# Patient Record
Sex: MALE | Race: BLACK OR AFRICAN AMERICAN | NOT HISPANIC OR LATINO
[De-identification: names, ages, dates, MRNs, and addresses within clinical notes are randomized per-mention and may not be internally consistent; named-entity substitution may affect disease eponyms.]

---
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
## 2021-01-12 ENCOUNTER — NON-APPOINTMENT (OUTPATIENT)
Age: 21
End: 2021-01-12

## 2021-01-13 ENCOUNTER — APPOINTMENT (OUTPATIENT)
Dept: PEDIATRIC HEMATOLOGY/ONCOLOGY | Facility: CLINIC | Age: 21
End: 2021-01-13

## 2021-01-13 ENCOUNTER — OUTPATIENT (OUTPATIENT)
Dept: OUTPATIENT SERVICES | Age: 21
LOS: 1 days | End: 2021-01-13

## 2021-01-13 DIAGNOSIS — Z98.89 OTHER SPECIFIED POSTPROCEDURAL STATES: Chronic | ICD-10-CM

## 2021-01-13 DIAGNOSIS — Q24.9 CONGENITAL MALFORMATION OF HEART, UNSPECIFIED: Chronic | ICD-10-CM

## 2021-01-13 DIAGNOSIS — Z98.890 OTHER SPECIFIED POSTPROCEDURAL STATES: Chronic | ICD-10-CM

## 2021-03-12 NOTE — PATIENT PROFILE PEDIATRIC. - EXTENSIONS OF SELF_PEDS
none Protopic Pregnancy And Lactation Text: This medication is Pregnancy Category C. It is unknown if this medication is excreted in breast milk when applied topically.

## 2021-03-23 ENCOUNTER — APPOINTMENT (OUTPATIENT)
Dept: PEDIATRIC CARDIOLOGY | Facility: CLINIC | Age: 21
End: 2021-03-23
Payer: MEDICAID

## 2021-03-23 VITALS
HEIGHT: 66.54 IN | DIASTOLIC BLOOD PRESSURE: 65 MMHG | OXYGEN SATURATION: 90 % | SYSTOLIC BLOOD PRESSURE: 128 MMHG | HEART RATE: 85 BPM | BODY MASS INDEX: 23.11 KG/M2 | WEIGHT: 145.51 LBS

## 2021-03-23 PROCEDURE — 99072 ADDL SUPL MATRL&STAF TM PHE: CPT

## 2021-03-23 PROCEDURE — 93288 INTERROG EVL PM/LDLS PM IP: CPT

## 2021-03-23 PROCEDURE — 99214 OFFICE O/P EST MOD 30 MIN: CPT

## 2021-03-24 NOTE — DISCUSSION/SUMMARY
[Needs SBE Prophylaxis] : [unfilled]  needs bacterial endocarditis prophylaxis. SBE prophylaxis is indicated for dental and invasive ENT procedures. (Circulation. 2007; 116: 9866-9992) [PE + No Varsity Sports or Strenuous Activity] : [unfilled] may participate in the physical education program, WITH RESTRICTION from all varsity sports and from excessively stressful activities such as rope climbing, weight lifting, sustained running (i.e. laps) and fitness testing. Must be allowed to rest when tired. [FreeTextEntry1] : In summary Jimbo is a 20 year old with history of DILV, L-malposition of the great arteries status post lateral tunnel Fontan with sick sinus node syndrome, AV block with tachybradycardia syndrome (and IART), s/p dual chamber pacemaker and CRT system who is seen today for follow up. He has an RV lead fracture and is only LV paced now. Today on pacemaker interrogation, he is noticed to be in AT that started 3 days ago. He has remained asymptomatic through this. I am reassured that he has no signs of low cardiac output. In the past ATP has not worked for him to abort the AT. At this time, knowing that he has had frequent recurrent AT while he is anticoagulated and rate controlled because of his heart block, I don’t see any benefit of cardioversion at this time.  I recommended that he undergoes an ablation for IART. The risks and benefits of the procedure were discussed with mother and the patient. We will work on scheduling the procedure. I advised him to send weekly transmissions and follow up in 2-3 months. He will follow up with Dr Londono in 3 months.

## 2021-03-24 NOTE — CARDIOLOGY SUMMARY
[Today's Date] : [unfilled] [FreeTextEntry1] : Likely IART with atrial rate of 250. V paced at the rate of 80. QTc in the setting of V pacing is 450 ms.  [de-identified] : PACEMAKER EVALUATION (See separate note for details): Presenting rhythm was AT  @ 80 bpm. CL of the AT was ~ 240 ms. The pacemaker mode is DDDR with an AV delay of 100/130 ms. PVARP is 250 ms with 2:1 block at 188. The lower rate limit is 80 with an upper tracking limit of 115 bpm. He has 16 months remaining on the battery. Adequate lead parameters of the LV lead. Low p wave amplitudes. \par Several events noted of AT. Since 3 days ago patient has consistently been in AT with V rate of 80. No changes were made.

## 2021-03-24 NOTE — HISTORY OF PRESENT ILLNESS
[FreeTextEntry1] : It was a pleasure to see Jimbo Myers today in the EP clinic for the follow up of pacemaker. As you know, he is a 20 year old with single ventricle physiology (double-inlet left ventricle with L-malposition of the great arteries) who is status post Jykqd-Ejlj-Uagtfwy anastomosis and aortic arch reconstruction followed by a fenestrated lateral tunnel Fontan completion (now s/p fenestration closure). He underwent placement of dual-chamber epicardial leads for AV sequential pacing and antitachycardia functionality for sick sinus node syndrome with AV mihir disease, tachybradycardia syndrome and IART. \par He developed features of failing Fontan in 2016- cardiac cath revealed LV dysfunction with elevated PVR and mild fontan obstruction; a stent was placed at the site of Fontan stenosis, pulmonary vasodilators and CHF meds were initiated and cardiac resynchronization therapy with placement of additional ventricular lead led to an excellent clinical response and he was noted to be doing well until recently when in June, 2020 he presented to the ER with syncope. CT head was performed and was normal, however CXR revealed a likely RV fracture and upon device interrogation the RV lead impedance did increase. He has previously been on coumadin for anti-coagulation but it was changed to Rivoraxiban by Hematology.  \par \par He was admitted to PICU in Sept, 2020 after being seen in the clinic for A. fib that required cardioversion. He was last seen in the clinic on Dec 2020 when he was doing well. Today he denies any symptoms of chest pain, palpitations, dizziness or syncope. He has recently started lifting weights. He lifts about 50 lbs in each hand, he does it twice a day (for 2 hrs) and has not had any limitations with exercise. He says that he is compliant with his medications. He continues on Sotalol 120 mg twice a day and Xarelto. \par

## 2021-03-24 NOTE — PHYSICAL EXAM
[Facies] : the head and face were normal in appearance [Sclera] : the sclera were normal [Outer Ear] : the ears and nose were normal in appearance [II] : a grade 2/6 [LLSB] : LLSB  [Apical] : apex [Holodiastolic] : holodiastolic [Abdomen Soft] : soft [Nail Clubbing] : no clubbing  or cyanosis of the fingernails [Delayed Developmental Milestones] : normal neurologic development for age [] : no rash [Demonstrated Behavior - Infant Nonreactive To Parents] : interactive [FreeTextEntry1] : Normal S1 and single S2

## 2021-03-24 NOTE — CONSULT LETTER
[Today's Date] : [unfilled] [Name] : Name: [unfilled] [] : : ~~ [Today's Date:] : [unfilled] [Dear  ___:] : Dear Dr. [unfilled]: [Consult] : I had the pleasure of evaluating your patient, [unfilled]. My full evaluation follows. [Consult - Single Provider] : Thank you very much for allowing me to participate in the care of this patient. If you have any questions, please do not hesitate to contact me. [Sincerely,] : Sincerely, [DrAmarilys ___] : Dr. WU [FreeTextEntry4] : Adonis Londono MD [FreeTextEntry5] : Pediatric Cardiology [FreeTextEntry6] : [facility] [place] [de-identified] : Issa Christensen MD, FACC, FHRS, FAAP\par Associate Chief, Pediatric Cardiology\par , Pediatric Cardiac Electrophysiology\par [facility]\par [facility]\par Professor of Pediatrics\par [employer]\par \par \par

## 2021-03-24 NOTE — REASON FOR VISIT
[Follow-Up] : a follow-up visit for [S/P Cardiac Surgery] : status post cardiac surgery [S/P Catheterization] : status post catheterization [Atrial Flutter] : atrial flutter [Patient] : patient [Mother] : mother [FreeTextEntry1] : sinus node dysfunction [FreeTextEntry3] : complex congenital heart disease

## 2021-04-19 ENCOUNTER — APPOINTMENT (OUTPATIENT)
Dept: PEDIATRIC CARDIOLOGY | Facility: CLINIC | Age: 21
End: 2021-04-19

## 2021-05-18 ENCOUNTER — APPOINTMENT (OUTPATIENT)
Dept: PEDIATRIC CARDIOLOGY | Facility: CLINIC | Age: 21
End: 2021-05-18
Payer: MEDICAID

## 2021-05-18 ENCOUNTER — OUTPATIENT (OUTPATIENT)
Dept: OUTPATIENT SERVICES | Age: 21
LOS: 1 days | End: 2021-05-18

## 2021-05-18 ENCOUNTER — APPOINTMENT (OUTPATIENT)
Dept: PEDIATRIC CARDIOLOGY | Facility: CLINIC | Age: 21
End: 2021-05-18

## 2021-05-18 VITALS
DIASTOLIC BLOOD PRESSURE: 80 MMHG | OXYGEN SATURATION: 92 % | HEART RATE: 83 BPM | WEIGHT: 145.73 LBS | HEIGHT: 65.55 IN | TEMPERATURE: 97 F | SYSTOLIC BLOOD PRESSURE: 129 MMHG

## 2021-05-18 DIAGNOSIS — Z98.89 OTHER SPECIFIED POSTPROCEDURAL STATES: Chronic | ICD-10-CM

## 2021-05-18 DIAGNOSIS — Z98.890 OTHER SPECIFIED POSTPROCEDURAL STATES: Chronic | ICD-10-CM

## 2021-05-18 DIAGNOSIS — Z95.0 PRESENCE OF CARDIAC PACEMAKER: Chronic | ICD-10-CM

## 2021-05-18 DIAGNOSIS — Z95.818 PRESENCE OF OTHER CARDIAC IMPLANTS AND GRAFTS: Chronic | ICD-10-CM

## 2021-05-18 DIAGNOSIS — I49.5 SICK SINUS SYNDROME: ICD-10-CM

## 2021-05-18 DIAGNOSIS — Q20.4 DOUBLE INLET VENTRICLE: ICD-10-CM

## 2021-05-18 DIAGNOSIS — Q24.9 CONGENITAL MALFORMATION OF HEART, UNSPECIFIED: Chronic | ICD-10-CM

## 2021-05-18 LAB
ALBUMIN SERPL ELPH-MCNC: 4.9 G/DL — SIGNIFICANT CHANGE UP (ref 3.3–5)
ALP SERPL-CCNC: 125 U/L — SIGNIFICANT CHANGE UP (ref 60–270)
ALT FLD-CCNC: 26 U/L — SIGNIFICANT CHANGE UP (ref 4–41)
ANION GAP SERPL CALC-SCNC: 13 MMOL/L — SIGNIFICANT CHANGE UP (ref 7–14)
AST SERPL-CCNC: 21 U/L — SIGNIFICANT CHANGE UP (ref 4–40)
BILIRUB SERPL-MCNC: 1.1 MG/DL — SIGNIFICANT CHANGE UP (ref 0.2–1.2)
BLD GP AB SCN SERPL QL: NEGATIVE — SIGNIFICANT CHANGE UP
BUN SERPL-MCNC: 16 MG/DL — SIGNIFICANT CHANGE UP (ref 7–23)
CALCIUM SERPL-MCNC: 9.8 MG/DL — SIGNIFICANT CHANGE UP (ref 8.4–10.5)
CHLORIDE SERPL-SCNC: 103 MMOL/L — SIGNIFICANT CHANGE UP (ref 98–107)
CO2 SERPL-SCNC: 24 MMOL/L — SIGNIFICANT CHANGE UP (ref 22–31)
CREAT SERPL-MCNC: 0.85 MG/DL — SIGNIFICANT CHANGE UP (ref 0.5–1.3)
GLUCOSE SERPL-MCNC: 95 MG/DL — SIGNIFICANT CHANGE UP (ref 70–99)
HCT VFR BLD CALC: 49.7 % — SIGNIFICANT CHANGE UP (ref 39–50)
HGB BLD-MCNC: 15.4 G/DL — SIGNIFICANT CHANGE UP (ref 13–17)
MCHC RBC-ENTMCNC: 25.8 PG — LOW (ref 27–34)
MCHC RBC-ENTMCNC: 31 GM/DL — LOW (ref 32–36)
MCV RBC AUTO: 83.4 FL — SIGNIFICANT CHANGE UP (ref 80–100)
NRBC # BLD: 0 /100 WBCS — SIGNIFICANT CHANGE UP
NRBC # FLD: 0 K/UL — SIGNIFICANT CHANGE UP
NT-PROBNP SERPL-SCNC: 211 PG/ML — SIGNIFICANT CHANGE UP
PLATELET # BLD AUTO: 112 K/UL — LOW (ref 150–400)
POTASSIUM SERPL-MCNC: 4.2 MMOL/L — SIGNIFICANT CHANGE UP (ref 3.5–5.3)
POTASSIUM SERPL-SCNC: 4.2 MMOL/L — SIGNIFICANT CHANGE UP (ref 3.5–5.3)
PROT SERPL-MCNC: 8.1 G/DL — SIGNIFICANT CHANGE UP (ref 6–8.3)
RBC # BLD: 5.96 M/UL — HIGH (ref 4.2–5.8)
RBC # FLD: 13.9 % — SIGNIFICANT CHANGE UP (ref 10.3–14.5)
RH IG SCN BLD-IMP: NEGATIVE — SIGNIFICANT CHANGE UP
SODIUM SERPL-SCNC: 140 MMOL/L — SIGNIFICANT CHANGE UP (ref 135–145)
WBC # BLD: 5.37 K/UL — SIGNIFICANT CHANGE UP (ref 3.8–10.5)
WBC # FLD AUTO: 5.37 K/UL — SIGNIFICANT CHANGE UP (ref 3.8–10.5)

## 2021-05-18 PROCEDURE — 93303 ECHO TRANSTHORACIC: CPT

## 2021-05-18 PROCEDURE — 93325 DOPPLER ECHO COLOR FLOW MAPG: CPT

## 2021-05-18 PROCEDURE — 93320 DOPPLER ECHO COMPLETE: CPT

## 2021-05-18 NOTE — H&P PST ADULT - NSICDXPASTMEDICALHX_GEN_ALL_CORE_FT
PAST MEDICAL HISTORY:  Atrial tachycardia     AV block     D-TGA (dextro-transposition of great arteries)     Double inlet left ventricle     Hepatomegaly     Other ascites     Pacemaker     Pulmonary hypertension     S/P Fontan procedure     Sick sinus syndrome      PAST MEDICAL HISTORY:  Atrial tachycardia     AV block     D-TGA (dextro-transposition of great arteries)     Double inlet left ventricle     Hepatomegaly     Other ascites     Pacemaker     Pulmonary hypertension     Sick sinus syndrome      PAST MEDICAL HISTORY:  Atrial tachycardia     AV block     Coagulopathy     D-TGA (dextro-transposition of great arteries)     Double inlet left ventricle     Hepatomegaly     Other ascites     Pacemaker     Pulmonary hypertension     Sick sinus syndrome

## 2021-05-18 NOTE — H&P PST ADULT - REASON FOR ADMISSION
PST evaluation prior to bilateral heart cath EPS ablation HOLA with Dr. Christensen on 5/24/21 at [facility] [place].

## 2021-05-18 NOTE — H&P PST ADULT - CARDIOVASCULAR COMMENTS
well healed MS incision H/o DILV, L-malposition of the great arteries status post lateral tunnel Fontan with sick sinus node syndrome, AV block with tachybradycardia syndrome (and IART), s/p dual chamber pacemaker and CRT system who is seen today for follow up. He has an RV lead fracture and is only LV paced. Now scheduled for bilateral heart cath EPS ablation and HOLA with Dr. Christensen on 5/24/2021. Please see attached cardiology for further details.

## 2021-05-18 NOTE — H&P PST ADULT - NSICDXPASTSURGICALHX_GEN_ALL_CORE_FT
PAST SURGICAL HISTORY:  Cardiac anomaly, congenital     S/P bidirectional Storm shunt     S/P Fontan procedure fenestration closed with device    S/P Fontan procedure Cardiac cath with stent in Fontan conduit placed 10/24/16    [facility]     Status post Fontan operation      PAST SURGICAL HISTORY:  Cardiac pacemaker 5/26/2004    S/P Nirmal-Taussig shunt Neqkz-Qgkd-Rbfgzjz anastomosis with modified right BT shunt and creation of pulmonary atresia    S/P cardiac cath 2001-assessment of anatomy prior to 1st surgery  4/13/2004- coil emolization of collateral vessels  7/25/2011-balloon angioplasty of superior narrowing of Fontan circuit, balloon angioplasty of LPA, ASD device placed for closure of Fontan fenestraion  10/24/2016- Cath of Fontan    S/P celestine-Fontan operation 3/15/2003- Celestine-Fontan with ligation of BT shunt and left pulmonary artery plasty

## 2021-05-18 NOTE — H&P PST ADULT - NS PRO REFERRAL CMGT
Review of education for day of procedure was provided. Pt. expressed strong understanding due to previous surgical/medical experience. Pt. appeared to be coping well.

## 2021-05-18 NOTE — H&P PST ADULT - ASSESSMENT
21yo male with complex cardiac h/o DILV, L-malposition of great arteries, sick sinus syndrome and AV mihir disease with tachybradycardia syndrome with a dual chamber pacemaker (currently with RV lead fracture and is currently only LV paced). PSH of status post Qpfsy-Gpzd-Uwfoydq anastomosis and aortic arch reconstruction followed by a fenestrated lateral tunnel Fontan completion (now s/p fenestration closure). multiple cardiac caths, pacemaker placement, CBC, CMP, Pro-BNP, and T/S sent as indicated. No evidence of acute illness or infection. Child life prep with family. CHG wipes given and detailed instructions given.    *Per cardiology patient instructed to hold Sotalol started 5/16/21 (mother confirmed medication was stopped) and to hold Xarelto on day of procedure.  19yo male with complex cardiac h/o DILV, L-malposition of great arteries, sick sinus syndrome and AV mihir disease with tachybradycardia syndrome with a dual chamber pacemaker (currently with RV lead fracture and is currently only LV paced). PSH of status post Bndkh-Zaiw-Pnfsrtr anastomosis and aortic arch reconstruction followed by a fenestrated lateral tunnel Fontan completion (now s/p fenestration closure). multiple cardiac caths, pacemaker placement, CBC, CMP, Pro-BNP, and T/S sent as indicated. Pt is scheduled for Covid PCR on 5/21/21.  No evidence of acute illness or infection. Child life prep with family. CHG wipes given and detailed instructions given.    *Per cardiology patient instructed to hold Sotalol started 5/16/21 (mother confirmed medication was stopped) and to hold Xarelto on day of procedure.  19yo male with complex cardiac h/o DILV, L-malposition of great arteries, sick sinus syndrome and AV mihir disease with tachybradycardia syndrome with a dual chamber pacemaker (currently with RV lead fracture and is currently only LV paced). PSH of status post Vucwy-Whfa-Rdqfabe anastomosis and aortic arch reconstruction followed by a fenestrated lateral tunnel Fontan completion (now s/p fenestration closure). multiple cardiac caths, pacemaker placement, CBC, CMP, Pro-BNP, and T/S sent as indicated. Pt is scheduled for Covid PCR on 5/21/21.  No evidence of acute illness or infection. Child life prep with family. CHG wipes given and detailed instructions given.    *Per cardiology patient instructed to hold Sotalol starting 5/16/21 (mother confirmed medication was stopped) and to hold Xarelto on day of procedure.

## 2021-05-18 NOTE — H&P PST ADULT - NSICDXPROBLEM_GEN_ALL_CORE_FT
PROBLEM DIAGNOSES  Problem: Sick sinus syndrome  Assessment and Plan: bilateral heart cath [place]A [place]. [place] [facility] [place]       PROBLEM DIAGNOSES  Problem: Sick sinus syndrome  Assessment and Plan: bilateral heart cath EPS ablation HOLA with Dr. Christensen on 5/24/21 at [facility] [place].    Problem: DILV (double inlet left ventricle)  Assessment and Plan: hold Sotalol starting 5/16/21    Problem: Coagulopathy  Assessment and Plan:  hold Xarelto on day of procedure

## 2021-05-18 NOTE — H&P PST ADULT - URINARY CATHETER
From: Neil Davis  To: Neil Gordon MD  Sent: 8/11/2020 3:40 PM CDT  Subject: Lab Test or Test Related Question    Heldaquan Gordon,  After a little over a month of daily taking 11 mg of Xeljanz XR,I would like to schedule updated blood and liver tests...to follow, if any internal effects or changes. I can tell you physical effects I feel that were not there prior to,some muscle aching,shortness of breath..the feeling of tiredness more than usual. I am also the effect on my liver if any..reading the Xeljanz medical guide there is a risk of increased of death in people 50 years and older I'm 73 with rheumatoid arthritis..I would like to confirm or deny if I am within that percentage/classification. Also discuss the additional supplements and or medication I am taking and its interaction with the Xeljanz xr. Please reply  Thank you Mr. Neil Davis  
no

## 2021-05-18 NOTE — H&P PST ADULT - NOTES
[employer] bud[employer] FHx: Mother: PCOS, Father: Healthy, Sister (5yo): Healthy. No family history of anesthesia complications or prolonged bleeding. FHx: Mother: PCOS, Father: Healthy, Sister (7yo): Healthy. No family history of anesthesia complications or prolonged bleeding. All vaccines reportedly UTD. No vaccine in past 2 weeks, educated parent on avoiding any vaccines until 3 days after surgery. No recent travel in the past few months in or outside of the [place]. No known exposure to anyone with Covid-19 virus.

## 2021-05-18 NOTE — H&P PST ADULT - EKG AND INTERPRETATION
EK2021. Likely IART with atrial rate of 250. V paced at the rate of 80. QTc in the setting of V pacing is 450 ms.  Echo: 2020. No significant interval change as compare to previous echo. Mild to moderate AV valve regurgitation. (see attached)

## 2021-05-18 NOTE — H&P PST ADULT - HEMATOLOGY/LYMPHATICS COMMENTS
Follows with hematology, maintained on Xarelto to prevent any thromboembolism/ stroke associated with underlying cardiac disorder.

## 2021-05-19 DIAGNOSIS — Q20.4 DOUBLE INLET VENTRICLE: ICD-10-CM

## 2021-05-19 DIAGNOSIS — D68.9 COAGULATION DEFECT, UNSPECIFIED: ICD-10-CM

## 2021-05-20 DIAGNOSIS — Z01.818 ENCOUNTER FOR OTHER PREPROCEDURAL EXAMINATION: ICD-10-CM

## 2021-05-21 ENCOUNTER — APPOINTMENT (OUTPATIENT)
Dept: DISASTER EMERGENCY | Facility: CLINIC | Age: 21
End: 2021-05-21

## 2021-05-21 PROBLEM — D68.9 COAGULATION DEFECT, UNSPECIFIED: Chronic | Status: ACTIVE | Noted: 2021-05-19

## 2021-05-22 LAB — SARS-COV-2 N GENE NPH QL NAA+PROBE: NOT DETECTED

## 2021-05-24 ENCOUNTER — APPOINTMENT (OUTPATIENT)
Dept: DISASTER EMERGENCY | Facility: CLINIC | Age: 21
End: 2021-05-24

## 2021-05-25 LAB — SARS-COV-2 N GENE NPH QL NAA+PROBE: NOT DETECTED

## 2021-05-26 ENCOUNTER — INPATIENT (INPATIENT)
Age: 21
LOS: 33 days | Discharge: HOME | End: 2021-06-29
Attending: PEDIATRICS | Admitting: PEDIATRICS
Payer: MEDICAID

## 2021-05-26 VITALS
RESPIRATION RATE: 20 BRPM | TEMPERATURE: 98 F | HEART RATE: 81 BPM | SYSTOLIC BLOOD PRESSURE: 127 MMHG | OXYGEN SATURATION: 94 % | HEIGHT: 65.55 IN | WEIGHT: 145.73 LBS | DIASTOLIC BLOOD PRESSURE: 72 MMHG

## 2021-05-26 DIAGNOSIS — Z98.890 OTHER SPECIFIED POSTPROCEDURAL STATES: Chronic | ICD-10-CM

## 2021-05-26 DIAGNOSIS — Z95.0 PRESENCE OF CARDIAC PACEMAKER: Chronic | ICD-10-CM

## 2021-05-26 DIAGNOSIS — Z95.818 PRESENCE OF OTHER CARDIAC IMPLANTS AND GRAFTS: Chronic | ICD-10-CM

## 2021-05-26 DIAGNOSIS — Q20.4 DOUBLE INLET VENTRICLE: ICD-10-CM

## 2021-05-26 LAB
BASE EXCESS BLDC CALC-SCNC: -2.5 MMOL/L — SIGNIFICANT CHANGE UP
BLD GP AB SCN SERPL QL: NEGATIVE — SIGNIFICANT CHANGE UP
BLOOD GAS PROFILE - CAPILLARY W/ LACTATE RESULT: SIGNIFICANT CHANGE UP
CA-I BLDC-SCNC: 1.09 MMOL/L — LOW (ref 1.1–1.35)
COHGB MFR BLDC: 1 % — SIGNIFICANT CHANGE UP
HCO3 BLDC-SCNC: 23 MMOL/L — SIGNIFICANT CHANGE UP
HGB BLD-MCNC: 15.6 G/DL — SIGNIFICANT CHANGE UP (ref 13–17)
LACTATE, CAPILLARY RESULT: 1.8 MMOL/L — HIGH (ref 0.5–1.6)
METHGB MFR BLDC: 0.8 % — SIGNIFICANT CHANGE UP
OXYHGB MFR BLDC: 92.7 % — SIGNIFICANT CHANGE UP
PCO2 BLDC: 30.4 MMHG — SIGNIFICANT CHANGE UP (ref 30–65)
PH BLDC: 7.45 — SIGNIFICANT CHANGE UP (ref 7.2–7.45)
PO2 BLDC: 66 MMHG — SIGNIFICANT CHANGE UP (ref 30–65)
POTASSIUM BLDC-SCNC: 4.3 MMOL/L — SIGNIFICANT CHANGE UP (ref 3.5–5)
RH IG SCN BLD-IMP: NEGATIVE — SIGNIFICANT CHANGE UP
SAO2 % BLDC: 94.4 % — SIGNIFICANT CHANGE UP
SODIUM BLDC-SCNC: 140 MMOL/L — SIGNIFICANT CHANGE UP (ref 135–145)

## 2021-05-26 PROCEDURE — 94681 O2 UPTK CO2 OUTP % O2 XTRC: CPT | Mod: 26

## 2021-05-26 PROCEDURE — 93531: CPT | Mod: 26

## 2021-05-26 PROCEDURE — 93325 DOPPLER ECHO COLOR FLOW MAPG: CPT | Mod: 26

## 2021-05-26 PROCEDURE — 93568 NJX CAR CTH NSLC P-ART ANGRP: CPT | Mod: 59

## 2021-05-26 PROCEDURE — 93320 DOPPLER ECHO COMPLETE: CPT | Mod: 26

## 2021-05-26 PROCEDURE — 93653 COMPRE EP EVAL TX SVT: CPT | Mod: 22

## 2021-05-26 PROCEDURE — 93010 ELECTROCARDIOGRAM REPORT: CPT

## 2021-05-26 PROCEDURE — 93621 COMP EP EVL L PAC&REC C SINS: CPT | Mod: 26

## 2021-05-26 PROCEDURE — 93613 INTRACARDIAC EPHYS 3D MAPG: CPT

## 2021-05-26 PROCEDURE — 76937 US GUIDE VASCULAR ACCESS: CPT | Mod: 26

## 2021-05-26 PROCEDURE — 71045 X-RAY EXAM CHEST 1 VIEW: CPT | Mod: 26

## 2021-05-26 PROCEDURE — 99291 CRITICAL CARE FIRST HOUR: CPT

## 2021-05-26 PROCEDURE — 93010 ELECTROCARDIOGRAM REPORT: CPT | Mod: 77

## 2021-05-26 PROCEDURE — 93315 ECHO TRANSESOPHAGEAL: CPT | Mod: 26

## 2021-05-26 RX ORDER — PROPOFOL 10 MG/ML
1 INJECTION, EMULSION INTRAVENOUS
Qty: 1000 | Refills: 0 | Status: DISCONTINUED | OUTPATIENT
Start: 2021-05-26 | End: 2021-05-27

## 2021-05-26 RX ORDER — PROPOFOL 10 MG/ML
10 INJECTION, EMULSION INTRAVENOUS ONCE
Refills: 0 | Status: DISCONTINUED | OUTPATIENT
Start: 2021-05-26 | End: 2021-05-27

## 2021-05-26 RX ORDER — PROPOFOL 10 MG/ML
30 INJECTION, EMULSION INTRAVENOUS ONCE
Refills: 0 | Status: COMPLETED | OUTPATIENT
Start: 2021-05-26 | End: 2021-05-26

## 2021-05-26 RX ORDER — DEXTROSE MONOHYDRATE, SODIUM CHLORIDE, AND POTASSIUM CHLORIDE 50; .745; 4.5 G/1000ML; G/1000ML; G/1000ML
1000 INJECTION, SOLUTION INTRAVENOUS
Refills: 0 | Status: DISCONTINUED | OUTPATIENT
Start: 2021-05-26 | End: 2021-05-27

## 2021-05-26 RX ORDER — SOTALOL HCL 120 MG
120 TABLET ORAL EVERY 12 HOURS
Refills: 0 | Status: DISCONTINUED | OUTPATIENT
Start: 2021-05-26 | End: 2021-05-26

## 2021-05-26 RX ORDER — FUROSEMIDE 40 MG
20 TABLET ORAL EVERY 12 HOURS
Refills: 0 | Status: DISCONTINUED | OUTPATIENT
Start: 2021-05-27 | End: 2021-05-28

## 2021-05-26 RX ORDER — CALCIUM GLUCONATE 100 MG/ML
2000 VIAL (ML) INTRAVENOUS ONCE
Refills: 0 | Status: COMPLETED | OUTPATIENT
Start: 2021-05-26 | End: 2021-05-26

## 2021-05-26 RX ORDER — PROPOFOL 10 MG/ML
20 INJECTION, EMULSION INTRAVENOUS ONCE
Refills: 0 | Status: COMPLETED | OUTPATIENT
Start: 2021-05-26 | End: 2021-05-26

## 2021-05-26 RX ORDER — ROCURONIUM BROMIDE 10 MG/ML
66 VIAL (ML) INTRAVENOUS ONCE
Refills: 0 | Status: COMPLETED | OUTPATIENT
Start: 2021-05-26 | End: 2021-05-26

## 2021-05-26 RX ORDER — MILRINONE LACTATE 1 MG/ML
0.5 INJECTION, SOLUTION INTRAVENOUS
Qty: 20 | Refills: 0 | Status: DISCONTINUED | OUTPATIENT
Start: 2021-05-26 | End: 2021-05-27

## 2021-05-26 RX ORDER — SPIRONOLACTONE 25 MG/1
100 TABLET, FILM COATED ORAL DAILY
Refills: 0 | Status: DISCONTINUED | OUTPATIENT
Start: 2021-05-26 | End: 2021-05-27

## 2021-05-26 RX ORDER — PROPOFOL 10 MG/ML
66 INJECTION, EMULSION INTRAVENOUS
Refills: 0 | Status: DISCONTINUED | OUTPATIENT
Start: 2021-05-26 | End: 2021-05-26

## 2021-05-26 RX ORDER — LISINOPRIL 2.5 MG/1
5 TABLET ORAL DAILY
Refills: 0 | Status: DISCONTINUED | OUTPATIENT
Start: 2021-05-26 | End: 2021-05-26

## 2021-05-26 RX ADMIN — PROPOFOL 30 MILLIGRAM(S): 10 INJECTION, EMULSION INTRAVENOUS at 19:50

## 2021-05-26 RX ADMIN — SPIRONOLACTONE 100 MILLIGRAM(S): 25 TABLET, FILM COATED ORAL at 23:15

## 2021-05-26 RX ADMIN — PROPOFOL 6.61 MG/KG/HR: 10 INJECTION, EMULSION INTRAVENOUS at 18:41

## 2021-05-26 RX ADMIN — Medication 66 MILLIGRAM(S): at 20:55

## 2021-05-26 RX ADMIN — MILRINONE LACTATE 9.92 MICROGRAM(S)/KG/MIN: 1 INJECTION, SOLUTION INTRAVENOUS at 18:49

## 2021-05-26 RX ADMIN — MILRINONE LACTATE 9.92 MICROGRAM(S)/KG/MIN: 1 INJECTION, SOLUTION INTRAVENOUS at 19:42

## 2021-05-26 RX ADMIN — Medication 40 MILLIGRAM(S): at 22:23

## 2021-05-26 RX ADMIN — PROPOFOL 13.2 MG/KG/HR: 10 INJECTION, EMULSION INTRAVENOUS at 19:44

## 2021-05-26 RX ADMIN — PROPOFOL 30 MILLIGRAM(S): 10 INJECTION, EMULSION INTRAVENOUS at 20:54

## 2021-05-26 RX ADMIN — PROPOFOL 20 MILLIGRAM(S): 10 INJECTION, EMULSION INTRAVENOUS at 19:15

## 2021-05-26 NOTE — TRANSFER ACCEPTANCE NOTE - PMH
Atrial tachycardia    AV block    Coagulopathy    D-TGA (dextro-transposition of great arteries)    Double inlet left ventricle    Hepatomegaly    Other ascites    Pacemaker    Pulmonary hypertension    Sick sinus syndrome

## 2021-05-26 NOTE — TRANSFER ACCEPTANCE NOTE - ASSESSMENT
21yo male with complex cardiac h/o DILV, L-malposition of great arteries, sick sinus syndrome and AV mihir disease with tachybradycardia syndrome with a dual chamber pacemaker (currently with RV lead fracture and is currently only LV paced 2/2 complete heart block), PSH of status post Zkbww-Jqsu-Dyvciou anastomosis and aortic arch reconstruction followed by a fenestrated lateral tunnel Fontan completion (now s/p fenestration closure) here s/p cardiac catheterization and ablation. Procedure was complicated by unsuccessful ablation and post extubation patient was noted to have increased hemoptysis and desaturations, patient was reintubated for airway protection and given propofol for sedation. Hemoptysis most likely due traumatic intubation, patient is now stable on current vent settings. Will need to keep patient on high PEEP settings to help tamponade bleed. Additionally patient seems to be back in atrial arrythmia and will need long term plan to help break  21yo male with complex cardiac h/o DILV, L-malposition of great arteries, sick sinus syndrome and AV mihir disease with tachybradycardia syndrome with a dual chamber pacemaker (currently with RV lead fracture and is currently only LV paced 2/2 complete heart block), PSH of status post Nsnzx-Uumh-Behyguz anastomosis and aortic arch reconstruction followed by a fenestrated lateral tunnel Fontan completion (now s/p fenestration closure) here s/p cardiac catheterization and ablation. Procedure was complicated by unsuccessful ablation and post extubation patient was noted to have increased hemoptysis and desaturations, patient was reintubated for airway protection and given propofol for sedation. Hemoptysis most likely due traumatic intubation, patient is now stable on current vent settings. Will need to keep patient on high PEEP settings to help tamponade bleed. Additionally patient seems to be back in atrial arrythmia and will need long term plan to help break arrythmia. Patient is in critical condition and requires ICU level care.    #Resp  - SIMV PRVC RR 20  10/5  - CXR wnl  - Hemoptysis ongoing however hgb stable  - Lasix 20mg q12    #CV  - EKG done, continues in abnormal atrial rhythm  - Milrinone 0.5  - Sildenafil 20 mg q8  - holding sotalol per cardio, conversation ongoing about cardioversion versus amio drip  - holding lisinopril    #Heme  - xarelto on hold     #Neuro  - SBS 0  - Propofol @ 2    21yo male with complex cardiac h/o DILV, L-malposition of great arteries, sick sinus syndrome and AV mihir disease with tachybradycardia syndrome with a dual chamber pacemaker (currently with RV lead fracture and is currently only LV paced 2/2 complete heart block), PSH of status post Xrlmg-Wznu-Tzckloq anastomosis and aortic arch reconstruction followed by a fenestrated lateral tunnel Fontan completion (now s/p fenestration closure) here s/p cardiac catheterization and ablation. Procedure was complicated by unsuccessful ablation and post extubation patient was noted to have increased hemoptysis, work of breathing and desaturations, patient was reintubated for airway protection and given propofol for sedation.   Patient is in critical condition and requires ICU level care.    #Resp  - SIMV PRVC RR 20  10/5  - CXR wnl  - Hemoptysis ongoing however hgb stable  - Lasix 20mg q12    #CV  - EKG done, continues in abnormal atrial rhythm  - Milrinone 0.5  - Sildenafil 20 mg q8  - holding sotalol per cardio, conversation ongoing about cardioversion versus amio drip  - holding lisinopril    #Heme  - xarelto on hold     #Neuro  - SBS 0  - Propofol @ 2

## 2021-05-26 NOTE — TRANSFER ACCEPTANCE NOTE - ATTENDING COMMENTS
Patient seen and examined. Plan of care discussed with House Staff. Agree with H&P as above and edited as appropriate    Access 4 Fr RFA, 7 Fr RFV x2 with US guidance.  Sat data (%): on 50% FiO2 LPA 73, RUPVein 98, Ao 97; CI 2.6 L/min/m2 with Qp:Qs 1 :1.  Pressures (mmHg): Elevated mFontan = mIVC = 20. mPCW=16, No significant gradient across LV to AAo to Vikki (98/58/73).  Normal PVR while on sildenafil.  Angios showed unobstructed Fontan with existing stent within Fontan conduit.    Comment: IART ablation was attempted after the diagnostic cath but unsuccessful.  Patient was overdrive paced out of IART.  At the end of the case, Ao sat was 94% and LPA 66% on 50% FiO2.    Exam on arrival to PICU  Gen: sedated  Resp: vent assisted  CVS RRR nl S1/single s2, no murmurs  Abd sofyt nt/nd + hepatomegaly   Ext WWP 2+ DP throughout cath sites c/d/i no hematoma  Neuro sedated but moves extremities equally       A/P: 22 yo M DILV, L-malposed great arteries s/p lateral tunnel Fontan (closed fenestration) with sick sinus syndrome and complete heart block requiring pacing also with IART and failing Fontan physiology now admitted for further monitoring, assessment, and treatment s/p diagnostic cath and IART ablation attempt. He has elevated Fontan pressure secondary to LV diastolic dysfunction. He has bleeding from mouth and ETT likely  secondary to anticoagulation and HOLA (ddx includes collaterals)    cardiopulmonary monitoring,   wean vent to ERT as tolerated (goal sats ~92% room air (baseline)  start milrinone gtt (holding home lisinopril)   continue home sildenafil   restart home sotalol for IART in am   EKG now   lasix Q12 for even to slight negative fluid balance  continue home aldactone  hold home Xarelto as is still bleeding   SBS goal 0 on propofol gtt  vascular checks of cath sites     CBC. chem 20, coags now  CBC in am  CXR now and in am

## 2021-05-26 NOTE — PROCEDURE NOTE - ADDITIONAL PROCEDURE DETAILS
Access 4 Fr RFA, 7 Fr RFV x2 with US guidance.  Sat data (%): on 50% FiO2 LPA 73, RUPVein 98, Ao 97; CI 2.6 L/min/m2 with Qp:Qs 1 :1.  Pressures (mmHg): Elevated mFontan = mIVC = 20. mPCW=16, No significant gradient across LV to AAo to Vikki (98/58/73).  Normal PVR while on sildenafil.  Angios showed unobstructed Fontan with existing stent within Fontan conduit.    Comment: IART ablation was attempted after the diagnostic cath but unsuccessful.  Patient was overdrive paced out of IART.  At the end of the case, Ao sat was 94% and LPA 66% on 50% FiO2.  INTERVENTION.  A/P: 22 yo M DILV, L-malposed great arteries s/p Fontan with IART now s/p diagnostic cath with elevated Fontan pressure secondary to LV diastolic dysfunction.  - Transfer to PICU  - Above shared with family, PICU, cardiology. Access 4 Fr RFA, 7 Fr RFV x2 with US guidance.  Sat data (%): on 50% FiO2 LPA 73, RUPVein 98, Ao 97; CI 2.6 L/min/m2 with Qp:Qs 1 :1.  Pressures (mmHg): Elevated mFontan = mIVC = 20. mPCW=16, No significant gradient across LV to AAo to Vikki (98/58/73).  Normal PVR while on sildenafil.  Angios showed unobstructed Fontan with existing stent within Fontan conduit.    Comment: IART ablation was attempted after the diagnostic cath but unsuccessful.  Patient was overdrive paced out of IART.  At the end of the case, Ao sat was 94% and LPA 66% on 50% FiO2.  INTERVENTION.  A/P: 22 yo M DILV, L-malposed great arteries s/p Fontan with IART now s/p diagnostic cath with elevated Fontan pressure secondary to LV diastolic dysfunction.  - Transfer to PICU  - Resume home medications  - Above shared with family, PICU, cardiology.

## 2021-05-26 NOTE — TRANSFER ACCEPTANCE NOTE - HISTORY OF PRESENT ILLNESS
19yo male with complex cardiac h/o DILV, L-malposition of great arteries, sick sinus syndrome and AV mihir disease with tachybradycardia syndrome with a dual chamber pacemaker (currently with RV lead fracture and is currently only LV paced 2/2 complete heart block), PSH of status post Pyrmv-Blfy-Fanlzyi anastomosis and aortic arch reconstruction followed by a fenestrated lateral tunnel Fontan completion (now s/p fenestration closure). here s/p cardiac catheterization and ablation. Procedure was complicated by unsuccessful ablation and post extubation patient was noted to have increased hemoptysis and desaturations, patient was reintubated for airway protection and given propofol for sedation.

## 2021-05-27 DIAGNOSIS — I47.1 SUPRAVENTRICULAR TACHYCARDIA: ICD-10-CM

## 2021-05-27 DIAGNOSIS — Z95.0 PRESENCE OF CARDIAC PACEMAKER: ICD-10-CM

## 2021-05-27 DIAGNOSIS — I44.30 UNSPECIFIED ATRIOVENTRICULAR BLOCK: ICD-10-CM

## 2021-05-27 DIAGNOSIS — Q20.8 OTHER CONGENITAL MALFORMATIONS OF CARDIAC CHAMBERS AND CONNECTIONS: ICD-10-CM

## 2021-05-27 DIAGNOSIS — I51.9 HEART DISEASE, UNSPECIFIED: ICD-10-CM

## 2021-05-27 LAB
T3 SERPL-MCNC: 90 NG/DL — SIGNIFICANT CHANGE UP (ref 80–200)
T4 AB SER-ACNC: 9.77 UG/DL — SIGNIFICANT CHANGE UP (ref 5.1–13)
TSH SERPL-MCNC: 3.02 UIU/ML — SIGNIFICANT CHANGE UP (ref 0.5–4.3)

## 2021-05-27 PROCEDURE — 93303 ECHO TRANSTHORACIC: CPT | Mod: 26

## 2021-05-27 PROCEDURE — 99291 CRITICAL CARE FIRST HOUR: CPT

## 2021-05-27 PROCEDURE — 93325 DOPPLER ECHO COLOR FLOW MAPG: CPT | Mod: 26

## 2021-05-27 PROCEDURE — 93320 DOPPLER ECHO COMPLETE: CPT | Mod: 26

## 2021-05-27 PROCEDURE — 99292 CRITICAL CARE ADDL 30 MIN: CPT

## 2021-05-27 RX ORDER — RIVAROXABAN 15 MG-20MG
20 KIT ORAL DAILY
Refills: 0 | Status: DISCONTINUED | OUTPATIENT
Start: 2021-05-27 | End: 2021-06-01

## 2021-05-27 RX ORDER — SPIRONOLACTONE 25 MG/1
100 TABLET, FILM COATED ORAL DAILY
Refills: 0 | Status: DISCONTINUED | OUTPATIENT
Start: 2021-05-27 | End: 2021-06-02

## 2021-05-27 RX ORDER — LISINOPRIL 2.5 MG/1
5 TABLET ORAL DAILY
Refills: 0 | Status: DISCONTINUED | OUTPATIENT
Start: 2021-05-27 | End: 2021-06-02

## 2021-05-27 RX ORDER — CALCIUM GLUCONATE 100 MG/ML
2000 VIAL (ML) INTRAVENOUS ONCE
Refills: 0 | Status: COMPLETED | OUTPATIENT
Start: 2021-05-27 | End: 2021-05-27

## 2021-05-27 RX ORDER — AMIODARONE HYDROCHLORIDE 400 MG/1
300 TABLET ORAL
Refills: 0 | Status: DISCONTINUED | OUTPATIENT
Start: 2021-05-27 | End: 2021-06-01

## 2021-05-27 RX ORDER — ACETAMINOPHEN 500 MG
650 TABLET ORAL EVERY 6 HOURS
Refills: 0 | Status: DISCONTINUED | OUTPATIENT
Start: 2021-05-27 | End: 2021-05-27

## 2021-05-27 RX ORDER — PROPOFOL 10 MG/ML
66 INJECTION, EMULSION INTRAVENOUS
Refills: 0 | Status: DISCONTINUED | OUTPATIENT
Start: 2021-05-27 | End: 2021-05-27

## 2021-05-27 RX ORDER — DEXMEDETOMIDINE HYDROCHLORIDE IN 0.9% SODIUM CHLORIDE 4 UG/ML
0.3 INJECTION INTRAVENOUS
Qty: 1000 | Refills: 0 | Status: DISCONTINUED | OUTPATIENT
Start: 2021-05-27 | End: 2021-05-27

## 2021-05-27 RX ORDER — ACETAMINOPHEN 500 MG
650 TABLET ORAL EVERY 6 HOURS
Refills: 0 | Status: DISCONTINUED | OUTPATIENT
Start: 2021-05-27 | End: 2021-06-02

## 2021-05-27 RX ADMIN — Medication 20 MILLIGRAM(S): at 03:32

## 2021-05-27 RX ADMIN — Medication 20 MILLIGRAM(S): at 07:58

## 2021-05-27 RX ADMIN — Medication 20 MILLIGRAM(S): at 00:08

## 2021-05-27 RX ADMIN — MILRINONE LACTATE 9.92 MICROGRAM(S)/KG/MIN: 1 INJECTION, SOLUTION INTRAVENOUS at 07:22

## 2021-05-27 RX ADMIN — DEXMEDETOMIDINE HYDROCHLORIDE IN 0.9% SODIUM CHLORIDE 4.96 MICROGRAM(S)/KG/HR: 4 INJECTION INTRAVENOUS at 00:47

## 2021-05-27 RX ADMIN — Medication 20 MILLIGRAM(S): at 15:46

## 2021-05-27 RX ADMIN — Medication 20 MILLIGRAM(S): at 15:45

## 2021-05-27 RX ADMIN — PROPOFOL 9.92 MG/KG/HR: 10 INJECTION, EMULSION INTRAVENOUS at 00:15

## 2021-05-27 RX ADMIN — LISINOPRIL 5 MILLIGRAM(S): 2.5 TABLET ORAL at 15:46

## 2021-05-27 RX ADMIN — SPIRONOLACTONE 100 MILLIGRAM(S): 25 TABLET, FILM COATED ORAL at 23:19

## 2021-05-27 RX ADMIN — AMIODARONE HYDROCHLORIDE 300 MILLIGRAM(S): 400 TABLET ORAL at 15:45

## 2021-05-27 RX ADMIN — AMIODARONE HYDROCHLORIDE 300 MILLIGRAM(S): 400 TABLET ORAL at 23:18

## 2021-05-27 RX ADMIN — MILRINONE LACTATE 9.92 MICROGRAM(S)/KG/MIN: 1 INJECTION, SOLUTION INTRAVENOUS at 05:53

## 2021-05-27 RX ADMIN — RIVAROXABAN 20 MILLIGRAM(S): KIT at 15:46

## 2021-05-27 RX ADMIN — Medication 650 MILLIGRAM(S): at 11:32

## 2021-05-27 RX ADMIN — Medication 40 MILLIGRAM(S): at 05:54

## 2021-05-27 RX ADMIN — PROPOFOL 66 MILLIGRAM(S): 10 INJECTION, EMULSION INTRAVENOUS at 01:12

## 2021-05-27 NOTE — PROGRESS NOTE PEDS - ASSESSMENT
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 Patient asymptomatic and hemodynamically stable upon admission.  Admitted to PICU with plan for HOLA under sedation to evaluate for thrombus followed by synchronized cardioversion.    Resp   - RA  - pulse ox    CV  - Sotalol increased to 120 mg BID   - Enalapril 2.5 mg BID  - Spironolactone 100 mg daily  - Sildenafil 20 mg q8h  - Lasix 20 mg daily  - HOLA and cardioversion (w sedation/intubation) successful 9/23/20  - DDD pacing      Heme  - Restart Xarelto 20 mg daily  - Melody ARRIOLA  - tolerated regular PO diet over night  - NPO since 2 am 22 y/o male DILV, 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 He has elevated Fontan pressure secondary to LV diastolic dysfunction; pt failed extubation after cath, but extubated this morning.    PLAN:    Resp:  Encourage incentive spirometry  Wean NC as tolerated for goal SpO2 > 92%    CV:  Milrinone infusion at 0.5 mcg/kg/min  Continue home sildenafil   f/u with EP regarding cardioversion vs starting a new med  Holding home Sotalol  Continue Lasix IV q 12 hours (usually on Lasix daily)  Continue home aldactone    Heme:  hold home Xarelto   vascular checks of cath sites    22 y/o male DILV, 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 He has elevated Fontan pressure secondary to LV diastolic dysfunction; pt failed extubation after cath, but extubated this morning.    PLAN:    Resp:  Encourage incentive spirometry  Wean NC as tolerated for goal SpO2 > 92%    CV:  Milrinone infusion at 0.5 mcg/kg/min  (holding home Lisinopril)  Continue home sildenafil   f/u with EP regarding cardioversion vs starting a new med (potentially Amiodarone)  Holding home Sotalol  Continue Lasix IV q 12 hours (usually on Lasix daily)  Continue home aldactone    Heme:  hold home Xarelto   vascular checks of cath sites     FEN:  NPO for possible cardioversion

## 2021-05-27 NOTE — PROGRESS NOTE PEDS - SUBJECTIVE AND OBJECTIVE BOX
RESPIRATORY:  RR: 16 (21 @ 06:25) (10 - 25)  SpO2: 90% (21 @ 06:30) (88% - 96%)  Wt(kg): --    Respiratory Support:        CBG - ( 26 May 2021 20:18 )  pH: 7.45  /  pCO2: 30.4  /  pO2: 66.0  / HCO3: 23    / Base Excess: -2.5  /  SO2: 94.4  / Lactate: x            Respiratory Medications:          Comments:      CARDIOVASCULAR  HR: 75 (21 @ 06:30) (75 - 77)  BP: 108/60 (21 @ 06:25) (89/45 - 128/76)  Wt(kg): --  ABP: --  ABP(mean): --  Wt(kg): --  CVP(mm Hg): --  CVP(cm H2O): --  [ ] NIRS:  [ ] ECHO:   Cardiac Rhythm: NSR    Cardiovascular Medications:  furosemide   Oral Tab/Cap - Peds 20 milliGRAM(s) Oral every 12 hours  milrinone Infusion - Peds 0.5 MICROgram(s)/kG/Min IV Continuous <Continuous>  sildenafil   Oral Tab/Cap - Peds 20 milliGRAM(s) Oral every 8 hours  spironolactone Oral Liquid - Peds 100 milliGRAM(s) Oral daily      Comments:    HEMATOLOGIC/ONCOLOGIC:  ( @ 02:21):               14.9   7.87 )-----------(114                46.1   Neurophils% (auto):   81.1    manual%: x      Lymphocytes% (auto):  7.9     manual%: x      Eosinphils% (auto):   0.6     manual%: x      Bands%: x       blasts%: x        ( @ 19:11):               16.7   12.84)-----------(155                52.0   Neurophils% (auto):   78.1    manual%: x      Lymphocytes% (auto):  12.8    manual%: x      Eosinphils% (auto):   1.0     manual%: x      Bands%: x       blasts%: x          (  @ 19:11 )   PT: 15.0 sec;   INR: 1.32 ratio  aPTT: 42.6 sec           Transfusions last 24 hours:	  [ ] PRBC	[ ] Platelets    [ ] FFP	[ ] Cryoprecipitate    Hematologic/Oncologic Medications:    DVT Prophylaxis:    Comments:    INFECTIOUS DISEASE:  T(C): 37.8 (21 @ 05:00), Max: 37.9 (21 @ 02:00)  Wt(kg): --    Cultures:  RECENT CULTURES:        Medications:      Labs:        FLUIDS/ELECTROLYTES/NUTRITION:    Weight:  Daily Weight Gm: 43139 (26 May 2021 07:11)     @ 07:01  -   @ 07:00  --------------------------------------------------------  IN: 1174.2 mL / OUT: 1020 mL / NET: 154.2 mL          Labs:   @ 02:21    138    |  105    |  13     ----------------------------<  155    3.7     |  17     |  0.88     I.Ca:0.99  Mg:x     Ph:x           @ 19:11    139    |  106    |  16     ----------------------------<  119    6.0     |  18     |  1.01     I.Ca:0.97  M.9   Ph:6.0           @ 02:21  TPro  6.6     AST  25     Alb  4.0      ALT  17     TBili  1.1    AlkPhos  101    DBili  x      Trig: x       @ 19:11  TPro  7.1     AST  52     Alb  4.4      ALT  24     TBili  1.1    AlkPhos  110    DBili  x      Trig: x          	  Gastrointestinal Medications:  dextrose 5% + sodium chloride 0.9% with potassium chloride 20 mEq/L. - Pediatric 1000 milliLiter(s) IV Continuous <Continuous>      Comments:      NEUROLOGY:  [ ] SBS:	[ ] GISSELLE-1:         [ ] BIS:        Adequacy of sedation and pain control has been assessed and adjusted    Comments:      OTHER MEDICATIONS:  Endocrine/Metabolic Medications:    Genitourinary Medications:    Topical/Other Medications:      Necessity of urinary, arterial, and venous catheters discussed      PHYSICAL EXAM:      IMAGING STUDIES:        Parent/Guardian is at the bedside:   [ ] Yes   [  ] No  Patient and Parent/Guardian updated as to the progress/plan of care:  [  ] Yes	[  ] No    [ ] The patient remains in critical and unstable condition, and requires ICU care and monitoring  [ ] The patient is improving but requires continued monitoring and adjustment of therapy Bloody secretions improved, so patient extubated early this morning.     RESPIRATORY:  RR: 16 (21 @ 06:25) (10 - 25)  SpO2: 90% (21 @ 06:30) (88% - 96%)      Respiratory Support:  NC 2L        Respiratory Medications:          Comments:      CARDIOVASCULAR  HR: 75 (21 @ 06:30) (75 - 77)  BP: 108/60 (21 @ 06:25) (89/45 - 128/76)  [ ] NIRS:  [ ] ECHO:   Cardiac Rhythm: NSR    Cardiovascular Medications:  furosemide   Oral Tab/Cap - Peds 20 milliGRAM(s) Oral every 12 hours  milrinone Infusion - Peds 0.5 MICROgram(s)/kG/Min IV Continuous <Continuous>  sildenafil   Oral Tab/Cap - Peds 20 milliGRAM(s) Oral every 8 hours  spironolactone Oral Liquid - Peds 100 milliGRAM(s) Oral daily      Comments:    HEMATOLOGIC/ONCOLOGIC:  ( @ 02:21):               14.9   7.87 )-----------(114                46.1   Neurophils% (auto):   81.1    manual%: x      Lymphocytes% (auto):  7.9     manual%: x      Eosinphils% (auto):   0.6     manual%: x      Bands%: x       blasts%: x        ( @ 19:11):               16.7   12.84)-----------(155                52.0   Neurophils% (auto):   78.1    manual%: x      Lymphocytes% (auto):  12.8    manual%: x      Eosinphils% (auto):   1.0     manual%: x      Bands%: x       blasts%: x          (  @ 19:11 )   PT: 15.0 sec;   INR: 1.32 ratio  aPTT: 42.6 sec           Transfusions last 24 hours:	  [ ] PRBC	[ ] Platelets    [ ] FFP	[ ] Cryoprecipitate    Hematologic/Oncologic Medications:    DVT Prophylaxis:    Comments:    INFECTIOUS DISEASE:  T(C): 37.8 (21 @ 05:00), Max: 37.9 (21 @ 02:00)      Cultures:  RECENT CULTURES:        Medications:      Labs:        FLUIDS/ELECTROLYTES/NUTRITION:    Weight:  Daily Weight Gm: 58343 (26 May 2021 07:11)     @ 07:01  -   @ 07:00  --------------------------------------------------------  IN: 1174.2 mL / OUT: 1020 mL / NET: 154.2 mL          Labs:   @ 02:21    138    |  105    |  13     ----------------------------<  155    3.7     |  17     |  0.88     I.Ca:0.99  Mg:x     Ph:x           @ 19:11    139    |  106    |  16     ----------------------------<  119    6.0     |  18     |  1.01     I.Ca:0.97  M.9   Ph:6.0           @ 02:21  TPro  6.6     AST  25     Alb  4.0      ALT  17     TBili  1.1    AlkPhos  101    DBili  x      Trig: x       @ 19:11  TPro  7.1     AST  52     Alb  4.4      ALT  24     TBili  1.1    AlkPhos  110    DBili  x      Trig: x          	  Gastrointestinal Medications:  dextrose 5% + sodium chloride 0.9% with potassium chloride 20 mEq/L. - Pediatric 1000 milliLiter(s) IV Continuous <Continuous>      Comments:      NEUROLOGY:  [ ] SBS:	[ ] GISSELLE-1:         [ ] BIS:        Adequacy of sedation and pain control has been assessed and adjusted    Comments:      OTHER MEDICATIONS:  Endocrine/Metabolic Medications:    Genitourinary Medications:    Topical/Other Medications:      Necessity of urinary, arterial, and venous catheters discussed      PHYSICAL EXAM:      IMAGING STUDIES:  Cath data:  Sat data (%): on 50% FiO2 LPA 73, RUPVein 98, Ao 97; CI 2.6 L/min/m2 with Qp:Qs 1 :1.  Pressures (mmHg): Elevated mFontan = mIVC = 20. mPCW=16, No significant gradient across LV to AAo to Vikki (98/58/73).  Normal PVR while on sildenafil.  Angios showed unobstructed Fontan with existing stent within Fontan conduit.    Comment: IART ablation was attempted after the diagnostic cath but unsuccessful.  Patient was overdrive paced out of IART.  At the end of the case, Ao sat was 94% and LPA 66% on 50% FiO2.          Parent/Guardian is at the bedside:   [x] Yes   [  ] No  Patient and Parent/Guardian updated as to the progress/plan of care:  [x] Yes	[  ] No    [x] The patient remains in critical and unstable condition, and requires ICU care and monitoring  [ ] The patient is improving but requires continued monitoring and adjustment of therapy    Critical Care time by attending physician, excluding procedure time =  45 minutes Bloody secretions improved, so patient extubated early this morning to nasal cannula.     RESPIRATORY:  RR: 16 (21 @ 06:25) (10 - 25)  SpO2: 90% (21 @ 06:30) (88% - 96%)      Respiratory Support:  NC 2L    Respiratory Medications:      Comments:      CARDIOVASCULAR  HR: 75 (21 @ 06:30) (75 - 77)  BP: 108/60 (21 @ 06:25) (89/45 - 128/76)  [ ] NIRS:  [ ] ECHO:   Cardiac Rhythm: NSR    Cardiovascular Medications:  furosemide   Oral Tab/Cap - Peds 20 milliGRAM(s) Oral every 12 hours  milrinone Infusion - Peds 0.5 MICROgram(s)/kG/Min IV Continuous <Continuous>  sildenafil   Oral Tab/Cap - Peds 20 milliGRAM(s) Oral every 8 hours  spironolactone Oral Liquid - Peds 100 milliGRAM(s) Oral daily      Comments:    HEMATOLOGIC/ONCOLOGIC:  ( @ 02:21):               14.9   7.87 )-----------(114                46.1   Neurophils% (auto):   81.1    manual%: x      Lymphocytes% (auto):  7.9     manual%: x      Eosinphils% (auto):   0.6     manual%: x      Bands%: x       blasts%: x        ( @ 19:11):               16.7   12.84)-----------(155                52.0   Neurophils% (auto):   78.1    manual%: x      Lymphocytes% (auto):  12.8    manual%: x      Eosinphils% (auto):   1.0     manual%: x      Bands%: x       blasts%: x          (  @ 19:11 )   PT: 15.0 sec;   INR: 1.32 ratio  aPTT: 42.6 sec           Transfusions last 24 hours:	  [ ] PRBC	[ ] Platelets    [ ] FFP	[ ] Cryoprecipitate    Hematologic/Oncologic Medications:    DVT Prophylaxis:    Comments:    INFECTIOUS DISEASE:  T(C): 37.8 (21 @ 05:00), Max: 37.9 (21 @ 02:00)      Cultures:  RECENT CULTURES:        Medications:      Labs:        FLUIDS/ELECTROLYTES/NUTRITION:    Weight:  Daily Weight Gm: 02006 (26 May 2021 07:11)     @ 07:01  -   @ 07:00  --------------------------------------------------------  IN: 1174.2 mL / OUT: 1020 mL / NET: 154.2 mL          Labs:   @ 02:21    138    |  105    |  13     ----------------------------<  155    3.7     |  17     |  0.88     I.Ca:0.99  Mg:x     Ph:x           @ 19:11    139    |  106    |  16     ----------------------------<  119    6.0     |  18     |  1.01     I.Ca:0.97  M.9   Ph:6.0           @ 02:21  TPro  6.6     AST  25     Alb  4.0      ALT  17     TBili  1.1    AlkPhos  101    DBili  x      Trig: x       @ 19:11  TPro  7.1     AST  52     Alb  4.4      ALT  24     TBili  1.1    AlkPhos  110    DBili  x      Trig: x          	  Gastrointestinal Medications:  dextrose 5% + sodium chloride 0.9% with potassium chloride 20 mEq/L. - Pediatric 1000 milliLiter(s) IV Continuous <Continuous>      Comments:      NEUROLOGY:  [ ] SBS:	[ ] GISSELLE-1:         [ ] BIS:        Adequacy of sedation and pain control has been assessed and adjusted    Comments:      OTHER MEDICATIONS:  Endocrine/Metabolic Medications:    Genitourinary Medications:    Topical/Other Medications:      Necessity of urinary, arterial, and venous catheters discussed      PHYSICAL EXAM:      IMAGING STUDIES:  Cath data:  Sat data (%): on 50% FiO2 LPA 73, RUPVein 98, Ao 97; CI 2.6 L/min/m2 with Qp:Qs 1 :1.  Pressures (mmHg): Elevated mFontan = mIVC = 20. mPCW=16, No significant gradient across LV to AAo to Vikki (98/58/73).  Normal PVR while on sildenafil.  Angios showed unobstructed Fontan with existing stent within Fontan conduit.    Comment: IART ablation was attempted after the diagnostic cath but unsuccessful.  Patient was overdrive paced out of IART.  At the end of the case, Ao sat was 94% and LPA 66% on 50% FiO2.          Parent/Guardian is at the bedside:   [x] Yes   [  ] No  Patient and Parent/Guardian updated as to the progress/plan of care:  [x] Yes	[  ] No    [x] The patient remains in critical and unstable condition, and requires ICU care and monitoring  [ ] The patient is improving but requires continued monitoring and adjustment of therapy    Critical Care time by attending physician, excluding procedure time =  45 minutes Bloody secretions improved, so patient extubated early this morning to nasal cannula.  Still with IART.     RESPIRATORY:  RR: 16 (21 @ 06:25) (10 - 25)  SpO2: 90% (21 @ 06:30) (88% - 96%)      Respiratory Support:  NC 2L    Respiratory Medications:      Comments:      CARDIOVASCULAR  HR: 75 (21 @ 06:30) (75 - 77)  BP: 108/60 (21 @ 06:25) (89/45 - 128/76)  [ ] NIRS:  [ ] ECHO:   Cardiac Rhythm: IART; heart block; DDD     Cardiovascular Medications:  furosemide   Oral Tab/Cap - Peds 20 milliGRAM(s) Oral every 12 hours  milrinone Infusion - Peds 0.5 MICROgram(s)/kG/Min IV Continuous <Continuous>  sildenafil   Oral Tab/Cap - Peds 20 milliGRAM(s) Oral every 8 hours  spironolactone Oral Liquid - Peds 100 milliGRAM(s) Oral daily      Comments:    HEMATOLOGIC/ONCOLOGIC:  ( @ 02:21):               14.9   7.87 )-----------(114                46.1   Neurophils% (auto):   81.1    manual%: x      Lymphocytes% (auto):  7.9     manual%: x      Eosinphils% (auto):   0.6     manual%: x      Bands%: x       blasts%: x        ( @ 19:11):               16.7   12.84)-----------(155                52.0   Neurophils% (auto):   78.1    manual%: x      Lymphocytes% (auto):  12.8    manual%: x      Eosinphils% (auto):   1.0     manual%: x      Bands%: x       blasts%: x          (  @ 19:11 )   PT: 15.0 sec;   INR: 1.32 ratio  aPTT: 42.6 sec           Transfusions last 24 hours:	  [ ] PRBC	[ ] Platelets    [ ] FFP	[ ] Cryoprecipitate    Hematologic/Oncologic Medications:    DVT Prophylaxis:    Comments:    INFECTIOUS DISEASE:  T(C): 37.8 (21 @ 05:00), Max: 37.9 (21 @ 02:00)      Cultures:  RECENT CULTURES:        Medications:      Labs:        FLUIDS/ELECTROLYTES/NUTRITION:    Weight:  Daily Weight Gm: 25509 (26 May 2021 07:11)     @ 07:01  -   @ 07:00  --------------------------------------------------------  IN: 1174.2 mL / OUT: 1020 mL / NET: 154.2 mL          Labs:   @ 02:21    138    |  105    |  13     ----------------------------<  155    3.7     |  17     |  0.88     I.Ca:0.99  Mg:x     Ph:x           @ 19:11    139    |  106    |  16     ----------------------------<  119    6.0     |  18     |  1.01     I.Ca:0.97  M.9   Ph:6.0           @ 02:21  TPro  6.6     AST  25     Alb  4.0      ALT  17     TBili  1.1    AlkPhos  101    DBili  x      Trig: x       @ 19:11  TPro  7.1     AST  52     Alb  4.4      ALT  24     TBili  1.1    AlkPhos  110    DBili  x      Trig: x          	  Gastrointestinal Medications:  dextrose 5% + sodium chloride 0.9% with potassium chloride 20 mEq/L. - Pediatric 1000 milliLiter(s) IV Continuous <Continuous>      Comments:      NEUROLOGY:  [ ] SBS:	[ ] GISSELLE-1:         [ ] BIS:        Adequacy of sedation and pain control has been assessed and adjusted    Comments:      OTHER MEDICATIONS:  Endocrine/Metabolic Medications:    Genitourinary Medications:    Topical/Other Medications:      Necessity of urinary, arterial, and venous catheters discussed      PHYSICAL EXAM:  Gen - awake, alert and active; NAD  Resp - breathing comfortably on NC; lungs clear with good air entry  CV - rate at 75; regular rhythm; no murmur; distal pulses 2+; cap refill < 2 seconds  Abd - soft, NT, ND, no HSM  Ext - warm and well-perfused; nonedematous      IMAGING STUDIES:  Cath data:  Sat data (%): on 50% FiO2 LPA 73, RUPVein 98, Ao 97; CI 2.6 L/min/m2 with Qp:Qs 1 :1.  Pressures (mmHg): Elevated mFontan = mIVC = 20. mPCW=16, No significant gradient across LV to AAo to Vikki (98/58/73).  Normal PVR while on sildenafil.  Angios showed unobstructed Fontan with existing stent within Fontan conduit.    Comment: IART ablation was attempted after the diagnostic cath but unsuccessful.  Patient was overdrive paced out of IART.  At the end of the case, Ao sat was 94% and LPA 66% on 50% FiO2.          Parent/Guardian is at the bedside:   [x] Yes   [  ] No  Patient and Parent/Guardian updated as to the progress/plan of care:  [x] Yes	[  ] No    [x] The patient remains in critical and unstable condition, and requires ICU care and monitoring  [ ] The patient is improving but requires continued monitoring and adjustment of therapy    Critical Care time by attending physician, excluding procedure time =  45 minutes

## 2021-05-27 NOTE — PROGRESS NOTE PEDS - SUBJECTIVE AND OBJECTIVE BOX
INTERVAL HISTORY: s/p cath/EPS/Ablation yesterday.     RESPIRATORY SUPPORT: Mode: standby  NUTRITION: * (*kcal/kg/day)      05-26 @ 07:01  -  05-27 @ 07:00  --------------------------------------------------------  IN: 1174.2 mL / OUT: 1020 mL / NET: 154.2 mL      MEDICATIONS:  furosemide   Oral Tab/Cap - Peds 20 milliGRAM(s) Oral every 12 hours  milrinone Infusion - Peds 0.5 MICROgram(s)/kG/Min IV Continuous <Continuous>  sildenafil   Oral Tab/Cap - Peds 20 milliGRAM(s) Oral every 8 hours  spironolactone Oral Liquid - Peds 100 milliGRAM(s) Oral daily  dextrose 5% + sodium chloride 0.9% with potassium chloride 20 mEq/L. - Pediatric 1000 milliLiter(s) IV Continuous <Continuous>    PHYSICAL EXAMINATION:  Vital signs - Weight (kg): 66.1 (05-26 @ 07:11)  T(C): 36.8 (05-27-21 @ 08:00), Max: 37.9 (05-27-21 @ 02:00)  HR: 76 (05-27-21 @ 08:00) (75 - 77)  BP: 103/56 (05-27-21 @ 08:00) (89/45 - 128/76)  ABP: --  RR: 28 (05-27-21 @ 08:00) (10 - 28)  SpO2: 89% (05-27-21 @ 08:00) (88% - 96%)  CVP(mm Hg): --  General - non-dysmorphic appearance, well-developed, in no distress.  Skin - no rash, no desquamation, no cyanosis.  Eyes / ENT - no conjunctival injection, sclerae anicteric, external ears & nares normal, mucous membranes moist.  Pulmonary - normal inspiratory effort, no retractions, lungs clear to auscultation bilaterally, no wheezes, no rales.  Cardiovascular - normal rate, regular rhythm, normal S1 & S2, no murmurs, no rubs, no gallops, capillary refill < 2sec, normal pulses.  Gastrointestinal - soft, non-distended, non-tender, no hepatosplenomegaly (liver palpable *cm below right costal margin).  Musculoskeletal - no joint swelling, no clubbing, no edema.  Neurologic / Psychiatric - alert, oriented as age-appropriate, affect appropriate, moves all extremities, normal tone.    LABORATORY TESTS:                          14.9  CBC:   7.87 )-----------( 114   (05-27-21 @ 02:21)                          46.1               138   |  105   |  13                 Ca: 9.0    BMP:   ----------------------------< 155    Mg: x     (05-27-21 @ 02:21)             3.7    |  17    | 0.88               Ph: x        LFT:     TPro: 6.6 / Alb: 4.0 / TBili: 1.1 / DBili: x / AST: 25 / ALT: 17 / AlkPhos: 101   (05-27-21 @ 02:21)    COAG: PT: 15.0 / PTT: 42.6 / INR: 1.32   (05-26-21 @ 19:11)     CARDIAC MARKERS:             High-Sensitivity Troponin: x   (05-18-21 @ 12:51)             CK: x / CKMB: x   (05-18-21 @ 12:51)             Pro-BNP: 211   (05-18-21 @ 12:51)      CBG:   pH: 7.45 / pCO2: 30.4 / pO2: 66.0 / HCO3: 23 / Base Excess: -2.5 / Lactate: x   (05-26-21 @ 20:18)      IMAGING STUDIES:  Electrocardiogram - (*date)      Telemetry - (*dates) normal sinus rhythm, no ectopy, no arrhythmias.    Chest x-ray - (*date)     Echocardiogram - (*date)     Other - (*date)  INTERVAL HISTORY: s/p cath/EPS/Ablation yesterday.     RESPIRATORY SUPPORT: Mode: standby  NUTRITION: * (*kcal/kg/day)      05-26 @ 07:01  -  05-27 @ 07:00  --------------------------------------------------------  IN: 1174.2 mL / OUT: 1020 mL / NET: 154.2 mL      MEDICATIONS:  furosemide   Oral Tab/Cap - Peds 20 milliGRAM(s) Oral every 12 hours  milrinone Infusion - Peds 0.5 MICROgram(s)/kG/Min IV Continuous <Continuous>  sildenafil   Oral Tab/Cap - Peds 20 milliGRAM(s) Oral every 8 hours  spironolactone Oral Liquid - Peds 100 milliGRAM(s) Oral daily  dextrose 5% + sodium chloride 0.9% with potassium chloride 20 mEq/L. - Pediatric 1000 milliLiter(s) IV Continuous <Continuous>    PHYSICAL EXAMINATION:  Vital signs - Weight (kg): 66.1 (05-26 @ 07:11)  T(C): 36.8 (05-27-21 @ 08:00), Max: 37.9 (05-27-21 @ 02:00)  HR: 76 (05-27-21 @ 08:00) (75 - 77)  BP: 103/56 (05-27-21 @ 08:00) (89/45 - 128/76)  ABP: --  RR: 28 (05-27-21 @ 08:00) (10 - 28)  SpO2: 89% (05-27-21 @ 08:00) (88% - 96%)  CVP(mm Hg): --  General - non-dysmorphic appearance, well-developed, in no distress.  Skin - no rash, no desquamation, no cyanosis.  Eyes / ENT - no conjunctival injection, sclerae anicteric, external ears & nares normal, mucous membranes moist.  Pulmonary - normal inspiratory effort, no retractions, lungs clear to auscultation bilaterally, no wheezes, no rales.  Cardiovascular - normal rate, regular rhythm, normal S1 & S2, no murmurs, no rubs, no gallops, capillary refill < 2sec, normal pulses.  Gastrointestinal - soft, non-distended, non-tender, no hepatosplenomegaly (liver palpable *cm below right costal margin).  Musculoskeletal - no joint swelling, no clubbing, no edema.  Neurologic / Psychiatric - alert, oriented as age-appropriate, affect appropriate, moves all extremities, normal tone.    LABORATORY TESTS:                          14.9  CBC:   7.87 )-----------( 114   (05-27-21 @ 02:21)                          46.1               138   |  105   |  13                 Ca: 9.0    BMP:   ----------------------------< 155    Mg: x     (05-27-21 @ 02:21)             3.7    |  17    | 0.88               Ph: x        LFT:     TPro: 6.6 / Alb: 4.0 / TBili: 1.1 / DBili: x / AST: 25 / ALT: 17 / AlkPhos: 101   (05-27-21 @ 02:21)    COAG: PT: 15.0 / PTT: 42.6 / INR: 1.32   (05-26-21 @ 19:11)     CARDIAC MARKERS:             High-Sensitivity Troponin: x   (05-18-21 @ 12:51)             CK: x / CKMB: x   (05-18-21 @ 12:51)             Pro-BNP: 211   (05-18-21 @ 12:51)      CBG:   pH: 7.45 / pCO2: 30.4 / pO2: 66.0 / HCO3: 23 / Base Excess: -2.5 / Lactate: x   (05-26-21 @ 20:18)      IMAGING STUDIES:  Electrocardiogram - (*date)      Telemetry - (*dates) normal sinus rhythm, no ectopy, no arrhythmias.    Chest x-ray - (*date)     Echocardiogram - (5/26/21)  Summary:   1. This was a technically difficult suboptimal study due to poor echo windows. Findings limited to below.   2. 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   3. Mild mitral valve regurgitation.   4. Trivial tricuspid valve regurgitation.   5. Trivial aortic valve regurgitation.   6. Status post device closure of Fontan fenestration.   7. S/P stent placement in the Fontan pathway. Limited imaging of the inferior Fontan baffle. In this setting, the inferior vena cava to its connection near the atrial portion of the baffle appears patent with no obstruction. S/p device closure of Fontan fenestration. The Fontan fenestration is not seen on this study. The right bidirectional Storm is seen only on color flow mapping. This appears to have laminar low velocity flow.   8. The connection of the right bidirectional Storm to the right pulmonary artery could not be imaged. The right pulmonary artery is seen in a limited portion immediately to the left of the Storm and appears patent. The left pulmonary artery could not be imaged.   9. Extremely poor and limited imaging of the lateral free walls of the single systemic left ventricle. On limited short axis views there appears to be adequate systolic excursion of the posterior wall of the left ventricle and decreased in the anterior wall. The bulboventricular foramen could not be imaged. Suggest alternate imaging for appropriate assessment of function.  10. No pericardial effusion.  11. Small right pleural effusion.    Cath 5/26/21  Access 4 Fr RFA, 7 Fr RFV x2 with US guidance.  Sat data (%): on 50% FiO2 LPA 73, RUPVein 98, Ao 97; CI 2.6 L/min/m2 with Qp:Qs 1 :1.  Pressures (mmHg): Elevated mFontan = mIVC = 20. mPCW=16, No significant gradient across LV to AAo to Vikki (98/58/73).  Normal PVR while on sildenafil.  Angios showed unobstructed Fontan with existing stent within Fontan conduit.    Comment: IART ablation was attempted after the diagnostic cath but unsuccessful.  Patient was overdrive paced out of IART.  At the end of the case, Ao sat was 94% and LPA 66% on 50% FiO2.   INTERVAL HISTORY: s/p cath/EPS/Ablation yesterday.     RESPIRATORY SUPPORT: 2L NC,   NUTRITION: regular diet      05-26 @ 07:01  -  05-27 @ 07:00  --------------------------------------------------------  IN: 1174.2 mL / OUT: 1020 mL / NET: 154.2 mL      MEDICATIONS:  furosemide   Oral Tab/Cap - Peds 20 milliGRAM(s) Oral every 12 hours  milrinone Infusion - Peds 0.5 MICROgram(s)/kG/Min IV Continuous <Continuous>  sildenafil   Oral Tab/Cap - Peds 20 milliGRAM(s) Oral every 8 hours  spironolactone Oral Liquid - Peds 100 milliGRAM(s) Oral daily  dextrose 5% + sodium chloride 0.9% with potassium chloride 20 mEq/L. - Pediatric 1000 milliLiter(s) IV Continuous <Continuous>    PHYSICAL EXAMINATION:  Vital signs - Weight (kg): 66.1 (05-26 @ 07:11)  T(C): 36.8 (05-27-21 @ 08:00), Max: 37.9 (05-27-21 @ 02:00)  HR: 76 (05-27-21 @ 08:00) (75 - 77)  BP: 103/56 (05-27-21 @ 08:00) (89/45 - 128/76)  RR: 28 (05-27-21 @ 08:00) (10 - 28)  SpO2: 89% (05-27-21 @ 08:00) (88% - 96%)    General - non-dysmorphic appearance, well-developed, in no distress.  Skin - no rash, no desquamation, no cyanosis.  Eyes / ENT - no conjunctival injection, sclerae anicteric, external ears & nares normal, mucous membranes moist.  Pulmonary - normal inspiratory effort, no retractions, lungs clear to auscultation bilaterally, no wheezes, no rales.  Cardiovascular - normal rate, regular rhythm, normal S1 & S2, no murmurs, no rubs, no gallops, capillary refill < 2sec, normal pulses.  Gastrointestinal - soft, non-distended, non-tender, no splenomegaly. (+) hepatomegaly  Musculoskeletal - no joint swelling, no clubbing, no edema.  Neurologic / Psychiatric - alert, oriented as age-appropriate, affect appropriate, moves all extremities, normal tone.    LABORATORY TESTS:                          14.9  CBC:   7.87 )-----------( 114   (05-27-21 @ 02:21)                          46.1               138   |  105   |  13                 Ca: 9.0    BMP:   ----------------------------< 155    Mg: x     (05-27-21 @ 02:21)             3.7    |  17    | 0.88               Ph: x        LFT:     TPro: 6.6 / Alb: 4.0 / TBili: 1.1 / DBili: x / AST: 25 / ALT: 17 / AlkPhos: 101   (05-27-21 @ 02:21)    COAG: PT: 15.0 / PTT: 42.6 / INR: 1.32   (05-26-21 @ 19:11)     CARDIAC MARKERS:             High-Sensitivity Troponin: x   (05-18-21 @ 12:51)             CK: x / CKMB: x   (05-18-21 @ 12:51)             Pro-BNP: 211   (05-18-21 @ 12:51)      CBG:   pH: 7.45 / pCO2: 30.4 / pO2: 66.0 / HCO3: 23 / Base Excess: -2.5 / Lactate: x   (05-26-21 @ 20:18)      IMAGING STUDIES:  Electrocardiogram - (5/26/21)  ventricular paced rhythm @ 75bpm. Underlying IART    Telemetry - (5/27/21) ventricular paced rhythm @ 75bpm. Underlying IART    Chest x-ray - (5/25/21) IMPRESSION: Stable mild cardiomegaly with subsegmental left lower lobe atelectasis      Echocardiogram - (5/26/21)  Summary:   1. This was a technically difficult suboptimal study due to poor echo windows. Findings limited to below.   2. 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   3. Mild mitral valve regurgitation.   4. Trivial tricuspid valve regurgitation.   5. Trivial aortic valve regurgitation.   6. Status post device closure of Fontan fenestration.   7. S/P stent placement in the Fontan pathway. Limited imaging of the inferior Fontan baffle. In this setting, the inferior vena cava to its connection near the atrial portion of the baffle appears patent with no obstruction. S/p device closure of Fontan fenestration. The Fontan fenestration is not seen on this study. The right bidirectional Storm is seen only on color flow mapping. This appears to have laminar low velocity flow.   8. The connection of the right bidirectional Storm to the right pulmonary artery could not be imaged. The right pulmonary artery is seen in a limited portion immediately to the left of the Storm and appears patent. The left pulmonary artery could not be imaged.   9. Extremely poor and limited imaging of the lateral free walls of the single systemic left ventricle. On limited short axis views there appears to be adequate systolic excursion of the posterior wall of the left ventricle and decreased in the anterior wall. The bulboventricular foramen could not be imaged. Suggest alternate imaging for appropriate assessment of function.  10. No pericardial effusion.  11. Small right pleural effusion.    Cath 5/26/21  Access 4 Fr RFA, 7 Fr RFV x2 with US guidance.  Sat data (%): on 50% FiO2 LPA 73, RUPVein 98, Ao 97; CI 2.6 L/min/m2 with Qp:Qs 1 :1.  Pressures (mmHg): Elevated mFontan = mIVC = 20. mPCW=16, No significant gradient across LV to AAo to Vikki (98/58/73).  Normal PVR while on sildenafil.  Angios showed unobstructed Fontan with existing stent within Fontan conduit.    Comment: IART ablation was attempted after the diagnostic cath but unsuccessful.  Patient was overdrive paced out of IART.  At the end of the case, Ao sat was 94% and LPA 66% on 50% FiO2.   INTERVAL HISTORY: s/p cath/EPS/attempted IART ablation yesterday.     RESPIRATORY SUPPORT: 2L NC    NUTRITION: NPO    05-26 @ 07:01  -  05-27 @ 07:00  --------------------------------------------------------  IN: 1174.2 mL / OUT: 1020 mL / NET: 154.2 mL    MEDICATIONS:  furosemide   Oral Tab/Cap - Peds 20 milliGRAM(s) Oral every 12 hours  milrinone Infusion - Peds 0.5 MICROgram(s)/kG/Min IV Continuous <Continuous>  sildenafil   Oral Tab/Cap - Peds 20 milliGRAM(s) Oral every 8 hours  spironolactone Oral Liquid - Peds 100 milliGRAM(s) Oral daily    PHYSICAL EXAMINATION:  Vital signs - Weight (kg): 66.1 (05-26 @ 07:11)  T(C): 36.8 (05-27-21 @ 08:00), Max: 37.9 (05-27-21 @ 02:00)  HR: 76 (05-27-21 @ 08:00) (75 - 77)  BP: 103/56 (05-27-21 @ 08:00) (89/45 - 128/76)  RR: 28 (05-27-21 @ 08:00) (10 - 28)  SpO2: 89% (05-27-21 @ 08:00) (88% - 96%)    General - non-dysmorphic appearance, well-developed, in no distress.  Skin - no rash, no desquamation, no cyanosis.  Eyes / ENT - no conjunctival injection, sclerae anicteric, external ears & nares normal, mucous membranes moist.  Pulmonary - normal inspiratory effort, no retractions, lungs clear to auscultation bilaterally, no wheezes, no rales.  Cardiovascular - normal rate, regular rhythm, normal S1 & single S2, no murmurs, no rubs, no gallops, capillary refill < 2sec, normal pulses.  Gastrointestinal - soft, non-distended, non-tender, no splenomegaly. (+) hepatomegaly.  Musculoskeletal - no joint swelling, no clubbing, no edema.  Neurologic / Psychiatric - alert, oriented as age-appropriate, affect appropriate, moves all extremities, normal tone.    LABORATORY TESTS:                          14.9  CBC:   7.87 )-----------( 114   (05-27-21 @ 02:21)                          46.1               138   |  105   |  13                 Ca: 9.0    BMP:   ----------------------------< 155    Mg: x     (05-27-21 @ 02:21)             3.7    |  17    | 0.88               Ph: x        LFT:     TPro: 6.6 / Alb: 4.0 / TBili: 1.1 / DBili: x / AST: 25 / ALT: 17 / AlkPhos: 101   (05-27-21 @ 02:21)    COAG: PT: 15.0 / PTT: 42.6 / INR: 1.32   (05-26-21 @ 19:11)     CARDIAC MARKERS:             High-Sensitivity Troponin: x   (05-18-21 @ 12:51)             CK: x / CKMB: x   (05-18-21 @ 12:51)             Pro-BNP: 211   (05-18-21 @ 12:51)    IMAGING STUDIES:  Electrocardiogram - (5/26/21) Ventricular paced rhythm @ 75bpm. Underlying IART.    Telemetry - (5/27/21) Ventricular paced rhythm @ 75bpm. Underlying IART.    Chest x-ray - (5/27/21) Stable mild cardiomegaly with subsegmental left lower lobe atelectasis.    Echocardiogram - (5/27/21)   1. This was a technically difficult suboptimal study due to poor echo windows. Findings limited to below.   2. 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   3. Mild mitral valve regurgitation.   4. Trivial tricuspid valve regurgitation.   5. Trivial aortic valve regurgitation.   6. Status post device closure of Fontan fenestration.   7. S/P stent placement in the Fontan pathway. Limited imaging of the inferior Fontan baffle. In this setting, the inferior vena cava to its connection near the atrial portion of the baffle appears patent with no obstruction. S/p device closure of Fontan fenestration. The Fontan fenestration is not seen on this study. The right bidirectional Storm is seen only on color flow mapping. This appears to have laminar low velocity flow.   8. The connection of the right bidirectional Storm to the right pulmonary artery could not be imaged. The right pulmonary artery is seen in a limited portion immediately to the left of the Storm and appears patent. The left pulmonary artery could not be imaged.   9. Extremely poor and limited imaging of the lateral free walls of the single systemic left ventricle. On limited short axis views there appears to be adequate systolic excursion of the posterior wall of the left ventricle and decreased in the anterior wall. The bulboventricular foramen could not be imaged. Suggest alternate imaging for appropriate assessment of function.  10. No pericardial effusion.  11. Small right pleural effusion.    Cath - (5/26/21)  Access 4 Fr RFA, 7 Fr RFV x2 with US guidance.  Sat data (%): on 50% FiO2 LPA 73, RUPV 98, Ao 97; CI 2.6 L/min/m2 with Qp:Qs 1 :1.  Pressures (mmHg): Elevated mFontan = mIVC = 20. mPCW=16, No significant gradient across LV to AAo to Vikki (98/58/73).  Normal PVR while on sildenafil.  Angios showed unobstructed Fontan with existing stent within Fontan conduit.    Comment: IART ablation was attempted after the diagnostic cath but unsuccessful. Patient was overdrive paced out of IART. At the end of the case, Ao sat was 94% and LPA 66% on 50% FiO2.

## 2021-05-27 NOTE — PROGRESS NOTE PEDS - ASSESSMENT
22 yo M DILV, L-malposed great arteries s/p Fontan, sick sinus syndrome and pacemaker dependent with IART now s/p diagnostic cath with elevated Fontan pressure secondary to LV diastolic dysfunction. He was paced out of tachycardia yesterday, but reverted to IART this mornning.    Pacemaker set @ DDD     Plan:  - continuos telemetry monitoring while admitted  - continue home medications - sildenafil, lasix, spirolactone  - wean oxygen as tolerated. Goal saturations > 90%. Pulmonary toileting.  - EP following.  - contoniue milrinone until EP decisions regarding breaking the tachycardia 22 yo M DILV, L-malposed great arteries s/p Fontan, sick sinus syndrome and pacemaker dependent with IART now s/p diagnostic cath with elevated Fontan pressure secondary to LV diastolic dysfunction. He was paced out of tachycardia yesterday, but reverted to IART this morning. We attempted to pace him again using his pacemaker, but were unsuccessful.    Pacemaker set @ DDD     Plan:  - continuos telemetry monitoring while admitted  - continue home medications - sildenafil, lasix, spirolactone  - stop sotalol. Start amiodarone load of 300mg BID  - check baseline TFTs  - wean oxygen as tolerated. Goal saturations > 90%. Pulmonary toileting.  - EP following.  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 Presented in IART - s/p EP study with unsuccessful attempt at ablation along with diagnostic cath showing elevated Fontan pressure (20mmHg). He was ultimately paced out of tachycardia in lab yesterday, but reverted to IART overnight. Failed extubation in cath lab and returned intubated to PICU - extubated this morning. We attempted to pace him out of the tachycardia again today using his pacemaker, but were unsuccessful. The patient requires close monitoring in the ICU.     Plan:  - Continuos telemetry monitoring.  - Pacemaker set at DDD .  - Continue home medications - Sildenafil 20mg q8h, Aldactone 100mg daily.   - D/C Milrinone. Restart home Lisinopril 5mg OD.   - Continue Lasix 20mg PO q12h. Will decrease back to home dose of 20mg PO OD tomorrow.   - Discontinue Sotalol. Start Amiodarone load of 300mg PO BID.   - Check baseline TFTs.  - Please restart home Xarelto 20mg OD.   - Wean oxygen as tolerated. Goal saturations > 90%. Encourage ambulation, incentive spirometry.  - EP following.  - Will need to remain admitted on telemetry monitoring throughout Amiodarone load with close eye on arrythmia. Plan to reattempt overdrive pacing after Amiodarone load completed.

## 2021-05-28 ENCOUNTER — TRANSCRIPTION ENCOUNTER (OUTPATIENT)
Age: 21
End: 2021-05-28

## 2021-05-28 DIAGNOSIS — I44.2 ATRIOVENTRICULAR BLOCK, COMPLETE: ICD-10-CM

## 2021-05-28 PROCEDURE — 99233 SBSQ HOSP IP/OBS HIGH 50: CPT

## 2021-05-28 PROCEDURE — 93010 ELECTROCARDIOGRAM REPORT: CPT

## 2021-05-28 PROCEDURE — 99291 CRITICAL CARE FIRST HOUR: CPT

## 2021-05-28 RX ORDER — SODIUM CHLORIDE 9 MG/ML
3 INJECTION INTRAMUSCULAR; INTRAVENOUS; SUBCUTANEOUS EVERY 8 HOURS
Refills: 0 | Status: DISCONTINUED | OUTPATIENT
Start: 2021-05-28 | End: 2021-05-28

## 2021-05-28 RX ORDER — IBUPROFEN 200 MG
400 TABLET ORAL ONCE
Refills: 0 | Status: COMPLETED | OUTPATIENT
Start: 2021-05-28 | End: 2021-05-28

## 2021-05-28 RX ORDER — SODIUM CHLORIDE 9 MG/ML
3 INJECTION INTRAMUSCULAR; INTRAVENOUS; SUBCUTANEOUS EVERY 8 HOURS
Refills: 0 | Status: DISCONTINUED | OUTPATIENT
Start: 2021-05-28 | End: 2021-06-29

## 2021-05-28 RX ORDER — FUROSEMIDE 40 MG
20 TABLET ORAL DAILY
Refills: 0 | Status: DISCONTINUED | OUTPATIENT
Start: 2021-05-28 | End: 2021-06-02

## 2021-05-28 RX ORDER — SODIUM CHLORIDE 9 MG/ML
3 INJECTION INTRAMUSCULAR; INTRAVENOUS; SUBCUTANEOUS EVERY 8 HOURS
Refills: 0 | Status: DISCONTINUED | OUTPATIENT
Start: 2021-05-28 | End: 2021-06-08

## 2021-05-28 RX ADMIN — SPIRONOLACTONE 100 MILLIGRAM(S): 25 TABLET, FILM COATED ORAL at 11:31

## 2021-05-28 RX ADMIN — SODIUM CHLORIDE 3 MILLILITER(S): 9 INJECTION INTRAMUSCULAR; INTRAVENOUS; SUBCUTANEOUS at 10:03

## 2021-05-28 RX ADMIN — Medication 400 MILLIGRAM(S): at 12:30

## 2021-05-28 RX ADMIN — Medication 650 MILLIGRAM(S): at 00:53

## 2021-05-28 RX ADMIN — AMIODARONE HYDROCHLORIDE 300 MILLIGRAM(S): 400 TABLET ORAL at 23:22

## 2021-05-28 RX ADMIN — Medication 650 MILLIGRAM(S): at 00:26

## 2021-05-28 RX ADMIN — Medication 650 MILLIGRAM(S): at 07:15

## 2021-05-28 RX ADMIN — Medication 20 MILLIGRAM(S): at 00:21

## 2021-05-28 RX ADMIN — LISINOPRIL 5 MILLIGRAM(S): 2.5 TABLET ORAL at 11:27

## 2021-05-28 RX ADMIN — SODIUM CHLORIDE 3 MILLILITER(S): 9 INJECTION INTRAMUSCULAR; INTRAVENOUS; SUBCUTANEOUS at 18:10

## 2021-05-28 RX ADMIN — Medication 650 MILLIGRAM(S): at 06:15

## 2021-05-28 RX ADMIN — RIVAROXABAN 20 MILLIGRAM(S): KIT at 11:25

## 2021-05-28 RX ADMIN — Medication 20 MILLIGRAM(S): at 16:41

## 2021-05-28 RX ADMIN — AMIODARONE HYDROCHLORIDE 300 MILLIGRAM(S): 400 TABLET ORAL at 11:28

## 2021-05-28 RX ADMIN — Medication 400 MILLIGRAM(S): at 11:24

## 2021-05-28 RX ADMIN — Medication 20 MILLIGRAM(S): at 17:23

## 2021-05-28 RX ADMIN — Medication 20 MILLIGRAM(S): at 23:22

## 2021-05-28 RX ADMIN — Medication 20 MILLIGRAM(S): at 08:40

## 2021-05-28 RX ADMIN — Medication 20 MILLIGRAM(S): at 02:45

## 2021-05-28 NOTE — DISCHARGE NOTE PROVIDER - HOSPITAL COURSE
19yo male with complex cardiac h/o DILV, L-malposition of great arteries, sick sinus syndrome and AV mihir disease with tachybradycardia syndrome with a dual chamber pacemaker (currently with RV lead fracture and is currently only LV paced 2/2 complete heart block), PSH of status post Ovhhk-Yuiv-Lzkhysi anastomosis and aortic arch reconstruction followed by a fenestrated lateral tunnel Fontan completion (now s/p fenestration closure). here s/p cardiac catheterization and ablation. Procedure was complicated by unsuccessful ablation and post extubation patient was noted to have increased hemoptysis and desaturations, patient was reintubated for airway protection and given propofol for sedation.     PICU course (5/2 19yo male with complex cardiac h/o DILV, L-malposition of great arteries, sick sinus syndrome and AV mihir disease with tachybradycardia syndrome with a dual chamber pacemaker (currently with RV lead fracture and is currently only LV paced 2/2 complete heart block), PSH of status post Djdas-Cjon-Yhttmzs anastomosis and aortic arch reconstruction followed by a fenestrated lateral tunnel Fontan completion (now s/p fenestration closure). here s/p cardiac catheterization and ablation. Procedure was complicated by unsuccessful ablation and post extubation patient was noted to have increased hemoptysis and desaturations, patient was reintubated for airway protection and given propofol for sedation.     PICU course (5/26 - :  Resp: Patient remained intubated overnight and was extubated by the next morning. Required nasal cannula to maintain oxygenation, likely due to post-op inflammation, atelectasis, and increasing fontan pressures. Weaned to room air on ____.  CV: On 5/27, attempted cardioversion through pacing, although was unsuccessful, and subsequently started medical cardioversion with amiodarone 300mg bid. IART rhythm was broken on _____. Restarted on home medications including lisinopril, sildenafil, lasix, aldactone. Lasix dose was increased post-op and weaned back to his home regimen. EKGs were monitored daily.   Heme: Given hemoptysis, hemoglobin was monitored and he did not require any transfusions. Hemoptysis resolved overnight 5/26-5/27. Home xarelto was held and restarted on 5/27.   FENGI: Continued on his regular diet. 21yo male with complex cardiac h/o DILV, L-malposition of great arteries, sick sinus syndrome and AV mihir disease with tachybradycardia syndrome with a dual chamber pacemaker (currently with RV lead fracture and is currently only LV paced 2/2 complete heart block), PSH of status post Txfjb-Dzlp-Umjweih anastomosis and aortic arch reconstruction followed by a fenestrated lateral tunnel Fontan completion (now s/p fenestration closure). here s/p cardiac catheterization and ablation. Procedure was complicated by unsuccessful ablation and post extubation patient was noted to have increased hemoptysis and desaturations, patient was reintubated for airway protection and given propofol for sedation.     PICU course (5/26 - :  Resp: Patient remained intubated overnight and was extubated by the next morning. Required nasal cannula to maintain oxygenation, likely due to post-op inflammation, atelectasis, and increasing fontan pressures. Weaned to room air on 5/27. Re-intubated on 6/1.  CV: On 5/27, attempted cardioversion through pacing, although was unsuccessful, and subsequently started medical cardioversion with amiodarone 300mg bid. Restarted on home medications including lisinopril, sildenafil, lasix, aldactone. IART rhythm was broken on 6/1 after cardioversion. However, he went back into IART later that night. Pacemaker was changed to VOO at 70 prior to neurosurgery. Amiodarone, aldactone, and lisinopril were held while critically ill. Epinephrine and vasopressin were utilized to maintain higher maps while EVD in place.   Neuro: Four hours after cardioversion, he had an episode of staring and unresposiveness. Head CT showed severe hemorrhagic stroke in left frontal lobe  in the ROCCO[place]MCA watershed territory with extension to the intraventricular regions. He required urgent surgical decompressive craniectomy and hemorrhage evacuation with EVD placement overnight on 6/1. Neuroprotective measures were taken and he was started on Keppra.   Heme: Given hemoptysis, hemoglobin was monitored and he did not require any transfusions. Hemoptysis resolved overnight 5/26-5/27. Home xarelto was held and restarted on 5/27. INR on 5/26 was 1.32, and it jumped to 4.94 after hemorrhage was identified. Cause for jump in INR is unclear. In the OR, xarelto reversals were given (Andexxa, KCentra), in addition to multiple units of FFP and blood products. Xarelto was stopped and he was received SubQ Vit K for 3 days.   ID: Received prophylactic antibiotics while EVD in place.   FENGI: Continued on his regular diet. 19yo male with complex cardiac h/o DILV, L-malposition of great arteries, sick sinus syndrome and AV mihir disease with tachybradycardia syndrome with a dual chamber pacemaker (currently with RV lead fracture and is currently only LV paced 2/2 complete heart block), PSH of status post Mgohb-Luwo-Rpmxxvg anastomosis and aortic arch reconstruction followed by a fenestrated lateral tunnel Fontan completion (now s/p fenestration closure). here s/p cardiac catheterization and ablation. Procedure was complicated by unsuccessful ablation and post extubation patient was noted to have increased hemoptysis and desaturations, patient was reintubated for airway protection and given propofol for sedation.     PICU course (5/26 - :  Resp: Patient remained intubated overnight and was extubated by the next morning. Required nasal cannula to maintain oxygenation, likely due to post-op inflammation, atelectasis, and increasing fontan pressures. Weaned to room air on 5/27. Re-intubated on 6/1 due to AMS associated with new intracranial bleed.   CV: On 5/27, attempted cardioversion through pacing, although was unsuccessful, and subsequently started medical cardioversion with amiodarone 300mg bid. Restarted on home medications including lisinopril, sildenafil, lasix, aldactone. IART rhythm was broken on 6/1 after cardioversion. However, he went back into IART later that night. Pacemaker was changed to VOO at 70 prior to neurosurgery. Amiodarone, aldactone, and lisinopril were held while critically ill. Epinephrine and vasopressin were utilized to maintain higher maps while EVD in place. On 6/8 he started having runs of ventricular tachycardia. Arrythmia resolved with lidocaine infusion and changing pacemaker mode to VVI.   Neuro: Four hours after cardioversion, he had an episode of staring and unresposiveness. Head CT showed severe hemorrhagic stroke in left frontal lobe in the ROCCO[place]MCA watershed territory with extension to the intraventricular regions. He required urgent surgical decompressive craniectomy and hemorrhage evacuation with EVD placement overnight on 6/1. Neuroprotective measures were taken and he was started on Keppra. Serial head CTs showed a new hemorrhage along the EVD catheter tract on 6/6.   Heme: Given hemoptysis, hemoglobin was monitored and he did not require any transfusions. Hemoptysis resolved overnight 5/26-5/27. Home xarelto was held and restarted on 5/27. INR on 5/26 was 1.32, and it jumped to 4.94 after hemorrhage was identified. Cause for jump in INR is unclear. In the OR, xarelto reversals were given (Andexxa, KCentra), in addition to multiple units of FFP and blood products. Xarelto was stopped and he was received SubQ Vit K for 3 days. He was given multiple units of FFP for goal INR < 1.5 in order to prevent further bleeding. Hematology was consulted for persistently elevated INR. Thought to be possibly due to hepatic congestion secondary to elevated fontan pressures.   ID: Received prophylactic antibiotics while EVD in place. Escalated to ceftriaxone when patient became febrile on 6/6. Blood cultures, urine cultures, sputum cultures, CSF cultures, and RVP were negative. Ceftrixaone was continued until ___.   FENGI: Continued on his regular diet. He was made NPO when he was re-intubated due to AMS associated with intracranial bleed. He was given TPN for nutrition. Enteral feeds were started _____. 19yo male with complex cardiac h/o DILV, L-malposition of great arteries, sick sinus syndrome and AV mihir disease with tachybradycardia syndrome with a dual chamber pacemaker (currently with RV lead fracture and is currently only LV paced 2/2 complete heart block), PSH of status post Jpwxg-Aymy-Ytyksbs anastomosis and aortic arch reconstruction followed by a fenestrated lateral tunnel Fontan completion (now s/p fenestration closure). here s/p cardiac catheterization and ablation. Procedure was complicated by unsuccessful ablation and post extubation patient was noted to have increased hemoptysis and desaturations, patient was reintubated for airway protection and given propofol for sedation.     PICU course (5/26 - :  Resp: Patient remained intubated overnight and was extubated by the next morning. Required nasal cannula to maintain oxygenation, likely due to post-op inflammation, atelectasis, and increasing fontan pressures. Weaned to room air on 5/27. Re-intubated on 6/1 due to AMS associated with new intracranial bleed. Extubated to HFNC on 6/9. Weaned to RA on ___  CV: On 5/27, attempted cardioversion through pacing, although was unsuccessful, and subsequently started medical cardioversion with amiodarone 300mg bid. Restarted on home medications including lisinopril, sildenafil, lasix, aldactone. IART rhythm was broken on 6/1 after cardioversion. However, he went back into IART later that night. Pacemaker was changed to VOO at 70 prior to neurosurgery. Amiodarone, aldactone, and lisinopril were held while critically ill. Epinephrine and vasopressin were utilized to maintain higher maps while EVD in place. On 6/8 he started having runs of ventricular tachycardia. Arrythmia resolved with lidocaine infusion and changing pacemaker mode to VVI.   Neuro: Four hours after cardioversion, he had an episode of staring and unresposiveness. Head CT showed severe hemorrhagic stroke in left frontal lobe in the ROCCO-MCA watershed territory with extension to the intraventricular regions. He required urgent surgical decompressive craniectomy and hemorrhage evacuation with EVD placement overnight on 6/1. Neuroprotective measures were taken and he was started on Keppra. Serial head CTs showed a new hemorrhage along the EVD catheter tract on 6/6. Further head CTs did not show new bleeding. EVD removed on ____.  Heme: Given hemoptysis, hemoglobin was monitored and he did not require any transfusions. Hemoptysis resolved overnight 5/26-5/27. Home xarelto was held and restarted on 5/27. INR on 5/26 was 1.32, and it jumped to 4.94 after hemorrhage was identified. Cause for jump in INR is unclear. In the OR, xarelto reversals were given (Andexxa, KCentra), in addition to multiple units of FFP and blood products. Xarelto was stopped and he was received SubQ Vit K for 3 days. He was given multiple units of FFP for goal INR < 1.5 in order to prevent further bleeding. Hematology was consulted for persistently elevated INR. Thought to be possibly due to hepatic congestion secondary to elevated fontan pressures.   ID: Received prophylactic antibiotics while EVD in place. Escalated to ceftriaxone when patient became febrile on 6/6. Blood cultures, urine cultures, sputum cultures, CSF cultures, and RVP were negative. Ceftrixaone was continued until ___.   FENGI: Continued on his regular diet. He was made NPO when he was re-intubated due to AMS associated with intracranial bleed. He was given TPN for nutrition. Enteral feeds were started _____. 19yo male with complex cardiac h/o DILV, L-malposition of great arteries, sick sinus syndrome and AV mihir disease with tachybradycardia syndrome with a dual chamber pacemaker (currently with RV lead fracture and is currently only LV paced 2/2 complete heart block), PSH of status post Iqyso-Btxk-Hkhhpnp anastomosis and aortic arch reconstruction followed by a fenestrated lateral tunnel Fontan completion (now s/p fenestration closure). here s/p cardiac catheterization and ablation. Procedure was complicated by unsuccessful ablation and post extubation patient was noted to have increased hemoptysis and desaturations, patient was reintubated for airway protection and given propofol for sedation.     PICU course (5/26 - :  Resp: Patient remained intubated overnight and was extubated by the next morning. Required nasal cannula to maintain oxygenation, likely due to post-op inflammation, atelectasis, and increasing fontan pressures. Weaned to room air on 5/27. Re-intubated on 6/1 due to AMS associated with new intracranial bleed. Extubated to HFNC on 6/9. Weaned to RA on ___. Goal sats > 85%.   CV: On 5/27, attempted cardioversion through pacing, although was unsuccessful, and subsequently started medical cardioversion with amiodarone 300mg bid. Restarted on home medications including lisinopril, sildenafil, lasix, aldactone. IART rhythm was broken on 6/1 after cardioversion. However, he went back into IART later that night. Pacemaker was changed to VOO at 70 prior to neurosurgery. Amiodarone, aldactone, and lisinopril were held while critically ill. Epinephrine and vasopressin were utilized to maintain higher maps while EVD in place. On 6/8 he started having runs of ventricular tachycardia. Arrythmia resolved with lidocaine infusion and changing pacemaker mode to VVI.   Neuro: Four hours after cardioversion, he had an episode of staring and unresposiveness. Head CT showed severe hemorrhagic stroke in left frontal lobe in the ROCCO-MCA watershed territory with extension to the intraventricular regions. He required urgent surgical decompressive craniectomy and hemorrhage evacuation with EVD placement overnight on 6/1. Neuroprotective measures were taken and he was started on Keppra. Serial head CTs showed a new hemorrhage along the EVD catheter tract on 6/6. Serial head CTs did not show new bleeding. EVD removed on ____. VEEG placed on 6/10 due to eye deviation and seizure-like activity did not correlate with seizures.   Heme: Given hemoptysis, hemoglobin was monitored and he did not require any transfusions. Hemoptysis resolved overnight 5/26-5/27. Home xarelto was held and restarted on 5/27. INR on 5/26 was 1.32, and it jumped to 4.94 after hemorrhage was identified. Cause for jump in INR is unclear. In the OR, xarelto reversals were given (Andexxa, KCentra), in addition to multiple units of FFP and blood products. Xarelto was stopped and he was received SubQ Vit K for 3 days. He was given multiple units of FFP for goal INR < 1.5 in order to prevent further bleeding. Hematology was consulted for persistently elevated INR. Thought to be possibly due to hepatic congestion secondary to elevated fontan pressures. INR improved w/ Vitamin K.   ID: Received prophylactic antibiotics while EVD in place. Escalated to ceftriaxone when patient became febrile on 6/6. Blood cultures, urine cultures, sputum cultures, CSF cultures, and RVP were negative. Ceftrixaone was continued until 6/10 (when fever curve improved).   FENGI: Continued on his regular diet. He was made NPO when he was re-intubated due to AMS associated with intracranial bleed. He was given TPN for nutrition. Enteral feeds were started _____. 21yo male with complex cardiac h/o DILV, L-malposition of great arteries, sick sinus syndrome and AV mihir disease with tachybradycardia syndrome with a dual chamber pacemaker (currently with RV lead fracture and is currently only LV paced 2/2 complete heart block), PSH of status post Rapkz-Xbvt-Ptxnfqw anastomosis and aortic arch reconstruction followed by a fenestrated lateral tunnel Fontan completion (now s/p fenestration closure). here s/p cardiac catheterization and ablation. Procedure was complicated by unsuccessful ablation and post extubation patient was noted to have increased hemoptysis and desaturations, patient was reintubated for airway protection and given propofol for sedation.     PICU course (5/26 - :  Resp: Patient remained intubated overnight and was extubated by the next morning. Required nasal cannula to maintain oxygenation, likely due to post-op inflammation, atelectasis, and increasing fontan pressures. Weaned to room air on 5/27. Re-intubated on 6/1 due to AMS associated with new intracranial bleed. Extubated to HFNC on 6/9. Weaned to RA on ___. Goal sats > 85%.   CV: On 5/27, attempted cardioversion through pacing, although was unsuccessful, and subsequently started medical cardioversion with amiodarone 300mg bid. Restarted on home medications including lisinopril, sildenafil, lasix, aldactone. IART rhythm was broken on 6/1 after cardioversion. However, he went back into IART later that night. Pacemaker was changed to VOO at 70 prior to neurosurgery. Amiodarone, aldactone, and lisinopril were held while critically ill. Epinephrine and vasopressin were utilized to maintain higher maps while EVD in place. On 6/8 he started having runs of ventricular tachycardia. Arrythmia resolved with lidocaine infusion and changing pacemaker mode to VVI.   Neuro: Four hours after cardioversion, he had an episode of staring and unresposiveness. Head CT showed severe hemorrhagic stroke in left frontal lobe in the ROCCO-MCA watershed territory with extension to the intraventricular regions. He required urgent surgical decompressive craniectomy and hemorrhage evacuation with EVD placement overnight on 6/1. Neuroprotective measures were taken and he was started on Keppra. Serial head CTs showed a new hemorrhage along the EVD catheter tract on 6/6. Serial head CTs did not show new bleeding. EVD removed on ____. VEEG placed on 6/10 due to eye deviation and seizure-like activity did not correlate with seizures.   Heme: Given hemoptysis, hemoglobin was monitored and he did not require any transfusions. Hemoptysis resolved overnight 5/26-5/27. Home xarelto was held and restarted on 5/27. INR on 5/26 was 1.32, and it jumped to 4.94 after hemorrhage was identified. Cause for jump in INR is unclear. In the OR, xarelto reversals were given (Andexxa, KCentra), in addition to multiple units of FFP and blood products. Xarelto was stopped and he was received SubQ Vit K for 3 days. He was given multiple units of FFP for goal INR < 1.5 in order to prevent further bleeding. Hematology was consulted for persistently elevated INR. Thought to be possibly due to hepatic congestion secondary to elevated fontan pressures. INR improved w/ Vitamin K.  ID: Received prophylactic antibiotics while EVD in place. Escalated to ceftriaxone when patient became febrile on 6/6. Blood cultures, urine cultures, sputum cultures, CSF cultures, and RVP were negative. Ceftriaxone was continued until 6/10 (when fever curve improved).  : Noted to have L scrotal swelling on 6/10. US showed L hydrocele. Surgery consulted and will follow outpatient.   FENGI: Continued on his regular diet. He was made NPO when he was re-intubated due to AMS associated with intracranial bleed. He was given TPN for nutrition. Enteral feeds were started on 6/11. Reached full enteral feeds on ____. 19yo male with complex cardiac h/o DILV, L-malposition of great arteries, sick sinus syndrome and AV mihir disease with tachybradycardia syndrome with a dual chamber pacemaker (currently with RV lead fracture and is currently only LV paced 2/2 complete heart block), PSH of status post Drlid-Btsf-Nktotkz anastomosis and aortic arch reconstruction followed by a fenestrated lateral tunnel Fontan completion (now s/p fenestration closure). here s/p cardiac catheterization and ablation. Procedure was complicated by unsuccessful ablation and post extubation patient was noted to have increased hemoptysis and desaturations, patient was reintubated for airway protection and given propofol for sedation.     PICU course (5/26 - :  Resp: Patient remained intubated overnight and was extubated by the next morning. Required nasal cannula to maintain oxygenation, likely due to post-op inflammation, atelectasis, and increasing fontan pressures. Weaned to room air on 5/27. Re-intubated on 6/1 due to AMS associated with new intracranial bleed. Extubated to HFNC on 6/9. Weaned to RA on ___. Goal sats > 85%.   CV: On 5/27, attempted cardioversion through pacing, although was unsuccessful, and subsequently started medical cardioversion with amiodarone 300mg bid. Restarted on home medications including lisinopril, sildenafil, lasix, aldactone. IART rhythm was broken on 6/1 after cardioversion. However, he went back into IART later that night. Pacemaker was changed to VOO at 70 prior to neurosurgery. Amiodarone, aldactone, and lisinopril were held while critically ill. Epinephrine and vasopressin were utilized to maintain higher maps while EVD in place. On 6/8 he started having runs of ventricular tachycardia. Arrythmia resolved with lidocaine infusion and changing pacemaker mode to VVI.   Neuro: Four hours after cardioversion, he had an episode of staring and unresposiveness. Head CT showed severe hemorrhagic stroke in left frontal lobe in the ROCCO[place]MCA watershed territory with extension to the intraventricular regions. He required urgent surgical decompressive craniectomy and hemorrhage evacuation with EVD placement overnight on 6/1. Neuroprotective measures were taken and he was started on Keppra. Serial head CTs showed a new hemorrhage along the EVD catheter tract on 6/6. Serial head CTs did not show new bleeding. EVD removed on ____. VEEG placed on 6/10 due to eye deviation and seizure-like activity did not correlate with seizures.   Heme: Given hemoptysis, hemoglobin was monitored and he did not require any transfusions. Hemoptysis resolved overnight 5/26-5/27. Home xarelto was held and restarted on 5/27. INR on 5/26 was 1.32, and it jumped to 4.94 after hemorrhage was identified. Cause for jump in INR is unclear. In the OR, xarelto reversals were given (Andexxa, KCentra), in addition to multiple units of FFP and blood products. Xarelto was stopped and he was received SubQ Vit K for 3 days. He was given multiple units of FFP for goal INR < 1.5 in order to prevent further bleeding. Hematology was consulted for persistently elevated INR. Thought to be possibly due to hepatic congestion secondary to elevated fontan pressures. INR improved w/ Vitamin K.  ID: Received prophylactic antibiotics while EVD in place. Escalated to ceftriaxone when patient became febrile on 6/6. Blood cultures, urine cultures, sputum cultures, CSF cultures, and RVP were negative. Ceftriaxone was continued until 6/10 (when fever curve improved).  : Noted to have L scrotal swelling on 6/10. US showed L hydrocele. Surgery consulted and will follow outpatient.  FENGI: Continued on his regular diet. He was made NPO when he was re-intubated due to AMS associated with intracranial bleed. He was given TPN for nutrition. Enteral feeds were started on 6/11. Reached full enteral feeds on ____. 19yo male with complex cardiac h/o DILV, L-malposition of great arteries, sick sinus syndrome and AV mihir disease with tachybradycardia syndrome with a dual chamber pacemaker (currently with RV lead fracture and is currently only LV paced 2/2 complete heart block), PSH of status post Ywwjh-Yuar-Qcrezqj anastomosis and aortic arch reconstruction followed by a fenestrated lateral tunnel Fontan completion (now s/p fenestration closure). here s/p cardiac catheterization and ablation. Procedure was complicated by unsuccessful ablation and post extubation patient was noted to have increased hemoptysis and desaturations, patient was reintubated for airway protection and given propofol for sedation.     PICU course (5/26 -):  Resp: Patient remained intubated overnight and was extubated by the next morning. Required nasal cannula to maintain oxygenation, likely due to post-op inflammation, atelectasis, and increasing fontan pressures. Weaned to room air on 5/27. Re-intubated on 6/1 due to AMS associated with new intracranial bleed. Extubated to HFNC on 6/9. Weaned to RA on ___. Goal sats > 85%.   CV: On 5/27, attempted cardioversion through pacing, although was unsuccessful, and subsequently started medical cardioversion with amiodarone 300mg bid. Restarted on home medications including lisinopril, sildenafil, lasix, aldactone. IART rhythm was broken on 6/1 after cardioversion. However, he went back into IART later that night. Pacemaker was changed to VOO at 70 prior to neurosurgery. Amiodarone, aldactone, and lisinopril were held while critically ill. Epinephrine and vasopressin were utilized to maintain higher maps while EVD in place. On 6/8 he started having runs of ventricular tachycardia. Arrythmia resolved with lidocaine infusion and changing pacemaker mode to VVI.   Neuro: Four hours after cardioversion, he had an episode of staring and unresposiveness. Head CT showed severe hemorrhagic stroke in left frontal lobe in the ROCCO-MCA watershed territory with extension to the intraventricular regions. He required urgent surgical decompressive craniectomy and hemorrhage evacuation with EVD placement overnight on 6/1. Neuroprotective measures were taken and he was started on Keppra. Serial head CTs showed a new hemorrhage along the EVD catheter tract on 6/6. Serial head CTs did not show new bleeding. EVD removed on ____. VEEG placed on 6/10 due to eye deviation and seizure-like activity did not correlate with seizures.  Heme: Given hemoptysis, hemoglobin was monitored and he did not require any transfusions. Hemoptysis resolved overnight 5/26-5/27. Home xarelto was held and restarted on 5/27. INR on 5/26 was 1.32, and it jumped to 4.94 after hemorrhage was identified. Cause for jump in INR is unclear. In the OR, xarelto reversals were given (Andexxa, KCentra), in addition to multiple units of FFP and blood products. Xarelto was stopped and he was received SubQ Vit K for 3 days. He was given multiple units of FFP for goal INR < 1.5 in order to prevent further bleeding. Hematology was consulted for persistently elevated INR. Thought to be possibly due to hepatic congestion secondary to elevated fontan pressures. INR improved w/ Vitamin K.  ID: Received prophylactic antibiotics while EVD in place. Escalated to ceftriaxone when patient became febrile on 6/6. Blood cultures, urine cultures, sputum cultures, CSF cultures, and RVP were negative. Ceftriaxone was continued until 6/10 (when fever curve improved).   : Noted to have L scrotal swelling on 6/10. US showed L hydrocele. Surgery consulted and will follow outpatient.  FENGI: Continued on his regular diet. He was made NPO when he was re-intubated due to AMS associated with intracranial bleed. He was given TPN for nutrition. Enteral feeds were started on 6/11. Reached full enteral feeds on ____. 21yo male with complex cardiac h/o DILV, L-malposition of great arteries, sick sinus syndrome and AV mihir disease with tachybradycardia syndrome with a dual chamber pacemaker (currently with RV lead fracture and is currently only LV paced 2/2 complete heart block), PSH of status post Iitae-Pvdi-Hzvpzno anastomosis and aortic arch reconstruction followed by a fenestrated lateral tunnel Fontan completion (now s/p fenestration closure). here s/p cardiac catheterization and ablation. Procedure was complicated by unsuccessful ablation and post extubation patient was noted to have increased hemoptysis and desaturations, patient was reintubated for airway protection and given propofol for sedation.     PICU course (5/26 -):  Resp: Patient remained intubated overnight and was extubated by the next morning. Required nasal cannula to maintain oxygenation, likely due to post-op inflammation, atelectasis, and increasing fontan pressures. Weaned to room air on 5/27. Re-intubated on 6/1 due to AMS associated with new intracranial bleed. Extubated to HFNC on 6/9. CTA chest on 6/10 showed numerous veno-venous anterior mediastinal collaterals. Weaned to RA on 6/16. Goal sats > 85%.   CV: On 5/27, attempted cardioversion through pacing, although was unsuccessful, and subsequently started medical cardioversion with amiodarone 300mg BID. Restarted on home medications including lisinopril, sildenafil, lasix, aldactone. IART rhythm was broken on 6/1 after cardioversion. However, he went back into IART later that night. Pacemaker was changed to VOO at 70 prior to neurosurgery. Amiodarone, aldactone, and lisinopril were held while critically ill. Epinephrine and vasopressin were utilized to maintain higher maps while EVD in place. On 6/8 he started having runs of ventricular tachycardia. Arrythmia resolved with lidocaine infusion and changing pacemaker mode to VVI. Transitioned to PO mexiletine on 6/13. Pacemaker was switched ___ prior to neurosurgery on 6/17.   Neuro: Four hours after cardioversion, he had an episode of staring and unresponsiveness. Head CT showed severe hemorrhagic stroke in left frontal lobe in the ROCCO-MCA watershed territory with extension to the intraventricular regions. He required urgent surgical decompressive craniectomy and hemorrhage evacuation with EVD placement overnight on 6/1. Neuroprotective measures were taken and he was started on Keppra. Serial head CTs showed a new hemorrhage along the EVD catheter tract on 6/6. Serial head CTs did not show new bleeding. EVD clamped on 6/10. VEEG placed on 6/10 due to eye deviation and seizure-like activity did not correlate with seizures. Keppra increased per neurology and neurosurgery. He did have a clinical seizure on 6/14 which broke with Ativan. Neurology re-consulted and recommended continuing Keppra at current dose. PM&R consulted for delirium/agitation, recommended starting propranolol for agitation. Delirium improved with propranolol and unclamping EVD. Taken to OR on _____ for EVD removal, cranioplasty, and VPS placement.   Heme: Given hemoptysis, hemoglobin was monitored and he did not require any transfusions. Hemoptysis resolved overnight 5/26-5/27. Home xarelto was held and restarted on 5/27. INR on 5/26 was 1.32, and it jumped to 4.94 after hemorrhage was identified. Cause for jump in INR is unclear. In the OR, xarelto reversals were given (Andexxa, KCentra), in addition to multiple units of FFP and blood products. Xarelto was stopped and he was received SubQ Vit K for 3 days. He was given multiple units of FFP for goal INR < 1.5 in order to prevent further bleeding. Hematology was consulted for persistently elevated INR. Thought to be possibly due to hepatic congestion secondary to elevated fontan pressures. INR improved w/ Vitamin K.   ID: Received prophylactic antibiotics while EVD in place. Escalated to ceftriaxone when patient became febrile on 6/6. Blood cultures, urine cultures, sputum cultures, CSF cultures, and RVP were negative. Ceftriaxone was continued until 6/10 (when fever curve improved). Switched to prophylactic ancef while EVD in place.   : Noted to have L scrotal swelling on 6/10. US showed L hydrocele. Surgery consulted and will follow outpatient.  FENGI: Continued on his regular diet. He was made NPO when he was re-intubated due to AMS associated with intracranial bleed. He was given TPN for nutrition. Enteral feeds were started on 6/11. As neurologic status improved, speech and swallow was consulted for bedside swallow evaluation. Evaluation was reassuring against overt signs of aspiration. Allowed to PO regular diet. 19yo male with complex cardiac h/o DILV, L-malposition of great arteries, sick sinus syndrome and AV mihir disease with tachybradycardia syndrome with a dual chamber pacemaker (currently with RV lead fracture and is currently only LV paced 2/2 complete heart block), PSH of status post Ryioc-Yrle-Uduwrfr anastomosis and aortic arch reconstruction followed by a fenestrated lateral tunnel Fontan completion (now s/p fenestration closure). here s/p cardiac catheterization and ablation. Procedure was complicated by unsuccessful ablation and post extubation patient was noted to have increased hemoptysis and desaturations, patient was reintubated for airway protection and given propofol for sedation.     PICU course (5/26 -):  Resp: Patient remained intubated overnight and was extubated by the next morning. Required nasal cannula to maintain oxygenation, likely due to post-op inflammation, atelectasis, and increasing fontan pressures. Weaned to room air on 5/27. Re-intubated on 6/1 due to AMS associated with new intracranial bleed. Extubated to HFNC on 6/9. CTA chest on 6/10 showed numerous veno-venous anterior mediastinal collaterals. Weaned to RA on 6/16. Goal sats > 85%.   CV: On 5/27, attempted cardioversion through pacing, although was unsuccessful, and subsequently started medical cardioversion with amiodarone 300mg BID. Restarted on home medications including lisinopril, sildenafil, lasix, aldactone. IART rhythm was broken on 6/1 after cardioversion. However, he went back into IART later that night. Pacemaker was changed to VOO at 70 prior to neurosurgery. Amiodarone, aldactone, and lisinopril were held while critically ill. Epinephrine and vasopressin were utilized to maintain higher maps while EVD in place. On 6/8 he started having runs of ventricular tachycardia. Arrythmia resolved with lidocaine infusion and changing pacemaker mode to VVI. Transitioned to PO mexiletine on 6/13. Pacemaker was switched ___ prior to neurosurgery on 6/17.   Neuro: Four hours after cardioversion, he had an episode of staring and unresponsiveness. Head CT showed severe hemorrhagic stroke in left frontal lobe in the ROCCO[place]MCA watershed territory with extension to the intraventricular regions. He required urgent surgical decompressive craniectomy and hemorrhage evacuation with EVD placement overnight on 6/1. Neuroprotective measures were taken and he was started on Keppra. Serial head CTs showed a new hemorrhage along the EVD catheter tract on 6/6. Serial head CTs did not show new bleeding. EVD clamped on 6/10. VEEG placed on 6/10 due to eye deviation and seizure-like activity did not correlate with seizures. Keppra increased per neurology and neurosurgery. He did have a clinical seizure on 6/14 which broke with Ativan. Neurology re-consulted and recommended continuing Keppra at current dose. PM&R consulted for delirium/agitation, recommended starting propranolol for agitation. Delirium improved with propranolol and unclamping EVD. Taken to OR on 6/17 for EVD removal, cranioplasty, and VPS placement.   Heme: Given hemoptysis, hemoglobin was monitored and he did not require any transfusions. Hemoptysis resolved overnight 5/26-5/27. Home xarelto was held and restarted on 5/27. INR on 5/26 was 1.32, and it jumped to 4.94 after hemorrhage was identified. Cause for jump in INR is unclear. In the OR, xarelto reversals were given (Andexxa, KCentra), in addition to multiple units of FFP and blood products. Xarelto was stopped and he was received SubQ Vit K for 3 days. He was given multiple units of FFP for goal INR < 1.5 in order to prevent further bleeding. Hematology was consulted for persistently elevated INR. Thought to be possibly due to hepatic congestion secondary to elevated fontan pressures. INR improved w/ Vitamin K. Hypercoagulopathy workup was sent and is pending ____.  ID: Received prophylactic antibiotics while EVD in place. Escalated to ceftriaxone when patient became febrile on 6/6. Blood cultures, urine cultures, sputum cultures, CSF cultures, and RVP were negative. Ceftriaxone was continued until 6/10 (when fever curve improved). Switched to prophylactic ancef while EVD in place.   : Noted to have L scrotal swelling on 6/10. US showed L hydrocele. Surgery consulted and will follow outpatient.  FENGI: Continued on his regular diet. He was made NPO when he was re-intubated due to AMS associated with intracranial bleed. He was given TPN for nutrition. Enteral feeds were started on 6/11. As neurologic status improved, speech and swallow was consulted for bedside swallow evaluation. Evaluation was reassuring against overt signs of aspiration. Allowed to PO regular diet. 19yo male with complex cardiac h/o DILV, L-malposition of great arteries, sick sinus syndrome and AV mihir disease with tachybradycardia syndrome with a dual chamber pacemaker (currently with RV lead fracture and is currently only LV paced 2/2 complete heart block), PSH of status post Qmepv-Aucp-Jizeihb anastomosis and aortic arch reconstruction followed by a fenestrated lateral tunnel Fontan completion (now s/p fenestration closure). here s/p cardiac catheterization and ablation. Procedure was complicated by unsuccessful ablation and post extubation patient was noted to have increased hemoptysis and desaturations, patient was reintubated for airway protection and given propofol for sedation.     PICU course (5/26 - 6/19):  Resp: Patient remained intubated overnight and was extubated by the next morning. Required nasal cannula to maintain oxygenation, likely due to post-op inflammation, atelectasis, and increasing fontan pressures. Weaned to room air on 5/27. Re-intubated on 6/1 due to AMS associated with new intracranial bleed. Extubated to HFNC on 6/9. CTA chest on 6/10 showed numerous veno-venous anterior mediastinal collaterals. Weaned to RA on 6/16. Goal sats > 85%.   CV: On 5/27, attempted cardioversion through pacing, although was unsuccessful, and subsequently started medical cardioversion with amiodarone 300mg BID. Restarted on home medications including lisinopril, sildenafil, lasix, aldactone. IART rhythm was broken on 6/1 after cardioversion. However, he went back into IART later that night. Pacemaker was changed to VOO at 70 prior to neurosurgery. Amiodarone, aldactone, and lisinopril were held while critically ill. Epinephrine and vasopressin were utilized to maintain higher maps while EVD in place. On 6/8 he started having runs of ventricular tachycardia. Arrythmia resolved with lidocaine infusion and changing pacemaker mode to VVI. Transitioned to PO mexiletine on 6/13. Pacemaker transitioned back to VOO at 70.   Neuro: Four hours after cardioversion, he had an episode of staring and unresponsiveness. Head CT showed severe hemorrhagic stroke in left frontal lobe in the ROCCO[place]MCA watershed territory with extension to the intraventricular regions. He required urgent surgical decompressive craniectomy and hemorrhage evacuation with EVD placement overnight on 6/1. Neuroprotective measures were taken and he was started on Keppra. Serial head CTs showed a new hemorrhage along the EVD catheter tract on 6/6. Serial head CTs did not show new bleeding. EVD clamped on 6/10. VEEG placed on 6/10 due to eye deviation and seizure-like activity did not correlate with seizures. Keppra increased per neurology and neurosurgery. He did have a clinical seizure on 6/14 which broke with Ativan. Neurology re-consulted and recommended continuing Keppra at current dose. PM&R consulted for delirium/agitation, recommended starting propranolol for agitation. Delirium improved with propranolol and unclamping EVD. Taken to OR on 6/17 for EVD removal, cranioplasty, and VPS placement.   Heme: Given hemoptysis, hemoglobin was monitored and he did not require any transfusions. Hemoptysis resolved overnight 5/26-5/27. Home xarelto was held and restarted on 5/27. INR on 5/26 was 1.32, and it jumped to 4.94 after hemorrhage was identified. Cause for jump in INR is unclear. In the OR, xarelto reversals were given (Andexxa, KCentra), in addition to multiple units of FFP and blood products. Xarelto was stopped and he was received SubQ Vit K for 3 days. He was given multiple units of FFP for goal INR < 1.5 in order to prevent further bleeding. Hematology was consulted for persistently elevated INR. Thought to be possibly due to hepatic congestion secondary to elevated fontan pressures. INR improved w/ Vitamin K. Hypercoagulopathy workup was sent and is pending. Vitamin K PO 5mg 3x/wk started on 6/14. Will discuss with hematology duration of treatment.   ID: Received prophylactic antibiotics while EVD in place. Escalated to ceftriaxone when patient became febrile on 6/6. Blood cultures, urine cultures, sputum cultures, CSF cultures, and RVP were negative. Ceftriaxone was continued until 6/10 (when fever curve improved). Switched to prophylactic ancef while EVD in place and continued x24hrs post-operatively s/p VPS. Spiked fever ~7am on POD#1.   : Noted to have L scrotal swelling on 6/10. US showed L hydrocele. Surgery consulted and will follow outpatient.  FENGI: Continued on his regular diet. He was made NPO when he was re-intubated due to AMS associated with intracranial bleed. He was given TPN for nutrition. Enteral feeds were started on 6/11. As neurologic status improved, speech and swallow was consulted for bedside swallow evaluation. Evaluation was reassuring against overt signs of aspiration. Allowed to PO regular diet.   Transferred to 2C on 6/19    2CN Course (6/19-- 19yo male with complex cardiac h/o DILV, L-malposition of great arteries, sick sinus syndrome and AV mihir disease with tachybradycardia syndrome with a dual chamber pacemaker (currently with RV lead fracture and is currently only LV paced 2/2 complete heart block), PSH of status post Hkjlm-Ckug-Wezbtay anastomosis and aortic arch reconstruction followed by a fenestrated lateral tunnel Fontan completion (now s/p fenestration closure). here s/p cardiac catheterization and ablation. Procedure was complicated by unsuccessful ablation and post extubation patient was noted to have increased hemoptysis and desaturations, patient was reintubated for airway protection and given propofol for sedation.     PICU course (5/26 - 6/19):  Resp: Patient remained intubated overnight and was extubated by the next morning. Required nasal cannula to maintain oxygenation, likely due to post-op inflammation, atelectasis, and increasing fontan pressures. Weaned to room air on 5/27. Re-intubated on 6/1 due to AMS associated with new intracranial bleed. Extubated to HFNC on 6/9. CTA chest on 6/10 showed numerous veno-venous anterior mediastinal collaterals. Weaned to RA on 6/16. Goal sats > 85%.   CV: On 5/27, attempted cardioversion through pacing, although was unsuccessful, and subsequently started medical cardioversion with amiodarone 300mg BID. Restarted on home medications including lisinopril, sildenafil, lasix, aldactone. IART rhythm was broken on 6/1 after cardioversion. However, he went back into IART later that night. Pacemaker was changed to VOO at 70 prior to neurosurgery. Amiodarone, aldactone, and lisinopril were held while critically ill. Epinephrine and vasopressin were utilized to maintain higher maps while EVD in place. On 6/8 he started having runs of ventricular tachycardia. Arrythmia resolved with lidocaine infusion and changing pacemaker mode to VVI. Transitioned to PO mexiletine on 6/13. Pacemaker transitioned back to VOO at 70.   Neuro: Four hours after cardioversion, he had an episode of staring and unresponsiveness. Head CT showed severe hemorrhagic stroke in left frontal lobe in the ROCCO-MCA watershed territory with extension to the intraventricular regions. He required urgent surgical decompressive craniectomy and hemorrhage evacuation with EVD placement overnight on 6/1. Neuroprotective measures were taken and he was started on Keppra. Serial head CTs showed a new hemorrhage along the EVD catheter tract on 6/6. Serial head CTs did not show new bleeding. EVD clamped on 6/10. VEEG placed on 6/10 due to eye deviation and seizure-like activity did not correlate with seizures. Keppra increased per neurology and neurosurgery. He did have a clinical seizure on 6/14 which broke with Ativan. Neurology re-consulted and recommended continuing Keppra at current dose. PM&R consulted for delirium/agitation, recommended starting propranolol for agitation. Delirium improved with propranolol and unclamping EVD. Taken to OR on 6/17 for EVD removal, cranioplasty, and VPS placement.   Heme: Given hemoptysis, hemoglobin was monitored and he did not require any transfusions. Hemoptysis resolved overnight 5/26-5/27. Home xarelto was held and restarted on 5/27. INR on 5/26 was 1.32, and it jumped to 4.94 after hemorrhage was identified. Cause for jump in INR is unclear. In the OR, xarelto reversals were given (Andexxa, KCentra), in addition to multiple units of FFP and blood products. Xarelto was stopped and he was received SubQ Vit K for 3 days. He was given multiple units of FFP for goal INR < 1.5 in order to prevent further bleeding. Hematology was consulted for persistently elevated INR. Thought to be possibly due to hepatic congestion secondary to elevated fontan pressures. INR improved w/ Vitamin K. Hypercoagulopathy workup was sent and is pending. Vitamin K PO 5mg 3x/wk started on 6/14. Will discuss with hematology duration of treatment.   ID: Received prophylactic antibiotics while EVD in place. Escalated to ceftriaxone when patient became febrile on 6/6. Blood cultures, urine cultures, sputum cultures, CSF cultures, and RVP were negative. Ceftriaxone was continued until 6/10 (when fever curve improved). Switched to prophylactic ancef while EVD in place and continued post-operatively s/p VPS. Spiked fever ~7am on POD#1.   : Noted to have L scrotal swelling on 6/10. US showed L hydrocele. Surgery consulted and will follow outpatient.  FENGI: Continued on his regular diet. He was made NPO when he was re-intubated due to AMS associated with intracranial bleed. He was given TPN for nutrition. Enteral feeds were started on 6/11. As neurologic status improved, speech and swallow was consulted for bedside swallow evaluation. Evaluation was reassuring against overt signs of aspiration. Allowed to PO regular diet.   Transferred to 2CN on 6/19    2CN Course (6/19--  Resp: Pt remained stable on RA. Goal sats >85%  CV: PPM initially continued on VOO at 70. Mexiletine, sildenafil, and lasix were continued. Aldactone....  Neuro: Repeat CT head 6/18 showed trace hemorrhage R lateral ventricle and interval placement of wire mesh cranioplasty. Repeat CT head on 6/19 was stable and on 6/21.....  Heme: Pt was given FFP x1 for elevated INR 1.57 on 6/19.   ID: Continued on ancef until surgical drain was removed on....CSF cxs from 6/18 showed NGTD  FENGI: Pt continued regular diet without issue. 21yo male with complex cardiac h/o DILV, L-malposition of great arteries, sick sinus syndrome and AV mihir disease with tachybradycardia syndrome with a dual chamber pacemaker (currently with RV lead fracture and is currently only LV paced 2/2 complete heart block), PSH of status post Filzr-Ypgv-Phczqzh anastomosis and aortic arch reconstruction followed by a fenestrated lateral tunnel Fontan completion (now s/p fenestration closure). here s/p cardiac catheterization and ablation. Procedure was complicated by unsuccessful ablation and post extubation patient was noted to have increased hemoptysis and desaturations, patient was reintubated for airway protection and given propofol for sedation.     PICU course (5/26 - 6/19):  Resp: Patient remained intubated overnight and was extubated by the next morning. Required nasal cannula to maintain oxygenation, likely due to post-op inflammation, atelectasis, and increasing fontan pressures. Weaned to room air on 5/27. Re-intubated on 6/1 due to AMS associated with new intracranial bleed. Extubated to HFNC on 6/9. CTA chest on 6/10 showed numerous veno-venous anterior mediastinal collaterals. Weaned to RA on 6/16. Goal sats > 85%.   CV: On 5/27, attempted cardioversion through pacing, although was unsuccessful, and subsequently started medical cardioversion with amiodarone 300mg BID. Restarted on home medications including lisinopril, sildenafil, lasix, aldactone. IART rhythm was broken on 6/1 after cardioversion. However, he went back into IART later that night. Pacemaker was changed to VOO at 70 prior to neurosurgery. Amiodarone, aldactone, and lisinopril were held while critically ill. Epinephrine and vasopressin were utilized to maintain higher maps while EVD in place. On 6/8 he started having runs of ventricular tachycardia. Arrythmia resolved with lidocaine infusion and changing pacemaker mode to VVI. Transitioned to PO mexiletine on 6/13. Pacemaker transitioned back to VOO at 70.   Neuro: Four hours after cardioversion, he had an episode of staring and unresponsiveness. Head CT showed severe hemorrhagic stroke in left frontal lobe in the ROCCO-MCA watershed territory with extension to the intraventricular regions. He required urgent surgical decompressive craniectomy and hemorrhage evacuation with EVD placement overnight on 6/1. Neuroprotective measures were taken and he was started on Keppra. Serial head CTs showed a new hemorrhage along the EVD catheter tract on 6/6. Serial head CTs did not show new bleeding. EVD clamped on 6/10. VEEG placed on 6/10 due to eye deviation and seizure-like activity did not correlate with seizures. Keppra increased per neurology and neurosurgery. He did have a clinical seizure on 6/14 which broke with Ativan. Neurology re-consulted and recommended continuing Keppra at current dose. PM&R consulted for delirium/agitation, recommended starting propranolol for agitation. Delirium improved with propranolol and unclamping EVD. Taken to OR on 6/17 for EVD removal, cranioplasty, and VPS placement.   Heme: Given hemoptysis, hemoglobin was monitored and he did not require any transfusions. Hemoptysis resolved overnight 5/26-5/27. Home xarelto was held and restarted on 5/27. INR on 5/26 was 1.32, and it jumped to 4.94 after hemorrhage was identified. Cause for jump in INR is unclear. In the OR, xarelto reversals were given (Andexxa, KCentra), in addition to multiple units of FFP and blood products. Xarelto was stopped and he was received SubQ Vit K for 3 days. He was given multiple units of FFP for goal INR < 1.5 in order to prevent further bleeding. Hematology was consulted for persistently elevated INR. Thought to be possibly due to hepatic congestion secondary to elevated fontan pressures. INR improved w/ Vitamin K. Hypercoagulopathy workup was sent and is pending. Vitamin K PO 5mg 3x/wk started on 6/14. Will discuss with hematology duration of treatment.   ID: Received prophylactic antibiotics while EVD in place. Escalated to ceftriaxone when patient became febrile on 6/6. Blood cultures, urine cultures, sputum cultures, CSF cultures, and RVP were negative. Ceftriaxone was continued until 6/10 (when fever curve improved). Switched to prophylactic ancef while EVD in place and continued post-operatively s/p VPS. Spiked fever ~7am on POD#1.   : Noted to have L scrotal swelling on 6/10. US showed L hydrocele. Surgery consulted and will follow outpatient.  FENGI: Continued on his regular diet. He was made NPO when he was re-intubated due to AMS associated with intracranial bleed. He was given TPN for nutrition. Enteral feeds were started on 6/11. As neurologic status improved, speech and swallow was consulted for bedside swallow evaluation. Evaluation was reassuring against overt signs of aspiration. Allowed to PO regular diet.   Transferred to 2CN on 6/19    2CN Course (6/19--  Resp: Pt remained stable on RA. Goal sats >85%  CV: PPM initially continued on VOO at 70. Mexiletine, sildenafil, and lasix were continued. Lisinopril was restarted on 6/21 and Aldactone was restarted on 6/22.  Neuro: Repeat CT head 6/18 showed trace hemorrhage R lateral ventricle and interval placement of wire mesh cranioplasty. Repeat CT head on 6/19 and 6/20 was stable, repeat CT head on 6/25 was also stable prior to restarting anticoagulation. Propranolol was weaned per PM&R recs to 5mg q8h on 6/25 and off on.......  Heme: Pt was given FFP x1 for elevated INR 1.57 on 6/19.   ID: Continued on ancef until surgical drain was removed on....CSF cxs from 6/18 showed NGTD  FENGI: Pt continued regular diet without issue. 21yo male with complex cardiac h/o DILV, L-malposition of great arteries, sick sinus syndrome and AV mihir disease with tachybradycardia syndrome with a dual chamber pacemaker (currently with RV lead fracture and is currently only LV paced 2/2 complete heart block), PSH of status post Caacb-Xkin-Zrtsxug anastomosis and aortic arch reconstruction followed by a fenestrated lateral tunnel Fontan completion (now s/p fenestration closure). here s/p cardiac catheterization and ablation. Procedure was complicated by unsuccessful ablation and post extubation patient was noted to have increased hemoptysis and desaturations, patient was reintubated for airway protection and given propofol for sedation.     PICU course (5/26 - 6/19):  Resp: Patient remained intubated overnight and was extubated by the next morning. Required nasal cannula to maintain oxygenation, likely due to post-op inflammation, atelectasis, and increasing fontan pressures. Weaned to room air on 5/27. Re-intubated on 6/1 due to AMS associated with new intracranial bleed. Extubated to HFNC on 6/9. CTA chest on 6/10 showed numerous veno-venous anterior mediastinal collaterals. Weaned to RA on 6/16. Goal sats > 85%.   CV: On 5/27, attempted cardioversion through pacing, although was unsuccessful, and subsequently started medical cardioversion with amiodarone 300mg BID. Restarted on home medications including lisinopril, sildenafil, lasix, aldactone. IART rhythm was broken on 6/1 after cardioversion. However, he went back into IART later that night. Pacemaker was changed to VOO at 70 prior to neurosurgery. Amiodarone, aldactone, and lisinopril were held while critically ill. Epinephrine and vasopressin were utilized to maintain higher maps while EVD in place. On 6/8 he started having runs of ventricular tachycardia. Arrythmia resolved with lidocaine infusion and changing pacemaker mode to VVI. Transitioned to PO mexiletine on 6/13. Pacemaker transitioned back to VOO at 70.   Neuro: Four hours after cardioversion, he had an episode of staring and unresponsiveness. Head CT showed severe hemorrhagic stroke in left frontal lobe in the ROCCO[place]MCA watershed territory with extension to the intraventricular regions. He required urgent surgical decompressive craniectomy and hemorrhage evacuation with EVD placement overnight on 6/1. Neuroprotective measures were taken and he was started on Keppra. Serial head CTs showed a new hemorrhage along the EVD catheter tract on 6/6. Serial head CTs did not show new bleeding. EVD clamped on 6/10. VEEG placed on 6/10 due to eye deviation and seizure-like activity did not correlate with seizures. Keppra increased per neurology and neurosurgery. He did have a clinical seizure on 6/14 which broke with Ativan. Neurology re-consulted and recommended continuing Keppra at current dose. PM&R consulted for delirium/agitation, recommended starting propranolol for agitation. Delirium improved with propranolol and unclamping EVD. Taken to OR on 6/17 for EVD removal, cranioplasty, and VPS placement.   Heme: Given hemoptysis, hemoglobin was monitored and he did not require any transfusions. Hemoptysis resolved overnight 5/26-5/27. Home xarelto was held and restarted on 5/27. INR on 5/26 was 1.32, and it jumped to 4.94 after hemorrhage was identified. Cause for jump in INR is unclear. In the OR, xarelto reversals were given (Andexxa, KCentra), in addition to multiple units of FFP and blood products. Xarelto was stopped and he was received SubQ Vit K for 3 days. He was given multiple units of FFP for goal INR < 1.5 in order to prevent further bleeding. Hematology was consulted for persistently elevated INR. Thought to be possibly due to hepatic congestion secondary to elevated fontan pressures. INR improved w/ Vitamin K. Hypercoagulopathy workup was sent and is pending. Vitamin K PO 5mg 3x/wk started on 6/14. Will discuss with hematology duration of treatment.   ID: Received prophylactic antibiotics while EVD in place. Escalated to ceftriaxone when patient became febrile on 6/6. Blood cultures, urine cultures, sputum cultures, CSF cultures, and RVP were negative. Ceftriaxone was continued until 6/10 (when fever curve improved). Switched to prophylactic ancef while EVD in place and continued post-operatively s/p VPS. Spiked fever ~7am on POD#1.   : Noted to have L scrotal swelling on 6/10. US showed L hydrocele. Surgery consulted and will follow outpatient.  FENGI: Continued on his regular diet. He was made NPO when he was re-intubated due to AMS associated with intracranial bleed. He was given TPN for nutrition. Enteral feeds were started on 6/11. As neurologic status improved, speech and swallow was consulted for bedside swallow evaluation. Evaluation was reassuring against overt signs of aspiration. Allowed to PO regular diet.   Transferred to 2CN on 6/19    2CN Course (6/19--  Resp: Pt remained stable on RA. Goal sats >85%  CV: PPM initially continued on VOO at 70. Mexiletine, sildenafil, and lasix were continued. Lisinopril was restarted on 6/21 and Aldactone was restarted on 6/22.  Neuro: Repeat CT head 6/18 showed trace hemorrhage R lateral ventricle and interval placement of wire mesh cranioplasty. Repeat CT head on 6/19 and 6/20 was stable, repeat CT head on 6/25 was also stable prior to restarting anticoagulation. Propranolol was weaned per PM&R recs to 5mg q8h on 6/25 and off on.......  Heme: Pt was given FFP x1 for elevated INR 1.57 on 6/19. INR remained stable less than 1.5 and vit K 5mg PO 3x/wk was d/c'd on 6/23. INR continued to be trended daily which showed.......  ID: Continued on ancef until surgical drain was removed on 6/20. CSF cxs from 6/18 showed NGTD. Pt monitored off abx without clinical signs or symptoms of infection.  FENGI: Pt continued regular diet without issue. 21yo male with complex cardiac h/o DILV, L-malposition of great arteries, sick sinus syndrome and AV mihir disease with tachybradycardia syndrome with a dual chamber pacemaker (currently with RV lead fracture and is currently only LV paced 2/2 complete heart block), PSH of status post Jbdev-Krbw-Wpziddn anastomosis and aortic arch reconstruction followed by a fenestrated lateral tunnel Fontan completion (now s/p fenestration closure). here s/p cardiac catheterization and ablation. Procedure was complicated by unsuccessful ablation and post extubation patient was noted to have increased hemoptysis and desaturations, patient was reintubated for airway protection and given propofol for sedation.     PICU course (5/26 - 6/19):  Resp: Patient remained intubated overnight and was extubated by the next morning. Required nasal cannula to maintain oxygenation, likely due to post-op inflammation, atelectasis, and increasing fontan pressures. Weaned to room air on 5/27. Re-intubated on 6/1 due to AMS associated with new intracranial bleed. Extubated to HFNC on 6/9. CTA chest on 6/10 showed numerous veno-venous anterior mediastinal collaterals. Weaned to RA on 6/16. Goal sats > 85%.   CV: On 5/27, attempted cardioversion through pacing, although was unsuccessful, and subsequently started medical cardioversion with amiodarone 300mg BID. Restarted on home medications including lisinopril, sildenafil, lasix, aldactone. IART rhythm was broken on 6/1 after cardioversion. However, he went back into IART later that night. Pacemaker was changed to VOO at 70 prior to neurosurgery. Amiodarone, aldactone, and lisinopril were held while critically ill. Epinephrine and vasopressin were utilized to maintain higher maps while EVD in place. On 6/8 he started having runs of ventricular tachycardia. Arrythmia resolved with lidocaine infusion and changing pacemaker mode to VVI. Transitioned to PO mexiletine on 6/13. Pacemaker transitioned back to VOO at 70.   Neuro: Four hours after cardioversion, he had an episode of staring and unresponsiveness. Head CT showed severe hemorrhagic stroke in left frontal lobe in the ROCCO[place]MCA watershed territory with extension to the intraventricular regions. He required urgent surgical decompressive craniectomy and hemorrhage evacuation with EVD placement overnight on 6/1. Neuroprotective measures were taken and he was started on Keppra. Serial head CTs showed a new hemorrhage along the EVD catheter tract on 6/6. Serial head CTs did not show new bleeding. EVD clamped on 6/10. VEEG placed on 6/10 due to eye deviation and seizure-like activity did not correlate with seizures. Keppra increased per neurology and neurosurgery. He did have a clinical seizure on 6/14 which broke with Ativan. Neurology re-consulted and recommended continuing Keppra at current dose. PM&R consulted for delirium/agitation, recommended starting propranolol for agitation. Delirium improved with propranolol and unclamping EVD. Taken to OR on 6/17 for EVD removal, cranioplasty, and VPS placement.   Heme: Given hemoptysis, hemoglobin was monitored and he did not require any transfusions. Hemoptysis resolved overnight 5/26-5/27. Home xarelto was held and restarted on 5/27. INR on 5/26 was 1.32, and it jumped to 4.94 after hemorrhage was identified. Cause for jump in INR is unclear. In the OR, xarelto reversals were given (Andexxa, KCentra), in addition to multiple units of FFP and blood products. Xarelto was stopped and he was received SubQ Vit K for 3 days. He was given multiple units of FFP for goal INR < 1.5 in order to prevent further bleeding. Hematology was consulted for persistently elevated INR. Thought to be possibly due to hepatic congestion secondary to elevated fontan pressures. INR improved w/ Vitamin K. Hypercoagulopathy workup was sent and is pending. Vitamin K PO 5mg 3x/wk started on 6/14. Will discuss with hematology duration of treatment.   ID: Received prophylactic antibiotics while EVD in place. Escalated to ceftriaxone when patient became febrile on 6/6. Blood cultures, urine cultures, sputum cultures, CSF cultures, and RVP were negative. Ceftriaxone was continued until 6/10 (when fever curve improved). Switched to prophylactic ancef while EVD in place and continued post-operatively s/p VPS. Spiked fever ~7am on POD#1.   : Noted to have L scrotal swelling on 6/10. US showed L hydrocele. Surgery consulted and will follow outpatient.  FENGI: Continued on his regular diet. He was made NPO when he was re-intubated due to AMS associated with intracranial bleed. He was given TPN for nutrition. Enteral feeds were started on 6/11. As neurologic status improved, speech and swallow was consulted for bedside swallow evaluation. Evaluation was reassuring against overt signs of aspiration. Allowed to PO regular diet.   Transferred to 2CN on 6/19    2CN Course (6/19--  Resp: Pt remained stable on RA. Goal sats >85%  CV: PPM initially continued on VOO at 70. Mexiletine, sildenafil, and lasix were continued. Lisinopril was restarted on 6/21 and Aldactone was restarted on 6/22.  Patient remains hemodynamically stable.  Neuro: Repeat CT head 6/18 showed trace hemorrhage R lateral ventricle and interval placement of wire mesh cranioplasty. Repeat CT head on 6/19 and 6/20 was stable, repeat CT head on 6/25 was also stable prior to restarting anticoagulation. Propranolol was weaned per PM&R recs to 5mg q8h on 6/25 and off on 6/27.  Heme: Pt was given FFP x1 for elevated INR 1.57 on 6/19. INR remained stable less than 1.5 and vit K 5mg PO 3x/wk was d/c'd on 6/23. INR continued to be trended daily which has been stable, slightly elevated at 1.39.  ID: Continued on ancef until surgical drain was removed on 6/20. CSF cxs from 6/18 showed NGTD. Pt monitored off abx without clinical signs or symptoms of infection.  FENGI: Pt continued regular diet, tolerating well. Voiding and stooling per baseline. 21yo male with complex cardiac h/o DILV, L-malposition of great arteries, sick sinus syndrome and AV mihir disease with tachybradycardia syndrome with a dual chamber pacemaker (currently with RV lead fracture and is currently only LV paced 2/2 complete heart block), PSH of status post Ugqdr-Lghq-Rtdjwid anastomosis and aortic arch reconstruction followed by a fenestrated lateral tunnel Fontan completion (now s/p fenestration closure). here s/p cardiac catheterization and ablation. Procedure was complicated by unsuccessful ablation and post extubation patient was noted to have increased hemoptysis and desaturations, patient was reintubated for airway protection and given propofol for sedation.     PICU course (5/26 - 6/19):  Resp: Patient remained intubated overnight and was extubated by the next morning. Required nasal cannula to maintain oxygenation, likely due to post-op inflammation, atelectasis, and increasing fontan pressures. Weaned to room air on 5/27. Re-intubated on 6/1 due to AMS associated with new intracranial bleed. Extubated to HFNC on 6/9. CTA chest on 6/10 showed numerous veno-venous anterior mediastinal collaterals. Weaned to RA on 6/16. Goal sats > 85%.   CV: On 5/27, attempted cardioversion through pacing, although was unsuccessful, and subsequently started medical cardioversion with amiodarone 300mg BID. Restarted on home medications including lisinopril, sildenafil, lasix, aldactone. IART rhythm was broken on 6/1 after cardioversion. However, he went back into IART later that night. Pacemaker was changed to VOO at 70 prior to neurosurgery. Amiodarone, aldactone, and lisinopril were held while critically ill. Epinephrine and vasopressin were utilized to maintain higher maps while EVD in place. On 6/8 he started having runs of ventricular tachycardia. Arrythmia resolved with lidocaine infusion and changing pacemaker mode to VVI. Transitioned to PO mexiletine on 6/13. Pacemaker transitioned back to VOO at 70.   Neuro: Four hours after cardioversion, he had an episode of staring and unresponsiveness. Head CT showed severe hemorrhagic stroke in left frontal lobe in the [place]CC[place] watershed territory with extension to the intraventricular regions. He required urgent surgical decompressive craniectomy and hemorrhage evacuation with EVD placement overnight on 6/1. Neuroprotective measures were taken and he was started on Keppra. Serial head CTs showed a new hemorrhage along the EVD catheter tract on 6/6. Serial head CTs did not show new bleeding. EVD clamped on 6/10. VEEG placed on 6/10 due to eye deviation and seizure-like activity did not correlate with seizures. Keppra increased per neurology and neurosurgery. He did have a clinical seizure on 6/14 which broke with Ativan. Neurology re-consulted and recommended continuing Keppra at current dose. PM&R consulted for delirium/agitation, recommended starting propranolol for agitation. Delirium improved with propranolol and unclamping EVD. Taken to OR on 6/17 for EVD removal, cranioplasty, and VPS placement.   Heme: Given hemoptysis, hemoglobin was monitored and he did not require any transfusions. Hemoptysis resolved overnight 5/26-5/27. Home xarelto was held and restarted on 5/27. INR on 5/26 was 1.32, and it jumped to 4.94 after hemorrhage was identified. Cause for jump in INR is unclear. In the OR, xarelto reversals were given (Andexxa, KCentra), in addition to multiple units of FFP and blood products. Xarelto was stopped and he was received SubQ Vit K for 3 days. He was given multiple units of FFP for goal INR < 1.5 in order to prevent further bleeding. Hematology was consulted for persistently elevated INR. Thought to be possibly due to hepatic congestion secondary to elevated fontan pressures. INR improved w/ Vitamin K. Hypercoagulopathy workup was sent and is pending. Vitamin K PO 5mg 3x/wk started on 6/14. Will discuss with hematology duration of treatment.   ID: Received prophylactic antibiotics while EVD in place. Escalated to ceftriaxone when patient became febrile on 6/6. Blood cultures, urine cultures, sputum cultures, CSF cultures, and RVP were negative. Ceftriaxone was continued until 6/10 (when fever curve improved). Switched to prophylactic ancef while EVD in place and continued post-operatively s/p VPS. Spiked fever ~7am on POD#1.   : Noted to have L scrotal swelling on 6/10. US showed L hydrocele. Surgery consulted and will follow outpatient.  FENGI: Continued on his regular diet. He was made NPO when he was re-intubated due to AMS associated with intracranial bleed. He was given TPN for nutrition. Enteral feeds were started on 6/11. As neurologic status improved, speech and swallow was consulted for bedside swallow evaluation. Evaluation was reassuring against overt signs of aspiration. Allowed to PO regular diet.   Transferred to 2CN on 6/19    2CN Course (6/19--  Resp: Pt remained stable on RA. Goal sats >85%  CV: PPM initially continued on VOO at 70. Mexiletine, sildenafil, and lasix were continued. Lisinopril was restarted on 6/21 and Aldactone was restarted on 6/22.  Patient remains hemodynamically stable.  Neuro: Repeat CT head 6/18 showed trace hemorrhage R lateral ventricle and interval placement of wire mesh cranioplasty. Repeat CT head on 6/19 and 6/20 was stable, repeat CT head on 6/25 was also stable prior to restarting anticoagulation. Propranolol was weaned per PM&R recs to 5mg q8h on 6/25 and off on 6/27.  Heme: Pt was given FFP x1 for elevated INR 1.57 on 6/19. INR remained stable less than 1.5 and vit K 5mg PO 3x/wk was d/c'd on 6/23. INR continued to be trended daily which has been stable, slightly elevated at 1.39.  ID: Continued on ancef until surgical drain was removed on 6/20. CSF cxs from 6/18 showed NGTD. Pt monitored off abx without clinical signs or symptoms of infection.  FENGI: Pt continued regular diet, tolerating well. Voiding and stooling per baseline. Nutrition consulted for education prior to discharge. Provided Jimbo and aunt with verbal and written education on "Heart Healthy Nutrition Therapy"; encouraged a diet low in sodium and saturated fats, high in monounsaturated fats, omega 3's, whole grains, fruits, vegs, lean proteins, etc.        Discharge Plan:   21yo male with complex cardiac h/o DILV, L-malposition of great arteries, sick sinus syndrome and AV mihir disease with tachybradycardia syndrome with a dual chamber pacemaker (currently with RV lead fracture and is currently only LV paced 2/2 complete heart block), PSH of status post Jqlvo-Lsyn-Wnyufcs anastomosis and aortic arch reconstruction followed by a fenestrated lateral tunnel Fontan completion (now s/p fenestration closure). here s/p cardiac catheterization and ablation. Procedure was complicated by unsuccessful ablation and post extubation patient was noted to have increased hemoptysis and desaturations, patient was reintubated for airway protection and given propofol for sedation.     PICU course (5/26 - 6/19):  Resp: Patient remained intubated overnight and was extubated by the next morning. Required nasal cannula to maintain oxygenation, likely due to post-op inflammation, atelectasis, and increasing fontan pressures. Weaned to room air on 5/27. Re-intubated on 6/1 due to AMS associated with new intracranial bleed. Extubated to HFNC on 6/9. CTA chest on 6/10 showed numerous veno-venous anterior mediastinal collaterals. Weaned to RA on 6/16. Goal sats > 85%.   CV: On 5/27, attempted cardioversion through pacing, although was unsuccessful, and subsequently started medical cardioversion with amiodarone 300mg BID. Restarted on home medications including lisinopril, sildenafil, lasix, aldactone. IART rhythm was broken on 6/1 after cardioversion. However, he went back into IART later that night. Pacemaker was changed to VOO at 70 prior to neurosurgery. Amiodarone, aldactone, and lisinopril were held while critically ill. Epinephrine and vasopressin were utilized to maintain higher maps while EVD in place. On 6/8 he started having runs of ventricular tachycardia. Arrythmia resolved with lidocaine infusion and changing pacemaker mode to VVI. Transitioned to PO mexiletine on 6/13. Pacemaker transitioned back to VOO at 70.   Neuro: Four hours after cardioversion, he had an episode of staring and unresponsiveness. Head CT showed severe hemorrhagic stroke in left frontal lobe in the ROCCO[place]MCA watershed territory with extension to the intraventricular regions. He required urgent surgical decompressive craniectomy and hemorrhage evacuation with EVD placement overnight on 6/1. Neuroprotective measures were taken and he was started on Keppra. Serial head CTs showed a new hemorrhage along the EVD catheter tract on 6/6. Serial head CTs did not show new bleeding. EVD clamped on 6/10. VEEG placed on 6/10 due to eye deviation and seizure-like activity did not correlate with seizures. Keppra increased per neurology and neurosurgery. He did have a clinical seizure on 6/14 which broke with Ativan. Neurology re-consulted and recommended continuing Keppra at current dose. PM&R consulted for delirium/agitation, recommended starting propranolol for agitation. Delirium improved with propranolol and unclamping EVD. Taken to OR on 6/17 for EVD removal, cranioplasty, and VPS placement.   Heme: Given hemoptysis, hemoglobin was monitored and he did not require any transfusions. Hemoptysis resolved overnight 5/26-5/27. Home xarelto was held and restarted on 5/27. INR on 5/26 was 1.32, and it jumped to 4.94 after hemorrhage was identified. Cause for jump in INR is unclear. In the OR, xarelto reversals were given (Andexxa, KCentra), in addition to multiple units of FFP and blood products. Xarelto was stopped and he was received SubQ Vit K for 3 days. He was given multiple units of FFP for goal INR < 1.5 in order to prevent further bleeding. Hematology was consulted for persistently elevated INR. Thought to be possibly due to hepatic congestion secondary to elevated fontan pressures. INR improved w/ Vitamin K. Hypercoagulopathy workup was sent and is pending. Vitamin K PO 5mg 3x/wk started on 6/14. Will discuss with hematology duration of treatment.   ID: Received prophylactic antibiotics while EVD in place. Escalated to ceftriaxone when patient became febrile on 6/6. Blood cultures, urine cultures, sputum cultures, CSF cultures, and RVP were negative. Ceftriaxone was continued until 6/10 (when fever curve improved). Switched to prophylactic ancef while EVD in place and continued post-operatively s/p VPS. Spiked fever ~7am on POD#1.   : Noted to have L scrotal swelling on 6/10. US showed L hydrocele. Surgery consulted and will follow outpatient.  FENGI: Continued on his regular diet. He was made NPO when he was re-intubated due to AMS associated with intracranial bleed. He was given TPN for nutrition. Enteral feeds were started on 6/11. As neurologic status improved, speech and swallow was consulted for bedside swallow evaluation. Evaluation was reassuring against overt signs of aspiration. Allowed to PO regular diet.   Transferred to 2CN on 6/19    2CN Course (6/19--  Resp: Pt remained stable on RA. Goal sats >85%  CV: PPM initially continued on VOO at 70. Mexiletine, sildenafil, and lasix were continued. Lisinopril was restarted on 6/21 and Aldactone was restarted on 6/22.  Patient remains hemodynamically stable.  Neuro: Repeat CT head 6/18 showed trace hemorrhage R lateral ventricle and interval placement of wire mesh cranioplasty. Repeat CT head on 6/19 and 6/20 was stable, repeat CT head on 6/25 was also stable prior to restarting anticoagulation. Propranolol was weaned per PM&R recs to 5mg q8h on 6/25 and off on 6/27.  Heme: Pt was given FFP x1 for elevated INR 1.57 on 6/19. INR remained stable less than 1.5 and vit K 5mg PO 3x/wk was d/c'd on 6/23. INR continued to be trended daily which has been stable, slightly elevated at 1.39.  ID: Continued on ancef until surgical drain was removed on 6/20. CSF cxs from 6/18 showed NGTD. Pt monitored off abx without clinical signs or symptoms of infection.  FENGI: Pt continued regular diet, tolerating well. Voiding and stooling per baseline. Nutrition consulted for education prior to discharge. Provided Jimbo and aunt with verbal and written education on "Heart Healthy Nutrition Therapy"; encouraged a diet low in sodium and saturated fats, high in monounsaturated fats, omega 3's, whole grains, fruits, vegs, lean proteins, etc.        Discharge Plan:  - Follow up with Neurosurgeon, Dr. Russo, in 2 weeks  - 21yo male with complex cardiac h/o DILV, L-malposition of great arteries, sick sinus syndrome and AV mihir disease with tachybradycardia syndrome with a dual chamber pacemaker (currently with RV lead fracture and is currently only LV paced 2/2 complete heart block), PSH of status post Clovh-Dcfs-Dxrizyv anastomosis and aortic arch reconstruction followed by a fenestrated lateral tunnel Fontan completion (now s/p fenestration closure). here s/p cardiac catheterization and ablation. Procedure was complicated by unsuccessful ablation and post extubation patient was noted to have increased hemoptysis and desaturations, patient was reintubated for airway protection and given propofol for sedation.     PICU course (5/26 - 6/19):  Resp: Patient remained intubated overnight and was extubated by the next morning. Required nasal cannula to maintain oxygenation, likely due to post-op inflammation, atelectasis, and increasing fontan pressures. Weaned to room air on 5/27. Re-intubated on 6/1 due to AMS associated with new intracranial bleed. Extubated to HFNC on 6/9. CTA chest on 6/10 showed numerous veno-venous anterior mediastinal collaterals. Weaned to RA on 6/16. Goal sats > 85%.   CV: On 5/27, attempted cardioversion through pacing, although was unsuccessful, and subsequently started medical cardioversion with amiodarone 300mg BID. Restarted on home medications including lisinopril, sildenafil, lasix, aldactone. IART rhythm was broken on 6/1 after cardioversion. However, he went back into IART later that night. Pacemaker was changed to VOO at 70 prior to neurosurgery. Amiodarone, aldactone, and lisinopril were held while critically ill. Epinephrine and vasopressin were utilized to maintain higher maps while EVD in place. On 6/8 he started having runs of ventricular tachycardia. Arrythmia resolved with lidocaine infusion and changing pacemaker mode to VVI. Transitioned to PO mexiletine on 6/13. Pacemaker transitioned back to VOO at 70.   Neuro: Four hours after cardioversion, he had an episode of staring and unresponsiveness. Head CT showed severe hemorrhagic stroke in left frontal lobe in the ROCCO-MCA watershed territory with extension to the intraventricular regions. He required urgent surgical decompressive craniectomy and hemorrhage evacuation with EVD placement overnight on 6/1. Neuroprotective measures were taken and he was started on Keppra. Serial head CTs showed a new hemorrhage along the EVD catheter tract on 6/6. Serial head CTs did not show new bleeding. EVD clamped on 6/10. VEEG placed on 6/10 due to eye deviation and seizure-like activity did not correlate with seizures. Keppra increased per neurology and neurosurgery. He did have a clinical seizure on 6/14 which broke with Ativan. Neurology re-consulted and recommended continuing Keppra at current dose. PM&R consulted for delirium/agitation, recommended starting propranolol for agitation. Delirium improved with propranolol and unclamping EVD. Taken to OR on 6/17 for EVD removal, cranioplasty, and VPS placement.   Heme: Given hemoptysis, hemoglobin was monitored and he did not require any transfusions. Hemoptysis resolved overnight 5/26-5/27. Home xarelto was held and restarted on 5/27. INR on 5/26 was 1.32, and it jumped to 4.94 after hemorrhage was identified. Cause for jump in INR is unclear. In the OR, xarelto reversals were given (Andexxa, KCentra), in addition to multiple units of FFP and blood products. Xarelto was stopped and he was received SubQ Vit K for 3 days. He was given multiple units of FFP for goal INR < 1.5 in order to prevent further bleeding. Hematology was consulted for persistently elevated INR. Thought to be possibly due to hepatic congestion secondary to elevated fontan pressures. INR improved w/ Vitamin K. Hypercoagulopathy workup was sent and is pending. Vitamin K PO 5mg 3x/wk started on 6/14. Will discuss with hematology duration of treatment.   ID: Received prophylactic antibiotics while EVD in place. Escalated to ceftriaxone when patient became febrile on 6/6. Blood cultures, urine cultures, sputum cultures, CSF cultures, and RVP were negative. Ceftriaxone was continued until 6/10 (when fever curve improved). Switched to prophylactic ancef while EVD in place and continued post-operatively s/p VPS. Spiked fever ~7am on POD#1.   : Noted to have L scrotal swelling on 6/10. US showed L hydrocele. Surgery consulted and will follow outpatient.  FENGI: Continued on his regular diet. He was made NPO when he was re-intubated due to AMS associated with intracranial bleed. He was given TPN for nutrition. Enteral feeds were started on 6/11. As neurologic status improved, speech and swallow was consulted for bedside swallow evaluation. Evaluation was reassuring against overt signs of aspiration. Allowed to PO regular diet.   Transferred to 2CN on 6/19    2CN Course (6/19--  Resp: Pt remained stable on RA. Goal sats >85%  CV: PPM initially continued on VOO at 70. Mexiletine, sildenafil, and lasix were continued. Lisinopril was restarted on 6/21 and Aldactone was restarted on 6/22.  Patient remains hemodynamically stable.  Neuro: Repeat CT head 6/18 showed trace hemorrhage R lateral ventricle and interval placement of wire mesh cranioplasty. Repeat CT head on 6/19 and 6/20 was stable, repeat CT head on 6/25 was also stable prior to restarting anticoagulation. Propranolol was weaned per PM&R recs to 5mg q8h on 6/25 and off on 6/27.  Heme: Pt was given FFP x1 for elevated INR 1.57 on 6/19. INR remained stable less than 1.5 and vit K 5mg PO 3x/wk was d/c'd on 6/23. INR continued to be trended daily which has been stable, slightly elevated at 1.39.  ID: Continued on ancef until surgical drain was removed on 6/20. CSF cxs from 6/18 showed NGTD. Pt monitored off abx without clinical signs or symptoms of infection.  FENGI: Pt continued regular diet, tolerating well. Voiding and stooling per baseline. Nutrition consulted for education prior to discharge. Provided Jimbo and aunt with verbal and written education on "Heart Healthy Nutrition Therapy"; encouraged a diet low in sodium and saturated fats, high in monounsaturated fats, omega 3's, whole grains, fruits, vegs, lean proteins, etc.        Discharge Plan:  - Follow up with Neurosurgeon, Dr. Russo, in 2 weeks  - Follow up with Hematology, Palak Ordonez, in 1 month- office will call mother with appoitment information 19yo male with complex cardiac h/o DILV, L-malposition of great arteries, sick sinus syndrome and AV mihir disease with tachybradycardia syndrome with a dual chamber pacemaker (currently with RV lead fracture and is currently only LV paced 2/2 complete heart block), PSH of status post Hcqnw-Bomn-Xhgpcmx anastomosis and aortic arch reconstruction followed by a fenestrated lateral tunnel Fontan completion (now s/p fenestration closure). here s/p cardiac catheterization and ablation. Procedure was complicated by unsuccessful ablation and post extubation patient was noted to have increased hemoptysis and desaturations, patient was reintubated for airway protection and given propofol for sedation.     PICU course (5/26 - 6/19):  Resp: Patient remained intubated overnight and was extubated by the next morning. Required nasal cannula to maintain oxygenation, likely due to post-op inflammation, atelectasis, and increasing fontan pressures. Weaned to room air on 5/27. Re-intubated on 6/1 due to AMS associated with new intracranial bleed. Extubated to HFNC on 6/9. CTA chest on 6/10 showed numerous veno-venous anterior mediastinal collaterals. Weaned to RA on 6/16. Goal sats > 85%.   CV: On 5/27, attempted cardioversion through pacing, although was unsuccessful, and subsequently started medical cardioversion with amiodarone 300mg BID. Restarted on home medications including lisinopril, sildenafil, lasix, aldactone. IART rhythm was broken on 6/1 after cardioversion. However, he went back into IART later that night. Pacemaker was changed to VOO at 70 prior to neurosurgery. Amiodarone, aldactone, and lisinopril were held while critically ill. Epinephrine and vasopressin were utilized to maintain higher maps while EVD in place. On 6/8 he started having runs of ventricular tachycardia. Arrythmia resolved with lidocaine infusion and changing pacemaker mode to VVI. Transitioned to PO mexiletine on 6/13. Pacemaker transitioned back to VOO at 70.   Neuro: Four hours after cardioversion, he had an episode of staring and unresponsiveness. Head CT showed severe hemorrhagic stroke in left frontal lobe in the ROCCO-MCA watershed territory with extension to the intraventricular regions. He required urgent surgical decompressive craniectomy and hemorrhage evacuation with EVD placement overnight on 6/1. Neuroprotective measures were taken and he was started on Keppra. Serial head CTs showed a new hemorrhage along the EVD catheter tract on 6/6. Serial head CTs did not show new bleeding. EVD clamped on 6/10. VEEG placed on 6/10 due to eye deviation and seizure-like activity did not correlate with seizures. Keppra increased per neurology and neurosurgery. He did have a clinical seizure on 6/14 which broke with Ativan. Neurology re-consulted and recommended continuing Keppra at current dose. PM&R consulted for delirium/agitation, recommended starting propranolol for agitation. Delirium improved with propranolol and unclamping EVD. Taken to OR on 6/17 for EVD removal, cranioplasty, and VPS placement.   Heme: Given hemoptysis, hemoglobin was monitored and he did not require any transfusions. Hemoptysis resolved overnight 5/26-5/27. Home xarelto was held and restarted on 5/27. INR on 5/26 was 1.32, and it jumped to 4.94 after hemorrhage was identified. Cause for jump in INR is unclear. In the OR, xarelto reversals were given (Andexxa, KCentra), in addition to multiple units of FFP and blood products. Xarelto was stopped and he was received SubQ Vit K for 3 days. He was given multiple units of FFP for goal INR < 1.5 in order to prevent further bleeding. Hematology was consulted for persistently elevated INR. Thought to be possibly due to hepatic congestion secondary to elevated fontan pressures. INR improved w/ Vitamin K. Hypercoagulopathy workup was sent and is pending. Vitamin K PO 5mg 3x/wk started on 6/14. Will discuss with hematology duration of treatment.   ID: Received prophylactic antibiotics while EVD in place. Escalated to ceftriaxone when patient became febrile on 6/6. Blood cultures, urine cultures, sputum cultures, CSF cultures, and RVP were negative. Ceftriaxone was continued until 6/10 (when fever curve improved). Switched to prophylactic ancef while EVD in place and continued post-operatively s/p VPS. Spiked fever ~7am on POD#1.   : Noted to have L scrotal swelling on 6/10. US showed L hydrocele. Surgery consulted and will follow outpatient.  FENGI: Continued on his regular diet. He was made NPO when he was re-intubated due to AMS associated with intracranial bleed. He was given TPN for nutrition. Enteral feeds were started on 6/11. As neurologic status improved, speech and swallow was consulted for bedside swallow evaluation. Evaluation was reassuring against overt signs of aspiration. Allowed to PO regular diet.   Transferred to 2CN on 6/19    2CN Course (6/19--  Resp: Pt remained stable on RA. Goal sats >85%  CV: PPM initially continued on VOO at 70. Mexiletine, sildenafil, and lasix were continued. Lisinopril was restarted on 6/21 and Aldactone was restarted on 6/22.  Patient remains hemodynamically stable.  Neuro: Repeat CT head 6/18 showed trace hemorrhage R lateral ventricle and interval placement of wire mesh cranioplasty. Repeat CT head on 6/19 and 6/20 was stable, repeat CT head on 6/25 was also stable prior to restarting anticoagulation. Propranolol was weaned per PM&R recs to 5mg q8h on 6/25 and off on 6/27.  Heme: Pt was given FFP x1 for elevated INR 1.57 on 6/19. INR remained stable less than 1.5 and vit K 5mg PO 3x/wk was d/c'd on 6/23. INR continued to be trended daily which has been stable, slightly elevated at 1.39.  ID: Continued on ancef until surgical drain was removed on 6/20. CSF cxs from 6/18 showed NGTD. Pt monitored off abx without clinical signs or symptoms of infection.  FENGI: Pt continued regular diet, tolerating well. Voiding and stooling per baseline. Nutrition consulted for education prior to discharge. Provided Jimbo and aunt with verbal and written education on "Heart Healthy Nutrition Therapy"; encouraged a diet low in sodium and saturated fats, high in monounsaturated fats, omega 3's, whole grains, fruits, vegs, lean proteins, etc.        Discharge Plan:  - Follow up with Neurosurgeon, Dr. Russo, in 2 weeks  - Follow up with Hematology, JERI Ray , in 1 month- office will call mother with appoitment information  - Follow up with Pediatric Surgery for management of left hydrocele  - 21yo male with complex cardiac h/o DILV, L-malposition of great arteries, sick sinus syndrome and AV mihir disease with tachybradycardia syndrome with a dual chamber pacemaker (currently with RV lead fracture and is currently only LV paced 2/2 complete heart block), PSH of status post Qvzjh-Whpk-Fzotbpz anastomosis and aortic arch reconstruction followed by a fenestrated lateral tunnel Fontan completion (now s/p fenestration closure). here s/p cardiac catheterization and ablation. Procedure was complicated by unsuccessful ablation and post extubation patient was noted to have increased hemoptysis and desaturations, patient was reintubated for airway protection and given propofol for sedation.     PICU course (5/26 - 6/19):  Resp: Patient remained intubated overnight and was extubated by the next morning. Required nasal cannula to maintain oxygenation, likely due to post-op inflammation, atelectasis, and increasing fontan pressures. Weaned to room air on 5/27. Re-intubated on 6/1 due to AMS associated with new intracranial bleed. Extubated to HFNC on 6/9. CTA chest on 6/10 showed numerous veno-venous anterior mediastinal collaterals. Weaned to RA on 6/16. Goal sats > 85%.   CV: On 5/27, attempted cardioversion through pacing, although was unsuccessful, and subsequently started medical cardioversion with amiodarone 300mg BID. Restarted on home medications including lisinopril, sildenafil, lasix, aldactone. IART rhythm was broken on 6/1 after cardioversion. However, he went back into IART later that night. Pacemaker was changed to VOO at 70 prior to neurosurgery. Amiodarone, aldactone, and lisinopril were held while critically ill. Epinephrine and vasopressin were utilized to maintain higher maps while EVD in place. On 6/8 he started having runs of ventricular tachycardia. Arrythmia resolved with lidocaine infusion and changing pacemaker mode to VVI. Transitioned to PO mexiletine on 6/13. Pacemaker transitioned back to VOO at 70.   Neuro: Four hours after cardioversion, he had an episode of staring and unresponsiveness. Head CT showed severe hemorrhagic stroke in left frontal lobe in the ROCCO-MCA watershed territory with extension to the intraventricular regions. He required urgent surgical decompressive craniectomy and hemorrhage evacuation with EVD placement overnight on 6/1. Neuroprotective measures were taken and he was started on Keppra. Serial head CTs showed a new hemorrhage along the EVD catheter tract on 6/6. Serial head CTs did not show new bleeding. EVD clamped on 6/10. VEEG placed on 6/10 due to eye deviation and seizure-like activity did not correlate with seizures. Keppra increased per neurology and neurosurgery. He did have a clinical seizure on 6/14 which broke with Ativan. Neurology re-consulted and recommended continuing Keppra at current dose. PM&R consulted for delirium/agitation, recommended starting propranolol for agitation. Delirium improved with propranolol and unclamping EVD. Taken to OR on 6/17 for EVD removal, cranioplasty, and VPS placement.   Heme: Given hemoptysis, hemoglobin was monitored and he did not require any transfusions. Hemoptysis resolved overnight 5/26-5/27. Home xarelto was held and restarted on 5/27. INR on 5/26 was 1.32, and it jumped to 4.94 after hemorrhage was identified. Cause for jump in INR is unclear. In the OR, xarelto reversals were given (Andexxa, KCentra), in addition to multiple units of FFP and blood products. Xarelto was stopped and he was received SubQ Vit K for 3 days. He was given multiple units of FFP for goal INR < 1.5 in order to prevent further bleeding. Hematology was consulted for persistently elevated INR. Thought to be possibly due to hepatic congestion secondary to elevated fontan pressures. INR improved w/ Vitamin K. Hypercoagulopathy workup was sent and is pending. Vitamin K PO 5mg 3x/wk started on 6/14. Will discuss with hematology duration of treatment.   ID: Received prophylactic antibiotics while EVD in place. Escalated to ceftriaxone when patient became febrile on 6/6. Blood cultures, urine cultures, sputum cultures, CSF cultures, and RVP were negative. Ceftriaxone was continued until 6/10 (when fever curve improved). Switched to prophylactic ancef while EVD in place and continued post-operatively s/p VPS. Spiked fever ~7am on POD#1.   : Noted to have L scrotal swelling on 6/10. US showed L hydrocele. Surgery consulted and will follow outpatient.  FENGI: Continued on his regular diet. He was made NPO when he was re-intubated due to AMS associated with intracranial bleed. He was given TPN for nutrition. Enteral feeds were started on 6/11. As neurologic status improved, speech and swallow was consulted for bedside swallow evaluation. Evaluation was reassuring against overt signs of aspiration. Allowed to PO regular diet.   Transferred to 2CN on 6/19    2CN Course (6/19-6/29)  Resp: Pt remained stable on RA. Goal sats >85%  CV: PPM initially continued on VOO at 70. Mexiletine, sildenafil, and lasix were continued. Lisinopril was restarted on 6/21 and Aldactone was restarted on 6/22.  Patient remains hemodynamically stable.  Neuro: Repeat CT head 6/18 showed trace hemorrhage R lateral ventricle and interval placement of wire mesh cranioplasty. Repeat CT head on 6/19 and 6/20 was stable, repeat CT head on 6/25 was also stable prior to restarting anticoagulation. Propranolol was weaned per PM&R recs to 5mg q8h on 6/25 and off on 6/27.  Heme: Pt was given FFP x1 for elevated INR 1.57 on 6/19. INR remained stable less than 1.5 and vit K 5mg PO 3x/wk was d/c'd on 6/23. INR continued to be trended daily which has been stable, slightly elevated at 1.39.  ID: Continued on ancef until surgical drain was removed on 6/20. CSF cxs from 6/18 showed NGTD. Pt monitored off abx without clinical signs or symptoms of infection.  FENGI: Pt continued regular diet, tolerating well. Voiding and stooling per baseline. Nutrition consulted for education prior to discharge. Provided Jimbo and aunt with verbal and written education on "Heart Healthy Nutrition Therapy"; encouraged a diet low in sodium and saturated fats, high in monounsaturated fats, omega 3's, whole grains, fruits, vegs, lean proteins, etc.  MSK: Pt was provided PT and OT services while in patient and was followed by PM&R team.      Discharge Plan:  - Follow up with Neurosurgeon, Dr. Russo, in 2 weeks  - Follow up with Hematology, JERI Ray , in 1 month- office will call mother with appoitment information  - Follow up with Pediatric Surgery for management of left hydrocele  - Follow up with PM&R, Dr. Arshad, as scheduled- mother to call office to schedule appointment  - Script provided to parent for outpatient Physical Therapy and Occupational therapy evaluation and treatment 19yo male with complex cardiac h/o DILV, L-malposition of great arteries, sick sinus syndrome and AV mihir disease with tachybradycardia syndrome with a dual chamber pacemaker (currently with RV lead fracture and is currently only LV paced 2/2 complete heart block), PSH of status post Izkhs-Thdz-Cqslngq anastomosis and aortic arch reconstruction followed by a fenestrated lateral tunnel Fontan completion (now s/p fenestration closure). here s/p cardiac catheterization and ablation. Procedure was complicated by unsuccessful ablation and post extubation patient was noted to have increased hemoptysis and desaturations, patient was reintubated for airway protection and given propofol for sedation.     PICU course (5/26 - 6/19):  Resp: Patient remained intubated overnight and was extubated by the next morning. Required nasal cannula to maintain oxygenation, likely due to post-op inflammation, atelectasis, and increasing fontan pressures. Weaned to room air on 5/27. Re-intubated on 6/1 due to AMS associated with new intracranial bleed. Extubated to HFNC on 6/9. CTA chest on 6/10 showed numerous veno-venous anterior mediastinal collaterals. Weaned to RA on 6/16. Goal sats > 85%.   CV: On 5/27, attempted cardioversion through pacing, although was unsuccessful, and subsequently started medical cardioversion with amiodarone 300mg BID. Restarted on home medications including lisinopril, sildenafil, lasix, aldactone. IART rhythm was broken on 6/1 after cardioversion. However, he went back into IART later that night. Pacemaker was changed to VOO at 70 prior to neurosurgery. Amiodarone, aldactone, and lisinopril were held while critically ill. Epinephrine and vasopressin were utilized to maintain higher maps while EVD in place. On 6/8 he started having runs of ventricular tachycardia. Arrythmia resolved with lidocaine infusion and changing pacemaker mode to VVI. Transitioned to PO mexiletine on 6/13. Pacemaker transitioned back to VOO at 70.   Neuro: Four hours after cardioversion, he had an episode of staring and unresponsiveness. Head CT showed severe hemorrhagic stroke in left frontal lobe in the ROCCO-MCA watershed territory with extension to the intraventricular regions. He required urgent surgical decompressive craniectomy and hemorrhage evacuation with EVD placement overnight on 6/1. Neuroprotective measures were taken and he was started on Keppra. Serial head CTs showed a new hemorrhage along the EVD catheter tract on 6/6. Serial head CTs did not show new bleeding. EVD clamped on 6/10. VEEG placed on 6/10 due to eye deviation and seizure-like activity did not correlate with seizures. Keppra increased per neurology and neurosurgery. He did have a clinical seizure on 6/14 which broke with Ativan. Neurology re-consulted and recommended continuing Keppra at current dose. PM&R consulted for delirium/agitation, recommended starting propranolol for agitation. Delirium improved with propranolol and unclamping EVD. Taken to OR on 6/17 for EVD removal, cranioplasty, and VPS placement.   Heme: Given hemoptysis, hemoglobin was monitored and he did not require any transfusions. Hemoptysis resolved overnight 5/26-5/27. Home xarelto was held and restarted on 5/27. INR on 5/26 was 1.32, and it jumped to 4.94 after hemorrhage was identified. Cause for jump in INR is unclear. In the OR, xarelto reversals were given (Andexxa, KCentra), in addition to multiple units of FFP and blood products. Xarelto was stopped and he was received SubQ Vit K for 3 days. He was given multiple units of FFP for goal INR < 1.5 in order to prevent further bleeding. Hematology was consulted for persistently elevated INR. Thought to be possibly due to hepatic congestion secondary to elevated fontan pressures. INR improved w/ Vitamin K. Hypercoagulopathy workup was sent and is pending. Vitamin K PO 5mg 3x/wk started on 6/14. Will discuss with hematology duration of treatment.   ID: Received prophylactic antibiotics while EVD in place. Escalated to ceftriaxone when patient became febrile on 6/6. Blood cultures, urine cultures, sputum cultures, CSF cultures, and RVP were negative. Ceftriaxone was continued until 6/10 (when fever curve improved). Switched to prophylactic ancef while EVD in place and continued post-operatively s/p VPS. Spiked fever ~7am on POD#1.   : Noted to have L scrotal swelling on 6/10. US showed L hydrocele. Surgery consulted and will follow outpatient.  FENGI: Continued on his regular diet. He was made NPO when he was re-intubated due to AMS associated with intracranial bleed. He was given TPN for nutrition. Enteral feeds were started on 6/11. As neurologic status improved, speech and swallow was consulted for bedside swallow evaluation. Evaluation was reassuring against overt signs of aspiration. Allowed to PO regular diet.   Transferred to 2CN on 6/19    2[facility] Course (6/19-6/29)  Resp: Pt remained stable on RA. Goal sats >85%  CV: PPM initially continued on VOO at 70. Mexiletine, sildenafil, and lasix were continued. Lisinopril was restarted on 6/21 and Aldactone was restarted on 6/22.  Patient remains hemodynamically stable.  Neuro: Repeat CT head 6/18 showed trace hemorrhage R lateral ventricle and interval placement of wire mesh cranioplasty. Repeat CT head on 6/19 and 6/20 was stable, repeat CT head on 6/25 was also stable prior to restarting anticoagulation. Propranolol was weaned per PM&R recs to 5mg q8h on 6/25 and off on 6/27.  Heme: Pt was given FFP x1 for elevated INR 1.57 on 6/19. INR remained stable less than 1.5 and vit K 5mg PO 3x/wk was d/c'd on 6/23. INR continued to be trended daily which has been stable, slightly elevated at 1.39.  ID: Continued on ancef until surgical drain was removed on 6/20. CSF cxs from 6/18 showed NGTD. Pt monitored off abx without clinical signs or symptoms of infection.  FENGI: Pt continued regular diet, tolerating well. Voiding and stooling per baseline. Nutrition consulted for education prior to discharge. Provided Jimbo and aunt with verbal and written education on "Heart Healthy Nutrition Therapy"; encouraged a diet low in sodium and saturated fats, high in monounsaturated fats, omega 3's, whole grains, fruits, vegs, lean proteins, etc.  MSK: Pt was provided PT and OT services while in patient and was followed by PM&R team.      Discharge Plan:  - Follow up with Neurosurgeon, Dr. Russo, in 2 weeks  - Follow up with Hematology, JERI Ray , in 1 month- office will call mother with appoitment information  - Follow up with Pediatric Surgery for management of left hydrocele  - Follow up with PM&R, Dr. Arshad, as scheduled- mother to call office to schedule appointment  - Follow up with Cardiology as scheduled- Office will call mother with appointment information  - Script provided to parent for outpatient Physical Therapy and Occupational therapy evaluation and treatment 19yo male with complex cardiac h/o DILV, L-malposition of great arteries, sick sinus syndrome and AV mihir disease with tachybradycardia syndrome with a dual chamber pacemaker (currently with RV lead fracture and is currently only LV paced 2/2 complete heart block), PSH of status post Dhrlk-Hmoz-Nbrlexw anastomosis and aortic arch reconstruction followed by a fenestrated lateral tunnel Fontan completion (now s/p fenestration closure). here s/p cardiac catheterization and ablation. Procedure was complicated by unsuccessful ablation and post extubation patient was noted to have increased hemoptysis and desaturations, patient was reintubated for airway protection and given propofol for sedation.     PICU course (5/26 - 6/19):  Resp: Patient remained intubated overnight and was extubated by the next morning. Required nasal cannula to maintain oxygenation, likely due to post-op inflammation, atelectasis, and increasing fontan pressures. Weaned to room air on 5/27. Re-intubated on 6/1 due to AMS associated with new intracranial bleed. Extubated to HFNC on 6/9. CTA chest on 6/10 showed numerous veno-venous anterior mediastinal collaterals. Weaned to RA on 6/16. Goal sats > 85%.   CV: On 5/27, attempted cardioversion through pacing, although was unsuccessful, and subsequently started medical cardioversion with amiodarone 300mg BID. Restarted on home medications including lisinopril, sildenafil, lasix, aldactone. IART rhythm was broken on 6/1 after cardioversion. However, he went back into IART later that night. Pacemaker was changed to VOO at 70 prior to neurosurgery. Amiodarone, aldactone, and lisinopril were held while critically ill. Epinephrine and vasopressin were utilized to maintain higher maps while EVD in place. On 6/8 he started having runs of ventricular tachycardia. Arrythmia resolved with lidocaine infusion and changing pacemaker mode to VVI. Transitioned to PO mexiletine on 6/13. Pacemaker transitioned back to VOO at 70.   Neuro: Four hours after cardioversion, he had an episode of staring and unresponsiveness. Head CT showed severe hemorrhagic stroke in left frontal lobe in the ROCCO-MCA watershed territory with extension to the intraventricular regions. He required urgent surgical decompressive craniectomy and hemorrhage evacuation with EVD placement overnight on 6/1. Neuroprotective measures were taken and he was started on Keppra. Serial head CTs showed a new hemorrhage along the EVD catheter tract on 6/6. Serial head CTs did not show new bleeding. EVD clamped on 6/10. VEEG placed on 6/10 due to eye deviation and seizure-like activity did not correlate with seizures. Keppra increased per neurology and neurosurgery. He did have a clinical seizure on 6/14 which broke with Ativan. Neurology re-consulted and recommended continuing Keppra at current dose. PM&R consulted for delirium/agitation, recommended starting propranolol for agitation. Delirium improved with propranolol and unclamping EVD. Taken to OR on 6/17 for EVD removal, cranioplasty, and VPS placement.   Heme: Given hemoptysis, hemoglobin was monitored and he did not require any transfusions. Hemoptysis resolved overnight 5/26-5/27. Home xarelto was held and restarted on 5/27. INR on 5/26 was 1.32, and it jumped to 4.94 after hemorrhage was identified. Cause for jump in INR is unclear. In the OR, xarelto reversals were given (Andexxa, KCentra), in addition to multiple units of FFP and blood products. Xarelto was stopped and he was received SubQ Vit K for 3 days. He was given multiple units of FFP for goal INR < 1.5 in order to prevent further bleeding. Hematology was consulted for persistently elevated INR. Thought to be possibly due to hepatic congestion secondary to elevated fontan pressures. INR improved w/ Vitamin K. Hypercoagulopathy workup was sent and is pending. Vitamin K PO 5mg 3x/wk started on 6/14. Will discuss with hematology duration of treatment.   ID: Received prophylactic antibiotics while EVD in place. Escalated to ceftriaxone when patient became febrile on 6/6. Blood cultures, urine cultures, sputum cultures, CSF cultures, and RVP were negative. Ceftriaxone was continued until 6/10 (when fever curve improved). Switched to prophylactic ancef while EVD in place and continued post-operatively s/p VPS. Spiked fever ~7am on POD#1.   : Noted to have L scrotal swelling on 6/10. US showed L hydrocele. Surgery consulted and will follow outpatient.  FENGI: Continued on his regular diet. He was made NPO when he was re-intubated due to AMS associated with intracranial bleed. He was given TPN for nutrition. Enteral feeds were started on 6/11. As neurologic status improved, speech and swallow was consulted for bedside swallow evaluation. Evaluation was reassuring against overt signs of aspiration. Allowed to PO regular diet.   Transferred to 2CN on 6/19    2CN Course (6/19-6/29)  Resp: Pt remained stable on RA. Goal sats >85%  CV: PPM initially continued on VOO at 70. Mexiletine, sildenafil, and lasix were continued. Lisinopril was restarted on 6/21 and Aldactone was restarted on 6/22.  Patient remains hemodynamically stable.  Neuro: Repeat CT head 6/18 showed trace hemorrhage R lateral ventricle and interval placement of wire mesh cranioplasty. Repeat CT head on 6/19 and 6/20 was stable, repeat CT head on 6/25 was also stable prior to restarting anticoagulation. Propranolol was weaned per PM&R recs to 5mg q8h on 6/25 and off on 6/27.  Heme: Pt was given FFP x1 for elevated INR 1.57 on 6/19. INR remained stable less than 1.5 and vit K 5mg PO 3x/wk was d/c'd on 6/23. INR continued to be trended daily which has been stable, slightly elevated at 1.39.  ID: Continued on ancef until surgical drain was removed on 6/20. CSF cxs from 6/18 showed NGTD. Pt monitored off abx without clinical signs or symptoms of infection.  FENGI: Pt continued regular diet, tolerating well. Voiding and stooling per baseline. Nutrition consulted for education prior to discharge. Provided Jimbo and aunt with verbal and written education on "Heart Healthy Nutrition Therapy"; encouraged a diet low in sodium and saturated fats, high in monounsaturated fats, omega 3's, whole grains, fruits, vegs, lean proteins, etc.  MSK: Pt was provided PT and OT services while in patient and was followed by PM&R team.      Discharge Plan:  - Follow up with Neurosurgeon, Dr. Russo, in 2 weeks  - Follow up with Hematology, JERI Ray , in 1 month- office will call mother with appoitment information  - Follow up with Pediatric Surgery for management of left hydrocele  - Follow up with PM&R, Dr. Arshad, as scheduled- mother to call office to schedule appointment  - Follow up with Cardiology as scheduled- Office will call mother with appointment information (3-6 weeks)  - Script provided to parent for outpatient Physical Therapy and Occupational therapy evaluation and treatment

## 2021-05-28 NOTE — DISCHARGE NOTE PROVIDER - NPI NUMBER (FOR SYSADMIN USE ONLY) :
[[id number]] [[id number]],[[id number]] [[id number]],[[id number]],[[id number]] [[id number]],[[id number]] [[id number]],[[id number]]

## 2021-05-28 NOTE — PROGRESS NOTE PEDS - SUBJECTIVE AND OBJECTIVE BOX
Interval/Overnight Events: Stable overnight.  Still in IART with ventricular pacing.    VITAL SIGNS:  T(C): 36.8 (05-28-21 @ 08:30), Max: 36.9 (05-27-21 @ 11:00)  HR: 75 (05-28-21 @ 08:30) (75 - 76)  BP: 106/62 (05-28-21 @ 08:30) (106/62 - 121/76)  RR: 20 (05-28-21 @ 08:30) (20 - 30)  SpO2: 89% (05-28-21 @ 08:30) (88% - 93%)  Daily Weight in Gm: 98188 (27 May 2021 23:00)    Current Medications:  aMIOdarone   Oral Tab/Cap - Peds 300 milliGRAM(s) Oral two times a day  furosemide   Oral Tab/Cap - Peds 20 milliGRAM(s) Oral every 12 hours  lisinopril Oral Tab/Cap - Peds 5 milliGRAM(s) Oral daily  sildenafil   Oral Tab/Cap - Peds 20 milliGRAM(s) Oral every 8 hours  spironolactone Oral Tab/Cap - Peds 100 milliGRAM(s) Oral daily  rivaroxaban Oral Tab/Cap - Peds 20 milliGRAM(s) Oral daily  acetaminophen   Oral Tab/Cap - Peds. 650 milliGRAM(s) Oral every 6 hours PRN    ===============================RESPIRATORY==============================  [ ] FiO2: ___ 	[ ] Heliox: ____ 		[ ] BiPAP: ___   [ x] NC: 1.75__  Liters			[ ] HFNC: __ 	Liters, FiO2: __  [ ] Mechanical Ventilation:   [ ] Inhaled Nitric Oxide:  [ ] Extubation Readiness Assessed    =============================CARDIOVASCULAR============================  Cardiac Rhythm:	[ ] NSR		[ x] Other: IART, ventricular pacing    ==========================HEMATOLOGY/ONCOLOGY========================  Transfusions:	[ ] PRBC	      [ ] Platelets	[ ] FFP		[ ] Cryoprecipitate  DVT Prophylaxis:    =======================FLUIDS/ELECTROLYTES/NUTRITION=====================  I&O's Summary    27 May 2021 07:01  -  28 May 2021 07:00  --------------------------------------------------------  IN: 1298.1 mL / OUT: 1990 mL / NET: -691.9 mL    28 May 2021 07:01  -  28 May 2021 10:43  --------------------------------------------------------  IN: 180 mL / OUT: 500 mL / NET: -320 mL      Diet:	[x ] Regular	[ ] Soft		[ ] Clears	      [ ] NPO  .	[ ] Other:  .	[ ] NGT		[ ] NDT		[ ] GT		[ ] GJT    ================================NEUROLOGY=============================  [ ] SBS:		[ ] GISSELLE-1:	[ ] BIS:         [x ] CAPD: <9  [ x] Adequacy of sedation and pain control has been assessed and adjusted    ========================PATIENT CARE ACCESS DEVICES=====================  [ x] Peripheral IV  [ ] Central Venous Line	[ ] R	[ ] L	[ ] IJ	[ ] Fem	[ ] SC			Placed:   [ ] Arterial Line		[ ] R	[ ] L	[ ] PT	[ ] DP	[ ] Fem	[ ] Rad	[ ] Ax	Placed:   [ ] PICC:				[ ] Broviac		[ ] Mediport  [ ] Urinary Catheter, Date Placed:   [ ] Necessity of urinary, arterial, and venous catheters discussed    =============================ANCILLARY TESTS============================  LABS:    RECENT CULTURES:      IMAGING STUDIES:    ==============================PHYSICAL EXAM============================  GENERAL: In no acute distress  RESPIRATORY: Lungs clear to auscultation bilaterally. Good aeration. No rales, rhonchi, retractions or wheezing. Effort even and unlabored.  CARDIOVASCULAR: Regular rate and rhythm. Normal S1/S2. No murmurs, rubs, or gallop. Capillary refill < 2 seconds. Distal pulses 2+ and equal.  ABDOMEN: Soft, non-distended.  No palpable hepatosplenomegaly.  SKIN: No rash.  EXTREMITIES: Warm and well perfused. No gross extremity deformities.  NEUROLOGIC: Alert. No acute change from baseline exam.    ======================================================================  Parent/Guardian is at the bedside:	[x ] Yes	[ ] No  Patient and Parent/Guardian updated as to the progress/plan of care:	[ x] Yes	[ ] No    [ ] The patient remains in critical and unstable condition, and requires ICU care and monitoring.  Total critical care time spent by attending physician was ____ minutes, excluding procedure time.    [x ] The patient is improving but requires continued monitoring and adjustment of therapy due to risk of cardiorespiratory compromise.

## 2021-05-28 NOTE — DISCHARGE NOTE PROVIDER - NSDCFUSCHEDAPPT_GEN_ALL_CORE_FT
TRINI MÉNDEZ ; 06/15/2021 ; [facility] [address] ; 06/15/2021 ; [facility] [address] ; 07/14/2021 ; [facility] [address] ; 07/14/2021 ; [facility] [address] TRINI MÉNDEZ ; 07/14/2021 ; [facility] [address] ; 07/14/2021 ; [facility] [address]

## 2021-05-28 NOTE — DISCHARGE NOTE PROVIDER - NSDCCPCAREPLAN_GEN_ALL_CORE_FT
PRINCIPAL DISCHARGE DIAGNOSIS  Diagnosis: Cardiac arrhythmia  Assessment and Plan of Treatment: - COntact your cardiologist or come to the ED if you experience shortness of breath, dizziness, palpitations, chest pain       PRINCIPAL DISCHARGE DIAGNOSIS  Diagnosis: Cardiac arrhythmia  Assessment and Plan of Treatment: - Contact your cardiologist or come to the ED if you experience shortness of breath, dizziness, palpitations, chest pain  -Follow up with providers as instructed

## 2021-05-28 NOTE — PROGRESS NOTE PEDS - ASSESSMENT
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 He has elevated Fontan pressure secondary to LV diastolic dysfunction.      PLAN:    Resp:  Encourage incentive spirometry  Wean NC as tolerated for goal SpO2 > 92%    CV:  On home Lisinopril  Continue home sildenafil   Cont Amiodarone with hope to chemically cardiovert  Holding home Sotalol  Continue Lasix PO QDy  Continue home aldactone    Heme:  Cont home Xarelto   vascular checks of cath sites     FEN:  OK for POs

## 2021-05-28 NOTE — DISCHARGE NOTE PROVIDER - NSDCMRMEDTOKEN_GEN_ALL_CORE_FT
furosemide 20 mg oral tablet: 1 tab(s) (20mg) orally once a day at 6PM  lisinopril 5 mg oral tablet: 1 tab(s) orally once a day at 12pm  sildenafil 20 mg oral tablet: 1 tab(s) orally every 8 hours  sotalol 120 mg oral tablet: 1 tab(s) orally 2 times a day MDD:Please take 120 mg twice a day (8 am, 6 pm)  Xarelto 20 mg oral tablet: 1 tab(s) orally once a day at 12pm   aspirin 325 mg oral tablet: 1 tab(s) orally once a day   furosemide 20 mg oral tablet: 0.5 tab(s) orally 2 times a day   levETIRAcetam 500 mg oral tablet: 3 tab(s) orally 2 times a day   lisinopril 5 mg oral tablet: 1 tab(s) orally once a day   mexiletine 150 mg oral capsule: 1 cap(s) orally every 8 hours   propranolol 10 mg oral tablet: 0.5 tab(s) orally every 8 hours   sildenafil 20 mg oral tablet: 1 tab(s) orally every 8 hours   spironolactone 100 mg oral tablet: 1 tab(s) orally once a day    Adult Rolling Walker: Adult Rolling Walker  ICD: Z74.09    Patient Height: 166.5 cm  Patient Weight: 66.1 kg  aspirin 325 mg oral tablet: 1 tab(s) orally once a day   furosemide 20 mg oral tablet: 0.5 tab(s) orally 2 times a day   levETIRAcetam 500 mg oral tablet: 3 tab(s) orally 2 times a day   lisinopril 5 mg oral tablet: 1 tab(s) orally once a day   mexiletine 150 mg oral capsule: 1 cap(s) orally every 8 hours   propranolol 10 mg oral tablet: 0.5 tab(s) orally every 8 hours   sildenafil 20 mg oral tablet: 1 tab(s) orally every 8 hours   spironolactone 100 mg oral tablet: 1 tab(s) orally once a day    Adult Rolling Walker: Adult Rolling Walker  ICD: Z74.09    Patient Height: 166.5 cm  Patient Weight: 66.1 kg  aspirin 325 mg oral tablet: 1 tab(s) orally once a day   furosemide 20 mg oral tablet: 0.5 tab(s) orally 2 times a day   levETIRAcetam 500 mg oral tablet: 3 tab(s) orally 2 times a day   lisinopril 5 mg oral tablet: 1 tab(s) orally once a day   mexiletine 150 mg oral capsule: 1 cap(s) orally every 8 hours   sildenafil 20 mg oral tablet: 1 tab(s) orally every 8 hours   spironolactone 100 mg oral tablet: 1 tab(s) orally once a day    Adult Rolling Walker: Adult Rolling Walker  ICD: Z74.09    Patient Height: 166.5 cm  Patient Weight: 66.1 kg  aspirin 325 mg oral tablet: 1 tab(s) orally once a day   furosemide 20 mg oral tablet: 0.5 tab(s) orally 2 times a day   levETIRAcetam 500 mg oral tablet: 3 tab(s) orally 2 times a day   lisinopril 5 mg oral tablet: 1 tab(s) orally once a day   mexiletine 150 mg oral capsule: 1 cap(s) orally every 8 hours   Physical Therapy and Occupational Therapy evaluation and treatment: Physical therapy and occupational therapy evaluation and treatment    ICD: Z74.09  Patient height: 166.5 cm  Patient weight: 66.1 kg  sildenafil 20 mg oral tablet: 1 tab(s) orally every 8 hours   spironolactone 100 mg oral tablet: 1 tab(s) orally once a day

## 2021-05-28 NOTE — PROGRESS NOTE PEDS - ASSESSMENT
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 Presented in IART - s/p EP study with unsuccessful attempt at ablation along with diagnostic cath showing elevated Fontan pressure (20mmHg). He was ultimately paced out of tachycardia in lab yesterday, but reverted to IART overnight. Failed extubation in cath lab and returned intubated to PICU - extubated this morning. We attempted to pace him out of the tachycardia again today using his pacemaker, but were unsuccessful. The patient requires close monitoring in the ICU.     Plan:  - Continuos telemetry monitoring.  - Pacemaker set at DDD .  - Continue home medications - Sildenafil 20mg q8h, Aldactone 100mg daily, Lisinopril 5mg OD, Xarelto 20mg OD.    - Continue Lasix 20mg PO q12h.   - Amiodarone load of 300mg PO BID D2/5  - Wean oxygen as tolerated. Goal saturations > 90%. Encourage ambulation, incentive spirometry.  - EP following.  - Will need to remain admitted on telemetry monitoring throughout Amiodarone load with close eye on arrythmia. Plan to reattempt overdrive pacing after Amiodarone load completed.  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 Presented in IART - s/p EP study with unsuccessful attempt at ablation along with diagnostic cath showing elevated Fontan pressure (20mmHg). He was ultimately paced out of tachycardia in lab yesterday, but reverted to IART overnight. Failed extubation in cath lab and returned intubated to PICU - extubated this morning. We attempted to pace him out of the tachycardia again today using his pacemaker, but were unsuccessful. The patient requires close monitoring in the ICU.     Plan:  - Continuos telemetry monitoring.  - Pacemaker set at DDD .  - Continue home medications - Sildenafil 20mg q8h, Aldactone 100mg daily, Lisinopril 5mg OD, Xarelto 20mg OD.    - Can make lasix 20mg QD (home dosing)  - Amiodarone load of 300mg PO BID D2/5  - Wean oxygen as tolerated. Goal saturations > 90%. Encourage ambulation, incentive spirometry.  - daily EKG  - EP following.  - Will need to remain admitted on telemetry monitoring throughout Amiodarone load with close eye on arrythmia. Plan to reattempt overdrive pacing after Amiodarone load completed.  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 Presented in IART - s/p EP study with unsuccessful attempt at ablation along with diagnostic cath showing elevated Fontan pressure (20mmHg). He was ultimately paced out of tachycardia in lab, but reverted to IART and is on Amiodarone load (after overdrive pacing unsuccessful). The patient requires close monitoring in the ICU.     Plan:  - Continuos telemetry monitoring.  - Pacemaker set at DDD .  - Continue home medications - Sildenafil 20mg q8h, Aldactone 100mg daily, Lisinopril 5mg OD, Xarelto 20mg OD.    - Can make Lasix 20mg QD today (home dosing).  - Amiodarone load of 300mg PO BID (D2/5).  - Daily EKGs.   - Wean oxygen as tolerated. Goal saturations > 90%. Encourage ambulation, incentive spirometry.  - Will need to remain admitted on telemetry monitoring throughout Amiodarone load with close eye on arrythmia. Plan to reattempt overdrive pacing if still in IART after Amiodarone load completed.

## 2021-05-28 NOTE — DISCHARGE NOTE PROVIDER - CARE PROVIDER_API CALL
Javan Russo)  Neurosurgery  [address] [place] [address]  Phone: [phone]  Fax: [phone]  Follow Up Time: 2 weeks   Javan Russo)  Neurosurgery  [address] [place] [address]  Phone: [phone]  Fax: [phone]  Follow Up Time: 2 weeks    Palak OrdonezPA)  Physician Assistant Services  [address] [place] [address], [address]  [place] [address]  Phone: [phone]  Fax: [phone]  Follow Up Time: 1 month   Javan Russo)  Neurosurgery  [address] [facility] [place] [address]  Phone: [phone]  Fax: [phone]  Follow Up Time: 2 weeks    Palak OrdonezPA)  Physician Assistant Services  [address] [place] [address], [address]  [place] [address]  Phone: [phone]  Fax: [phone]  Follow Up Time: 1 month    Jatin Harrison)  Pediatric Surgery; Surgery  [address] [facility], [address]  [place] [address]  Phone: [phone]  Fax: [phone]  Follow Up Time: Routine   Javan Russo)  Neurosurgery  [address] [facility] [place] [address]  Phone: [phone]  Fax: [phone]  Follow Up Time: 2 weeks    Palak OrdonezPA)  Physician Assistant Services  [address] [place] [address], [address]  [place] [address]  Phone: [phone]  Fax: [phone]  Follow Up Time: 1 month    Jatin Harrison)  Pediatric Surgery; Surgery  [address] [facility], [address]  [place] [address]  Phone: [phone]  Fax: [phone]  Follow Up Time: Routine    Pj Arshad (DO)  Pediatric Rehabilitation Med; PhysicalRehab Medicine  [address] [place] [address], [address] [place] [address]  Phone: [phone]  Fax: [phone]  Follow Up Time: Routine   Javan Russo)  Neurosurgery  [address] [place] [address]  Phone: [phone]  Fax: [phone]  Follow Up Time: 2 weeks    Palak Ordonez (PA)  Physician Assistant Services  [address] [place] [address], [address]  [place] [address]  Phone: [phone]  Fax: [phone]  Follow Up Time: 1 month    Jatin Harrison)  Pediatric Surgery; Surgery  [address] [facility], [address]  [place] [address]  Phone: [phone]  Fax: [phone]  Follow Up Time: Routine    Pj Arshad (DO)  Pediatric Rehabilitation Med; PhysicalRehab Medicine  [address] [place] [address], [address]th [place] [address]  Phone: [phone]  Fax: [phone]  Follow Up Time: Routine    Issa Christensen)  Pediatric Cardiology  [address] [facility], [address]  [place] [address]  Phone: [phone]  Fax: [phone]  Follow Up Time: Routine

## 2021-05-28 NOTE — DISCHARGE NOTE PROVIDER - CARE PROVIDERS DIRECT ADDRESSES
,DirectAddress_Unknown ,DirectAddress_Unknown,DirectAddress_Unknown ,DirectAddress_Unknown,DirectAddress_Unknown,[email or web address] ,DirectAddress_Unknown,DirectAddress_Unknown,[email or web address] ,DirectAddress_Unknown,DirectAddress_Unknown,[email or web address]

## 2021-05-28 NOTE — DISCHARGE NOTE PROVIDER - PROVIDER TOKENS
Abdominal Pain, N/V/D  Symptoms PROVIDER:[TOKEN:[[id number]],FOLLOWUP:[2 weeks]] PROVIDER:[TOKEN:[[id number]],FOLLOWUP:[2 weeks]],PROVIDER:[TOKEN:[[id number]],FOLLOWUP:[1 month]] PROVIDER:[TOKEN:[[id number]],FOLLOWUP:[2 weeks]],PROVIDER:[TOKEN:[[id number]],FOLLOWUP:[1 month]],PROVIDER:[TOKEN:[[id number]],FOLLOWUP:[Routine]] PROVIDER:[TOKEN:[[id number]],FOLLOWUP:[2 weeks]],PROVIDER:[TOKEN:[[id number]],FOLLOWUP:[1 month]],PROVIDER:[TOKEN:[[id number]],FOLLOWUP:[Routine]],PROVIDER:[TOKEN:[[id number]],FOLLOWUP:[Routine]] PROVIDER:[TOKEN:[[id number]],FOLLOWUP:[2 weeks]],PROVIDER:[TOKEN:[[id number]],FOLLOWUP:[1 month]],PROVIDER:[TOKEN:[[id number]],FOLLOWUP:[Routine]],PROVIDER:[TOKEN:[[id number]],FOLLOWUP:[Routine]],PROVIDER:[TOKEN:[[id number]],FOLLOWUP:[Routine]]

## 2021-05-28 NOTE — PROGRESS NOTE PEDS - SUBJECTIVE AND OBJECTIVE BOX
INTERVAL HISTORY: Started on enteral amiodarone yesterday. Remains in IART. Still on NC.    RESPIRATORY SUPPORT: 2L NC  NUTRITION: regular diet    05-27 @ 07:01  -  05-28 @ 07:00  --------------------------------------------------------  IN: 1298.1 mL / OUT: 1990 mL / NET: -691.9 mL    INTRAVASCULAR ACCESS: PIV    MEDICATIONS:  aMIOdarone   Oral Tab/Cap - Peds 300 milliGRAM(s) Oral two times a day  furosemide   Oral Tab/Cap - Peds 20 milliGRAM(s) Oral every 12 hours  lisinopril Oral Tab/Cap - Peds 5 milliGRAM(s) Oral daily  sildenafil   Oral Tab/Cap - Peds 20 milliGRAM(s) Oral every 8 hours  spironolactone Oral Tab/Cap - Peds 100 milliGRAM(s) Oral daily  rivaroxaban Oral Tab/Cap - Peds 20 milliGRAM(s) Oral daily    PHYSICAL EXAMINATION:  Vital signs - Weight (kg): 66.1 (05-26 @ 07:11)  T(C): 36.7 (05-28-21 @ 05:00), Max: 36.9 (05-27-21 @ 11:00)  HR: 75 (05-28-21 @ 05:00) (75 - 76)  BP: 119/67 (05-28-21 @ 05:00) (103/56 - 121/76)  RR: 25 (05-28-21 @ 05:00) (20 - 30)  SpO2: 90% (05-28-21 @ 05:00) (88% - 93%)    General - non-dysmorphic appearance, well-developed, in no distress.  Skin - no rash, no desquamation, no cyanosis.  Eyes / ENT - no conjunctival injection, sclerae anicteric, external ears & nares normal, mucous membranes moist.  Pulmonary - normal inspiratory effort, no retractions, lungs clear to auscultation bilaterally, no wheezes, no rales.  Cardiovascular - normal rate, regular rhythm, normal S1 & single S2, no murmurs, no rubs, no gallops, capillary refill < 2sec, normal pulses.  Gastrointestinal - soft, non-distended, non-tender, no splenomegaly. (+) hepatomegaly.  Musculoskeletal - no joint swelling, no clubbing, no edema.  Neurologic / Psychiatric - alert, oriented as age-appropriate, affect appropriate,    LABORATORY TESTS:                          14.9  CBC:   7.87 )-----------( 114   (05-27-21 @ 02:21)                          46.1               138   |  105   |  13                 Ca: 9.0    BMP:   ----------------------------< 155    Mg: x     (05-27-21 @ 02:21)             3.7    |  17    | 0.88               Ph: x        LFT:     TPro: 6.6 / Alb: 4.0 / TBili: 1.1 / DBili: x / AST: 25 / ALT: 17 / AlkPhos: 101   (05-27-21 @ 02:21)    COAG: PT: 15.0 / PTT: 42.6 / INR: 1.32   (05-26-21 @ 19:11)     CARDIAC MARKERS:             High-Sensitivity Troponin: x   (05-18-21 @ 12:51)             CK: x / CKMB: x   (05-18-21 @ 12:51)             Pro-BNP: 211   (05-18-21 @ 12:51)      CBG:   pH: 7.45 / pCO2: 30.4 / pO2: 66.0 / HCO3: 23 / Base Excess: -2.5 / Lactate: x   (05-26-21 @ 20:18)    IMAGING STUDIES:  Electrocardiogram - (5/26/21) Ventricular paced rhythm @ 75bpm. Underlying IART.    Telemetry - (5/27/21) Ventricular paced rhythm @ 75bpm. Underlying IART.    Chest x-ray - (5/27/21) Stable mild cardiomegaly with subsegmental left lower lobe atelectasis.    Echocardiogram - (5/27/21)   1. This was a technically difficult suboptimal study due to poor echo windows. Findings limited to below.   2. 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   3. Mild mitral valve regurgitation.   4. Trivial tricuspid valve regurgitation.   5. Trivial aortic valve regurgitation.   6. Status post device closure of Fontan fenestration.   7. S/P stent placement in the Fontan pathway. Limited imaging of the inferior Fontan baffle. In this setting, the inferior vena cava to its connection near the atrial portion of the baffle appears patent with no obstruction. S/p device closure of Fontan fenestration. The Fontan fenestration is not seen on this study. The right bidirectional Storm is seen only on color flow mapping. This appears to have laminar low velocity flow.   8. The connection of the right bidirectional Storm to the right pulmonary artery could not be imaged. The right pulmonary artery is seen in a limited portion immediately to the left of the Storm and appears patent. The left pulmonary artery could not be imaged.   9. Extremely poor and limited imaging of the lateral free walls of the single systemic left ventricle. On limited short axis views there appears to be adequate systolic excursion of the posterior wall of the left ventricle and decreased in the anterior wall. The bulboventricular foramen could not be imaged. Suggest alternate imaging for appropriate assessment of function.  10. No pericardial effusion.  11. Small right pleural effusion.    Cath - (5/26/21)  Access 4 Fr RFA, 7 Fr RFV x2 with US guidance.  Sat data (%): on 50% FiO2 LPA 73, RUPV 98, Ao 97; CI 2.6 L/min/m2 with Qp:Qs 1 :1.  Pressures (mmHg): Elevated mFontan = mIVC = 20. mPCW=16, No significant gradient across LV to AAo to Vikki (98/58/73).  Normal PVR while on sildenafil.  Angios showed unobstructed Fontan with existing stent within Fontan conduit.    Comment: IART ablation was attempted after the diagnostic cath but unsuccessful. Patient was overdrive paced out of IART. At the end of the case, Ao sat was 94% and LPA 66% on 50% FiO2 INTERVAL HISTORY: Started on enteral amiodarone yesterday. Remains in IART. Still on 2L NC.     RESPIRATORY SUPPORT: 2L NC    NUTRITION: regular diet    05-27 @ 07:01  -  05-28 @ 07:00  --------------------------------------------------------  IN: 1298.1 mL / OUT: 1990 mL / NET: -691.9 mL    INTRAVASCULAR ACCESS: PIV    MEDICATIONS:  aMIOdarone   Oral Tab/Cap - Peds 300 milliGRAM(s) Oral two times a day  furosemide   Oral Tab/Cap - Peds 20 milliGRAM(s) Oral every 12 hours  lisinopril Oral Tab/Cap - Peds 5 milliGRAM(s) Oral daily  sildenafil   Oral Tab/Cap - Peds 20 milliGRAM(s) Oral every 8 hours  spironolactone Oral Tab/Cap - Peds 100 milliGRAM(s) Oral daily  rivaroxaban Oral Tab/Cap - Peds 20 milliGRAM(s) Oral daily    PHYSICAL EXAMINATION:  Vital signs - Weight (kg): 66.1 (05-26 @ 07:11)  T(C): 36.7 (05-28-21 @ 05:00), Max: 36.9 (05-27-21 @ 11:00)  HR: 75 (05-28-21 @ 05:00) (75 - 76)  BP: 119/67 (05-28-21 @ 05:00) (103/56 - 121/76)  RR: 25 (05-28-21 @ 05:00) (20 - 30)  SpO2: 90% (05-28-21 @ 05:00) (88% - 93%)    General - non-dysmorphic appearance, well-developed, in no distress.  Skin - no rash, no desquamation, no cyanosis.  Eyes / ENT - no conjunctival injection, sclerae anicteric, external ears & nares normal, mucous membranes moist.  Pulmonary - normal inspiratory effort, no retractions, lungs clear to auscultation bilaterally, no wheezes, no rales.  Cardiovascular - normal rate, regular rhythm, normal S1 & single S2, grade 2/6 harsh systolic murmur at LMSB, no rubs, no gallops, capillary refill < 2sec, normal pulses.  Gastrointestinal - soft, non-distended, non-tender, no splenomegaly. (+) hepatomegaly.  Musculoskeletal - no joint swelling, no clubbing, no edema.  Neurologic / Psychiatric - alert, oriented as age-appropriate, affect appropriate,    LABORATORY TESTS:                          14.9  CBC:   7.87 )-----------( 114   (05-27-21 @ 02:21)                          46.1               138   |  105   |  13                 Ca: 9.0    BMP:   ----------------------------< 155    Mg: x     (05-27-21 @ 02:21)             3.7    |  17    | 0.88               Ph: x        LFT:     TPro: 6.6 / Alb: 4.0 / TBili: 1.1 / DBili: x / AST: 25 / ALT: 17 / AlkPhos: 101   (05-27-21 @ 02:21)    COAG: PT: 15.0 / PTT: 42.6 / INR: 1.32   (05-26-21 @ 19:11)     CARDIAC MARKERS:             High-Sensitivity Troponin: x   (05-18-21 @ 12:51)             CK: x / CKMB: x   (05-18-21 @ 12:51)             Pro-BNP: 211   (05-18-21 @ 12:51)      CBG:   pH: 7.45 / pCO2: 30.4 / pO2: 66.0 / HCO3: 23 / Base Excess: -2.5 / Lactate: x   (05-26-21 @ 20:18)    IMAGING STUDIES:  Electrocardiogram - (5/27/21) Ventricular paced rhythm @ 75bpm. Underlying IART.    Telemetry - (5/28/21) Ventricular paced rhythm @ 75bpm. Underlying IART.    Chest x-ray - (5/27/21) Stable mild cardiomegaly with subsegmental left lower lobe atelectasis.    Echocardiogram - (5/27/21)   1. This was a technically difficult suboptimal study due to poor echo windows. Findings limited to below.   2. 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   3. Mild mitral valve regurgitation.   4. Trivial tricuspid valve regurgitation.   5. Trivial aortic valve regurgitation.   6. Status post device closure of Fontan fenestration.   7. S/P stent placement in the Fontan pathway. Limited imaging of the inferior Fontan baffle. In this setting, the inferior vena cava to its connection near the atrial portion of the baffle appears patent with no obstruction. S/p device closure of Fontan fenestration. The Fontan fenestration is not seen on this study. The right bidirectional Storm is seen only on color flow mapping. This appears to have laminar low velocity flow.   8. The connection of the right bidirectional Storm to the right pulmonary artery could not be imaged. The right pulmonary artery is seen in a limited portion immediately to the left of the Storm and appears patent. The left pulmonary artery could not be imaged.   9. Extremely poor and limited imaging of the lateral free walls of the single systemic left ventricle. On limited short axis views there appears to be adequate systolic excursion of the posterior wall of the left ventricle and decreased in the anterior wall. The bulboventricular foramen could not be imaged. Suggest alternate imaging for appropriate assessment of function.  10. No pericardial effusion.  11. Small right pleural effusion.    Cath - (5/26/21)  Access 4 Fr RFA, 7 Fr RFV x2 with US guidance.  Sat data (%): on 50% FiO2 LPA 73, RUPV 98, Ao 97; CI 2.6 L/min/m2 with Qp:Qs 1 :1.  Pressures (mmHg): Elevated mFontan = mIVC = 20. mPCW=16, No significant gradient across LV to AAo to Vikki (98/58/73).  Normal PVR while on sildenafil.  Angios showed unobstructed Fontan with existing stent within Fontan conduit.    Comment: IART ablation was attempted after the diagnostic cath but unsuccessful. Patient was overdrive paced out of IART. At the end of the case, Ao sat was 94% and LPA 66% on 50% FiO2

## 2021-05-29 PROCEDURE — 99291 CRITICAL CARE FIRST HOUR: CPT | Mod: 25

## 2021-05-29 PROCEDURE — 93010 ELECTROCARDIOGRAM REPORT: CPT

## 2021-05-29 PROCEDURE — 99291 CRITICAL CARE FIRST HOUR: CPT

## 2021-05-29 RX ADMIN — SPIRONOLACTONE 100 MILLIGRAM(S): 25 TABLET, FILM COATED ORAL at 09:23

## 2021-05-29 RX ADMIN — SODIUM CHLORIDE 3 MILLILITER(S): 9 INJECTION INTRAMUSCULAR; INTRAVENOUS; SUBCUTANEOUS at 09:29

## 2021-05-29 RX ADMIN — SODIUM CHLORIDE 3 MILLILITER(S): 9 INJECTION INTRAMUSCULAR; INTRAVENOUS; SUBCUTANEOUS at 02:09

## 2021-05-29 RX ADMIN — Medication 20 MILLIGRAM(S): at 07:35

## 2021-05-29 RX ADMIN — Medication 650 MILLIGRAM(S): at 09:21

## 2021-05-29 RX ADMIN — SODIUM CHLORIDE 3 MILLILITER(S): 9 INJECTION INTRAMUSCULAR; INTRAVENOUS; SUBCUTANEOUS at 16:41

## 2021-05-29 RX ADMIN — AMIODARONE HYDROCHLORIDE 300 MILLIGRAM(S): 400 TABLET ORAL at 23:11

## 2021-05-29 RX ADMIN — LISINOPRIL 5 MILLIGRAM(S): 2.5 TABLET ORAL at 09:22

## 2021-05-29 RX ADMIN — RIVAROXABAN 20 MILLIGRAM(S): KIT at 09:24

## 2021-05-29 RX ADMIN — AMIODARONE HYDROCHLORIDE 300 MILLIGRAM(S): 400 TABLET ORAL at 09:23

## 2021-05-29 RX ADMIN — Medication 20 MILLIGRAM(S): at 15:31

## 2021-05-29 RX ADMIN — Medication 20 MILLIGRAM(S): at 15:30

## 2021-05-29 RX ADMIN — Medication 20 MILLIGRAM(S): at 23:12

## 2021-05-29 RX ADMIN — Medication 650 MILLIGRAM(S): at 09:53

## 2021-05-29 NOTE — PROGRESS NOTE PEDS - SUBJECTIVE AND OBJECTIVE BOX
INTERVAL HISTORY: No acute events. Weaned to .5L NC. Less nauseated. Has headache responsive to tylenol    RESPIRATORY SUPPORT: .5L NC  NUTRITION: regular diet      05-28 @ 07:01  -  05-29 @ 07:00  --------------------------------------------------------  IN: 1420 mL / OUT: 1750 mL / NET: -330 mL    INTRAVASCULAR ACCESS: *PIV  MEDICATIONS:  aMIOdarone   Oral Tab/Cap - Peds 300 milliGRAM(s) Oral two times a day  furosemide   Oral Tab/Cap - Peds 20 milliGRAM(s) Oral daily  lisinopril Oral Tab/Cap - Peds 5 milliGRAM(s) Oral daily  sildenafil   Oral Tab/Cap - Peds 20 milliGRAM(s) Oral every 8 hours  spironolactone Oral Tab/Cap - Peds 100 milliGRAM(s) Oral daily  rivaroxaban Oral Tab/Cap - Peds 20 milliGRAM(s) Oral daily  sodium chloride 0.9% lock flush - Peds 3 milliLiter(s) IV Push every 8 hours  sodium chloride 0.9% lock flush - Peds 3 milliLiter(s) IV Push every 8 hours    PHYSICAL EXAMINATION:  Vital signs - Weight (kg): 66.1 (05-26 @ 07:11)  T(C): 37 (05-29-21 @ 10:50), Max: 37 (05-29-21 @ 10:50)  HR: 76 (05-29-21 @ 10:50) (75 - 76)  BP: 104/57 (05-29-21 @ 10:50) (98/66 - 128/72)  RR: 26 (05-29-21 @ 10:50) (19 - 28)  SpO2: 89% (05-29-21 @ 10:50) (87% - 91%)    General - non-dysmorphic appearance, well-developed, in no distress.  Skin - no rash, no desquamation, no cyanosis.  Eyes / ENT - no conjunctival injection, sclerae anicteric, external ears & nares normal, mucous membranes moist.  Pulmonary - normal inspiratory effort, no retractions, lungs clear to auscultation bilaterally, no wheezes, no rales.  Cardiovascular - normal rate, regular rhythm, normal S1 & single S2, grade 2/6 harsh systolic murmur at LMSB, no rubs, no gallops, capillary refill < 2sec, normal pulses.  Gastrointestinal - soft, non-distended, non-tender, no splenomegaly. (+) hepatomegaly.  Musculoskeletal - no joint swelling, no clubbing, no edema.  Neurologic / Psychiatric - alert, oriented as age-appropriate, affect appropriate,      LABORATORY TESTS:                          14.9  CBC:   7.87 )-----------( 114   (05-27-21 @ 02:21)                          46.1               138   |  105   |  13                 Ca: 9.0    BMP:   ----------------------------< 155    Mg: x     (05-27-21 @ 02:21)             3.7    |  17    | 0.88               Ph: x        LFT:     TPro: 6.6 / Alb: 4.0 / TBili: 1.1 / DBili: x / AST: 25 / ALT: 17 / AlkPhos: 101   (05-27-21 @ 02:21)    COAG: PT: 15.0 / PTT: 42.6 / INR: 1.32   (05-26-21 @ 19:11)         CBG:   pH: 7.45 / pCO2: 30.4 / pO2: 66.0 / HCO3: 23 / Base Excess: -2.5 / Lactate: x   (05-26-21 @ 20:18)    IMAGING STUDIES:  Electrocardiogram - (5/27/21) Ventricular paced rhythm @ 75bpm. Underlying IART.    Telemetry - (5/29/21) Ventricular paced rhythm @ 75bpm. Underlying IART.    Chest x-ray - (5/27/21) Stable mild cardiomegaly with subsegmental left lower lobe atelectasis.    Echocardiogram - (5/27/21)   1. This was a technically difficult suboptimal study due to poor echo windows. Findings limited to below.   2. 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   3. Mild mitral valve regurgitation.   4. Trivial tricuspid valve regurgitation.   5. Trivial aortic valve regurgitation.   6. Status post device closure of Fontan fenestration.   7. S/P stent placement in the Fontan pathway. Limited imaging of the inferior Fontan baffle. In this setting, the inferior vena cava to its connection near the atrial portion of the baffle appears patent with no obstruction. S/p device closure of Fontan fenestration. The Fontan fenestration is not seen on this study. The right bidirectional Storm is seen only on color flow mapping. This appears to have laminar low velocity flow.   8. The connection of the right bidirectional Storm to the right pulmonary artery could not be imaged. The right pulmonary artery is seen in a limited portion immediately to the left of the Storm and appears patent. The left pulmonary artery could not be imaged.   9. Extremely poor and limited imaging of the lateral free walls of the single systemic left ventricle. On limited short axis views there appears to be adequate systolic excursion of the posterior wall of the left ventricle and decreased in the anterior wall. The bulboventricular foramen could not be imaged. Suggest alternate imaging for appropriate assessment of function.  10. No pericardial effusion.  11. Small right pleural effusion.    Cath - (5/26/21)  Access 4 Fr RFA, 7 Fr RFV x2 with US guidance.  Sat data (%): on 50% FiO2 LPA 73, RUPV 98, Ao 97; CI 2.6 L/min/m2 with Qp:Qs 1 :1.  Pressures (mmHg): Elevated mFontan = mIVC = 20. mPCW=16, No significant gradient across LV to AAo to Vikki (98/58/73).  Normal PVR while on sildenafil.  Angios showed unobstructed Fontan with existing stent within Fontan conduit.    Comment: IART ablation was attempted after the diagnostic cath but unsuccessful. Patient was overdrive paced out of IART. At the end of the case, Ao sat was 94% and LPA 66% on 50% FiO2

## 2021-05-29 NOTE — PROGRESS NOTE PEDS - SUBJECTIVE AND OBJECTIVE BOX
Interval/Overnight Events: No acute events  _________________________________________________________________  Respiratory:  NC  _________________________________________________________________  Cardiac:  Cardiac Rhythm: IART, V pacing at 70    aMIOdarone   Oral Tab/Cap - Peds 300 milliGRAM(s) Oral two times a day  furosemide   Oral Tab/Cap - Peds 20 milliGRAM(s) Oral daily  lisinopril Oral Tab/Cap - Peds 5 milliGRAM(s) Oral daily  sildenafil   Oral Tab/Cap - Peds 20 milliGRAM(s) Oral every 8 hours  spironolactone Oral Tab/Cap - Peds 100 milliGRAM(s) Oral daily  _________________________________________________________________  Hematologic:    rivaroxaban Oral Tab/Cap - Peds 20 milliGRAM(s) Oral daily  ________________________________________________________________  Infectious:    ________________________________________________________________  Fluids/Electrolytes/Nutrition:  I&O's Summary    28 May 2021 07:01  -  29 May 2021 07:00  --------------------------------------------------------  IN: 1420 mL / OUT: 1750 mL / NET: -330 mL    29 May 2021 07:01  -  29 May 2021 09:41  --------------------------------------------------------  IN: 480 mL / OUT: 60 mL / NET: 420 mL    Diet: PO    sodium chloride 0.9% lock flush - Peds 3 milliLiter(s) IV Push every 8 hours  sodium chloride 0.9% lock flush - Peds 3 milliLiter(s) IV Push every 8 hours  _________________________________________________________________  Neurologic:  Adequacy of sedation and pain control has been assessed and adjusted    acetaminophen   Oral Tab/Cap - Peds. 650 milliGRAM(s) Oral every 6 hours PRN  ________________________________________________________________  Additional Meds:  ________________________________________________________________  Access:  PIV  Necessity of urinary, arterial, and venous catheters discussed  ________________________________________________________________  Labs:    _________________________________________________________________  Imaging:    _________________________________________________________________  PE:  T(C): 36.7 (05-29-21 @ 07:40), Max: 36.9 (05-28-21 @ 20:00)  HR: 76 (05-29-21 @ 07:40) (75 - 76)  BP: 121/67 (05-29-21 @ 07:40) (98/66 - 128/72)  RR: 28 (05-29-21 @ 07:40) (19 - 28)  SpO2: 88% (05-29-21 @ 07:40) (87% - 91%)    General:	No distress  Respiratory:      Effort even and unlabored. Clear bilaterally.   CV:                   Regular rate and rhythm. Normal S1/S2. No murmurs, rubs, or   .                       gallop. Capillary refill < 2 seconds. Distal pulses 2+ and equal.  Abdomen:	Soft, non-distended. Bowel sounds present.   Skin:		No rashes.  Extremities:	Warm and well perfused.   Neurologic:	Alert.  No acute change from baseline exam.  ________________________________________________________________  Patient and Parent/Guardian was updated as to the progress/plan of care.    The patient remains in critical and unstable condition, and requires ICU care and monitoring. Total critical care time spent by attending physician was 35 minutes, excluding procedure time.

## 2021-05-29 NOTE — PROGRESS NOTE PEDS - ASSESSMENT
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 He has elevated Fontan pressure secondary to LV diastolic dysfunction.      PLAN:    Resp:  Encourage incentive spirometry  Wean NC as tolerated for goal SpO2 > 92%    CV:  On home Lisinopril  Home sildenafil   Cont Amiodarone   Continue Lasix PO daily  Continue home aldactone    Heme:  Cont home Xarelto     FEN:  PO ad rachael    OOB

## 2021-05-29 NOTE — PROGRESS NOTE PEDS - ASSESSMENT
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 Presented in IART - s/p EP study with unsuccessful attempt at ablation along with diagnostic cath showing elevated Fontan pressure (20mmHg). He was ultimately paced out of tachycardia in lab, but reverted to IART and is on Amiodarone load (after overdrive pacing unsuccessful). The patient requires close monitoring in the ICU.     Plan:  - Continuos telemetry monitoring.  - Pacemaker set at DDD .  - Continue home medications - Sildenafil 20mg q8h, Aldactone 100mg daily, Lisinopril 5mg OD, Xarelto 20mg OD.    - Can make Lasix 20mg QD today (home dosing).  - Amiodarone load of 300mg PO BID (D3/5).  - Daily EKGs.   - Wean oxygen as tolerated. Goal saturations > 90%. Encourage ambulation, incentive spirometry.  - Will need to remain admitted on telemetry monitoring throughout Amiodarone load with close eye on arrythmia. Plan to reattempt overdrive pacing if still in IART after Amiodarone load completed.

## 2021-05-30 PROCEDURE — 93010 ELECTROCARDIOGRAM REPORT: CPT | Mod: 76

## 2021-05-30 PROCEDURE — 99291 CRITICAL CARE FIRST HOUR: CPT

## 2021-05-30 RX ADMIN — SODIUM CHLORIDE 3 MILLILITER(S): 9 INJECTION INTRAMUSCULAR; INTRAVENOUS; SUBCUTANEOUS at 02:00

## 2021-05-30 RX ADMIN — SODIUM CHLORIDE 3 MILLILITER(S): 9 INJECTION INTRAMUSCULAR; INTRAVENOUS; SUBCUTANEOUS at 10:35

## 2021-05-30 RX ADMIN — AMIODARONE HYDROCHLORIDE 300 MILLIGRAM(S): 400 TABLET ORAL at 10:43

## 2021-05-30 RX ADMIN — SODIUM CHLORIDE 3 MILLILITER(S): 9 INJECTION INTRAMUSCULAR; INTRAVENOUS; SUBCUTANEOUS at 18:24

## 2021-05-30 RX ADMIN — LISINOPRIL 5 MILLIGRAM(S): 2.5 TABLET ORAL at 10:41

## 2021-05-30 RX ADMIN — Medication 20 MILLIGRAM(S): at 23:21

## 2021-05-30 RX ADMIN — AMIODARONE HYDROCHLORIDE 300 MILLIGRAM(S): 400 TABLET ORAL at 23:20

## 2021-05-30 RX ADMIN — SPIRONOLACTONE 100 MILLIGRAM(S): 25 TABLET, FILM COATED ORAL at 10:42

## 2021-05-30 RX ADMIN — Medication 20 MILLIGRAM(S): at 16:30

## 2021-05-30 RX ADMIN — RIVAROXABAN 20 MILLIGRAM(S): KIT at 10:41

## 2021-05-30 RX ADMIN — Medication 20 MILLIGRAM(S): at 08:32

## 2021-05-30 NOTE — PROGRESS NOTE PEDS - ASSESSMENT
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 Presented in IART - s/p EP study with unsuccessful attempt at ablation along with diagnostic cath showing elevated Fontan pressure (20mmHg). He was ultimately paced out of tachycardia in lab, but reverted to IART and is on Amiodarone load (after overdrive pacing unsuccessful). The patient requires close monitoring in the ICU.     Plan:  - Continuos telemetry monitoring.  - Pacemaker set at DDD .  - Continue home medications - Sildenafil 20mg q8h, Aldactone 100mg daily, Lisinopril 5mg OD, Xarelto 20mg OD.    - Can make Lasix 20mg QD today (home dosing).  - Amiodarone load of 300mg PO BID (D4/5).  - Daily EKGs.   - Wean oxygen as tolerated. Goal saturations > 90%. Encourage ambulation, incentive spirometry.  - Will need to remain admitted on telemetry monitoring throughout Amiodarone load with close eye on arrythmia. Plan to reattempt overdrive pacing if still in IART after Amiodarone load completed.

## 2021-05-30 NOTE — PROGRESS NOTE PEDS - SUBJECTIVE AND OBJECTIVE BOX
INTERVAL HISTORY: No acute events. Still on .5L NC.    RESPIRATORY SUPPORT: .5L NC   NUTRITION: Regular diet    05-29 @ 07:01  -  05-30 @ 07:00  --------------------------------------------------------  IN: 1780 mL / OUT: 1540 mL / NET: 240 mL    INTRAVASCULAR ACCESS: PIV    MEDICATIONS:  aMIOdarone   Oral Tab/Cap - Peds 300 milliGRAM(s) Oral two times a day  furosemide   Oral Tab/Cap - Peds 20 milliGRAM(s) Oral daily  lisinopril Oral Tab/Cap - Peds 5 milliGRAM(s) Oral daily  sildenafil   Oral Tab/Cap - Peds 20 milliGRAM(s) Oral every 8 hours  spironolactone Oral Tab/Cap - Peds 100 milliGRAM(s) Oral daily  rivaroxaban Oral Tab/Cap - Peds 20 milliGRAM(s) Oral daily  sodium chloride 0.9% lock flush - Peds 3 milliLiter(s) IV Push every 8 hours  sodium chloride 0.9% lock flush - Peds 3 milliLiter(s) IV Push every 8 hours    PHYSICAL EXAMINATION:  Vital signs - Weight (kg): 66.1 (05-26 @ 07:11)  T(C): 37 (05-30-21 @ 05:00), Max: 37 (05-29-21 @ 10:50)  HR: 75 (05-30-21 @ 05:00) (75 - 77)  BP: 106/63 (05-30-21 @ 05:00) (104/57 - 125/70)  RR: 20 (05-30-21 @ 05:00) (19 - 29)  SpO2: 88% (05-30-21 @ 05:00) (88% - 91%)    General - non-dysmorphic appearance, well-developed, in no distress.  Skin - no rash, no desquamation, no cyanosis.  Eyes / ENT - no conjunctival injection, sclerae anicteric, external ears & nares normal, mucous membranes moist.  Pulmonary - normal inspiratory effort, no retractions, lungs clear to auscultation bilaterally, no wheezes, no rales.  Cardiovascular - normal rate, regular rhythm, normal S1 & single S2, grade 2/6 harsh systolic murmur at LMSB, no rubs, no gallops, capillary refill < 2sec, normal pulses.  Gastrointestinal - soft, non-distended, non-tender, no splenomegaly. (+) hepatomegaly.  Musculoskeletal - no joint swelling, no clubbing, no edema.  Neurologic / Psychiatric - alert, oriented as age-appropriate, affect appropriate,    LABORATORY TESTS:                          14.9  CBC:   7.87 )-----------( 114   (05-27-21 @ 02:21)                          46.1               138   |  105   |  13                 Ca: 9.0    BMP:   ----------------------------< 155    Mg: x     (05-27-21 @ 02:21)             3.7    |  17    | 0.88               Ph: x        LFT:     TPro: 6.6 / Alb: 4.0 / TBili: 1.1 / DBili: x / AST: 25 / ALT: 17 / AlkPhos: 101   (05-27-21 @ 02:21)    COAG: PT: 15.0 / PTT: 42.6 / INR: 1.32   (05-26-21 @ 19:11)         CBG:   pH: 7.45 / pCO2: 30.4 / pO2: 66.0 / HCO3: 23 / Base Excess: -2.5 / Lactate: x   (05-26-21 @ 20:18)    IMAGING STUDIES:  Electrocardiogram - (5/27/21) Ventricular paced rhythm @ 75bpm. Underlying IART.    Telemetry - (5/29/21) Ventricular paced rhythm @ 75bpm. Underlying IART.    Chest x-ray - (5/27/21) Stable mild cardiomegaly with subsegmental left lower lobe atelectasis.    Echocardiogram - (5/27/21)   1. This was a technically difficult suboptimal study due to poor echo windows. Findings limited to below.   2. 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   3. Mild mitral valve regurgitation.   4. Trivial tricuspid valve regurgitation.   5. Trivial aortic valve regurgitation.   6. Status post device closure of Fontan fenestration.   7. S/P stent placement in the Fontan pathway. Limited imaging of the inferior Fontan baffle. In this setting, the inferior vena cava to its connection near the atrial portion of the baffle appears patent with no obstruction. S/p device closure of Fontan fenestration. The Fontan fenestration is not seen on this study. The right bidirectional Storm is seen only on color flow mapping. This appears to have laminar low velocity flow.   8. The connection of the right bidirectional Storm to the right pulmonary artery could not be imaged. The right pulmonary artery is seen in a limited portion immediately to the left of the Storm and appears patent. The left pulmonary artery could not be imaged.   9. Extremely poor and limited imaging of the lateral free walls of the single systemic left ventricle. On limited short axis views there appears to be adequate systolic excursion of the posterior wall of the left ventricle and decreased in the anterior wall. The bulboventricular foramen could not be imaged. Suggest alternate imaging for appropriate assessment of function.  10. No pericardial effusion.  11. Small right pleural effusion.    Cath - (5/26/21)  Access 4 Fr RFA, 7 Fr RFV x2 with US guidance.  Sat data (%): on 50% FiO2 LPA 73, RUPV 98, Ao 97; CI 2.6 L/min/m2 with Qp:Qs 1 :1.  Pressures (mmHg): Elevated mFontan = mIVC = 20. mPCW=16, No significant gradient across LV to AAo to Vikki (98/58/73).  Normal PVR while on sildenafil.  Angios showed unobstructed Fontan with existing stent within Fontan conduit.    Comment: IART ablation was attempted after the diagnostic cath but unsuccessful. Patient was overdrive paced out of IART. At the end of the case, Ao sat was 94% and LPA 66% on 50% FiO2 INTERVAL HISTORY: No acute events. RA  RESPIRATORY SUPPORT:RA  NUTRITION: Regular diet    05-29 @ 07:01  -  05-30 @ 07:00  --------------------------------------------------------  IN: 1780 mL / OUT: 1540 mL / NET: 240 mL    INTRAVASCULAR ACCESS: PIV    MEDICATIONS:  aMIOdarone   Oral Tab/Cap - Peds 300 milliGRAM(s) Oral two times a day  furosemide   Oral Tab/Cap - Peds 20 milliGRAM(s) Oral daily  lisinopril Oral Tab/Cap - Peds 5 milliGRAM(s) Oral daily  sildenafil   Oral Tab/Cap - Peds 20 milliGRAM(s) Oral every 8 hours  spironolactone Oral Tab/Cap - Peds 100 milliGRAM(s) Oral daily  rivaroxaban Oral Tab/Cap - Peds 20 milliGRAM(s) Oral daily  sodium chloride 0.9% lock flush - Peds 3 milliLiter(s) IV Push every 8 hours  sodium chloride 0.9% lock flush - Peds 3 milliLiter(s) IV Push every 8 hours    PHYSICAL EXAMINATION:  Vital signs - Weight (kg): 66.1 (05-26 @ 07:11)  T(C): 37 (05-30-21 @ 05:00), Max: 37 (05-29-21 @ 10:50)  HR: 75 (05-30-21 @ 05:00) (75 - 77)  BP: 106/63 (05-30-21 @ 05:00) (104/57 - 125/70)  RR: 20 (05-30-21 @ 05:00) (19 - 29)  SpO2: 88% (05-30-21 @ 05:00) (88% - 91%)    General - non-dysmorphic appearance, well-developed, in no distress.  Skin - no rash, no desquamation, no cyanosis.  Eyes / ENT - no conjunctival injection, sclerae anicteric, external ears & nares normal, mucous membranes moist.  Pulmonary - normal inspiratory effort, no retractions, lungs clear to auscultation bilaterally, no wheezes, no rales.  Cardiovascular - normal rate, regular rhythm, normal S1 & single S2, grade 2/6 harsh systolic murmur at LMSB, no rubs, no gallops, capillary refill < 2sec, normal pulses.  Gastrointestinal - soft, non-distended, non-tender, no splenomegaly. (+) hepatomegaly.  Musculoskeletal - no joint swelling, no clubbing, no edema.  Neurologic / Psychiatric - alert, oriented as age-appropriate, affect appropriate,    LABORATORY TESTS:                          14.9  CBC:   7.87 )-----------( 114   (05-27-21 @ 02:21)                          46.1               138   |  105   |  13                 Ca: 9.0    BMP:   ----------------------------< 155    Mg: x     (05-27-21 @ 02:21)             3.7    |  17    | 0.88               Ph: x        LFT:     TPro: 6.6 / Alb: 4.0 / TBili: 1.1 / DBili: x / AST: 25 / ALT: 17 / AlkPhos: 101   (05-27-21 @ 02:21)    COAG: PT: 15.0 / PTT: 42.6 / INR: 1.32   (05-26-21 @ 19:11)         CBG:   pH: 7.45 / pCO2: 30.4 / pO2: 66.0 / HCO3: 23 / Base Excess: -2.5 / Lactate: x   (05-26-21 @ 20:18)    IMAGING STUDIES:  Electrocardiogram - (5/27/21) Ventricular paced rhythm @ 75bpm. Underlying IART.    Telemetry - (5/29/21) Ventricular paced rhythm @ 75bpm. Underlying IART.    Chest x-ray - (5/27/21) Stable mild cardiomegaly with subsegmental left lower lobe atelectasis.    Echocardiogram - (5/27/21)   1. This was a technically difficult suboptimal study due to poor echo windows. Findings limited to below.   2. 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   3. Mild mitral valve regurgitation.   4. Trivial tricuspid valve regurgitation.   5. Trivial aortic valve regurgitation.   6. Status post device closure of Fontan fenestration.   7. S/P stent placement in the Fontan pathway. Limited imaging of the inferior Fontan baffle. In this setting, the inferior vena cava to its connection near the atrial portion of the baffle appears patent with no obstruction. S/p device closure of Fontan fenestration. The Fontan fenestration is not seen on this study. The right bidirectional Storm is seen only on color flow mapping. This appears to have laminar low velocity flow.   8. The connection of the right bidirectional Storm to the right pulmonary artery could not be imaged. The right pulmonary artery is seen in a limited portion immediately to the left of the Storm and appears patent. The left pulmonary artery could not be imaged.   9. Extremely poor and limited imaging of the lateral free walls of the single systemic left ventricle. On limited short axis views there appears to be adequate systolic excursion of the posterior wall of the left ventricle and decreased in the anterior wall. The bulboventricular foramen could not be imaged. Suggest alternate imaging for appropriate assessment of function.  10. No pericardial effusion.  11. Small right pleural effusion.    Cath - (5/26/21)  Access 4 Fr RFA, 7 Fr RFV x2 with US guidance.  Sat data (%): on 50% FiO2 LPA 73, RUPV 98, Ao 97; CI 2.6 L/min/m2 with Qp:Qs 1 :1.  Pressures (mmHg): Elevated mFontan = mIVC = 20. mPCW=16, No significant gradient across LV to AAo to Vikki (98/58/73).  Normal PVR while on sildenafil.  Angios showed unobstructed Fontan with existing stent within Fontan conduit.    Comment: IART ablation was attempted after the diagnostic cath but unsuccessful. Patient was overdrive paced out of IART. At the end of the case, Ao sat was 94% and LPA 66% on 50% FiO2

## 2021-05-30 NOTE — PROGRESS NOTE PEDS - SUBJECTIVE AND OBJECTIVE BOX
Interval/Overnight Events: Off O2 this am  _________________________________________________________________  Respiratory:  RA    _________________________________________________________________  Cardiac:  Cardiac Rhythm: IART, v pacing at 70    aMIOdarone   Oral Tab/Cap - Peds 300 milliGRAM(s) Oral two times a day  furosemide   Oral Tab/Cap - Peds 20 milliGRAM(s) Oral daily  lisinopril Oral Tab/Cap - Peds 5 milliGRAM(s) Oral daily  sildenafil   Oral Tab/Cap - Peds 20 milliGRAM(s) Oral every 8 hours  spironolactone Oral Tab/Cap - Peds 100 milliGRAM(s) Oral daily  _________________________________________________________________  Hematologic:    rivaroxaban Oral Tab/Cap - Peds 20 milliGRAM(s) Oral daily    ________________________________________________________________  Infectious:    ________________________________________________________________  Fluids/Electrolytes/Nutrition:  I&O's Summary    29 May 2021 07:01  -  30 May 2021 07:00  --------------------------------------------------------  IN: 1780 mL / OUT: 1690 mL / NET: 90 mL    30 May 2021 07:01  -  30 May 2021 10:08  --------------------------------------------------------  IN: 120 mL / OUT: 0 mL / NET: 120 mL    Diet: PO ad rachael    sodium chloride 0.9% lock flush - Peds 3 milliLiter(s) IV Push every 8 hours  sodium chloride 0.9% lock flush - Peds 3 milliLiter(s) IV Push every 8 hours    _________________________________________________________________  Neurologic:  Adequacy of sedation and pain control has been assessed and adjusted    acetaminophen   Oral Tab/Cap - Peds. 650 milliGRAM(s) Oral every 6 hours PRN    ________________________________________________________________  Additional Meds:    ________________________________________________________________  Access:  PIV  Necessity of urinary, arterial, and venous catheters discussed  ________________________________________________________________  Labs:      _________________________________________________________________  Imaging:    _________________________________________________________________  PE:  T(C): 36.9 (05-30-21 @ 08:00), Max: 37 (05-29-21 @ 10:50)  HR: 76 (05-30-21 @ 08:00) (75 - 77)  BP: 113/64 (05-30-21 @ 08:00) (104/57 - 125/70)  ABP: --  ABP(mean): --  RR: 21 (05-30-21 @ 08:00) (19 - 29)  SpO2: 91% (05-30-21 @ 08:00) (88% - 91%)  CVP(mm Hg): --    General:	No distress  Respiratory:      Effort even and unlabored. Clear bilaterally.   CV:                   Regular rate and rhythm. Normal S1/S2. No murmurs, rubs, or   .                       gallop. Capillary refill < 2 seconds. Distal pulses 2+ and equal.  Abdomen:	Soft, non-distended. Bowel sounds present.   Skin:		No rashes.  Extremities:	Warm and well perfused.   Neurologic:	Alert.  No acute change from baseline exam.  ________________________________________________________________  Patient and Parent/Guardian was updated as to the progress/plan of care.    The patient remains in critical and unstable condition, and requires ICU care and monitoring. Total critical care time spent by attending physician was 40 minutes, excluding procedure time.

## 2021-05-31 PROCEDURE — 71045 X-RAY EXAM CHEST 1 VIEW: CPT | Mod: 26

## 2021-05-31 PROCEDURE — 99291 CRITICAL CARE FIRST HOUR: CPT | Mod: 25

## 2021-05-31 PROCEDURE — 93010 ELECTROCARDIOGRAM REPORT: CPT

## 2021-05-31 PROCEDURE — 99291 CRITICAL CARE FIRST HOUR: CPT

## 2021-05-31 RX ADMIN — SODIUM CHLORIDE 3 MILLILITER(S): 9 INJECTION INTRAMUSCULAR; INTRAVENOUS; SUBCUTANEOUS at 02:00

## 2021-05-31 RX ADMIN — SODIUM CHLORIDE 3 MILLILITER(S): 9 INJECTION INTRAMUSCULAR; INTRAVENOUS; SUBCUTANEOUS at 09:29

## 2021-05-31 RX ADMIN — AMIODARONE HYDROCHLORIDE 300 MILLIGRAM(S): 400 TABLET ORAL at 22:47

## 2021-05-31 RX ADMIN — Medication 20 MILLIGRAM(S): at 07:49

## 2021-05-31 RX ADMIN — LISINOPRIL 5 MILLIGRAM(S): 2.5 TABLET ORAL at 09:39

## 2021-05-31 RX ADMIN — Medication 20 MILLIGRAM(S): at 16:01

## 2021-05-31 RX ADMIN — SPIRONOLACTONE 100 MILLIGRAM(S): 25 TABLET, FILM COATED ORAL at 09:39

## 2021-05-31 RX ADMIN — Medication 20 MILLIGRAM(S): at 22:48

## 2021-05-31 RX ADMIN — RIVAROXABAN 20 MILLIGRAM(S): KIT at 09:39

## 2021-05-31 RX ADMIN — SODIUM CHLORIDE 3 MILLILITER(S): 9 INJECTION INTRAMUSCULAR; INTRAVENOUS; SUBCUTANEOUS at 09:28

## 2021-05-31 RX ADMIN — AMIODARONE HYDROCHLORIDE 300 MILLIGRAM(S): 400 TABLET ORAL at 09:39

## 2021-05-31 RX ADMIN — SODIUM CHLORIDE 3 MILLILITER(S): 9 INJECTION INTRAMUSCULAR; INTRAVENOUS; SUBCUTANEOUS at 18:23

## 2021-05-31 NOTE — PROGRESS NOTE PEDS - SUBJECTIVE AND OBJECTIVE BOX
INTERVAL HISTORY: No acute events. Still in IART    RESPIRATORY SUPPORT: RA  NUTRITION: regular diet    05-30 @ 07:01  -  05-31 @ 07:00  --------------------------------------------------------  IN: 2320 mL / OUT: 1375 mL / NET: 945 mL    INTRAVASCULAR ACCESS: PIV    MEDICATIONS:  aMIOdarone   Oral Tab/Cap - Peds 300 milliGRAM(s) Oral two times a day  furosemide   Oral Tab/Cap - Peds 20 milliGRAM(s) Oral daily  lisinopril Oral Tab/Cap - Peds 5 milliGRAM(s) Oral daily  sildenafil   Oral Tab/Cap - Peds 20 milliGRAM(s) Oral every 8 hours  spironolactone Oral Tab/Cap - Peds 100 milliGRAM(s) Oral daily  rivaroxaban Oral Tab/Cap - Peds 20 milliGRAM(s) Oral daily  sodium chloride 0.9% lock flush - Peds 3 milliLiter(s) IV Push every 8 hours  sodium chloride 0.9% lock flush - Peds 3 milliLiter(s) IV Push every 8 hours    PHYSICAL EXAMINATION:  Vital signs - Weight (kg): 66.1 (05-26 @ 07:11)  T(C): 36.5 (05-31-21 @ 08:00), Max: 37 (05-31-21 @ 05:00)  HR: 76 (05-31-21 @ 08:00) (75 - 81)  BP: 115/63 (05-31-21 @ 08:00) (107/52 - 120/68)  RR: 20 (05-31-21 @ 08:00) (16 - 27)  SpO2: 90% (05-31-21 @ 08:00) (88% - 91%)    General - non-dysmorphic appearance, well-developed, in no distress.  Skin - no rash, no desquamation, no cyanosis.  Eyes / ENT - no conjunctival injection, sclerae anicteric, external ears & nares normal, mucous membranes moist.  Pulmonary - normal inspiratory effort, no retractions, lungs clear to auscultation bilaterally, no wheezes, no rales.  Cardiovascular - normal rate, regular rhythm, normal S1 & single S2, grade 2/6 harsh systolic murmur at LMSB, no rubs, no gallops, capillary refill < 2sec, normal pulses.  Gastrointestinal - soft, non-distended, non-tender, no splenomegaly. (+) hepatomegaly.  Musculoskeletal - no joint swelling, no clubbing, no edema.  Neurologic / Psychiatric - alert, oriented as age-appropriate, affect appropriate    LABORATORY TESTS:                          14.9  CBC:   7.87 )-----------( 114   (05-27-21 @ 02:21)                          46.1               138   |  105   |  13                 Ca: 9.0    BMP:   ----------------------------< 155    Mg: x     (05-27-21 @ 02:21)             3.7    |  17    | 0.88               Ph: x        LFT:     TPro: 6.6 / Alb: 4.0 / TBili: 1.1 / DBili: x / AST: 25 / ALT: 17 / AlkPhos: 101   (05-27-21 @ 02:21)    COAG: PT: 15.0 / PTT: 42.6 / INR: 1.32   (05-26-21 @ 19:11)         CBG:   pH: 7.45 / pCO2: 30.4 / pO2: 66.0 / HCO3: 23 / Base Excess: -2.5 / Lactate: x   (05-26-21 @ 20:18)      IMAGING STUDIES:  Electrocardiogram - (5/27/21) Ventricular paced rhythm @ 75bpm. Underlying IART.    Telemetry - (5/29/21) Ventricular paced rhythm @ 75bpm. Underlying IART.    Chest x-ray - (5/27/21) Stable mild cardiomegaly with subsegmental left lower lobe atelectasis.    Echocardiogram - (5/27/21)   1. This was a technically difficult suboptimal study due to poor echo windows. Findings limited to below.   2. 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   3. Mild mitral valve regurgitation.   4. Trivial tricuspid valve regurgitation.   5. Trivial aortic valve regurgitation.   6. Status post device closure of Fontan fenestration.   7. S/P stent placement in the Fontan pathway. Limited imaging of the inferior Fontan baffle. In this setting, the inferior vena cava to its connection near the atrial portion of the baffle appears patent with no obstruction. S/p device closure of Fontan fenestration. The Fontan fenestration is not seen on this study. The right bidirectional Storm is seen only on color flow mapping. This appears to have laminar low velocity flow.   8. The connection of the right bidirectional Storm to the right pulmonary artery could not be imaged. The right pulmonary artery is seen in a limited portion immediately to the left of the Storm and appears patent. The left pulmonary artery could not be imaged.   9. Extremely poor and limited imaging of the lateral free walls of the single systemic left ventricle. On limited short axis views there appears to be adequate systolic excursion of the posterior wall of the left ventricle and decreased in the anterior wall. The bulboventricular foramen could not be imaged. Suggest alternate imaging for appropriate assessment of function.  10. No pericardial effusion.  11. Small right pleural effusion.    Cath - (5/26/21)  Access 4 Fr RFA, 7 Fr RFV x2 with US guidance.  Sat data (%): on 50% FiO2 LPA 73, RUPV 98, Ao 97; CI 2.6 L/min/m2 with Qp:Qs 1 :1.  Pressures (mmHg): Elevated mFontan = mIVC = 20. mPCW=16, No significant gradient across LV to AAo to Vikki (98/58/73).  Normal PVR while on sildenafil.  Angios showed unobstructed Fontan with existing stent within Fontan conduit.    Comment: IART ablation was attempted after the diagnostic cath but unsuccessful. Patient was overdrive paced out of IART. At the end of the case, Ao sat was 94% and LPA 66% on 50% FiO2   INTERVAL HISTORY: No acute events. Still in IART. Needing supplemental oxygen again for desaturations when awake.    RESPIRATORY SUPPORT: RA  NUTRITION: regular diet    05-30 @ 07:01  -  05-31 @ 07:00  --------------------------------------------------------  IN: 2320 mL / OUT: 1375 mL / NET: 945 mL    INTRAVASCULAR ACCESS: PIV    MEDICATIONS:  aMIOdarone   Oral Tab/Cap - Peds 300 milliGRAM(s) Oral two times a day  furosemide   Oral Tab/Cap - Peds 20 milliGRAM(s) Oral daily  lisinopril Oral Tab/Cap - Peds 5 milliGRAM(s) Oral daily  sildenafil   Oral Tab/Cap - Peds 20 milliGRAM(s) Oral every 8 hours  spironolactone Oral Tab/Cap - Peds 100 milliGRAM(s) Oral daily  rivaroxaban Oral Tab/Cap - Peds 20 milliGRAM(s) Oral daily  sodium chloride 0.9% lock flush - Peds 3 milliLiter(s) IV Push every 8 hours  sodium chloride 0.9% lock flush - Peds 3 milliLiter(s) IV Push every 8 hours    PHYSICAL EXAMINATION:  Vital signs - Weight (kg): 66.1 (05-26 @ 07:11)  T(C): 36.5 (05-31-21 @ 08:00), Max: 37 (05-31-21 @ 05:00)  HR: 76 (05-31-21 @ 08:00) (75 - 81)  BP: 115/63 (05-31-21 @ 08:00) (107/52 - 120/68)  RR: 20 (05-31-21 @ 08:00) (16 - 27)  SpO2: 90% (05-31-21 @ 08:00) (88% - 91%)    General - non-dysmorphic appearance, well-developed, in no distress.  Skin - no rash, no desquamation, no cyanosis.  Eyes / ENT - no conjunctival injection, sclerae anicteric, external ears & nares normal, mucous membranes moist.  Pulmonary - normal inspiratory effort, no retractions, lungs clear to auscultation bilaterally, no wheezes, no rales.  Cardiovascular - normal rate, regular rhythm, normal S1 & single S2, grade 1/6 harsh systolic murmur at LMSB, no rubs, no gallops, capillary refill < 2sec, normal pulses.  Gastrointestinal - soft, non-distended, non-tender, no splenomegaly. (+) hepatomegaly.  Musculoskeletal - no joint swelling, no clubbing, no edema.  Neurologic / Psychiatric - alert, oriented as age-appropriate, affect appropriate    LABORATORY TESTS:                          14.9  CBC:   7.87 )-----------( 114   (05-27-21 @ 02:21)                          46.1               138   |  105   |  13                 Ca: 9.0    BMP:   ----------------------------< 155    Mg: x     (05-27-21 @ 02:21)             3.7    |  17    | 0.88               Ph: x        LFT:     TPro: 6.6 / Alb: 4.0 / TBili: 1.1 / DBili: x / AST: 25 / ALT: 17 / AlkPhos: 101   (05-27-21 @ 02:21)    COAG: PT: 15.0 / PTT: 42.6 / INR: 1.32   (05-26-21 @ 19:11)         CBG:   pH: 7.45 / pCO2: 30.4 / pO2: 66.0 / HCO3: 23 / Base Excess: -2.5 / Lactate: x   (05-26-21 @ 20:18)      IMAGING STUDIES:  Electrocardiogram - (5/27/21) Ventricular paced rhythm @ 75bpm. Underlying IART.    Telemetry - (5/29/21) Ventricular paced rhythm @ 75bpm. Underlying IART.    Chest x-ray - (5/27/21) Stable mild cardiomegaly with subsegmental left lower lobe atelectasis.    Echocardiogram - (5/27/21)   1. This was a technically difficult suboptimal study due to poor echo windows. Findings limited to below.   2. 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   3. Mild mitral valve regurgitation.   4. Trivial tricuspid valve regurgitation.   5. Trivial aortic valve regurgitation.   6. Status post device closure of Fontan fenestration.   7. S/P stent placement in the Fontan pathway. Limited imaging of the inferior Fontan baffle. In this setting, the inferior vena cava to its connection near the atrial portion of the baffle appears patent with no obstruction. S/p device closure of Fontan fenestration. The Fontan fenestration is not seen on this study. The right bidirectional Storm is seen only on color flow mapping. This appears to have laminar low velocity flow.   8. The connection of the right bidirectional Storm to the right pulmonary artery could not be imaged. The right pulmonary artery is seen in a limited portion immediately to the left of the Storm and appears patent. The left pulmonary artery could not be imaged.   9. Extremely poor and limited imaging of the lateral free walls of the single systemic left ventricle. On limited short axis views there appears to be adequate systolic excursion of the posterior wall of the left ventricle and decreased in the anterior wall. The bulboventricular foramen could not be imaged. Suggest alternate imaging for appropriate assessment of function.  10. No pericardial effusion.  11. Small right pleural effusion.    Cath - (5/26/21)  Access 4 Fr RFA, 7 Fr RFV x2 with US guidance.  Sat data (%): on 50% FiO2 LPA 73, RUPV 98, Ao 97; CI 2.6 L/min/m2 with Qp:Qs 1 :1.  Pressures (mmHg): Elevated mFontan = mIVC = 20. mPCW=16, No significant gradient across LV to AAo to Vikki (98/58/73).  Normal PVR while on sildenafil.  Angios showed unobstructed Fontan with existing stent within Fontan conduit.    Comment: IART ablation was attempted after the diagnostic cath but unsuccessful. Patient was overdrive paced out of IART. At the end of the case, Ao sat was 94% and LPA 66% on 50% FiO2

## 2021-05-31 NOTE — PROGRESS NOTE PEDS - SUBJECTIVE AND OBJECTIVE BOX
Interval/Overnight Events: No new issues overnight.     VITAL SIGNS:  T(C): 36.9 (05-31-21 @ 11:00), Max: 37 (05-31-21 @ 05:00)  HR: 76 (05-31-21 @ 11:00) (75 - 81)  BP: 113/63 (05-31-21 @ 11:00) (107/52 - 120/68)  RR: 22 (05-31-21 @ 11:00) (19 - 27)  SpO2: 90% (05-31-21 @ 11:00) (88% - 91%)    Daily     Current Medications:  aMIOdarone   Oral Tab/Cap - Peds 300 milliGRAM(s) Oral two times a day  furosemide   Oral Tab/Cap - Peds 20 milliGRAM(s) Oral daily  lisinopril Oral Tab/Cap - Peds 5 milliGRAM(s) Oral daily  sildenafil   Oral Tab/Cap - Peds 20 milliGRAM(s) Oral every 8 hours  spironolactone Oral Tab/Cap - Peds 100 milliGRAM(s) Oral daily  rivaroxaban Oral Tab/Cap - Peds 20 milliGRAM(s) Oral daily  sodium chloride 0.9% lock flush - Peds 3 milliLiter(s) IV Push every 8 hours  sodium chloride 0.9% lock flush - Peds 3 milliLiter(s) IV Push every 8 hours  acetaminophen   Oral Tab/Cap - Peds. 650 milliGRAM(s) Oral every 6 hours PRN    ===============================RESPIRATORY==============================  [ ] FiO2: ___ 	[ ] Heliox: ____ 		[ ] BiPAP: ___   [ x] NC: 2__  Liters			[ ] HFNC: __ 	Liters, FiO2: __  [ ] Mechanical Ventilation:   [ ] Inhaled Nitric Oxide:  [ ] Extubation Readiness Assessed    =============================CARDIOVASCULAR============================  Cardiac Rhythm:	[ x] NSR		[ ] Other:    ==========================HEMATOLOGY/ONCOLOGY========================  Transfusions:	[ ] PRBC	      [ ] Platelets	[ ] FFP		[ ] Cryoprecipitate  DVT Prophylaxis:    =======================FLUIDS/ELECTROLYTES/NUTRITION=====================  I&O's Summary    30 May 2021 07:01  -  31 May 2021 07:00  --------------------------------------------------------  IN: 2320 mL / OUT: 1375 mL / NET: 945 mL    31 May 2021 07:01  -  31 May 2021 12:57  --------------------------------------------------------  IN: 720 mL / OUT: 400 mL / NET: 320 mL      Diet:	[x ] Regular	[ ] Soft		[ ] Clears	      [ ] NPO  .	[ ] Other:  .	[ ] NGT		[ ] NDT		[ ] GT		[ ] GJT    ================================NEUROLOGY=============================  [ ] SBS:		[ ] GISSELLE-1:	[ ] BIS:         [ x] CAPD: <9  [ x] Adequacy of sedation and pain control has been assessed and adjusted    ========================PATIENT CARE ACCESS DEVICES=====================  [x ] Peripheral IV  [ ] Central Venous Line	[ ] R	[ ] L	[ ] IJ	[ ] Fem	[ ] SC			Placed:   [ ] Arterial Line		[ ] R	[ ] L	[ ] PT	[ ] DP	[ ] Fem	[ ] Rad	[ ] Ax	Placed:   [ ] PICC:				[ ] Broviac		[ ] Mediport  [ ] Urinary Catheter, Date Placed:   [ ] Necessity of urinary, arterial, and venous catheters discussed    =============================ANCILLARY TESTS============================  LABS:    RECENT CULTURES:      IMAGING STUDIES:    ==============================PHYSICAL EXAM============================  GENERAL: In no acute distress  RESPIRATORY: Lungs clear to auscultation bilaterally. Good aeration. No rales, rhonchi, retractions or wheezing. Effort even and unlabored.  CARDIOVASCULAR: Regular rate and rhythm. Normal S1/S2. No murmurs, rubs, or gallop. Capillary refill < 2 seconds. Distal pulses 2+ and equal.  ABDOMEN: Soft, non-distended.  No palpable hepatosplenomegaly.  SKIN: No rash.  EXTREMITIES: Warm and well perfused. No gross extremity deformities.  NEUROLOGIC: Alert. No acute change from baseline exam.    ======================================================================  Parent/Guardian is at the bedside:	[x ] Yes	[ ] No  Patient and Parent/Guardian updated as to the progress/plan of care:	[x ] Yes	[ ] No    [ ] The patient remains in critical and unstable condition, and requires ICU care and monitoring.  Total critical care time spent by attending physician was ____ minutes, excluding procedure time.    [ x] The patient is improving but requires continued monitoring and adjustment of therapy due to risk of acute cardiarespiratory decompensation.

## 2021-05-31 NOTE — PROGRESS NOTE PEDS - ASSESSMENT
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 He has elevated Fontan pressure secondary to LV diastolic dysfunction.      PLAN:    Resp:  Encourage incentive spirometry  Goal SpO2 > 92%    CV:  On home Lisinopril  Home sildenafil   Cont Amiodarone   Continue Lasix PO daily  Continue home aldactone    Heme:  Home Xarelto     FEN:  PO ad rachael    OOB ambulating

## 2021-05-31 NOTE — PROGRESS NOTE PEDS - ATTENDING COMMENTS
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 Presented in IART - s/p EP study with unsuccessful attempt at ablation along with diagnostic cath showing elevated Fontan pressure (20mmHg). He was ultimately paced out of tachycardia in lab, but reverted to IART and is on Amiodarone load (after overdrive pacing unsuccessful). The patient requires close monitoring in the ICU.     Plan:  - Continuos telemetry monitoring.  - Pacemaker set at DDD .  - Continue home medications - Sildenafil 20mg q8h, Aldactone 100mg daily, Lisinopril 5mg OD, Xarelto 20mg OD.    - Can make Lasix 20mg QD today (home dosing).  - Amiodarone load of 300mg PO BID (D3/5).  - Daily EKGs.   - Wean oxygen as tolerated. Goal saturations > 90%. Encourage ambulation, incentive spirometry.  - Will need to remain admitted on telemetry monitoring throughout Amiodarone load with close eye on arrythmia. Plan to reattempt overdrive pacing if still in IART after Amiodarone load completed.

## 2021-05-31 NOTE — PROGRESS NOTE PEDS - ASSESSMENT
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 Presented in IART - s/p EP study with unsuccessful attempt at ablation along with diagnostic cath showing elevated Fontan pressure (20mmHg). He was ultimately paced out of tachycardia in lab, but reverted to IART and is on Amiodarone load (after overdrive pacing unsuccessful). The patient requires close monitoring in the ICU.     Plan:  - Continuos telemetry monitoring.  - Pacemaker set at DDD .  - Continue home medications - Sildenafil 20mg q8h, Aldactone 100mg daily, Lisinopril 5mg OD, Xarelto 20mg OD.    - Can make Lasix 20mg QD today (home dosing).  - Amiodarone load of 300mg PO BID (D3/5).  - Daily EKGs.   - Wean oxygen as tolerated. Goal saturations > 90%. Encourage ambulation, incentive spirometry.  - Will need to remain admitted on telemetry monitoring throughout Amiodarone load with close eye on arrythmia. Plan to reattempt overdrive pacing if still in IART after Amiodarone load completed.

## 2021-06-01 ENCOUNTER — RESULT REVIEW (OUTPATIENT)
Age: 21
End: 2021-06-01

## 2021-06-01 DIAGNOSIS — I61.9 NONTRAUMATIC INTRACEREBRAL HEMORRHAGE, UNSPECIFIED: ICD-10-CM

## 2021-06-01 LAB
ALBUMIN SERPL ELPH-MCNC: 4.9 G/DL — SIGNIFICANT CHANGE UP (ref 3.3–5)
ALP SERPL-CCNC: 132 U/L — SIGNIFICANT CHANGE UP (ref 60–270)
ALT FLD-CCNC: 20 U/L — SIGNIFICANT CHANGE UP (ref 4–41)
ANION GAP SERPL CALC-SCNC: 17 MMOL/L — HIGH (ref 7–14)
ANION GAP SERPL CALC-SCNC: 18 MMOL/L — HIGH (ref 7–14)
APTT BLD: 41.5 SEC — HIGH (ref 27–36.3)
APTT BLD: 46.7 SEC — HIGH (ref 27–36.3)
AST SERPL-CCNC: 21 U/L — SIGNIFICANT CHANGE UP (ref 4–40)
B PERT DNA SPEC QL NAA+PROBE: SIGNIFICANT CHANGE UP
BASOPHILS # BLD AUTO: 0.03 K/UL — SIGNIFICANT CHANGE UP (ref 0–0.2)
BASOPHILS # BLD AUTO: 0.04 K/UL — SIGNIFICANT CHANGE UP (ref 0–0.2)
BASOPHILS NFR BLD AUTO: 0.2 % — SIGNIFICANT CHANGE UP (ref 0–2)
BASOPHILS NFR BLD AUTO: 0.6 % — SIGNIFICANT CHANGE UP (ref 0–2)
BILIRUB SERPL-MCNC: 1.6 MG/DL — HIGH (ref 0.2–1.2)
BLD GP AB SCN SERPL QL: NEGATIVE — SIGNIFICANT CHANGE UP
BLOOD GAS ARTERIAL - LYTES,HGB,ICA,LACT RESULT: SIGNIFICANT CHANGE UP
BLOOD GAS ARTERIAL - LYTES,HGB,ICA,LACT RESULT: SIGNIFICANT CHANGE UP
BUN SERPL-MCNC: 20 MG/DL — SIGNIFICANT CHANGE UP (ref 7–23)
BUN SERPL-MCNC: 21 MG/DL — SIGNIFICANT CHANGE UP (ref 7–23)
C PNEUM DNA SPEC QL NAA+PROBE: SIGNIFICANT CHANGE UP
CALCIUM SERPL-MCNC: 10.4 MG/DL — SIGNIFICANT CHANGE UP (ref 8.4–10.5)
CALCIUM SERPL-MCNC: 8.5 MG/DL — SIGNIFICANT CHANGE UP (ref 8.4–10.5)
CHLORIDE SERPL-SCNC: 103 MMOL/L — SIGNIFICANT CHANGE UP (ref 98–107)
CHLORIDE SERPL-SCNC: 94 MMOL/L — LOW (ref 98–107)
CO2 SERPL-SCNC: 18 MMOL/L — LOW (ref 22–31)
CO2 SERPL-SCNC: 24 MMOL/L — SIGNIFICANT CHANGE UP (ref 22–31)
CREAT SERPL-MCNC: 0.74 MG/DL — SIGNIFICANT CHANGE UP (ref 0.5–1.3)
CREAT SERPL-MCNC: 0.9 MG/DL — SIGNIFICANT CHANGE UP (ref 0.5–1.3)
EOSINOPHIL # BLD AUTO: 0.17 K/UL — SIGNIFICANT CHANGE UP (ref 0–0.5)
EOSINOPHIL # BLD AUTO: 0.28 K/UL — SIGNIFICANT CHANGE UP (ref 0–0.5)
EOSINOPHIL NFR BLD AUTO: 1.2 % — SIGNIFICANT CHANGE UP (ref 0–6)
EOSINOPHIL NFR BLD AUTO: 3.9 % — SIGNIFICANT CHANGE UP (ref 0–6)
FLUAV SUBTYP SPEC NAA+PROBE: SIGNIFICANT CHANGE UP
FLUBV RNA SPEC QL NAA+PROBE: SIGNIFICANT CHANGE UP
GLUCOSE SERPL-MCNC: 104 MG/DL — HIGH (ref 70–99)
GLUCOSE SERPL-MCNC: 201 MG/DL — HIGH (ref 70–99)
HADV DNA SPEC QL NAA+PROBE: SIGNIFICANT CHANGE UP
HCOV 229E RNA SPEC QL NAA+PROBE: SIGNIFICANT CHANGE UP
HCOV HKU1 RNA SPEC QL NAA+PROBE: SIGNIFICANT CHANGE UP
HCOV NL63 RNA SPEC QL NAA+PROBE: SIGNIFICANT CHANGE UP
HCOV OC43 RNA SPEC QL NAA+PROBE: SIGNIFICANT CHANGE UP
HCT VFR BLD CALC: 41.1 % — SIGNIFICANT CHANGE UP (ref 39–50)
HCT VFR BLD CALC: 49.5 % — SIGNIFICANT CHANGE UP (ref 39–50)
HGB BLD-MCNC: 13 G/DL — SIGNIFICANT CHANGE UP (ref 13–17)
HGB BLD-MCNC: 15.7 G/DL — SIGNIFICANT CHANGE UP (ref 13–17)
HMPV RNA SPEC QL NAA+PROBE: SIGNIFICANT CHANGE UP
HPIV1 RNA SPEC QL NAA+PROBE: SIGNIFICANT CHANGE UP
HPIV2 RNA SPEC QL NAA+PROBE: SIGNIFICANT CHANGE UP
HPIV3 RNA SPEC QL NAA+PROBE: SIGNIFICANT CHANGE UP
HPIV4 RNA SPEC QL NAA+PROBE: SIGNIFICANT CHANGE UP
IANC: 12.51 K/UL — HIGH (ref 1.5–8.5)
IANC: 5.32 K/UL — SIGNIFICANT CHANGE UP (ref 1.5–8.5)
IMM GRANULOCYTES NFR BLD AUTO: 0.3 % — SIGNIFICANT CHANGE UP (ref 0–1.5)
IMM GRANULOCYTES NFR BLD AUTO: 0.6 % — SIGNIFICANT CHANGE UP (ref 0–1.5)
INR BLD: 1.42 RATIO — HIGH (ref 0.88–1.16)
INR BLD: 4.16 RATIO — HIGH (ref 0.88–1.16)
LYMPHOCYTES # BLD AUTO: 0.59 K/UL — LOW (ref 1–3.3)
LYMPHOCYTES # BLD AUTO: 0.88 K/UL — LOW (ref 1–3.3)
LYMPHOCYTES # BLD AUTO: 12.3 % — LOW (ref 13–44)
LYMPHOCYTES # BLD AUTO: 4.1 % — LOW (ref 13–44)
MAGNESIUM SERPL-MCNC: 1.7 MG/DL — SIGNIFICANT CHANGE UP (ref 1.6–2.6)
MCHC RBC-ENTMCNC: 26 PG — LOW (ref 27–34)
MCHC RBC-ENTMCNC: 26.5 PG — LOW (ref 27–34)
MCHC RBC-ENTMCNC: 31.6 GM/DL — LOW (ref 32–36)
MCHC RBC-ENTMCNC: 31.7 GM/DL — LOW (ref 32–36)
MCV RBC AUTO: 82 FL — SIGNIFICANT CHANGE UP (ref 80–100)
MCV RBC AUTO: 83.9 FL — SIGNIFICANT CHANGE UP (ref 80–100)
MONOCYTES # BLD AUTO: 0.58 K/UL — SIGNIFICANT CHANGE UP (ref 0–0.9)
MONOCYTES # BLD AUTO: 1.08 K/UL — HIGH (ref 0–0.9)
MONOCYTES NFR BLD AUTO: 7.5 % — SIGNIFICANT CHANGE UP (ref 2–14)
MONOCYTES NFR BLD AUTO: 8.1 % — SIGNIFICANT CHANGE UP (ref 2–14)
NEUTROPHILS # BLD AUTO: 12.51 K/UL — HIGH (ref 1.8–7.4)
NEUTROPHILS # BLD AUTO: 5.32 K/UL — SIGNIFICANT CHANGE UP (ref 1.8–7.4)
NEUTROPHILS NFR BLD AUTO: 74.5 % — SIGNIFICANT CHANGE UP (ref 43–77)
NEUTROPHILS NFR BLD AUTO: 86.7 % — HIGH (ref 43–77)
NRBC # BLD: 0 /100 WBCS — SIGNIFICANT CHANGE UP
NRBC # BLD: 0 /100 WBCS — SIGNIFICANT CHANGE UP
NRBC # FLD: 0 K/UL — SIGNIFICANT CHANGE UP
NRBC # FLD: 0 K/UL — SIGNIFICANT CHANGE UP
PHOSPHATE SERPL-MCNC: 3.3 MG/DL — SIGNIFICANT CHANGE UP (ref 2.5–4.5)
PLATELET # BLD AUTO: 131 K/UL — LOW (ref 150–400)
PLATELET # BLD AUTO: 189 K/UL — SIGNIFICANT CHANGE UP (ref 150–400)
POTASSIUM SERPL-MCNC: 3.7 MMOL/L — SIGNIFICANT CHANGE UP (ref 3.5–5.3)
POTASSIUM SERPL-MCNC: 4 MMOL/L — SIGNIFICANT CHANGE UP (ref 3.5–5.3)
POTASSIUM SERPL-SCNC: 3.7 MMOL/L — SIGNIFICANT CHANGE UP (ref 3.5–5.3)
POTASSIUM SERPL-SCNC: 4 MMOL/L — SIGNIFICANT CHANGE UP (ref 3.5–5.3)
PROT SERPL-MCNC: 8.4 G/DL — HIGH (ref 6–8.3)
PROTHROM AB SERPL-ACNC: 15.9 SEC — HIGH (ref 10.6–13.6)
PROTHROM AB SERPL-ACNC: 44.3 SEC — HIGH (ref 10.6–13.6)
RAPID RVP RESULT: SIGNIFICANT CHANGE UP
RBC # BLD: 4.9 M/UL — SIGNIFICANT CHANGE UP (ref 4.2–5.8)
RBC # BLD: 6.04 M/UL — HIGH (ref 4.2–5.8)
RBC # FLD: 13.6 % — SIGNIFICANT CHANGE UP (ref 10.3–14.5)
RBC # FLD: 13.8 % — SIGNIFICANT CHANGE UP (ref 10.3–14.5)
RH IG SCN BLD-IMP: NEGATIVE — SIGNIFICANT CHANGE UP
RSV RNA SPEC QL NAA+PROBE: SIGNIFICANT CHANGE UP
RV+EV RNA SPEC QL NAA+PROBE: SIGNIFICANT CHANGE UP
SARS-COV-2 RNA SPEC QL NAA+PROBE: SIGNIFICANT CHANGE UP
SODIUM SERPL-SCNC: 135 MMOL/L — SIGNIFICANT CHANGE UP (ref 135–145)
SODIUM SERPL-SCNC: 139 MMOL/L — SIGNIFICANT CHANGE UP (ref 135–145)
WBC # BLD: 14.43 K/UL — HIGH (ref 3.8–10.5)
WBC # BLD: 7.14 K/UL — SIGNIFICANT CHANGE UP (ref 3.8–10.5)
WBC # FLD AUTO: 14.43 K/UL — HIGH (ref 3.8–10.5)
WBC # FLD AUTO: 7.14 K/UL — SIGNIFICANT CHANGE UP (ref 3.8–10.5)

## 2021-06-01 PROCEDURE — 70450 CT HEAD/BRAIN W/O DYE: CPT | Mod: 26

## 2021-06-01 PROCEDURE — 99233 SBSQ HOSP IP/OBS HIGH 50: CPT | Mod: 25

## 2021-06-01 PROCEDURE — 93010 ELECTROCARDIOGRAM REPORT: CPT

## 2021-06-01 PROCEDURE — 93325 DOPPLER ECHO COLOR FLOW MAPG: CPT | Mod: 26

## 2021-06-01 PROCEDURE — 99233 SBSQ HOSP IP/OBS HIGH 50: CPT

## 2021-06-01 PROCEDURE — 92960 CARDIOVERSION ELECTRIC EXT: CPT

## 2021-06-01 PROCEDURE — 93303 ECHO TRANSTHORACIC: CPT | Mod: 26

## 2021-06-01 PROCEDURE — 93320 DOPPLER ECHO COMPLETE: CPT | Mod: 26

## 2021-06-01 PROCEDURE — 88307 TISSUE EXAM BY PATHOLOGIST: CPT | Mod: 26

## 2021-06-01 PROCEDURE — 70450 CT HEAD/BRAIN W/O DYE: CPT | Mod: 26,77

## 2021-06-01 PROCEDURE — 99291 CRITICAL CARE FIRST HOUR: CPT

## 2021-06-01 RX ORDER — SODIUM CHLORIDE 9 MG/ML
250 INJECTION INTRAMUSCULAR; INTRAVENOUS; SUBCUTANEOUS ONCE
Refills: 0 | Status: COMPLETED | OUTPATIENT
Start: 2021-06-01 | End: 2021-06-01

## 2021-06-01 RX ORDER — ROCURONIUM BROMIDE 10 MG/ML
66 VIAL (ML) INTRAVENOUS ONCE
Refills: 0 | Status: COMPLETED | OUTPATIENT
Start: 2021-06-01 | End: 2021-06-01

## 2021-06-01 RX ORDER — ACETAMINOPHEN 500 MG
650 TABLET ORAL EVERY 6 HOURS
Refills: 0 | Status: DISCONTINUED | OUTPATIENT
Start: 2021-06-01 | End: 2021-06-02

## 2021-06-01 RX ORDER — PROPOFOL 10 MG/ML
35 INJECTION, EMULSION INTRAVENOUS ONCE
Refills: 0 | Status: COMPLETED | OUTPATIENT
Start: 2021-06-01 | End: 2021-06-01

## 2021-06-01 RX ORDER — SODIUM CHLORIDE 5 G/100ML
500 INJECTION, SOLUTION INTRAVENOUS ONCE
Refills: 0 | Status: COMPLETED | OUTPATIENT
Start: 2021-06-01 | End: 2021-06-01

## 2021-06-01 RX ORDER — LEVETIRACETAM 250 MG/1
1000 TABLET, FILM COATED ORAL EVERY 12 HOURS
Refills: 0 | Status: DISCONTINUED | OUTPATIENT
Start: 2021-06-01 | End: 2021-06-10

## 2021-06-01 RX ORDER — FENTANYL CITRATE 50 UG/ML
3 INJECTION INTRAVENOUS
Qty: 2500 | Refills: 0 | Status: DISCONTINUED | OUTPATIENT
Start: 2021-06-01 | End: 2021-06-05

## 2021-06-01 RX ORDER — PROPOFOL 10 MG/ML
66 INJECTION, EMULSION INTRAVENOUS ONCE
Refills: 0 | Status: COMPLETED | OUTPATIENT
Start: 2021-06-01 | End: 2021-06-01

## 2021-06-01 RX ORDER — FENTANYL CITRATE 50 UG/ML
66 INJECTION INTRAVENOUS ONCE
Refills: 0 | Status: DISCONTINUED | OUTPATIENT
Start: 2021-06-01 | End: 2021-06-01

## 2021-06-01 RX ORDER — SODIUM CHLORIDE 9 MG/ML
50 INJECTION INTRAMUSCULAR; INTRAVENOUS; SUBCUTANEOUS ONCE
Refills: 0 | Status: COMPLETED | OUTPATIENT
Start: 2021-06-01 | End: 2021-06-01

## 2021-06-01 RX ORDER — SODIUM CHLORIDE 9 MG/ML
1000 INJECTION, SOLUTION INTRAVENOUS
Refills: 0 | Status: DISCONTINUED | OUTPATIENT
Start: 2021-06-01 | End: 2021-06-01

## 2021-06-01 RX ORDER — LIDOCAINE HCL 20 MG/ML
66 VIAL (ML) INJECTION ONCE
Refills: 0 | Status: COMPLETED | OUTPATIENT
Start: 2021-06-01 | End: 2021-06-01

## 2021-06-01 RX ORDER — DEXTROSE MONOHYDRATE, SODIUM CHLORIDE, AND POTASSIUM CHLORIDE 50; .745; 4.5 G/1000ML; G/1000ML; G/1000ML
1000 INJECTION, SOLUTION INTRAVENOUS
Refills: 0 | Status: DISCONTINUED | OUTPATIENT
Start: 2021-06-01 | End: 2021-06-01

## 2021-06-01 RX ORDER — AMIODARONE HYDROCHLORIDE 400 MG/1
300 TABLET ORAL DAILY
Refills: 0 | Status: DISCONTINUED | OUTPATIENT
Start: 2021-06-02 | End: 2021-06-02

## 2021-06-01 RX ORDER — PROPOFOL 10 MG/ML
70 INJECTION, EMULSION INTRAVENOUS ONCE
Refills: 0 | Status: COMPLETED | OUTPATIENT
Start: 2021-06-01 | End: 2021-06-01

## 2021-06-01 RX ORDER — SODIUM CHLORIDE 9 MG/ML
1000 INJECTION, SOLUTION INTRAVENOUS
Refills: 0 | Status: DISCONTINUED | OUTPATIENT
Start: 2021-06-01 | End: 2021-06-04

## 2021-06-01 RX ORDER — PROPOFOL 10 MG/ML
66 INJECTION, EMULSION INTRAVENOUS ONCE
Refills: 0 | Status: DISCONTINUED | OUTPATIENT
Start: 2021-06-01 | End: 2021-06-01

## 2021-06-01 RX ADMIN — FENTANYL CITRATE 10.56 MICROGRAM(S): 50 INJECTION INTRAVENOUS at 21:07

## 2021-06-01 RX ADMIN — SODIUM CHLORIDE 3 MILLILITER(S): 9 INJECTION INTRAMUSCULAR; INTRAVENOUS; SUBCUTANEOUS at 10:00

## 2021-06-01 RX ADMIN — Medication 66 MILLIGRAM(S): at 20:38

## 2021-06-01 RX ADMIN — SPIRONOLACTONE 100 MILLIGRAM(S): 25 TABLET, FILM COATED ORAL at 09:56

## 2021-06-01 RX ADMIN — PROPOFOL 35 MILLIGRAM(S): 10 INJECTION, EMULSION INTRAVENOUS at 14:16

## 2021-06-01 RX ADMIN — AMIODARONE HYDROCHLORIDE 300 MILLIGRAM(S): 400 TABLET ORAL at 09:55

## 2021-06-01 RX ADMIN — Medication 20 MILLIGRAM(S): at 17:06

## 2021-06-01 RX ADMIN — PROPOFOL 70 MILLIGRAM(S): 10 INJECTION, EMULSION INTRAVENOUS at 14:11

## 2021-06-01 RX ADMIN — SODIUM CHLORIDE 300 MILLILITER(S): 9 INJECTION INTRAMUSCULAR; INTRAVENOUS; SUBCUTANEOUS at 13:25

## 2021-06-01 RX ADMIN — SODIUM CHLORIDE 3 MILLILITER(S): 9 INJECTION INTRAMUSCULAR; INTRAVENOUS; SUBCUTANEOUS at 18:01

## 2021-06-01 RX ADMIN — SODIUM CHLORIDE 100 MILLILITER(S): 9 INJECTION, SOLUTION INTRAVENOUS at 23:35

## 2021-06-01 RX ADMIN — RIVAROXABAN 20 MILLIGRAM(S): KIT at 09:54

## 2021-06-01 RX ADMIN — SODIUM CHLORIDE 500 MILLILITER(S): 5 INJECTION, SOLUTION INTRAVENOUS at 20:00

## 2021-06-01 RX ADMIN — SODIUM CHLORIDE 3 MILLILITER(S): 9 INJECTION INTRAMUSCULAR; INTRAVENOUS; SUBCUTANEOUS at 18:00

## 2021-06-01 RX ADMIN — SODIUM CHLORIDE 1000 MILLILITER(S): 9 INJECTION INTRAMUSCULAR; INTRAVENOUS; SUBCUTANEOUS at 20:10

## 2021-06-01 RX ADMIN — PROPOFOL 35 MILLIGRAM(S): 10 INJECTION, EMULSION INTRAVENOUS at 14:18

## 2021-06-01 RX ADMIN — Medication 20 MILLIGRAM(S): at 08:15

## 2021-06-01 RX ADMIN — DEXTROSE MONOHYDRATE, SODIUM CHLORIDE, AND POTASSIUM CHLORIDE 65 MILLILITER(S): 50; .745; 4.5 INJECTION, SOLUTION INTRAVENOUS at 18:03

## 2021-06-01 RX ADMIN — Medication 66 MILLIGRAM(S): at 20:39

## 2021-06-01 RX ADMIN — FENTANYL CITRATE 1.32 MICROGRAM(S)/KG/HR: 50 INJECTION INTRAVENOUS at 20:51

## 2021-06-01 RX ADMIN — PROPOFOL 35 MILLIGRAM(S): 10 INJECTION, EMULSION INTRAVENOUS at 13:15

## 2021-06-01 RX ADMIN — SODIUM CHLORIDE 3 MILLILITER(S): 9 INJECTION INTRAMUSCULAR; INTRAVENOUS; SUBCUTANEOUS at 02:00

## 2021-06-01 RX ADMIN — PROPOFOL 66 MILLIGRAM(S): 10 INJECTION, EMULSION INTRAVENOUS at 20:37

## 2021-06-01 RX ADMIN — LEVETIRACETAM 266.68 MILLIGRAM(S): 250 TABLET, FILM COATED ORAL at 18:30

## 2021-06-01 RX ADMIN — PROPOFOL 66 MILLIGRAM(S): 10 INJECTION, EMULSION INTRAVENOUS at 20:41

## 2021-06-01 RX ADMIN — LISINOPRIL 5 MILLIGRAM(S): 2.5 TABLET ORAL at 09:54

## 2021-06-01 RX ADMIN — Medication 260 MILLIGRAM(S): at 18:04

## 2021-06-01 RX ADMIN — Medication 66 MILLIGRAM(S): at 20:36

## 2021-06-01 NOTE — CONSULT NOTE PEDS - ASSESSMENT
21 y/o M, with left frontal ICH and SAH, r/o hemrrohagic conversion of infarct vs. underlying lesion

## 2021-06-01 NOTE — CHART NOTE - NSCHARTNOTEFT_GEN_A_CORE
Called to bedside for code W called around 16:20, when I found pt sitting up with support, staring, unresponsive, hypoventilating and cyanotic with SpO2 in 60s.  Supplemental O2 given.  Within about 1-2 min, pt became responsive, but altered.  He had an apparent expressive aphasia lasting few minutes subsequently. Pt then recovered spontaneously.  Hemodynamically pt remained stable throughout with unchanged paced rhythm.  Neurologic exam became normal with normal strength throughout,  PERRL, appropriately responsive and verbal.  Stat head CT done, which revealed a small left frontal hemorrhage, which could be c/w an embolic phenomenon.  Neurosurgery, neurology, hematology consulted.  Will start keppra, will check chemistries with goal of serum Na >145.  Will need CT angio ( cannot get MRI due to pacemaker).  Close neuro monitoring.  Will f/u with consultants.  Mother and pat updated on plan and are in agreement.

## 2021-06-01 NOTE — PROCEDURE NOTE - ADDITIONAL PROCEDURE DETAILS
Post-cardioversion converted to a rhythm with alternating  sinus beat and A-sensed V-paced rhythm. This is likely due to a non-functioning atrial wire.    On check of pacemaker LV threshold 1.75mV @ .4. Which is unchanged from previous. New settings DDD  [id number]

## 2021-06-01 NOTE — CONSULT NOTE PEDS - SUBJECTIVE AND OBJECTIVE BOX
NEUROLOGY CONSULT NOTE    21 y/o male with complex cardiac h/o double inlet left ventricle, L-malposition of great arteries, sick sinus syndrome and AV mihir disease with tachybradycardia syndrome with a dual chamber pacemaker (currently with RV lead fracture and is currently only LV paced 2/2 complete heart block), s/p Tfkds-Fipf-Pwlnfte anastomosis and aortic arch reconstruction followed by a fenestrated lateral tunnel Fontan completion (now s/p fenestration closure) s/p cardiac catheterization and ablation admitted for also 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 Neurology consulted for episode of behavioral arrest for rule out stroke. As per primary team, patient was in usual state of health until late afternoon when patient had an episode of staring out with unresponsiveness lasting 3 minutes without any limb shaking or post-episode confusion or fatigue. Patient returned to baseline subsequently without any urinary or fecal incontinence. STAT CT head was performed concerning for intraparenchymal hemorrhage in L frontal region.     PAST MEDICAL & SURGICAL HISTORY:  Double inlet left ventricle  D-TGA (dextro-transposition of great arteries)  Sick sinus syndrome  Atrial tachycardia  Hepatomegaly- Other ascites  Pulmonary hypertension  Pacemaker  AV block  Coagulopathy  S/P cardiac cath  2001-assessment of anatomy prior to 1st surgery  4/13/2004- coil emolization of collateral vessels  7/25/2011-balloon angioplasty of superior narrowing of Fontan circuit, balloon angioplasty of LPA, ASD device placed for closure of Fontan fenestraion  10/24/2016- Cath of Fontan  S/P Nirmal-Taussig shunt  Xgvgv-Txet-Hurdbxk anastomosis with modified right BT shunt and creation of pulmonary atresia  S/P randell-Fontan operation  3/15/2003- Randell-Fontan with ligation of BT shunt and left pulmonary artery plasty  Cardiac pacemaker- 5/26/2004    MEDICATIONS  (STANDING):  furosemide   Oral Tab/Cap - Peds 20 milliGRAM(s) Oral daily  lisinopril Oral Tab/Cap - Peds 5 milliGRAM(s) Oral daily  rivaroxaban Oral Tab/Cap - Peds 20 milliGRAM(s) Oral daily  sildenafil   Oral Tab/Cap - Peds 20 milliGRAM(s) Oral every 8 hours  sodium chloride 0.9% IV Intermittent (Bolus) - Peds 50 milliLiter(s) IV Bolus once  sodium chloride 0.9% lock flush - Peds 3 milliLiter(s) IV Push every 8 hours  sodium chloride 0.9% lock flush - Peds 3 milliLiter(s) IV Push every 8 hours  spironolactone Oral Tab/Cap - Peds 100 milliGRAM(s) Oral daily    MEDICATIONS  (PRN):  acetaminophen   Oral Tab/Cap - Peds. 650 milliGRAM(s) Oral every 6 hours PRN Mild Pain (1 - 3)    Allergies  No Known Allergies  Intolerances    FAMILY HISTORY:  No family history of migraines, seizures, or developmental delay.       Vital Signs Last 24 Hrs  T(C): 37.2 (01 Jun 2021 14:00), Max: 37.2 (01 Jun 2021 14:00)  T(F): 98.9 (01 Jun 2021 14:00), Max: 98.9 (01 Jun 2021 14:00)  HR: 83 (01 Jun 2021 15:15) (75 - 88)  BP: 134/47 (01 Jun 2021 15:15) (77/50 - 134/47)  BP(mean): 70 (01 Jun 2021 15:15) (56 - 92)  RR: 17 (01 Jun 2021 15:15) (14 - 29)  SpO2: 90% (01 Jun 2021 15:15) (88% - 99%)  Daily     Daily Weight: 63.8 (01 Jun 2021 09:27)      GENERAL PHYSICAL EXAM  General:        Well nourished, no acute distress  HEENT:         Normocephalic, atraumatic, clear conjunctiva, external ear normal, nasal mucosa normal, oral pharynx clear  Neck:            Supple, full range of motion, no nuchal rigidity  CV:               Regular rate and rhythm, no murmurs. Warm and well perfused.  Respiratory:   Clear to auscultation; Even, nonlabored breathing  Abdominal:    Soft, nontender, nondistended, no masses, no organomegaly  Extremities:    No joint swelling, erythema, tenderness; normal ROM, no contractures  Skin:              No rash, no neurocutaneous stigmata     NEUROLOGIC EXAM  Mental Status:     Oriented to person, place, and date; Good eye contact; follows simple commands  Cranial Nerves:    PERRL, EOMI, no facial asymmetry, V1-V3 intact , symmetric palate, tongue midline.   Eyes:                   Normal: optic discs   Visual Fields:        Full visual field  Muscle Strength:  Full strength 5/5, proximal and distal,  upper and lower extremities  Muscle Tone:       Normal tone  DTR:                    2+/4 Biceps, Brachioradialis, Triceps Bilateral;  2+/4  Patellar, Ankle bilateral. No clonus.  Babinski:              Plantar reflexes flexion bilaterally  Sensation:            Intact to pain, light touch, temperature and vibration throughout.  Coordination:       No dysmetria in finger to nose test bilaterally  Gait:                    Normal gait, normal tandem gait, normal toe walking, normal heel walking  Romberg:            Negative Romberg    Lab Results:      EEG Results:    Imaging Studies:   NEUROLOGY CONSULT NOTE    21 y/o male with complex cardiac h/o double inlet left ventricle, L-malposition of great arteries, sick sinus syndrome and AV mihir disease with tachybradycardia syndrome with a dual chamber pacemaker (currently with RV lead fracture and is currently only LV paced 2/2 complete heart block), s/p Xlrco-Hhtv-Kuqjwvw anastomosis and aortic arch reconstruction followed by a fenestrated lateral tunnel Fontan completion (now s/p fenestration closure) s/p cardiac catheterization and ablation admitted for also 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 Neurology consulted for episode of behavioral arrest for rule out stroke. As per primary team, patient was given propofol in preparation for cardioversion procedure occurring at 11AM this morning. Afterward, patient was in usual state of health until late afternoon (1635) when patient had an episode of staring out with unresponsiveness with associated jaw clenching lasting 3 minutes without any limb shaking or post-episode confusion or fatigue. Patient returned to baseline subsequently without any urinary or fecal incontinence. STAT CT head was performed concerning for hemorrhage in L frontal region.     PAST MEDICAL & SURGICAL HISTORY:  Double inlet left ventricle  D-TGA (dextro-transposition of great arteries)  Sick sinus syndrome  Atrial tachycardia  Hepatomegaly- Other ascites  Pulmonary hypertension  Pacemaker  AV block  Coagulopathy  S/P cardiac cath  2001-assessment of anatomy prior to 1st surgery  4/13/2004- coil emolization of collateral vessels  7/25/2011-balloon angioplasty of superior narrowing of Fontan circuit, balloon angioplasty of LPA, ASD device placed for closure of Fontan fenestraion  10/24/2016- Cath of Fontan  S/P Nirmal-Taussig shunt  Dxisg-Nplg-Cszuvqi anastomosis with modified right BT shunt and creation of pulmonary atresia  S/P celestine-Fontan operation  3/15/2003- Celestine-Fontan with ligation of BT shunt and left pulmonary artery plasty  Cardiac pacemaker- 5/26/2004    MEDICATIONS  (STANDING):  furosemide   Oral Tab/Cap - Peds 20 milliGRAM(s) Oral daily  lisinopril Oral Tab/Cap - Peds 5 milliGRAM(s) Oral daily  rivaroxaban Oral Tab/Cap - Peds 20 milliGRAM(s) Oral daily  sildenafil   Oral Tab/Cap - Peds 20 milliGRAM(s) Oral every 8 hours  sodium chloride 0.9% IV Intermittent (Bolus) - Peds 50 milliLiter(s) IV Bolus once  sodium chloride 0.9% lock flush - Peds 3 milliLiter(s) IV Push every 8 hours  sodium chloride 0.9% lock flush - Peds 3 milliLiter(s) IV Push every 8 hours  spironolactone Oral Tab/Cap - Peds 100 milliGRAM(s) Oral daily    MEDICATIONS  (PRN):  acetaminophen   Oral Tab/Cap - Peds. 650 milliGRAM(s) Oral every 6 hours PRN Mild Pain (1 - 3)    Allergies  No Known Allergies  Intolerances    FAMILY HISTORY:  No family history of migraines, seizures, or developmental delay.       Vital Signs Last 24 Hrs  T(C): 37.2 (01 Jun 2021 14:00), Max: 37.2 (01 Jun 2021 14:00)  T(F): 98.9 (01 Jun 2021 14:00), Max: 98.9 (01 Jun 2021 14:00)  HR: 83 (01 Jun 2021 15:15) (75 - 88)  BP: 134/47 (01 Jun 2021 15:15) (77/50 - 134/47)  BP(mean): 70 (01 Jun 2021 15:15) (56 - 92)  RR: 17 (01 Jun 2021 15:15) (14 - 29)  SpO2: 90% (01 Jun 2021 15:15) (88% - 99%)  Daily     Daily Weight: 63.8 (01 Jun 2021 09:27)      GENERAL PHYSICAL EXAM  General:        Well nourished, no acute distress  HEENT:         Normocephalic, atraumatic, clear conjunctiva, external ear normal, nasal mucosa normal, oral pharynx clear  Neck:            Supple, full range of motion, no nuchal rigidity  CV:               Regular rate and rhythm, no murmurs. Warm and well perfused.  Respiratory:   Clear to auscultation; Even, nonlabored breathing  Skin:              No rash, no neurocutaneous stigmata     NEUROLOGIC EXAM  Mental Status:     Oriented to person, place, and date; Good eye contact; follows simple commands. Intact registration, naming, repetition.   Cranial Nerves:    PERRL, EOMI, no facial asymmetry, V1-V3 intact , symmetric palate, tongue midline. No dysarthria.   Visual Fields:        Full visual field  Muscle Strength:  Full strength 5/5, proximal and distal,  upper and lower extremities  Muscle Tone:       Normal tone  DTR:                    2+/4 Biceps, Brachioradialis, Triceps Bilateral;  2+/4  Patellar, Ankle bilateral. No clonus.  Babinski:              Plantar reflexes flexion bilaterally  Sensation:            Intact to pain throughout.  Coordination:       No dysmetria in finger to nose test bilaterally  Gait:                    Deferred    Lab Results:      EEG Results:    Imaging Studies:  < from: CT Head No Cont (06.01.21 @ 16:48) >  Impression:  Head CT without contrast  1. Parenchymal hemorrhage in the anterior left frontal lobe, possibly within the boundary zone of the left ROCCO and MCA territories, with extravasation into the regional sulci consistent with parenchymal and subarachnoid hemorrhage; hemorrhagic conversion from a small infarct in the left ROCCO/MCA boundary zone is a consideration. Consider MRI follow-up for further assessment of possible ischemic changes, or underlying mass lesion, and to exclude embolic phenomenon which may not be detected by routine head CT.  < end of copied text >

## 2021-06-01 NOTE — CONSULT NOTE PEDS - SUBJECTIVE AND OBJECTIVE BOX
HPI: m 19yo male with complex cardiac h/o DILV, L-malposition of great arteries, sick sinus syndrome and AV mihir disease with tachybradycardia syndrome with a dual chamber pacemaker (currently with RV lead fracture and is currently only LV paced 2/2 complete heart block), PSH of status post Nalbd-Faav-Cecnpkp anastomosis and aortic arch reconstruction followed by a fenestrated lateral tunnel Fontan completion (now s/p fenestration closure). here s/p cardiac catheterization and ablation. Procedure was complicated by unsuccessful ablation and post extubation patient was noted to have increased hemoptysis and desaturations, patient was reintubated for airway protection and given propofol for sedation.     patient had a staring episode without loss of conciousness early this afternoon llasting approximately 3 minutes,  no tonic clonic activity, no post ictal state.  Neurology was consulted and recommended a CTH which showed 1. Parenchymal hemorrhage in the anterior left frontal lobe, possibly within the boundary zone of the left ROCCO and MCA territories, with extravasation into the regional sulci consistent with parenchymal and subarachnoid hemorrhage; hemorrhagic conversion from a small infarct in the left ROCCO/MCA boundary zone is a consideration. Consider MRI follow-up for further assessment of possible ischemic changes, or underlying mass lesion, and to exclude embolic phenomenon which may not be detected by routine head CT.    PAST MEDICAL & SURGICAL HISTORY:  Double inlet left ventricle  D-TGA (dextro-transposition of great arteries)  Sick sinus syndrome  Atrial tachycardia  Hepatomegaly  Other asites  Pulmonary hypertension  Pacemaker  AV block  Coagulopathy  S/P cardiac cath 2001-assessment of anatomy prior to 1st surgery  4/13/2004- coil emolization of collateral vessels  7/25/2011-balloon angioplasty of superior narrowing of Fontan circuit, balloon angioplasty of LPA, ASD device placed for closure of Fontan fenestraion  10/24/2016- Cath of Fontan  S/P Nirmal-Taussig shunt  Aypzx-Dsei-Cngfmob anastomosis with modified right BT shunt and creation of pulmonary atresia  S/P celestine-Fontan operation  3/15/2003- Celestine-Fontan with ligation of BT shunt and left pulmonary artery plasty  Cardiac pacemaker 5/26/2004    Allergies  No Known Allergies    acetaminophen   Oral Tab/Cap - Peds. 650 milliGRAM(s) Oral every 6 hours PRN  furosemide   Oral Tab/Cap - Peds 20 milliGRAM(s) Oral daily  lisinopril Oral Tab/Cap - Peds 5 milliGRAM(s) Oral daily  rivaroxaban Oral Tab/Cap - Peds 20 milliGRAM(s) Oral daily  sildenafil   Oral Tab/Cap - Peds 20 milliGRAM(s) Oral every 8 hours  sodium chloride 0.9% IV Intermittent (Bolus) - Peds 50 milliLiter(s) IV Bolus once  sodium chloride 0.9% lock flush - Peds 3 milliLiter(s) IV Push every 8 hours  sodium chloride 0.9% lock flush - Peds 3 milliLiter(s) IV Push every 8 hours  spironolactone Oral Tab/Cap - Peds 100 milliGRAM(s) Oral daily    SOCIAL HISTORY:  FAMILY HISTORY:    Vital Signs Last 24 Hrs  T(C): 37.2 (01 Jun 2021 14:00), Max: 37.2 (01 Jun 2021 14:00)  T(F): 98.9 (01 Jun 2021 14:00), Max: 98.9 (01 Jun 2021 14:00)  HR: 83 (01 Jun 2021 15:15) (75 - 88)  BP: 134/47 (01 Jun 2021 15:15) (77/50 - 134/47)  BP(mean): 70 (01 Jun 2021 15:15) (56 - 92)  RR: 17 (01 Jun 2021 15:15) (14 - 29)  SpO2: 90% (01 Jun 2021 15:15) (88% - 99%)    PHYSICAL EXAM:  AA&0 x 3,  speach clear, follows commands, PERRL  Cranial nerves 2-12 grossly intact  Motor: motor- strength 5/5 throughout  Sensory Intact to Light Touch  no drift    LABS:

## 2021-06-01 NOTE — PROGRESS NOTE PEDS - SUBJECTIVE AND OBJECTIVE BOX
INTERVAL HISTORY: No acute events. Still requiring .5L NC. Attempted to overdrive pace with pacemaker this morning, and did not break IART.    RESPIRATORY SUPPORT: .5LNC  NUTRITION: regular diet    05-31 @ 07:01  -  06-01 @ 07:00  --------------------------------------------------------  IN: 1460 mL / OUT: 2250 mL / NET: -790 mL    INTRAVASCULAR ACCESS: PIV    MEDICATIONS:  aMIOdarone   Oral Tab/Cap - Peds 300 milliGRAM(s) Oral two times a day  furosemide   Oral Tab/Cap - Peds 20 milliGRAM(s) Oral daily  lisinopril Oral Tab/Cap - Peds 5 milliGRAM(s) Oral daily  sildenafil   Oral Tab/Cap - Peds 20 milliGRAM(s) Oral every 8 hours  spironolactone Oral Tab/Cap - Peds 100 milliGRAM(s) Oral daily  rivaroxaban Oral Tab/Cap - Peds 20 milliGRAM(s) Oral daily  sodium chloride 0.9% lock flush - Peds 3 milliLiter(s) IV Push every 8 hours  sodium chloride 0.9% lock flush - Peds 3 milliLiter(s) IV Push every 8 hours    PHYSICAL EXAMINATION:  Vital signs - Weight (kg): 66.1 (05-26 @ 07:11)  T(C): 36.5 (06-01-21 @ 08:00), Max: 37.2 (05-31-21 @ 14:00)  HR: 77 (06-01-21 @ 08:00) (75 - 77)  BP: 111/65 (06-01-21 @ 08:00) (109/66 - 119/72)  RR: 14 (06-01-21 @ 08:00) (14 - 23)  SpO2: 90% (06-01-21 @ 08:00) (90% - 91%)    General - non-dysmorphic appearance, well-developed, in no distress.  Skin - no rash, no desquamation, no cyanosis.  Eyes / ENT - no conjunctival injection, sclerae anicteric, external ears & nares normal, mucous membranes moist.  Pulmonary - normal inspiratory effort, no retractions, lungs clear to auscultation bilaterally, no wheezes, no rales.  Cardiovascular - normal rate, regular rhythm, normal S1 & single S2, grade 1/6 harsh systolic murmur at LMSB, no rubs, no gallops, capillary refill < 2sec, normal pulses.  Gastrointestinal - soft, non-distended, non-tender, no splenomegaly. (+) hepatomegaly.  Musculoskeletal - no joint swelling, no clubbing, no edema.  Neurologic / Psychiatric - alert, oriented as age-appropriate, affect appropriate    LABORATORY TESTS:                          14.9  CBC:   7.87 )-----------( 114   (05-27-21 @ 02:21)                          46.1               138   |  105   |  13                 Ca: 9.0    BMP:   ----------------------------< 155    Mg: x     (05-27-21 @ 02:21)             3.7    |  17    | 0.88               Ph: x        LFT:     TPro: 6.6 / Alb: 4.0 / TBili: 1.1 / DBili: x / AST: 25 / ALT: 17 / AlkPhos: 101   (05-27-21 @ 02:21)    COAG: PT: 15.0 / PTT: 42.6 / INR: 1.32   (05-26-21 @ 19:11)         CBG:   pH: 7.45 / pCO2: 30.4 / pO2: 66.0 / HCO3: 23 / Base Excess: -2.5 / Lactate: x   (05-26-21 @ 20:18)      IMAGING STUDIES:  Electrocardiogram - (5/27/21) Ventricular paced rhythm @ 75bpm. Underlying IART.    Telemetry - (5/29/21) Ventricular paced rhythm @ 75bpm. Underlying IART.    Chest x-ray - (5/27/21) Stable mild cardiomegaly with subsegmental left lower lobe atelectasis.    Echocardiogram - (5/27/21)   1. This was a technically difficult suboptimal study due to poor echo windows. Findings limited to below.   2. 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   3. Mild mitral valve regurgitation.   4. Trivial tricuspid valve regurgitation.   5. Trivial aortic valve regurgitation.   6. Status post device closure of Fontan fenestration.   7. S/P stent placement in the Fontan pathway. Limited imaging of the inferior Fontan baffle. In this setting, the inferior vena cava to its connection near the atrial portion of the baffle appears patent with no obstruction. S/p device closure of Fontan fenestration. The Fontan fenestration is not seen on this study. The right bidirectional Storm is seen only on color flow mapping. This appears to have laminar low velocity flow.   8. The connection of the right bidirectional Storm to the right pulmonary artery could not be imaged. The right pulmonary artery is seen in a limited portion immediately to the left of the Storm and appears patent. The left pulmonary artery could not be imaged.   9. Extremely poor and limited imaging of the lateral free walls of the single systemic left ventricle. On limited short axis views there appears to be adequate systolic excursion of the posterior wall of the left ventricle and decreased in the anterior wall. The bulboventricular foramen could not be imaged. Suggest alternate imaging for appropriate assessment of function.  10. No pericardial effusion.  11. Small right pleural effusion.    Cath - (5/26/21)  Access 4 Fr RFA, 7 Fr RFV x2 with US guidance.  Sat data (%): on 50% FiO2 LPA 73, RUPV 98, Ao 97; CI 2.6 L/min/m2 with Qp:Qs 1 :1.  Pressures (mmHg): Elevated mFontan = mIVC = 20. mPCW=16, No significant gradient across LV to AAo to Vikki (98/58/73).  Normal PVR while on sildenafil.  Angios showed unobstructed Fontan with existing stent within Fontan conduit.    Comment: IART ablation was attempted after the diagnostic cath but unsuccessful. Patient was overdrive paced out of IART. At the end of the case, Ao sat was 94% and LPA 66% on 50% FiO2 INTERVAL HISTORY: No acute events. Still requiring 2L NC. Attempted to overdrive pace with pacemaker this morning, and did not break IART.    RESPIRATORY SUPPORT: 2LNC  NUTRITION: regular diet    05-31 @ 07:01  -  06-01 @ 07:00  --------------------------------------------------------  IN: 1460 mL / OUT: 2250 mL / NET: -790 mL    INTRAVASCULAR ACCESS: PIV    MEDICATIONS:  aMIOdarone   Oral Tab/Cap - Peds 300 milliGRAM(s) Oral two times a day  furosemide   Oral Tab/Cap - Peds 20 milliGRAM(s) Oral daily  lisinopril Oral Tab/Cap - Peds 5 milliGRAM(s) Oral daily  sildenafil   Oral Tab/Cap - Peds 20 milliGRAM(s) Oral every 8 hours  spironolactone Oral Tab/Cap - Peds 100 milliGRAM(s) Oral daily  rivaroxaban Oral Tab/Cap - Peds 20 milliGRAM(s) Oral daily  sodium chloride 0.9% lock flush - Peds 3 milliLiter(s) IV Push every 8 hours  sodium chloride 0.9% lock flush - Peds 3 milliLiter(s) IV Push every 8 hours    PHYSICAL EXAMINATION:  Vital signs - Weight (kg): 66.1 (05-26 @ 07:11)  T(C): 36.5 (06-01-21 @ 08:00), Max: 37.2 (05-31-21 @ 14:00)  HR: 77 (06-01-21 @ 08:00) (75 - 77)  BP: 111/65 (06-01-21 @ 08:00) (109/66 - 119/72)  RR: 14 (06-01-21 @ 08:00) (14 - 23)  SpO2: 90% (06-01-21 @ 08:00) (90% - 91%)    General - non-dysmorphic appearance, well-developed, in no distress.  Skin - no rash, no desquamation, no cyanosis.  Eyes / ENT - no conjunctival injection, sclerae anicteric, external ears & nares normal, mucous membranes moist.  Pulmonary - normal inspiratory effort, no retractions, lungs clear to auscultation bilaterally, no wheezes, no rales.  Cardiovascular - normal rate, regular rhythm, normal S1 & single S2, grade 1/6 harsh systolic murmur at LMSB, no rubs, no gallops, capillary refill < 2sec, normal pulses.  Gastrointestinal - soft, non-distended, non-tender, no splenomegaly. (+) hepatomegaly.  Musculoskeletal - no joint swelling, no clubbing, no edema.  Neurologic / Psychiatric - alert, oriented as age-appropriate, affect appropriate    LABORATORY TESTS:                          14.9  CBC:   7.87 )-----------( 114   (05-27-21 @ 02:21)                          46.1               138   |  105   |  13                 Ca: 9.0    BMP:   ----------------------------< 155    Mg: x     (05-27-21 @ 02:21)             3.7    |  17    | 0.88               Ph: x        LFT:     TPro: 6.6 / Alb: 4.0 / TBili: 1.1 / DBili: x / AST: 25 / ALT: 17 / AlkPhos: 101   (05-27-21 @ 02:21)    COAG: PT: 15.0 / PTT: 42.6 / INR: 1.32   (05-26-21 @ 19:11)         CBG:   pH: 7.45 / pCO2: 30.4 / pO2: 66.0 / HCO3: 23 / Base Excess: -2.5 / Lactate: x   (05-26-21 @ 20:18)      IMAGING STUDIES:  Electrocardiogram - (5/27/21) Ventricular paced rhythm @ 75bpm. Underlying IART.    Telemetry - (5/29/21) Ventricular paced rhythm @ 75bpm. Underlying IART.    Chest x-ray - (5/27/21) Stable mild cardiomegaly with subsegmental left lower lobe atelectasis.    Echocardiogram - (5/27/21)   1. This was a technically difficult suboptimal study due to poor echo windows. Findings limited to below.   2. 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   3. Mild mitral valve regurgitation.   4. Trivial tricuspid valve regurgitation.   5. Trivial aortic valve regurgitation.   6. Status post device closure of Fontan fenestration.   7. S/P stent placement in the Fontan pathway. Limited imaging of the inferior Fontan baffle. In this setting, the inferior vena cava to its connection near the atrial portion of the baffle appears patent with no obstruction. S/p device closure of Fontan fenestration. The Fontan fenestration is not seen on this study. The right bidirectional Storm is seen only on color flow mapping. This appears to have laminar low velocity flow.   8. The connection of the right bidirectional Storm to the right pulmonary artery could not be imaged. The right pulmonary artery is seen in a limited portion immediately to the left of the Storm and appears patent. The left pulmonary artery could not be imaged.   9. Extremely poor and limited imaging of the lateral free walls of the single systemic left ventricle. On limited short axis views there appears to be adequate systolic excursion of the posterior wall of the left ventricle and decreased in the anterior wall. The bulboventricular foramen could not be imaged. Suggest alternate imaging for appropriate assessment of function.  10. No pericardial effusion.  11. Small right pleural effusion.    Cath - (5/26/21)  Access 4 Fr RFA, 7 Fr RFV x2 with US guidance.  Sat data (%): on 50% FiO2 LPA 73, RUPV 98, Ao 97; CI 2.6 L/min/m2 with Qp:Qs 1 :1.  Pressures (mmHg): Elevated mFontan = mIVC = 20. mPCW=16, No significant gradient across LV to AAo to Vikki (98/58/73).  Normal PVR while on sildenafil.  Angios showed unobstructed Fontan with existing stent within Fontan conduit.    Comment: IART ablation was attempted after the diagnostic cath but unsuccessful. Patient was overdrive paced out of IART. At the end of the case, Ao sat was 94% and LPA 66% on 50% FiO2

## 2021-06-01 NOTE — DIETITIAN INITIAL EVALUATION PEDIATRIC - NS AS NUTRI INTERV MEALS SNACK
1. Continue regular diet as tolerated, obtain food preferences 2. monitor po intake, weights, labs/General/healthful diet

## 2021-06-01 NOTE — DIETITIAN INITIAL EVALUATION PEDIATRIC - ENERGY NEEDS
Height 5/26: 166.5 cm, 7%  Weight 5/26: 66.1 kg, 34%  BMI for Age: 23.8, 60%, z score= 0.25  IBW: 63.8 kg  (CDC Growth Chart)

## 2021-06-01 NOTE — CHART NOTE - NSCHARTNOTEFT_GEN_A_CORE
See earlier notes re: intraparenchymal hemorrhage. Called to bedside for acute onset R-sided hemiparaesis, slurred speech, decreased alertness. STAT CT showed massive expansion of bleed now with intraventricular extension. NRSGY aware and plans made to immediately intubate patient and send to OR. Intubation occurred without incident back in PICU. R radial arterial line and Anderson placed as well. 3% bolus administered.    Coags showed INR > 4 with extremely unclear etiology as patient had previously had close to normal INR without significant changes in regimen from home aside from addition of amiodarone. Many discussions had regarding reversing anticoagulation (independent of INR finding), ultimately was able to drive over reversal agent from [facility] which will be administered in the OR along with FFP.    Cardiology to put pacemaker into VOO mode for OR.    Patient is in OR now. Family updated and are aware of grave situation at this time.

## 2021-06-01 NOTE — CONSULT NOTE PEDS - ASSESSMENT
21 y/o male with complex cardiac h/o double inlet left ventricle, L-malposition of great arteries, sick sinus syndrome and AV mihir disease with tachybradycardia syndrome with a dual chamber pacemaker (currently with RV lead fracture and is currently only LV paced 2/2 complete heart block), s/p Tochj-Rqre-Tryixzx anastomosis and aortic arch reconstruction followed by a fenestrated lateral tunnel Fontan completion (now s/p fenestration closure) s/p cardiac catheterization and ablation admitted for also 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 Neurology consulted for episode of behavioral arrest for rule out stroke. Neurologic examination reveals ______. Given CT head findings, would consider anti-seizure regimen given cortical irritability from hemorrhage.     Recommendations:   [ ] Keppra 1000mg BID  [ ] Strict BP control goal <160/90 w/ Cardene drip if needed  [ ] Would consider neurosurgical consultation  [ ] Repeat CTH in 24hrs (unless MR head happens first) or PRN for worsening mental status or neuro exam  [ ] MRI head w/o contrast    19 y/o M with complex cardiac h/o double inlet left ventricle, L-malposition of great arteries, sick sinus syndrome and AV mihir disease with tachybradycardia syndrome with a dual chamber pacemaker (currently with RV lead fracture and is currently only LV paced 2/2 complete heart block), s/p Lznsl-Oavs-Hvcqssj anastomosis and aortic arch reconstruction followed by a fenestrated lateral tunnel Fontan completion (now s/p fenestration closure) s/p cardiac catheterization and ablation admitted for also 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 Neurology consulted for episode of behavioral arrest for rule out stroke. Neurologic examination reveals no focal abnormalities. Given the recent procedure, history of cardiac interventions, and semiology of clinical presentation, episode may be a focal seizure with impaired awareness secondary to watershed infarction with hemorrhagic conversion likely secondary to cardioembolic mechanism versus hypoperfusion from changes in blood pressure pre-operatively and post-operatively. Would consider anti-seizure regimen given cortical irritability from hemorrhage.     Impression: Episode of unresponsiveness, staring out with jaw clenching with no recollection likely consistent with focal seizure with impaired awareness secondary to watershed infarction with hemorrhagic conversion likely secondary to cardioembolic mechanism from multiple cardiac interventions versus hypoperfusion from changes in blood pressure pre-operatively and post-operatively    Recommendations:   [ ] Keppra 1000mg BID  [ ] Strict BP control goal <160/90 w/ Cardene drip if needed  [ ] Would consider neurosurgical consultation for intraparenchymal hemorrhage and hematology consultation regarding recommendations on current anticoagulation regimen  [ ] CT head w/o contrast in 24 hours or PRN for worsening mental status or neuro exam  [ ] CT angio head and neck w/w/o contrast to be done when CT head w/o contrast performed next. (As per primary team, pacemaker is non-MRI compatible)     Case seen and discussed with Neurology attending, Dr. Olivas.

## 2021-06-01 NOTE — PROGRESS NOTE PEDS - SUBJECTIVE AND OBJECTIVE BOX
Interval/Overnight Events:  Did not break this AM with atrial overdrive pacing.  Still on 2L O2.    VITAL SIGNS:  T(C): 36.5 (06-01-21 @ 08:00), Max: 37.2 (05-31-21 @ 14:00)  HR: 77 (06-01-21 @ 08:00) (75 - 77)  BP: 111/65 (06-01-21 @ 08:00) (109/66 - 119/72)  RR: 14 (06-01-21 @ 08:00) (14 - 23)  SpO2: 90% (06-01-21 @ 08:00) (90% - 91%)    Daily Weight: 63.8 (01 Jun 2021 09:27)    Current Medications:  aMIOdarone   Oral Tab/Cap - Peds 300 milliGRAM(s) Oral two times a day  furosemide   Oral Tab/Cap - Peds 20 milliGRAM(s) Oral daily  lisinopril Oral Tab/Cap - Peds 5 milliGRAM(s) Oral daily  sildenafil   Oral Tab/Cap - Peds 20 milliGRAM(s) Oral every 8 hours  spironolactone Oral Tab/Cap - Peds 100 milliGRAM(s) Oral daily  rivaroxaban Oral Tab/Cap - Peds 20 milliGRAM(s) Oral daily  sodium chloride 0.9% lock flush - Peds 3 milliLiter(s) IV Push every 8 hours  sodium chloride 0.9% lock flush - Peds 3 milliLiter(s) IV Push every 8 hours  acetaminophen   Oral Tab/Cap - Peds. 650 milliGRAM(s) Oral every 6 hours PRN    ===============================RESPIRATORY==============================  [ ] FiO2: ___ 	[ ] Heliox: ____ 		[ ] BiPAP: ___   [x NC: 2__  Liters			[ ] HFNC: __ 	Liters, FiO2: __  [ ] Mechanical Ventilation:   [ ] Inhaled Nitric Oxide:  [ ] Extubation Readiness Assessed    =============================CARDIOVASCULAR============================  Cardiac Rhythm:	[ x] NSR		[ ] Other:    ==========================HEMATOLOGY/ONCOLOGY========================  Transfusions:	[ ] PRBC	      [ ] Platelets	[ ] FFP		[ ] Cryoprecipitate  DVT Prophylaxis:    =======================FLUIDS/ELECTROLYTES/NUTRITION=====================  I&O's Summary    31 May 2021 07:01 - 01 Jun 2021 07:00  --------------------------------------------------------  IN: 1460 mL / OUT: 2250 mL / NET: -790 mL    01 Jun 2021 07:01 - 01 Jun 2021 10:09  --------------------------------------------------------  IN: 240 mL / OUT: 0 mL / NET: 240 mL      Diet:	[ ] Regular	[ ] Soft		[ ] Clears	      [x ] NPO (for cardioversion)  .	[ ] Other:  .	[ ] NGT		[ ] NDT		[ ] GT		[ ] GJT    ================================NEUROLOGY=============================  [ ] SBS:		[ ] GISSELLE-1:	[ ] BIS:         [x ] CAPD: <9  [ x] Adequacy of sedation and pain control has been assessed and adjusted    ========================PATIENT CARE ACCESS DEVICES=====================  [x ] Peripheral IV  [ ] Central Venous Line	[ ] R	[ ] L	[ ] IJ	[ ] Fem	[ ] SC			Placed:   [ ] Arterial Line		[ ] R	[ ] L	[ ] PT	[ ] DP	[ ] Fem	[ ] Rad	[ ] Ax	Placed:   [ ] PICC:				[ ] Broviac		[ ] Mediport  [ ] Urinary Catheter, Date Placed:   [ ] Necessity of urinary, arterial, and venous catheters discussed    =============================ANCILLARY TESTS============================  LABS:    RECENT CULTURES:      IMAGING STUDIES:    ==============================PHYSICAL EXAM============================  GENERAL: In no acute distress  RESPIRATORY: Lungs clear to auscultation bilaterally. Good aeration. No rales, rhonchi, retractions or wheezing. Effort even and unlabored.  CARDIOVASCULAR: Regular rate and rhythm. Normal S1/S2. No murmurs, rubs, or gallop. Capillary refill < 2 seconds. Distal pulses 2+ and equal.  ABDOMEN: Soft, non-distended.  No palpable hepatosplenomegaly.  SKIN: No rash.  EXTREMITIES: Warm and well perfused. No gross extremity deformities.  NEUROLOGIC: Alert. No acute change from baseline exam.    ======================================================================  Parent/Guardian is at the bedside:	[x Yes	[ ] No  Patient and Parent/Guardian updated as to the progress/plan of care:	[ x] Yes	[ ] No    30____ minutes, excluding procedure time.    [ ] The patient is improving but requires continued monitoring and adjustment of therapy due to ___________________________

## 2021-06-01 NOTE — DIETITIAN INITIAL EVALUATION PEDIATRIC - OTHER INFO
20 y.o. M pt with hx of DILV, 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 He has elevated Fontan pressure secondary to LV diastolic dysfunction; per MD notes.  On regular diet. 20 y.o. M pt with hx of DILV, 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 He has elevated Fontan pressure secondary to LV diastolic dysfunction; per MD notes.  On regular diet.  Spoke with Jimbo and mom at time of visit, report good appetite and po intake although sick of cart food on floor. No N/V/GI distress. Normal daily BM. No difficulty chewing/swallowing. No diet restrictions/food allergies or intolerances. No food preferences at this time. Will change diet to VIP menu.

## 2021-06-01 NOTE — PROGRESS NOTE PEDS - ASSESSMENT
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 He has elevated Fontan pressure secondary to LV diastolic dysfunction.      PLAN:    Resp:  Encourage incentive spirometry  Goal SpO2 > 92%    CV:  On home Lisinopril  Home sildenafil   Cont Amiodarone   Continue Lasix PO daily  Continue home aldactone  Will plan for electrical cardioversion today.    Heme:  Home Xarelto     FEN:  PO ad rachael (NPO for cardioversion)    OOB ambulating

## 2021-06-01 NOTE — CHART NOTE - NSCHARTNOTEFT_GEN_A_CORE
At about 1620 today, Jimbo was eating a few small bites with mom and aunt, when all of a sudden he started looking in the distance and stopped responding to them. Mom called for help and a code was called. He remained nonresponsive for about 1-2mins, desatted to 70% At about 1620 today, Jimbo was eating a few small bites with mom and aunt, when all of a sudden he started looking in the distance and stopped responding to them. Mom called for help and a code was called. He remained nonresponsive for about 1-2mins, desatted to 70%. He was suctioned and placed on a non-breather return of normal O2 sats. After 1-2mins he started to return to baseline with grossly normal neuro exam.

## 2021-06-01 NOTE — PROCEDURE NOTE - SUPERVISORY STATEMENT
Hemodynamic cath prior to EP study performed as above. Full reports to follow.
Pt tolerated cardioversion into vpaced and A sensed Vpaced rhythm.  We will discuss long term plan re re-CRT and new pacing system.

## 2021-06-01 NOTE — CHART NOTE - NSCHARTNOTEFT_GEN_A_CORE
Pediatric Neurology Update Note    Called for patient developing acute hemiparesis and altered mental status. Family reports patient was eating and then started staring and leaned to R side, sine then has been unresponsive and moving R side less. CTH with extension of previous intraparenchymal hemorrhage.     NEUROLOGIC EXAM  Mental Status:     Not alert, not responsive to questions, does not follow commands. Speech incomprehensible.  Cranial Nerves:    PERRL, no facial droop but incomplete R eyelid closure, tongue midline.   Muscle Strength:  Moves bilateral extremities spontaneously, but L>R  Muscle Tone:       Sitting up but slumped to R  DTR:                    No clonus.  Sensation:            Withdraws to pain on L  Coordination:       Appropriately localizes to remove mask from face with both hands  Gait:                    N/A    Recommendations  -CTH with extension of hemorrhage  -neurosurgical intervention planned and anticoagulation to be reversed  -continue Keppra 1000mg BID  -will continue to follow

## 2021-06-01 NOTE — CONSULT NOTE PEDS - PROBLEM SELECTOR RECOMMENDATION 9
1. case to be d/w attending. 1. case to be d/w attending.  2. continue Keppra  3. CTA/CTH with contrast   4. heme consult, cardiology consult to discuss reversal of Xarelto  5. hold Xarelto

## 2021-06-01 NOTE — DIETITIAN INITIAL EVALUATION PEDIATRIC - PERTINENT PMH/PSH
MEDICATIONS  (STANDING):  aMIOdarone   Oral Tab/Cap - Peds 300 milliGRAM(s) Oral two times a day  furosemide   Oral Tab/Cap - Peds 20 milliGRAM(s) Oral daily  lisinopril Oral Tab/Cap - Peds 5 milliGRAM(s) Oral daily  rivaroxaban Oral Tab/Cap - Peds 20 milliGRAM(s) Oral daily  sildenafil   Oral Tab/Cap - Peds 20 milliGRAM(s) Oral every 8 hours  sodium chloride 0.9% lock flush - Peds 3 milliLiter(s) IV Push every 8 hours  sodium chloride 0.9% lock flush - Peds 3 milliLiter(s) IV Push every 8 hours  spironolactone Oral Tab/Cap - Peds 100 milliGRAM(s) Oral daily

## 2021-06-01 NOTE — PROGRESS NOTE PEDS - ASSESSMENT
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 Presented in IART - s/p EP study with unsuccessful attempt at ablation along with diagnostic cath showing elevated Fontan pressure (20mmHg). He was ultimately paced out of tachycardia in lab, but reverted to IART and is on Amiodarone load (after overdrive pacing unsuccessful). We attempetd to overdrive pace again, after amio load, which was unsuccessful. Will plan for cardioversion today. The patient requires close monitoring in the ICU.     Plan:  - Continuos telemetry monitoring.  - Pacemaker set at DDD .  - plan for cardioversion today  - Continue home medications - Sildenafil 20mg q8h, Aldactone 100mg daily, Lisinopril 5mg OD, Xarelto 20mg OD.    - Can make Lasix 20mg QD today (home dosing).  - Amiodarone load of 300mg PO BID (D5/5). can switch to 300mg QD after.  - Daily EKGs.   - Wean oxygen as tolerated. Goal saturations > 90%. Encourage ambulation, incentive spirometry.  - Will need to remain admitted on telemetry monitoring throughout Amiodarone load with close eye on arrythmia. Plan to reattempt overdrive pacing if still in IART after Amiodarone load completed.

## 2021-06-02 LAB
ANION GAP SERPL CALC-SCNC: 14 MMOL/L — SIGNIFICANT CHANGE UP (ref 7–14)
APTT BLD: 31.8 SEC — SIGNIFICANT CHANGE UP (ref 27–36.3)
APTT BLD: 34.3 SEC — SIGNIFICANT CHANGE UP (ref 27–36.3)
APTT BLD: 35.3 SEC — SIGNIFICANT CHANGE UP (ref 27–36.3)
BASOPHILS # BLD AUTO: 0.02 K/UL — SIGNIFICANT CHANGE UP (ref 0–0.2)
BASOPHILS # BLD AUTO: 0.03 K/UL — SIGNIFICANT CHANGE UP (ref 0–0.2)
BASOPHILS # BLD AUTO: 0.05 K/UL — SIGNIFICANT CHANGE UP (ref 0–0.2)
BASOPHILS NFR BLD AUTO: 0.2 % — SIGNIFICANT CHANGE UP (ref 0–2)
BASOPHILS NFR BLD AUTO: 0.3 % — SIGNIFICANT CHANGE UP (ref 0–2)
BASOPHILS NFR BLD AUTO: 0.3 % — SIGNIFICANT CHANGE UP (ref 0–2)
BLOOD GAS ARTERIAL - LYTES,HGB,ICA,LACT RESULT: SIGNIFICANT CHANGE UP
BUN SERPL-MCNC: 19 MG/DL — SIGNIFICANT CHANGE UP (ref 7–23)
CALCIUM SERPL-MCNC: 9.7 MG/DL — SIGNIFICANT CHANGE UP (ref 8.4–10.5)
CHLORIDE SERPL-SCNC: 103 MMOL/L — SIGNIFICANT CHANGE UP (ref 98–107)
CO2 SERPL-SCNC: 22 MMOL/L — SIGNIFICANT CHANGE UP (ref 22–31)
CREAT SERPL-MCNC: 0.73 MG/DL — SIGNIFICANT CHANGE UP (ref 0.5–1.3)
D DIMER BLD IA.RAPID-MCNC: 1815 NG/ML DDU — HIGH
D DIMER BLD IA.RAPID-MCNC: 4777 NG/ML DDU — SIGNIFICANT CHANGE UP
D DIMER BLD IA.RAPID-MCNC: 9794 NG/ML DDU — SIGNIFICANT CHANGE UP
D DIMER BLD IA.RAPID-MCNC: SIGNIFICANT CHANGE UP NG/ML DDU
EOSINOPHIL # BLD AUTO: 0 K/UL — SIGNIFICANT CHANGE UP (ref 0–0.5)
EOSINOPHIL # BLD AUTO: 0.01 K/UL — SIGNIFICANT CHANGE UP (ref 0–0.5)
EOSINOPHIL # BLD AUTO: 0.01 K/UL — SIGNIFICANT CHANGE UP (ref 0–0.5)
EOSINOPHIL # BLD AUTO: 0.03 K/UL — SIGNIFICANT CHANGE UP (ref 0–0.5)
EOSINOPHIL # BLD AUTO: 0.03 K/UL — SIGNIFICANT CHANGE UP (ref 0–0.5)
EOSINOPHIL NFR BLD AUTO: 0 % — SIGNIFICANT CHANGE UP (ref 0–6)
EOSINOPHIL NFR BLD AUTO: 0.1 % — SIGNIFICANT CHANGE UP (ref 0–6)
EOSINOPHIL NFR BLD AUTO: 0.1 % — SIGNIFICANT CHANGE UP (ref 0–6)
EOSINOPHIL NFR BLD AUTO: 0.2 % — SIGNIFICANT CHANGE UP (ref 0–6)
EOSINOPHIL NFR BLD AUTO: 0.3 % — SIGNIFICANT CHANGE UP (ref 0–6)
FIBRINOGEN PPP-MCNC: 480 MG/DL — SIGNIFICANT CHANGE UP (ref 290–520)
FIBRINOGEN PPP-MCNC: 484 MG/DL — SIGNIFICANT CHANGE UP (ref 290–520)
FIBRINOGEN PPP-MCNC: 484 MG/DL — SIGNIFICANT CHANGE UP (ref 290–520)
FIBRINOGEN PPP-MCNC: 544 MG/DL — HIGH (ref 290–520)
FIBRINOGEN PPP-MCNC: 576 MG/DL — HIGH (ref 290–520)
GLUCOSE SERPL-MCNC: 202 MG/DL — HIGH (ref 70–99)
HCT VFR BLD CALC: 37.2 % — LOW (ref 39–50)
HCT VFR BLD CALC: 39.3 % — SIGNIFICANT CHANGE UP (ref 39–50)
HCT VFR BLD CALC: 40.5 % — SIGNIFICANT CHANGE UP (ref 39–50)
HCT VFR BLD CALC: 43.1 % — SIGNIFICANT CHANGE UP (ref 39–50)
HCT VFR BLD CALC: 47 % — SIGNIFICANT CHANGE UP (ref 39–50)
HGB BLD-MCNC: 12 G/DL — LOW (ref 13–17)
HGB BLD-MCNC: 12.3 G/DL — LOW (ref 13–17)
HGB BLD-MCNC: 13.1 G/DL — SIGNIFICANT CHANGE UP (ref 13–17)
HGB BLD-MCNC: 14.2 G/DL — SIGNIFICANT CHANGE UP (ref 13–17)
HGB BLD-MCNC: 15.1 G/DL — SIGNIFICANT CHANGE UP (ref 13–17)
IANC: 13.23 K/UL — HIGH (ref 1.5–8.5)
IANC: 7.45 K/UL — SIGNIFICANT CHANGE UP (ref 1.5–8.5)
IANC: 7.62 K/UL — SIGNIFICANT CHANGE UP (ref 1.5–8.5)
IANC: 7.82 K/UL — SIGNIFICANT CHANGE UP (ref 1.5–8.5)
IANC: 7.88 K/UL — SIGNIFICANT CHANGE UP (ref 1.5–8.5)
IMM GRANULOCYTES NFR BLD AUTO: 0.5 % — SIGNIFICANT CHANGE UP (ref 0–1.5)
IMM GRANULOCYTES NFR BLD AUTO: 0.8 % — SIGNIFICANT CHANGE UP (ref 0–1.5)
IMM GRANULOCYTES NFR BLD AUTO: 1 % — SIGNIFICANT CHANGE UP (ref 0–1.5)
INR BLD: 1.69 RATIO — HIGH (ref 0.88–1.16)
INR BLD: 1.84 RATIO — HIGH (ref 0.88–1.16)
INR BLD: 1.86 RATIO — HIGH (ref 0.88–1.16)
INR BLD: 1.94 RATIO — HIGH (ref 0.88–1.16)
INR BLD: 1.97 RATIO — HIGH (ref 0.88–1.16)
LYMPHOCYTES # BLD AUTO: 0.45 K/UL — LOW (ref 1–3.3)
LYMPHOCYTES # BLD AUTO: 0.61 K/UL — LOW (ref 1–3.3)
LYMPHOCYTES # BLD AUTO: 0.66 K/UL — LOW (ref 1–3.3)
LYMPHOCYTES # BLD AUTO: 0.69 K/UL — LOW (ref 1–3.3)
LYMPHOCYTES # BLD AUTO: 0.77 K/UL — LOW (ref 1–3.3)
LYMPHOCYTES # BLD AUTO: 4.9 % — LOW (ref 13–44)
LYMPHOCYTES # BLD AUTO: 5 % — LOW (ref 13–44)
LYMPHOCYTES # BLD AUTO: 6.4 % — LOW (ref 13–44)
LYMPHOCYTES # BLD AUTO: 7 % — LOW (ref 13–44)
LYMPHOCYTES # BLD AUTO: 7.5 % — LOW (ref 13–44)
MAGNESIUM SERPL-MCNC: 1.9 MG/DL — SIGNIFICANT CHANGE UP (ref 1.6–2.6)
MCHC RBC-ENTMCNC: 25.9 PG — LOW (ref 27–34)
MCHC RBC-ENTMCNC: 26.3 PG — LOW (ref 27–34)
MCHC RBC-ENTMCNC: 26.3 PG — LOW (ref 27–34)
MCHC RBC-ENTMCNC: 26.4 PG — LOW (ref 27–34)
MCHC RBC-ENTMCNC: 26.7 PG — LOW (ref 27–34)
MCHC RBC-ENTMCNC: 31.3 GM/DL — LOW (ref 32–36)
MCHC RBC-ENTMCNC: 32.1 GM/DL — SIGNIFICANT CHANGE UP (ref 32–36)
MCHC RBC-ENTMCNC: 32.3 GM/DL — SIGNIFICANT CHANGE UP (ref 32–36)
MCHC RBC-ENTMCNC: 32.3 GM/DL — SIGNIFICANT CHANGE UP (ref 32–36)
MCHC RBC-ENTMCNC: 32.9 GM/DL — SIGNIFICANT CHANGE UP (ref 32–36)
MCV RBC AUTO: 79.8 FL — LOW (ref 80–100)
MCV RBC AUTO: 81.6 FL — SIGNIFICANT CHANGE UP (ref 80–100)
MCV RBC AUTO: 81.7 FL — SIGNIFICANT CHANGE UP (ref 80–100)
MCV RBC AUTO: 82.9 FL — SIGNIFICANT CHANGE UP (ref 80–100)
MCV RBC AUTO: 83 FL — SIGNIFICANT CHANGE UP (ref 80–100)
MONOCYTES # BLD AUTO: 0.78 K/UL — SIGNIFICANT CHANGE UP (ref 0–0.9)
MONOCYTES # BLD AUTO: 0.96 K/UL — HIGH (ref 0–0.9)
MONOCYTES # BLD AUTO: 0.98 K/UL — HIGH (ref 0–0.9)
MONOCYTES # BLD AUTO: 1.02 K/UL — HIGH (ref 0–0.9)
MONOCYTES # BLD AUTO: 1.06 K/UL — HIGH (ref 0–0.9)
MONOCYTES NFR BLD AUTO: 10.4 % — SIGNIFICANT CHANGE UP (ref 2–14)
MONOCYTES NFR BLD AUTO: 10.5 % — SIGNIFICANT CHANGE UP (ref 2–14)
MONOCYTES NFR BLD AUTO: 10.6 % — SIGNIFICANT CHANGE UP (ref 2–14)
MONOCYTES NFR BLD AUTO: 6.9 % — SIGNIFICANT CHANGE UP (ref 2–14)
MONOCYTES NFR BLD AUTO: 8.5 % — SIGNIFICANT CHANGE UP (ref 2–14)
NEUTROPHILS # BLD AUTO: 13.23 K/UL — HIGH (ref 1.8–7.4)
NEUTROPHILS # BLD AUTO: 7.45 K/UL — HIGH (ref 1.8–7.4)
NEUTROPHILS # BLD AUTO: 7.62 K/UL — HIGH (ref 1.8–7.4)
NEUTROPHILS # BLD AUTO: 7.82 K/UL — HIGH (ref 1.8–7.4)
NEUTROPHILS # BLD AUTO: 7.88 K/UL — HIGH (ref 1.8–7.4)
NEUTROPHILS NFR BLD AUTO: 81 % — HIGH (ref 43–77)
NEUTROPHILS NFR BLD AUTO: 81.3 % — HIGH (ref 43–77)
NEUTROPHILS NFR BLD AUTO: 82.2 % — HIGH (ref 43–77)
NEUTROPHILS NFR BLD AUTO: 85.8 % — HIGH (ref 43–77)
NEUTROPHILS NFR BLD AUTO: 86.8 % — HIGH (ref 43–77)
NRBC # BLD: 0 /100 WBCS — SIGNIFICANT CHANGE UP
NRBC # FLD: 0 K/UL — SIGNIFICANT CHANGE UP
PHOSPHATE SERPL-MCNC: 4.1 MG/DL — SIGNIFICANT CHANGE UP (ref 2.5–4.5)
PLATELET # BLD AUTO: 151 K/UL — SIGNIFICANT CHANGE UP (ref 150–400)
PLATELET # BLD AUTO: 151 K/UL — SIGNIFICANT CHANGE UP (ref 150–400)
PLATELET # BLD AUTO: 162 K/UL — SIGNIFICANT CHANGE UP (ref 150–400)
PLATELET # BLD AUTO: 196 K/UL — SIGNIFICANT CHANGE UP (ref 150–400)
PLATELET # BLD AUTO: 246 K/UL — SIGNIFICANT CHANGE UP (ref 150–400)
POTASSIUM SERPL-MCNC: 5.1 MMOL/L — SIGNIFICANT CHANGE UP (ref 3.5–5.3)
POTASSIUM SERPL-SCNC: 5.1 MMOL/L — SIGNIFICANT CHANGE UP (ref 3.5–5.3)
PROTHROM AB SERPL-ACNC: 18.8 SEC — HIGH (ref 10.6–13.6)
PROTHROM AB SERPL-ACNC: 20.4 SEC — HIGH (ref 10.6–13.6)
PROTHROM AB SERPL-ACNC: 20.6 SEC — HIGH (ref 10.6–13.6)
PROTHROM AB SERPL-ACNC: 21.6 SEC — HIGH (ref 10.6–13.6)
PROTHROM AB SERPL-ACNC: 21.7 SEC — HIGH (ref 10.6–13.6)
RBC # BLD: 4.56 M/UL — SIGNIFICANT CHANGE UP (ref 4.2–5.8)
RBC # BLD: 4.74 M/UL — SIGNIFICANT CHANGE UP (ref 4.2–5.8)
RBC # BLD: 4.96 M/UL — SIGNIFICANT CHANGE UP (ref 4.2–5.8)
RBC # BLD: 5.4 M/UL — SIGNIFICANT CHANGE UP (ref 4.2–5.8)
RBC # BLD: 5.66 M/UL — SIGNIFICANT CHANGE UP (ref 4.2–5.8)
RBC # BLD: 5.66 M/UL — SIGNIFICANT CHANGE UP (ref 4.2–5.8)
RBC # FLD: 13.9 % — SIGNIFICANT CHANGE UP (ref 10.3–14.5)
RBC # FLD: 14 % — SIGNIFICANT CHANGE UP (ref 10.3–14.5)
RBC # FLD: 14.1 % — SIGNIFICANT CHANGE UP (ref 10.3–14.5)
RBC # FLD: 14.2 % — SIGNIFICANT CHANGE UP (ref 10.3–14.5)
RBC # FLD: 14.4 % — SIGNIFICANT CHANGE UP (ref 10.3–14.5)
RETICS #: 55.5 K/UL — SIGNIFICANT CHANGE UP (ref 25–125)
RETICS/RBC NFR: 1 % — SIGNIFICANT CHANGE UP (ref 0.5–2.5)
SODIUM SERPL-SCNC: 139 MMOL/L — SIGNIFICANT CHANGE UP (ref 135–145)
WBC # BLD: 15.26 K/UL — HIGH (ref 3.8–10.5)
WBC # BLD: 9.13 K/UL — SIGNIFICANT CHANGE UP (ref 3.8–10.5)
WBC # BLD: 9.17 K/UL — SIGNIFICANT CHANGE UP (ref 3.8–10.5)
WBC # BLD: 9.41 K/UL — SIGNIFICANT CHANGE UP (ref 3.8–10.5)
WBC # BLD: 9.59 K/UL — SIGNIFICANT CHANGE UP (ref 3.8–10.5)
WBC # FLD AUTO: 15.26 K/UL — HIGH (ref 3.8–10.5)
WBC # FLD AUTO: 9.13 K/UL — SIGNIFICANT CHANGE UP (ref 3.8–10.5)
WBC # FLD AUTO: 9.17 K/UL — SIGNIFICANT CHANGE UP (ref 3.8–10.5)
WBC # FLD AUTO: 9.41 K/UL — SIGNIFICANT CHANGE UP (ref 3.8–10.5)
WBC # FLD AUTO: 9.59 K/UL — SIGNIFICANT CHANGE UP (ref 3.8–10.5)

## 2021-06-02 PROCEDURE — 94681 O2 UPTK CO2 OUTP % O2 XTRC: CPT | Mod: 26

## 2021-06-02 PROCEDURE — 74018 RADEX ABDOMEN 1 VIEW: CPT | Mod: 26

## 2021-06-02 PROCEDURE — 99291 CRITICAL CARE FIRST HOUR: CPT

## 2021-06-02 PROCEDURE — 70450 CT HEAD/BRAIN W/O DYE: CPT | Mod: 26,GC

## 2021-06-02 PROCEDURE — 93770 DETERMINATION VENOUS PRESS: CPT

## 2021-06-02 PROCEDURE — 71045 X-RAY EXAM CHEST 1 VIEW: CPT | Mod: 26

## 2021-06-02 PROCEDURE — 71045 X-RAY EXAM CHEST 1 VIEW: CPT | Mod: 26,77

## 2021-06-02 PROCEDURE — 99233 SBSQ HOSP IP/OBS HIGH 50: CPT

## 2021-06-02 PROCEDURE — 93010 ELECTROCARDIOGRAM REPORT: CPT

## 2021-06-02 RX ORDER — FENTANYL CITRATE 50 UG/ML
66 INJECTION INTRAVENOUS
Refills: 0 | Status: DISCONTINUED | OUTPATIENT
Start: 2021-06-02 | End: 2021-06-02

## 2021-06-02 RX ORDER — SODIUM CHLORIDE 9 MG/ML
250 INJECTION, SOLUTION INTRAVENOUS
Refills: 0 | Status: DISCONTINUED | OUTPATIENT
Start: 2021-06-02 | End: 2021-06-20

## 2021-06-02 RX ORDER — ROCURONIUM BROMIDE 10 MG/ML
66 VIAL (ML) INTRAVENOUS ONCE
Refills: 0 | Status: COMPLETED | OUTPATIENT
Start: 2021-06-02 | End: 2021-06-02

## 2021-06-02 RX ORDER — ACETAMINOPHEN 500 MG
650 TABLET ORAL EVERY 6 HOURS
Refills: 0 | Status: COMPLETED | OUTPATIENT
Start: 2021-06-02 | End: 2021-06-03

## 2021-06-02 RX ORDER — CALCIUM CHLORIDE
1000 POWDER (GRAM) MISCELLANEOUS ONCE
Refills: 0 | Status: DISCONTINUED | OUTPATIENT
Start: 2021-06-02 | End: 2021-06-02

## 2021-06-02 RX ORDER — PHYTONADIONE (VIT K1) 5 MG
5 TABLET ORAL DAILY
Refills: 0 | Status: COMPLETED | OUTPATIENT
Start: 2021-06-02 | End: 2021-06-04

## 2021-06-02 RX ORDER — SODIUM CHLORIDE 9 MG/ML
1000 INJECTION, SOLUTION INTRAVENOUS
Refills: 0 | Status: DISCONTINUED | OUTPATIENT
Start: 2021-06-02 | End: 2021-06-02

## 2021-06-02 RX ORDER — FENTANYL CITRATE 50 UG/ML
130 INJECTION INTRAVENOUS
Refills: 0 | Status: DISCONTINUED | OUTPATIENT
Start: 2021-06-02 | End: 2021-06-02

## 2021-06-02 RX ORDER — CHLORHEXIDINE GLUCONATE 213 G/1000ML
15 SOLUTION TOPICAL EVERY 12 HOURS
Refills: 0 | Status: DISCONTINUED | OUTPATIENT
Start: 2021-06-02 | End: 2021-06-10

## 2021-06-02 RX ORDER — FENTANYL CITRATE 50 UG/ML
100 INJECTION INTRAVENOUS ONCE
Refills: 0 | Status: DISCONTINUED | OUTPATIENT
Start: 2021-06-02 | End: 2021-06-02

## 2021-06-02 RX ORDER — FAMOTIDINE 10 MG/ML
20 INJECTION INTRAVENOUS EVERY 12 HOURS
Refills: 0 | Status: DISCONTINUED | OUTPATIENT
Start: 2021-06-02 | End: 2021-06-15

## 2021-06-02 RX ORDER — CEFAZOLIN SODIUM 1 G
1980 VIAL (EA) INJECTION ONCE
Refills: 0 | Status: DISCONTINUED | OUTPATIENT
Start: 2021-06-02 | End: 2021-06-02

## 2021-06-02 RX ORDER — DEXMEDETOMIDINE HYDROCHLORIDE IN 0.9% SODIUM CHLORIDE 4 UG/ML
0.5 INJECTION INTRAVENOUS
Qty: 1000 | Refills: 0 | Status: DISCONTINUED | OUTPATIENT
Start: 2021-06-02 | End: 2021-06-02

## 2021-06-02 RX ORDER — NOREPINEPHRINE BITARTRATE/D5W 8 MG/250ML
0.11 PLASTIC BAG, INJECTION (ML) INTRAVENOUS
Qty: 4 | Refills: 0 | Status: DISCONTINUED | OUTPATIENT
Start: 2021-06-02 | End: 2021-06-08

## 2021-06-02 RX ORDER — FUROSEMIDE 40 MG
10 TABLET ORAL EVERY 24 HOURS
Refills: 0 | Status: DISCONTINUED | OUTPATIENT
Start: 2021-06-02 | End: 2021-06-02

## 2021-06-02 RX ORDER — DIPHENHYDRAMINE HCL 50 MG
50 CAPSULE ORAL ONCE
Refills: 0 | Status: COMPLETED | OUTPATIENT
Start: 2021-06-02 | End: 2021-06-02

## 2021-06-02 RX ORDER — PHYTONADIONE (VIT K1) 5 MG
2.5 TABLET ORAL DAILY
Refills: 0 | Status: DISCONTINUED | OUTPATIENT
Start: 2021-06-02 | End: 2021-06-02

## 2021-06-02 RX ORDER — SODIUM CHLORIDE 5 G/100ML
1 INJECTION, SOLUTION INTRAVENOUS
Qty: 500 | Refills: 0 | Status: DISCONTINUED | OUTPATIENT
Start: 2021-06-02 | End: 2021-06-04

## 2021-06-02 RX ORDER — CALCIUM GLUCONATE 100 MG/ML
2000 VIAL (ML) INTRAVENOUS ONCE
Refills: 0 | Status: DISCONTINUED | OUTPATIENT
Start: 2021-06-02 | End: 2021-06-02

## 2021-06-02 RX ORDER — ACETAMINOPHEN 500 MG
750 TABLET ORAL ONCE
Refills: 0 | Status: COMPLETED | OUTPATIENT
Start: 2021-06-02 | End: 2021-06-02

## 2021-06-02 RX ORDER — FENTANYL CITRATE 50 UG/ML
130 INJECTION INTRAVENOUS
Refills: 0 | Status: DISCONTINUED | OUTPATIENT
Start: 2021-06-02 | End: 2021-06-05

## 2021-06-02 RX ORDER — CALCIUM CHLORIDE
1000 POWDER (GRAM) MISCELLANEOUS ONCE
Refills: 0 | Status: COMPLETED | OUTPATIENT
Start: 2021-06-02 | End: 2021-06-02

## 2021-06-02 RX ORDER — FUROSEMIDE 40 MG
20 TABLET ORAL ONCE
Refills: 0 | Status: COMPLETED | OUTPATIENT
Start: 2021-06-02 | End: 2021-06-02

## 2021-06-02 RX ORDER — FUROSEMIDE 40 MG
10 TABLET ORAL EVERY 12 HOURS
Refills: 0 | Status: DISCONTINUED | OUTPATIENT
Start: 2021-06-02 | End: 2021-06-03

## 2021-06-02 RX ORDER — CEFAZOLIN SODIUM 1 G
1980 VIAL (EA) INJECTION EVERY 8 HOURS
Refills: 0 | Status: DISCONTINUED | OUTPATIENT
Start: 2021-06-02 | End: 2021-06-07

## 2021-06-02 RX ADMIN — Medication 25 MILLIGRAM(S): at 04:29

## 2021-06-02 RX ADMIN — SODIUM CHLORIDE 3 MILLILITER(S): 9 INJECTION INTRAMUSCULAR; INTRAVENOUS; SUBCUTANEOUS at 10:00

## 2021-06-02 RX ADMIN — Medication 300 MILLIGRAM(S): at 06:27

## 2021-06-02 RX ADMIN — FENTANYL CITRATE 130 MICROGRAM(S): 50 INJECTION INTRAVENOUS at 04:10

## 2021-06-02 RX ADMIN — Medication 66 MILLIGRAM(S): at 13:02

## 2021-06-02 RX ADMIN — Medication 4.96 MICROGRAM(S)/KG/MIN: at 11:13

## 2021-06-02 RX ADMIN — Medication 4.96 MICROGRAM(S)/KG/MIN: at 19:29

## 2021-06-02 RX ADMIN — Medication 4 MILLIGRAM(S): at 03:35

## 2021-06-02 RX ADMIN — SODIUM CHLORIDE 3 MILLILITER(S): 9 INJECTION INTRAMUSCULAR; INTRAVENOUS; SUBCUTANEOUS at 02:45

## 2021-06-02 RX ADMIN — Medication 5 MILLIGRAM(S): at 20:16

## 2021-06-02 RX ADMIN — Medication 2 MILLIGRAM(S): at 16:58

## 2021-06-02 RX ADMIN — Medication 20 MILLIGRAM(S): at 15:44

## 2021-06-02 RX ADMIN — Medication 66 MILLIGRAM(S): at 01:34

## 2021-06-02 RX ADMIN — FENTANYL CITRATE 100 MICROGRAM(S): 50 INJECTION INTRAVENOUS at 13:00

## 2021-06-02 RX ADMIN — Medication 3 UNIT(S)/KG/HR: at 19:31

## 2021-06-02 RX ADMIN — FENTANYL CITRATE 2.64 MICROGRAM(S)/KG/HR: 50 INJECTION INTRAVENOUS at 13:36

## 2021-06-02 RX ADMIN — Medication 3 UNIT(S)/KG/HR: at 07:29

## 2021-06-02 RX ADMIN — Medication 25 MILLIGRAM(S): at 13:18

## 2021-06-02 RX ADMIN — Medication 3 UNIT(S)/KG/HR: at 19:28

## 2021-06-02 RX ADMIN — FENTANYL CITRATE 20.8 MICROGRAM(S): 50 INJECTION INTRAVENOUS at 01:26

## 2021-06-02 RX ADMIN — FENTANYL CITRATE 2.64 MICROGRAM(S)/KG/HR: 50 INJECTION INTRAVENOUS at 07:28

## 2021-06-02 RX ADMIN — SODIUM CHLORIDE 3 MILLILITER(S): 9 INJECTION INTRAMUSCULAR; INTRAVENOUS; SUBCUTANEOUS at 18:32

## 2021-06-02 RX ADMIN — Medication 3 UNIT(S)/KG/HR: at 15:18

## 2021-06-02 RX ADMIN — FENTANYL CITRATE 2.64 MICROGRAM(S)/KG/HR: 50 INJECTION INTRAVENOUS at 19:29

## 2021-06-02 RX ADMIN — FENTANYL CITRATE 2.64 MICROGRAM(S)/KG/HR: 50 INJECTION INTRAVENOUS at 01:37

## 2021-06-02 RX ADMIN — SODIUM CHLORIDE 100 MILLILITER(S): 9 INJECTION, SOLUTION INTRAVENOUS at 23:30

## 2021-06-02 RX ADMIN — SODIUM CHLORIDE 3 MILLILITER(S): 9 INJECTION INTRAMUSCULAR; INTRAVENOUS; SUBCUTANEOUS at 02:55

## 2021-06-02 RX ADMIN — Medication 3 UNIT(S)/KG/HR: at 04:17

## 2021-06-02 RX ADMIN — Medication 4.96 MICROGRAM(S)/KG/MIN: at 13:41

## 2021-06-02 RX ADMIN — Medication 5.95 MICROGRAM(S)/KG/MIN: at 21:35

## 2021-06-02 RX ADMIN — SODIUM CHLORIDE 66.1 ML/KG/HR: 5 INJECTION, SOLUTION INTRAVENOUS at 21:33

## 2021-06-02 RX ADMIN — Medication 198 MILLIGRAM(S): at 22:12

## 2021-06-02 RX ADMIN — Medication 25 MILLIGRAM(S): at 20:54

## 2021-06-02 RX ADMIN — Medication 198 MILLIGRAM(S): at 14:54

## 2021-06-02 RX ADMIN — Medication 260 MILLIGRAM(S): at 20:20

## 2021-06-02 RX ADMIN — LEVETIRACETAM 266.68 MILLIGRAM(S): 250 TABLET, FILM COATED ORAL at 16:50

## 2021-06-02 RX ADMIN — FENTANYL CITRATE 20.8 MICROGRAM(S): 50 INJECTION INTRAVENOUS at 03:47

## 2021-06-02 RX ADMIN — FENTANYL CITRATE 130 MICROGRAM(S): 50 INJECTION INTRAVENOUS at 01:40

## 2021-06-02 RX ADMIN — FAMOTIDINE 200 MILLIGRAM(S): 10 INJECTION INTRAVENOUS at 14:55

## 2021-06-02 RX ADMIN — SODIUM CHLORIDE 66.1 ML/KG/HR: 5 INJECTION, SOLUTION INTRAVENOUS at 19:30

## 2021-06-02 RX ADMIN — Medication 750 MILLIGRAM(S): at 06:54

## 2021-06-02 RX ADMIN — LEVETIRACETAM 266.68 MILLIGRAM(S): 250 TABLET, FILM COATED ORAL at 06:27

## 2021-06-02 RX ADMIN — Medication 650 MILLIGRAM(S): at 21:11

## 2021-06-02 RX ADMIN — Medication 260 MILLIGRAM(S): at 14:23

## 2021-06-02 RX ADMIN — CHLORHEXIDINE GLUCONATE 15 MILLILITER(S): 213 SOLUTION TOPICAL at 15:19

## 2021-06-02 NOTE — PROGRESS NOTE PEDS - SUBJECTIVE AND OBJECTIVE BOX
INTERVAL HISTORY: Overnight, yesterday @ ~ 5pm had multiple episodes of altered mental status. CT scans showed L frontal parenchymal hemorrhage which progressed. Patient was taken emergently to the OR with neurosurgery for evacuation of the bleed and ~ 1L of blood/clot was removed. NSx, Neurology, and hematology are actively involved with patient.    On review of the telemetry, patient reverted back into IART after electrical cardioversion yesterday.    RESPIRATORY SUPPORT: Mode: SIMV with PS, RR (machine): 18, FiO2: 50, PEEP: 6, PS: 5  NUTRITION: NPO       @ 07:01  -  - @ 07:00  --------------------------------------------------------  IN: 2698.9 mL / OUT: 1405 mL / NET: 1293.9 mL    MEDICATIONS:  aMIOdarone   Oral Tab/Cap - Peds 300 milliGRAM(s) Oral daily  furosemide  IV Intermittent - Peds 10 milliGRAM(s) IV Intermittent every 24 hours  lisinopril Oral Tab/Cap - Peds 5 milliGRAM(s) Oral daily  sildenafil  IV Intermittent - Peds 10 milliGRAM(s) IV Intermittent every 8 hours  spironolactone Oral Tab/Cap - Peds 100 milliGRAM(s) Oral daily  ceFAZolin  IV Intermittent - Peds 1980 milliGRAM(s) IV Intermittent once  dexMEDEtomidine Infusion - Peds 0.5 MICROgram(s)/kG/Hr IV Continuous <Continuous>  fentaNYL   Infusion - Peds 2 MICROgram(s)/kG/Hr IV Continuous <Continuous>  levETIRAcetam IV Intermittent - Peds 1000 milliGRAM(s) IV Intermittent every 12 hours  calcium chloride  IV Intermittent - Peds 1000 milliGRAM(s) IV Intermittent once  dextrose 5%. - Pediatric 1000 milliLiter(s) IV Continuous <Continuous>  heparin   Infusion - Pediatric 0.045 Unit(s)/kG/Hr IV Continuous <Continuous>  sodium chloride 0.9% lock flush - Peds 3 milliLiter(s) IV Push every 8 hours  sodium chloride 0.9% lock flush - Peds 3 milliLiter(s) IV Push every 8 hours  sodium chloride 0.9%. - Pediatric 1000 milliLiter(s) IV Continuous <Continuous>  sodium chloride 3% Infusion - Pediatric 1 mL/kG/Hr IV Continuous <Continuous>    PHYSICAL EXAMINATION:  Vital signs -   T(C): 37 (21 @ 05:00), Max: 37.8 (21 @ 04:00)  HR: 70 (21 @ 07:23) (70 - 88)  BP: 134/59 (21 @ 01:00) (77/50 - 147/76)  ABP:  (97/48 - 159/73)  RR: 18 (21 @ 07:00) (15 - 29)  SpO2: 94% (21 @ 07:23) (78% - 99%)    General - intubated, sedated.  Skin - no rash, no desquamation, no cyanosis.  Eyes / ENT - no conjunctival injection, sclerae anicteric, external ears & nares normal, mucous membranes moist.  Pulmonary - normal inspiratory effort, no retractions, lungs clear to auscultation bilaterally, no wheezes, no rales.  Cardiovascular - normal rate, regular rhythm, normal S1 & S2, no murmurs, no rubs, no gallops, capillary refill < 2sec, normal pulses.  Gastrointestinal - soft, non-distended, non-tender, no hepatosplenomegaly   Musculoskeletal - no joint swelling, no clubbing, no edema.  Neurologic / Psychiatric - alert, oriented as age-appropriate, affect appropriate, moves all extremities, normal tone.    LABORATORY TESTS:                          14.2  CBC:   9.13 )-----------( 151   (21 @ 05:38)                          43.1               138   |  102   |  20                 Ca: 9.6    BMP:   ----------------------------< 155    M.9   (21 @ 05:38)             4.6    |  22    | 0.84               Ph: 3.8      LFT:     TPro: 7.3 / Alb: 4.3 / TBili: 1.7 / DBili: x / AST: 18 / ALT: 18 / AlkPhos: 103   (21 @ 05:38)    COAG: PT: 21.6 / PTT: 31.8 / INR: 1.94   (21 @ 05:38)       ABG:   pH: 7.49 / pCO2: 35 / pO2: 54 / HCO3: 27 / Base Excess: 3.3 / SaO2: 89.3 / Lactate: x / iCa: 1.20   (21 @ 05:18)    CBG:   pH: 7.45 / pCO2: 30.4 / pO2: 66.0 / HCO3: 23 / Base Excess: -2.5 / Lactate: x   (21 @ 20:18)      IMAGING STUDIES:  Electrocardiogram - (21) Ventricular paced rhythm @ 75bpm. Underlying IART.    Telemetry - (21) Ventricular paced rhythm @ 75bpm. Underlying IART.    Chest x-ray - (21) Stable mild cardiomegaly with subsegmental left lower lobe atelectasis.    Echocardiogram - (21)   1. This was a technically difficult suboptimal study due to poor echo windows. Findings limited to below.   2. 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   3. Mild mitral valve regurgitation.   4. Trivial tricuspid valve regurgitation.   5. Trivial aortic valve regurgitation.   6. Status post device closure of Fontan fenestration.   7. S/P stent placement in the Fontan pathway. Limited imaging of the inferior Fontan baffle. In this setting, the inferior vena cava to its connection near the atrial portion of the baffle appears patent with no obstruction. S/p device closure of Fontan fenestration. The Fontan fenestration is not seen on this study. The right bidirectional Storm is seen only on color flow mapping. This appears to have laminar low velocity flow.   8. The connection of the right bidirectional Storm to the right pulmonary artery could not be imaged. The right pulmonary artery is seen in a limited portion immediately to the left of the Storm and appears patent. The left pulmonary artery could not be imaged.   9. Extremely poor and limited imaging of the lateral free walls of the single systemic left ventricle. On limited short axis views there appears to be adequate systolic excursion of the posterior wall of the left ventricle and decreased in the anterior wall. The bulboventricular foramen could not be imaged. Suggest alternate imaging for appropriate assessment of function.  10. No pericardial effusion.  11. Small right pleural effusion.    Cath - (21)  Access 4 Fr RFA, 7 Fr RFV x2 with US guidance.  Sat data (%): on 50% FiO2 LPA 73, RUPV 98, Ao 97; CI 2.6 L/min/m2 with Qp:Qs 1 :1.  Pressures (mmHg): Elevated mFontan = mIVC = 20. mPCW=16, No significant gradient across LV to AAo to Vikki (98/58/73).  Normal PVR while on sildenafil.  Angios showed unobstructed Fontan with existing stent within Fontan conduit.    Comment: IART ablation was attempted after the diagnostic cath but unsuccessful. Patient was overdrive paced out of IART. At the end of the case, Ao sat was 94% and LPA 66% on 50% FiO2 INTERVAL HISTORY: Overnight, yesterday @ ~ 5pm had multiple episodes of altered mental status. CT scans showed L frontal parenchymal hemorrhage which progressed. Patient was taken emergently to the OR with neurosurgery for evacuation of the bleed and ~ 1L of blood/clot was removed. NSx, Neurology, and hematology are actively involved with patient.    On review of the telemetry, patient reverted back into IART after electrical cardioversion yesterday.    RESPIRATORY SUPPORT: Mode: SIMV with PS, RR (machine): 18, FiO2: 50, PEEP: 6, PS: 5  NUTRITION: NPO       @ 07:01  -  - @ 07:00  --------------------------------------------------------  IN: 2698.9 mL / OUT: 1405 mL / NET: 1293.9 mL    MEDICATIONS:  aMIOdarone   Oral Tab/Cap - Peds 300 milliGRAM(s) Oral daily  furosemide  IV Intermittent - Peds 10 milliGRAM(s) IV Intermittent every 24 hours  lisinopril Oral Tab/Cap - Peds 5 milliGRAM(s) Oral daily  sildenafil  IV Intermittent - Peds 10 milliGRAM(s) IV Intermittent every 8 hours  spironolactone Oral Tab/Cap - Peds 100 milliGRAM(s) Oral daily  ceFAZolin  IV Intermittent - Peds 1980 milliGRAM(s) IV Intermittent once  dexMEDEtomidine Infusion - Peds 0.5 MICROgram(s)/kG/Hr IV Continuous <Continuous>  fentaNYL   Infusion - Peds 2 MICROgram(s)/kG/Hr IV Continuous <Continuous>  levETIRAcetam IV Intermittent - Peds 1000 milliGRAM(s) IV Intermittent every 12 hours  calcium chloride  IV Intermittent - Peds 1000 milliGRAM(s) IV Intermittent once  dextrose 5%. - Pediatric 1000 milliLiter(s) IV Continuous <Continuous>  heparin   Infusion - Pediatric 0.045 Unit(s)/kG/Hr IV Continuous <Continuous>  sodium chloride 0.9% lock flush - Peds 3 milliLiter(s) IV Push every 8 hours  sodium chloride 0.9% lock flush - Peds 3 milliLiter(s) IV Push every 8 hours  sodium chloride 0.9%. - Pediatric 1000 milliLiter(s) IV Continuous <Continuous>  sodium chloride 3% Infusion - Pediatric 1 mL/kG/Hr IV Continuous <Continuous>    PHYSICAL EXAMINATION:  Vital signs -   T(C): 37 (21 @ 05:00), Max: 37.8 (21 @ 04:00)  HR: 70 (21 @ 07:23) (70 - 88)  BP: 134/59 (21 @ 01:00) (77/50 - 147/76)  ABP:  (97/48 - 159/73)  RR: 18 (21 @ 07:00) (15 - 29)  SpO2: 94% (21 @ 07:23) (78% - 99%)    General - intubated, sedated.  Skin - no rash, no desquamation, no cyanosis.  Eyes / ENT - no conjunctival injection, sclerae anicteric, external ears & nares normal, mucous membranes moist.  Pulmonary - normal inspiratory effort, no retractions, lungs clear to auscultation bilaterally, no wheezes, no rales.  Cardiovascular - normal rate, regular rhythm, normal S1 & S2, (+) murmurs, no rubs, no gallops, capillary refill < 2sec, normal pulses.  Gastrointestinal - soft, non-distended, non-tender, no splenomegaly. (+) hepatomegaly  Musculoskeletal - no joint swelling, no clubbing, no edema.  Neurologic / Psychiatric - intubated, sedated  LABORATORY TESTS:                          14.2  CBC:   9.13 )-----------( 151   (21 @ 05:38)                          43.1               138   |  102   |  20                 Ca: 9.6    BMP:   ----------------------------< 155    M.9   (21 @ 05:38)             4.6    |  22    | 0.84               Ph: 3.8      LFT:     TPro: 7.3 / Alb: 4.3 / TBili: 1.7 / DBili: x / AST: 18 / ALT: 18 / AlkPhos: 103   (21 @ 05:38)    COAG: PT: 21.6 / PTT: 31.8 / INR: 1.94   (21 @ 05:38)       ABG:   pH: 7.49 / pCO2: 35 / pO2: 54 / HCO3: 27 / Base Excess: 3.3 / SaO2: 89.3 / Lactate: x / iCa: 1.20   (21 @ 05:18)    CBG:   pH: 7.45 / pCO2: 30.4 / pO2: 66.0 / HCO3: 23 / Base Excess: -2.5 / Lactate: x   (21 @ 20:18)      IMAGING STUDIES:  Electrocardiogram - (21) Ventricular paced rhythm @ 75bpm. Underlying IART.    Telemetry - (21) Ventricular paced rhythm @ 75bpm. Underlying IART.    Chest x-ray - (21) Stable mild cardiomegaly with subsegmental left lower lobe atelectasis.    Echocardiogram - (21)   1. This was a technically difficult suboptimal study due to poor echo windows. Findings limited to below.   2. 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   3. Mild mitral valve regurgitation.   4. Trivial tricuspid valve regurgitation.   5. Trivial aortic valve regurgitation.   6. Status post device closure of Fontan fenestration.   7. S/P stent placement in the Fontan pathway. Limited imaging of the inferior Fontan baffle. In this setting, the inferior vena cava to its connection near the atrial portion of the baffle appears patent with no obstruction. S/p device closure of Fontan fenestration. The Fontan fenestration is not seen on this study. The right bidirectional Storm is seen only on color flow mapping. This appears to have laminar low velocity flow.   8. The connection of the right bidirectional Storm to the right pulmonary artery could not be imaged. The right pulmonary artery is seen in a limited portion immediately to the left of the Storm and appears patent. The left pulmonary artery could not be imaged.   9. Extremely poor and limited imaging of the lateral free walls of the single systemic left ventricle. On limited short axis views there appears to be adequate systolic excursion of the posterior wall of the left ventricle and decreased in the anterior wall. The bulboventricular foramen could not be imaged. Suggest alternate imaging for appropriate assessment of function.  10. No pericardial effusion.  11. Small right pleural effusion.    Cath - (21)  Access 4 Fr RFA, 7 Fr RFV x2 with US guidance.  Sat data (%): on 50% FiO2 LPA 73, RUPV 98, Ao 97; CI 2.6 L/min/m2 with Qp:Qs 1 :1.  Pressures (mmHg): Elevated mFontan = mIVC = 20. mPCW=16, No significant gradient across LV to AAo to Vikki (98/58/73).  Normal PVR while on sildenafil.  Angios showed unobstructed Fontan with existing stent within Fontan conduit.    Comment: IART ablation was attempted after the diagnostic cath but unsuccessful. Patient was overdrive paced out of IART. At the end of the case, Ao sat was 94% and LPA 66% on 50% FiO2

## 2021-06-02 NOTE — PROGRESS NOTE PEDS - ASSESSMENT
21 y/o M with complex cardiac h/o double inlet left ventricle, L-malposition of great arteries, sick sinus syndrome and AV mihir disease with tachybradycardia syndrome with a dual chamber pacemaker (currently with RV lead fracture and is currently only LV paced 2/2 complete heart block), s/p Cunoo-Nron-Ddzvssu anastomosis and aortic arch reconstruction followed by a fenestrated lateral tunnel Fontan completion (now s/p fenestration closure) s/p cardiac catheterization and ablation admitted for also 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 Neurology consulted for episode of behavioral arrest for rule out stroke/seizure.     Overnight patient had worsening of hemorrhage evidenced by repeat CT scan and changes in neurologic examination with hemiparesis in the R side, prompting EVD placement and evacuation of products with appropriate reversal agents in place. Patient was intubated and monitored for acute changes thereafter. CT head revealed worsening of intraparenchymal hemorrhage within the [place] watershed territory with extension to the intraventricular space. Neurologic examination consistent with R hemiparesis and hyperreflexia.     Impression: Episode of unresponsiveness, staring out with jaw clenching with no recollection likely consistent with focal seizure with impaired awareness secondary to watershed infarction with hemorrhagic conversion likely secondary to cardioembolic mechanism versus hypoperfusion resulting in infarction and hemorrhage. Worsening hemorrhage possibly secondary to persistent elevation in INRs indicative of lasting effects of anticoagulation despite reversal agents administered.    Recommendations:   [ ] Keppra 1000mg BID  [ ] Strict BP control goal <160/90 w/ Cardene drip if needed  [ ] CT head w/o contrast in 24 hours or PRN for worsening mental status or neuro exam  [ ] CT angio head and neck w/w/o contrast to be done when CT head w/o contrast performed next. (As per primary team, pacemaker is non-MRI compatible)     Case to be seen with Neurology attending, Dr. Olivas.

## 2021-06-02 NOTE — PROGRESS NOTE PEDS - PROBLEM SELECTOR PLAN 1
1. Maintain EVD @ 10cm of H20  2. Vit K, correct coags  3. Ancef while drain in place  4. NEurochecks q1H  Case d/w attending

## 2021-06-02 NOTE — PROGRESS NOTE PEDS - ATTENDING COMMENTS
Extension of intraparenchymal hemorrhage necessitating neurosurgical intervention. Reversal for rivaroxaban completed.

## 2021-06-02 NOTE — PROGRESS NOTE PEDS - ASSESSMENT
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 He has elevated Fontan pressure secondary to LV diastolic dysfunction.  Had severe hemorrhagic stroke left frontal on 6/1 requiring urgent evacuation in OR.    PLAN:  Neuro:  S/P decompressive craniectomy and hemorrhage evacuation 6/1.  Neuroprotection: 3% NaCl drip for goal SNa >/= 150, keppra, sedation, normothermia  Cont fentanyl, goal SBS -1  EVD at 10 cmH20  Monitor ICP Q1hr    Resp:  Full mechanical ventilation for neuroprotection (goal normocarbia, normoxia)  Follow ETCO2  ABG Q6hr  Goal SpO2 > 92%    CV:  hold Lisinopril, aldactone  Cont sildenafil   Hold Amiodarone until determine interaction with other medications (i,e., xarelto)  Continue Lasix IV Q12hr- goal even balance  Goal MAP (for CPP) >/= 85 (due to elevated Fontan pressure about 20)  Start norepi  Pacemaker at VOO at 70    Heme:  Holding Xarelto   heme consult appreciated  Coags Q6hr.  Goal PLTs >100  Add vit K x3 days    FEN:  NPO on IVF  BMP Q6hr

## 2021-06-02 NOTE — PROGRESS NOTE PEDS - SUBJECTIVE AND OBJECTIVE BOX
Interval/Overnight Events: Had significant hemorrhagic stroke last PM, went to  for evacuation and decompressive craniectomy.  Remains intubated.  Back in IART.  Pacemkaer converted to VOO.    VITAL SIGNS:  T(C): 37.4 (06-02-21 @ 08:00), Max: 37.8 (06-02-21 @ 04:00)  HR: 70 (06-02-21 @ 08:00) (70 - 88)  BP: 134/59 (06-02-21 @ 01:00) (77/50 - 147/76)  ABP: 88/45 (06-02-21 @ 08:00) (88/45 - 159/73)  ABP(mean): 58 (06-02-21 @ 08:00) (58 - 104)  RR: 18 (06-02-21 @ 08:00) (15 - 29)  SpO2: 95% (06-02-21 @ 08:00) (78% - 99%)    End-Tidal CO2: 27  ICP: <8    Daily Weight: 63.8 (01 Jun 2021 09:27)    Current Medications:  aMIOdarone   Oral Tab/Cap - Peds 300 milliGRAM(s) Oral daily  furosemide  IV Intermittent - Peds 10 milliGRAM(s) IV Intermittent every 24 hours  lisinopril Oral Tab/Cap - Peds 5 milliGRAM(s) Oral daily  sildenafil  IV Intermittent - Peds 10 milliGRAM(s) IV Intermittent every 8 hours  spironolactone Oral Tab/Cap - Peds 100 milliGRAM(s) Oral daily  heparin   Infusion - Pediatric 0.045 Unit(s)/kG/Hr IV Continuous <Continuous>  calcium chloride  IV Intermittent - Peds 1000 milliGRAM(s) IV Intermittent once  dextrose 5%. - Pediatric 1000 milliLiter(s) IV Continuous <Continuous>  sodium chloride 0.9% lock flush - Peds 3 milliLiter(s) IV Push every 8 hours  sodium chloride 0.9% lock flush - Peds 3 milliLiter(s) IV Push every 8 hours  sodium chloride 0.9%. - Pediatric 1000 milliLiter(s) IV Continuous <Continuous>  sodium chloride 3% Infusion - Pediatric 1 mL/kG/Hr IV Continuous <Continuous>  acetaminophen   Oral Tab/Cap - Peds. 650 milliGRAM(s) Oral every 6 hours PRN  dexMEDEtomidine Infusion - Peds 0.5 MICROgram(s)/kG/Hr IV Continuous <Continuous>  fentaNYL    IV Intermittent - Peds 130 MICROGram(s) IV Intermittent every 1 hour PRN  fentaNYL   Infusion - Peds 2 MICROgram(s)/kG/Hr IV Continuous <Continuous>  levETIRAcetam IV Intermittent - Peds 1000 milliGRAM(s) IV Intermittent every 12 hours  Andexanet Raymond 400 milliGRAM(s),IV Diluent 40 milliLiter(s) 400 milliGRAM(s) IV Bolus once  Andexanet Raymond 480 milliGRAM(s),IV Diluent 48 milliLiter(s) 480 milliGRAM(s) IV Continuous <Continuous>  Prothrombin Complex Concentrate IVPB 1500 Unit(s),IV SOLUTION 60 milliLiter(s) 1500 Unit(s) IV Intermittent once    ===============================RESPIRATORY==============================  [ ] FiO2: ___ 	[ ] Heliox: ____ 		[ ] BiPAP: ___   [ ] NC: __  Liters			[ ] HFNC: __ 	Liters, FiO2: __  [ x] Mechanical Ventilation: Mode: SIMV with PS, RR (machine): 18, TV (machine): 400, FiO2: 50, PEEP: 6, PS: 5, ITime: 1, MAP: 12, PIP: 22  [ ] Inhaled Nitric Oxide:  [ ] Extubation Readiness Assessed    =============================CARDIOVASCULAR============================  Cardiac Rhythm:	[ x] NSR		[ ] Other:    ==========================HEMATOLOGY/ONCOLOGY========================  Transfusions:	[ ] PRBC	      [ ] Platelets	[ ] FFP		[ ] Cryoprecipitate  DVT Prophylaxis:    =======================FLUIDS/ELECTROLYTES/NUTRITION=====================  I&O's Summary    01 Jun 2021 07:01  -  02 Jun 2021 07:00  --------------------------------------------------------  IN: 2698.9 mL / OUT: 1405 mL / NET: 1293.9 mL      Diet:	[ ] Regular	[ ] Soft		[ ] Clears	      [x ] NPO  .	[ ] Other:  .	[ ] NGT		[ ] NDT		[ ] GT		[ ] GJT    ================================NEUROLOGY=============================  [ x] SBS:	-1	[ ] GISSELLE-1:	[ ] BIS:         [x ] CAPD: <9  [ x] Adequacy of sedation and pain control has been assessed and adjusted    ========================PATIENT CARE ACCESS DEVICES=====================  [x ] Peripheral IV  [ ] Central Venous Line	[ ] R	[ ] L	[ ] IJ	[ ] Fem	[ ] SC			Placed:   [x ] Arterial Line		[x ] R	[ ] L	[ ] PT	[ ] DP	[ ] Fem	[ x] Rad	[ ] Ax	Placed:   [ ] PICC:				[ ] Broviac		[ ] Mediport  [x ] Urinary Catheter, Date Placed:   [ ] Necessity of urinary, arterial, and venous catheters discussed    =============================ANCILLARY TESTS============================  LABS:  ABG - ( 02 Jun 2021 05:18 )  pH: 7.49  /  pCO2: 35    /  pO2: 54    / HCO3: 27    / Base Excess: 3.3   /  SaO2: 89.3  / Lactate: x                                                14.2                  Neurophils% (auto):   x      (06-02 @ 05:38):    9.13 )-----------(151          Lymphocytes% (auto):  x                                             43.1                   Eosinphils% (auto):   x        Manual%: Neutrophils x    ; Lymphocytes x    ; Eosinophils x    ; Bands%: x    ; Blasts x                                  138    |  102    |  20                  Calcium: 9.6   / iCa: 1.13   (06-02 @ 05:38)    ----------------------------<  155       Magnesium: 1.9                              4.6     |  22     |  0.84             Phosphorous: 3.8      TPro  7.3    /  Alb  4.3    /  TBili  1.7    /  DBili  x      /  AST  18     /  ALT  18     /  AlkPhos  103    02 Jun 2021 05:38  ( 06-02 @ 05:38 )   PT: 21.6 sec;   INR: 1.94 ratio  aPTT: 31.8 sec  RECENT CULTURES:      IMAGING STUDIES:  CXR: Ett good, left hemithorax hazy throughout (? effusion)  ==============================PHYSICAL EXAM============================  GENERAL: sedated  RESPIRATORY: Lungs clear to auscultation bilaterally. Good aeration. No rales, rhonchi, retractions or wheezing. Effort even and unlabored.  CARDIOVASCULAR: Regular rate and rhythm. Normal S1/S2. No murmurs, rubs, or gallop. Capillary refill < 2 seconds. Distal pulses 2+ and equal.  ABDOMEN: Soft, non-distended.  No palpable hepatosplenomegaly.  SKIN: No rash.  EXTREMITIES: Warm and well perfused. No gross extremity deformities.  NEUROLOGIC: The patient is sedated. Responds to commands    ======================================================================  Parent/Guardian is at the bedside:	[ x] Yes	[ ] No  Patient and Parent/Guardian updated as to the progress/plan of care:	[ x] Yes	[ ] No    [ x] The patient remains in critical and unstable condition, and requires ICU care and monitoring.  Total critical care time spent by attending physician was _35___ minutes, excluding procedure time.    [ ] The patient is improving but requires continued monitoring and adjustment of therapy due to ___________________________

## 2021-06-02 NOTE — CHART NOTE - NSCHARTNOTEFT_GEN_A_CORE
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 Per note, patient s/p Kywpt-Payr-Biyfjbk anastomosis and aortic arch reconstruction followed by a fenestrated lateral tunnel Fontan completion (now s/p fenestration closure) s/p cardiac catheterization and ablation.     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 Per note, patient s/p Ujfet-Hjla-Nmjqotq anastomosis and aortic arch reconstruction followed by a fenestrated lateral tunnel Fontan completion (now s/p fenestration closure) s/p cardiac catheterization and ablation.     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 He had cardioversion today and was in his usual health, until that he suddenly became altered mental status and unresponsive. Head CT showed parenchymal hemorrhage in the anterior left frontal lobe involving the boundary zone of the left ROCCO and MCA territories.     Patient was reported to be taking Xarelto for his arrythmia condition. Hematology was consulted given the brain parenchymal hemorrhage and to inquire the potential reversal agent for Xarelto and further management.     Though patient was found to have brain parenchymal hemorrhage, patient was reported to return back his mental status to his baseline. The decision is to monitor closely with serial head CT scan. Xarelto was on hold since then. Recommend that Recombinant Coagulation Factor Xa(Andexxa) will be the reversal agent for Xarelto. If unable to secure Andexxa in a short period of time and patient condition deteriorate or further bleeding requiring OR intervention, may consider using NovoSeven as substitution medication    Patient subsequently presented with repeat AMS and acute R sided hemiparesis, and repeat CT scan showed interval worsening of intraparenchymal hemorrhage. Patient was brought to OR by Neurosurgery for evacuation of brain parenchymal hemorrhage.     Spoke with Neurosurgery closely prior and after procedure, patient received multiple products including Andexxa, Kcentra, FFP and Cryoprecipitate during the procedure. Almost 1L of PRBC with clot was evacuated, and EVD was placed per report. Patient was transported back to PICU for further management.     Given that patient has significant intraprenchymal brain bleed that could be secondary to watershed infarction with hemorrhagic conversion, on top that patient was taking Anticoagulant, we need to keep an close eye regarding the potential of rebleeding. Xarelto usually has half life of 4-9 hours and patient has received multiple products during OR that consist multiple factors level.     Plan:  CBC, PT/INR/PTT, Fibrinogen, D-dimer Q6hr   Repeat CT head and CTA in the AM   If rebleeding, will need more FFP/cryoprecipitate depends on his fibrinogen level.     Hematology will follow closely. 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 Per note, patient s/p Ucqlu-Prnt-Ldxqdpc anastomosis and aortic arch reconstruction followed by a fenestrated lateral tunnel Fontan completion (now s/p fenestration closure) s/p cardiac catheterization and ablation.     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 He had cardioversion today and was in his usual health, until that he suddenly became altered mental status and unresponsive. Head CT showed parenchymal hemorrhage in the anterior left frontal lobe involving the boundary zone of the left ROCCO and MCA territories.     Patient was reported to be taking Xarelto for his arrythmia condition. Hematology was consulted given the brain parenchymal hemorrhage and to inquire the potential reversal agent for Xarelto and further management.     Though patient was found to have brain parenchymal hemorrhage, patient was reported to return back his mental status to his baseline. The decision is to monitor closely with serial head CT scan. Xarelto was on hold since then. Recommend that Recombinant Coagulation Factor Xa(Andexxa) will be the reversal agent for Xarelto. If unable to secure Andexxa in a short period of time and patient condition deteriorate or further bleeding requiring OR intervention, may consider using NovoSeven as substitution medication    Patient subsequently presented with repeat AMS and acute R sided hemiparesis, and repeat CT scan showed interval worsening of intraparenchymal hemorrhage. Patient was brought to OR by Neurosurgery for evacuation of brain parenchymal hemorrhage.     Spoke with Neurosurgery closely prior and after procedure, patient received multiple products including Andexxa, Kcentra, FFP and Cryoprecipitate during the procedure. Almost 1L of PRBC with clot was evacuated, and EVD was placed per report. Patient was transported back to PICU for further management.     Given that patient has significant intraprenchymal brain bleed that could be secondary to watershed infarction with hemorrhagic conversion, on top that patient was taking Anticoagulant, we need to keep an close eye regarding the potential of rebleeding. Xarelto usually has half life of 4-9 hours and patient has received multiple products during OR that consist multiple factors level.     Plan:  CBC, PT/INR/PTT, Fibrinogen, D-dimer Q6hr   Repeat CT head and CTA in the AM   If rebleeding, will consider needing more products vs FFP/cryoprecipitate depends on his lab value and condition. 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 Per note, patient s/p Sllga-Rssx-Tszudjb anastomosis and aortic arch reconstruction followed by a fenestrated lateral tunnel Fontan completion (now s/p fenestration closure), s/p cardiac catheterization and ablation.     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 He had cardioversion today and was in his usual health, until he suddenly developed altered mental status and became unresponsive. Head CT showed parenchymal hemorrhage in the anterior left frontal lobe involving the boundary zone of the left ROCCO and MCA territories.     Patient was reported to be taking Xarelto for his arrythmia condition. Hematology was consulted given the brain parenchymal hemorrhage and to inquire about the potential reversal agent for Xarelto and further management.     Though patient was found to have brain parenchymal hemorrhage, patient was reported to return back his baseline mental status. The decision was made to monitor closely with serial head CT scans and hold Xarelto. Recommend that Recombinant Coagulation Factor Xa(Andexxa) will be the reversal agent for Xarelto. If unable to secure Andexxa in a short period of time and patient's condition deteriorates or further bleeding requiring OR intervention ensues, may consider using NovoSeven as substitution medication.    Patient subsequently presented with repeat AMS and acute R sided hemiparesis, and repeat CT scan showed interval worsening of intraparenchymal hemorrhage. Patient was brought to OR by Neurosurgery for evacuation of brain parenchymal hemorrhage.     Spoke with Neurosurgery closely prior and after procedure. Patient received multiple products including Andexxa (bolus and IV infusion), Kcentra, FFP and Cryoprecipitate prior and during the procedure. Almost 1L of PRBC with clot was evacuated, and EVD was placed per report. Patient was transported back to PICU for further management.     Given that patient has significant intraparenchymal brain bleed that may be secondary to watershed infarction with hemorrhagic conversion, and that patient was taking anticoagulant, patient needs to be monitored for the potential of rebleeding. Xarelto has half life of 4-9 hours and patient has received multiple products during OR that consist of multiple factors.     Plan:  CBC, PT/INR/PTT, Fibrinogen, D-dimer Q6hr   Repeat CT head and CTA in the AM   If rebleeding, will consider needing more products vs FFP/cryoprecipitate depends on his lab value and condition.  Consult with cardiology as patient may develop a prothrombotic state and may have cardiac manifestations given his need for anticoagulation.    I agree with recommendations as noted.

## 2021-06-02 NOTE — PROGRESS NOTE PEDS - SUBJECTIVE AND OBJECTIVE BOX
Reason for Visit:     [ ] History per:   [ ]  utilized, number:     Interval History/ROS:    PATIENT CARE ACCESS DEVICES:  [ ] Peripheral IV  [ ] Central Venous Line, Date Placed:		Site/Device:    Diet: Diet, NPO - Pediatric (06-01-21 @ 09:16)    MEDICATIONS  (STANDING):  aMIOdarone   Oral Tab/Cap - Peds 300 milliGRAM(s) Oral daily  Andexanet Raymond 400 milliGRAM(s),IV Diluent 40 milliLiter(s) 400 milliGRAM(s) IV Bolus once  Andexanet Raymond 480 milliGRAM(s),IV Diluent 48 milliLiter(s) 480 milliGRAM(s) (24 mL/Hr) IV Continuous <Continuous>  calcium chloride  IV Intermittent - Peds 1000 milliGRAM(s) IV Intermittent once  ceFAZolin  IV Intermittent - Peds 1980 milliGRAM(s) IV Intermittent once  dexMEDEtomidine Infusion - Peds 0.5 MICROgram(s)/kG/Hr (8.26 mL/Hr) IV Continuous <Continuous>  dextrose 5%. - Pediatric 1000 milliLiter(s) (10 mL/Hr) IV Continuous <Continuous>  fentaNYL   Infusion - Peds 2 MICROgram(s)/kG/Hr (2.64 mL/Hr) IV Continuous <Continuous>  furosemide  IV Intermittent - Peds 10 milliGRAM(s) IV Intermittent every 24 hours  heparin   Infusion - Pediatric 0.045 Unit(s)/kG/Hr (3 mL/Hr) IV Continuous <Continuous>  levETIRAcetam IV Intermittent - Peds 1000 milliGRAM(s) IV Intermittent every 12 hours  lisinopril Oral Tab/Cap - Peds 5 milliGRAM(s) Oral daily  Prothrombin Complex Concentrate IVPB 1500 Unit(s),IV SOLUTION 60 milliLiter(s) 1500 Unit(s) IV Intermittent once  sildenafil  IV Intermittent - Peds 10 milliGRAM(s) IV Intermittent every 8 hours  sodium chloride 0.9% lock flush - Peds 3 milliLiter(s) IV Push every 8 hours  sodium chloride 0.9% lock flush - Peds 3 milliLiter(s) IV Push every 8 hours  sodium chloride 0.9%. - Pediatric 1000 milliLiter(s) (100 mL/Hr) IV Continuous <Continuous>  sodium chloride 3% Infusion - Pediatric 1 mL/kG/Hr (66.1 mL/Hr) IV Continuous <Continuous>  spironolactone Oral Tab/Cap - Peds 100 milliGRAM(s) Oral daily    MEDICATIONS  (PRN):  acetaminophen   Oral Tab/Cap - Peds. 650 milliGRAM(s) Oral every 6 hours PRN Mild Pain (1 - 3)  fentaNYL    IV Intermittent - Peds 130 MICROGram(s) IV Intermittent every 1 hour PRN sedation    Vital Signs Last 24 Hrs  T(C): 37 (02 Jun 2021 05:00), Max: 37.8 (02 Jun 2021 04:00)  T(F): 98.6 (02 Jun 2021 05:00), Max: 100 (02 Jun 2021 04:00)  HR: 70 (02 Jun 2021 07:23) (70 - 88)  BP: 134/59 (02 Jun 2021 01:00) (77/50 - 147/76)  BP(mean): 82 (02 Jun 2021 01:00) (56 - 102)  RR: 18 (02 Jun 2021 07:00) (15 - 29)  SpO2: 94% (02 Jun 2021 07:23) (78% - 99%)  Daily     Daily Weight: 63.8 (01 Jun 2021 09:27)    I&O's Summary    01 Jun 2021 07:01  -  02 Jun 2021 07:00  --------------------------------------------------------  IN: 2698.9 mL / OUT: 1405 mL / NET: 1293.9 mL        GENERAL PHYSICAL EXAM  General:        Intubated, opens eyes and blinks spontaneously  CV:               Warm and well perfused.  Respiratory:    Even, nonlabored breathing on ventilator  Extremities:    No joint swelling, erythema, tenderness; normal ROM, no contractures  Skin:              No rash, no neurocutaneous stigmata     NEUROLOGIC EXAM  Mental Status:     Intubated, unable to answer questions but able to follow simple commands.   Cranial Nerves:    Conjugate, straight gaze at baseline without eye deviation, no obvious facial asymmetry. Pupils 3mm->2mm equal and reactive.   Muscle Strength:  1/5 R  strength, 0/5 R shoulder abduction, RUE flexion, extension, RLE flexion, extension. 4/5 L  strength,  Muscle Tone:       Normal tone  DTR:                    3+/4 R Biceps, Brachioradialis, Triceps, 4+/4 Patellar, Ankle, sustained clonus;  2+/4  L Biceps, Brachioradialis, Triceps, Patellar, Ankle bilateral. 3-4 beats clonus. negative Ramirez on R, brisk finger flexor reflexes  Sensation:            Intact to pain on L side, unable to elicit on R given hemiparesis    Lab Results:                        14.2   9.13  )-----------( 151      ( 02 Jun 2021 05:38 )             43.1     06-02    138  |  102  |  20  ----------------------------<  155<H>  4.6   |  22  |  0.84    Ca    9.6      02 Jun 2021 05:38  Phos  3.8     06-02  Mg     1.9     06-02    TPro  7.3  /  Alb  4.3  /  TBili  1.7<H>  /  DBili  x   /  AST  18  /  ALT  18  /  AlkPhos  103  06-02    LIVER FUNCTIONS - ( 02 Jun 2021 05:38 )  Alb: 4.3 g/dL / Pro: 7.3 g/dL / ALK PHOS: 103 U/L / ALT: 18 U/L / AST: 18 U/L / GGT: x             EEG Results:    Imaging Studies:  < from: CT Head No Cont (06.01.21 @ 16:48) >  1. Parenchymal hemorrhage in the anterior left frontal lobe, possibly within the boundary zone of the left ROCCO and MCA territories, with extravasation into the regional sulci consistent with parenchymal and subarachnoid hemorrhage; hemorrhagic conversion from a small infarct in the left ROCCO/MCA boundary zone is a consideration. Consider MRI follow-up for further assessment of possible ischemic changes, or underlying mass lesion, and to exclude embolic phenomenon which may not be detected by routine head CT.  < end of copied text >

## 2021-06-02 NOTE — PROGRESS NOTE PEDS - ASSESSMENT
20year old with extensive cardiac history on xarelto who developed IPH now s/p craniectomy for evacuation of IPH and placement of EVD

## 2021-06-02 NOTE — PROGRESS NOTE PEDS - ASSESSMENT
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 Presented in IART - s/p EP study with unsuccessful attempt at ablation along with diagnostic cath showing elevated Fontan pressure (20mmHg). He was loaded with amiodarone and ultimately electrically cardioverted out yesterday, however reverted back into IART.    His course has now been further complicated by L frontal parenchymal hemorrhage requiring emergent evacuation with NSx and EVD placement.    Plan:  - Continuos telemetry monitoring.  - Pacemaker set at DDD .  - Continue home medications - Sildenafil 20mg q8h, Aldactone 100mg daily, Lisinopril 5mg OD as able. Please covert what able to IV  - Can make Lasix 20mg QD today (home dosing).  - amiodarone 300mg QD would be his new home medication.  - Daily EKGs.   - anticoagulation care per hematology.   - rest of care per PICU and NSx  - Please page pediatric cardiology with any concerns or questions.    Thank you for involving us in the care of your patient.     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 Presented in IART - s/p EP study with unsuccessful attempt at ablation along with diagnostic cath showing elevated Fontan pressure (20mmHg). He was loaded with amiodarone and ultimately electrically cardioverted out yesterday, however reverted back into IART.    His course has now been further complicated by L frontal parenchymal hemorrhage requiring emergent evacuation with NSx and EVD placement.    Plan:  - Continuos telemetry monitoring.  - Pacemaker set at DDD .  - Continue home medications - Sildenafil 20mg q8h, Aldactone 100mg daily, Lisinopril 5mg OD as able. Please covert what able to IV. Okay to hold other medications  - Monitor blood pressurs  - amiodarone 300mg QD would be his new home medication.  - Daily EKGs.   - anticoagulation care per hematology.   - rest of care per PICU and NSx  - Please page pediatric cardiology with any concerns or questions.    Thank you for involving us in the care of your patient.     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 Presented in IART - s/p EP study with unsuccessful attempt at ablation along with diagnostic cath showing elevated Fontan pressure (20mmHg). He was loaded with amiodarone and ultimately electrically cardioverted out yesterday, however reverted back into IART.    His course has now been further complicated by L frontal parenchymal hemorrhage requiring emergent evacuation with NSx and EVD placement.    Plan:  - Continuos telemetry monitoring.  - Pacemaker set at DDD .  - Continue home medications - Sildenafil 20mg q8h (covert to IV)  - Monitor blood pressure. Goal MAPs > 80. Agree with norepi or vasopressin.   - Hold home lisinopril, aldactone  - hold home amidarone, will discuss with EP.  - Daily EKGs.   - anticoagulation care per hematology.   - rest of care per PICU and NSx  - Please page pediatric cardiology with any concerns or questions.    Thank you for involving us in the care of your patient.     Guicho Oliveros MD, MPH  Pediatric Cardiology Fellow  PAGER: [phone]

## 2021-06-02 NOTE — PROGRESS NOTE PEDS - SUBJECTIVE AND OBJECTIVE BOX
OVERNIGHT EVENTS:  Pt went to the OR from evacuation of IPH and placement of EVD. EVD @10cm of H20 output 85cc since OR.    HPI:  21yo male with complex cardiac h/o DILV, L-malposition of great arteries, sick sinus syndrome and AV mihir disease with tachybradycardia syndrome with a dual chamber pacemaker (currently with RV lead fracture and is currently only LV paced 2/2 complete heart block), PSH of status post Argws-Kiuc-Tyxeoih anastomosis and aortic arch reconstruction followed by a fenestrated lateral tunnel Fontan completion (now s/p fenestration closure). here s/p cardiac catheterization and ablation. Procedure was complicated by unsuccessful ablation and post extubation patient was noted to have increased hemoptysis and desaturations, patient was reintubated for airway protection and given propofol for sedation.  (26 May 2021 20:19)      Vital Signs Last 24 Hrs  T(C): 37 (02 Jun 2021 05:00), Max: 37.8 (02 Jun 2021 04:00)  T(F): 98.6 (02 Jun 2021 05:00), Max: 100 (02 Jun 2021 04:00)  HR: 70 (02 Jun 2021 07:23) (70 - 88)  BP: 134/59 (02 Jun 2021 01:00) (77/50 - 147/76)  BP(mean): 82 (02 Jun 2021 01:00) (56 - 102)  RR: 18 (02 Jun 2021 07:00) (15 - 29)  SpO2: 94% (02 Jun 2021 07:23) (78% - 99%)    I&O's Summary    01 Jun 2021 07:01  -  02 Jun 2021 07:00  --------------------------------------------------------  IN: 2698.9 mL / OUT: 1405 mL / NET: 1293.9 mL        PHYSICAL EXAM:  Mental Status:  Intubated sedated  Not following commands  Opens eyes spontaneously  GOODSON spontaneously    TUBES/LINES:  [ ] Ventriculostomy:      LABS:                        14.2   9.13  )-----------( 151      ( 02 Jun 2021 05:38 )             43.1     06-02    138  |  102  |  20  ----------------------------<  155<H>  4.6   |  22  |  0.84    Ca    9.6      02 Jun 2021 05:38  Phos  3.8     06-02  Mg     1.9     06-02    TPro  7.3  /  Alb  4.3  /  TBili  1.7<H>  /  DBili  x   /  AST  18  /  ALT  18  /  AlkPhos  103  06-02    PT/INR - ( 02 Jun 2021 05:38 )   PT: 21.6 sec;   INR: 1.94 ratio         PTT - ( 02 Jun 2021 05:38 )  PTT:31.8 sec      CULTURES:      CSF Analysis:       Drug Levels:       Allergies    No Known Allergies    Intolerances        MEDICATIONS:  Antibiotics:  ceFAZolin  IV Intermittent - Peds 1980 milliGRAM(s) IV Intermittent once    Neuro:  acetaminophen   Oral Tab/Cap - Peds. 650 milliGRAM(s) Oral every 6 hours PRN  dexMEDEtomidine Infusion - Peds 0.5 MICROgram(s)/kG/Hr IV Continuous <Continuous>  fentaNYL    IV Intermittent - Peds 130 MICROGram(s) IV Intermittent every 1 hour PRN  fentaNYL   Infusion - Peds 2 MICROgram(s)/kG/Hr IV Continuous <Continuous>  levETIRAcetam IV Intermittent - Peds 1000 milliGRAM(s) IV Intermittent every 12 hours    Anticoagulation  heparin   Infusion - Pediatric 0.045 Unit(s)/kG/Hr IV Continuous <Continuous>    OTHER:  aMIOdarone   Oral Tab/Cap - Peds 300 milliGRAM(s) Oral daily  Andexanet Raymond 400 milliGRAM(s),IV Diluent 40 milliLiter(s) 400 milliGRAM(s) IV Bolus once  Andexanet Raymond 480 milliGRAM(s),IV Diluent 48 milliLiter(s) 480 milliGRAM(s) IV Continuous <Continuous>  furosemide  IV Intermittent - Peds 10 milliGRAM(s) IV Intermittent every 24 hours  lisinopril Oral Tab/Cap - Peds 5 milliGRAM(s) Oral daily  Prothrombin Complex Concentrate IVPB 1500 Unit(s),IV SOLUTION 60 milliLiter(s) 1500 Unit(s) IV Intermittent once  sildenafil  IV Intermittent - Peds 10 milliGRAM(s) IV Intermittent every 8 hours  spironolactone Oral Tab/Cap - Peds 100 milliGRAM(s) Oral daily    IVF:  calcium chloride  IV Intermittent - Peds 1000 milliGRAM(s) IV Intermittent once  dextrose 5%. - Pediatric 1000 milliLiter(s) IV Continuous <Continuous>  sodium chloride 0.9% lock flush - Peds 3 milliLiter(s) IV Push every 8 hours  sodium chloride 0.9% lock flush - Peds 3 milliLiter(s) IV Push every 8 hours  sodium chloride 0.9%. - Pediatric 1000 milliLiter(s) IV Continuous <Continuous>  sodium chloride 3% Infusion - Pediatric 1 mL/kG/Hr IV Continuous <Continuous>

## 2021-06-03 LAB
APTT BLD: 38.6 SEC — HIGH (ref 27–36.3)
APTT BLD: 39.3 SEC — HIGH (ref 27–36.3)
BASOPHILS # BLD AUTO: 0.03 K/UL — SIGNIFICANT CHANGE UP (ref 0–0.2)
BASOPHILS # BLD AUTO: 0.05 K/UL — SIGNIFICANT CHANGE UP (ref 0–0.2)
BASOPHILS NFR BLD AUTO: 0.4 % — SIGNIFICANT CHANGE UP (ref 0–2)
BASOPHILS NFR BLD AUTO: 0.6 % — SIGNIFICANT CHANGE UP (ref 0–2)
BLOOD GAS ARTERIAL - LYTES,HGB,ICA,LACT RESULT: SIGNIFICANT CHANGE UP
D DIMER BLD IA.RAPID-MCNC: 3213 NG/ML DDU — HIGH
D DIMER BLD IA.RAPID-MCNC: 5323 NG/ML DDU — SIGNIFICANT CHANGE UP
D DIMER BLD IA.RAPID-MCNC: 6403 NG/ML DDU — HIGH
D DIMER BLD IA.RAPID-MCNC: 7932 NG/ML DDU — SIGNIFICANT CHANGE UP
EOSINOPHIL # BLD AUTO: 0.07 K/UL — SIGNIFICANT CHANGE UP (ref 0–0.5)
EOSINOPHIL # BLD AUTO: 0.27 K/UL — SIGNIFICANT CHANGE UP (ref 0–0.5)
EOSINOPHIL NFR BLD AUTO: 0.9 % — SIGNIFICANT CHANGE UP (ref 0–6)
EOSINOPHIL NFR BLD AUTO: 3.2 % — SIGNIFICANT CHANGE UP (ref 0–6)
FACT II INHIB PPP-ACNC: 87.8 % — SIGNIFICANT CHANGE UP (ref 75–135)
FACT V ACT/NOR PPP: 48.7 % — LOW (ref 75–150)
FACT VII ACT/NOR PPP: 33.6 % — LOW (ref 70–165)
FACT X ACT/NOR PPP: 68.3 % — LOW (ref 70–150)
FIBRINOGEN PPP-MCNC: 580 MG/DL — HIGH (ref 290–520)
FIBRINOGEN PPP-MCNC: 652 MG/DL — HIGH (ref 290–520)
FIBRINOGEN PPP-MCNC: 709 MG/DL — HIGH (ref 290–520)
FIBRINOGEN PPP-MCNC: 718 MG/DL — HIGH (ref 290–520)
HCT VFR BLD CALC: 37.3 % — LOW (ref 39–50)
HCT VFR BLD CALC: 39.5 % — SIGNIFICANT CHANGE UP (ref 39–50)
HGB BLD-MCNC: 11.6 G/DL — LOW (ref 13–17)
HGB BLD-MCNC: 12.3 G/DL — LOW (ref 13–17)
IANC: 6.11 K/UL — SIGNIFICANT CHANGE UP (ref 1.5–8.5)
IANC: 6.49 K/UL — SIGNIFICANT CHANGE UP (ref 1.5–8.5)
IMM GRANULOCYTES NFR BLD AUTO: 0.7 % — SIGNIFICANT CHANGE UP (ref 0–1.5)
IMM GRANULOCYTES NFR BLD AUTO: 1.1 % — SIGNIFICANT CHANGE UP (ref 0–1.5)
INR BLD: 1.68 RATIO — HIGH (ref 0.88–1.16)
INR BLD: 1.76 RATIO — HIGH (ref 0.88–1.16)
LYMPHOCYTES # BLD AUTO: 0.67 K/UL — LOW (ref 1–3.3)
LYMPHOCYTES # BLD AUTO: 0.77 K/UL — LOW (ref 1–3.3)
LYMPHOCYTES # BLD AUTO: 8.5 % — LOW (ref 13–44)
LYMPHOCYTES # BLD AUTO: 9 % — LOW (ref 13–44)
MCHC RBC-ENTMCNC: 26.1 PG — LOW (ref 27–34)
MCHC RBC-ENTMCNC: 26.8 PG — LOW (ref 27–34)
MCHC RBC-ENTMCNC: 31.1 GM/DL — LOW (ref 32–36)
MCHC RBC-ENTMCNC: 31.1 GM/DL — LOW (ref 32–36)
MCV RBC AUTO: 84 FL — SIGNIFICANT CHANGE UP (ref 80–100)
MCV RBC AUTO: 86.1 FL — SIGNIFICANT CHANGE UP (ref 80–100)
MONOCYTES # BLD AUTO: 0.9 K/UL — SIGNIFICANT CHANGE UP (ref 0–0.9)
MONOCYTES # BLD AUTO: 0.91 K/UL — HIGH (ref 0–0.9)
MONOCYTES NFR BLD AUTO: 10.6 % — SIGNIFICANT CHANGE UP (ref 2–14)
MONOCYTES NFR BLD AUTO: 11.5 % — SIGNIFICANT CHANGE UP (ref 2–14)
NEUTROPHILS # BLD AUTO: 6.11 K/UL — SIGNIFICANT CHANGE UP (ref 1.8–7.4)
NEUTROPHILS # BLD AUTO: 6.49 K/UL — SIGNIFICANT CHANGE UP (ref 1.8–7.4)
NEUTROPHILS NFR BLD AUTO: 76.1 % — SIGNIFICANT CHANGE UP (ref 43–77)
NEUTROPHILS NFR BLD AUTO: 77.4 % — HIGH (ref 43–77)
NRBC # BLD: 0 /100 WBCS — SIGNIFICANT CHANGE UP
NRBC # BLD: 0 /100 WBCS — SIGNIFICANT CHANGE UP
NRBC # FLD: 0 K/UL — SIGNIFICANT CHANGE UP
NRBC # FLD: 0 K/UL — SIGNIFICANT CHANGE UP
PLATELET # BLD AUTO: 122 K/UL — LOW (ref 150–400)
PLATELET # BLD AUTO: 147 K/UL — LOW (ref 150–400)
PROTHROM AB SERPL-ACNC: 18.7 SEC — HIGH (ref 10.6–13.6)
PROTHROM AB SERPL-ACNC: 19.5 SEC — HIGH (ref 10.6–13.6)
RBC # BLD: 4.44 M/UL — SIGNIFICANT CHANGE UP (ref 4.2–5.8)
RBC # BLD: 4.59 M/UL — SIGNIFICANT CHANGE UP (ref 4.2–5.8)
RBC # FLD: 14.4 % — SIGNIFICANT CHANGE UP (ref 10.3–14.5)
RBC # FLD: 14.6 % — HIGH (ref 10.3–14.5)
WBC # BLD: 7.9 K/UL — SIGNIFICANT CHANGE UP (ref 3.8–10.5)
WBC # BLD: 8.52 K/UL — SIGNIFICANT CHANGE UP (ref 3.8–10.5)
WBC # FLD AUTO: 7.9 K/UL — SIGNIFICANT CHANGE UP (ref 3.8–10.5)
WBC # FLD AUTO: 8.52 K/UL — SIGNIFICANT CHANGE UP (ref 3.8–10.5)

## 2021-06-03 PROCEDURE — 99232 SBSQ HOSP IP/OBS MODERATE 35: CPT

## 2021-06-03 PROCEDURE — 99291 CRITICAL CARE FIRST HOUR: CPT

## 2021-06-03 PROCEDURE — ZZZZZ: CPT

## 2021-06-03 PROCEDURE — 99292 CRITICAL CARE ADDL 30 MIN: CPT

## 2021-06-03 PROCEDURE — 71045 X-RAY EXAM CHEST 1 VIEW: CPT | Mod: 26

## 2021-06-03 PROCEDURE — 94681 O2 UPTK CO2 OUTP % O2 XTRC: CPT | Mod: 26

## 2021-06-03 PROCEDURE — 93770 DETERMINATION VENOUS PRESS: CPT

## 2021-06-03 PROCEDURE — 70450 CT HEAD/BRAIN W/O DYE: CPT | Mod: 26

## 2021-06-03 RX ORDER — ACETAMINOPHEN 500 MG
1000 TABLET ORAL EVERY 6 HOURS
Refills: 0 | Status: COMPLETED | OUTPATIENT
Start: 2021-06-03 | End: 2021-06-04

## 2021-06-03 RX ORDER — FUROSEMIDE 40 MG
10 TABLET ORAL EVERY 6 HOURS
Refills: 0 | Status: DISCONTINUED | OUTPATIENT
Start: 2021-06-03 | End: 2021-06-03

## 2021-06-03 RX ORDER — PROPOFOL 10 MG/ML
66 INJECTION, EMULSION INTRAVENOUS ONCE
Refills: 0 | Status: COMPLETED | OUTPATIENT
Start: 2021-06-03 | End: 2021-06-03

## 2021-06-03 RX ORDER — PROPOFOL 10 MG/ML
1 INJECTION, EMULSION INTRAVENOUS
Qty: 1000 | Refills: 0 | Status: DISCONTINUED | OUTPATIENT
Start: 2021-06-03 | End: 2021-06-05

## 2021-06-03 RX ORDER — CALCIUM CHLORIDE
1000 POWDER (GRAM) MISCELLANEOUS ONCE
Refills: 0 | Status: COMPLETED | OUTPATIENT
Start: 2021-06-03 | End: 2021-06-03

## 2021-06-03 RX ORDER — SODIUM CHLORIDE 5 G/100ML
200 INJECTION, SOLUTION INTRAVENOUS ONCE
Refills: 0 | Status: COMPLETED | OUTPATIENT
Start: 2021-06-03 | End: 2021-06-03

## 2021-06-03 RX ORDER — FENTANYL CITRATE 50 UG/ML
200 INJECTION INTRAVENOUS ONCE
Refills: 0 | Status: DISCONTINUED | OUTPATIENT
Start: 2021-06-03 | End: 2021-06-03

## 2021-06-03 RX ORDER — CHLORHEXIDINE GLUCONATE 213 G/1000ML
1 SOLUTION TOPICAL DAILY
Refills: 0 | Status: DISCONTINUED | OUTPATIENT
Start: 2021-06-03 | End: 2021-06-15

## 2021-06-03 RX ORDER — FUROSEMIDE 40 MG
10 TABLET ORAL EVERY 8 HOURS
Refills: 0 | Status: DISCONTINUED | OUTPATIENT
Start: 2021-06-03 | End: 2021-06-04

## 2021-06-03 RX ORDER — VASOPRESSIN 20 [USP'U]/ML
0.5 INJECTION INTRAVENOUS
Qty: 10 | Refills: 0 | Status: DISCONTINUED | OUTPATIENT
Start: 2021-06-03 | End: 2021-06-04

## 2021-06-03 RX ADMIN — SODIUM CHLORIDE 3 MILLILITER(S): 9 INJECTION, SOLUTION INTRAVENOUS at 07:31

## 2021-06-03 RX ADMIN — Medication 260 MILLIGRAM(S): at 08:18

## 2021-06-03 RX ADMIN — Medication 2 MILLIGRAM(S): at 06:13

## 2021-06-03 RX ADMIN — CHLORHEXIDINE GLUCONATE 1 APPLICATION(S): 213 SOLUTION TOPICAL at 23:59

## 2021-06-03 RX ADMIN — PROPOFOL 6.61 MG/KG/HR: 10 INJECTION, EMULSION INTRAVENOUS at 19:26

## 2021-06-03 RX ADMIN — Medication 3 UNIT(S)/KG/HR: at 19:28

## 2021-06-03 RX ADMIN — Medication 4.96 MICROGRAM(S)/KG/MIN: at 01:02

## 2021-06-03 RX ADMIN — FENTANYL CITRATE 3.97 MICROGRAM(S)/KG/HR: 50 INJECTION INTRAVENOUS at 13:52

## 2021-06-03 RX ADMIN — Medication 198 MILLIGRAM(S): at 14:17

## 2021-06-03 RX ADMIN — CHLORHEXIDINE GLUCONATE 15 MILLILITER(S): 213 SOLUTION TOPICAL at 15:30

## 2021-06-03 RX ADMIN — Medication 198 MILLIGRAM(S): at 06:14

## 2021-06-03 RX ADMIN — SODIUM CHLORIDE 3 MILLILITER(S): 9 INJECTION INTRAMUSCULAR; INTRAVENOUS; SUBCUTANEOUS at 18:42

## 2021-06-03 RX ADMIN — Medication 4.96 MICROGRAM(S)/KG/MIN: at 06:11

## 2021-06-03 RX ADMIN — Medication 2.97 MICROGRAM(S)/KG/MIN: at 07:28

## 2021-06-03 RX ADMIN — Medication 3.97 MICROGRAM(S)/KG/MIN: at 02:05

## 2021-06-03 RX ADMIN — SODIUM CHLORIDE 200 MILLILITER(S): 5 INJECTION, SOLUTION INTRAVENOUS at 02:40

## 2021-06-03 RX ADMIN — Medication 650 MILLIGRAM(S): at 03:08

## 2021-06-03 RX ADMIN — Medication 400 MILLIGRAM(S): at 14:43

## 2021-06-03 RX ADMIN — Medication 1 DROP(S): at 08:20

## 2021-06-03 RX ADMIN — Medication 3 UNIT(S)/KG/HR: at 07:29

## 2021-06-03 RX ADMIN — Medication 3 UNIT(S)/KG/HR: at 20:00

## 2021-06-03 RX ADMIN — Medication 25 MILLIGRAM(S): at 12:09

## 2021-06-03 RX ADMIN — Medication 9.92 MICROGRAM(S)/KG/MIN: at 16:38

## 2021-06-03 RX ADMIN — FENTANYL CITRATE 2.64 MICROGRAM(S)/KG/HR: 50 INJECTION INTRAVENOUS at 08:09

## 2021-06-03 RX ADMIN — SODIUM CHLORIDE 3 MILLILITER(S): 9 INJECTION INTRAMUSCULAR; INTRAVENOUS; SUBCUTANEOUS at 10:12

## 2021-06-03 RX ADMIN — SODIUM CHLORIDE 15 MILLILITER(S): 9 INJECTION, SOLUTION INTRAVENOUS at 09:40

## 2021-06-03 RX ADMIN — Medication 5 MILLIGRAM(S): at 21:57

## 2021-06-03 RX ADMIN — PROPOFOL 66 MILLIGRAM(S): 10 INJECTION, EMULSION INTRAVENOUS at 14:03

## 2021-06-03 RX ADMIN — FENTANYL CITRATE 3.97 MICROGRAM(S)/KG/HR: 50 INJECTION INTRAVENOUS at 19:27

## 2021-06-03 RX ADMIN — Medication 25 MILLIGRAM(S): at 04:26

## 2021-06-03 RX ADMIN — Medication 260 MILLIGRAM(S): at 02:00

## 2021-06-03 RX ADMIN — PROPOFOL 6.61 MG/KG/HR: 10 INJECTION, EMULSION INTRAVENOUS at 17:39

## 2021-06-03 RX ADMIN — Medication 1000 MILLIGRAM(S): at 21:16

## 2021-06-03 RX ADMIN — FENTANYL CITRATE 32 MICROGRAM(S): 50 INJECTION INTRAVENOUS at 13:48

## 2021-06-03 RX ADMIN — SODIUM CHLORIDE 66.1 ML/KG/HR: 5 INJECTION, SOLUTION INTRAVENOUS at 12:02

## 2021-06-03 RX ADMIN — Medication 20 MILLIGRAM(S): at 06:12

## 2021-06-03 RX ADMIN — FENTANYL CITRATE 20.8 MICROGRAM(S): 50 INJECTION INTRAVENOUS at 13:32

## 2021-06-03 RX ADMIN — SODIUM CHLORIDE 66.1 ML/KG/HR: 5 INJECTION, SOLUTION INTRAVENOUS at 20:00

## 2021-06-03 RX ADMIN — SODIUM CHLORIDE 3 MILLILITER(S): 9 INJECTION, SOLUTION INTRAVENOUS at 05:36

## 2021-06-03 RX ADMIN — Medication 4 MILLIGRAM(S): at 00:20

## 2021-06-03 RX ADMIN — LEVETIRACETAM 266.68 MILLIGRAM(S): 250 TABLET, FILM COATED ORAL at 04:52

## 2021-06-03 RX ADMIN — VASOPRESSIN 0.99 MILLIUNIT(S)/KG/MIN: 20 INJECTION INTRAVENOUS at 10:43

## 2021-06-03 RX ADMIN — FENTANYL CITRATE 20.8 MICROGRAM(S): 50 INJECTION INTRAVENOUS at 17:07

## 2021-06-03 RX ADMIN — SODIUM CHLORIDE 3 MILLILITER(S): 9 INJECTION, SOLUTION INTRAVENOUS at 19:28

## 2021-06-03 RX ADMIN — Medication 2 MILLIGRAM(S): at 00:35

## 2021-06-03 RX ADMIN — Medication 25 MILLIGRAM(S): at 20:54

## 2021-06-03 RX ADMIN — FAMOTIDINE 200 MILLIGRAM(S): 10 INJECTION INTRAVENOUS at 14:36

## 2021-06-03 RX ADMIN — VASOPRESSIN 1.39 MILLIUNIT(S)/KG/MIN: 20 INJECTION INTRAVENOUS at 12:00

## 2021-06-03 RX ADMIN — Medication 2 MILLIGRAM(S): at 12:49

## 2021-06-03 RX ADMIN — SODIUM CHLORIDE 66.1 ML/KG/HR: 5 INJECTION, SOLUTION INTRAVENOUS at 07:29

## 2021-06-03 RX ADMIN — CHLORHEXIDINE GLUCONATE 15 MILLILITER(S): 213 SOLUTION TOPICAL at 04:10

## 2021-06-03 RX ADMIN — LEVETIRACETAM 266.68 MILLIGRAM(S): 250 TABLET, FILM COATED ORAL at 17:10

## 2021-06-03 RX ADMIN — SODIUM CHLORIDE 66.1 ML/KG/HR: 5 INJECTION, SOLUTION INTRAVENOUS at 19:28

## 2021-06-03 RX ADMIN — Medication 400 MILLIGRAM(S): at 20:54

## 2021-06-03 RX ADMIN — Medication 198 MILLIGRAM(S): at 22:00

## 2021-06-03 RX ADMIN — SODIUM CHLORIDE 3 MILLILITER(S): 9 INJECTION INTRAMUSCULAR; INTRAVENOUS; SUBCUTANEOUS at 10:21

## 2021-06-03 RX ADMIN — FENTANYL CITRATE 3.97 MICROGRAM(S)/KG/HR: 50 INJECTION INTRAVENOUS at 21:54

## 2021-06-03 RX ADMIN — FAMOTIDINE 200 MILLIGRAM(S): 10 INJECTION INTRAVENOUS at 02:00

## 2021-06-03 RX ADMIN — Medication 9.92 MICROGRAM(S)/KG/MIN: at 19:25

## 2021-06-03 RX ADMIN — FENTANYL CITRATE 2.64 MICROGRAM(S)/KG/HR: 50 INJECTION INTRAVENOUS at 07:30

## 2021-06-03 NOTE — PROGRESS NOTE PEDS - ASSESSMENT
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 He has elevated Fontan pressure secondary to LV diastolic dysfunction.  Had severe hemorrhagic stroke left frontal on 6/1 requiring urgent evacuation in OR.    PLAN:  Neuro:  S/P decompressive craniectomy and hemorrhage evacuation 6/1.Bone flap discarded- will need prosthetic  Neuroprotection: 3% NaCl drip for goal SNa >/= 150, keppra, sedation, normothermia  Cont fentanyl, goal SBS -1  EVD at 5 cmH20  Monitor ICP Q1hr    Resp:  Full mechanical ventilation for neuroprotection (goal normocarbia, normoxia)  Follow ETCO2  ABG Q6hr  Goal SpO2 > 92%    CV:  hold Lisinopril, aldactone  Cont sildenafil   Hold Amiodarone until determine interaction with other medications (i,e., xarelto)  Continue Lasix IV Q6hr- goal -500  Goal MAP (for CPP) >/= 85 (due to elevated Fontan pressure about 20)  Cont norepi for goal MAP.  Consider adding vasopressin.  Pacemaker at VOO at 70    Heme:  Holding Xarelto   heme consult appreciated  Coags Q6hr.  Goal PLTs >100  Cont vit K x3 days  Check JANET today    FEN:  NPO on IVF  BMP Q6hr

## 2021-06-03 NOTE — PROGRESS NOTE PEDS - SUBJECTIVE AND OBJECTIVE BOX
Jimbo is a 20 year old male with complex cardiac history (double inlet left ventricle, L-malposition of great arteries, sick sinus syndrome and AV mihir disease with tachybradycardia syndrome with a dual chamber pacemaker) on Xarelto. Per chart review, patient s/p Pmrdq-Akgj-Nmdgaxu anastomosis and aortic arch reconstruction followed by a fenestrated lateral tunnel Fontan completion (now s/p fenestration closure), s/p cardiac catheterization and ablation.     He was admitted initially for IART (interatrial reentrant tachycardia) and failing Fontan physiology now requiring further management, with recent diagnostic cath and failed IART ablation attempt (temporarily paced out of IART in the cath lab). He had cardioversion  and was in his usual health, until he suddenly developed altered mental status and became unresponsive. Head CT showed parenchymal hemorrhage in the anterior left frontal lobe involving the boundary zone of the left ROCCO and MCA territories. Xarelto was held and he was emergently taken to the OR for craniotomy for evacuation during which about ~1L blood was removed and a large clot. Initial CT post-op showed new area of bleeding, but CT from today was stable. He received multiple products for his intracranial hemorrhage (ICH), please refer to previous notes for further details.    6/3/21  Overnight, no acute events. Remains intubated and sedated. Hgb still trending down but rate is slower. Receiving FFP. No bleeding. PT dependent factors and JANET sent.    Change from previous past medical, family or social history:	[x] No	[] Yes:    REVIEW OF SYSTEMS  Unable to obtain due to clinical condition.    MEDICATIONS:  acetaminophen  IV Intermittent - Peds. 1000 milliGRAM(s) IV Intermittent every 6 hours  Andexanet Raymond 480 milliGRAM(s),IV Diluent 48 milliLiter(s) 480 milliGRAM(s) IV Continuous <Continuous>  ceFAZolin  IV Intermittent - Peds 1980 milliGRAM(s) IV Intermittent every 8 hours  chlorhexidine 0.12% Oral Liquid - Peds 15 milliLiter(s) Swish and Spit every 12 hours  chlorhexidine 2% Topical Cloths - Peds 1 Application(s) Topical daily  famotidine IV Intermittent - Peds 20 milliGRAM(s) IV Intermittent every 12 hours  fentaNYL    IV Intermittent - Peds 130 MICROGram(s) IV Intermittent every 1 hour PRN  fentaNYL   Infusion - Peds 3 MICROgram(s)/kG/Hr IV Continuous <Continuous>  furosemide  IV Intermittent - Peds 10 milliGRAM(s) IV Intermittent every 6 hours  heparin   Infusion - Pediatric 0.045 Unit(s)/kG/Hr IV Continuous <Continuous>  levETIRAcetam IV Intermittent - Peds 1000 milliGRAM(s) IV Intermittent every 12 hours  norepinephrine Infusion - Peds 0.1 MICROgram(s)/kG/Min IV Continuous <Continuous>  phytonadione SubCutaneous Injection - Peds 5 milliGRAM(s) SubCutaneous daily  polyvinyl alcohol 1.4%/povidone 0.6% Ophthalmic Solution - Peds 1 Drop(s) Both EYES four times a day PRN  sildenafil  IV Intermittent - Peds 10 milliGRAM(s) IV Intermittent every 8 hours  sodium chloride 0.9% -  250 milliLiter(s) IV Continuous <Continuous>  sodium chloride 0.9% lock flush - Peds 3 milliLiter(s) IV Push every 8 hours  sodium chloride 0.9% lock flush - Peds 3 milliLiter(s) IV Push every 8 hours  sodium chloride 0.9%. - Pediatric 1000 milliLiter(s) IV Continuous <Continuous>  sodium chloride 3% Infusion - Pediatric 1 mL/kG/Hr IV Continuous <Continuous>  vasopressin Infusion - Peds 0.07 milliUNIT(s)/kG/Min IV Continuous <Continuous>    Vital Signs Last 24 Hrs  T(C): 37.5 (2021 12:00), Max: 37.8 (2021 11:00)  T(F): 99.5 (2021 12:00), Max: 100 (2021 11:00)  HR: 70 (2021 13:00) (70 - 73)  BP: 123/68 (2021 08:00) (123/68 - 123/68)  BP(mean): 84 (2021 08:00) (84 - 84)  RR: 14 (2021 13:00) (14 - 16)  SpO2: 94% (2021 13:00) (92% - 99%)    I&O's Summary    2021 07:01  -  2021 07:00  --------------------------------------------------------  IN: 6114.5 mL / OUT: 4394 mL / NET: 1720.5 mL    2021 07:01  -  2021 14:06  --------------------------------------------------------  IN: 1299.8 mL / OUT: 1340 mL / NET: -40.2 mL    PHYSICAL EXAM  All physical exam findings normal, except those marked:  Constitutional: sedated, no acute distress, intermittently moving hands and feet  HEENT: EVD in place, site of craniotomy pulsating; intubated  Cardiovascular	regular, +2 holosystolic murmur; no rubs or gallops; cap refill <2 seconds  Respiratory	on ventilator, clear to auscultation bilaterally  Abdominal	hypoactive bowel sounds, soft, NT, no hepatosplenomegaly, no masses  Extremities	no cyanosis or edema, symmetric pulses  Neurologic	sedated with intermittent spontaneous movements    Lab Results:  CBC  CBC Full  -  ( 2021 05:22 )  WBC Count : 7.90 K/uL  RBC Count : 4.44 M/uL  Hemoglobin : 11.6 g/dL  Hematocrit : 37.3 %  Platelet Count - Automated : 122 K/uL  Mean Cell Volume : 84.0 fL  Mean Cell Hemoglobin : 26.1 pg  Mean Cell Hemoglobin Concentration : 31.1 gm/dL  Auto Neutrophil # : 6.11 K/uL  Auto Lymphocyte # : 0.67 K/uL  Auto Monocyte # : 0.91 K/uL  Auto Eosinophil # : 0.07 K/uL  Auto Basophil # : 0.05 K/uL  Auto Neutrophil % : 77.4 %  Auto Lymphocyte % : 8.5 %  Auto Monocyte % : 11.5 %  Auto Eosinophil % : 0.9 %  Auto Basophil % : 0.6 %    .		Differential:	[x] Automated		[] Manual  Chemistry      150<H>  |  114<H>  |  14  ----------------------------<  109<H>  3.6   |  21<L>  |  0.79    Ca    9.2      2021 10:31  Phos  2.3     06-03  Mg     1.7     06-03    TPro  7.3  /  Alb  4.2  /  TBili  1.3<H>  /  DBili  x   /  AST  20  /  ALT  13  /  AlkPhos  82  06-03    LIVER FUNCTIONS - ( 2021 10:31 )  Alb: 4.2 g/dL / Pro: 7.3 g/dL / ALK PHOS: 82 U/L / ALT: 13 U/L / AST: 20 U/L / GGT: x           PT/INR - ( 2021 10:31 )   PT: 19.3 sec;   INR: 1.72 ratio    PTT - ( 2021 10:31 )  PTT:39.3 sec    Factor X Assay: 68.3  Factor V Assay: 48.7  Factor II Assay: 87.8  Factor VII Assay: 33.6

## 2021-06-03 NOTE — PROGRESS NOTE PEDS - SUBJECTIVE AND OBJECTIVE BOX
Interval/Overnight Events: Had elevation of ICP- EVD dropped to 5cm H20, Na increased.  Repeat CT overnight largely unchanged.  Rec'd FFP overnight.    VITAL SIGNS:  T(C): 37.1 (21 @ 08:00), Max: 37.8 (21 @ 12:00)  HR: 70 (21 @ 08:00) (70 - 73)  BP: 123/68 (21 @ 08:00) (123/68 - 123/68)  ABP: 122/55 (21 @ 08:00) (98/49 - 146/61)  ABP(mean): 76 (21 @ 08:00) (64 - 94)  RR: 14 (21 @ 08:00) (14 - 17)  SpO2: 94% (21 @ 08:00) (92% - 99%)  CVP(mm Hg): 30 (21 @ 08:00) (22 - 32)  End-Tidal CO2: 25    Daily Weight: 63.8 (2021 09:27)    Current Medications:  furosemide  IV Intermittent - Peds 10 milliGRAM(s) IV Intermittent every 6 hours  norepinephrine Infusion - Peds 0.03 MICROgram(s)/kG/Min IV Continuous <Continuous>  sildenafil  IV Intermittent - Peds 10 milliGRAM(s) IV Intermittent every 8 hours  heparin   Infusion - Pediatric 0.045 Unit(s)/kG/Hr IV Continuous <Continuous>  ceFAZolin  IV Intermittent - Peds 1980 milliGRAM(s) IV Intermittent every 8 hours  famotidine IV Intermittent - Peds 20 milliGRAM(s) IV Intermittent every 12 hours  phytonadione SubCutaneous Injection - Peds 5 milliGRAM(s) SubCutaneous daily  sodium chloride 0.9% -  250 milliLiter(s) IV Continuous <Continuous>  sodium chloride 0.9% lock flush - Peds 3 milliLiter(s) IV Push every 8 hours  sodium chloride 0.9% lock flush - Peds 3 milliLiter(s) IV Push every 8 hours  sodium chloride 0.9%. - Pediatric 1000 milliLiter(s) IV Continuous <Continuous>  sodium chloride 3% Infusion - Pediatric 1 mL/kG/Hr IV Continuous <Continuous>  fentaNYL    IV Intermittent - Peds 130 MICROGram(s) IV Intermittent every 1 hour PRN  fentaNYL   Infusion - Peds 2 MICROgram(s)/kG/Hr IV Continuous <Continuous>  levETIRAcetam IV Intermittent - Peds 1000 milliGRAM(s) IV Intermittent every 12 hours  Andexanet Raymond 480 milliGRAM(s),IV Diluent 48 milliLiter(s) 480 milliGRAM(s) IV Continuous <Continuous>  chlorhexidine 0.12% Oral Liquid - Peds 15 milliLiter(s) Swish and Spit every 12 hours  polyvinyl alcohol 1.4%/povidone 0.6% Ophthalmic Solution - Peds 1 Drop(s) Both EYES four times a day PRN    ===============================RESPIRATORY==============================  [ ] FiO2: ___ 	[ ] Heliox: ____ 		[ ] BiPAP: ___   [ ] NC: __  Liters			[ ] HFNC: __ 	Liters, FiO2: __  [ x] Mechanical Ventilation: Mode: SIMV with PS, RR (machine): 14, TV (machine): 400, FiO2: 30, PEEP: 6, PS: 5, ITime: 1, MAP: 11, PIP: 25  [ ] Inhaled Nitric Oxide:  [ ] Extubation Readiness Assessed    =============================CARDIOVASCULAR============================  Cardiac Rhythm:	[ x] NSR		[ ] Other:    ==========================HEMATOLOGY/ONCOLOGY========================  Transfusions:	[ ] PRBC	      [ ] Platelets	[ ] FFP		[ ] Cryoprecipitate  DVT Prophylaxis:    =======================FLUIDS/ELECTROLYTES/NUTRITION=====================  I&O's Summary    2021 07:  -  2021 07:00  --------------------------------------------------------  IN: 6114.5 mL / OUT: 4394 mL / NET: 1720.5 mL    2021 07:  -  2021 09:08  --------------------------------------------------------  IN: 420.3 mL / OUT: 928 mL / NET: -507.7 mL  NEIL: 40    Diet:	[ ] Regular	[ ] Soft		[ ] Clears	      [ x] NPO  .	[ ] Other:  .	[ ] NGT		[ ] NDT		[ ] GT		[ ] GJT    ================================NEUROLOGY=============================  [x SBS:	0 to -1   	[ ] GISSELLE-1:	[ ] BIS:         [ ] CAPD:  [ x] Adequacy of sedation and pain control has been assessed and adjusted    ========================PATIENT CARE ACCESS DEVICES=====================  [ ] Peripheral IV  [x ] Central Venous Line	[ ] R	[x ] L	[ ] IJ	[x ] Fem	[ ] SC			Placed:   [ x] Arterial Line		[x ] R	[ ] L	[ ] PT	[ ] DP	[ ] Fem	[ x] Rad	[ ] Ax	Placed:   [ ] PICC:				[ ] Broviac		[ ] Mediport  [ ] Urinary Catheter, Date Placed:   [ ] Necessity of urinary, arterial, and venous catheters discussed    =============================ANCILLARY TESTS============================  LABS:  ABG - ( 2021 05:13 )  pH: 7.41  /  pCO2: 41    /  pO2: 68    / HCO3: 25    / Base Excess: 1.0   /  SaO2: 92.9  / Lactate: x                                                11.6                  Neurophils% (auto):   77.4   ( @ 05:22):    7.90 )-----------(122          Lymphocytes% (auto):  8.5                                           37.3                   Eosinphils% (auto):   0.9      Manual%: Neutrophils x    ; Lymphocytes x    ; Eosinophils x    ; Bands%: x    ; Blasts x                                  148    |  113    |  15                  Calcium: 9.2   / iCa: 1.10   ( @ 05:22)    ----------------------------<  110       Magnesium: 1.8                              3.7     |  22     |  0.85             Phosphorous: 2.2      TPro  7.0    /  Alb  4.1    /  TBili  1.4    /  DBili  x      /  AST  18     /  ALT  11     /  AlkPhos  83     2021 05:22  (  @ 05:22 )   PT: 19.5 sec;   INR: 1.76 ratio  aPTT: 38.6 sec  RECENT CULTURES:      IMAGING STUDIES:  CXR: ETT good, bilateral increased interstitial markings R>L  ==============================PHYSICAL EXAM============================  GENERAL: sedated  RESPIRATORY: Lungs clear to auscultation bilaterally. Good aeration. No rales, rhonchi, retractions or wheezing. Effort even and unlabored.  CARDIOVASCULAR: Regular rate and rhythm. Normal S1/S2. No murmurs, rubs, or gallop. Capillary refill < 2 seconds. Distal pulses 2+ and equal.  ABDOMEN: Soft, non-distended.  No palpable hepatosplenomegaly.  SKIN: No rash.  EXTREMITIES: Warm and well perfused. No gross extremity deformities.  NEUROLOGIC: The patient is sedated. Responds to commands  ======================================================================  Parent/Guardian is at the bedside:	[ x] Yes	[ ] No  Patient and Parent/Guardian updated as to the progress/plan of care:	[ x] Yes	[ ] No    [ x] The patient remains in critical and unstable condition, and requires ICU care and monitoring.  Total critical care time spent by attending physician was _35___ minutes, excluding procedure time.    [ ] The patient is improving but requires continued monitoring and adjustment of therapy due to ___________________________

## 2021-06-03 NOTE — PROGRESS NOTE PEDS - SUBJECTIVE AND OBJECTIVE BOX
INTERVAL HISTORY: No acute cardiology concerns overnight. Remains in IART.    RESPIRATORY SUPPORT: Mode: SIMV with PS, RR (machine): 14, FiO2: 30, PEEP: 6, PS: 5  NUTRITION: NPO     @ 07:01  -   @ 07:00  --------------------------------------------------------  IN: 6114.5 mL / OUT: 4394 mL / NET: 1720.5 mL    MEDICATIONS:  furosemide  IV Intermittent - Peds 10 milliGRAM(s) IV Intermittent every 6 hours  norepinephrine Infusion - Peds 0.03 MICROgram(s)/kG/Min IV Continuous <Continuous>  sildenafil  IV Intermittent - Peds 10 milliGRAM(s) IV Intermittent every 8 hours  ceFAZolin  IV Intermittent - Peds 1980 milliGRAM(s) IV Intermittent every 8 hours  fentaNYL   Infusion - Peds 2 MICROgram(s)/kG/Hr IV Continuous <Continuous>  levETIRAcetam IV Intermittent - Peds 1000 milliGRAM(s) IV Intermittent every 12 hours  famotidine IV Intermittent - Peds 20 milliGRAM(s) IV Intermittent every 12 hours  heparin   Infusion - Pediatric 0.045 Unit(s)/kG/Hr IV Continuous <Continuous>  phytonadione SubCutaneous Injection - Peds 5 milliGRAM(s) SubCutaneous daily  sodium chloride 0.9% -  250 milliLiter(s) IV Continuous <Continuous>  sodium chloride 0.9% lock flush - Peds 3 milliLiter(s) IV Push every 8 hours  sodium chloride 0.9% lock flush - Peds 3 milliLiter(s) IV Push every 8 hours  sodium chloride 0.9%. - Pediatric 1000 milliLiter(s) IV Continuous <Continuous>  sodium chloride 3% Infusion - Pediatric 1 mL/kG/Hr IV Continuous <Continuous>    PHYSICAL EXAMINATION:  Vital signs -   T(C): 37.1 (21 @ 08:00), Max: 37.8 (21 @ 12:00)  HR: 70 (21 @ 08:00) (70 - 73)  BP: 123/68 (21 @ 08:00) (123/68 - 123/68)  ABP:  (98/49 - 146/61)  RR: 14 (21 @ 08:00) (14 - 17)  SpO2: 94% (21 @ 08:00) (92% - 99%)  CVP(mm Hg):  (22 - 32)    General - intubated, sedated. Will respond to stimuli  Skin - no rash, no desquamation, no cyanosis.  Eyes / ENT - no conjunctival injection, sclerae anicteric, external ears & nares normal, mucous membranes moist.  Pulmonary - normal inspiratory effort, no retractions, lungs clear to auscultation bilaterally, no wheezes, no rales.  Cardiovascular - normal rate, regular rhythm, normal S1 & single S2, grade 1/6 harsh systolic murmur at LMSB, no rubs, no gallops, capillary refill < 2sec, normal pulses.  Gastrointestinal - soft, non-distended, non-tender, no splenomegaly. (+) hepatomegaly.  Musculoskeletal - no joint swelling, no clubbing, no edema.  Neurologic / Psychiatric - intubated, sedated      LABORATORY TESTS:                          11.6  CBC:   7.90 )-----------( 122   (21 @ 05:22)                          37.3               148   |  113   |  15                 Ca: 9.2    BMP:   ----------------------------< 110    M.8   (21 @ 05:22)             3.7    |  22    | 0.85               Ph: 2.2      LFT:     TPro: 7.0 / Alb: 4.1 / TBili: 1.4 / DBili: x / AST: 18 / ALT: 11 / AlkPhos: 83   (21 @ 05:22)    COAG: PT: 19.5 / PTT: 38.6 / INR: 1.76   (21 @ 05:22)       ABG:   pH: 7.41 / pCO2: 41 / pO2: 68 / HCO3: 25 / Base Excess: 1.0 / SaO2: 92.9 / Lactate: x / iCa: 1.22   (21 @ 05:13)    CBG:   pH: 7.45 / pCO2: 30.4 / pO2: 66.0 / HCO3: 23 / Base Excess: -2.5 / Lactate: x   (21 @ 20:18)      IMAGING STUDIES:  Electrocardiogram - (21) Ventricular paced rhythm @ 75bpm. Underlying IART.    Telemetry - (21) Ventricular paced rhythm @ 75bpm. Underlying IART.    Chest x-ray - (21) Stable mild cardiomegaly with subsegmental left lower lobe atelectasis.    Echocardiogram - (21)   1. This was a technically difficult suboptimal study due to poor echo windows. Findings limited to below.   2. 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   3. Mild mitral valve regurgitation.   4. Trivial tricuspid valve regurgitation.   5. Trivial aortic valve regurgitation.   6. Status post device closure of Fontan fenestration.   7. S/P stent placement in the Fontan pathway. Limited imaging of the inferior Fontan baffle. In this setting, the inferior vena cava to its connection near the atrial portion of the baffle appears patent with no obstruction. S/p device closure of Fontan fenestration. The Fontan fenestration is not seen on this study. The right bidirectional Storm is seen only on color flow mapping. This appears to have laminar low velocity flow.   8. The connection of the right bidirectional Storm to the right pulmonary artery could not be imaged. The right pulmonary artery is seen in a limited portion immediately to the left of the Storm and appears patent. The left pulmonary artery could not be imaged.   9. Extremely poor and limited imaging of the lateral free walls of the single systemic left ventricle. On limited short axis views there appears to be adequate systolic excursion of the posterior wall of the left ventricle and decreased in the anterior wall. The bulboventricular foramen could not be imaged. Suggest alternate imaging for appropriate assessment of function.  10. No pericardial effusion.  11. Small right pleural effusion.    Cath - (21)  Access 4 Fr RFA, 7 Fr RFV x2 with US guidance.  Sat data (%): on 50% FiO2 LPA 73, RUPV 98, Ao 97; CI 2.6 L/min/m2 with Qp:Qs 1 :1.  Pressures (mmHg): Elevated mFontan = mIVC = 20. mPCW=16, No significant gradient across LV to AAo to Vikki (98/58/73).  Normal PVR while on sildenafil.  Angios showed unobstructed Fontan with existing stent within Fontan conduit.    Comment: IART ablation was attempted after the diagnostic cath but unsuccessful. Patient was overdrive paced out of IART. At the end of the case, Ao sat was 94% and LPA 66% on 50% FiO2 INTERVAL HISTORY: No acute cardiology concerns overnight. Remains in IART.    RESPIRATORY SUPPORT: Mode: SIMV with PS, RR (machine): 14, FiO2: 30, PEEP: 6, PS: 5  NUTRITION: NPO     @ 07:01  -   @ 07:00  --------------------------------------------------------  IN: 6114.5 mL / OUT: 4394 mL / NET: 1720.5 mL    MEDICATIONS:  furosemide  IV Intermittent - Peds 10 milliGRAM(s) IV Intermittent every 6 hours  norepinephrine Infusion - Peds 0.03 MICROgram(s)/kG/Min IV Continuous <Continuous>  sildenafil  IV Intermittent - Peds 10 milliGRAM(s) IV Intermittent every 8 hours  ceFAZolin  IV Intermittent - Peds 1980 milliGRAM(s) IV Intermittent every 8 hours  fentaNYL   Infusion - Peds 2 MICROgram(s)/kG/Hr IV Continuous <Continuous>  levETIRAcetam IV Intermittent - Peds 1000 milliGRAM(s) IV Intermittent every 12 hours  famotidine IV Intermittent - Peds 20 milliGRAM(s) IV Intermittent every 12 hours  heparin   Infusion - Pediatric 0.045 Unit(s)/kG/Hr IV Continuous <Continuous>  phytonadione SubCutaneous Injection - Peds 5 milliGRAM(s) SubCutaneous daily  sodium chloride 0.9% -  250 milliLiter(s) IV Continuous <Continuous>  sodium chloride 0.9% lock flush - Peds 3 milliLiter(s) IV Push every 8 hours  sodium chloride 0.9% lock flush - Peds 3 milliLiter(s) IV Push every 8 hours  sodium chloride 0.9%. - Pediatric 1000 milliLiter(s) IV Continuous <Continuous>  sodium chloride 3% Infusion - Pediatric 1 mL/kG/Hr IV Continuous <Continuous>    PHYSICAL EXAMINATION:  Vital signs -   T(C): 37.1 (21 @ 08:00), Max: 37.8 (21 @ 12:00)  HR: 70 (21 @ 08:00) (70 - 73)  BP: 123/68 (21 @ 08:00) (123/68 - 123/68)  ABP:  (98/49 - 146/61)  RR: 14 (21 @ 08:00) (14 - 17)  SpO2: 94% (21 @ 08:00) (92% - 99%)  CVP(mm Hg):  (22 - 32)    General - intubated, sedated. Will respond to stimuli  Skin - no rash, no desquamation, no cyanosis.  Eyes / ENT - no conjunctival injection, sclerae anicteric, external ears & nares normal, mucous membranes moist.  Pulmonary - normal inspiratory effort, no retractions, lungs clear to auscultation bilaterally, no wheezes, no rales.  Cardiovascular - normal rate, regular rhythm, normal S1 & single S2, grade 1/6 blowing holosystolic murmur at LMSB, no rubs, no gallops, capillary refill < 2sec, normal pulses.  Gastrointestinal - soft, non-distended, non-tender, no splenomegaly. (+) hepatomegaly.  Musculoskeletal - no joint swelling, no clubbing, no edema.  Neurologic / Psychiatric - intubated, sedated      LABORATORY TESTS:                          11.6  CBC:   7.90 )-----------( 122   (21 @ 05:22)                          37.3               148   |  113   |  15                 Ca: 9.2    BMP:   ----------------------------< 110    M.8   (21 @ 05:22)             3.7    |  22    | 0.85               Ph: 2.2      LFT:     TPro: 7.0 / Alb: 4.1 / TBili: 1.4 / DBili: x / AST: 18 / ALT: 11 / AlkPhos: 83   (21 @ 05:22)    COAG: PT: 19.5 / PTT: 38.6 / INR: 1.76   (21 @ 05:22)       ABG:   pH: 7.41 / pCO2: 41 / pO2: 68 / HCO3: 25 / Base Excess: 1.0 / SaO2: 92.9 / Lactate: x / iCa: 1.22   (21 @ 05:13)    CBG:   pH: 7.45 / pCO2: 30.4 / pO2: 66.0 / HCO3: 23 / Base Excess: -2.5 / Lactate: x   (21 @ 20:18)      IMAGING STUDIES:  Electrocardiogram - (21) Ventricular paced rhythm @ 75bpm. Underlying IART.    Telemetry - (21) Ventricular paced rhythm @ 75bpm. Underlying IART.    Chest x-ray - (21) Stable mild cardiomegaly with subsegmental left lower lobe atelectasis.    Echocardiogram - (21)   1. This was a technically difficult suboptimal study due to poor echo windows. Findings limited to below.   2. 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   3. Mild mitral valve regurgitation.   4. Trivial tricuspid valve regurgitation.   5. Trivial aortic valve regurgitation.   6. Status post device closure of Fontan fenestration.   7. S/P stent placement in the Fontan pathway. Limited imaging of the inferior Fontan baffle. In this setting, the inferior vena cava to its connection near the atrial portion of the baffle appears patent with no obstruction. S/p device closure of Fontan fenestration. The Fontan fenestration is not seen on this study. The right bidirectional Storm is seen only on color flow mapping. This appears to have laminar low velocity flow.   8. The connection of the right bidirectional Storm to the right pulmonary artery could not be imaged. The right pulmonary artery is seen in a limited portion immediately to the left of the Storm and appears patent. The left pulmonary artery could not be imaged.   9. Extremely poor and limited imaging of the lateral free walls of the single systemic left ventricle. On limited short axis views there appears to be adequate systolic excursion of the posterior wall of the left ventricle and decreased in the anterior wall. The bulboventricular foramen could not be imaged. Suggest alternate imaging for appropriate assessment of function.  10. No pericardial effusion.  11. Small right pleural effusion.    Cath - (21)  Access 4 Fr RFA, 7 Fr RFV x2 with US guidance.  Sat data (%): on 50% FiO2 LPA 73, RUPV 98, Ao 97; CI 2.6 L/min/m2 with Qp:Qs 1 :1.  Pressures (mmHg): Elevated mFontan = mIVC = 20. mPCW=16, No significant gradient across LV to AAo to Vikki (98/58/73).  Normal PVR while on sildenafil.  Angios showed unobstructed Fontan with existing stent within Fontan conduit.    Comment: IART ablation was attempted after the diagnostic cath but unsuccessful. Patient was overdrive paced out of IART. At the end of the case, Ao sat was 94% and LPA 66% on 50% FiO2

## 2021-06-03 NOTE — PROGRESS NOTE PEDS - ASSESSMENT
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 Presented in IART - s/p EP study with unsuccessful attempt at ablation along with diagnostic cath showing elevated Fontan pressure (20mmHg). He was loaded with amiodarone and ultimately electrically cardioverted out yesterday, however reverted back into IART. His course has now been further complicated by L frontal parenchymal hemorrhage requiring emergent evacuation with NSx and EVD placement.    Plan:  - Continuos telemetry monitoring.  - Pacemaker set at DDD .  - Continue home medications - Sildenafil 20mg q8h (covert to IV)  - Monitor blood pressure. Goal MAPs > 80. Agree with norepi or vasopressin.   - Hold home lisinopril, aldactone  - hold home amidarone, will discuss with EP.  - Daily EKGs.   - anticoagulation care per hematology.   - rest of care per PICU and NSx  - Please page pediatric cardiology with any concerns or questions.    Thank you for involving us in the care of your patient.     Guicho Oliveros MD, MPH  Pediatric Cardiology Fellow  PAGER: [phone]

## 2021-06-03 NOTE — PROGRESS NOTE PEDS - SUBJECTIVE AND OBJECTIVE BOX
OVERNIGHT EVENTS:  Received 3 units of FFP (8pm, 1am and 2am)    HPI:  21yo male with complex cardiac h/o DILV, L-malposition of great arteries, sick sinus syndrome and AV mihir disease with tachybradycardia syndrome with a dual chamber pacemaker (currently with RV lead fracture and is currently only LV paced 2/2 complete heart block), PSH of status post Cgwrb-Evdr-Ohkdcad anastomosis and aortic arch reconstruction followed by a fenestrated lateral tunnel Fontan completion (now s/p fenestration closure). here s/p cardiac catheterization and ablation. Procedure was complicated by unsuccessful ablation and post extubation patient was noted to have increased hemoptysis and desaturations, patient was reintubated for airway protection and given propofol for sedation.  (26 May 2021 20:19)      ICU Vital Signs Last 24 Hrs  T(C): 36.9 (03 Jun 2021 05:00), Max: 37.8 (02 Jun 2021 12:00)  T(F): 98.4 (03 Jun 2021 05:00), Max: 100 (02 Jun 2021 12:00)  HR: 70 (03 Jun 2021 07:00) (70 - 73)  BP: --  BP(mean): --  ABP: 146/61 (03 Jun 2021 07:00) (88/45 - 146/61)  ABP(mean): 87 (03 Jun 2021 07:00) (58 - 94)  RR: 14 (03 Jun 2021 07:00) (14 - 18)  SpO2: 93% (03 Jun 2021 07:00) (93% - 99%)            PHYSICAL EXAM:  Mental Status:  Intubated, on sedation  Opening eyes to voice  Following commands, showing thumbs up  GOODSON spontaneously, Left more than right. Right sided weakness improving  L craniectomy flap full, not tense  NEIL 25cc  EVD @ 10cm H20 patent, ICP 21, EVD dropped down to 5cm H20 this AM        LABS:                        14.2   9.13  )-----------( 151      ( 02 Jun 2021 05:38 )             43.1     06-02    138  |  102  |  20  ----------------------------<  155<H>  4.6   |  22  |  0.84    Ca    9.6      02 Jun 2021 05:38  Phos  3.8     06-02  Mg     1.9     06-02    TPro  7.3  /  Alb  4.3  /  TBili  1.7<H>  /  DBili  x   /  AST  18  /  ALT  18  /  AlkPhos  103  06-02    PT/INR - ( 02 Jun 2021 05:38 )   PT: 21.6 sec;   INR: 1.94 ratio         PTT - ( 02 Jun 2021 05:38 )  PTT:31.8 sec      CULTURES:      CSF Analysis:       Drug Levels:       Allergies    No Known Allergies    Intolerances        MEDICATIONS:  Antibiotics:  ceFAZolin  IV Intermittent - Peds 1980 milliGRAM(s) IV Intermittent once    Neuro:  acetaminophen   Oral Tab/Cap - Peds. 650 milliGRAM(s) Oral every 6 hours PRN  dexMEDEtomidine Infusion - Peds 0.5 MICROgram(s)/kG/Hr IV Continuous <Continuous>  fentaNYL    IV Intermittent - Peds 130 MICROGram(s) IV Intermittent every 1 hour PRN  fentaNYL   Infusion - Peds 2 MICROgram(s)/kG/Hr IV Continuous <Continuous>  levETIRAcetam IV Intermittent - Peds 1000 milliGRAM(s) IV Intermittent every 12 hours    Anticoagulation  heparin   Infusion - Pediatric 0.045 Unit(s)/kG/Hr IV Continuous <Continuous>    OTHER:  aMIOdarone   Oral Tab/Cap - Peds 300 milliGRAM(s) Oral daily  Andexanet Raymond 400 milliGRAM(s),IV Diluent 40 milliLiter(s) 400 milliGRAM(s) IV Bolus once  Andexanet Raymond 480 milliGRAM(s),IV Diluent 48 milliLiter(s) 480 milliGRAM(s) IV Continuous <Continuous>  furosemide  IV Intermittent - Peds 10 milliGRAM(s) IV Intermittent every 24 hours  lisinopril Oral Tab/Cap - Peds 5 milliGRAM(s) Oral daily  Prothrombin Complex Concentrate IVPB 1500 Unit(s),IV SOLUTION 60 milliLiter(s) 1500 Unit(s) IV Intermittent once  sildenafil  IV Intermittent - Peds 10 milliGRAM(s) IV Intermittent every 8 hours  spironolactone Oral Tab/Cap - Peds 100 milliGRAM(s) Oral daily    IVF:  calcium chloride  IV Intermittent - Peds 1000 milliGRAM(s) IV Intermittent once  dextrose 5%. - Pediatric 1000 milliLiter(s) IV Continuous <Continuous>  sodium chloride 0.9% lock flush - Peds 3 milliLiter(s) IV Push every 8 hours  sodium chloride 0.9% lock flush - Peds 3 milliLiter(s) IV Push every 8 hours  sodium chloride 0.9%. - Pediatric 1000 milliLiter(s) IV Continuous <Continuous>  sodium chloride 3% Infusion - Pediatric 1 mL/kG/Hr IV Continuous <Continuous>

## 2021-06-03 NOTE — PROGRESS NOTE PEDS - ASSESSMENT
19 y/o M with complex cardiac h/o double inlet left ventricle, L-malposition of great arteries, sick sinus syndrome and AV mihir disease with tachybradycardia syndrome with a dual chamber pacemaker (currently with RV lead fracture and is currently only LV paced 2/2 complete heart block), s/p Mjley-Mlvy-Qipwmir anastomosis and aortic arch reconstruction followed by a fenestrated lateral tunnel Fontan completion (now s/p fenestration closure) s/p cardiac catheterization and ablation admitted for also 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 Neurology consulted for episode of behavioral arrest for rule out stroke/seizure. Multiple CT heads revealed worsening of ROCCO-MCA watershed territory hemorrhage with extension to the intraventricular regions subsequent to elevated INRs with clinical examination of dense R hemiparesis.    Overnight patient had elevated pressures resulting in decrease in EVD drain to 5cc. No clinical seizures noted overnight. Examination today reveals improvement of dense hemiparesis with adequate  strength to hold onto providers' fingers when pulling away. Some movement noted proximally in the RUE and throughout RLE and patient continues to follow commands well. Continues to have hypertonia and hyperreflexia R>L.      Impression: Episode of unresponsiveness, staring out with jaw clenching with no recollection likely consistent with focal seizure with impaired awareness secondary to watershed infarction with hemorrhagic conversion likely secondary to cardioembolic mechanism versus hypoperfusion resulting in infarction and hemorrhage. Examination continues to improve with more movement along the R.     Recommendations:   [ ] Keppra 1000mg BID  [ ] Strict BP control goal <160/90 w/ Cardene drip if needed  [ ] CT head w/o contrast PRN for worsening mental status or neuro exam    Case to be seen with Neurology attending, Dr. Olivas.  21 y/o M with complex cardiac h/o double inlet left ventricle, L-malposition of great arteries, sick sinus syndrome and AV mihir disease with tachybradycardia syndrome with a dual chamber pacemaker (currently with RV lead fracture and is currently only LV paced 2/2 complete heart block), s/p Jfkyu-Ghsv-Jhzlzcn anastomosis and aortic arch reconstruction followed by a fenestrated lateral tunnel Fontan completion (now s/p fenestration closure) s/p cardiac catheterization and ablation admitted for also 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 Neurology consulted for episode of behavioral arrest for rule out stroke/seizure. Multiple CT heads revealed worsening of ROCCO-MCA watershed territory hemorrhage with extension to the intraventricular regions subsequent to elevated INRs with clinical examination of dense R hemiparesis.    Overnight patient had elevated pressures resulting in decrease in EVD drain to 5cc. No clinical seizures noted overnight. Examination today reveals improvement of dense hemiparesis with adequate  strength to hold onto providers' fingers when pulling away. Some movement noted proximally in the RUE and throughout RLE and patient continues to follow commands well. Continues to have hypertonia and hyperreflexia R>L.      Impression: Episode of unresponsiveness, staring out with jaw clenching with no recollection likely consistent with focal seizure with impaired awareness secondary to watershed infarction with hemorrhagic conversion likely secondary to cardioembolic mechanism versus hypoperfusion resulting in infarction and hemorrhage. Examination continues to improve with more movement along the R.     Recommendations:   [ ] Keppra 1000mg BID  [ ] Strict BP control goal <160/90 w/ Cardene drip if needed  [ ] CT head w/o contrast PRN for worsening mental status or neuro exam

## 2021-06-03 NOTE — PROGRESS NOTE PEDS - ATTENDING COMMENTS
Patient without any further bleeding. Receiving FFP multiple times since out of OR.  Need to watch for clotting.  JANET with normal clotting noted - consistent with the fact that patient has received multiple blood products.  Unsure as to why INR is high and fluctuating.

## 2021-06-03 NOTE — PROGRESS NOTE PEDS - SUBJECTIVE AND OBJECTIVE BOX
NEUROLOGY CONSULT NOTE    Interval History/ROS: No acute clinical seizures noted overnight    PATIENT CARE ACCESS DEVICES:  [ ] Peripheral IV  [ ] Central Venous Line, Date Placed:		Site/Device:    Diet: Diet, NPO - Pediatric (06-01-21 @ 09:16)    MEDICATIONS  (STANDING):  aMIOdarone   Oral Tab/Cap - Peds 300 milliGRAM(s) Oral daily  Andexanet Raymond 400 milliGRAM(s),IV Diluent 40 milliLiter(s) 400 milliGRAM(s) IV Bolus once  Andexanet Raymond 480 milliGRAM(s),IV Diluent 48 milliLiter(s) 480 milliGRAM(s) (24 mL/Hr) IV Continuous <Continuous>  calcium chloride  IV Intermittent - Peds 1000 milliGRAM(s) IV Intermittent once  ceFAZolin  IV Intermittent - Peds 1980 milliGRAM(s) IV Intermittent once  dexMEDEtomidine Infusion - Peds 0.5 MICROgram(s)/kG/Hr (8.26 mL/Hr) IV Continuous <Continuous>  dextrose 5%. - Pediatric 1000 milliLiter(s) (10 mL/Hr) IV Continuous <Continuous>  fentaNYL   Infusion - Peds 2 MICROgram(s)/kG/Hr (2.64 mL/Hr) IV Continuous <Continuous>  furosemide  IV Intermittent - Peds 10 milliGRAM(s) IV Intermittent every 24 hours  heparin   Infusion - Pediatric 0.045 Unit(s)/kG/Hr (3 mL/Hr) IV Continuous <Continuous>  levETIRAcetam IV Intermittent - Peds 1000 milliGRAM(s) IV Intermittent every 12 hours  lisinopril Oral Tab/Cap - Peds 5 milliGRAM(s) Oral daily  Prothrombin Complex Concentrate IVPB 1500 Unit(s),IV SOLUTION 60 milliLiter(s) 1500 Unit(s) IV Intermittent once  sildenafil  IV Intermittent - Peds 10 milliGRAM(s) IV Intermittent every 8 hours  sodium chloride 0.9% lock flush - Peds 3 milliLiter(s) IV Push every 8 hours  sodium chloride 0.9% lock flush - Peds 3 milliLiter(s) IV Push every 8 hours  sodium chloride 0.9%. - Pediatric 1000 milliLiter(s) (100 mL/Hr) IV Continuous <Continuous>  sodium chloride 3% Infusion - Pediatric 1 mL/kG/Hr (66.1 mL/Hr) IV Continuous <Continuous>  spironolactone Oral Tab/Cap - Peds 100 milliGRAM(s) Oral daily    MEDICATIONS  (PRN):  acetaminophen   Oral Tab/Cap - Peds. 650 milliGRAM(s) Oral every 6 hours PRN Mild Pain (1 - 3)  fentaNYL    IV Intermittent - Peds 130 MICROGram(s) IV Intermittent every 1 hour PRN sedation    Vital Signs Last 24 Hrs  T(C): 36.9 (03 Jun 2021 05:00), Max: 37.8 (02 Jun 2021 12:00)  T(F): 98.4 (03 Jun 2021 05:00), Max: 100 (02 Jun 2021 12:00)  HR: 70 (03 Jun 2021 07:00) (70 - 73)  BP: --  BP(mean): --  RR: 14 (03 Jun 2021 07:00) (14 - 18)  SpO2: 93% (03 Jun 2021 07:00) (93% - 99%)      GENERAL PHYSICAL EXAM  General:        Intubated, opens eyes and blinks spontaneously  CV:               Warm and well perfused.  Respiratory:    Even, nonlabored breathing on ventilator  Extremities:    No joint swelling, erythema, tenderness; normal ROM, no contractures  Skin:              No rash, no neurocutaneous stigmata     NEUROLOGIC EXAM  Mental Status:     Intubated, unable to answer questions but able to follow simple commands.   Cranial Nerves:    Conjugate, straight gaze at baseline without eye deviation, no obvious facial asymmetry. Pupils 3mm->2mm equal and reactive.   Muscle Strength:  3/5 R  strength, 1/5 R shoulder abduction, RUE flexion, extension, RLE flexion, extension. 5/5 L  strength,  Muscle Tone:       Normal tone  DTR:                    3+/4 R Biceps, Brachioradialis, Triceps, 4+/4 Patellar, Ankle, sustained clonus;  2+/4  L Biceps, Brachioradialis, Triceps, Patellar, Ankle bilateral. 3-4 beats clonus. negative Ramirez on R, brisk finger flexor reflexes  Sensation:            Intact to pain on L side, unable to elicit on R given hemiparesis    Lab Results:                        14.2   9.13  )-----------( 151      ( 02 Jun 2021 05:38 )             43.1     06-02    138  |  102  |  20  ----------------------------<  155<H>  4.6   |  22  |  0.84    Ca    9.6      02 Jun 2021 05:38  Phos  3.8     06-02  Mg     1.9     06-02    TPro  7.3  /  Alb  4.3  /  TBili  1.7<H>  /  DBili  x   /  AST  18  /  ALT  18  /  AlkPhos  103  06-02    LIVER FUNCTIONS - ( 02 Jun 2021 05:38 )  Alb: 4.3 g/dL / Pro: 7.3 g/dL / ALK PHOS: 103 U/L / ALT: 18 U/L / AST: 18 U/L / GGT: x             EEG Results:    Imaging Studies:  < from: CT Head No Cont (06.01.21 @ 16:48) >  1. Parenchymal hemorrhage in the anterior left frontal lobe, possibly within the boundary zone of the left ROCCO and MCA territories, with extravasation into the regional sulci consistent with parenchymal and subarachnoid hemorrhage; hemorrhagic conversion from a small infarct in the left ROCCO/MCA boundary zone is a consideration. Consider MRI follow-up for further assessment of possible ischemic changes, or underlying mass lesion, and to exclude embolic phenomenon which may not be detected by routine head CT.  < end of copied text >

## 2021-06-03 NOTE — PROGRESS NOTE PEDS - ATTENDING COMMENTS
intubated, awakens to voice, spontanous, follow simple commands, evd at 5, stable evolving ct, picu care, maintain inr <1.5, f/u heme

## 2021-06-03 NOTE — PROGRESS NOTE PEDS - ASSESSMENT
Jimbo is a 20 year old male with complex cardiac history previously on Xarelto now with an ICH 2/2 to hemorrhagic conversion of an ischemic stroke after undergoing cardioversion s/p craniectomy. Currently, his bleeding has stabilized after receiving multiple products, including: Andexxa (bolus and IV infusion), Kcentra, FFP and Cryoprecipitate. His INR was also found to be elevated and after receiving products has improved and continues to improve on FFP. Unclear etiology of abnormal coags, however may have some degree of consumption. A JANET was completed on him today, which showed slightly delayed initiation time of clot (but not clinically significant) with normal clot formation time and firmness. At this time, JANET indicates he is not at increased risk for bleeding.    Recommendations:  - If future procedure required, would recommend giving FFP prior  - Continue to hold anticoagulation at this time and discuss with NSG when it can be restarted when more stable  - Continue to monitor CBC daily, maintain Hgb >8, monitor plt as they are trending down, f/u with NSG plt parameters Jimbo is a very ill 20 year old male with complex cardiac history (as above) previously on Xarelto now with an ICH 2/2 to hemorrhagic conversion of an ischemic stroke after undergoing cardioversion s/p craniectomy. Currently, his bleeding has stabilized after receiving multiple products, including: Andexxa (bolus and IV infusion), Kcentra, FFP and Cryoprecipitate. His INR was also found to be elevated and after receiving products has improved and continues to improve on FFP. Unclear etiology of abnormal coags, however may have some degree of consumption. A JANET was completed on him today, which showed slightly delayed initiation time of clot (but not clinically significant) with normal clot formation time and firmness. At this time, JANET indicates he is not at increased risk for bleeding at this time.    Recommendations:  - If future procedure required, would recommend giving FFP prior  - Continue to hold anticoagulation at this time and discuss with NSG when it can be restarted when more stable  - Continue to monitor CBC daily, maintain Hgb >8, monitor plt as they are trending down, f/u with NSG plt parameters Jimbo is a very ill 20 year old male with complex cardiac history (as above) previously on Xarelto now with an ICH 2/2 to hemorrhagic conversion of an ischemic stroke after undergoing cardioversion s/p craniectomy. Currently, his bleeding has stabilized after receiving multiple products, including: Andexxa (bolus and IV infusion), Kcentra, FFP and Cryoprecipitate. His INR was also found to be elevated and after receiving products has improved and continues to improve on FFP. Unclear etiology of abnormal coags, however may have some degree of consumption and/or previous hepatic congestion 2/2 underlying cardiac history. A JANET was completed on him today, which showed slightly delayed initiation time of clot (but not clinically significant) with normal clot formation time and firmness. At this time, JANET indicates he is not at increased risk for bleeding at this time.    Recommendations:  - If future procedure required, would recommend giving FFP prior  - Continue to hold anticoagulation at this time and discuss with NSG when it can be restarted when more stable  - Continue to monitor CBC daily, maintain Hgb >8, monitor plt as they are trending down, f/u with NSG plt parameters

## 2021-06-03 NOTE — PROGRESS NOTE PEDS - ASSESSMENT
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 Neurology consulted for episode of behavioral arrest for rule out stroke/seizure. Multiple CT heads revealed worsening of ROCCO-MCA watershed territory hemorrhage with extension to the intraventricular regions subsequent to elevated INRs with clinical examination of dense R hemiparesis.    Overnight patient had elevated pressures resulting in decrease in EVD drain to 5cc. No clinical seizures noted overnight. Examination today reveals improvement of dense hemiparesis with adequate  strength to hold onto providers' fingers when pulling away. Some movement noted proximally in the RUE and throughout RLE and patient continues to follow commands well. Continues to have hypertonia and hyperreflexia R>L.      Impression: Episode of unresponsiveness, staring out with jaw clenching with no recollection likely consistent with focal seizure with impaired awareness secondary to watershed infarction with hemorrhagic conversion likely secondary to cardioembolic mechanism versus hypoperfusion resulting in infarction and hemorrhage. Examination continues to improve with more movement along the R.         PLAN:  - STRICT NEURO CHECKS Q 1 HOUR  - GOAL INR <1.5, needs more FFP  - Repeat CTH without contrast if any change in mental status   - HOB elevated to 30 degrees

## 2021-06-04 ENCOUNTER — APPOINTMENT (OUTPATIENT)
Dept: PEDIATRIC CARDIOLOGY | Facility: CLINIC | Age: 21
End: 2021-06-04

## 2021-06-04 LAB
APTT BLD: 40 SEC — HIGH (ref 27–36.3)
APTT BLD: 46.1 SEC — HIGH (ref 27–36.3)
BASOPHILS # BLD AUTO: 0.02 K/UL — SIGNIFICANT CHANGE UP (ref 0–0.2)
BASOPHILS NFR BLD AUTO: 0.2 % — SIGNIFICANT CHANGE UP (ref 0–2)
BLOOD GAS ARTERIAL - LYTES,HGB,ICA,LACT RESULT: SIGNIFICANT CHANGE UP
BLOOD GAS ARTERIAL - LYTES,HGB,ICA,LACT RESULT: SIGNIFICANT CHANGE UP
D DIMER BLD IA.RAPID-MCNC: 3224 NG/ML DDU — HIGH
EOSINOPHIL # BLD AUTO: 0.37 K/UL — SIGNIFICANT CHANGE UP (ref 0–0.5)
EOSINOPHIL NFR BLD AUTO: 4.5 % — SIGNIFICANT CHANGE UP (ref 0–6)
FIBRINOGEN PPP-MCNC: 722 MG/DL — HIGH (ref 290–520)
HCT VFR BLD CALC: 40.3 % — SIGNIFICANT CHANGE UP (ref 39–50)
HGB BLD-MCNC: 12.3 G/DL — LOW (ref 13–17)
IANC: 6.28 K/UL — SIGNIFICANT CHANGE UP (ref 1.5–8.5)
IMM GRANULOCYTES NFR BLD AUTO: 0.5 % — SIGNIFICANT CHANGE UP (ref 0–1.5)
INR BLD: 1.68 RATIO — HIGH (ref 0.88–1.16)
INR BLD: 1.68 RATIO — HIGH (ref 0.88–1.16)
LYMPHOCYTES # BLD AUTO: 0.74 K/UL — LOW (ref 1–3.3)
LYMPHOCYTES # BLD AUTO: 8.9 % — LOW (ref 13–44)
MCHC RBC-ENTMCNC: 26.2 PG — LOW (ref 27–34)
MCHC RBC-ENTMCNC: 30.5 GM/DL — LOW (ref 32–36)
MCV RBC AUTO: 85.9 FL — SIGNIFICANT CHANGE UP (ref 80–100)
MONOCYTES # BLD AUTO: 0.85 K/UL — SIGNIFICANT CHANGE UP (ref 0–0.9)
MONOCYTES NFR BLD AUTO: 10.2 % — SIGNIFICANT CHANGE UP (ref 2–14)
NEUTROPHILS # BLD AUTO: 6.28 K/UL — SIGNIFICANT CHANGE UP (ref 1.8–7.4)
NEUTROPHILS NFR BLD AUTO: 75.7 % — SIGNIFICANT CHANGE UP (ref 43–77)
NRBC # BLD: 0 /100 WBCS — SIGNIFICANT CHANGE UP
NRBC # FLD: 0 K/UL — SIGNIFICANT CHANGE UP
PLATELET # BLD AUTO: 155 K/UL — SIGNIFICANT CHANGE UP (ref 150–400)
PROTHROM AB SERPL-ACNC: 18.7 SEC — HIGH (ref 10.6–13.6)
PROTHROM AB SERPL-ACNC: 18.9 SEC — HIGH (ref 10.6–13.6)
RBC # BLD: 4.69 M/UL — SIGNIFICANT CHANGE UP (ref 4.2–5.8)
RBC # FLD: 14.7 % — HIGH (ref 10.3–14.5)
WBC # BLD: 8.3 K/UL — SIGNIFICANT CHANGE UP (ref 3.8–10.5)
WBC # FLD AUTO: 8.3 K/UL — SIGNIFICANT CHANGE UP (ref 3.8–10.5)

## 2021-06-04 PROCEDURE — 71045 X-RAY EXAM CHEST 1 VIEW: CPT | Mod: 26

## 2021-06-04 PROCEDURE — 70450 CT HEAD/BRAIN W/O DYE: CPT | Mod: 26

## 2021-06-04 PROCEDURE — 94681 O2 UPTK CO2 OUTP % O2 XTRC: CPT | Mod: 26

## 2021-06-04 PROCEDURE — 74018 RADEX ABDOMEN 1 VIEW: CPT | Mod: 26

## 2021-06-04 PROCEDURE — 93770 DETERMINATION VENOUS PRESS: CPT

## 2021-06-04 PROCEDURE — 99291 CRITICAL CARE FIRST HOUR: CPT

## 2021-06-04 PROCEDURE — 99292 CRITICAL CARE ADDL 30 MIN: CPT

## 2021-06-04 RX ORDER — SODIUM CHLORIDE 9 MG/ML
1000 INJECTION, SOLUTION INTRAVENOUS
Refills: 0 | Status: DISCONTINUED | OUTPATIENT
Start: 2021-06-04 | End: 2021-06-05

## 2021-06-04 RX ORDER — CALCIUM CHLORIDE
1000 POWDER (GRAM) MISCELLANEOUS ONCE
Refills: 0 | Status: COMPLETED | OUTPATIENT
Start: 2021-06-04 | End: 2021-06-04

## 2021-06-04 RX ORDER — MINERAL OIL
1 OIL (ML) MISCELLANEOUS ONCE
Refills: 0 | Status: DISCONTINUED | OUTPATIENT
Start: 2021-06-04 | End: 2021-06-04

## 2021-06-04 RX ORDER — ACETAMINOPHEN 500 MG
750 TABLET ORAL EVERY 6 HOURS
Refills: 0 | Status: COMPLETED | OUTPATIENT
Start: 2021-06-04 | End: 2021-06-05

## 2021-06-04 RX ORDER — SODIUM CHLORIDE 5 G/100ML
0.5 INJECTION, SOLUTION INTRAVENOUS
Qty: 500 | Refills: 0 | Status: DISCONTINUED | OUTPATIENT
Start: 2021-06-04 | End: 2021-06-04

## 2021-06-04 RX ADMIN — SODIUM CHLORIDE 3 MILLILITER(S): 9 INJECTION INTRAMUSCULAR; INTRAVENOUS; SUBCUTANEOUS at 09:22

## 2021-06-04 RX ADMIN — Medication 3 UNIT(S)/KG/HR: at 07:33

## 2021-06-04 RX ADMIN — Medication 1000 MILLIGRAM(S): at 03:16

## 2021-06-04 RX ADMIN — CHLORHEXIDINE GLUCONATE 15 MILLILITER(S): 213 SOLUTION TOPICAL at 03:22

## 2021-06-04 RX ADMIN — SODIUM CHLORIDE 3 MILLILITER(S): 9 INJECTION, SOLUTION INTRAVENOUS at 19:21

## 2021-06-04 RX ADMIN — Medication 9.92 MICROGRAM(S)/KG/MIN: at 07:32

## 2021-06-04 RX ADMIN — Medication 9.92 MICROGRAM(S)/KG/MIN: at 10:38

## 2021-06-04 RX ADMIN — Medication 9.92 MICROGRAM(S)/KG/MIN: at 01:32

## 2021-06-04 RX ADMIN — FENTANYL CITRATE 3.97 MICROGRAM(S)/KG/HR: 50 INJECTION INTRAVENOUS at 10:12

## 2021-06-04 RX ADMIN — Medication 25 MILLIGRAM(S): at 12:14

## 2021-06-04 RX ADMIN — Medication 1000 MILLIGRAM(S): at 08:57

## 2021-06-04 RX ADMIN — Medication 20 MILLIGRAM(S): at 20:57

## 2021-06-04 RX ADMIN — LEVETIRACETAM 266.68 MILLIGRAM(S): 250 TABLET, FILM COATED ORAL at 16:52

## 2021-06-04 RX ADMIN — CHLORHEXIDINE GLUCONATE 15 MILLILITER(S): 213 SOLUTION TOPICAL at 13:33

## 2021-06-04 RX ADMIN — Medication 400 MILLIGRAM(S): at 08:08

## 2021-06-04 RX ADMIN — CHLORHEXIDINE GLUCONATE 1 APPLICATION(S): 213 SOLUTION TOPICAL at 22:17

## 2021-06-04 RX ADMIN — Medication 1 DROP(S): at 13:21

## 2021-06-04 RX ADMIN — Medication 9.92 MICROGRAM(S)/KG/MIN: at 06:11

## 2021-06-04 RX ADMIN — Medication 2 MILLIGRAM(S): at 01:26

## 2021-06-04 RX ADMIN — Medication 7.93 MICROGRAM(S)/KG/MIN: at 03:05

## 2021-06-04 RX ADMIN — LEVETIRACETAM 266.68 MILLIGRAM(S): 250 TABLET, FILM COATED ORAL at 05:30

## 2021-06-04 RX ADMIN — SODIUM CHLORIDE 33.1 ML/KG/HR: 5 INJECTION, SOLUTION INTRAVENOUS at 12:11

## 2021-06-04 RX ADMIN — SODIUM CHLORIDE 66.1 ML/KG/HR: 5 INJECTION, SOLUTION INTRAVENOUS at 01:33

## 2021-06-04 RX ADMIN — FAMOTIDINE 200 MILLIGRAM(S): 10 INJECTION INTRAVENOUS at 02:30

## 2021-06-04 RX ADMIN — SODIUM CHLORIDE 3 MILLILITER(S): 9 INJECTION INTRAMUSCULAR; INTRAVENOUS; SUBCUTANEOUS at 02:00

## 2021-06-04 RX ADMIN — Medication 400 MILLIGRAM(S): at 02:00

## 2021-06-04 RX ADMIN — Medication 198 MILLIGRAM(S): at 13:32

## 2021-06-04 RX ADMIN — Medication 1 ENEMA: at 05:04

## 2021-06-04 RX ADMIN — Medication 3 UNIT(S)/KG/HR: at 19:19

## 2021-06-04 RX ADMIN — Medication 5 MILLIGRAM(S): at 20:54

## 2021-06-04 RX ADMIN — SODIUM CHLORIDE 3 MILLILITER(S): 9 INJECTION INTRAMUSCULAR; INTRAVENOUS; SUBCUTANEOUS at 18:14

## 2021-06-04 RX ADMIN — PROPOFOL 6.61 MG/KG/HR: 10 INJECTION, EMULSION INTRAVENOUS at 19:21

## 2021-06-04 RX ADMIN — Medication 750 MILLIGRAM(S): at 23:31

## 2021-06-04 RX ADMIN — Medication 25 MILLIGRAM(S): at 04:07

## 2021-06-04 RX ADMIN — Medication 198 MILLIGRAM(S): at 06:09

## 2021-06-04 RX ADMIN — Medication 300 MILLIGRAM(S): at 22:16

## 2021-06-04 RX ADMIN — PROPOFOL 6.61 MG/KG/HR: 10 INJECTION, EMULSION INTRAVENOUS at 20:56

## 2021-06-04 RX ADMIN — FAMOTIDINE 200 MILLIGRAM(S): 10 INJECTION INTRAVENOUS at 13:13

## 2021-06-04 RX ADMIN — SODIUM CHLORIDE 3 MILLILITER(S): 9 INJECTION, SOLUTION INTRAVENOUS at 04:08

## 2021-06-04 RX ADMIN — FENTANYL CITRATE 3.97 MICROGRAM(S)/KG/HR: 50 INJECTION INTRAVENOUS at 19:20

## 2021-06-04 RX ADMIN — SODIUM CHLORIDE 3 MILLILITER(S): 9 INJECTION, SOLUTION INTRAVENOUS at 07:31

## 2021-06-04 RX ADMIN — Medication 1 DROP(S): at 22:17

## 2021-06-04 RX ADMIN — SODIUM CHLORIDE 3 MILLILITER(S): 9 INJECTION, SOLUTION INTRAVENOUS at 04:30

## 2021-06-04 RX ADMIN — Medication 9.92 MICROGRAM(S)/KG/MIN: at 19:18

## 2021-06-04 RX ADMIN — Medication 25 MILLIGRAM(S): at 20:55

## 2021-06-04 RX ADMIN — FENTANYL CITRATE 3.97 MICROGRAM(S)/KG/HR: 50 INJECTION INTRAVENOUS at 07:31

## 2021-06-04 RX ADMIN — FENTANYL CITRATE 3.97 MICROGRAM(S)/KG/HR: 50 INJECTION INTRAVENOUS at 22:42

## 2021-06-04 RX ADMIN — PROPOFOL 6.61 MG/KG/HR: 10 INJECTION, EMULSION INTRAVENOUS at 07:34

## 2021-06-04 RX ADMIN — PROPOFOL 6.61 MG/KG/HR: 10 INJECTION, EMULSION INTRAVENOUS at 05:30

## 2021-06-04 RX ADMIN — Medication 2 MILLIGRAM(S): at 08:58

## 2021-06-04 RX ADMIN — Medication 198 MILLIGRAM(S): at 22:16

## 2021-06-04 RX ADMIN — SODIUM CHLORIDE 66.1 ML/KG/HR: 5 INJECTION, SOLUTION INTRAVENOUS at 07:33

## 2021-06-04 NOTE — PROGRESS NOTE PEDS - ASSESSMENT
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 He has elevated Fontan pressure secondary to LV diastolic dysfunction.  Had severe hemorrhagic stroke left frontal on 6/1 requiring urgent evacuation in OR.    PLAN:  Neuro:  S/P decompressive craniectomy and hemorrhage evacuation 6/1.Bone flap discarded- will need prosthetic  Neuroprotection: 3% NaCl drip for goal SNa >/= 150, keppra, sedation, normothermia  Cont fentanyl and propofol, goal SBS -1  EVD at 5 cmH20  Monitor ICP Q1hr  Repeat CT today    Resp:  Full mechanical ventilation for neuroprotection (goal normocarbia, normoxia)  Follow ETCO2  ABG Q6hr  Goal SpO2 > 92%    CV:  hold Lisinopril, aldactone  Cont sildenafil   Hold Amiodarone until determine interaction with other medications (i,e., xarelto)  Continue Lasix IV Q8hr- goal -500  Goal MAP (for CPP) >/= 85 (due to elevated Fontan pressure about 20)  Cont norepi for goal MAP.  Consider adding vasopressin.  Pacemaker at VOO at 70    Heme:  Holding Xarelto   heme consult appreciated  Coags Q6hr.  Goal PLTs >100  Cont vit K x3 days  Check JANET 6/3- normal coagulation profile    FEN:  NPO on IVF  BMP Q6hr

## 2021-06-04 NOTE — PROGRESS NOTE PEDS - ASSESSMENT
21 y/o M with complex cardiac history presented after cardiac cath with large parenchymal hemorrhage in the anterior left frontal lobe and subarachnoid hemorrhage s/p Left craniectomy, discarded bone flap, placement of EVD on 6/1/2021. Patient with stable neurological exam      PLAN:  - STRICT NEURO CHECKS Q 1 HOUR  - GOAL INR <1.5, needs more FFP  - Repeat CTH without contrast if any change in mental status   - HOB elevated to 30 degrees

## 2021-06-04 NOTE — PROGRESS NOTE PEDS - ASSESSMENT
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 Presented in IART - s/p EP study with unsuccessful attempt at ablation along with diagnostic cath showing elevated Fontan pressure (20mmHg). He was loaded with amiodarone and ultimately electrically cardioverted out yesterday, however reverted back into IART. His course has now been further complicated by L frontal parenchymal hemorrhage requiring emergent evacuation with NSx and EVD placement.    Plan:  - Continuos telemetry monitoring.  - Pacemaker set at VOO 70bpm  - Continue home medications - Sildenafil 20mg q8h (covert to IV)  - Monitor blood pressure. Goal MAPs > 80. Agree with norepi or vasopressin. TItrate to goal per PICU  - Hold home lisinopril, aldactone  - hold home amidarone. No plans to cardiovert at this time.  - Daily EKGs.   - anticoagulation care per hematology/PICU/NSx  - rest of care per PICU and NSx  - Please page pediatric cardiology with any concerns or questions.    Thank you for involving us in the care of your patient.     Guicho Oliveros MD, MPH  Pediatric Cardiology Fellow  PAGER: [phone]

## 2021-06-04 NOTE — PROGRESS NOTE PEDS - SUBJECTIVE AND OBJECTIVE BOX
INTERVAL HISTORY: Straight cath overnight.    RESPIRATORY SUPPORT: Mode: SIMV with PS, RR (machine): 12, FiO2: 40, PEEP: 6, PS: 5  NUTRITION: NPO     @ 07:01  -   @ 07:00  --------------------------------------------------------  IN: 4083.2 mL / OUT: 4619 mL / NET: -535.8 mL    MEDICATIONS:  furosemide  IV Intermittent - Peds 10 milliGRAM(s) IV Intermittent every 8 hours  norepinephrine Infusion - Peds 0.1 MICROgram(s)/kG/Min IV Continuous <Continuous>  sildenafil  IV Intermittent - Peds 10 milliGRAM(s) IV Intermittent every 8 hours  ceFAZolin  IV Intermittent - Peds 1980 milliGRAM(s) IV Intermittent every 8 hours  fentaNYL   Infusion - Peds 3 MICROgram(s)/kG/Hr IV Continuous <Continuous>  levETIRAcetam IV Intermittent - Peds 1000 milliGRAM(s) IV Intermittent every 12 hours  propofol Infusion - Peds 1 mG/kG/Hr IV Continuous <Continuous>  famotidine IV Intermittent - Peds 20 milliGRAM(s) IV Intermittent every 12 hours  heparin   Infusion - Pediatric 0.045 Unit(s)/kG/Hr IV Continuous <Continuous>  phytonadione SubCutaneous Injection - Peds 5 milliGRAM(s) SubCutaneous daily  sodium chloride 0.9% -  250 milliLiter(s) IV Continuous <Continuous>  sodium chloride 0.9% lock flush - Peds 3 milliLiter(s) IV Push every 8 hours  sodium chloride 0.9% lock flush - Peds 3 milliLiter(s) IV Push every 8 hours  sodium chloride 0.9%. - Pediatric 1000 milliLiter(s) IV Continuous <Continuous>  sodium chloride 3% Infusion - Pediatric 1 mL/kG/Hr IV Continuous <Continuous>  vasopressin Infusion - Peds 0.5 milliUNIT(s)/kG/Min IV Continuous <Continuous>    PHYSICAL EXAMINATION:  Vital signs -   T(C): 37.2 (21 @ 08:00), Max: 37.8 (21 @ 11:00)  HR: 70 (21 @ 10:00) (70 - 70)  BP: 139/68 (21 @ 08:00) (139/68 - 139/68)  ABP:  (101/69 - 146/66)  RR: 17 (21 @ 10:00) (12 - 21)  SpO2: 91% (21 @ 10:00) (87% - 97%)  CVP(mm Hg):  (11 - 19)  Gen  General - intubated, sedated. Will respond to stimuli  Skin - no rash, no desquamation, no cyanosis.  Eyes / ENT - no conjunctival injection, sclerae anicteric, external ears & nares normal, mucous membranes moist.  Pulmonary - normal inspiratory effort, no retractions, lungs clear to auscultation bilaterally, no wheezes, no rales.  Cardiovascular - normal rate, regular rhythm, normal S1 & single S2, grade 1/6 blowing holosystolic murmur at LMSB, no rubs, no gallops, capillary refill < 2sec, normal pulses.  Gastrointestinal - soft, non-distended, non-tender, no splenomegaly. (+) hepatomegaly.  Musculoskeletal - no joint swelling, no clubbing, no edema.  Neurologic / Psychiatric - intubated, sedated    LABORATORY TESTS:                          12.3  CBC:   8.30 )-----------( 155   (21 @ 04:51)                          40.3               155   |  118   |  14                 Ca: 9.3    BMP:   ----------------------------< 89     M.8   (21 @ 04:51)             3.4    |  20    | 0.94               Ph: 2.8      LFT:     TPro: 7.5 / Alb: 4.2 / TBili: 1.1 / DBili: x / AST: 19 / ALT: 9 / AlkPhos: 86   (21 @ 04:51)    COAG: PT: 18.9 / PTT: 40.0 / INR: 1.68   (21 @ 04:51)       ABG:   pH: 7.40 / pCO2: 38 / pO2: 74 / HCO3: 24 / Base Excess: -0.9 / SaO2: 94.5 / Lactate: x / iCa: 1.26   (21 @ 04:26)    CBG:   pH: 7.45 / pCO2: 30.4 / pO2: 66.0 / HCO3: 23 / Base Excess: -2.5 / Lactate: x   (21 @ 20:18)      ABG:   pH: 7.41 / pCO2: 41 / pO2: 68 / HCO3: 25 / Base Excess: 1.0 / SaO2: 92.9 / Lactate: x / iCa: 1.22   (21 @ 05:13)    CBG:   pH: 7.45 / pCO2: 30.4 / pO2: 66.0 / HCO3: 23 / Base Excess: -2.5 / Lactate: x   (21 @ 20:18)      IMAGING STUDIES:  Electrocardiogram - (21) Ventricular paced rhythm @ 75bpm. Underlying IART.    Telemetry - (21) Ventricular paced rhythm @ 75bpm. Underlying IART.    Chest x-ray - (21) Stable mild cardiomegaly with subsegmental left lower lobe atelectasis.    Echocardiogram - (21)   1. This was a technically difficult suboptimal study due to poor echo windows. Findings limited to below.   2. 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   3. Mild mitral valve regurgitation.   4. Trivial tricuspid valve regurgitation.   5. Trivial aortic valve regurgitation.   6. Status post device closure of Fontan fenestration.   7. S/P stent placement in the Fontan pathway. Limited imaging of the inferior Fontan baffle. In this setting, the inferior vena cava to its connection near the atrial portion of the baffle appears patent with no obstruction. S/p device closure of Fontan fenestration. The Fontan fenestration is not seen on this study. The right bidirectional Storm is seen only on color flow mapping. This appears to have laminar low velocity flow.   8. The connection of the right bidirectional Storm to the right pulmonary artery could not be imaged. The right pulmonary artery is seen in a limited portion immediately to the left of the Storm and appears patent. The left pulmonary artery could not be imaged.   9. Extremely poor and limited imaging of the lateral free walls of the single systemic left ventricle. On limited short axis views there appears to be adequate systolic excursion of the posterior wall of the left ventricle and decreased in the anterior wall. The bulboventricular foramen could not be imaged. Suggest alternate imaging for appropriate assessment of function.  10. No pericardial effusion.  11. Small right pleural effusion.    Cath - (21)  Access 4 Fr RFA, 7 Fr RFV x2 with US guidance.  Sat data (%): on 50% FiO2 LPA 73, RUPV 98, Ao 97; CI 2.6 L/min/m2 with Qp:Qs 1 :1.  Pressures (mmHg): Elevated mFontan = mIVC = 20. mPCW=16, No significant gradient across LV to AAo to Vikki (98/58/73).  Normal PVR while on sildenafil.  Angios showed unobstructed Fontan with existing stent within Fontan conduit.    Comment: IART ablation was attempted after the diagnostic cath but unsuccessful. Patient was overdrive paced out of IART. At the end of the case, Ao sat was 94% and LPA 66% on 50% FiO2

## 2021-06-04 NOTE — PROGRESS NOTE PEDS - SUBJECTIVE AND OBJECTIVE BOX
PAST 24hr EVENTS: no changes overnight, EVD patent, on pressors/fentanyl     HPI: 21yo male with complex cardiac h/o DILV, L-malposition of great arteries, sick sinus syndrome and AV mihir disease with tachybradycardia syndrome with a dual chamber pacemaker (currently with RV lead fracture and is currently only LV paced 2/2 complete heart block), PSH of status post Pdjhg-[id number]-[id number] anastomosis and aortic arch reconstruction followed by a fenestrated lateral tunnel Fontan completion (now s/p fenestration closure). here s/p cardiac catheterization and ablation. Procedure was complicated by unsuccessful ablation and post extubation patient was noted to have increased hemoptysis and desaturations, patient was reintubated for airway protection and given propofol for sedation.  (26 May 2021 20:19)      PHYSICAL EXAM:   Vital Signs Last 24 Hrs  T(C): 37.3 (2021 06:00), Max: 37.8 (2021 11:00)  T(F): 99.1 (2021 06:00), Max: 100 (2021 11:00)  HR: 70 (2021 06:18) (70 - 70)  BP: 123/68 (2021 08:00) (123/68 - 123/68)  BP(mean): 84 (2021 08:00) (84 - 84)  RR: 12 (2021 06:00) (12 - 21)  SpO2: 92% (2021 06:18) (87% - 97%)    Intubated, on fentanyl   Opening eyes to voice  Not following commands this morning in upper extremities- no thumbs up, no hand squeezing - did follow in lowers (wiggled toes)   GOODSON spontaneously, Left more than right. Right sided weakness  L craniectomy flap full, not tense  NEIL 5cc  EVD 5cm H20, patent    I&O's Summary    2021 07:01  -  2021 07:00  --------------------------------------------------------  IN: 4083.2 mL / OUT: 4619 mL / NET: -535.8 mL                              12.3   8.30  )-----------( 155      ( 2021 04:51 )             40.3     06-04    155<H>  |  118<H>  |  14  ----------------------------<  89  3.4<L>   |  20<L>  |  0.94    Ca    9.3      2021 04:51  Phos  2.8     06-04  Mg     1.8     06-04    TPro  7.5  /  Alb  4.2  /  TBili  1.1  /  DBili  x   /  AST  19  /  ALT  9   /  AlkPhos  86  06-04    PT/INR - ( 2021 04:51 )   PT: 18.9 sec;   INR: 1.68 ratio         PTT - ( 2021 04:51 )  PTT:40.0 sec      MEDICATIONS  (STANDING):  acetaminophen  IV Intermittent - Peds. 1000 milliGRAM(s) IV Intermittent every 6 hours  Andexanet Raymond 480 milliGRAM(s),IV Diluent 48 milliLiter(s) 480 milliGRAM(s) (24 mL/Hr) IV Continuous <Continuous>  ceFAZolin  IV Intermittent - Peds 1980 milliGRAM(s) IV Intermittent every 8 hours  chlorhexidine 0.12% Oral Liquid - Peds 15 milliLiter(s) Swish and Spit every 12 hours  chlorhexidine 2% Topical Cloths - Peds 1 Application(s) Topical daily  famotidine IV Intermittent - Peds 20 milliGRAM(s) IV Intermittent every 12 hours  fentaNYL   Infusion - Peds 3 MICROgram(s)/kG/Hr (3.97 mL/Hr) IV Continuous <Continuous>  furosemide  IV Intermittent - Peds 10 milliGRAM(s) IV Intermittent every 8 hours  heparin   Infusion - Pediatric 0.045 Unit(s)/kG/Hr (3 mL/Hr) IV Continuous <Continuous>  levETIRAcetam IV Intermittent - Peds 1000 milliGRAM(s) IV Intermittent every 12 hours  norepinephrine Infusion - Peds 0.1 MICROgram(s)/kG/Min (9.92 mL/Hr) IV Continuous <Continuous>  phytonadione SubCutaneous Injection - Peds 5 milliGRAM(s) SubCutaneous daily  propofol Infusion - Peds 1 mG/kG/Hr (6.61 mL/Hr) IV Continuous <Continuous>  sildenafil  IV Intermittent - Peds 10 milliGRAM(s) IV Intermittent every 8 hours  sodium chloride 0.9% -  250 milliLiter(s) (3 mL/Hr) IV Continuous <Continuous>  sodium chloride 0.9% lock flush - Peds 3 milliLiter(s) IV Push every 8 hours  sodium chloride 0.9% lock flush - Peds 3 milliLiter(s) IV Push every 8 hours  sodium chloride 0.9%. - Pediatric 1000 milliLiter(s) (8 mL/Hr) IV Continuous <Continuous>  sodium chloride 3% Infusion - Pediatric 1 mL/kG/Hr (66.1 mL/Hr) IV Continuous <Continuous>  vasopressin Infusion - Peds 0.5 milliUNIT(s)/kG/Min (9.92 mL/Hr) IV Continuous <Continuous>    MEDICATIONS  (PRN):  fentaNYL    IV Intermittent - Peds 130 MICROGram(s) IV Intermittent every 1 hour PRN sedation  polyvinyl alcohol 1.4%/povidone 0.6% Ophthalmic Solution - Peds 1 Drop(s) Both EYES four times a day PRN Dry Eyes    RADIOLOGY:  < from: CT Head No Cont (21 @ 03:29) >  COMPARISON EXAMINATION: 2021    FINDINGS:  VENTRICLES AND SULCI: Left frontal ventricular catheter with its tip traversing the right lateral ventricle. No hydrocephalus. Still with residual mass effect on the left lateral ventricle appreciated. Intraventricular hemorrhage with blood identified in the lateral third and fourth ventricles.  INTRA-AXIAL:  Left frontal parenchymal lucency with associated hemorrhage as seen on the prior study. Decreased air when compared with the prior in this region  EXTRA-AXIAL:  Decreased subarachnoid hemorrhage. Residual hemorrhage tracking along the tentorial leaves.  VISUALIZED SINUSES:  Clear.  VISUALIZED MASTOIDS:  Clear.  CALVARIUM:  Left frontal craniectomy.  MISCELLANEOUS:  None.    IMPRESSION: No significant interval change allowing for resorption of air status post left frontal craniectomy and some resorption of subarachnoid hemorrhage when compared with the prior left frontal ventricular catheter traversing the right lateral ventricle with appearance of the ventricles including intraventricular hemorrhage unchanged compared with the prior

## 2021-06-04 NOTE — PROGRESS NOTE PEDS - SUBJECTIVE AND OBJECTIVE BOX
Interval/Overnight Events: Required straight cath x2 overnight for urinary retention.  Was quite responsive and appropriate neurologically during the day.    VITAL SIGNS:  T(C): 37.2 (21 @ 08:00), Max: 37.8 (21 @ 11:00)  HR: 70 (21 @ 10:00) (70 - 70)  BP: 139/68 (21 @ 08:00) (139/68 - 139/68)  ABP: 139/65 (21 @ 10:00) (101/69 - 146/66)  ABP(mean): 89 (21 @ 10:00) (79 - 96)  RR: 17 (21 @ 10:00) (12 - 21)  SpO2: 91% (21 @ 10:00) (87% - 97%)  CVP(mm Hg): 12 (21 @ 10:00) (11 - 19)  End-Tidal CO2: 34    ICP: 6-11    Daily     Current Medications:  furosemide  IV Intermittent - Peds 10 milliGRAM(s) IV Intermittent every 8 hours  norepinephrine Infusion - Peds 0.1 MICROgram(s)/kG/Min IV Continuous <Continuous>  sildenafil  IV Intermittent - Peds 10 milliGRAM(s) IV Intermittent every 8 hours  heparin   Infusion - Pediatric 0.045 Unit(s)/kG/Hr IV Continuous <Continuous>  ceFAZolin  IV Intermittent - Peds 1980 milliGRAM(s) IV Intermittent every 8 hours  famotidine IV Intermittent - Peds 20 milliGRAM(s) IV Intermittent every 12 hours  phytonadione SubCutaneous Injection - Peds 5 milliGRAM(s) SubCutaneous daily  sodium chloride 0.9% -  250 milliLiter(s) IV Continuous <Continuous>  sodium chloride 0.9% lock flush - Peds 3 milliLiter(s) IV Push every 8 hours  sodium chloride 0.9% lock flush - Peds 3 milliLiter(s) IV Push every 8 hours  sodium chloride 0.9%. - Pediatric 1000 milliLiter(s) IV Continuous <Continuous>  sodium chloride 3% Infusion - Pediatric 1 mL/kG/Hr IV Continuous <Continuous>  vasopressin Infusion - Peds 0.5 milliUNIT(s)/kG/Min IV Continuous <Continuous>  fentaNYL    IV Intermittent - Peds 130 MICROGram(s) IV Intermittent every 1 hour PRN  fentaNYL   Infusion - Peds 3 MICROgram(s)/kG/Hr IV Continuous <Continuous>  levETIRAcetam IV Intermittent - Peds 1000 milliGRAM(s) IV Intermittent every 12 hours  propofol Infusion - Peds 1 mG/kG/Hr IV Continuous <Continuous>  Andexanet Raymond 480 milliGRAM(s),IV Diluent 48 milliLiter(s) 480 milliGRAM(s) IV Continuous <Continuous>  chlorhexidine 0.12% Oral Liquid - Peds 15 milliLiter(s) Swish and Spit every 12 hours  chlorhexidine 2% Topical Cloths - Peds 1 Application(s) Topical daily  polyvinyl alcohol 1.4%/povidone 0.6% Ophthalmic Solution - Peds 1 Drop(s) Both EYES four times a day PRN    ===============================RESPIRATORY==============================  [ ] FiO2: ___ 	[ ] Heliox: ____ 		[ ] BiPAP: ___   [ ] NC: __  Liters			[ ] HFNC: __ 	Liters, FiO2: __  [ x] Mechanical Ventilation: Vt 460, PEEP 6, R 12, PS 10, 40%  [ ] Inhaled Nitric Oxide:  [ ] Extubation Readiness Assessed    =============================CARDIOVASCULAR============================  Cardiac Rhythm:	[ x] NSR		[ ] Other:    ==========================HEMATOLOGY/ONCOLOGY========================  Transfusions:	[ ] PRBC	      [ ] Platelets	[ x] FFP		[ ] Cryoprecipitate  DVT Prophylaxis:    =======================FLUIDS/ELECTROLYTES/NUTRITION=====================  I&O's Summary    2021 07: 07:00  --------------------------------------------------------  IN: 4083.2 mL / OUT: 4619 mL / NET: -535.8 mL    2021 07:01  -  2021 10:19  --------------------------------------------------------  IN: 401.9 mL / OUT: 769 mL / NET: -367.1 mL    NEIL: 10  EVD: 144 (at 5 cm H20)    Diet:	[ ] Regular	[ ] Soft		[ ] Clears	      [ x] NPO  .	[ ] Other:  .	[ ] NGT		[ ] NDT		[ ] GT		[ ] GJT    ================================NEUROLOGY=============================  [ ] SBS:		[ ] GISSELLE-1:	[ ] BIS:         [ ] CAPD:  [ x] Adequacy of sedation and pain control has been assessed and adjusted    ========================PATIENT CARE ACCESS DEVICES=====================  [ x] Peripheral IV  [ ] Central Venous Line	[ ] R	[ ] L	[ ] IJ	[ ] Fem	[ ] SC			Placed:   [ ] Arterial Line		[ ] R	[ ] L	[ ] PT	[ ] DP	[ ] Fem	[ ] Rad	[ ] Ax	Placed:   [ ] PICC:				[ ] Broviac		[ ] Mediport  [ ] Urinary Catheter, Date Placed:   [ ] Necessity of urinary, arterial, and venous catheters discussed    =============================ANCILLARY TESTS============================  LABS:  ABG - ( 2021 04:26 )  pH: 7.40  /  pCO2: 38    /  pO2: 74    / HCO3: 24    / Base Excess: -0.9  /  SaO2: 94.5  / Lactate: x                                                12.3                  Neurophils% (auto):   75.7   ( @ 04:51):    8.30 )-----------(155          Lymphocytes% (auto):  8.9                                           40.3                   Eosinphils% (auto):   4.5      Manual%: Neutrophils x    ; Lymphocytes x    ; Eosinophils x    ; Bands%: x    ; Blasts x                                  155    |  118    |  14                  Calcium: 9.3   / iCa: 1.20   ( @ 04:51)    ----------------------------<  89        Magnesium: 1.8                              3.4     |  20     |  0.94             Phosphorous: 2.8      TPro  7.5    /  Alb  4.2    /  TBili  1.1    /  DBili  x      /  AST  19     /  ALT  9      /  AlkPhos  86     2021 04:51  (  @ 04:51 )   PT: 18.9 sec;   INR: 1.68 ratio  aPTT: 40.0 sec  RECENT CULTURES:      IMAGING STUDIES:  CXR: ETT good, bilateral basilar opacities, small right pleural effusion (no sig change)  ==============================PHYSICAL EXAM============================  GENERAL: sedated  RESPIRATORY: Lungs clear to auscultation bilaterally. Good aeration. No rales, rhonchi, retractions or wheezing. Effort even and unlabored.  CARDIOVASCULAR: Regular rate and rhythm. Normal S1/S2. No murmurs, rubs, or gallop. Capillary refill < 2 seconds. Distal pulses 2+ and equal.  ABDOMEN: Soft, non-distended.  No palpable hepatosplenomegaly.  SKIN: No rash.  EXTREMITIES: Warm and well perfused. No gross extremity deformities.    ======================================================================  Parent/Guardian is at the bedside:	[x ] Yes	[ ] No  Patient and Parent/Guardian updated as to the progress/plan of care:	[ x] Yes	[ ] No    [x ] The patient remains in critical and unstable condition, and requires ICU care and monitoring.  Total critical care time spent by attending physician was _35___ minutes, excluding procedure time.    [ ] The patient is improving but requires continued monitoring and adjustment of therapy due to ___________________________

## 2021-06-05 LAB
ANION GAP SERPL CALC-SCNC: 20 MMOL/L — HIGH (ref 7–14)
APPEARANCE UR: CLEAR — SIGNIFICANT CHANGE UP
APTT BLD: 42.8 SEC — HIGH (ref 27–36.3)
APTT BLD: 56.9 SEC — HIGH (ref 27–36.3)
BILIRUB UR-MCNC: NEGATIVE — SIGNIFICANT CHANGE UP
BLD GP AB SCN SERPL QL: NEGATIVE — SIGNIFICANT CHANGE UP
BLOOD GAS ARTERIAL - LYTES,HGB,ICA,LACT RESULT: SIGNIFICANT CHANGE UP
BLOOD GAS ARTERIAL - LYTES,HGB,ICA,LACT RESULT: SIGNIFICANT CHANGE UP
BUN SERPL-MCNC: 14 MG/DL — SIGNIFICANT CHANGE UP (ref 7–23)
CALCIUM SERPL-MCNC: 10.3 MG/DL — SIGNIFICANT CHANGE UP (ref 8.4–10.5)
CHLORIDE SERPL-SCNC: 123 MMOL/L — HIGH (ref 98–107)
CO2 SERPL-SCNC: 19 MMOL/L — LOW (ref 22–31)
COLOR SPEC: YELLOW — SIGNIFICANT CHANGE UP
CREAT SERPL-MCNC: 1.04 MG/DL — SIGNIFICANT CHANGE UP (ref 0.5–1.3)
D DIMER BLD IA.RAPID-MCNC: 3124 NG/ML DDU — HIGH
DIFF PNL FLD: ABNORMAL
FIBRINOGEN PPP-MCNC: 744 MG/DL — HIGH (ref 290–520)
GLUCOSE SERPL-MCNC: 95 MG/DL — SIGNIFICANT CHANGE UP (ref 70–99)
GLUCOSE UR QL: NEGATIVE — SIGNIFICANT CHANGE UP
INR BLD: 1.73 RATIO — HIGH (ref 0.88–1.16)
INR BLD: 1.74 RATIO — HIGH (ref 0.88–1.16)
KETONES UR-MCNC: ABNORMAL
LEUKOCYTE ESTERASE UR-ACNC: NEGATIVE — SIGNIFICANT CHANGE UP
MAGNESIUM SERPL-MCNC: 1.8 MG/DL — SIGNIFICANT CHANGE UP (ref 1.6–2.6)
NITRITE UR-MCNC: NEGATIVE — SIGNIFICANT CHANGE UP
PH UR: 6 — SIGNIFICANT CHANGE UP (ref 5–8)
PHOSPHATE SERPL-MCNC: 5.8 MG/DL — HIGH (ref 2.5–4.5)
POTASSIUM SERPL-MCNC: 3.1 MMOL/L — LOW (ref 3.5–5.3)
POTASSIUM SERPL-SCNC: 3.1 MMOL/L — LOW (ref 3.5–5.3)
PROT UR-MCNC: ABNORMAL
PROTHROM AB SERPL-ACNC: 19.3 SEC — HIGH (ref 10.6–13.6)
PROTHROM AB SERPL-ACNC: 19.4 SEC — HIGH (ref 10.6–13.6)
RH IG SCN BLD-IMP: NEGATIVE — SIGNIFICANT CHANGE UP
SODIUM SERPL-SCNC: 158 MMOL/L — HIGH (ref 135–145)
SODIUM SERPL-SCNC: 162 MMOL/L — CRITICAL HIGH (ref 135–145)
SP GR SPEC: 1.02 — SIGNIFICANT CHANGE UP (ref 1.01–1.02)
UROBILINOGEN FLD QL: ABNORMAL

## 2021-06-05 PROCEDURE — 94681 O2 UPTK CO2 OUTP % O2 XTRC: CPT | Mod: 26

## 2021-06-05 PROCEDURE — 71045 X-RAY EXAM CHEST 1 VIEW: CPT | Mod: 26

## 2021-06-05 PROCEDURE — 99291 CRITICAL CARE FIRST HOUR: CPT

## 2021-06-05 PROCEDURE — 93770 DETERMINATION VENOUS PRESS: CPT

## 2021-06-05 PROCEDURE — 99292 CRITICAL CARE ADDL 30 MIN: CPT

## 2021-06-05 RX ORDER — FENTANYL CITRATE 50 UG/ML
3 INJECTION INTRAVENOUS
Qty: 5000 | Refills: 0 | Status: DISCONTINUED | OUTPATIENT
Start: 2021-06-05 | End: 2021-06-05

## 2021-06-05 RX ORDER — GLYCERIN ADULT
1 SUPPOSITORY, RECTAL RECTAL ONCE
Refills: 0 | Status: DISCONTINUED | OUTPATIENT
Start: 2021-06-05 | End: 2021-06-05

## 2021-06-05 RX ORDER — FUROSEMIDE 40 MG
10 TABLET ORAL ONCE
Refills: 0 | Status: COMPLETED | OUTPATIENT
Start: 2021-06-05 | End: 2021-06-05

## 2021-06-05 RX ORDER — FENTANYL CITRATE 50 UG/ML
200 INJECTION INTRAVENOUS
Refills: 0 | Status: DISCONTINUED | OUTPATIENT
Start: 2021-06-05 | End: 2021-06-05

## 2021-06-05 RX ORDER — PROPOFOL 10 MG/ML
66 INJECTION, EMULSION INTRAVENOUS ONCE
Refills: 0 | Status: COMPLETED | OUTPATIENT
Start: 2021-06-05 | End: 2021-06-05

## 2021-06-05 RX ORDER — POLYETHYLENE GLYCOL 3350 17 G/17G
17 POWDER, FOR SOLUTION ORAL DAILY
Refills: 0 | Status: DISCONTINUED | OUTPATIENT
Start: 2021-06-05 | End: 2021-06-05

## 2021-06-05 RX ORDER — FENTANYL CITRATE 50 UG/ML
130 INJECTION INTRAVENOUS
Refills: 0 | Status: DISCONTINUED | OUTPATIENT
Start: 2021-06-05 | End: 2021-06-06

## 2021-06-05 RX ORDER — POTASSIUM CHLORIDE 20 MEQ
20 PACKET (EA) ORAL ONCE
Refills: 0 | Status: COMPLETED | OUTPATIENT
Start: 2021-06-05 | End: 2021-06-05

## 2021-06-05 RX ORDER — ROCURONIUM BROMIDE 10 MG/ML
66 VIAL (ML) INTRAVENOUS ONCE
Refills: 0 | Status: COMPLETED | OUTPATIENT
Start: 2021-06-05 | End: 2021-06-05

## 2021-06-05 RX ORDER — PROPOFOL 10 MG/ML
2 INJECTION, EMULSION INTRAVENOUS
Qty: 1000 | Refills: 0 | Status: DISCONTINUED | OUTPATIENT
Start: 2021-06-05 | End: 2021-06-09

## 2021-06-05 RX ORDER — FENTANYL CITRATE 50 UG/ML
1 INJECTION INTRAVENOUS
Qty: 2500 | Refills: 0 | Status: DISCONTINUED | OUTPATIENT
Start: 2021-06-05 | End: 2021-06-07

## 2021-06-05 RX ORDER — GLYCERIN ADULT
1 SUPPOSITORY, RECTAL RECTAL ONCE
Refills: 0 | Status: COMPLETED | OUTPATIENT
Start: 2021-06-05 | End: 2021-06-05

## 2021-06-05 RX ORDER — SODIUM CHLORIDE 9 MG/ML
1000 INJECTION, SOLUTION INTRAVENOUS
Refills: 0 | Status: DISCONTINUED | OUTPATIENT
Start: 2021-06-05 | End: 2021-06-06

## 2021-06-05 RX ADMIN — LEVETIRACETAM 266.68 MILLIGRAM(S): 250 TABLET, FILM COATED ORAL at 16:45

## 2021-06-05 RX ADMIN — Medication 300 MILLIGRAM(S): at 16:02

## 2021-06-05 RX ADMIN — SODIUM CHLORIDE 3 MILLILITER(S): 9 INJECTION INTRAMUSCULAR; INTRAVENOUS; SUBCUTANEOUS at 10:48

## 2021-06-05 RX ADMIN — Medication 198 MILLIGRAM(S): at 22:12

## 2021-06-05 RX ADMIN — Medication 66 MILLIGRAM(S): at 17:49

## 2021-06-05 RX ADMIN — CHLORHEXIDINE GLUCONATE 15 MILLILITER(S): 213 SOLUTION TOPICAL at 03:59

## 2021-06-05 RX ADMIN — PROPOFOL 13.2 MG/KG/HR: 10 INJECTION, EMULSION INTRAVENOUS at 23:21

## 2021-06-05 RX ADMIN — Medication 3 UNIT(S)/KG/HR: at 19:30

## 2021-06-05 RX ADMIN — SODIUM CHLORIDE 3 MILLILITER(S): 9 INJECTION, SOLUTION INTRAVENOUS at 04:01

## 2021-06-05 RX ADMIN — Medication 9.92 MICROGRAM(S)/KG/MIN: at 23:58

## 2021-06-05 RX ADMIN — Medication 25 MILLIGRAM(S): at 11:57

## 2021-06-05 RX ADMIN — Medication 1 DROP(S): at 08:25

## 2021-06-05 RX ADMIN — SODIUM CHLORIDE 3 MILLILITER(S): 9 INJECTION INTRAMUSCULAR; INTRAVENOUS; SUBCUTANEOUS at 18:07

## 2021-06-05 RX ADMIN — PROPOFOL 6.61 MG/KG/HR: 10 INJECTION, EMULSION INTRAVENOUS at 17:29

## 2021-06-05 RX ADMIN — SODIUM CHLORIDE 3 MILLILITER(S): 9 INJECTION INTRAMUSCULAR; INTRAVENOUS; SUBCUTANEOUS at 02:11

## 2021-06-05 RX ADMIN — FENTANYL CITRATE 20.8 MICROGRAM(S): 50 INJECTION INTRAVENOUS at 17:42

## 2021-06-05 RX ADMIN — Medication 9.92 MICROGRAM(S)/KG/MIN: at 13:25

## 2021-06-05 RX ADMIN — LEVETIRACETAM 266.68 MILLIGRAM(S): 250 TABLET, FILM COATED ORAL at 05:30

## 2021-06-05 RX ADMIN — FENTANYL CITRATE 130 MICROGRAM(S): 50 INJECTION INTRAVENOUS at 18:08

## 2021-06-05 RX ADMIN — Medication 3 UNIT(S)/KG/HR: at 04:02

## 2021-06-05 RX ADMIN — Medication 750 MILLIGRAM(S): at 16:32

## 2021-06-05 RX ADMIN — Medication 25 MILLIGRAM(S): at 04:00

## 2021-06-05 RX ADMIN — Medication 3 UNIT(S)/KG/HR: at 07:25

## 2021-06-05 RX ADMIN — Medication 300 MILLIGRAM(S): at 10:26

## 2021-06-05 RX ADMIN — Medication 750 MILLIGRAM(S): at 11:11

## 2021-06-05 RX ADMIN — FENTANYL CITRATE 3.97 MICROGRAM(S)/KG/HR: 50 INJECTION INTRAVENOUS at 07:24

## 2021-06-05 RX ADMIN — Medication 9.92 MICROGRAM(S)/KG/MIN: at 17:28

## 2021-06-05 RX ADMIN — FAMOTIDINE 200 MILLIGRAM(S): 10 INJECTION INTRAVENOUS at 02:10

## 2021-06-05 RX ADMIN — Medication 750 MILLIGRAM(S): at 05:40

## 2021-06-05 RX ADMIN — SODIUM CHLORIDE 100 MILLILITER(S): 9 INJECTION, SOLUTION INTRAVENOUS at 17:23

## 2021-06-05 RX ADMIN — PROPOFOL 66 MILLIGRAM(S): 10 INJECTION, EMULSION INTRAVENOUS at 11:05

## 2021-06-05 RX ADMIN — Medication 1 SUPPOSITORY(S): at 13:59

## 2021-06-05 RX ADMIN — FENTANYL CITRATE 3.97 MICROGRAM(S)/KG/HR: 50 INJECTION INTRAVENOUS at 11:22

## 2021-06-05 RX ADMIN — PROPOFOL 6.61 MG/KG/HR: 10 INJECTION, EMULSION INTRAVENOUS at 19:29

## 2021-06-05 RX ADMIN — Medication 198 MILLIGRAM(S): at 14:00

## 2021-06-05 RX ADMIN — Medication 9.92 MICROGRAM(S)/KG/MIN: at 19:30

## 2021-06-05 RX ADMIN — FENTANYL CITRATE 3.97 MICROGRAM(S)/KG/HR: 50 INJECTION INTRAVENOUS at 17:29

## 2021-06-05 RX ADMIN — SODIUM CHLORIDE 3 MILLILITER(S): 9 INJECTION, SOLUTION INTRAVENOUS at 07:26

## 2021-06-05 RX ADMIN — SODIUM CHLORIDE 3 MILLILITER(S): 9 INJECTION, SOLUTION INTRAVENOUS at 19:31

## 2021-06-05 RX ADMIN — Medication 1 APPLICATION(S): at 08:26

## 2021-06-05 RX ADMIN — FENTANYL CITRATE 20.8 MICROGRAM(S): 50 INJECTION INTRAVENOUS at 11:25

## 2021-06-05 RX ADMIN — FAMOTIDINE 200 MILLIGRAM(S): 10 INJECTION INTRAVENOUS at 14:00

## 2021-06-05 RX ADMIN — PROPOFOL 6.61 MG/KG/HR: 10 INJECTION, EMULSION INTRAVENOUS at 11:10

## 2021-06-05 RX ADMIN — Medication 2 MILLIGRAM(S): at 22:30

## 2021-06-05 RX ADMIN — Medication 300 MILLIGRAM(S): at 04:00

## 2021-06-05 RX ADMIN — Medication 25 MILLIGRAM(S): at 20:13

## 2021-06-05 RX ADMIN — Medication 100 MILLIEQUIVALENT(S): at 21:04

## 2021-06-05 RX ADMIN — PROPOFOL 6.61 MG/KG/HR: 10 INJECTION, EMULSION INTRAVENOUS at 07:24

## 2021-06-05 RX ADMIN — CHLORHEXIDINE GLUCONATE 15 MILLILITER(S): 213 SOLUTION TOPICAL at 14:00

## 2021-06-05 RX ADMIN — Medication 198 MILLIGRAM(S): at 06:00

## 2021-06-05 RX ADMIN — Medication 1 DROP(S): at 22:30

## 2021-06-05 RX ADMIN — FENTANYL CITRATE 130 MICROGRAM(S): 50 INJECTION INTRAVENOUS at 11:45

## 2021-06-05 RX ADMIN — Medication 9.92 MICROGRAM(S)/KG/MIN: at 07:23

## 2021-06-05 NOTE — PROGRESS NOTE PEDS - SUBJECTIVE AND OBJECTIVE BOX
NEUROSURGERY NOTE   TRINI DEV / [id number] / 21 @ 08:14    PAST 24hr EVENTS:  Patient seen and examined at bedside with mother, patient is more awake and opening eyes spontaneously this morning. Following commands     HPI: 20y Male    PHYSICAL EXAM:   Vital Signs Last 24 Hrs  T(C): 37.5 (2021 05:00), Max: 38.4 (2021 23:00)  T(F): 99.5 (2021 05:00), Max: 101.1 (2021 23:00)  HR: 70 (2021 07:14) (70 - 71)  BP: 139/69 (2021 03:00) (139/69 - 142/75)  BP(mean): 87 (2021 03:00) (87 - 95)  RR: 13 (2021 07:00) (12 - 18)  SpO2: 90% (2021 07:14) (90% - 93%)    I&O's Summary    2021 07:01  -  2021 07:00  --------------------------------------------------------  IN: 2984.3 mL / OUT: 4098 mL / NET: -1113.7 mL                              12.0   7.63  )-----------( 174      ( 2021 04:58 )             40.1     06-05    161<HH>  |  120<H>  |  13  ----------------------------<  98  3.0<L>   |  19<L>  |  0.96    Ca    9.8      2021 04:58  Phos  5.1     06-05  Mg     1.8     06-05    TPro  7.6  /  Alb  4.0  /  TBili  1.6<H>  /  DBili  x   /  AST  20  /  ALT  6   /  AlkPhos  86  06-05    PT/INR - ( 2021 04:58 )   PT: 19.4 sec;   INR: 1.73 ratio         PTT - ( 2021 04:58 )  PTT:42.8 sec  Urinalysis Basic - ( 2021 02:50 )    Color: Yellow / Appearance: Clear / S.018 / pH: x  Gluc: x / Ketone: Moderate  / Bili: Negative / Urobili: 3 mg/dL   Blood: x / Protein: Trace / Nitrite: Negative   Leuk Esterase: Negative / RBC: 3-5 /HPF / WBC 0-2 /HPF   Sq Epi: x / Non Sq Epi: x / Bacteria: Occasional        MEDICATIONS  (STANDING):  acetaminophen  IV Intermittent - Peds. 750 milliGRAM(s) IV Intermittent every 6 hours  Andexanet Raymond 480 milliGRAM(s),IV Diluent 48 milliLiter(s) 480 milliGRAM(s) (24 mL/Hr) IV Continuous <Continuous>  ceFAZolin  IV Intermittent - Peds 1980 milliGRAM(s) IV Intermittent every 8 hours  chlorhexidine 0.12% Oral Liquid - Peds 15 milliLiter(s) Swish and Spit every 12 hours  chlorhexidine 2% Topical Cloths - Peds 1 Application(s) Topical daily  famotidine IV Intermittent - Peds 20 milliGRAM(s) IV Intermittent every 12 hours  fentaNYL   Infusion - Peds 3 MICROgram(s)/kG/Hr (3.97 mL/Hr) IV Continuous <Continuous>  heparin   Infusion - Pediatric 0.045 Unit(s)/kG/Hr (3 mL/Hr) IV Continuous <Continuous>  levETIRAcetam IV Intermittent - Peds 1000 milliGRAM(s) IV Intermittent every 12 hours  norepinephrine Infusion - Peds 0.1 MICROgram(s)/kG/Min (9.92 mL/Hr) IV Continuous <Continuous>  propofol Infusion - Peds 1 mG/kG/Hr (6.61 mL/Hr) IV Continuous <Continuous>  sildenafil  IV Intermittent - Peds 10 milliGRAM(s) IV Intermittent every 8 hours  sodium chloride 0.9% -  250 milliLiter(s) (3 mL/Hr) IV Continuous <Continuous>  sodium chloride 0.9% lock flush - Peds 3 milliLiter(s) IV Push every 8 hours  sodium chloride 0.9% lock flush - Peds 3 milliLiter(s) IV Push every 8 hours  sodium chloride 0.9%. - Pediatric 1000 milliLiter(s) (71 mL/Hr) IV Continuous <Continuous>  sodium chloride 0.9%. - Pediatric 1000 milliLiter(s) (3 mL/Hr) IV Continuous <Continuous>    MEDICATIONS  (PRN):  fentaNYL    IV Intermittent - Peds 130 MICROGram(s) IV Intermittent every 1 hour PRN sedation  petrolatum, white/mineral oil Ophthalmic Ointment - Peds 1 Application(s) Both EYES two times a day PRN dry eyes  polyvinyl alcohol 1.4%/povidone 0.6% Ophthalmic Solution - Peds 1 Drop(s) Both EYES four times a day PRN Dry Eyes      NPO STATUS:   REASON: [] OR procedure   [] imaging with sedation   [] medical need    [] other   RN Informed: [] Yes [] No  Family informed and educated [] Yes [] No    RADIOLOGY:   NEUROSURGERY NOTE   TRINI DEV / [id number] / 21 @ 08:14    PAST 24hr EVENTS:  Patient seen and examined at bedside with mother, patient is more awake and opening eyes to voice this morning. Following commands briskly, EVD at 5cm/ H2O, patent and draining continuing. CTH yesterday stable. INR this morning 1.73, got 2U FFP.     HPI: 20y Male    PHYSICAL EXAM:   Intubated on fentanyl   OE to voice and following commands more briskly this morning   MAEx4, left sided stronger than right sided  L crani flap full, not dense   NEIL 0cc   EVD @ 5cm/H2O, patent and draining     Vital Signs Last 24 Hrs  T(C): 37.5 (2021 05:00), Max: 38.4 (2021 23:00)  T(F): 99.5 (2021 05:00), Max: 101.1 (2021 23:00)  HR: 70 (2021 07:14) (70 - 71)  BP: 139/69 (2021 03:00) (139/69 - 142/75)  BP(mean): 87 (2021 03:00) (87 - 95)  RR: 13 (2021 07:00) (12 - 18)  SpO2: 90% (2021 07:14) (90% - 93%)    I&O's Summary    2021 07:01  -  2021 07:00  --------------------------------------------------------  IN: 2984.3 mL / OUT: 4098 mL / NET: -1113.7 mL                              12.0   7.63  )-----------( 174      ( 2021 04:58 )             40.1     06-05    161<HH>  |  120<H>  |  13  ----------------------------<  98  3.0<L>   |  19<L>  |  0.96    Ca    9.8      2021 04:58  Phos  5.1     06-05  Mg     1.8     06-05    TPro  7.6  /  Alb  4.0  /  TBili  1.6<H>  /  DBili  x   /  AST  20  /  ALT  6   /  AlkPhos  86  06-05    PT/INR - ( 2021 04:58 )   PT: 19.4 sec;   INR: 1.73 ratio         PTT - ( 2021 04:58 )  PTT:42.8 sec  Urinalysis Basic - ( 2021 02:50 )    Color: Yellow / Appearance: Clear / S.018 / pH: x  Gluc: x / Ketone: Moderate  / Bili: Negative / Urobili: 3 mg/dL   Blood: x / Protein: Trace / Nitrite: Negative   Leuk Esterase: Negative / RBC: 3-5 /HPF / WBC 0-2 /HPF   Sq Epi: x / Non Sq Epi: x / Bacteria: Occasional        MEDICATIONS  (STANDING):  acetaminophen  IV Intermittent - Peds. 750 milliGRAM(s) IV Intermittent every 6 hours  Andexanet Raymond 480 milliGRAM(s),IV Diluent 48 milliLiter(s) 480 milliGRAM(s) (24 mL/Hr) IV Continuous <Continuous>  ceFAZolin  IV Intermittent - Peds 1980 milliGRAM(s) IV Intermittent every 8 hours  chlorhexidine 0.12% Oral Liquid - Peds 15 milliLiter(s) Swish and Spit every 12 hours  chlorhexidine 2% Topical Cloths - Peds 1 Application(s) Topical daily  famotidine IV Intermittent - Peds 20 milliGRAM(s) IV Intermittent every 12 hours  fentaNYL   Infusion - Peds 3 MICROgram(s)/kG/Hr (3.97 mL/Hr) IV Continuous <Continuous>  heparin   Infusion - Pediatric 0.045 Unit(s)/kG/Hr (3 mL/Hr) IV Continuous <Continuous>  levETIRAcetam IV Intermittent - Peds 1000 milliGRAM(s) IV Intermittent every 12 hours  norepinephrine Infusion - Peds 0.1 MICROgram(s)/kG/Min (9.92 mL/Hr) IV Continuous <Continuous>  propofol Infusion - Peds 1 mG/kG/Hr (6.61 mL/Hr) IV Continuous <Continuous>  sildenafil  IV Intermittent - Peds 10 milliGRAM(s) IV Intermittent every 8 hours  sodium chloride 0.9% -  250 milliLiter(s) (3 mL/Hr) IV Continuous <Continuous>  sodium chloride 0.9% lock flush - Peds 3 milliLiter(s) IV Push every 8 hours  sodium chloride 0.9% lock flush - Peds 3 milliLiter(s) IV Push every 8 hours  sodium chloride 0.9%. - Pediatric 1000 milliLiter(s) (71 mL/Hr) IV Continuous <Continuous>  sodium chloride 0.9%. - Pediatric 1000 milliLiter(s) (3 mL/Hr) IV Continuous <Continuous>    MEDICATIONS  (PRN):  fentaNYL    IV Intermittent - Peds 130 MICROGram(s) IV Intermittent every 1 hour PRN sedation  petrolatum, white/mineral oil Ophthalmic Ointment - Peds 1 Application(s) Both EYES two times a day PRN dry eyes  polyvinyl alcohol 1.4%/povidone 0.6% Ophthalmic Solution - Peds 1 Drop(s) Both EYES four times a day PRN Dry Eyes      NPO STATUS:   REASON: [] OR procedure   [] imaging with sedation   [] medical need    [] other   RN Informed: [] Yes [] No  Family informed and educated [] Yes [] No    RADIOLOGY:  < from: CT Head No Cont (21 @ 11:21) >  INTERPRETATION:  Clinical indication: Follow-up head CT. Follow-up hemorrhage.    Multiple axial sections were performed from base of skull to vertex without contrast enhancement.    This exam is compared with prior noncontrast head CT performed on Tomeka 3, 2020.    Postop changes compatible left frontal craniectomy is again seen. Extension of parenchyma through the craniectomy defect is again identified. Scattered areas of high attenuation and edema involving left frontal region is again seen. The size and configuration the ventricles appear unchanged. Extracalvarial staples are again seen in the postop region.    Right frontal catheter is again seen and unchanged..    Areas of subarachnoid hemorrhage involving the bifrontal and left parietal region is again seen.    Extra-axial high attenuation along the posterior interhemispheric and bilateral tentorial region is identified which is compatible with acute subdural hematomas. These findings appear unchanged.    Intraventricular hemorrhage is again seen.    Extra calvarial soft tissue swelling and drain is seen. Extra-axial collection is identified in the postop region. This collection measures approximately 1.6 cm widest diameter and previously measured approximately 1.4 cm widest diameter. This is likely compatible with a pseudomeningocele.    The paranasal sinuses mastoid and middle ear regions appear clear.    IMPRESSION: Slight increase pseudomeningocele otherwise no significant change when allowing for differences in technique.

## 2021-06-05 NOTE — PROGRESS NOTE PEDS - ASSESSMENT
19 y/o M with complex cardiac history presented after cardiac cath with large parenchymal hemorrhage in the anterior left frontal lobe and subarachnoid hemorrhage s/p Left craniectomy, discarded bone flap, placement of EVD on 6/1/2021. Patient with stable neurological exam    - Strict Q1 neurochecks   - Continue EVD @ 5cm/H2O  - Plan for PICU possible extubation today   - Goal INR > 1.5, transfuse FFP if needed   - HOB > 30 degrees   - Na goals > 150, stopped 3% on NS Q6 BMP  - Discussed case with attending

## 2021-06-05 NOTE — PROGRESS NOTE PEDS - ASSESSMENT
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 He has elevated Fontan pressure secondary to LV diastolic dysfunction.  Had severe hemorrhagic stroke left frontal on 6/1 requiring urgent evacuation in OR.    PLAN:  Neuro:  S/P decompressive craniectomy and hemorrhage evacuation 6/1.Bone flap discarded- will need prosthetic  Neuroprotection: 3% NaCl drip for goal SNa >/= 150, keppra, sedation, normothermia  Cont fentanyl and propofol, goal SBS -1  EVD at 5 cmH20  Monitor ICP Q1hr  Repeat CT today    Resp:  Full mechanical ventilation for neuroprotection (goal normocarbia, normoxia)  Follow ETCO2  ABG Q6hr  Goal SpO2 > 92%    CV:  hold Lisinopril, aldactone  Cont sildenafil   Hold Amiodarone until determine interaction with other medications (i,e., xarelto)  Continue Lasix IV Q8hr- goal -500  Goal MAP (for CPP) >/= 85 (due to elevated Fontan pressure about 20)  Cont norepi for goal MAP.  Consider adding vasopressin.  Pacemaker at VOO at 70    Heme:  Holding Xarelto   heme consult appreciated  Coags Q6hr.  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 He has elevated Fontan pressure secondary to LV diastolic dysfunction.  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    PLAN:  Neuro:  Neuroprotection: 3% NaCl drip for goal SNa >/= 150 (currently on hold), keppra, sedation, normothermia  Continue Fentanyl and Propofol- will discontinue in preparation for extubation  EVD at 5 cmH20  Monitor ICP Q1hr (goal <20)  Repeat head CT 6/4- no interval change    Resp:  Full mechanical ventilation for neuroprotection (goal normocarbia, normoxia)  SBT today   continuous 02sat/ETCO2  ABG Q12H  Goal SpO2 > 92%    CV:  Home meds on hold (Lisinopril, Aldactone  Continue Sildenafil   Hold Amiodarone- no antiarrythmic per EP  Continue Lasix IV Q8hr- goal -500  Goal MAP (for CPP) 85 (due to elevated Fontan pressure about 20)  Adjust NE for goal MAP  Pacemaker at VOO at 70    Heme:  Xarelto on hold   Coags Q6H  Goal PLTs >100  Goal INR < 1.5  s/p Vitamin K x3 days    FEN:  NPO/IVF  Chem Q6hr  Hypernatremia Na161  Start bowel regimen     20 y/o male DILV, 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 He has elevated Fontan pressure secondary to LV diastolic dysfunction.  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    PLAN:  Neuro:  Neuroprotection: 3% NaCl drip for goal SNa >/= 150 (currently on hold), keppra, sedation, normothermia  Continue Fentanyl and Propofol- will discontinue in preparation for extubation  EVD at 5 cmH20  Monitor ICP Q1hr (goal <20)  Repeat head CT 6/4- no interval change    Resp:  Full mechanical ventilation for neuroprotection (goal normocarbia, normoxia)  SBT today   continuous 02sat/ETCO2  ABG Q12H  Goal SpO2 > 92%    CV:  Home meds on hold (Lisinopril, Aldactone  Continue Sildenafil   Hold Amiodarone- no antiarrythmic per EP  Continue Lasix IV Q8hr- goal -500  Goal MAP (for CPP) 85 (due to elevated Fontan pressure about 20)  Adjust NE for goal MAP  Pacemaker at VOO at 70    Heme:  Xarelto on hold   Coags Q6H  Goal PLTs >100  Goal INR < 1.5  s/p Vitamin K x3 days    FEN:  NPO/IVF  Chem Q6hr  Hypernatremia Na161  Start bowel regimen    If tolerates being off sedation with no increase in ICP, will trial extubation.

## 2021-06-05 NOTE — PROGRESS NOTE PEDS - SUBJECTIVE AND OBJECTIVE BOX
INTERVAL HISTORY: Febrile overnight to 38.4C.  BCx and UCx sent. CSF Cx has not been sent.  Lasix dose was held due to net negative status.    RESPIRATORY SUPPORT: Mode: SIMV with PS, RR (machine): 12, FiO2: 40, PEEP: 6, PS: 5  NUTRITION: NPO    06-04 @ 07:01  -  06-05 @ 07:00  --------------------------------------------------------  IN: 2984.3 mL / OUT: 4098 mL / NET: -1113.7 mL    MEDICATIONS:  norepinephrine Infusion - Peds 0.1 MICROgram(s)/kG/Min IV Continuous <Continuous>  sildenafil  IV Intermittent - Peds 10 milliGRAM(s) IV Intermittent every 8 hours  ceFAZolin  IV Intermittent - Peds 1980 milliGRAM(s) IV Intermittent every 8 hours  acetaminophen  IV Intermittent - Peds. 750 milliGRAM(s) IV Intermittent every 6 hours  fentaNYL   Infusion - Peds 3.003 MICROgram(s)/kG/Hr IV Continuous <Continuous>  levETIRAcetam IV Intermittent - Peds 1000 milliGRAM(s) IV Intermittent every 12 hours  propofol Infusion - Peds 1 mG/kG/Hr IV Continuous <Continuous>  famotidine IV Intermittent - Peds 20 milliGRAM(s) IV Intermittent every 12 hours    PHYSICAL EXAMINATION:  T(C): 37.4 (06-05-21 @ 13:00), Max: 38.4 (06-04-21 @ 23:00)  HR: 70 (06-05-21 @ 15:09) (70 - 71)  BP: 105/79 (06-05-21 @ 08:00) (105/79 - 139/69)  ABP:  (114/63 - 156/72)  RR: 12 (06-05-21 @ 15:00) (12 - 18)  SpO2: 91% (06-05-21 @ 15:09) (87% - 92%)  CVP(mm Hg):  (13 - 33)  General - intubated, sedated. Will respond to stimuli  Skin - no rash, no desquamation, no cyanosis.  Eyes / ENT - no conjunctival injection, sclerae anicteric, external ears & nares normal, mucous membranes moist.  Pulmonary - normal inspiratory effort, no retractions, lungs clear to auscultation bilaterally, no wheezes, no rales.  Cardiovascular - normal rate, regular rhythm, normal S1 & single S2, grade 1/6 blowing holosystolic murmur at LMSB, no rubs, no gallops, capillary refill < 2sec, normal pulses.  Gastrointestinal - soft, non-distended, non-tender, no splenomegaly. (+) hepatomegaly.  Musculoskeletal - no joint swelling, no clubbing, no edema.  Neurologic / Psychiatric - intubated, sedated    LABORATORY TESTS:                          12.0  CBC:   7.63 )-----------( 174   (06-05-21 @ 04:58)                          40.1               158   |  x     |  x                  Ca: x      BMP:   ----------------------------< x      Mg: x     (06-05-21 @ 11:05)             x      |  x     | x                  Ph: x        LFT:     TPro: 7.6 / Alb: 4.0 / TBili: 1.6 / DBili: x / AST: 20 / ALT: 6 / AlkPhos: 86   (06-05-21 @ 04:58)    COAG: PT: 19.3 / PTT: 56.9 / INR: 1.74   (06-05-21 @ 11:05)       ABG:   pH: 7.37 / pCO2: 41 / pO2: 76 / HCO3: 23 / Base Excess: -1.8 / SaO2: 94.2 / Lactate: x / iCa: 1.31   (06-05-21 @ 04:35)    CBG:   pH: 7.45 / pCO2: 30.4 / pO2: 66.0 / HCO3: 23 / Base Excess: -2.5 / Lactate: x   (05-26-21 @ 20:18)      IMAGING STUDIES:  Electrocardiogram - (5/27/21) Ventricular paced rhythm @ 75bpm. Underlying IART.    Telemetry - (6/5/21) Ventricular paced rhythm @ 70bpm. Underlying IART.    Chest x-ray - (6/4/21) No significant interval change.  ET tube tip above tracheal bifurcation.  NG tube tip beyond GE junction.  RIGHT lung base airspace disease and/or effusion.    Echocardiogram - (5/27/21)   1. This was a technically difficult suboptimal study due to poor echo windows. Findings limited to below.   2. 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   3. Mild mitral valve regurgitation.   4. Trivial tricuspid valve regurgitation.   5. Trivial aortic valve regurgitation.   6. Status post device closure of Fontan fenestration.   7. S/P stent placement in the Fontan pathway. Limited imaging of the inferior Fontan baffle. In this setting, the inferior vena cava to its connection near the atrial portion of the baffle appears patent with no obstruction. S/p device closure of Fontan fenestration. The Fontan fenestration is not seen on this study. The right bidirectional Storm is seen only on color flow mapping. This appears to have laminar low velocity flow.   8. The connection of the right bidirectional Storm to the right pulmonary artery could not be imaged. The right pulmonary artery is seen in a limited portion immediately to the left of the Storm and appears patent. The left pulmonary artery could not be imaged.   9. Extremely poor and limited imaging of the lateral free walls of the single systemic left ventricle. On limited short axis views there appears to be adequate systolic excursion of the posterior wall of the left ventricle and decreased in the anterior wall. The bulboventricular foramen could not be imaged. Suggest alternate imaging for appropriate assessment of function.  10. No pericardial effusion.  11. Small right pleural effusion.    Cath - (5/26/21)  Access 4 Fr RFA, 7 Fr RFV x2 with US guidance.  Sat data (%): on 50% FiO2 LPA 73, RUPV 98, Ao 97; CI 2.6 L/min/m2 with Qp:Qs 1 :1.  Pressures (mmHg): Elevated mFontan = mIVC = 20. mPCW=16, No significant gradient across LV to AAo to Vikki (98/58/73).  Normal PVR while on sildenafil.  Angios showed unobstructed Fontan with existing stent within Fontan conduit.    Comment: IART ablation was attempted after the diagnostic cath but unsuccessful. Patient was overdrive paced out of IART. At the end of the case, Ao sat was 94% and LPA 66% on 50% FiO2

## 2021-06-05 NOTE — PROGRESS NOTE PEDS - ASSESSMENT
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 Presented in IART - s/p EP study with unsuccessful attempt at ablation along with diagnostic cath showing elevated Fontan pressure (20mmHg). He was loaded with amiodarone and ultimately electrically cardioverted out yesterday, however reverted back into IART. His course has now been further complicated by L frontal parenchymal hemorrhage requiring emergent evacuation with NSx and EVD placement.    Plan:  - Continuos telemetry monitoring.  - Pacemaker set at VOO 70bpm  - Continue home medications - Sildenafil 20mg q8h (covert to IV)  - Monitor blood pressure. Goal MAPs > 80. Agree with norepi or vasopressin. TItrate to goal per PICU  - Hold home lisinopril, aldactone  - hold home amidarone. No plans to cardiovert at this time.  - Daily EKGs.   - anticoagulation care per hematology/PICU/NSx  - rest of care per PICU and NSx  - Please page pediatric cardiology with any concerns or questions.

## 2021-06-05 NOTE — PROGRESS NOTE PEDS - SUBJECTIVE AND OBJECTIVE BOX
Interval/Overnight Events:    VITAL SIGNS:  T(C): 37.5 (21 @ 05:00), Max: 38.4 (21 @ 23:00)  HR: 70 (21 @ 07:14) (70 - 71)  BP: 139/69 (21 @ 03:00) (139/69 - 142/75)  ABP: 136/73 (21 @ 07:00) (114/63 - 155/71)  ABP(mean): 97 (21 @ 07:00) (80 - 100)  RR: 13 (21 @ 07:00) (12 - 18)  SpO2: 90% (21 @ 07:14) (90% - 93%)  CVP(mm Hg): 16 (21 @ 07:00) (12 - 178)  End-Tidal CO2:  NIRS:    ===============================RESPIRATORY==============================  [X] Mechanical Ventilation: Mode: SIMV RR (machine): 12, TV: 460, FiO2: 40, PEEP: 6, PS: 5, ITime: 1, MAP: 10, PIP: 23    [ ] Extubation Readiness Assessed  Comments:    =============================CARDIOVASCULAR============================  Cardiovascular Medications:  norepinephrine Infusion - Peds 0.1 MICROgram(s)/kG/Min IV Continuous <Continuous>  sildenafil  IV Intermittent - Peds 10 milliGRAM(s) IV Intermittent every 8 hours    Cardiac Rhythm:	[x] NSR		    [ ] Central Venous Line	[ ] R	[ ] L	[ ] IJ	[ ] Fem	[ ] SC			Placed:   [ ] Arterial Line		[ ] R	[ ] L	[ ] PT	[ ] DP	[ ] Fem	[ ] Rad	[ ] Ax	Placed:   [ ] PICC:				[ ] Broviac		[ ] Mediport  Comments:    =========================HEMATOLOGY/ONCOLOGY=========================  Transfusions:	[ ] PRBC	[ ] Platelets	[ ] FFP		[ ] Cryoprecipitate  DVT Prophylaxis:  Comments:    ============================INFECTIOUS DISEASE===========================  [ ] Cooling [place] being used. Target Temperature:     ======================FLUIDS/ELECTROLYTES/NUTRITION=====================  I&O's Summary    2021 07:01  -  2021 07:00  --------------------------------------------------------  IN: 2984.3 mL / OUT: 4098 mL / NET: -1113.7 mL    Daily   Diet:	[ ] Regular	[ ] Soft		[ ] Clears	[ ] NPO  .	[ ] Other:  .	[ ] NGT		[ ] NDT		[ ] GT		[ ] GJT    [ ] Urinary Catheter, Date Placed:   Comments:    ==============================NEUROLOGY===============================  [ ] SBS:		[ ] GISSELLE-1:	[ ] BIS:	[ ] CAPD:  [ ] EVD set at: ___ , Drainage in last 24 hours: ___ ml    Neurologic Medications:  acetaminophen  IV Intermittent - Peds. 750 milliGRAM(s) IV Intermittent every 6 hours  fentaNYL    IV Intermittent - Peds 130 MICROGram(s) IV Intermittent every 1 hour PRN  fentaNYL   Infusion - Peds 3 MICROgram(s)/kG/Hr IV Continuous <Continuous>  levETIRAcetam IV Intermittent - Peds 1000 milliGRAM(s) IV Intermittent every 12 hours  propofol Infusion - Peds 1 mG/kG/Hr IV Continuous <Continuous>    [x] Adequacy of sedation and pain control has been assessed and adjusted  Comments:    MEDICATIONS:  Hematologic/Oncologic Medications:  heparin   Infusion - Pediatric 0.045 Unit(s)/kG/Hr IV Continuous <Continuous>  Antimicrobials/Immunologic Medications:  ceFAZolin  IV Intermittent - Peds 1980 milliGRAM(s) IV Intermittent every 8 hours  Gastrointestinal Medications:  famotidine IV Intermittent - Peds 20 milliGRAM(s) IV Intermittent every 12 hours  sodium chloride 0.9% -  250 milliLiter(s) IV Continuous <Continuous>  sodium chloride 0.9% lock flush - Peds 3 milliLiter(s) IV Push every 8 hours  sodium chloride 0.9% lock flush - Peds 3 milliLiter(s) IV Push every 8 hours  sodium chloride 0.9%. - Pediatric 1000 milliLiter(s) IV Continuous <Continuous>  sodium chloride 0.9%. - Pediatric 1000 milliLiter(s) IV Continuous <Continuous>  Endocrine/Metabolic Medications:  Genitourinary Medications:  Topical/Other Medications:  Andexanet Raymond 480 milliGRAM(s),IV Diluent 48 milliLiter(s) 480 milliGRAM(s) IV Continuous <Continuous>  chlorhexidine 0.12% Oral Liquid - Peds 15 milliLiter(s) Swish and Spit every 12 hours  chlorhexidine 2% Topical Cloths - Peds 1 Application(s) Topical daily  petrolatum, white/mineral oil Ophthalmic Ointment - Peds 1 Application(s) Both EYES two times a day PRN  polyvinyl alcohol 1.4%/povidone 0.6% Ophthalmic Solution - Peds 1 Drop(s) Both EYES four times a day PRN    =============================PATIENT CARE==============================  [ ] There are pressure ulcers/areas of breakdown that are being addressed?  [x] Preventative measures are being taken to decrease risk for skin breakdown.  [x] Necessity of urinary, arterial, and venous catheters discussed    =============================PHYSICAL EXAM=============================  GENERAL: In no acute distress  RESPIRATORY: Lungs clear to auscultation bilaterally. Good aeration. No rales, rhonchi, retractions or wheezing. Effort even and unlabored.  CARDIOVASCULAR: Regular rate and rhythm. Normal S1/S2. No murmurs, rubs, or gallop. Capillary refill < 2 seconds. Distal pulses 2+ and equal.  ABDOMEN: Soft, non-distended. Bowel sounds present. No palpable hepatosplenomegaly.  SKIN: No rash.  EXTREMITIES: Warm and well perfused. No gross extremity deformities.  NEUROLOGIC: Alert and oriented. No acute change from baseline exam.    =======================================================================  LABS:  ABG - ( 2021 04:35 )  pH: 7.37  /  pCO2: 41    /  pO2: 76    / HCO3: 23    / Base Excess: -1.8  /  SaO2: 94.2  / Lactate: x                                                12.0                  Neurophils% (auto):   77.0   ( @ 04:58):    7.63 )-----------(174          Lymphocytes% (auto):  7.9                                           40.1                   Eosinphils% (auto):   4.5      Manual%: Neutrophils x    ; Lymphocytes x    ; Eosinophils x    ; Bands%: x    ; Blasts x        (  @ 04:58 )   PT: 19.4 sec;   INR: 1.73 ratio  aPTT: 42.8 sec                            161    |  120    |  13                  Calcium: 9.8   / iCa: 1.28   ( @ 04:58)    ----------------------------<  98        Magnesium: 1.8                              3.0     |  19     |  0.96             Phosphorous: 5.1      TPro  7.6    /  Alb  4.0    /  TBili  1.6    /  DBili  x      /  AST  20     /  ALT  6      /  AlkPhos  86     2021 04:58  RECENT CULTURES:      IMAGING STUDIES:    Parent/Guardian is at the bedside:	[ ] Yes	[ ] No  Patient and Parent/Guardian updated as to the progress/plan of care:	[ ] Yes	[ ] No    [ ] The patient remains in critical and unstable condition, and requires ICU care and monitoring  [ ] The patient is improving but requires continued monitoring and adjustment of therapy    [ ] The total critical care time spent by attending physician was __ minutes, excluding procedure time. Interval/Overnight Events: febrile overnight- blood/urine cultures sent. Lasix held. FFP given for elevated INR.    VITAL SIGNS:  T(C): 37.5 (21 @ 05:00), Max: 38.4 (21 @ 23:00)  HR: 70 (21 @ 07:14) (70 - 71)  BP: 139/69 (21 @ 03:00) (139/69 - 142/75)  ABP: 136/73 (21 @ 07:00) (114/63 - 155/71)  ABP(mean): 97 (21 @ 07:00) (80 - 100)  RR: 13 (21 @ 07:00) (12 - 18)  SpO2: 90% (21 @ 07:14) (90% - 93%)  CVP(mm Hg): 16 (21 @ 07:00) (12 - 178)  End-Tidal CO2: 30s    ===============================RESPIRATORY==============================  [X] Mechanical Ventilation: Mode: SIMV RR: 12, TV: 460, FiO2: 40, PEEP: 6, PS: 5, ITime: 1, MAP: 10, PIP: 23    [X] Extubation Readiness Assessed  Comments:    =============================CARDIOVASCULAR============================  Cardiovascular Medications:  norepinephrine Infusion - Peds 0.1 MICROgram(s)/kG/Min IV Continuous <Continuous>  sildenafil  IV Intermittent - Peds 10 milliGRAM(s) IV Intermittent every 8 hours    Cardiac Rhythm:	[x] NSR		    [X ] Central Venous Line	[ ] R	X[ ] L	[ ] IJ	[X] Fem	[ ] SC			Placed: 21  [X ] Arterial Line		[X ] R	[ ] L	[ ] PT	[ ] DP	[ ] Fem	[X ] Rad	[ ] Ax	Placed: 21    =========================HEMATOLOGY/ONCOLOGY=========================  Transfusions:	[X ] FFP	x 2  DVT Prophylaxis: Venodynes     ============================INFECTIOUS DISEASE===========================  [X] Tmax 38.4    ======================FLUIDS/ELECTROLYTES/NUTRITION=====================  I&O's Summary    2021 07:01  -  2021 07:00  --------------------------------------------------------  IN: 2984.3 mL / OUT: 4098 mL / NET: -1113.7 mL    Daily   Diet:	[X] NPO  .	  [X] Urinary Catheter, Date Placed: 21  Comments: failed secondary to urinary retention     ==============================NEUROLOGY===============================  [X ] SBS: 0		  [X ] EVD set at: 5 , Drainage in last 24 hours: 173 ml    Neurologic Medications:  acetaminophen  IV Intermittent - Peds. 750 milliGRAM(s) IV Intermittent every 6 hours  fentaNYL    IV Intermittent - Peds 130 MICROGram(s) IV Intermittent every 1 hour PRN  fentaNYL   Infusion - Peds 3 MICROgram(s)/kG/Hr IV Continuous <Continuous>  levETIRAcetam IV Intermittent - Peds 1000 milliGRAM(s) IV Intermittent every 12 hours  propofol Infusion - Peds 1 mG/kG/Hr IV Continuous <Continuous>    [x] Adequacy of sedation and pain control has been assessed and adjusted  Comments:    MEDICATIONS:  Hematologic/Oncologic Medications:  heparin   Infusion - Pediatric 0.045 Unit(s)/kG/Hr IV Continuous <Continuous>  Antimicrobials/Immunologic Medications:  ceFAZolin  IV Intermittent - Peds 1980 milliGRAM(s) IV Intermittent every 8 hours  Gastrointestinal Medications:  famotidine IV Intermittent - Peds 20 milliGRAM(s) IV Intermittent every 12 hours  sodium chloride 0.9% -  250 milliLiter(s) IV Continuous <Continuous>  sodium chloride 0.9% lock flush - Peds 3 milliLiter(s) IV Push every 8 hours  sodium chloride 0.9% lock flush - Peds 3 milliLiter(s) IV Push every 8 hours  sodium chloride 0.9%. - Pediatric 1000 milliLiter(s) IV Continuous <Continuous>  sodium chloride 0.9%. - Pediatric 1000 milliLiter(s) IV Continuous <Continuous>  Endocrine/Metabolic Medications:  Genitourinary Medications:  Topical/Other Medications:  Andexanet Raymond 480 milliGRAM(s),IV Diluent 48 milliLiter(s) 480 milliGRAM(s) IV Continuous <Continuous>  chlorhexidine 0.12% Oral Liquid - Peds 15 milliLiter(s) Swish and Spit every 12 hours  chlorhexidine 2% Topical Cloths - Peds 1 Application(s) Topical daily  petrolatum, white/mineral oil Ophthalmic Ointment - Peds 1 Application(s) Both EYES two times a day PRN  polyvinyl alcohol 1.4%/povidone 0.6% Ophthalmic Solution - Peds 1 Drop(s) Both EYES four times a day PRN    =============================PATIENT CARE==============================  [ ] There are pressure ulcers/areas of breakdown that are being addressed?  [x] Preventative measures are being taken to decrease risk for skin breakdown.  [x] Necessity of urinary, arterial, and venous catheters discussed    =============================PHYSICAL EXAM=============================  GENERAL: In no acute distress  RESPIRATORY: Lungs clear to auscultation bilaterally. Good aeration. No rales, rhonchi, retractions or wheezing. Effort even and unlabored.  CARDIOVASCULAR: Regular rate and rhythm. Normal S1/S2. No murmurs, rubs, or gallop. Capillary refill < 2 seconds. Distal pulses 2+ and equal.  ABDOMEN: Soft, non-distended. Bowel sounds present. No palpable hepatosplenomegaly.  SKIN: No rash.  EXTREMITIES: Warm and well perfused. No gross extremity deformities.  NEUROLOGIC: Alert and oriented. No acute change from baseline exam.    =======================================================================  LABS:  ABG - ( 2021 04:35 )  pH: 7.37  /  pCO2: 41    /  pO2: 76    / HCO3: 23    / Base Excess: -1.8  /  SaO2: 94.2  / Lactate: x                                                12.0                  Neurophils% (auto):   77.0   ( 04:58):    7.63 )-----------(174          Lymphocytes% (auto):  7.9                                           40.1                   Eosinphils% (auto):   4.5      Manual%: Neutrophils x    ; Lymphocytes x    ; Eosinophils x    ; Bands%: x    ; Blasts x        (  @ 04:58 )   PT: 19.4 sec;   INR: 1.73 ratio  aPTT: 42.8 sec                            161    |  120    |  13                  Calcium: 9.8   / iCa: 1.28   ( @ 04:58)    ----------------------------<  98        Magnesium: 1.8                              3.0     |  19     |  0.96             Phosphorous: 5.1      TPro  7.6    /  Alb  4.0    /  TBili  1.6    /  DBili  x      /  AST  20     /  ALT  6      /  AlkPhos  86     2021 04:58  RECENT CULTURES:      IMAGING STUDIES:    Parent/Guardian is at the bedside:	[ ] Yes	[ ] No  Patient and Parent/Guardian updated as to the progress/plan of care:	[ ] Yes	[ ] No    [ ] The patient remains in critical and unstable condition, and requires ICU care and monitoring  [ ] The patient is improving but requires continued monitoring and adjustment of therapy    [ ] The total critical care time spent by attending physician was __ minutes, excluding procedure time. Interval/Overnight Events: febrile overnight- blood/urine cultures sent. Lasix held. FFP given for elevated INR.    VITAL SIGNS:  T(C): 37.5 (21 @ 05:00), Max: 38.4 (21 @ 23:00)  HR: 70 (21 @ 07:14) (70 - 71)  BP: 139/69 (21 @ 03:00) (139/69 - 142/75)  ABP: 136/73 (21 @ 07:00) (114/63 - 155/71)  ABP(mean): 97 (21 @ 07:00) (80 - 100)  RR: 13 (21 @ 07:00) (12 - 18)  SpO2: 90% (21 @ 07:14) (90% - 93%)  CVP(mm Hg): 16 (21 @ 07:00) (12 - 178)  End-Tidal CO2: 30s    ===============================RESPIRATORY==============================  [X] Mechanical Ventilation: Mode: SIMV RR: 12, TV: 460, FiO2: 40, PEEP: 6, PS: 5, ITime: 1, MAP: 10, PIP: 23    [X] Extubation Readiness Assessed  Comments:    =============================CARDIOVASCULAR============================  Cardiovascular Medications:  norepinephrine Infusion - Peds 0.1 MICROgram(s)/kG/Min IV Continuous <Continuous>  sildenafil  IV Intermittent - Peds 10 milliGRAM(s) IV Intermittent every 8 hours    Cardiac Rhythm:	[x] NSR		    [X ] Central Venous Line	[ ] R	X[ ] L	[ ] IJ	[X] Fem	[ ] SC			Placed: 21  [X ] Arterial Line		[X ] R	[ ] L	[ ] PT	[ ] DP	[ ] Fem	[X ] Rad	[ ] Ax	Placed: 21    =========================HEMATOLOGY/ONCOLOGY=========================  Transfusions:	[X ] FFP	x 2  DVT Prophylaxis: Venodynes     ============================INFECTIOUS DISEASE===========================  [X] Tmax 38.4    ======================FLUIDS/ELECTROLYTES/NUTRITION=====================  I&O's Summary    2021 07:01  -  2021 07:00  --------------------------------------------------------  IN: 2984.3 mL / OUT: 4098 mL / NET: -1113.7 mL    Daily   Diet:	[X] NPO  .	  [X] Urinary Catheter, Date Placed: 21  Comments: failed secondary to urinary retention     ==============================NEUROLOGY===============================  [X ] SBS: 0		  [X ] EVD set at: 5 , Drainage in last 24 hours: 173 ml    Neurologic Medications:  acetaminophen  IV Intermittent - Peds. 750 milliGRAM(s) IV Intermittent every 6 hours  fentaNYL    IV Intermittent - Peds 130 MICROGram(s) IV Intermittent every 1 hour PRN  fentaNYL   Infusion - Peds 3 MICROgram(s)/kG/Hr IV Continuous <Continuous>  levETIRAcetam IV Intermittent - Peds 1000 milliGRAM(s) IV Intermittent every 12 hours  propofol Infusion - Peds 1 mG/kG/Hr IV Continuous <Continuous>    [x] Adequacy of sedation and pain control has been assessed and adjusted  Comments:    MEDICATIONS:  Hematologic/Oncologic Medications:  heparin   Infusion - Pediatric 0.045 Unit(s)/kG/Hr IV Continuous <Continuous>  Antimicrobials/Immunologic Medications:  ceFAZolin  IV Intermittent - Peds 1980 milliGRAM(s) IV Intermittent every 8 hours  Gastrointestinal Medications:  famotidine IV Intermittent - Peds 20 milliGRAM(s) IV Intermittent every 12 hours  sodium chloride 0.9% -  250 milliLiter(s) IV Continuous <Continuous>  sodium chloride 0.9% lock flush - Peds 3 milliLiter(s) IV Push every 8 hours  sodium chloride 0.9% lock flush - Peds 3 milliLiter(s) IV Push every 8 hours  sodium chloride 0.9%. - Pediatric 1000 milliLiter(s) IV Continuous <Continuous>  sodium chloride 0.9%. - Pediatric 1000 milliLiter(s) IV Continuous <Continuous>  Endocrine/Metabolic Medications:  Genitourinary Medications:  Topical/Other Medications:  Andexanet Raymond 480 milliGRAM(s),IV Diluent 48 milliLiter(s) 480 milliGRAM(s) IV Continuous <Continuous>  chlorhexidine 0.12% Oral Liquid - Peds 15 milliLiter(s) Swish and Spit every 12 hours  chlorhexidine 2% Topical Cloths - Peds 1 Application(s) Topical daily  petrolatum, white/mineral oil Ophthalmic Ointment - Peds 1 Application(s) Both EYES two times a day PRN  polyvinyl alcohol 1.4%/povidone 0.6% Ophthalmic Solution - Peds 1 Drop(s) Both EYES four times a day PRN    =============================PATIENT CARE==============================  [ ] There are pressure ulcers/areas of breakdown that are being addressed?  [x] Preventative measures are being taken to decrease risk for skin breakdown.  [x] Necessity of urinary, arterial, and venous catheters discussed    =============================PHYSICAL EXAM=============================  GENERAL: Intubated, sedated   HEENT: EVD in place, PERRL, nares patent, OG in place, lips dry  RESPIRATORY: good aeration bilaterally. No rales or wheezing. Effort even and unlabored.  CARDIOVASCULAR: RRR. Normal S1/S2. No murmurs or gallop. Capillary refill < 2 seconds. Distal pulses 2+ and equal.  ABDOMEN: Soft, full, non-distended. Bowel sounds present. No palpable hepatomegaly.  SKIN: No rash.  EXTREMITIES: Warm and well perfused. No gross extremity deformities.  NEUROLOGIC: Follows directions. Able to give thumbs up. Moves all 4 extremities.     =======================================================================  LABS:  ABG - ( 2021 04:35 )  pH: 7.37  /  pCO2: 41    /  pO2: 76    / HCO3: 23    / Base Excess: -1.8  /  SaO2: 94.2  / Lactate: x                                                12.0                  Neutrophils% (auto):   77.0   ( @ 04:58):    7.63 )-----------(174          Lymphocytes% (auto):  7.9                                           40.1                   Eosinphils% (auto):   4.5      (  @ 04:58 )   PT: 19.4 sec;   INR: 1.73 ratio  aPTT: 42.8 sec                            161    |  120    |  13                  Calcium: 9.8   / iCa: 1.28   ( @ 04:58)    ----------------------------<  98        Magnesium: 1.8                              3.0     |  19     |  0.96             Phosphorous: 5.1      TPro  7.6    /  Alb  4.0    /  TBili  1.6    /  DBili  x      /  AST  20     /  ALT  6      /  AlkPhos  86     2021 04:58    Parent/Guardian is at the bedside:	[X] Yes	[ ] No  Patient and Parent/Guardian updated as to the progress/plan of care:	[X] Yes	[ ] No    [ ] The patient remains in critical and unstable condition, and requires ICU care and monitoring  [X] The patient is improving but requires continued monitoring and adjustment of therapy    [X] The total critical care time spent by attending physician was 50 minutes, excluding procedure time.

## 2021-06-06 LAB
APPEARANCE UR: CLEAR — SIGNIFICANT CHANGE UP
APTT BLD: 57.9 SEC — HIGH (ref 27–36.3)
B PERT DNA SPEC QL NAA+PROBE: SIGNIFICANT CHANGE UP
BASOPHILS # BLD AUTO: 0.02 K/UL — SIGNIFICANT CHANGE UP (ref 0–0.2)
BASOPHILS NFR BLD AUTO: 0.3 % — SIGNIFICANT CHANGE UP (ref 0–2)
BILIRUB UR-MCNC: NEGATIVE — SIGNIFICANT CHANGE UP
BLOOD GAS ARTERIAL - LYTES,HGB,ICA,LACT RESULT: SIGNIFICANT CHANGE UP
BLOOD GAS ARTERIAL - LYTES,HGB,ICA,LACT RESULT: SIGNIFICANT CHANGE UP
C PNEUM DNA SPEC QL NAA+PROBE: SIGNIFICANT CHANGE UP
COLOR SPEC: YELLOW — SIGNIFICANT CHANGE UP
CULTURE RESULTS: NO GROWTH — SIGNIFICANT CHANGE UP
D DIMER BLD IA.RAPID-MCNC: 2441 NG/ML DDU — HIGH
DIFF PNL FLD: ABNORMAL
EOSINOPHIL # BLD AUTO: 0.71 K/UL — HIGH (ref 0–0.5)
EOSINOPHIL NFR BLD AUTO: 9.6 % — HIGH (ref 0–6)
FIBRINOGEN PPP-MCNC: 714 MG/DL — HIGH (ref 290–520)
FLUAV SUBTYP SPEC NAA+PROBE: SIGNIFICANT CHANGE UP
FLUBV RNA SPEC QL NAA+PROBE: SIGNIFICANT CHANGE UP
GLUCOSE UR QL: NEGATIVE — SIGNIFICANT CHANGE UP
HADV DNA SPEC QL NAA+PROBE: SIGNIFICANT CHANGE UP
HCOV 229E RNA SPEC QL NAA+PROBE: SIGNIFICANT CHANGE UP
HCOV HKU1 RNA SPEC QL NAA+PROBE: SIGNIFICANT CHANGE UP
HCOV NL63 RNA SPEC QL NAA+PROBE: SIGNIFICANT CHANGE UP
HCOV OC43 RNA SPEC QL NAA+PROBE: SIGNIFICANT CHANGE UP
HCT VFR BLD CALC: 38.6 % — LOW (ref 39–50)
HGB BLD-MCNC: 11.9 G/DL — LOW (ref 13–17)
HMPV RNA SPEC QL NAA+PROBE: SIGNIFICANT CHANGE UP
HPIV1 RNA SPEC QL NAA+PROBE: SIGNIFICANT CHANGE UP
HPIV2 RNA SPEC QL NAA+PROBE: SIGNIFICANT CHANGE UP
HPIV3 RNA SPEC QL NAA+PROBE: SIGNIFICANT CHANGE UP
HPIV4 RNA SPEC QL NAA+PROBE: SIGNIFICANT CHANGE UP
IANC: 5.33 K/UL — SIGNIFICANT CHANGE UP (ref 1.5–8.5)
IMM GRANULOCYTES NFR BLD AUTO: 0.7 % — SIGNIFICANT CHANGE UP (ref 0–1.5)
INR BLD: 1.55 RATIO — HIGH (ref 0.88–1.16)
INR BLD: 1.68 RATIO — HIGH (ref 0.88–1.16)
KETONES UR-MCNC: ABNORMAL
LEUKOCYTE ESTERASE UR-ACNC: NEGATIVE — SIGNIFICANT CHANGE UP
LYMPHOCYTES # BLD AUTO: 0.55 K/UL — LOW (ref 1–3.3)
LYMPHOCYTES # BLD AUTO: 7.5 % — LOW (ref 13–44)
MCHC RBC-ENTMCNC: 26.6 PG — LOW (ref 27–34)
MCHC RBC-ENTMCNC: 30.8 GM/DL — LOW (ref 32–36)
MCV RBC AUTO: 86.2 FL — SIGNIFICANT CHANGE UP (ref 80–100)
MONOCYTES # BLD AUTO: 0.7 K/UL — SIGNIFICANT CHANGE UP (ref 0–0.9)
MONOCYTES NFR BLD AUTO: 9.5 % — SIGNIFICANT CHANGE UP (ref 2–14)
NEUTROPHILS # BLD AUTO: 5.33 K/UL — SIGNIFICANT CHANGE UP (ref 1.8–7.4)
NEUTROPHILS NFR BLD AUTO: 72.4 % — SIGNIFICANT CHANGE UP (ref 43–77)
NITRITE UR-MCNC: NEGATIVE — SIGNIFICANT CHANGE UP
NRBC # BLD: 0 /100 WBCS — SIGNIFICANT CHANGE UP
NRBC # FLD: 0 K/UL — SIGNIFICANT CHANGE UP
PH UR: 6 — SIGNIFICANT CHANGE UP (ref 5–8)
PLATELET # BLD AUTO: 188 K/UL — SIGNIFICANT CHANGE UP (ref 150–400)
PROT UR-MCNC: ABNORMAL
PROTHROM AB SERPL-ACNC: 17.5 SEC — HIGH (ref 10.6–13.6)
PROTHROM AB SERPL-ACNC: 18.8 SEC — HIGH (ref 10.6–13.6)
RAPID RVP RESULT: SIGNIFICANT CHANGE UP
RBC # BLD: 4.48 M/UL — SIGNIFICANT CHANGE UP (ref 4.2–5.8)
RBC # FLD: 14.8 % — HIGH (ref 10.3–14.5)
RSV RNA SPEC QL NAA+PROBE: SIGNIFICANT CHANGE UP
RV+EV RNA SPEC QL NAA+PROBE: SIGNIFICANT CHANGE UP
SARS-COV-2 RNA SPEC QL NAA+PROBE: SIGNIFICANT CHANGE UP
SODIUM SERPL-SCNC: 157 MMOL/L — HIGH (ref 135–145)
SP GR SPEC: 1.02 — SIGNIFICANT CHANGE UP (ref 1.01–1.02)
SPECIMEN SOURCE: SIGNIFICANT CHANGE UP
UROBILINOGEN FLD QL: SIGNIFICANT CHANGE UP
WBC # BLD: 7.36 K/UL — SIGNIFICANT CHANGE UP (ref 3.8–10.5)
WBC # FLD AUTO: 7.36 K/UL — SIGNIFICANT CHANGE UP (ref 3.8–10.5)

## 2021-06-06 PROCEDURE — 99291 CRITICAL CARE FIRST HOUR: CPT

## 2021-06-06 PROCEDURE — 93770 DETERMINATION VENOUS PRESS: CPT

## 2021-06-06 PROCEDURE — 70450 CT HEAD/BRAIN W/O DYE: CPT | Mod: 26

## 2021-06-06 PROCEDURE — 94681 O2 UPTK CO2 OUTP % O2 XTRC: CPT | Mod: 26

## 2021-06-06 PROCEDURE — 71045 X-RAY EXAM CHEST 1 VIEW: CPT | Mod: 26

## 2021-06-06 PROCEDURE — 99292 CRITICAL CARE ADDL 30 MIN: CPT

## 2021-06-06 RX ORDER — FENTANYL CITRATE 50 UG/ML
66 INJECTION INTRAVENOUS
Refills: 0 | Status: DISCONTINUED | OUTPATIENT
Start: 2021-06-06 | End: 2021-06-07

## 2021-06-06 RX ORDER — DEXMEDETOMIDINE HYDROCHLORIDE IN 0.9% SODIUM CHLORIDE 4 UG/ML
0.25 INJECTION INTRAVENOUS
Qty: 1000 | Refills: 0 | Status: DISCONTINUED | OUTPATIENT
Start: 2021-06-06 | End: 2021-06-07

## 2021-06-06 RX ORDER — METHADONE HYDROCHLORIDE 40 MG/1
4.6 TABLET ORAL EVERY 6 HOURS
Refills: 0 | Status: DISCONTINUED | OUTPATIENT
Start: 2021-06-06 | End: 2021-06-06

## 2021-06-06 RX ORDER — DEXTROSE MONOHYDRATE, SODIUM CHLORIDE, AND POTASSIUM CHLORIDE 50; .745; 4.5 G/1000ML; G/1000ML; G/1000ML
1000 INJECTION, SOLUTION INTRAVENOUS
Refills: 0 | Status: DISCONTINUED | OUTPATIENT
Start: 2021-06-06 | End: 2021-06-07

## 2021-06-06 RX ORDER — ACETAMINOPHEN 500 MG
750 TABLET ORAL EVERY 6 HOURS
Refills: 0 | Status: COMPLETED | OUTPATIENT
Start: 2021-06-06 | End: 2021-06-07

## 2021-06-06 RX ORDER — POTASSIUM CHLORIDE 20 MEQ
20 PACKET (EA) ORAL ONCE
Refills: 0 | Status: COMPLETED | OUTPATIENT
Start: 2021-06-06 | End: 2021-06-06

## 2021-06-06 RX ORDER — GLYCERIN ADULT
1 SUPPOSITORY, RECTAL RECTAL ONCE
Refills: 0 | Status: COMPLETED | OUTPATIENT
Start: 2021-06-06 | End: 2021-06-06

## 2021-06-06 RX ORDER — FUROSEMIDE 40 MG
10 TABLET ORAL ONCE
Refills: 0 | Status: COMPLETED | OUTPATIENT
Start: 2021-06-06 | End: 2021-06-06

## 2021-06-06 RX ORDER — METHADONE HYDROCHLORIDE 40 MG/1
3.5 TABLET ORAL EVERY 6 HOURS
Refills: 0 | Status: DISCONTINUED | OUTPATIENT
Start: 2021-06-06 | End: 2021-06-07

## 2021-06-06 RX ORDER — FUROSEMIDE 40 MG
10 TABLET ORAL EVERY 12 HOURS
Refills: 0 | Status: DISCONTINUED | OUTPATIENT
Start: 2021-06-06 | End: 2021-06-07

## 2021-06-06 RX ORDER — CALCIUM GLUCONATE 100 MG/ML
2000 VIAL (ML) INTRAVENOUS ONCE
Refills: 0 | Status: COMPLETED | OUTPATIENT
Start: 2021-06-06 | End: 2021-06-06

## 2021-06-06 RX ADMIN — PROPOFOL 13.2 MG/KG/HR: 10 INJECTION, EMULSION INTRAVENOUS at 07:22

## 2021-06-06 RX ADMIN — PROPOFOL 13.2 MG/KG/HR: 10 INJECTION, EMULSION INTRAVENOUS at 19:12

## 2021-06-06 RX ADMIN — Medication 100 MILLIEQUIVALENT(S): at 08:14

## 2021-06-06 RX ADMIN — Medication 5.95 MICROGRAM(S)/KG/MIN: at 19:12

## 2021-06-06 RX ADMIN — Medication 2 MILLIGRAM(S): at 03:57

## 2021-06-06 RX ADMIN — Medication 25 MILLIGRAM(S): at 05:21

## 2021-06-06 RX ADMIN — Medication 25 MILLIGRAM(S): at 19:48

## 2021-06-06 RX ADMIN — CHLORHEXIDINE GLUCONATE 15 MILLILITER(S): 213 SOLUTION TOPICAL at 06:59

## 2021-06-06 RX ADMIN — Medication 100 MILLIEQUIVALENT(S): at 21:41

## 2021-06-06 RX ADMIN — Medication 1 SUPPOSITORY(S): at 06:19

## 2021-06-06 RX ADMIN — METHADONE HYDROCHLORIDE 2.76 MILLIGRAM(S): 40 TABLET ORAL at 20:46

## 2021-06-06 RX ADMIN — FENTANYL CITRATE 1.32 MICROGRAM(S)/KG/HR: 50 INJECTION INTRAVENOUS at 17:04

## 2021-06-06 RX ADMIN — SODIUM CHLORIDE 3 MILLILITER(S): 9 INJECTION INTRAMUSCULAR; INTRAVENOUS; SUBCUTANEOUS at 18:34

## 2021-06-06 RX ADMIN — SODIUM CHLORIDE 3 MILLILITER(S): 9 INJECTION INTRAMUSCULAR; INTRAVENOUS; SUBCUTANEOUS at 18:32

## 2021-06-06 RX ADMIN — PROPOFOL 13.2 MG/KG/HR: 10 INJECTION, EMULSION INTRAVENOUS at 07:56

## 2021-06-06 RX ADMIN — CHLORHEXIDINE GLUCONATE 1 APPLICATION(S): 213 SOLUTION TOPICAL at 23:08

## 2021-06-06 RX ADMIN — DEXTROSE MONOHYDRATE, SODIUM CHLORIDE, AND POTASSIUM CHLORIDE 74 MILLILITER(S): 50; .745; 4.5 INJECTION, SOLUTION INTRAVENOUS at 12:45

## 2021-06-06 RX ADMIN — LEVETIRACETAM 266.68 MILLIGRAM(S): 250 TABLET, FILM COATED ORAL at 04:59

## 2021-06-06 RX ADMIN — PROPOFOL 13.2 MG/KG/HR: 10 INJECTION, EMULSION INTRAVENOUS at 15:33

## 2021-06-06 RX ADMIN — DEXMEDETOMIDINE HYDROCHLORIDE IN 0.9% SODIUM CHLORIDE 8.26 MICROGRAM(S)/KG/HR: 4 INJECTION INTRAVENOUS at 19:14

## 2021-06-06 RX ADMIN — LEVETIRACETAM 266.68 MILLIGRAM(S): 250 TABLET, FILM COATED ORAL at 16:53

## 2021-06-06 RX ADMIN — FENTANYL CITRATE 3.97 MICROGRAM(S)/KG/HR: 50 INJECTION INTRAVENOUS at 06:50

## 2021-06-06 RX ADMIN — SODIUM CHLORIDE 3 MILLILITER(S): 9 INJECTION INTRAMUSCULAR; INTRAVENOUS; SUBCUTANEOUS at 10:00

## 2021-06-06 RX ADMIN — FENTANYL CITRATE 1.32 MICROGRAM(S)/KG/HR: 50 INJECTION INTRAVENOUS at 19:15

## 2021-06-06 RX ADMIN — FENTANYL CITRATE 2.64 MICROGRAM(S)/KG/HR: 50 INJECTION INTRAVENOUS at 14:42

## 2021-06-06 RX ADMIN — Medication 300 MILLIGRAM(S): at 22:02

## 2021-06-06 RX ADMIN — FENTANYL CITRATE 3.97 MICROGRAM(S)/KG/HR: 50 INJECTION INTRAVENOUS at 07:24

## 2021-06-06 RX ADMIN — SODIUM CHLORIDE 3 MILLILITER(S): 9 INJECTION INTRAMUSCULAR; INTRAVENOUS; SUBCUTANEOUS at 02:00

## 2021-06-06 RX ADMIN — Medication 198 MILLIGRAM(S): at 05:25

## 2021-06-06 RX ADMIN — FAMOTIDINE 200 MILLIGRAM(S): 10 INJECTION INTRAVENOUS at 14:36

## 2021-06-06 RX ADMIN — Medication 1 SUPPOSITORY(S): at 18:23

## 2021-06-06 RX ADMIN — METHADONE HYDROCHLORIDE 2.76 MILLIGRAM(S): 40 TABLET ORAL at 14:59

## 2021-06-06 RX ADMIN — SODIUM CHLORIDE 3 MILLILITER(S): 9 INJECTION INTRAMUSCULAR; INTRAVENOUS; SUBCUTANEOUS at 18:31

## 2021-06-06 RX ADMIN — SODIUM CHLORIDE 3 MILLILITER(S): 9 INJECTION, SOLUTION INTRAVENOUS at 07:24

## 2021-06-06 RX ADMIN — FENTANYL CITRATE 2.64 MICROGRAM(S)/KG/HR: 50 INJECTION INTRAVENOUS at 16:26

## 2021-06-06 RX ADMIN — Medication 3 UNIT(S)/KG/HR: at 19:13

## 2021-06-06 RX ADMIN — CHLORHEXIDINE GLUCONATE 15 MILLILITER(S): 213 SOLUTION TOPICAL at 15:38

## 2021-06-06 RX ADMIN — Medication 750 MILLIGRAM(S): at 22:19

## 2021-06-06 RX ADMIN — Medication 25 MILLIGRAM(S): at 12:27

## 2021-06-06 RX ADMIN — Medication 5.95 MICROGRAM(S)/KG/MIN: at 14:43

## 2021-06-06 RX ADMIN — Medication 198 MILLIGRAM(S): at 22:48

## 2021-06-06 RX ADMIN — Medication 3 UNIT(S)/KG/HR: at 17:16

## 2021-06-06 RX ADMIN — Medication 2 MILLIGRAM(S): at 15:23

## 2021-06-06 RX ADMIN — DEXTROSE MONOHYDRATE, SODIUM CHLORIDE, AND POTASSIUM CHLORIDE 69 MILLILITER(S): 50; .745; 4.5 INJECTION, SOLUTION INTRAVENOUS at 19:11

## 2021-06-06 RX ADMIN — Medication 40 MILLIGRAM(S): at 19:59

## 2021-06-06 RX ADMIN — DEXMEDETOMIDINE HYDROCHLORIDE IN 0.9% SODIUM CHLORIDE 8.26 MICROGRAM(S)/KG/HR: 4 INJECTION INTRAVENOUS at 12:44

## 2021-06-06 RX ADMIN — Medication 9.92 MICROGRAM(S)/KG/MIN: at 07:23

## 2021-06-06 RX ADMIN — CHLORHEXIDINE GLUCONATE 1 APPLICATION(S): 213 SOLUTION TOPICAL at 06:19

## 2021-06-06 RX ADMIN — FAMOTIDINE 200 MILLIGRAM(S): 10 INJECTION INTRAVENOUS at 01:54

## 2021-06-06 RX ADMIN — Medication 198 MILLIGRAM(S): at 14:36

## 2021-06-06 RX ADMIN — SODIUM CHLORIDE 3 MILLILITER(S): 9 INJECTION, SOLUTION INTRAVENOUS at 17:15

## 2021-06-06 NOTE — PROGRESS NOTE PEDS - SUBJECTIVE AND OBJECTIVE BOX
INTERVAL HISTORY:     RESPIRATORY SUPPORT: Mode: SIMV with PS, RR (machine): 12, FiO2: 40, PEEP: 6, PS: 5  NUTRITION: NPO    06-04 @ 07:01  -  06-05 @ 07:00  --------------------------------------------------------  IN: 2984.3 mL / OUT: 4098 mL / NET: -1113.7 mL    MEDICATIONS:  norepinephrine Infusion - Peds 0.1 MICROgram(s)/kG/Min IV Continuous <Continuous>  sildenafil  IV Intermittent - Peds 10 milliGRAM(s) IV Intermittent every 8 hours  ceFAZolin  IV Intermittent - Peds 1980 milliGRAM(s) IV Intermittent every 8 hours  acetaminophen  IV Intermittent - Peds. 750 milliGRAM(s) IV Intermittent every 6 hours  fentaNYL   Infusion - Peds 3.003 MICROgram(s)/kG/Hr IV Continuous <Continuous>  levETIRAcetam IV Intermittent - Peds 1000 milliGRAM(s) IV Intermittent every 12 hours  propofol Infusion - Peds 1 mG/kG/Hr IV Continuous <Continuous>  famotidine IV Intermittent - Peds 20 milliGRAM(s) IV Intermittent every 12 hours    PHYSICAL EXAMINATION:  T(C): 37.4 (06-05-21 @ 13:00), Max: 38.4 (06-04-21 @ 23:00)  HR: 70 (06-05-21 @ 15:09) (70 - 71)  BP: 105/79 (06-05-21 @ 08:00) (105/79 - 139/69)  ABP:  (114/63 - 156/72)  RR: 12 (06-05-21 @ 15:00) (12 - 18)  SpO2: 91% (06-05-21 @ 15:09) (87% - 92%)  CVP(mm Hg):  (13 - 33)  General - intubated, sedated. Will respond to stimuli  Skin - no rash, no desquamation, no cyanosis.  Eyes / ENT - no conjunctival injection, sclerae anicteric, external ears & nares normal, mucous membranes moist.  Pulmonary - normal inspiratory effort, no retractions, lungs clear to auscultation bilaterally, no wheezes, no rales.  Cardiovascular - normal rate, regular rhythm, normal S1 & single S2, grade 1/6 blowing holosystolic murmur at LMSB, no rubs, no gallops, capillary refill < 2sec, normal pulses.  Gastrointestinal - soft, non-distended, non-tender, no splenomegaly. (+) hepatomegaly.  Musculoskeletal - no joint swelling, no clubbing, no edema.  Neurologic / Psychiatric - intubated, sedated    LABORATORY TESTS:                          12.0  CBC:   7.63 )-----------( 174   (06-05-21 @ 04:58)                          40.1               158   |  x     |  x                  Ca: x      BMP:   ----------------------------< x      Mg: x     (06-05-21 @ 11:05)             x      |  x     | x                  Ph: x        LFT:     TPro: 7.6 / Alb: 4.0 / TBili: 1.6 / DBili: x / AST: 20 / ALT: 6 / AlkPhos: 86   (06-05-21 @ 04:58)    COAG: PT: 19.3 / PTT: 56.9 / INR: 1.74   (06-05-21 @ 11:05)       ABG:   pH: 7.37 / pCO2: 41 / pO2: 76 / HCO3: 23 / Base Excess: -1.8 / SaO2: 94.2 / Lactate: x / iCa: 1.31   (06-05-21 @ 04:35)    CBG:   pH: 7.45 / pCO2: 30.4 / pO2: 66.0 / HCO3: 23 / Base Excess: -2.5 / Lactate: x   (05-26-21 @ 20:18)      IMAGING STUDIES:  Electrocardiogram - (5/27/21) Ventricular paced rhythm @ 75bpm. Underlying IART.    Telemetry - (6/5/21) Ventricular paced rhythm @ 70bpm. Underlying IART.    Chest x-ray - (6/4/21) No significant interval change.  ET tube tip above tracheal bifurcation.  NG tube tip beyond GE junction.  RIGHT lung base airspace disease and/or effusion.    Echocardiogram - (5/27/21)   1. This was a technically difficult suboptimal study due to poor echo windows. Findings limited to below.   2. 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   3. Mild mitral valve regurgitation.   4. Trivial tricuspid valve regurgitation.   5. Trivial aortic valve regurgitation.   6. Status post device closure of Fontan fenestration.   7. S/P stent placement in the Fontan pathway. Limited imaging of the inferior Fontan baffle. In this setting, the inferior vena cava to its connection near the atrial portion of the baffle appears patent with no obstruction. S/p device closure of Fontan fenestration. The Fontan fenestration is not seen on this study. The right bidirectional Storm is seen only on color flow mapping. This appears to have laminar low velocity flow.   8. The connection of the right bidirectional Storm to the right pulmonary artery could not be imaged. The right pulmonary artery is seen in a limited portion immediately to the left of the Storm and appears patent. The left pulmonary artery could not be imaged.   9. Extremely poor and limited imaging of the lateral free walls of the single systemic left ventricle. On limited short axis views there appears to be adequate systolic excursion of the posterior wall of the left ventricle and decreased in the anterior wall. The bulboventricular foramen could not be imaged. Suggest alternate imaging for appropriate assessment of function.  10. No pericardial effusion.  11. Small right pleural effusion.    Cath - (5/26/21)  Access 4 Fr RFA, 7 Fr RFV x2 with US guidance.  Sat data (%): on 50% FiO2 LPA 73, RUPV 98, Ao 97; CI 2.6 L/min/m2 with Qp:Qs 1 :1.  Pressures (mmHg): Elevated mFontan = mIVC = 20. mPCW=16, No significant gradient across LV to AAo to Vikki (98/58/73).  Normal PVR while on sildenafil.  Angios showed unobstructed Fontan with existing stent within Fontan conduit.    Comment: IART ablation was attempted after the diagnostic cath but unsuccessful. Patient was overdrive paced out of IART. At the end of the case, Ao sat was 94% and LPA 66% on 50% FiO2  INTERVAL HISTORY: Placed on ERT yesterday but ICP increased to 30 so patient was kept intubated on SIMV.  conintued on norepi with MAP goal of >85).  Continued to be appropriate neurologically, following commands.    RESPIRATORY SUPPORT: Mode: SIMV with PS, RR (machine): 12, FiO2: 40, PEEP: 6, PS: 5  NUTRITION: NPO     @ 07:01  -   @ 07:00  --------------------------------------------------------  IN: 3549.4 mL / OUT: 3842 mL / NET: -292.6 mL    MEDICATIONS:  furosemide  IV Intermittent - Peds 10 milliGRAM(s) IV Intermittent every 12 hours  norepinephrine Infusion - Peds 0.08 MICROgram(s)/kG/Min IV Continuous <Continuous>  sildenafil  IV Intermittent - Peds 10 milliGRAM(s) IV Intermittent every 8 hours  ceFAZolin  IV Intermittent - Peds 1980 milliGRAM(s) IV Intermittent every 8 hours  fentaNYL   Infusion - Peds 3.003 MICROgram(s)/kG/Hr IV Continuous <Continuous>  levETIRAcetam IV Intermittent - Peds 1000 milliGRAM(s) IV Intermittent every 12 hours  methadone IV Intermittent - Peds 4.6 milliGRAM(s) IV Intermittent every 6 hours  propofol Infusion - Peds 2 mG/kG/Hr IV Continuous <Continuous>  famotidine IV Intermittent - Peds 20 milliGRAM(s) IV Intermittent every 12 hours  heparin   Infusion - Pediatric 0.045 Unit(s)/kG/Hr IV Continuous <Continuous>      PHYSICAL EXAMINATION:  T(C): 36.8 (21 @ 08:00), Max: 37.8 (21 @ 02:00)  HR: 70 (21 @ 10:00) (70 - 71)  ABP:  (125/66 - 157/76)  RR: 12 (21 @ 10:00) (12 - 20)  SpO2: 91% (21 @ 10:00) (87% - 92%)  CVP(mm Hg):  (13 - 21)  General - intubated, sedated. Will respond to stimuli  Skin - no rash, no desquamation, no cyanosis.  Eyes / ENT - no conjunctival injection, sclerae anicteric, external ears & nares normal, mucous membranes moist.  Pulmonary - normal inspiratory effort, no retractions, lungs clear to auscultation bilaterally, no wheezes, no rales.  Cardiovascular - normal rate, regular rhythm, normal S1 & single S2, grade 1/6 blowing holosystolic murmur at LMSB, no rubs, no gallops, capillary refill < 2sec, normal pulses.  Gastrointestinal - soft, non-distended, non-tender, no splenomegaly. (+) hepatomegaly.  Musculoskeletal - no joint swelling, no clubbing, no edema.  Neurologic / Psychiatric - intubated, sedated    LABORATORY TESTS:                          11.9  CBC:   7.36 )-----------( 188   (21 @ 00:37)                          38.6               158   |  115   |  8                  Ca: 9.2    BMP:   ----------------------------< 91     M.6   (21 @ 06:20)             2.8    |  20    | 0.82               Ph: 3.9      LFT:     TPro: 7.7 / Alb: 4.2 / TBili: 1.2 / DBili: x / AST: 21 / ALT: <5 / AlkPhos: 88   (21 @ 06:20)    COAG: PT: 17.5 / PTT: 57.9 / INR: 1.55   (21 @ 00:37)       ABG:   pH: 7.42 / pCO2: 39 / pO2: 66 / HCO3: 25 / Base Excess: 0.7 / SaO2: 92.7 / Lactate: x / iCa: 1.19   (21 @ 05:49)      IMAGING STUDIES:  Electrocardiogram - (21) Ventricular paced rhythm @ 75bpm. Underlying IART.    Telemetry - (21) Ventricular paced rhythm @ 70bpm. Underlying IART.    Chest x-ray - (21) No significant interval change.  ET tube tip above tracheal bifurcation.  NG tube tip beyond GE junction.  RIGHT lung base airspace disease and/or effusion.    Echocardiogram - (21)   1. This was a technically difficult suboptimal study due to poor echo windows. Findings limited to below.   2. 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   3. Mild mitral valve regurgitation.   4. Trivial tricuspid valve regurgitation.   5. Trivial aortic valve regurgitation.   6. Status post device closure of Fontan fenestration.   7. S/P stent placement in the Fontan pathway. Limited imaging of the inferior Fontan baffle. In this setting, the inferior vena cava to its connection near the atrial portion of the baffle appears patent with no obstruction. S/p device closure of Fontan fenestration. The Fontan fenestration is not seen on this study. The right bidirectional Storm is seen only on color flow mapping. This appears to have laminar low velocity flow.   8. The connection of the right bidirectional Storm to the right pulmonary artery could not be imaged. The right pulmonary artery is seen in a limited portion immediately to the left of the Storm and appears patent. The left pulmonary artery could not be imaged.   9. Extremely poor and limited imaging of the lateral free walls of the single systemic left ventricle. On limited short axis views there appears to be adequate systolic excursion of the posterior wall of the left ventricle and decreased in the anterior wall. The bulboventricular foramen could not be imaged. Suggest alternate imaging for appropriate assessment of function.  10. No pericardial effusion.  11. Small right pleural effusion.    Cath - (21)  Access 4 Fr RFA, 7 Fr RFV x2 with US guidance.  Sat data (%): on 50% FiO2 LPA 73, RUPV 98, Ao 97; CI 2.6 L/min/m2 with Qp:Qs 1 :1.  Pressures (mmHg): Elevated mFontan = mIVC = 20. mPCW=16, No significant gradient across LV to AAo to Vikki (98/58/73).  Normal PVR while on sildenafil.  Angios showed unobstructed Fontan with existing stent within Fontan conduit.    Comment: IART ablation was attempted after the diagnostic cath but unsuccessful. Patient was overdrive paced out of IART. At the end of the case, Ao sat was 94% and LPA 66% on 50% FiO2

## 2021-06-06 NOTE — PROGRESS NOTE PEDS - SUBJECTIVE AND OBJECTIVE BOX
Interval/Overnight Events: ICP elevated yesterday with decrease in sedation, kept intubated.    ===========================RESPIRATORY==========================  RR: 12 (21 @ 09:00) (12 - 20)  SpO2: 92% (21 @ 09:00) (87% - 92%)  End Tidal CO2: 30s    Respiratory Support: Mode: SIMV with PS, RR (machine): 12, TV (machine): 460, FiO2: 40, PEEP: 6, PS: 5, ITime: 1, MAP: 10, PIP: 23    [x] Airway Clearance Discussed  Extubation Readiness:  [ ] Not Applicable     [x] Discussed and Assessed  Comments:    =========================CARDIOVASCULAR========================  HR: 70 (21 @ 09:00) (70 - 71)  ABP: 126/64 (21 @ 09:00) (125/66 - 157/76)  CVP(mm Hg): 14 (21 @ 09:00) (13 - 33)  NIRS:    Patient Care Access: Left Fem CVL (), Righ Rad AL ()  norepinephrine Infusion - Peds 0.08 MICROgram(s)/kG/Min IV Continuous <Continuous>  sildenafil  IV Intermittent - Peds 10 milliGRAM(s) IV Intermittent every 8 hours  Comments:    =====================HEMATOLOGY/ONCOLOGY=====================  Transfusions:	[ ] PRBC	[ ] Platelets	[ ] FFP		[ ] Cryoprecipitate  DVT Prophylaxis:  heparin   Infusion - Pediatric 0.045 Unit(s)/kG/Hr IV Continuous <Continuous>  Comments:    ========================INFECTIOUS DISEASE=======================  T(C): 36.8 (21 @ 08:00), Max: 37.8 (21 @ 02:00)  T(F): 98.2 (21 @ 08:00), Max: 100 (21 @ 02:00)  [ ] Cooling [place] being used. Target Temperature:    ceFAZolin  IV Intermittent - Peds 1980 milliGRAM(s) IV Intermittent every 8 hours    ==================FLUIDS/ELECTROLYTES/NUTRITION=================  I&O's Summary    2021 07:  -  2021 07:00  --------------------------------------------------------  IN: 3549.4 mL / OUT: 3842 mL / NET: -292.6 mL    2021 07:01  -  2021 09:26  --------------------------------------------------------  IN: 400.2 mL / OUT: 265 mL / NET: 135.2 mL    Diet: NPO  [ ] NGT		[ ] NDT		[ ] GT		[ ] GJT    famotidine IV Intermittent - Peds 20 milliGRAM(s) IV Intermittent every 12 hours  sodium chloride 0.9% -  250 milliLiter(s) IV Continuous <Continuous>  sodium chloride 0.9% lock flush - Peds 3 milliLiter(s) IV Push every 8 hours  sodium chloride 0.9% lock flush - Peds 3 milliLiter(s) IV Push every 8 hours  sodium chloride 0.9%. - Pediatric 1000 milliLiter(s) IV Continuous <Continuous> at 74 ml/hr  Comments: Urinary Catheter, Date Placed:     ==========================NEUROLOGY===========================  [ ] SBS:	-1	[ ] GISSELLE-1:	[ ] BIS:	[ ] CAPD:  fentaNYL    IV Intermittent - Peds 130 MICROGram(s) IV Intermittent every 1 hour PRN  fentaNYL   Infusion - Peds 3.003 MICROgram(s)/kG/Hr IV Continuous <Continuous>  levETIRAcetam IV Intermittent - Peds 1000 milliGRAM(s) IV Intermittent every 12 hours  propofol Infusion - Peds 2 mG/kG/Hr IV Continuous <Continuous>  [x] Adequacy of sedation and pain control has been assessed and adjusted  Comments: ICP in the Teens, Drained 72 in the last 24 hours.    OTHER MEDICATIONS:  Andexanet Raymond 480 milliGRAM(s),IV Diluent 48 milliLiter(s) 480 milliGRAM(s) IV Continuous <Continuous>  chlorhexidine 0.12% Oral Liquid - Peds 15 milliLiter(s) Swish and Spit every 12 hours  chlorhexidine 2% Topical Cloths - Peds 1 Application(s) Topical daily  petrolatum, white/mineral oil Ophthalmic Ointment - Peds 1 Application(s) Both EYES two times a day PRN  polyvinyl alcohol 1.4%/povidone 0.6% Ophthalmic Solution - Peds 1 Drop(s) Both EYES four times a day PRN    =========================PATIENT CARE==========================  [ ] There are pressure ulcers/areas of breakdown that are being addressed.  [x] Preventative measures are being taken to decrease risk for skin breakdown.  [x] Necessity of urinary, arterial, and venous catheters discussed    =========================PHYSICAL EXAM=========================  GENERAL: In no acute distress  RESPIRATORY: Lungs clear to auscultation bilaterally. Good aeration. No rales, rhonchi, retractions or wheezing. Effort even and unlabored.  CARDIOVASCULAR: Regular rate and rhythm. Normal S1/S2. No murmurs, rubs, or gallop. Capillary refill < 2 seconds. Distal pulses 2+ and equal.  ABDOMEN: Soft, non-distended. Bowel sounds present. No palpable hepatosplenomegaly.  SKIN: No rash.  EXTREMITIES: Warm and well perfused. No gross extremity deformities.  NEUROLOGIC: Alert and oriented. No acute change from baseline exam.    ===============================================================  LABS:  ABG - ( 2021 05:49 )  pH: 7.42  /  pCO2: 39    /  pO2: 66    / HCO3: 25    / Base Excess: 0.7   /  SaO2: 92.7  / Lactate: x                                                11.9                  Neurophils% (auto):   72.4   ( @ 00:37):    7.36 )-----------(188          Lymphocytes% (auto):  7.5                                           38.6                   Eosinphils% (auto):   9.6       (  @ 00:37 )   PT: 17.5 sec;   INR: 1.55 ratio  aPTT: 57.9 sec  Fibrinogen Assay: 714 mg/dL  D-Dimer Assay, Quantitative: 2441    2021 06:20    158    |  115    |  8                  Calcium: 9.2   / iCa: x      ----------------------------<  91        Magnesium: 1.6    2.8     |  20     |  0.82            Phosphorous: 3.9      TPro  7.7    /  Alb  4.2    /  TBili  1.2    /  DBili  x      /  AST  21     /  ALT  <5     /  AlkPhos  88     2021 06:20    RECENT CULTURES:   @ 08:27 .Blood Blood     No growth to date.     @ 01:30 .Urine Catheterized     No growth    IMAGING STUDIES: ETT at T3, Mild pulm haziness, improved from day prior.    Parent/Guardian is at the bedside:	[x ] Yes	[ ] No  Patient and Parent/Guardian updated as to the progress/plan of care:	[ x] Yes	[ ] No    [x ] The patient remains in critical and unstable condition, and requires ICU care and monitoring, total critical care time spent by myself, the attending physician was 50 minutes, excluding procedure time.  [ ] The patient is improving but requires continued monitoring and adjustment of therapy Interval/Overnight Events: ICP elevated yesterday with decrease in sedation, kept intubated.    ===========================RESPIRATORY==========================  RR: 12 (21 @ 09:00) (12 - 20)  SpO2: 92% (21 @ 09:00) (87% - 92%)  End Tidal CO2: 30s    Respiratory Support: Mode: SIMV with PS, RR (machine): 12, TV (machine): 460, FiO2: 40, PEEP: 6, PS: 5, ITime: 1, MAP: 10, PIP: 23    [x] Airway Clearance Discussed  Extubation Readiness:  [ ] Not Applicable     [x] Discussed and Assessed  Comments:    =========================CARDIOVASCULAR========================  HR: 70 (21 @ 09:00) (70 - 71)  ABP: 126/64 (21 @ 09:00) (125/66 - 157/76)  CVP(mm Hg): 14 (21 @ 09:00) (13 - 33)  NIRS:    Patient Care Access: Left Fem CVL (), Righ Rad AL ()  norepinephrine Infusion - Peds 0.08 MICROgram(s)/kG/Min IV Continuous <Continuous>  sildenafil  IV Intermittent - Peds 10 milliGRAM(s) IV Intermittent every 8 hours  Comments:    =====================HEMATOLOGY/ONCOLOGY=====================  Transfusions:	[ ] PRBC	[ ] Platelets	[ ] FFP		[ ] Cryoprecipitate  DVT Prophylaxis:  heparin   Infusion - Pediatric 0.045 Unit(s)/kG/Hr IV Continuous <Continuous>  Comments:    ========================INFECTIOUS DISEASE=======================  T(C): 36.8 (21 @ 08:00), Max: 37.8 (21 @ 02:00)  T(F): 98.2 (21 @ 08:00), Max: 100 (21 @ 02:00)  [ ] Cooling [place] being used. Target Temperature:    ceFAZolin  IV Intermittent - Peds 1980 milliGRAM(s) IV Intermittent every 8 hours    ==================FLUIDS/ELECTROLYTES/NUTRITION=================  I&O's Summary    2021 07:  -  2021 07:00  --------------------------------------------------------  IN: 3549.4 mL / OUT: 3842 mL / NET: -292.6 mL    2021 07:01  -  2021 09:26  --------------------------------------------------------  IN: 400.2 mL / OUT: 265 mL / NET: 135.2 mL    Diet: NPO  [ ] NGT		[ ] NDT		[ ] GT		[ ] GJT    famotidine IV Intermittent - Peds 20 milliGRAM(s) IV Intermittent every 12 hours  sodium chloride 0.9% -  250 milliLiter(s) IV Continuous <Continuous>  sodium chloride 0.9% lock flush - Peds 3 milliLiter(s) IV Push every 8 hours  sodium chloride 0.9% lock flush - Peds 3 milliLiter(s) IV Push every 8 hours  sodium chloride 0.9%. - Pediatric 1000 milliLiter(s) IV Continuous <Continuous> at 74 ml/hr  Comments: Urinary Catheter, Date Placed:     ==========================NEUROLOGY===========================  [ ] SBS:	-1	[ ] GISSELLE-1:	[ ] BIS:	[ ] CAPD:  fentaNYL    IV Intermittent - Peds 130 MICROGram(s) IV Intermittent every 1 hour PRN  fentaNYL   Infusion - Peds 3.003 MICROgram(s)/kG/Hr IV Continuous <Continuous>  levETIRAcetam IV Intermittent - Peds 1000 milliGRAM(s) IV Intermittent every 12 hours  propofol Infusion - Peds 2 mG/kG/Hr IV Continuous <Continuous>  [x] Adequacy of sedation and pain control has been assessed and adjusted  Comments: ICP in the Teens, Drained 72 in the last 24 hours.    OTHER MEDICATIONS:  Andexanet Raymond 480 milliGRAM(s),IV Diluent 48 milliLiter(s) 480 milliGRAM(s) IV Continuous <Continuous>  chlorhexidine 0.12% Oral Liquid - Peds 15 milliLiter(s) Swish and Spit every 12 hours  chlorhexidine 2% Topical Cloths - Peds 1 Application(s) Topical daily  petrolatum, white/mineral oil Ophthalmic Ointment - Peds 1 Application(s) Both EYES two times a day PRN  polyvinyl alcohol 1.4%/povidone 0.6% Ophthalmic Solution - Peds 1 Drop(s) Both EYES four times a day PRN    =========================PATIENT CARE==========================  [ ] There are pressure ulcers/areas of breakdown that are being addressed.  [x] Preventative measures are being taken to decrease risk for skin breakdown.  [x] Necessity of urinary, arterial, and venous catheters discussed    =========================PHYSICAL EXAM=========================  GENERAL: In no acute distress  RESPIRATORY: Lungs clear to auscultation bilaterally. Good aeration. No rales, rhonchi, retractions or wheezing. Effort even and unlabored.  CARDIOVASCULAR: Regular rate and rhythm. Normal S1/S2. No murmurs, rubs, or gallop. Capillary refill < 2 seconds. Distal pulses 2+ and equal.  ABDOMEN: Soft, non-distended. Bowel sounds present. No palpable hepatosplenomegaly.  SKIN: No rash.  EXTREMITIES: Warm and well perfused. No gross extremity deformities.  NEUROLOGIC: The patient is sedated. Pupils equal and reactive to light. Seems to move ext appropriately.     ===============================================================  LABS:  ABG - ( 2021 05:49 )  pH: 7.42  /  pCO2: 39    /  pO2: 66    / HCO3: 25    / Base Excess: 0.7   /  SaO2: 92.7  / Lactate: x                                                11.9                  Neurophils% (auto):   72.4   ( @ 00:37):    7.36 )-----------(188          Lymphocytes% (auto):  7.5                                           38.6                   Eosinphils% (auto):   9.6       (  @ 00:37 )   PT: 17.5 sec;   INR: 1.55 ratio  aPTT: 57.9 sec  Fibrinogen Assay: 714 mg/dL  D-Dimer Assay, Quantitative: 2441    2021 06:20    158    |  115    |  8                  Calcium: 9.2   / iCa: x      ----------------------------<  91        Magnesium: 1.6    2.8     |  20     |  0.82            Phosphorous: 3.9      TPro  7.7    /  Alb  4.2    /  TBili  1.2    /  DBili  x      /  AST  21     /  ALT  <5     /  AlkPhos  88     2021 06:20    RECENT CULTURES:   @ 08:27 .Blood Blood     No growth to date.     @ 01:30 .Urine Catheterized     No growth    IMAGING STUDIES: ETT at T3, Mild pulm haziness, improved from day prior.    Parent/Guardian is at the bedside:	[x ] Yes	[ ] No  Patient and Parent/Guardian updated as to the progress/plan of care:	[ x] Yes	[ ] No    [x ] The patient remains in critical and unstable condition, and requires ICU care and monitoring, total critical care time spent by myself, the attending physician was 50 minutes, excluding procedure time.  [ ] The patient is improving but requires continued monitoring and adjustment of therapy

## 2021-06-06 NOTE — PROGRESS NOTE PEDS - ASSESSMENT
19 y/o M with complex cardiac history presented after cardiac cath with large parenchymal hemorrhage in the anterior left frontal lobe and subarachnoid hemorrhage s/p Left craniectomy, discarded bone flap, placement of EVD on 6/1/2021. Patient with followable neurological exam    - Strict Q1 neurochecks   - Continue EVD @ 5cm/H2O  - Plan for PICU possible extubation today   - Goal INR > 1.5, transfuse FFP if needed   - HOB > 30 degrees   - Na goals 145-155, stopped 3% on NS Q6 BMP, Na currently 158   - Discussed case with attending

## 2021-06-06 NOTE — PROGRESS NOTE PEDS - ASSESSMENT
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 He has elevated Fontan pressure secondary to LV diastolic dysfunction.  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    PLAN:  Neuro:  Neuroprotection: 3% NaCl drip stopped yesterday - SNa still elevated. Cont to monitor every 12 hours, keppra, sedation, normothermia  EVD at 5 cmH20  Monitor ICP Q1hr (goal <20)  Repeat head CT 6/4- no interval change  Will start precedex, methadone today - with goal of decreasing Fentanyl to allow for smoother possible future extubation. Cont Propofol.    Resp:  Full mechanical ventilation for neuroprotection (goal normocarbia, normoxia)  Will keep intubated today because of rise in ICP yesterday with decrease in sedtion  continuous 02sat/ETCO2  ABG Q12H, Goal SpO2 > 92%    CV:  Home meds on hold (Lisinopril, Aldactone)  Continue Sildenafil   Hold Amiodarone- no antiarrythmic per EP  Continue Lasix IV Q12hr: goal -500  Goal MAP (for CPP) 85 (due to elevated Fontan pressure about 20), Adjust NE for goal MAP  Pacemaker at VOO at 70    Heme:  Xarelto on hold  Goal PLTs >100  Goal INR < 1.5 - Give FFP today  s/p Vitamin K x3 days    FEN:  NPO/IVF  Chem Q6hr  Hypernatremia Na161  Start bowel regimen    Access  Left Fem CVL (6/2), Righ Rad AL (6/1) 22 y/o male DILV, 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 He has elevated Fontan pressure secondary to LV diastolic dysfunction.  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    PLAN:  Neuro:  Neuroprotection: 3% NaCl drip stopped yesterday - Serum Na still elevated. Cont to monitor every 12 hours, keppra, sedation, normothermia  EVD at 5 cmH20 - cont to monitor output  Monitor ICP Q1hr (goal <20)  Repeat head CT 6/4- no interval change  Will start precedex, methadone today - with goal of decreasing Fentanyl to allow for smoother possible future extubation. Cont Propofol.    Resp:  Full mechanical ventilation for neuroprotection (goal normocarbia, normoxia)  Will keep intubated today because of rise in ICP yesterday with decrease in sedation  continuous 02sat/ETCO2  ABG Q12H, Goal SpO2 > 92%    CV:  Home meds on hold (Lisinopril, Aldactone)  Continue Sildenafil   Hold Amiodarone- no antiarrythmic per EP  Continue Lasix IV Q12hr: goal -500  Goal MAP (for CPP) 85 (due to elevated Fontan pressure about 20), Adjust NE for goal MAP  Pacemaker at VOO at 70    Heme:  Xarelto on hold  Goal PLTs >100  Goal INR < 1.5 - Give FFP today  s/p Vitamin K x3 days    FEN:  NPO/IVF - on NS  Serum sodium still elevated. Check every 12 hours.  Start bowel regimen    Access  Left Fem CVL (6/2), Righ Rad AL (6/1)

## 2021-06-06 NOTE — PROGRESS NOTE PEDS - ASSESSMENT
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 Presented in IART - s/p EP study with unsuccessful attempt at ablation along with diagnostic cath showing elevated Fontan pressure (20mmHg). He was loaded with amiodarone and ultimately electrically cardioverted out yesterday, however reverted back into IART. His course has now been further complicated by L frontal parenchymal hemorrhage requiring emergent evacuation with NSx and EVD placement.    Plan:  - Continuos telemetry monitoring.  - Pacemaker set at VOO 70bpm  - Continue home medications - Sildenafil 20mg q8h (covert to IV)  - Monitor blood pressure. Goal MAPs > 80. Agree with norepi or vasopressin. TItrate to goal per PICU  - Hold home lisinopril, aldactone  - hold home amidarone. No plans to cardiovert at this time.  - Daily EKGs.   - anticoagulation care per hematology/PICU/NSx  - rest of care per PICU and NSx  - Please page pediatric cardiology with any concerns or questions. 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 Presented in IART - s/p EP study with unsuccessful attempt at ablation along with diagnostic cath showing elevated Fontan pressure (20mmHg). He was loaded with amiodarone and ultimately electrically cardioverted out on 6/1, however reverted back into IART. His course has now been further complicated by L frontal parenchymal hemorrhage requiring emergent evacuation with NSx and EVD placement.    Plan:  - Continuos telemetry monitoring.  - Pacemaker set at VOO 70bpm  - Continue home medications - Sildenafil 20mg q8h (covert to IV)  - Monitor blood pressure. Goal MAPs > 80. Agree with norepi or vasopressin. TItrate to goal per PICU  - Hold home lisinopril, aldactone  - hold home amidarone. No plans to cardiovert at this time.  - Daily EKGs.   - anticoagulation care per hematology/PICU/NSx  - Starting methadone (sedated for ~5 days) and precedex in hopes of weaning off fentaly.  Continuing on propofol for sedation.  - rest of care per PICU and NSx  - Please page pediatric cardiology with any concerns or questions.

## 2021-06-06 NOTE — PROVIDER CONTACT NOTE (OTHER) - SITUATION
Pt having increased ICP reading >20 for 15 mins from EVD. Neuro exam shows right sided neglect and increased lethargy with decreased sedation

## 2021-06-06 NOTE — CHART NOTE - NSCHARTNOTEFT_GEN_A_CORE
Called to bedside for change in exam. Pt with weakness in right upper extremity and less alert with propofol/fentanyl drips stopped. Pt with no change in HR/BP, increased ICP to 22 while febrile during this time. Pt had received first dose of methadone an hour before this exam. Methadone dose at 4.6mg q6. ICP improved with propofol sedation and decreased to 7. Increase in ICP most likely secondary to fever and possible methadone.     Plan to repeat neuro exam with propofol off and before next methadone dose.

## 2021-06-06 NOTE — PROGRESS NOTE PEDS - SUBJECTIVE AND OBJECTIVE BOX
PAST 24hr EVENTS: no issues overnight, ICP 15, drain patent. Distally flushed drain this morning to clear clot in distal tubing.      PHYSICAL EXAM:   Vital Signs Last 24 Hrs  T(C): 37.4 (2021 05:00), Max: 37.8 (2021 02:00)  T(F): 99.3 (2021 05:00), Max: 100 (2021 02:00)  HR: 70 (2021 07:25) (70 - 71)  RR: 12 (2021 06:00) (12 - 20)  SpO2: 92% (2021 07:25) (87% - 92%)    Intubated on fentanyl   OE to voice and following commands more briskly this morning   MAEx4, left sided stronger than right sided  L crani flap full, not tense   NEIL 0cc   EVD @ 5cm/H2O, patent and draining     I&O's Summary    2021 07:01  -  2021 07:00  --------------------------------------------------------  IN: 3549.4 mL / OUT: 3842 mL / NET: -292.6 mL                              11.9   7.36  )-----------( 188      ( 2021 00:37 )             38.6     06-06    158<H>  |  115<H>  |  8   ----------------------------<  91  2.8<LL>   |  20<L>  |  0.82    Ca    9.2      2021 06:20  Phos  3.9     06-06  Mg     1.6     06-06    TPro  7.7  /  Alb  4.2  /  TBili  1.2  /  DBili  x   /  AST  21  /  ALT  <5  /  AlkPhos  88  06-06    PT/INR - ( 2021 00:37 )   PT: 17.5 sec;   INR: 1.55 ratio         PTT - ( 2021 00:37 )  PTT:57.9 sec  Urinalysis Basic - ( 2021 02:50 )    Color: Yellow / Appearance: Clear / S.018 / pH: x  Gluc: x / Ketone: Moderate  / Bili: Negative / Urobili: 3 mg/dL   Blood: x / Protein: Trace / Nitrite: Negative   Leuk Esterase: Negative / RBC: 3-5 /HPF / WBC 0-2 /HPF   Sq Epi: x / Non Sq Epi: x / Bacteria: Occasional        MEDICATIONS  (STANDING):  Andexanet Raymond 480 milliGRAM(s),IV Diluent 48 milliLiter(s) 480 milliGRAM(s) (24 mL/Hr) IV Continuous <Continuous>  ceFAZolin  IV Intermittent - Peds 1980 milliGRAM(s) IV Intermittent every 8 hours  chlorhexidine 0.12% Oral Liquid - Peds 15 milliLiter(s) Swish and Spit every 12 hours  chlorhexidine 2% Topical Cloths - Peds 1 Application(s) Topical daily  famotidine IV Intermittent - Peds 20 milliGRAM(s) IV Intermittent every 12 hours  fentaNYL   Infusion - Peds 3.003 MICROgram(s)/kG/Hr (3.97 mL/Hr) IV Continuous <Continuous>  heparin   Infusion - Pediatric 0.045 Unit(s)/kG/Hr (3 mL/Hr) IV Continuous <Continuous>  levETIRAcetam IV Intermittent - Peds 1000 milliGRAM(s) IV Intermittent every 12 hours  norepinephrine Infusion - Peds 0.08 MICROgram(s)/kG/Min (7.93 mL/Hr) IV Continuous <Continuous>  potassium chloride IV Intermittent (NICU/PICU) - Peds 20 milliEquivalent(s) IV Intermittent once  propofol Infusion - Peds 2 mG/kG/Hr (13.2 mL/Hr) IV Continuous <Continuous>  sildenafil  IV Intermittent - Peds 10 milliGRAM(s) IV Intermittent every 8 hours  sodium chloride 0.9% -  250 milliLiter(s) (3 mL/Hr) IV Continuous <Continuous>  sodium chloride 0.9% lock flush - Peds 3 milliLiter(s) IV Push every 8 hours  sodium chloride 0.9% lock flush - Peds 3 milliLiter(s) IV Push every 8 hours  sodium chloride 0.9%. - Pediatric 1000 milliLiter(s) (69 mL/Hr) IV Continuous <Continuous>    MEDICATIONS  (PRN):  fentaNYL    IV Intermittent - Peds 130 MICROGram(s) IV Intermittent every 1 hour PRN sedation  petrolatum, white/mineral oil Ophthalmic Ointment - Peds 1 Application(s) Both EYES two times a day PRN dry eyes  polyvinyl alcohol 1.4%/povidone 0.6% Ophthalmic Solution - Peds 1 Drop(s) Both EYES four times a day PRN Dry Eyes    RADIOLOGY:  < from: CT Head No Cont (21 @ 11:21) >  Postop changes compatible left frontal craniectomy is again seen. Extension of parenchyma through the craniectomy defect is again identified. Scattered areas of high attenuation and edema involving left frontal region is again seen. The size and configuration the ventricles appear unchanged. Extracalvarial staples are again seen in the postop region.    Right frontal catheter is again seen and unchanged..    Areas of subarachnoid hemorrhage involving the bifrontal and left parietal region is again seen.    Extra-axial high attenuation along the posterior interhemispheric and bilateral tentorial region is identified which is compatible with acute subdural hematomas. These findings appear unchanged.    Intraventricular hemorrhage is again seen.    Extra calvarial soft tissue swelling and drain is seen. Extra-axial collection is identified in the postop region. This collection measures approximately 1.6 cm widest diameter and previously measured approximately 1.4 cm widest diameter. This is likely compatible with a pseudomeningocele.    The paranasal sinuses mastoid and middle ear regions appear clear.    IMPRESSION: Slight increase pseudomeningocele otherwise no significant change when allowing for differences in technique.

## 2021-06-07 LAB
APPEARANCE CSF: ABNORMAL
APPEARANCE SPUN FLD: ABNORMAL
BLOOD GAS ARTERIAL - LYTES,HGB,ICA,LACT RESULT: SIGNIFICANT CHANGE UP
BLOOD GAS ARTERIAL - LYTES,HGB,ICA,LACT RESULT: SIGNIFICANT CHANGE UP
COLOR CSF: ABNORMAL
EOSINOPHIL # CSF: 4 % — SIGNIFICANT CHANGE UP
GLUCOSE CSF-MCNC: 78 MG/DL — HIGH (ref 40–70)
GRAM STN FLD: SIGNIFICANT CHANGE UP
GRAM STN FLD: SIGNIFICANT CHANGE UP
LYMPHOCYTES # CSF: 5 % — SIGNIFICANT CHANGE UP
MONOS+MACROS NFR CSF: 0 % — SIGNIFICANT CHANGE UP
NEUTROPHILS # CSF: 91 % — SIGNIFICANT CHANGE UP
NRBC NFR CSF: 374 CELLS/UL — CRITICAL HIGH (ref 0–5)
PROT CSF-MCNC: 26 MG/DL — SIGNIFICANT CHANGE UP (ref 15–45)
RBC # CSF: HIGH CELLS/UL (ref 0–0)
SODIUM SERPL-SCNC: 154 MMOL/L — HIGH (ref 135–145)
SPECIMEN SOURCE: SIGNIFICANT CHANGE UP
SPECIMEN SOURCE: SIGNIFICANT CHANGE UP
TOTAL CELLS COUNTED, SPINAL FLUID: 100 CELLS — SIGNIFICANT CHANGE UP
TUBE TYPE: SIGNIFICANT CHANGE UP

## 2021-06-07 PROCEDURE — 99221 1ST HOSP IP/OBS SF/LOW 40: CPT

## 2021-06-07 PROCEDURE — 71045 X-RAY EXAM CHEST 1 VIEW: CPT | Mod: 26

## 2021-06-07 PROCEDURE — 93770 DETERMINATION VENOUS PRESS: CPT

## 2021-06-07 PROCEDURE — 99291 CRITICAL CARE FIRST HOUR: CPT

## 2021-06-07 PROCEDURE — 70450 CT HEAD/BRAIN W/O DYE: CPT | Mod: 26

## 2021-06-07 PROCEDURE — 94681 O2 UPTK CO2 OUTP % O2 XTRC: CPT | Mod: 26

## 2021-06-07 PROCEDURE — 99291 CRITICAL CARE FIRST HOUR: CPT | Mod: 25

## 2021-06-07 RX ORDER — ALBUTEROL 90 UG/1
2.5 AEROSOL, METERED ORAL ONCE
Refills: 0 | Status: COMPLETED | OUTPATIENT
Start: 2021-06-07 | End: 2021-06-07

## 2021-06-07 RX ORDER — CEFTRIAXONE 500 MG/1
2000 INJECTION, POWDER, FOR SOLUTION INTRAMUSCULAR; INTRAVENOUS EVERY 24 HOURS
Refills: 0 | Status: DISCONTINUED | OUTPATIENT
Start: 2021-06-07 | End: 2021-06-10

## 2021-06-07 RX ORDER — CALCIUM GLUCONATE 100 MG/ML
2000 VIAL (ML) INTRAVENOUS ONCE
Refills: 0 | Status: COMPLETED | OUTPATIENT
Start: 2021-06-07 | End: 2021-06-07

## 2021-06-07 RX ORDER — ACETAMINOPHEN 500 MG
750 TABLET ORAL EVERY 6 HOURS
Refills: 0 | Status: COMPLETED | OUTPATIENT
Start: 2021-06-08 | End: 2021-06-08

## 2021-06-07 RX ORDER — METHADONE HYDROCHLORIDE 40 MG/1
2 TABLET ORAL EVERY 6 HOURS
Refills: 0 | Status: DISCONTINUED | OUTPATIENT
Start: 2021-06-07 | End: 2021-06-07

## 2021-06-07 RX ORDER — ALBUTEROL 90 UG/1
2.5 AEROSOL, METERED ORAL EVERY 4 HOURS
Refills: 0 | Status: DISCONTINUED | OUTPATIENT
Start: 2021-06-07 | End: 2021-06-07

## 2021-06-07 RX ORDER — FUROSEMIDE 40 MG
10 TABLET ORAL EVERY 8 HOURS
Refills: 0 | Status: DISCONTINUED | OUTPATIENT
Start: 2021-06-07 | End: 2021-06-08

## 2021-06-07 RX ORDER — ALBUTEROL 90 UG/1
2.5 AEROSOL, METERED ORAL EVERY 4 HOURS
Refills: 0 | Status: COMPLETED | OUTPATIENT
Start: 2021-06-07 | End: 2021-06-07

## 2021-06-07 RX ORDER — I.V. FAT EMULSION 20 G/100ML
0.36 EMULSION INTRAVENOUS
Qty: 24 | Refills: 0 | Status: DISCONTINUED | OUTPATIENT
Start: 2021-06-07 | End: 2021-06-08

## 2021-06-07 RX ORDER — SODIUM CHLORIDE 9 MG/ML
3 INJECTION INTRAMUSCULAR; INTRAVENOUS; SUBCUTANEOUS ONCE
Refills: 0 | Status: COMPLETED | OUTPATIENT
Start: 2021-06-07 | End: 2021-06-07

## 2021-06-07 RX ORDER — MAGNESIUM SULFATE 500 MG/ML
2000 VIAL (ML) INJECTION ONCE
Refills: 0 | Status: COMPLETED | OUTPATIENT
Start: 2021-06-07 | End: 2021-06-07

## 2021-06-07 RX ORDER — ELECTROLYTE SOLUTION,INJ
1 VIAL (ML) INTRAVENOUS
Refills: 0 | Status: DISCONTINUED | OUTPATIENT
Start: 2021-06-07 | End: 2021-06-08

## 2021-06-07 RX ADMIN — PROPOFOL 13.2 MG/KG/HR: 10 INJECTION, EMULSION INTRAVENOUS at 19:24

## 2021-06-07 RX ADMIN — SODIUM CHLORIDE 3 MILLILITER(S): 9 INJECTION INTRAMUSCULAR; INTRAVENOUS; SUBCUTANEOUS at 10:25

## 2021-06-07 RX ADMIN — CHLORHEXIDINE GLUCONATE 15 MILLILITER(S): 213 SOLUTION TOPICAL at 15:59

## 2021-06-07 RX ADMIN — Medication 25 MILLIGRAM(S): at 17:33

## 2021-06-07 RX ADMIN — Medication 198 MILLIGRAM(S): at 05:58

## 2021-06-07 RX ADMIN — SODIUM CHLORIDE 3 MILLILITER(S): 9 INJECTION INTRAMUSCULAR; INTRAVENOUS; SUBCUTANEOUS at 18:00

## 2021-06-07 RX ADMIN — Medication 3 UNIT(S)/KG/HR: at 19:30

## 2021-06-07 RX ADMIN — Medication 750 MILLIGRAM(S): at 12:19

## 2021-06-07 RX ADMIN — PROPOFOL 13.2 MG/KG/HR: 10 INJECTION, EMULSION INTRAVENOUS at 22:30

## 2021-06-07 RX ADMIN — I.V. FAT EMULSION 5 GM/KG/DAY: 20 EMULSION INTRAVENOUS at 18:22

## 2021-06-07 RX ADMIN — FAMOTIDINE 200 MILLIGRAM(S): 10 INJECTION INTRAVENOUS at 02:20

## 2021-06-07 RX ADMIN — Medication 300 MILLIGRAM(S): at 12:04

## 2021-06-07 RX ADMIN — Medication 300 MILLIGRAM(S): at 06:24

## 2021-06-07 RX ADMIN — Medication 25 MILLIGRAM(S): at 04:10

## 2021-06-07 RX ADMIN — Medication 2 MILLIGRAM(S): at 00:51

## 2021-06-07 RX ADMIN — Medication 9.92 MICROGRAM(S)/KG/MIN: at 10:55

## 2021-06-07 RX ADMIN — CHLORHEXIDINE GLUCONATE 15 MILLILITER(S): 213 SOLUTION TOPICAL at 03:46

## 2021-06-07 RX ADMIN — PROPOFOL 13.2 MG/KG/HR: 10 INJECTION, EMULSION INTRAVENOUS at 07:26

## 2021-06-07 RX ADMIN — DEXMEDETOMIDINE HYDROCHLORIDE IN 0.9% SODIUM CHLORIDE 8.26 MICROGRAM(S)/KG/HR: 4 INJECTION INTRAVENOUS at 07:27

## 2021-06-07 RX ADMIN — Medication 2 MILLIGRAM(S): at 20:32

## 2021-06-07 RX ADMIN — Medication 1 EACH: at 18:23

## 2021-06-07 RX ADMIN — SODIUM CHLORIDE 3 MILLILITER(S): 9 INJECTION, SOLUTION INTRAVENOUS at 14:55

## 2021-06-07 RX ADMIN — Medication 2 MILLIGRAM(S): at 13:22

## 2021-06-07 RX ADMIN — DEXMEDETOMIDINE HYDROCHLORIDE IN 0.9% SODIUM CHLORIDE 4.13 MICROGRAM(S)/KG/HR: 4 INJECTION INTRAVENOUS at 10:40

## 2021-06-07 RX ADMIN — Medication 9.92 MICROGRAM(S)/KG/MIN: at 19:25

## 2021-06-07 RX ADMIN — Medication 25 MILLIGRAM(S): at 19:58

## 2021-06-07 RX ADMIN — PROPOFOL 13.2 MG/KG/HR: 10 INJECTION, EMULSION INTRAVENOUS at 14:53

## 2021-06-07 RX ADMIN — Medication 25 MILLIGRAM(S): at 12:04

## 2021-06-07 RX ADMIN — Medication 3 UNIT(S)/KG/HR: at 07:27

## 2021-06-07 RX ADMIN — Medication 6.94 MICROGRAM(S)/KG/MIN: at 07:27

## 2021-06-07 RX ADMIN — SODIUM CHLORIDE 3 MILLILITER(S): 9 INJECTION INTRAMUSCULAR; INTRAVENOUS; SUBCUTANEOUS at 03:19

## 2021-06-07 RX ADMIN — Medication 1 EACH: at 19:25

## 2021-06-07 RX ADMIN — METHADONE HYDROCHLORIDE 2.1 MILLIGRAM(S): 40 TABLET ORAL at 03:34

## 2021-06-07 RX ADMIN — ALBUTEROL 2.5 MILLIGRAM(S): 90 AEROSOL, METERED ORAL at 10:25

## 2021-06-07 RX ADMIN — SODIUM CHLORIDE 3 MILLILITER(S): 9 INJECTION INTRAMUSCULAR; INTRAVENOUS; SUBCUTANEOUS at 10:27

## 2021-06-07 RX ADMIN — Medication 14.9 MICROGRAM(S)/KG/MIN: at 22:47

## 2021-06-07 RX ADMIN — SODIUM CHLORIDE 3 MILLILITER(S): 9 INJECTION, SOLUTION INTRAVENOUS at 07:28

## 2021-06-07 RX ADMIN — Medication 3 UNIT(S)/KG/HR: at 14:56

## 2021-06-07 RX ADMIN — LEVETIRACETAM 266.68 MILLIGRAM(S): 250 TABLET, FILM COATED ORAL at 05:39

## 2021-06-07 RX ADMIN — LEVETIRACETAM 266.68 MILLIGRAM(S): 250 TABLET, FILM COATED ORAL at 17:30

## 2021-06-07 RX ADMIN — Medication 750 MILLIGRAM(S): at 19:00

## 2021-06-07 RX ADMIN — I.V. FAT EMULSION 5 GM/KG/DAY: 20 EMULSION INTRAVENOUS at 19:26

## 2021-06-07 RX ADMIN — Medication 300 MILLIGRAM(S): at 18:43

## 2021-06-07 RX ADMIN — Medication 9.92 MICROGRAM(S)/KG/MIN: at 22:45

## 2021-06-07 RX ADMIN — Medication 9.92 MICROGRAM(S)/KG/MIN: at 18:04

## 2021-06-07 RX ADMIN — SODIUM CHLORIDE 3 MILLILITER(S): 9 INJECTION, SOLUTION INTRAVENOUS at 19:29

## 2021-06-07 RX ADMIN — ALBUTEROL 2.5 MILLIGRAM(S): 90 AEROSOL, METERED ORAL at 17:38

## 2021-06-07 RX ADMIN — Medication 9.92 MICROGRAM(S)/KG/MIN: at 10:42

## 2021-06-07 RX ADMIN — Medication 750 MILLIGRAM(S): at 06:31

## 2021-06-07 RX ADMIN — Medication 6.94 MICROGRAM(S)/KG/MIN: at 06:30

## 2021-06-07 RX ADMIN — DEXTROSE MONOHYDRATE, SODIUM CHLORIDE, AND POTASSIUM CHLORIDE 69 MILLILITER(S): 50; .745; 4.5 INJECTION, SOLUTION INTRAVENOUS at 03:33

## 2021-06-07 RX ADMIN — FAMOTIDINE 200 MILLIGRAM(S): 10 INJECTION INTRAVENOUS at 15:59

## 2021-06-07 RX ADMIN — Medication 2 MILLIGRAM(S): at 03:36

## 2021-06-07 RX ADMIN — Medication 40 MILLIGRAM(S): at 23:38

## 2021-06-07 RX ADMIN — CEFTRIAXONE 100 MILLIGRAM(S): 500 INJECTION, POWDER, FOR SOLUTION INTRAMUSCULAR; INTRAVENOUS at 06:50

## 2021-06-07 NOTE — PROGRESS NOTE PEDS - ATTENDING COMMENTS
ct with stable ivh/tract bleed, inr 1.5-1.6, will give ffp if above 1.7 and cryo if above 1.5, evd at 10 stable ouptput, d/c anurag, icp wnl, picu care

## 2021-06-07 NOTE — CONSULT NOTE PEDS - SUBJECTIVE AND OBJECTIVE BOX
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  Course has been further complicated by left frontal parenchymal hemorrhage requiring emergent evacuation with neurosurgery and EVD placement.  Pt is currently intubated, has been NPO since  (prior had been on a regular diet with good PO noted).  [facility] requesting TPN be initiated this evening for nutritional support as initiation of enteral feedings not feasible at this time.  Currently on IV fluids of NS + KCl 40mEq/L at 69mL/hr.  [facility] in discussion with neurosurgery requesting no-minimal dextrose in TPN solution (maximum D5%).     MEDICATIONS  (STANDING):  acetaminophen  IV Intermittent - Peds. 750 milliGRAM(s) IV Intermittent every 6 hours  Andexanet Raymond 480 milliGRAM(s),IV Diluent 48 milliLiter(s) 480 milliGRAM(s) (24 mL/Hr) IV Continuous <Continuous>  cefTRIAXone IV Intermittent - Peds 2000 milliGRAM(s) IV Intermittent every 24 hours  chlorhexidine 0.12% Oral Liquid - Peds 15 milliLiter(s) Swish and Spit every 12 hours  chlorhexidine 2% Topical Cloths - Peds 1 Application(s) Topical daily  dexMEDEtomidine Infusion - Peds 0.25 MICROgram(s)/kG/Hr (4.13 mL/Hr) IV Continuous <Continuous>  famotidine IV Intermittent - Peds 20 milliGRAM(s) IV Intermittent every 12 hours  furosemide  IV Intermittent - Peds 10 milliGRAM(s) IV Intermittent every 12 hours  heparin   Infusion - Pediatric 0.045 Unit(s)/kG/Hr (3 mL/Hr) IV Continuous <Continuous>  levETIRAcetam IV Intermittent - Peds 1000 milliGRAM(s) IV Intermittent every 12 hours  norepinephrine Infusion - Peds 0.1 MICROgram(s)/kG/Min (9.92 mL/Hr) IV Continuous <Continuous>  propofol Infusion - Peds 2 mG/kG/Hr (13.2 mL/Hr) IV Continuous <Continuous>  sildenafil  IV Intermittent - Peds 10 milliGRAM(s) IV Intermittent every 8 hours  sodium chloride 0.9% -  250 milliLiter(s) (3 mL/Hr) IV Continuous <Continuous>  sodium chloride 0.9% lock flush - Peds 3 milliLiter(s) IV Push every 8 hours  sodium chloride 0.9% lock flush - Peds 3 milliLiter(s) IV Push every 8 hours  sodium chloride 0.9% with potassium chloride 40 mEq/L. - Pediatric 1000 milliLiter(s) (69 mL/Hr) IV Continuous <Continuous>    PAST MEDICAL & SURGICAL HISTORY:  Double inlet left ventricle  D-TGA (dextro-transposition of great arteries)  Sick sinus syndrome  Atrial tachycardia  Hepatomegaly  Other ascites  Pulmonary hypertension  Pacemaker  AV block  Coagulopathy  S/P cardiac cath  2001-assessment of anatomy prior to 1st surgery  2004- coil emolization of collateral vessels  2011-balloon angioplasty of superior narrowing of Fontan circuit, balloon angioplasty of LPA, ASD device placed for closure of Fontan fenestraion  10/24/2016- Cath of Fontan  S/P Nirmal-Taussig shunt  Cemqf-Hsnq-Yvizibd anastomosis with modified right BT shunt and creation of pulmonary atresia  S/P celestine-Fontan operation  3/15/2003- Celestine-Fontan with ligation of BT shunt and left pulmonary artery plasty  Cardiac pacemaker  2004    No Known Allergies    REVIEW OF SYSTEMS  History of Pneumonia or Asthma: [x] No  [] Yes  History of Diabetes: [x] No  [] Yes  History of Dysphagia: [x] No  [] Yes  History of Heart Disease:  [] No  [x] Yes  History of Seizure / Developmental Delay:  [x] No   [] Yes  History of Vomiting:  [x] No   [] Yes    PHYSICAL EXAM  WEIGHT: 66.1kg ( @ 07:11); WEIGHT PERCENTILE/Z-SCORE: 34%/z-score -0.41  HEIGHT: 166.5cm ( @ 07:11); HEIGHT PERCENTILE/Z-SCORE: 7%/z-score -1.44  WEIGHT AS METABOLIC K.2 *kG (defined as maintenance fluid volume in mL/100mL)  BMI:  23.8 BMI PERCENTILE/Z-SCORE: 60%/z-score 0.25    GENERAL APPEARANCE: lean,  Well developed,   HEENT: Normocephalic, No Cheilosis,  No Periorbital Edema, Non- Icteric   RESPIRATORY:  Ventilated with ET tube;   ABDOMEN:  Non-Distended  NEUROLOGY: Not Alert; Sedated;  EXTREMITIES: No Cyanosis;  SKIN: No Rashes visible;  No Jaundice  LABS      153  |  111  |  9   ----------------------------<  111  3.5   |  23  |  0.81    Ca    9.7      2021 07:05  Phos  3.6       Mg     1.4         TPro  7.8  /  Alb  4.1  /  TBili  1.4  /  DBili  x   /  AST  18  /  ALT  6   /  AlkPhos  82      ASSESSMENT:   Feeding Problems;  Insufficient Enteral caloric intake;  Hypomagnesemia    Pt with cardiac history as above with course now complicated by left frontal parenchymal hemorrhage requiring emergent evacuation with neurosurgery and EVD placement.  Pt remains NPO presently and to be initiated on TPN this evening for nutritional support as requested by [facility].      PLAN: TPN ordered as D2.5% (low dextrose concentration at the request of Neurosurgery/[facility]) + amino acids 2.5% at 69mL/hr with IL 20% at 5mL/hr.  Electrolytes ordered as NaCl 154mEq/L + KCl 35mEq/L + KPhos 3mMol/L + Magnesium 10mEq/L.   No calcium added to TPN solution as pt on Ceftriaxone which cannot infuse with calcium-containing IV solution (which was discussed with [facility] house staff).  Zinc 5 milligrams, Copper 300 micrograms, and MVI added to TPN solution daily.  -Will increase parenteral calories and further adjust electrolytes as lab values and clinical status permits.  Acute fluid and electrolyte changes as per primary management team.    Problem: Respiratory:  Goal: Respiratory status will improve  Intervention: Assess and monitor pulmonary status  Assure pts o2 sats are maintained above 92%, HOB at 30 degrees or greater, suctioning as needed.       Problem: Pain Management  Goal: Pain level will decrease to patient’s comfort goal  Intervention: Follow pain managment plan developed in collaboration with patient and Interdisciplinary Team  Assess pain every two hours, administering pain meds per MAR.

## 2021-06-07 NOTE — PROGRESS NOTE PEDS - SUBJECTIVE AND OBJECTIVE BOX
Interval/Overnight Events:    VITAL SIGNS:  T(C): 37.7 (21 @ 05:00), Max: 38.2 (21 @ 20:00)  HR: 70 (21 @ 06:41) (70 - 71)  BP: 119/61 (21 @ 11:00) (119/61 - 119/61)  ABP: 132/69 (21 @ 06:00) (105/62 - 140/75)  ABP(mean): 92 (21 @ 06:00) (75 - 98)  RR: 12 (21 @ 06:00) (12 - 12)  SpO2: 88% (21 @ 06:41) (87% - 97%)  CVP(mm Hg): 24 (21 @ 06:00) (11 - 27)  End-Tidal CO2:  NIRS:    Physical Exam:    General: NAD  HEENT: no acute changes from baseline  Resp: unlabored, CTAB, good aeration, no rhonchi/rales/wheezing  CV: RRR, nl S1/S2, no m/r/g appreciated, CR < 2s, distal pulses 2+ and equal  Abd: soft, NTND, no HSM appreciated  Ext: wwp, no gross deformities  Neuro: alert and oriented, no acute change from baseline  Skin: no rash    =======================RESPIRATORY=======================  [ ] FiO2: ___ 	[ ] Heliox: ____ 		[ ] BiPAP: ___   [ ] NC: __  Liters			[ ] HFNC: __ 	Liters, FiO2: __  [ ] Mechanical Ventilation: Mode: SIMV with PS, RR (machine): 12, TV (machine): 460, FiO2: 50, PEEP: 8, PS: 5, ITime: 1, MAP: 13, PIP: 24  [ ] Inhaled Nitric Oxide:  [ ] Extubation Readiness Assessed  Comments:    =====================CARDIOVASCULAR======================  Cardiovascular Medications:  furosemide  IV Intermittent - Peds 10 milliGRAM(s) IV Intermittent every 12 hours  norepinephrine Infusion - Peds 0.07 MICROgram(s)/kG/Min IV Continuous <Continuous>  sildenafil  IV Intermittent - Peds 10 milliGRAM(s) IV Intermittent every 8 hours    Chest Tube Output: ___ in 24 hours, ___ in last 12 hours   [ ] Right     [ ] Left    [ ] Mediastinal  Cardiac Rhythm:	[x] NSR		[ ] Other:    [ ] Central Venous Line	[ ] R	[ ] L	[ ] IJ	[ ] Fem	[ ] SC			Placed:   [ ] Arterial Line		[ ] R	[ ] L	[ ] PT	[ ] DP	[ ] Fem	[ ] Rad	[ ] Ax	Placed:   [ ] PICC:				[ ] Broviac		[ ] Mediport  Comments:    ==========HEMATOLOGY/ONCOLOGY=================  Transfusions:	[ ] PRBC	[ ] Platelets	[ ] FFP		[ ] Cryoprecipitate  DVT Prophylaxis:  Comments:    =================INFECTIOUS DISEASE==================  [ ] Cooling [place] being used. Target Temperature:     ===========FLUIDS/ELECTROLYTES/NUTRITION=============  I&O's Summary    2021 07:01  -  2021 07:00  --------------------------------------------------------  IN: 3951.4 mL / OUT: 3984 mL / NET: -32.6 mL      Daily   Diet:	[ ] Regular	[ ] Soft		[ ] Clears	[ ] NPO  .	[ ] Other:  .	[ ] NGT		[ ] NDT		[ ] GT		[ ] GJT    [ ] Urinary Catheter, Date Placed:   Comments:    ====================NEUROLOGY===================  [ ] SBS:		[ ] GISSELLE-1:	[ ] BIS:	[ ] CAPD:  [ ] EVD set at: ___ , Drainage in last 24 hours: ___ ml    [x] Adequacy of sedation and pain control has been assessed and adjusted  Comments:      ==================PATIENT CARE=================  [ ] There are pressure ulcers/areas of breakdown that are being addressed -   [x] Preventative measures are being taken to decrease risk for skin breakdown.  [x] Necessity of urinary, arterial, and venous catheters discussed    ==================LABS============================  ABG - ( 2021 01:47 )  pH: 7.44  /  pCO2: 38    /  pO2: 68    / HCO3: 26    / Base Excess: 1.2   /  SaO2: 93.4  / Lactate: x                                                12.8                  Neurophils% (auto):   71.2   ( @ 01:54):    8.43 )-----------(198          Lymphocytes% (auto):  8.4                                           41.5                   Eosinphils% (auto):   10.9     Manual%: Neutrophils x    ; Lymphocytes x    ; Eosinophils x    ; Bands%: x    ; Blasts x        (  @ 01:54 )   PT: 18.1 sec;   INR: 1.61 ratio  aPTT: 41.4 sec                            153    |  111    |  9                   Calcium: 9.7   / iCa: 1.14   ( @ 07:05)    ----------------------------<  111       Magnesium: 1.4                              3.5     |  23     |  0.81             Phosphorous: 3.6      TPro  7.8    /  Alb  4.1    /  TBili  1.4    /  DBili  x      /  AST  18     /  ALT  6      /  AlkPhos  82     2021 07:05  RECENT CULTURES:   @ 06:15 .CSF CSF       polymorphonuclear leukocytes seen  No organisms seen  by cytocentrifuge     @ 08:27 .Blood Blood     No growth to date.       @ 01:30 .Urine Catheterized     No growth          =================MEDICATIONS======================  MEDICATIONS  MEDICATIONS  (STANDING):  acetaminophen  IV Intermittent - Peds. 750 milliGRAM(s) IV Intermittent every 6 hours  Andexanet Raymond 480 milliGRAM(s),IV Diluent 48 milliLiter(s) 480 milliGRAM(s) (24 mL/Hr) IV Continuous <Continuous>  cefTRIAXone IV Intermittent - Peds 2000 milliGRAM(s) IV Intermittent every 24 hours  chlorhexidine 0.12% Oral Liquid - Peds 15 milliLiter(s) Swish and Spit every 12 hours  chlorhexidine 2% Topical Cloths - Peds 1 Application(s) Topical daily  dexMEDEtomidine Infusion - Peds 0.5 MICROgram(s)/kG/Hr (8.26 mL/Hr) IV Continuous <Continuous>  famotidine IV Intermittent - Peds 20 milliGRAM(s) IV Intermittent every 12 hours  furosemide  IV Intermittent - Peds 10 milliGRAM(s) IV Intermittent every 12 hours  heparin   Infusion - Pediatric 0.045 Unit(s)/kG/Hr (3 mL/Hr) IV Continuous <Continuous>  levETIRAcetam IV Intermittent - Peds 1000 milliGRAM(s) IV Intermittent every 12 hours  norepinephrine Infusion - Peds 0.07 MICROgram(s)/kG/Min (6.94 mL/Hr) IV Continuous <Continuous>  propofol Infusion - Peds 2 mG/kG/Hr (13.2 mL/Hr) IV Continuous <Continuous>  sildenafil  IV Intermittent - Peds 10 milliGRAM(s) IV Intermittent every 8 hours  sodium chloride 0.9% -  250 milliLiter(s) (3 mL/Hr) IV Continuous <Continuous>  sodium chloride 0.9% lock flush - Peds 3 milliLiter(s) IV Push every 8 hours  sodium chloride 0.9% lock flush - Peds 3 milliLiter(s) IV Push every 8 hours  sodium chloride 0.9% with potassium chloride 40 mEq/L. - Pediatric 1000 milliLiter(s) (69 mL/Hr) IV Continuous <Continuous>    MEDICATIONS  (PRN):  petrolatum, white/mineral oil Ophthalmic Ointment - Peds 1 Application(s) Both EYES two times a day PRN dry eyes  polyvinyl alcohol 1.4%/povidone 0.6% Ophthalmic Solution - Peds 1 Drop(s) Both EYES four times a day PRN Dry Eyes    ===================================================  IMAGING STUDIES:    [ ] XR   [ ] CT   [ ] MR   [ ] US  [ ] Echo  ===========================================================  Parent/Guardian is at the bedside:	[ ] Yes	[ ] No  Patient and Parent/Guardian updated as to the progress/plan of care:	[ ] Yes	[ ] No    [x] The patient remains in critical and unstable condition, and requires ICU care and monitoring, assessment, and treatment  [ ] The patient is improving but requires continued monitoring, assessment, treatment, and adjustment of therapy    [x] The total critical care time spent by attending physician was __35__ minutes, excluding procedure time. Interval/Overnight Events:    Had period of unresponsiveness  Was febrile at that time, ICPs in 20s  CT stable  Methadone held  This AM with more reassuring exam per NRSGY    VITAL SIGNS:  T(C): 37.7 (21 @ 05:00), Max: 38.2 (21 @ 20:00)  HR: 70 (21 @ 06:41) (70 - 71)  BP: 119/61 (21 @ 11:00) (119/61 - 119/61)  ABP: 132/69 (21 @ 06:00) (105/62 - 140/75)  ABP(mean): 92 (21 @ 06:00) (75 - 98)  RR: 12 (21 @ 06:00) (12 - 12)  SpO2: 88% (21 @ 06:41) (87% - 97%)  CVP(mm Hg): 24 (21 @ 06:00) (11 - 27)  End-Tidal CO2:  NIRS:    Physical Exam:    General: NAD  HEENT: EVD in place, pupils equal and reactive  Resp: vent-assisted, unlabored, CTAB, good aeration, no rhonchi/rales/wheezing  CV: RRR, nl S1/S2, no m/r/g appreciated, CR < 2s, distal pulses 2+ and equal  Abd: soft, NTND, no HSM appreciated  Ext: wwp, no gross deformities  Neuro: sleeping at this time  Skin: no rash    =======================RESPIRATORY=======================  [ ] FiO2: ___ 	[ ] Heliox: ____ 		[ ] BiPAP: ___   [ ] NC: __  Liters			[ ] HFNC: __ 	Liters, FiO2: __  [x ] Mechanical Ventilation: Mode: SIMV with PS, RR (machine): 12, TV (machine): 460, FiO2: 50, PEEP: 8, PS: 5, ITime: 1, MAP: 13, PIP: 24  [ ] Inhaled Nitric Oxide:  [ ] Extubation Readiness Assessed  Comments:    =====================CARDIOVASCULAR======================  Cardiovascular Medications:  furosemide  IV Intermittent - Peds 10 milliGRAM(s) IV Intermittent every 12 hours  norepinephrine Infusion - Peds 0.07 MICROgram(s)/kG/Min IV Continuous <Continuous>  sildenafil  IV Intermittent - Peds 10 milliGRAM(s) IV Intermittent every 8 hours    Chest Tube Output: ___ in 24 hours, ___ in last 12 hours   [ ] Right     [ ] Left    [ ] Mediastinal  Cardiac Rhythm:	[x] NSR		[ ] Other:    [ ] Central Venous Line	[ ] R	[ ] L	[ ] IJ	[ ] Fem	[ ] SC			Placed:   [ ] Arterial Line		[ ] R	[ ] L	[ ] PT	[ ] DP	[ ] Fem	[ ] Rad	[ ] Ax	Placed:   [ ] PICC:				[ ] Broviac		[ ] Mediport  Comments:    ==========HEMATOLOGY/ONCOLOGY=================  Transfusions:	[ ] PRBC	[ ] Platelets	[ ] FFP		[ ] Cryoprecipitate  DVT Prophylaxis:  Comments:    =================INFECTIOUS DISEASE==================  [ ] Cooling [place] being used. Target Temperature:     ===========FLUIDS/ELECTROLYTES/NUTRITION=============  I&O's Summary    2021 07:01  -  2021 07:00  --------------------------------------------------------  IN: 3951.4 mL / OUT: 3984 mL / NET: -32.6 mL      Daily   Diet:	[ ] Regular	[ ] Soft		[ ] Clears	[x ] NPO  .	[ ] Other:  .	[ ] NGT		[ ] NDT		[ ] GT		[ ] GJT    [ ] Urinary Catheter, Date Placed:   Comments:    ====================NEUROLOGY===================  [ ] SBS:		[ ] GISSELLE-1:	[ ] BIS:	[ ] CAPD:  [ ] EVD set at: ___ , Drainage in last 24 hours: ___ ml    [x] Adequacy of sedation and pain control has been assessed and adjusted  Comments:      ==================PATIENT CARE=================  [ ] There are pressure ulcers/areas of breakdown that are being addressed -   [x] Preventative measures are being taken to decrease risk for skin breakdown.  [x] Necessity of urinary, arterial, and venous catheters discussed    ==================LABS============================  ABG - ( 2021 01:47 )  pH: 7.44  /  pCO2: 38    /  pO2: 68    / HCO3: 26    / Base Excess: 1.2   /  SaO2: 93.4  / Lactate: x                                                12.8                  Neurophils% (auto):   71.2   ( @ 01:54):    8.43 )-----------(198          Lymphocytes% (auto):  8.4                                           41.5                   Eosinphils% (auto):   10.9     Manual%: Neutrophils x    ; Lymphocytes x    ; Eosinophils x    ; Bands%: x    ; Blasts x        (  @ 01:54 )   PT: 18.1 sec;   INR: 1.61 ratio  aPTT: 41.4 sec                            153    |  111    |  9                   Calcium: 9.7   / iCa: 1.14   ( @ 07:05)    ----------------------------<  111       Magnesium: 1.4                              3.5     |  23     |  0.81             Phosphorous: 3.6      TPro  7.8    /  Alb  4.1    /  TBili  1.4    /  DBili  x      /  AST  18     /  ALT  6      /  AlkPhos  82     2021 07:05  RECENT CULTURES:   @ 06:15 .CSF CSF       polymorphonuclear leukocytes seen  No organisms seen  by cytocentrifuge    06-05 @ 08:27 .Blood Blood     No growth to date.       @ 01:30 .Urine Catheterized     No growth          =================MEDICATIONS======================  MEDICATIONS  MEDICATIONS  (STANDING):  acetaminophen  IV Intermittent - Peds. 750 milliGRAM(s) IV Intermittent every 6 hours  Andexanet Raymond 480 milliGRAM(s),IV Diluent 48 milliLiter(s) 480 milliGRAM(s) (24 mL/Hr) IV Continuous <Continuous>  cefTRIAXone IV Intermittent - Peds 2000 milliGRAM(s) IV Intermittent every 24 hours  chlorhexidine 0.12% Oral Liquid - Peds 15 milliLiter(s) Swish and Spit every 12 hours  chlorhexidine 2% Topical Cloths - Peds 1 Application(s) Topical daily  dexMEDEtomidine Infusion - Peds 0.5 MICROgram(s)/kG/Hr (8.26 mL/Hr) IV Continuous <Continuous>  famotidine IV Intermittent - Peds 20 milliGRAM(s) IV Intermittent every 12 hours  furosemide  IV Intermittent - Peds 10 milliGRAM(s) IV Intermittent every 12 hours  heparin   Infusion - Pediatric 0.045 Unit(s)/kG/Hr (3 mL/Hr) IV Continuous <Continuous>  levETIRAcetam IV Intermittent - Peds 1000 milliGRAM(s) IV Intermittent every 12 hours  norepinephrine Infusion - Peds 0.07 MICROgram(s)/kG/Min (6.94 mL/Hr) IV Continuous <Continuous>  propofol Infusion - Peds 2 mG/kG/Hr (13.2 mL/Hr) IV Continuous <Continuous>  sildenafil  IV Intermittent - Peds 10 milliGRAM(s) IV Intermittent every 8 hours  sodium chloride 0.9% -  250 milliLiter(s) (3 mL/Hr) IV Continuous <Continuous>  sodium chloride 0.9% lock flush - Peds 3 milliLiter(s) IV Push every 8 hours  sodium chloride 0.9% lock flush - Peds 3 milliLiter(s) IV Push every 8 hours  sodium chloride 0.9% with potassium chloride 40 mEq/L. - Pediatric 1000 milliLiter(s) (69 mL/Hr) IV Continuous <Continuous>    MEDICATIONS  (PRN):  petrolatum, white/mineral oil Ophthalmic Ointment - Peds 1 Application(s) Both EYES two times a day PRN dry eyes  polyvinyl alcohol 1.4%/povidone 0.6% Ophthalmic Solution - Peds 1 Drop(s) Both EYES four times a day PRN Dry Eyes    ===================================================  IMAGING STUDIES:    [ ] XR   [ ] CT   [ ] MR   [ ] US  [ ] Echo  ===========================================================  Parent/Guardian is at the bedside:	[ x] Yes	[ ] No  Patient and Parent/Guardian updated as to the progress/plan of care:	[x ] Yes	[ ] No    [x] The patient remains in critical and unstable condition, and requires ICU care and monitoring, assessment, and treatment  [ ] The patient is improving but requires continued monitoring, assessment, treatment, and adjustment of therapy    [x] The total critical care time spent by attending physician was __35__ minutes, excluding procedure time. Interval/Overnight Events:    Had period of unresponsiveness  Was febrile at that time, ICPs in 20s  CT stable  Methadone held  This AM with more reassuring exam per NRSGY when sedation held    VITAL SIGNS:  T(C): 37.7 (21 @ 05:00), Max: 38.2 (21 @ 20:00)  HR: 70 (21 @ 06:41) (70 - 71)  BP: 119/61 (21 @ 11:00) (119/61 - 119/61)  ABP: 132/69 (21 @ 06:00) (105/62 - 140/75)  ABP(mean): 92 (21 @ 06:00) (75 - 98)  RR: 12 (21 @ 06:00) (12 - 12)  SpO2: 88% (21 @ 06:41) (87% - 97%)  CVP(mm Hg): 24 (21 @ 06:00) (11 - 27)  End-Tidal CO2:  NIRS:    Physical Exam:    General: NAD  HEENT: EVD in place, pupils equal and reactive  Resp: vent-assisted, unlabored, dim at bases  CV: RRR, nl S1/S2, no m/r/g appreciated, CR < 2s, distal pulses 2+ and equal  Abd: soft, NTND, no HSM appreciated  Ext: wwp, no gross deformities  Neuro: sleeping at this time  Skin: no rash    =======================RESPIRATORY=======================  [ ] FiO2: ___ 	[ ] Heliox: ____ 		[ ] BiPAP: ___   [ ] NC: __  Liters			[ ] HFNC: __ 	Liters, FiO2: __  [x ] Mechanical Ventilation: Mode: SIMV with PS, RR (machine): 12, TV (machine): 460, FiO2: 50, PEEP: 8, PS: 5, ITime: 1, MAP: 13, PIP: 24  [ ] Inhaled Nitric Oxide:  [ ] Extubation Readiness Assessed  Comments:    =====================CARDIOVASCULAR======================  Cardiovascular Medications:  furosemide  IV Intermittent - Peds 10 milliGRAM(s) IV Intermittent every 12 hours  norepinephrine Infusion - Peds 0.07 MICROgram(s)/kG/Min IV Continuous <Continuous>  sildenafil  IV Intermittent - Peds 10 milliGRAM(s) IV Intermittent every 8 hours    Chest Tube Output: ___ in 24 hours, ___ in last 12 hours   [ ] Right     [ ] Left    [ ] Mediastinal  Cardiac Rhythm:	[x] NSR		[ ] Other:    [ ] Central Venous Line	[ ] R	[ ] L	[ ] IJ	[ ] Fem	[ ] SC			Placed:   [ ] Arterial Line		[ ] R	[ ] L	[ ] PT	[ ] DP	[ ] Fem	[ ] Rad	[ ] Ax	Placed:   [ ] PICC:				[ ] Broviac		[ ] Mediport  Comments:    ==========HEMATOLOGY/ONCOLOGY=================  Transfusions:	[ ] PRBC	[ ] Platelets	[ ] FFP		[ ] Cryoprecipitate  DVT Prophylaxis:  Comments:    =================INFECTIOUS DISEASE==================  [ ] Cooling [place] being used. Target Temperature:     ===========FLUIDS/ELECTROLYTES/NUTRITION=============  I&O's Summary    2021 07:01  -  2021 07:00  --------------------------------------------------------  IN: 3951.4 mL / OUT: 3984 mL / NET: -32.6 mL      Daily   Diet:	[ ] Regular	[ ] Soft		[ ] Clears	[x ] NPO  .	[ ] Other:  .	[ ] NGT		[ ] NDT		[ ] GT		[ ] GJT    [ ] Urinary Catheter, Date Placed:   Comments:    ====================NEUROLOGY===================  [ ] SBS:		[ ] GISSELLE-1:	[ ] BIS:	[ ] CAPD:  [ ] EVD set at: ___ , Drainage in last 24 hours: ___ ml    [x] Adequacy of sedation and pain control has been assessed and adjusted  Comments:      ==================PATIENT CARE=================  [ ] There are pressure ulcers/areas of breakdown that are being addressed -   [x] Preventative measures are being taken to decrease risk for skin breakdown.  [x] Necessity of urinary, arterial, and venous catheters discussed    ==================LABS============================  ABG - ( 2021 01:47 )  pH: 7.44  /  pCO2: 38    /  pO2: 68    / HCO3: 26    / Base Excess: 1.2   /  SaO2: 93.4  / Lactate: x                                                12.8                  Neurophils% (auto):   71.2   ( @ 01:54):    8.43 )-----------(198          Lymphocytes% (auto):  8.4                                           41.5                   Eosinphils% (auto):   10.9     Manual%: Neutrophils x    ; Lymphocytes x    ; Eosinophils x    ; Bands%: x    ; Blasts x        (  @ 01:54 )   PT: 18.1 sec;   INR: 1.61 ratio  aPTT: 41.4 sec                            153    |  111    |  9                   Calcium: 9.7   / iCa: 1.14   ( @ 07:05)    ----------------------------<  111       Magnesium: 1.4                              3.5     |  23     |  0.81             Phosphorous: 3.6      TPro  7.8    /  Alb  4.1    /  TBili  1.4    /  DBili  x      /  AST  18     /  ALT  6      /  AlkPhos  82     2021 07:05  RECENT CULTURES:   @ 06:15 .CSF CSF       polymorphonuclear leukocytes seen  No organisms seen  by cytocentrifuge     @ 08:27 .Blood Blood     No growth to date.       @ 01:30 .Urine Catheterized     No growth          =================MEDICATIONS======================  MEDICATIONS  MEDICATIONS  (STANDING):  acetaminophen  IV Intermittent - Peds. 750 milliGRAM(s) IV Intermittent every 6 hours  Andexanet Raymond 480 milliGRAM(s),IV Diluent 48 milliLiter(s) 480 milliGRAM(s) (24 mL/Hr) IV Continuous <Continuous>  cefTRIAXone IV Intermittent - Peds 2000 milliGRAM(s) IV Intermittent every 24 hours  chlorhexidine 0.12% Oral Liquid - Peds 15 milliLiter(s) Swish and Spit every 12 hours  chlorhexidine 2% Topical Cloths - Peds 1 Application(s) Topical daily  dexMEDEtomidine Infusion - Peds 0.5 MICROgram(s)/kG/Hr (8.26 mL/Hr) IV Continuous <Continuous>  famotidine IV Intermittent - Peds 20 milliGRAM(s) IV Intermittent every 12 hours  furosemide  IV Intermittent - Peds 10 milliGRAM(s) IV Intermittent every 12 hours  heparin   Infusion - Pediatric 0.045 Unit(s)/kG/Hr (3 mL/Hr) IV Continuous <Continuous>  levETIRAcetam IV Intermittent - Peds 1000 milliGRAM(s) IV Intermittent every 12 hours  norepinephrine Infusion - Peds 0.07 MICROgram(s)/kG/Min (6.94 mL/Hr) IV Continuous <Continuous>  propofol Infusion - Peds 2 mG/kG/Hr (13.2 mL/Hr) IV Continuous <Continuous>  sildenafil  IV Intermittent - Peds 10 milliGRAM(s) IV Intermittent every 8 hours  sodium chloride 0.9% -  250 milliLiter(s) (3 mL/Hr) IV Continuous <Continuous>  sodium chloride 0.9% lock flush - Peds 3 milliLiter(s) IV Push every 8 hours  sodium chloride 0.9% lock flush - Peds 3 milliLiter(s) IV Push every 8 hours  sodium chloride 0.9% with potassium chloride 40 mEq/L. - Pediatric 1000 milliLiter(s) (69 mL/Hr) IV Continuous <Continuous>    MEDICATIONS  (PRN):  petrolatum, white/mineral oil Ophthalmic Ointment - Peds 1 Application(s) Both EYES two times a day PRN dry eyes  polyvinyl alcohol 1.4%/povidone 0.6% Ophthalmic Solution - Peds 1 Drop(s) Both EYES four times a day PRN Dry Eyes    ===================================================  IMAGING STUDIES:    [ ] XR   [ ] CT   [ ] MR   [ ] US  [ ] Echo  ===========================================================  Parent/Guardian is at the bedside:	[ x] Yes	[ ] No  Patient and Parent/Guardian updated as to the progress/plan of care:	[x ] Yes	[ ] No    [x] The patient remains in critical and unstable condition, and requires ICU care and monitoring, assessment, and treatment  [ ] The patient is improving but requires continued monitoring, assessment, treatment, and adjustment of therapy    [x] The total critical care time spent by attending physician was __35__ minutes, excluding procedure time.

## 2021-06-07 NOTE — PROGRESS NOTE PEDS - PROBLEM SELECTOR PLAN 1
1. neurochecks and ICP monitoring Q1H  2. raise EVD to 10cm/H20  3. d/c NEIL drain  4. f/u CT head results  5. f/u sepsis workup  6. continue AED  7. notify NSx for ICP > 20  8. elevated HOB > 30 degrees

## 2021-06-07 NOTE — PROGRESS NOTE PEDS - ASSESSMENT
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 He has elevated Fontan pressure secondary to LV diastolic dysfunction.  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    PLAN:  Neuro:  Neuroprotection: 3% NaCl drip stopped yesterday - Serum Na still elevated. Cont to monitor every 12 hours, keppra, sedation, normothermia  EVD at 5 cmH20 - cont to monitor output  Monitor ICP Q1hr (goal <20)  Repeat head CT 6/4- no interval change  Will start precedex, methadone today - with goal of decreasing Fentanyl to allow for smoother possible future extubation. Cont Propofol.    Resp:  Full mechanical ventilation for neuroprotection (goal normocarbia, normoxia)  Will keep intubated today because of rise in ICP yesterday with decrease in sedation  continuous 02sat/ETCO2  ABG Q12H, Goal SpO2 > 92%    CV:  Home meds on hold (Lisinopril, Aldactone)  Continue Sildenafil   Hold Amiodarone- no antiarrythmic per EP  Continue Lasix IV Q12hr: goal -500  Goal MAP (for CPP) 85 (due to elevated Fontan pressure about 20), Adjust NE for goal MAP  Pacemaker at VOO at 70    Heme:  Xarelto on hold  Goal PLTs >100  Goal INR < 1.5 - Give FFP today  s/p Vitamin K x3 days    FEN:  NPO/IVF - on NS  Serum sodium still elevated. Check every 12 hours.  Start bowel regimen    Access  Left Fem CVL (6/2), Righ Rad AL (6/1) 22 y/o male DILV, 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 He has elevated Fontan pressure secondary to LV diastolic dysfunction.  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 Persistent hypoxia, back in IART    PLAN:  Neuro:  - s/p 3% NaCl - target normonatremia  - Keppra (clinical szs)  - R sided hemiparesis  - EVD @ 10 - cont to monitor output  - Monitor ICP Q1hr (goal <20)  - Repeat head CT 6/7 - no interval change  - precedex 0.5 - wean  - propofol 2 - titrate down if still overly sedated when off precedex  - skull flap off  - tylenol RTC for fever control    Resp:  Full mechanical ventilation for neuroprotection (goal normocarbia, normoxia)  Will keep intubated today because of rise in ICP overnight  continuous 02sat/ETCO2  Goal SpO2 > 90%  FiO2 up to 60%, increase PEEP from 8-->10  start IPV/albuterol for pulmonary toilet    CV:  Home meds on hold (Lisinopril, Aldactone)  Continue Sildenafil   Hold Amiodarone- no antiarrythmic per EP  Continue Lasix IV Q12hr: goal -500  Goal MAP (for CPP) 80 (due to elevated Fontan pressure about 20), Adjust NE for goal MAP  Pacemaker at VOO at 70  IART - will manage as outpt now    Heme:  Xarelto on hold  Goal PLTs >100  Goal INR < 1.7 - FFP as needed  s/p Vitamin K x3 days    FEN:  NPO/IVF - on NS  Start bowel regimen  [  ] start half NG feeds (on vasoactives but well perfused)    ID  - Ancef --> CTX started 6/6 for fever    Access  Left Fem CVL (6/2), Connor MACHADO (6/1) 22 y/o male DILV, 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 He has elevated Fontan pressure secondary to LV diastolic dysfunction.  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 Persistent hypoxia, back in IART (not hemodynamically compromising)    PLAN:  Neuro:  - s/p 3% NaCl - target Na > 145  - Keppra (clinical szs)  - R sided hemiparesis  - EVD @ 10 - cont to monitor output  - Monitor ICP Q1hr (goal <20)  - Repeat head CT 6/7 - some blood around catheter but no significant changes  - precedex 0.5 - wean  - propofol 2 - titrate down if still overly sedated when off precedex  - skull flap off, will need prosthetic in future  - tylenol RTC for fever control    Resp:  Full mechanical ventilation for neuroprotection (goal normocarbia, normoxia)  FiO2 up to 60%, increase PEEP from 8-->10  start IPV/albuterol for pulmonary toilet  Goal SpO2 > 90%    CV:  Home meds on hold (Lisinopril, Aldactone)  Continue Sildenafil   Hold Amiodarone- no antiarrythmic per EP  Continue Lasix IV Q12hr: goal -500  Goal MAP (for CPP) 80 (due to elevated Fontan pressure about 20), Adjust NE for goal MAP  Pacemaker at VOO at 70  IART - will manage as outpt now    Heme:  Xarelto on hold  Goal PLTs >100  Give cryo (for lower volume) if INR between 1.5-1.7, FFP if > 1.7  s/p Vitamin K x3 days    FEN:  NPO/IVF - on NS  Bowel regimen  Still having high Replogle output, will hold off on feeds  Start TPN with low/no dextrose (per NRSGY)    ID  - Ancef --> CTX started 6/6 for fever    Access  Left Fem CVL (6/2), Connor Barron AL (6/1)

## 2021-06-07 NOTE — PROGRESS NOTE PEDS - ATTENDING COMMENTS
Agree with above note, assessment & plan (edited where appropriate). 19 yo M s/p lateral tunnel Fontan (DILV, L-malposed great arteries. Also history of sick sinus syndrome and complete heart block, s/p dual chamber epicardial pacemaker with cardiac resynchronization therapy for left ventricular dysfunction and failing Fontan in 2016.    Admitted with IART for ablation; EP study with unsuccessful attempt at ablation; diagnostic cath --> high Fontan pressure (20mmHg). Underwent amiodarone load & cardioversion (unsuccessful) which was complicated by L frontal parenchymal bleed requiring surgical evacuation & EVD placement.      Assessment/Plan:  CV - Pacemaker set at VOO 70bpm; underlying rhythm patient remains in IART (by report atrial wires nonfunctioning). Will consider reprogramming to DDD when patient stable and no anticipated NSx interventions. Continue Sildenafil 20mg q8h IV, Lasix. Requiring  norepinephrine, s/p vasopressin. Goal MAPs > 80. Agree with norepi and/or vasopressin. TItrate to goal per PICU to maintain CPP.  No plans to cardiovert at this time as remains stable on ventricular pacemaker. Will need repeat HOLA prior to any attempts to cardiovert.    Will defer other neuro/neurosurgical issues to PICU/NSx team.  Plan discussed with mother at bedside & PICU team.

## 2021-06-07 NOTE — PROGRESS NOTE PEDS - ASSESSMENT
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 Presented in IART - s/p EP study with unsuccessful attempt at ablation along with diagnostic cath showing elevated Fontan pressure (20mmHg). He was loaded with amiodarone and ultimately electrically cardioverted out yesterday, however reverted back into IART. His course has now been further complicated by L frontal parenchymal hemorrhage requiring emergent evacuation with NSx and EVD placement.    Plan:  - Continuos telemetry monitoring.  - Pacemaker set at VOO 70bpm  - Continue home medications - Sildenafil 20mg q8h (covert to IV)  - Monitor blood pressure. Goal MAPs > 80. Agree with norepi or vasopressin. TItrate to goal per PICU  - Hold home lisinopril, aldactone  - hold home amidarone. No plans to cardiovert at this time.  - Daily EKGs.   - anticoagulation care per hematology/PICU/NSx  - rest of care per PICU and NSx  - Please page pediatric cardiology with any concerns or questions.    Thank you for involving us in the care of your patient.     Guicho Oliveros MD, MPH  Pediatric Cardiology Fellow  PAGER: [phone]   TRINI MÉNDEZ is a 21 yo male with DILV, L-malposed great arteries s/p lateral tunnel Fontan (with subsequent stent placement in Fontan pathway in 2016), with sick sinus syndrome and complete heart block, s/p dual chamber epicardial pacemaker with cardiac resynchronization therapy for left ventricular dysfunction and failing Fontan in 2016. Presented in IART - s/p EP study with unsuccessful attempt at ablation along with diagnostic cath showing elevated Fontan pressure (20mmHg). He was loaded with amiodarone and ultimately electrically cardioverted out, however reverted back into IART. His course has now been further complicated by L frontal parenchymal hemorrhage requiring emergent evacuation with NSx and EVD placement.    Plan:  - Continuos telemetry monitoring.  - Pacemaker set at VOO 70bpm, in the event urgent surgical intervention is required  - Continue Sildenafil 20mg q8h IV  - lasix home dose is 20mg QD. Agree with lasix 10mg IV BID. Goal net even to negative.  - Monitor blood pressure. Goal MAPs > 80. Agree with norepi and/or vasopressin. TItrate to goal per PICU to maintain CPP.  - Hold home lisinopril, aldactone  - hold home amidarone. No plans to cardiovert at this time. Will need repeat HOLA prior to any attempts to cardiovert.  - anticoagulation care per hematology/PICU/NSx  - rest of care per PICU and NSx  - Please page pediatric cardiology with any concerns or questions.    Thank you for involving us in the care of your patient.     Guicho Oliveros MD, MPH  Pediatric Cardiology Fellow  PAGER: [phone]

## 2021-06-07 NOTE — PROGRESS NOTE PEDS - SUBJECTIVE AND OBJECTIVE BOX
INTERVAL HISTORY: Febrile overnight a/w increased ICP. Abx started with sepsis work-up    RESPIRATORY SUPPORT: Mode: SIMV with PS, RR (machine): 12, FiO2: 50, PEEP: 8, PS: 5  NUTRITION: NPO       @ 07:01  -   @ 07:00  --------------------------------------------------------  IN: 3951.4 mL / OUT: 3984 mL / NET: -32.6 mL    MEDICATIONS:  furosemide  IV Intermittent - Peds 10 milliGRAM(s) IV Intermittent every 12 hours  norepinephrine Infusion - Peds 0.07 MICROgram(s)/kG/Min IV Continuous <Continuous>  sildenafil  IV Intermittent - Peds 10 milliGRAM(s) IV Intermittent every 8 hours  cefTRIAXone IV Intermittent - Peds 2000 milliGRAM(s) IV Intermittent every 24 hours  acetaminophen  IV Intermittent - Peds. 750 milliGRAM(s) IV Intermittent every 6 hours  dexMEDEtomidine Infusion - Peds 0.5 MICROgram(s)/kG/Hr IV Continuous <Continuous>  levETIRAcetam IV Intermittent - Peds 1000 milliGRAM(s) IV Intermittent every 12 hours  propofol Infusion - Peds 2 mG/kG/Hr IV Continuous <Continuous>  famotidine IV Intermittent - Peds 20 milliGRAM(s) IV Intermittent every 12 hours  heparin   Infusion - Pediatric 0.045 Unit(s)/kG/Hr IV Continuous <Continuous>  sodium chloride 0.9% -  250 milliLiter(s) IV Continuous <Continuous>  sodium chloride 0.9% lock flush - Peds 3 milliLiter(s) IV Push every 8 hours  sodium chloride 0.9% lock flush - Peds 3 milliLiter(s) IV Push every 8 hours  sodium chloride 0.9% with potassium chloride 40 mEq/L. - Pediatric 1000 milliLiter(s) IV Continuous <Continuous>    PHYSICAL EXAMINATION:  Vital signs -   T(C): 37.7 (21 @ 05:00), Max: 38.2 (21 @ 20:00)  HR: 70 (21 @ 06:41) (70 - 71)  BP: 119/61 (21 @ 11:00) (119/61 - 119/61)  ABP:  (105/62 - 140/75)  RR: 12 (21 @ 06:00) (12 - 12)  SpO2: 88% (21 @ 06:41) (87% - 97%)  CVP(mm Hg):  (11 - 27)    General - intubated, sedated. Will respond to stimuli  Skin - no rash, no desquamation, no cyanosis.  Eyes / ENT - no conjunctival injection, sclerae anicteric, external ears & nares normal, mucous membranes moist.  Pulmonary - normal inspiratory effort, no retractions, lungs clear to auscultation bilaterally, no wheezes, no rales.  Cardiovascular - normal rate, regular rhythm, normal S1 & single S2, grade 1/6 blowing holosystolic murmur at LMSB, no rubs, no gallops, capillary refill < 2sec, normal pulses.  Gastrointestinal - soft, non-distended, non-tender, no splenomegaly. (+) hepatomegaly.  Musculoskeletal - no joint swelling, no clubbing, no edema.  Neurologic / Psychiatric - intubated, sedated      LABORATORY TESTS:                          12.8  CBC:   8.43 )-----------( 198   (21 @ 01:54)                          41.5               153   |  111   |  9                  Ca: 9.7    BMP:   ----------------------------< 111    M.4   (21 @ 07:05)             3.5    |  23    | 0.81               Ph: 3.6      LFT:     TPro: 7.8 / Alb: 4.1 / TBili: 1.4 / DBili: x / AST: 18 / ALT: 6 / AlkPhos: 82   (21 @ 07:05)    COAG: PT: 18.1 / PTT: 41.4 / INR: 1.61   (21 @ 01:54)       ABG:   pH: 7.44 / pCO2: 38 / pO2: 68 / HCO3: 26 / Base Excess: 1.2 / SaO2: 93.4 / Lactate: x / iCa: 1.28   (21 @ 01:47)    IMAGING STUDIES:  Electrocardiogram - (21) Ventricular paced rhythm @ 75bpm. Underlying IART.    Telemetry - (21) Ventricular paced rhythm @ 75bpm. Underlying IART.    Chest x-ray - (21) Stable mild cardiomegaly with subsegmental left lower lobe atelectasis.    Echocardiogram - (21)   1. This was a technically difficult suboptimal study due to poor echo windows. Findings limited to below.   2. 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   3. Mild mitral valve regurgitation.   4. Trivial tricuspid valve regurgitation.   5. Trivial aortic valve regurgitation.   6. Status post device closure of Fontan fenestration.   7. S/P stent placement in the Fontan pathway. Limited imaging of the inferior Fontan baffle. In this setting, the inferior vena cava to its connection near the atrial portion of the baffle appears patent with no obstruction. S/p device closure of Fontan fenestration. The Fontan fenestration is not seen on this study. The right bidirectional Storm is seen only on color flow mapping. This appears to have laminar low velocity flow.   8. The connection of the right bidirectional Storm to the right pulmonary artery could not be imaged. The right pulmonary artery is seen in a limited portion immediately to the left of the Storm and appears patent. The left pulmonary artery could not be imaged.   9. Extremely poor and limited imaging of the lateral free walls of the single systemic left ventricle. On limited short axis views there appears to be adequate systolic excursion of the posterior wall of the left ventricle and decreased in the anterior wall. The bulboventricular foramen could not be imaged. Suggest alternate imaging for appropriate assessment of function.  10. No pericardial effusion.  11. Small right pleural effusion.    Cath - (21)  Access 4 Fr RFA, 7 Fr RFV x2 with US guidance.  Sat data (%): on 50% FiO2 LPA 73, RUPV 98, Ao 97; CI 2.6 L/min/m2 with Qp:Qs 1 :1.  Pressures (mmHg): Elevated mFontan = mIVC = 20. mPCW=16, No significant gradient across LV to AAo to Vikki (98/58/73).  Normal PVR while on sildenafil.  Angios showed unobstructed Fontan with existing stent within Fontan conduit.    Comment: IART ablation was attempted after the diagnostic cath but unsuccessful. Patient was overdrive paced out of IART. At the end of the case, Ao sat was 94% and LPA 66% on 50% FiO2

## 2021-06-07 NOTE — PROGRESS NOTE PEDS - SUBJECTIVE AND OBJECTIVE BOX
POD # 6 s/p left frontal craniectomy and insertion of EVD for left frontal ICH, SAH    Patient with a few spikes in ICP overnight, f/u CT head last night appears stable, will f/u with official read.  ICP ranged from 5 to 23, currently 17, drained 140cc over 24H, currently at 5 cm/H20.  Patient with  a temp of 100.7, CSF sent for analysis, GS- negative.     HPI:  21yo male with complex cardiac h/o DILV, L-malposition of great arteries, sick sinus syndrome and AV mihir disease with tachybradycardia syndrome with a dual chamber pacemaker (currently with RV lead fracture and is currently only LV paced 2/2 complete heart block), PSH of status post Cgnqx-Bsan-Cgcuiwo anastomosis and aortic arch reconstruction followed by a fenestrated lateral tunnel Fontan completion (now s/p fenestration closure). here s/p cardiac catheterization and ablation. Procedure was complicated by unsuccessful ablation and post extubation patient was noted to have increased hemoptysis and desaturations, patient was reintubated for airway protection and given propofol for sedation.  (26 May 2021 20:19)    PAST MEDICAL & SURGICAL HISTORY:  Double inlet left ventricle  D-TGA (dextro-transposition of great arteries)  Sick sinus syndrome  Atrial tachycardia  Hepatomegaly  Other ascites  Pulmonary hypertension  Pacemaker  AV block  Coagulopathy  S/P cardiac cath  2001-assessment of anatomy prior to 1st surgery  2004- coil emolization of collateral vessels  2011-balloon angioplasty of superior narrowing of Fontan circuit, balloon angioplasty of LPA, ASD device placed for closure of Fontan fenestraion  10/24/2016- Cath of Fontan  S/P Nirmal-Taussig shunt  Cxcdh-Xlyk-Agadpnx anastomosis with modified right BT shunt and creation of pulmonary atresia  S/P celestine-Fontan operation  3/15/2003- Celestine-Fontan with ligation of BT shunt and left pulmonary artery plasty  Cardiac pacemaker 2004    PHYSICAL EXAM: intubated , sedated on propofol and precedex  Opens eyes to stimulus, PERRL, tracts,   craniectomy site- soft  Motor-localized uppers, withdraws lowers  Incision site C/D/I    Diet:  Regular (  )  NPO       ( x )    Drains:  ventriculostomy   ( x )  Lumbar drain       (  )  NEIL drain               ( x ) 3cc/24H  Hemovac              (  )    Vital Signs Last 24 Hrs  T(C): 37.7 (2021 05:00), Max: 38.2 (2021 20:00)  T(F): 99.8 (2021 05:00), Max: 100.7 (2021 20:00)  HR: 70 (2021 06:41) (70 - 71)  BP: 119/61 (2021 11:00) (119/61 - 119/61)  BP(mean): 76 (2021 11:00) (76 - 76)  RR: 12 (2021 06:00) (12 - 12)  SpO2: 88% (2021 06:41) (87% - 97%)  I&O's Summary    2021 07:01  -  2021 07:00  --------------------------------------------------------  IN: 3951.4 mL / OUT: 3984 mL / NET: -32.6 mL      MEDICATIONS  (STANDING):  acetaminophen  IV Intermittent - Peds. 750 milliGRAM(s) IV Intermittent every 6 hours  Andexanet Raymodn 480 milliGRAM(s),IV Diluent 48 milliLiter(s) 480 milliGRAM(s) (24 mL/Hr) IV Continuous <Continuous>  cefTRIAXone IV Intermittent - Peds 2000 milliGRAM(s) IV Intermittent every 24 hours  chlorhexidine 0.12% Oral Liquid - Peds 15 milliLiter(s) Swish and Spit every 12 hours  chlorhexidine 2% Topical Cloths - Peds 1 Application(s) Topical daily  dexMEDEtomidine Infusion - Peds 0.5 MICROgram(s)/kG/Hr (8.26 mL/Hr) IV Continuous <Continuous>  famotidine IV Intermittent - Peds 20 milliGRAM(s) IV Intermittent every 12 hours  furosemide  IV Intermittent - Peds 10 milliGRAM(s) IV Intermittent every 12 hours  heparin   Infusion - Pediatric 0.045 Unit(s)/kG/Hr (3 mL/Hr) IV Continuous <Continuous>  levETIRAcetam IV Intermittent - Peds 1000 milliGRAM(s) IV Intermittent every 12 hours  norepinephrine Infusion - Peds 0.07 MICROgram(s)/kG/Min (6.94 mL/Hr) IV Continuous <Continuous>  propofol Infusion - Peds 2 mG/kG/Hr (13.2 mL/Hr) IV Continuous <Continuous>  sildenafil  IV Intermittent - Peds 10 milliGRAM(s) IV Intermittent every 8 hours  sodium chloride 0.9% -  250 milliLiter(s) (3 mL/Hr) IV Continuous <Continuous>  sodium chloride 0.9% lock flush - Peds 3 milliLiter(s) IV Push every 8 hours  sodium chloride 0.9% lock flush - Peds 3 milliLiter(s) IV Push every 8 hours  sodium chloride 0.9% with potassium chloride 40 mEq/L. - Pediatric 1000 milliLiter(s) (69 mL/Hr) IV Continuous <Continuous>    MEDICATIONS  (PRN):  petrolatum, white/mineral oil Ophthalmic Ointment - Peds 1 Application(s) Both EYES two times a day PRN dry eyes  polyvinyl alcohol 1.4%/povidone 0.6% Ophthalmic Solution - Peds 1 Drop(s) Both EYES four times a day PRN Dry Eyes    LABS:                        12.8   8.43  )-----------( 198      ( 2021 01:54 )             41.5     06-07    153<H>  |  111<H>  |  9   ----------------------------<  111<H>  3.5   |  23  |  0.81    Ca    9.7      2021 07:05  Phos  3.6     06-07  Mg     1.4     06-07    TPro  7.8  /  Alb  4.1  /  TBili  1.4<H>  /  DBili  x   /  AST  18  /  ALT  6   /  AlkPhos  82  06-07    PT/INR - ( 2021 01:54 )   PT: 18.1 sec;   INR: 1.61 ratio         PTT - ( 2021 01:54 )  PTT:41.4 sec  Urinalysis Basic - ( 2021 22:24 )    Color: Yellow / Appearance: Clear / S.024 / pH: x  Gluc: x / Ketone: Moderate  / Bili: Negative / Urobili: <2 mg/dL   Blood: x / Protein: 30 mg/dL / Nitrite: Negative   Leuk Esterase: Negative / RBC: 16 /HPF / WBC 6 /HPF   Sq Epi: x / Non Sq Epi: 6 /HPF / Bacteria: Negative

## 2021-06-07 NOTE — CHART NOTE - NSCHARTNOTEFT_GEN_A_CORE
Patient febrile then noted to have ICP >20 that persisted despite Propofol bolus and Tylenol.  Per nurse, Methadone had been given ~1hr prior.  NSx at bedside- sedation held- and patient unable to be aroused by voice or sternal rub. Pupils equal. No spontaneous movement.  ICP trended down, however given poor exam and associated HTN (140s/90s), CT scan of head obtained.  CT scan of head (prelim) unchanged.  CXF sample obtained by NSx.  Antibiotics started.  Methadone dose decreased.  Patient will be examined prior to next Methadone dose.

## 2021-06-07 NOTE — CHART NOTE - NSCHARTNOTEFT_GEN_A_CORE
20 y.o. 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 He has elevated Fontan pressure secondary to LV diastolic dysfunction. Had L frontal hemorrhagic stroke on 21 requiring decompressive craniectomy and urgent evacuation in OR. Persistent hypoxia, back in IART; per MD notes.  Pt remains intubated. Weaning sedation, pressors.   Pt has been NPO since , plan to start half feeds today via NGT.  +BM     Estimated energy needs: 30-35 kcal/kg using IBW of 63.8 [id number] kcal/day  Estimated protein needs: 1.2-1.3 g/kg using IBW of 63.8 k-83 g pro/day    PLAN/RECS:  1. Initiate enteral feeds of Jevity 1.2 @ 20 cc/hr and increase by 5 cc/hr q4h to goal of 70 cc/hr   Regimen will provide 1680 mL total volume, 2016 kcal, 93.2 g pro  2. Monitor po intake, weights, labs     GOAL:  Pt will meet >75% of estimated nutrient needs with good tolerance

## 2021-06-08 LAB
ALBUMIN SERPL ELPH-MCNC: 4.1 G/DL — SIGNIFICANT CHANGE UP (ref 3.3–5)
ALBUMIN SERPL ELPH-MCNC: 4.1 G/DL — SIGNIFICANT CHANGE UP (ref 3.3–5)
ALP SERPL-CCNC: 72 U/L — SIGNIFICANT CHANGE UP (ref 60–270)
ALP SERPL-CCNC: 77 U/L — SIGNIFICANT CHANGE UP (ref 60–270)
ALT FLD-CCNC: 6 U/L — SIGNIFICANT CHANGE UP (ref 4–41)
ALT FLD-CCNC: <5 U/L — LOW (ref 4–41)
ANION GAP SERPL CALC-SCNC: 12 MMOL/L — SIGNIFICANT CHANGE UP (ref 7–14)
ANION GAP SERPL CALC-SCNC: 15 MMOL/L — HIGH (ref 7–14)
APTT 50/50 2HOUR INCUB: 35.6 SEC — SIGNIFICANT CHANGE UP (ref 27.5–37.4)
APTT BLD: 33.1 SEC — SIGNIFICANT CHANGE UP (ref 27.5–37.4)
APTT BLD: 41 SEC — HIGH (ref 27–36.3)
AST SERPL-CCNC: 16 U/L — SIGNIFICANT CHANGE UP (ref 4–40)
AST SERPL-CCNC: 16 U/L — SIGNIFICANT CHANGE UP (ref 4–40)
BASOPHILS # BLD AUTO: 0.05 K/UL — SIGNIFICANT CHANGE UP (ref 0–0.2)
BASOPHILS NFR BLD AUTO: 0.8 % — SIGNIFICANT CHANGE UP (ref 0–2)
BILIRUB SERPL-MCNC: 1.2 MG/DL — SIGNIFICANT CHANGE UP (ref 0.2–1.2)
BILIRUB SERPL-MCNC: 1.2 MG/DL — SIGNIFICANT CHANGE UP (ref 0.2–1.2)
BUN SERPL-MCNC: 14 MG/DL — SIGNIFICANT CHANGE UP (ref 7–23)
BUN SERPL-MCNC: 18 MG/DL — SIGNIFICANT CHANGE UP (ref 7–23)
CALCIUM SERPL-MCNC: 9.1 MG/DL — SIGNIFICANT CHANGE UP (ref 8.4–10.5)
CALCIUM SERPL-MCNC: 9.4 MG/DL — SIGNIFICANT CHANGE UP (ref 8.4–10.5)
CHLORIDE SERPL-SCNC: 107 MMOL/L — SIGNIFICANT CHANGE UP (ref 98–107)
CHLORIDE SERPL-SCNC: 108 MMOL/L — HIGH (ref 98–107)
CO2 SERPL-SCNC: 25 MMOL/L — SIGNIFICANT CHANGE UP (ref 22–31)
CO2 SERPL-SCNC: 26 MMOL/L — SIGNIFICANT CHANGE UP (ref 22–31)
CREAT SERPL-MCNC: 0.71 MG/DL — SIGNIFICANT CHANGE UP (ref 0.5–1.3)
CREAT SERPL-MCNC: 0.75 MG/DL — SIGNIFICANT CHANGE UP (ref 0.5–1.3)
CULTURE RESULTS: NO GROWTH — SIGNIFICANT CHANGE UP
D DIMER BLD IA.RAPID-MCNC: 2898 NG/ML DDU — HIGH
EOSINOPHIL # BLD AUTO: 0.45 K/UL — SIGNIFICANT CHANGE UP (ref 0–0.5)
EOSINOPHIL NFR BLD AUTO: 7.2 % — HIGH (ref 0–6)
FIBRINOGEN PPP-MCNC: 792 MG/DL — HIGH (ref 290–520)
GLUCOSE SERPL-MCNC: 132 MG/DL — HIGH (ref 70–99)
GLUCOSE SERPL-MCNC: 167 MG/DL — HIGH (ref 70–99)
HCT VFR BLD CALC: 37.9 % — LOW (ref 39–50)
HGB BLD-MCNC: 11.7 G/DL — LOW (ref 13–17)
IANC: 4.37 K/UL — SIGNIFICANT CHANGE UP (ref 1.5–8.5)
IMM GRANULOCYTES NFR BLD AUTO: 1.9 % — HIGH (ref 0–1.5)
INR BLD: 1.6 RATIO — HIGH (ref 0.88–1.16)
LYMPHOCYTES # BLD AUTO: 0.75 K/UL — LOW (ref 1–3.3)
LYMPHOCYTES # BLD AUTO: 12 % — LOW (ref 13–44)
MAGNESIUM SERPL-MCNC: 2.2 MG/DL — SIGNIFICANT CHANGE UP (ref 1.6–2.6)
MAGNESIUM SERPL-MCNC: 2.4 MG/DL — SIGNIFICANT CHANGE UP (ref 1.6–2.6)
MCHC RBC-ENTMCNC: 26.1 PG — LOW (ref 27–34)
MCHC RBC-ENTMCNC: 30.9 GM/DL — LOW (ref 32–36)
MCV RBC AUTO: 84.6 FL — SIGNIFICANT CHANGE UP (ref 80–100)
MONOCYTES # BLD AUTO: 0.5 K/UL — SIGNIFICANT CHANGE UP (ref 0–0.9)
MONOCYTES NFR BLD AUTO: 8 % — SIGNIFICANT CHANGE UP (ref 2–14)
NEUTROPHILS # BLD AUTO: 4.37 K/UL — SIGNIFICANT CHANGE UP (ref 1.8–7.4)
NEUTROPHILS NFR BLD AUTO: 70.1 % — SIGNIFICANT CHANGE UP (ref 43–77)
NRBC # BLD: 0 /100 WBCS — SIGNIFICANT CHANGE UP
NRBC # FLD: 0 K/UL — SIGNIFICANT CHANGE UP
PHOSPHATE SERPL-MCNC: 2.5 MG/DL — SIGNIFICANT CHANGE UP (ref 2.5–4.5)
PHOSPHATE SERPL-MCNC: 2.5 MG/DL — SIGNIFICANT CHANGE UP (ref 2.5–4.5)
PLATELET # BLD AUTO: 172 K/UL — SIGNIFICANT CHANGE UP (ref 150–400)
POTASSIUM SERPL-MCNC: 3.6 MMOL/L — SIGNIFICANT CHANGE UP (ref 3.5–5.3)
POTASSIUM SERPL-MCNC: 3.7 MMOL/L — SIGNIFICANT CHANGE UP (ref 3.5–5.3)
POTASSIUM SERPL-SCNC: 3.6 MMOL/L — SIGNIFICANT CHANGE UP (ref 3.5–5.3)
POTASSIUM SERPL-SCNC: 3.7 MMOL/L — SIGNIFICANT CHANGE UP (ref 3.5–5.3)
PROT SERPL-MCNC: 7.8 G/DL — SIGNIFICANT CHANGE UP (ref 6–8.3)
PROT SERPL-MCNC: 7.9 G/DL — SIGNIFICANT CHANGE UP (ref 6–8.3)
PROTHROM AB SERPL-ACNC: 17.8 SEC — HIGH (ref 10.6–13.6)
PT 50/50: 13.4 SEC — SIGNIFICANT CHANGE UP (ref 10.6–13.6)
RBC # BLD: 4.48 M/UL — SIGNIFICANT CHANGE UP (ref 4.2–5.8)
RBC # FLD: 14.4 % — SIGNIFICANT CHANGE UP (ref 10.3–14.5)
SODIUM SERPL-SCNC: 146 MMOL/L — HIGH (ref 135–145)
SODIUM SERPL-SCNC: 147 MMOL/L — HIGH (ref 135–145)
SPECIMEN SOURCE: SIGNIFICANT CHANGE UP
THROMBIN TIME: 20.6 SEC — SIGNIFICANT CHANGE UP (ref 16–26)
TRIGL SERPL-MCNC: 174 MG/DL — HIGH
TROPONIN T, HIGH SENSITIVITY RESULT: 12 NG/L — SIGNIFICANT CHANGE UP
WBC # BLD: 6.24 K/UL — SIGNIFICANT CHANGE UP (ref 3.8–10.5)
WBC # FLD AUTO: 6.24 K/UL — SIGNIFICANT CHANGE UP (ref 3.8–10.5)

## 2021-06-08 PROCEDURE — 99232 SBSQ HOSP IP/OBS MODERATE 35: CPT

## 2021-06-08 PROCEDURE — 99291 CRITICAL CARE FIRST HOUR: CPT | Mod: 25

## 2021-06-08 PROCEDURE — 70450 CT HEAD/BRAIN W/O DYE: CPT | Mod: 26

## 2021-06-08 PROCEDURE — 93770 DETERMINATION VENOUS PRESS: CPT

## 2021-06-08 PROCEDURE — 94681 O2 UPTK CO2 OUTP % O2 XTRC: CPT | Mod: 26

## 2021-06-08 PROCEDURE — 93010 ELECTROCARDIOGRAM REPORT: CPT

## 2021-06-08 PROCEDURE — 99291 CRITICAL CARE FIRST HOUR: CPT

## 2021-06-08 PROCEDURE — 99233 SBSQ HOSP IP/OBS HIGH 50: CPT

## 2021-06-08 PROCEDURE — 71045 X-RAY EXAM CHEST 1 VIEW: CPT | Mod: 26

## 2021-06-08 PROCEDURE — 93321 DOPPLER ECHO F-UP/LMTD STD: CPT | Mod: 26

## 2021-06-08 PROCEDURE — 93304 ECHO TRANSTHORACIC: CPT | Mod: 26

## 2021-06-08 PROCEDURE — 93325 DOPPLER ECHO COLOR FLOW MAPG: CPT | Mod: 26

## 2021-06-08 RX ORDER — ELECTROLYTE SOLUTION,INJ
1 VIAL (ML) INTRAVENOUS
Refills: 0 | Status: DISCONTINUED | OUTPATIENT
Start: 2021-06-08 | End: 2021-06-09

## 2021-06-08 RX ORDER — LIDOCAINE HCL 20 MG/ML
66 VIAL (ML) INJECTION ONCE
Refills: 0 | Status: COMPLETED | OUTPATIENT
Start: 2021-06-08 | End: 2021-06-08

## 2021-06-08 RX ORDER — FUROSEMIDE 40 MG
10 TABLET ORAL ONCE
Refills: 0 | Status: COMPLETED | OUTPATIENT
Start: 2021-06-08 | End: 2021-06-08

## 2021-06-08 RX ORDER — I.V. FAT EMULSION 20 G/100ML
0.73 EMULSION INTRAVENOUS
Qty: 48 | Refills: 0 | Status: DISCONTINUED | OUTPATIENT
Start: 2021-06-08 | End: 2021-06-09

## 2021-06-08 RX ORDER — PROPOFOL 10 MG/ML
66 INJECTION, EMULSION INTRAVENOUS ONCE
Refills: 0 | Status: COMPLETED | OUTPATIENT
Start: 2021-06-08 | End: 2021-06-08

## 2021-06-08 RX ORDER — NOREPINEPHRINE BITARTRATE/D5W 8 MG/250ML
0.1 PLASTIC BAG, INJECTION (ML) INTRAVENOUS
Qty: 4 | Refills: 0 | Status: DISCONTINUED | OUTPATIENT
Start: 2021-06-08 | End: 2021-06-09

## 2021-06-08 RX ORDER — FUROSEMIDE 40 MG
20 TABLET ORAL EVERY 8 HOURS
Refills: 0 | Status: DISCONTINUED | OUTPATIENT
Start: 2021-06-08 | End: 2021-06-09

## 2021-06-08 RX ORDER — LIDOCAINE HCL 20 MG/ML
25 VIAL (ML) INJECTION
Qty: 2000 | Refills: 0 | Status: DISCONTINUED | OUTPATIENT
Start: 2021-06-08 | End: 2021-06-15

## 2021-06-08 RX ADMIN — Medication 3 UNIT(S)/KG/HR: at 11:04

## 2021-06-08 RX ADMIN — PROPOFOL 13.2 MG/KG/HR: 10 INJECTION, EMULSION INTRAVENOUS at 07:26

## 2021-06-08 RX ADMIN — Medication 1 EACH: at 07:28

## 2021-06-08 RX ADMIN — Medication 12.4 MICROGRAM(S)/KG/MIN: at 19:19

## 2021-06-08 RX ADMIN — Medication 66 MILLIGRAM(S): at 13:16

## 2021-06-08 RX ADMIN — PROPOFOL 13.2 MG/KG/HR: 10 INJECTION, EMULSION INTRAVENOUS at 19:18

## 2021-06-08 RX ADMIN — I.V. FAT EMULSION 10 GM/KG/DAY: 20 EMULSION INTRAVENOUS at 17:14

## 2021-06-08 RX ADMIN — SODIUM CHLORIDE 3 MILLILITER(S): 9 INJECTION, SOLUTION INTRAVENOUS at 19:20

## 2021-06-08 RX ADMIN — Medication 66 MILLIGRAM(S): at 13:31

## 2021-06-08 RX ADMIN — Medication 25 MILLIGRAM(S): at 11:49

## 2021-06-08 RX ADMIN — FAMOTIDINE 200 MILLIGRAM(S): 10 INJECTION INTRAVENOUS at 02:02

## 2021-06-08 RX ADMIN — CHLORHEXIDINE GLUCONATE 15 MILLILITER(S): 213 SOLUTION TOPICAL at 22:22

## 2021-06-08 RX ADMIN — Medication 300 MILLIGRAM(S): at 20:44

## 2021-06-08 RX ADMIN — Medication 300 MILLIGRAM(S): at 13:53

## 2021-06-08 RX ADMIN — CHLORHEXIDINE GLUCONATE 1 APPLICATION(S): 213 SOLUTION TOPICAL at 22:22

## 2021-06-08 RX ADMIN — SODIUM CHLORIDE 3 MILLILITER(S): 9 INJECTION INTRAMUSCULAR; INTRAVENOUS; SUBCUTANEOUS at 02:11

## 2021-06-08 RX ADMIN — Medication 66 MILLIGRAM(S): at 13:09

## 2021-06-08 RX ADMIN — Medication 1 APPLICATION(S): at 10:45

## 2021-06-08 RX ADMIN — Medication 1 EACH: at 17:14

## 2021-06-08 RX ADMIN — PROPOFOL 66 MILLIGRAM(S): 10 INJECTION, EMULSION INTRAVENOUS at 08:15

## 2021-06-08 RX ADMIN — PROPOFOL 13.2 MG/KG/HR: 10 INJECTION, EMULSION INTRAVENOUS at 11:47

## 2021-06-08 RX ADMIN — Medication 3 UNIT(S)/KG/HR: at 19:20

## 2021-06-08 RX ADMIN — Medication 3.97 MICROGRAM(S)/KG/MIN: at 21:41

## 2021-06-08 RX ADMIN — SODIUM CHLORIDE 3 MILLILITER(S): 9 INJECTION, SOLUTION INTRAVENOUS at 11:04

## 2021-06-08 RX ADMIN — Medication 10.9 MICROGRAM(S)/KG/MIN: at 11:50

## 2021-06-08 RX ADMIN — Medication 9.92 MICROGRAM(S)/KG/MIN: at 13:10

## 2021-06-08 RX ADMIN — CEFTRIAXONE 100 MILLIGRAM(S): 500 INJECTION, POWDER, FOR SOLUTION INTRAMUSCULAR; INTRAVENOUS at 06:01

## 2021-06-08 RX ADMIN — Medication 25 MILLIGRAM(S): at 04:17

## 2021-06-08 RX ADMIN — Medication 25 MILLIGRAM(S): at 19:51

## 2021-06-08 RX ADMIN — Medication 10.9 MICROGRAM(S)/KG/MIN: at 06:15

## 2021-06-08 RX ADMIN — Medication 1 EACH: at 19:19

## 2021-06-08 RX ADMIN — PROPOFOL 13.2 MG/KG/HR: 10 INJECTION, EMULSION INTRAVENOUS at 06:15

## 2021-06-08 RX ADMIN — Medication 2 MILLIGRAM(S): at 04:17

## 2021-06-08 RX ADMIN — LEVETIRACETAM 266.68 MILLIGRAM(S): 250 TABLET, FILM COATED ORAL at 05:00

## 2021-06-08 RX ADMIN — Medication 300 MILLIGRAM(S): at 06:53

## 2021-06-08 RX ADMIN — SODIUM CHLORIDE 3 MILLILITER(S): 9 INJECTION, SOLUTION INTRAVENOUS at 07:29

## 2021-06-08 RX ADMIN — PROPOFOL 13.2 MG/KG/HR: 10 INJECTION, EMULSION INTRAVENOUS at 20:23

## 2021-06-08 RX ADMIN — I.V. FAT EMULSION 5 GM/KG/DAY: 20 EMULSION INTRAVENOUS at 07:27

## 2021-06-08 RX ADMIN — FAMOTIDINE 200 MILLIGRAM(S): 10 INJECTION INTRAVENOUS at 13:01

## 2021-06-08 RX ADMIN — CHLORHEXIDINE GLUCONATE 15 MILLILITER(S): 213 SOLUTION TOPICAL at 05:34

## 2021-06-08 RX ADMIN — SODIUM CHLORIDE 3 MILLILITER(S): 9 INJECTION INTRAMUSCULAR; INTRAVENOUS; SUBCUTANEOUS at 17:46

## 2021-06-08 RX ADMIN — SODIUM CHLORIDE 3 MILLILITER(S): 9 INJECTION INTRAMUSCULAR; INTRAVENOUS; SUBCUTANEOUS at 10:19

## 2021-06-08 RX ADMIN — Medication 1 DROP(S): at 17:45

## 2021-06-08 RX ADMIN — Medication 12.4 MICROGRAM(S)/KG/MIN: at 13:52

## 2021-06-08 RX ADMIN — Medication 9.92 MICROGRAM(S)/KG/MIN: at 01:09

## 2021-06-08 RX ADMIN — CHLORHEXIDINE GLUCONATE 1 APPLICATION(S): 213 SOLUTION TOPICAL at 00:13

## 2021-06-08 RX ADMIN — Medication 750 MILLIGRAM(S): at 07:32

## 2021-06-08 RX ADMIN — Medication 3 UNIT(S)/KG/HR: at 07:28

## 2021-06-08 RX ADMIN — Medication 1 APPLICATION(S): at 16:46

## 2021-06-08 RX ADMIN — Medication 10.9 MICROGRAM(S)/KG/MIN: at 07:27

## 2021-06-08 RX ADMIN — Medication 2 MILLIGRAM(S): at 12:15

## 2021-06-08 RX ADMIN — I.V. FAT EMULSION 10 GM/KG/DAY: 20 EMULSION INTRAVENOUS at 19:18

## 2021-06-08 RX ADMIN — LEVETIRACETAM 266.68 MILLIGRAM(S): 250 TABLET, FILM COATED ORAL at 16:55

## 2021-06-08 RX ADMIN — Medication 2 MILLIGRAM(S): at 20:24

## 2021-06-08 RX ADMIN — CHLORHEXIDINE GLUCONATE 15 MILLILITER(S): 213 SOLUTION TOPICAL at 14:05

## 2021-06-08 RX ADMIN — Medication 300 MILLIGRAM(S): at 00:56

## 2021-06-08 NOTE — PROGRESS NOTE PEDS - ATTENDING COMMENTS
Agree with above note, assessment & plan (edited where appropriate). 21 yo M s/p lateral tunnel Fontan (DILV, L-malposed great arteries. Also history of sick sinus syndrome and complete heart block, s/p dual chamber epicardial pacemaker with cardiac resynchronization therapy for left ventricular dysfunction and failing Fontan in 2016.  Admitted with IART for ablation; EP study with unsuccessful attempt at ablation; diagnostic cath --> high Fontan pressure (20mmHg). Underwent amiodarone load & cardioversion (unsuccessful) which was complicated by L frontal parenchymal bleed requiring surgical evacuation & EVD placement.      Today the patient was noted to have short runs of nonsustained VT around 1100. Pacemaker was interrogated with Dr. Christensen at bedside who changed to VVI at 70 and changed the sensitivity. Patient progressed to sustained ventricular arrhythmia and received lidocaine bolus x 2; lidocaine infusion was started. Reverted to ventricularly paced at 70 bpm with this intervention. Plan to continue the lidocaine infusion and monitor heart rhythm closely.  Will repeat electrolytes and echocardiogram today.     Neuro care as per neurosurgery & PICU team. Patient remains in critical condition and is at risk for complex arrhythmias with resulting hemodynamic collapse.  Plan discussed with mother at bedside & PICU team.

## 2021-06-08 NOTE — PROGRESS NOTE PEDS - SUBJECTIVE AND OBJECTIVE BOX
HEALTH ISSUES - PROBLEM Dx:  Sick sinus syndrome  DILV (double inlet left ventricle)  Coagulopathy  Fontan circulation present  AV block  Pacemaker  Atrial tachycardia  LV dysfunction  Complete heart block  Intracerebral bleed    Interval History: Patient seen and examined at bedside with mother present. Jimbo was febrile last night and started on CTX. He was found to have a new tract of hemorrhage along the EVD on CT on , stable since then. NEIL drain was removed yesterday. He continues to receive FFP daily for elevated INR as per neurosurgery.    Change from previous past medical, family or social history:	[x] No	[] Yes:    REVIEW OF SYSTEMS  All review of systems negative, except for those marked:  General:		[] Abnormal:  Pulmonary:		[x] Abnormal: intubated, desaturations  Cardiac:		[x] Abnormal: complex cardiac hx  Gastrointestinal:	[] Abnormal:  ENT:			[] Abnormal:  Renal/Urologic:		[] Abnormal:  Musculoskeletal		[] Abnormal:  Endocrine:		[] Abnormal:  Hematologic:		[x] Abnormal: elevated INR  Neurologic:		[x] Abnormal: sedated, hemorrhagic stroke, EVD in place  Skin:			[] Abnormal:  Allergy/Immune		[] Abnormal:  Psychiatric:		[] Abnormal:    ALLERGIES: No Known Allergies    Hematologic/Oncologic Medications:  heparin   Infusion - Pediatric 0.045 Unit(s)/kG/Hr IV Continuous <Continuous>    OTHER MEDICATIONS  (STANDING):  acetaminophen  IV Intermittent - Peds. 750 milliGRAM(s) IV Intermittent every 6 hours  Andexanet Raymond 480 milliGRAM(s),IV Diluent 48 milliLiter(s) 480 milliGRAM(s) IV Continuous <Continuous>  cefTRIAXone IV Intermittent - Peds 2000 milliGRAM(s) IV Intermittent every 24 hours  chlorhexidine 0.12% Oral Liquid - Peds 15 milliLiter(s) Swish and Spit every 12 hours  chlorhexidine 2% Topical Cloths - Peds 1 Application(s) Topical daily  famotidine IV Intermittent - Peds 20 milliGRAM(s) IV Intermittent every 12 hours  fat emulsion  (Plant Based) 20% Infusion - Pediatric 0.363 Gm/kG/Day IV Continuous <Continuous>  fat emulsion  (Plant Based) 20% Infusion - Pediatric 0.726 Gm/kG/Day IV Continuous <Continuous>  furosemide  IV Intermittent - Peds 10 milliGRAM(s) IV Intermittent every 8 hours  levETIRAcetam IV Intermittent - Peds 1000 milliGRAM(s) IV Intermittent every 12 hours  lidocaine  Infusion - Peds 25 MICROgram(s)/kG/Min IV Continuous <Continuous>  norepinephrine Infusion - Peds 0.11 MICROgram(s)/kG/Min IV Continuous <Continuous>  Parenteral Nutrition - Pediatric 1 Each TPN Continuous <Continuous>  Parenteral Nutrition - Pediatric 1 Each TPN Continuous <Continuous>  propofol Infusion - Peds 2 mG/kG/Hr IV Continuous <Continuous>  sildenafil  IV Intermittent - Peds 10 milliGRAM(s) IV Intermittent every 8 hours  sodium chloride 0.9% -  250 milliLiter(s) IV Continuous <Continuous>  sodium chloride 0.9% lock flush - Peds 3 milliLiter(s) IV Push every 8 hours    MEDICATIONS  (PRN):  petrolatum, white/mineral oil Ophthalmic Ointment - Peds 1 Application(s) Both EYES two times a day PRN dry eyes  polyvinyl alcohol 1.4%/povidone 0.6% Ophthalmic Solution - Peds 1 Drop(s) Both EYES four times a day PRN Dry Eyes    DIET: TPN/IL    Vital Signs Last 24 Hrs  T(C): 37.1 (2021 17:00), Max: 38.3 (2021 01:00)  T(F): 98.7 (2021 17:00), Max: 100.9 (2021 01:00)  HR: 70 (2021 18:00) (70 - 128)  BP: --  BP(mean): --  RR: 12 (2021 18:00) (12 - 21)  SpO2: 87% (2021 18:00) (82% - 90%)  I&O's Summary    2021 07:01  -  2021 07:00  --------------------------------------------------------  IN: 3529.5 mL / OUT: 4207 mL / NET: -677.5 mL    2021 07:01  -  2021 18:16  --------------------------------------------------------  IN: 1526.9 mL / OUT: 1296 mL / NET: 230.9 mL    Pain Score (0-10): N/A		Lansky/Karnofsky Score:     PATIENT CARE ACCESS:  [x] Peripheral IV  [x] Central Venous Line	[] R	[x] L	[] IJ	[x] Fem	[] SC			[] Placed:  [x] Arterial line  [] PICC, Date Placed:			[] Broviac – __ Lumen, Date Placed:  [] Mediport, Date Placed:		[] MedComp, Date Placed:  [] Urinary Catheter, Date Placed:  []  Shunt, Date Placed:		Programmable:		[] Yes	[] No  [] Ommaya, Date Placed:  [x] EVD  [x] Necessity of urinary, arterial, and venous catheters discussed    PHYSICAL EXAM:  Constitutional: Critically ill young man, intubated and sedated  HEENT: Gauze covering head and surgical incisions, EVD in place and draining light red CSF, intubated, moist oral mucosa, dry lips  Cardiovascular: +Systolic murmur, regular rate and rhythm, peripheral pulses 2+, capillary refill <2 seconds  Respiratory/Chest: Intubated, SpO2 mid-80s, clear to auscultation bilaterally, no wheezing or crackles appreciated  Abdominal: Soft, nondistended, no masses appreciated  Extremities: Moving all four extremities intermittently, no gross deformities, no cyanosis or edema  Skin: No rashes appreciated  Neurologic: Sedated but appears to react appropriately to stimuli and mother's touch  Psychiatric: Sedated    LAB RESULTS:  CBC Full  -  ( 2021 22:23 )  WBC Count : 8.70 K/uL  RBC Count : 4.59 M/uL  Hemoglobin : 12.1 g/dL  Hematocrit : 39.2 %  Platelet Count - Automated : 203 K/uL  Mean Cell Volume : 85.4 fL  Mean Cell Hemoglobin : 26.4 pg  Mean Cell Hemoglobin Concentration : 30.9 gm/dL  Auto Neutrophil # : 6.87 K/uL  Auto Lymphocyte # : 0.67 K/uL  Auto Monocyte # : 0.82 K/uL  Auto Eosinophil # : 0.21 K/uL  Auto Basophil # : 0.03 K/uL  Auto Neutrophil % : 79.1 %  Auto Lymphocyte % : 7.7 %  Auto Monocyte % : 9.4 %  Auto Eosinophil % : 2.4 %  Auto Basophil % : 0.3 %        147<H>  |  107  |  14  ----------------------------<  167<H>  3.6   |  25  |  0.71    Ca    9.4      2021 09:21  Phos  2.5     -  Mg     2.2     -    TPro  7.9  /  Alb  4.1  /  TBili  1.2  /  DBili  x   /  AST  16  /  ALT  <5<L>  /  AlkPhos  77  06-08    LIVER FUNCTIONS - ( 2021 09:21 )  Alb: 4.1 g/dL / Pro: 7.9 g/dL / ALK PHOS: 77 U/L / ALT: <5 U/L / AST: 16 U/L / GGT: x           PT/INR - ( 2021 09:21 )   PT: 17.8 sec;   INR: 1.60 ratio    PTT - ( 2021 09:21 )  PTT:41.0 sec    Urinalysis Basic - ( 2021 22:24 )  Color: Yellow / Appearance: Clear / S.024 / pH: x  Gluc: x / Ketone: Moderate  / Bili: Negative / Urobili: <2 mg/dL   Blood: x / Protein: 30 mg/dL / Nitrite: Negative   Leuk Esterase: Negative / RBC: 16 /HPF / WBC 6 /HPF   Sq Epi: x / Non Sq Epi: 6 /HPF / Bacteria: Negative    MICROBIOLOGY/CULTURES:    RADIOLOGY RESULTS:    < from: CT Head No Cont (21 @ 11:52) >  EXAM:  CT BRAIN      *** ADDENDUM 2021  ***    New acute hemorrhage along shunt catheter tract is seen.    *** END OF ADDENDUM 2021  ***    PROCEDURE DATE:  2021     INTERPRETATION:  Clinical indications: Hemorrhagic infarct.    Multiple axial sections were performed from base of skull to vertex without contrast enhancement. Coronal and sagittal constructions were performed as well.    This exam is compared prior noncontrast head CT performed on 2021    Postopchanges compatible with a left frontal craniotomy is again identified. There is extension of parenchyma through the craniectomy defect again seen.    Left frontal shunt catheter is again seen and unchanged in position    The overall size configuration of the ventricles appear unchanged. Intraventricular hemorrhage is again seen.    Abnormal low-attenuation involving the left frontal region is again seen with involvement of the anterior bilateral corpus callosum. Associated areas of hemorrhage is again seen as well.    Extra calvarial soft tissue swelling is again seen postop region. Also again seen is extra-axial axial collection this is likely compatible with a pseudomeningocele. This finding measures approximately 1.8 cm in widest diameter and previously measures approximately 1.2 cm widest diameter. Extra-axial drain and staples are identified postop region.    Acute subdural hematoma involving the bilateral tentorial posterior interhemispheric region is again seen and unchanged.    The visualized paranasal sinuses mastoid and mastoid clear.    IMPRESSION: Increasing pseudomeningocele in the postop region otherwise no significant change when allowing for differences.    ***Please see the addendum at the top of this report. It maycontain additional important information or changes.****    < end of copied text >    < from: CT Head No Cont (21 @ 08:28) >  EXAM:  CT BRAIN      PROCEDURE DATE:  2021     INTERPRETATION:  Clinical indication: Right-sided neglect.    Multiple axial sections were performed from base skull to vertex without contrast enhancement. Coronal and sagittal reconstructions were performed as well    Left frontal craniectomy is again seen. Left frontal shunt catheter is again seen with with associated areas of acute hemorrhage along the shunt catheter tract. Extension of parenchyma through the craniectomy defect is again seen. Extra-axial collection in the postop region is again seen which measures approximately 1.5 cm in widest diameter and previously measured approximately 2.2 cm in widest diameter. This is compatible with a pseudomeningocele. Associated soft tissue swelling and staples are seen involving the left frontal extracalvarial region.    Intraventricular hemorrhage is again seen. The size and configuration of the ventricles appear unchanged.    Acute subdural hematoma along the left tentorial posterior interhemispheric region is again seen and unchanged.    The visualized paranasal sinuses mastoid eminence appear clear.    IMPRESSION: Slight decrease in the pseudomeningocele in the postop region otherwise no significant change when allowing for differences in technique.    < end of copied text >

## 2021-06-08 NOTE — PROGRESS NOTE PEDS - ASSESSMENT
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 Presented in IART - s/p EP study with unsuccessful attempt at ablation along with diagnostic cath showing elevated Fontan pressure (20mmHg). He was loaded with amiodarone and ultimately electrically cardioverted out, however reverted back into IART. His course has now been further complicated by L frontal parenchymal hemorrhage requiring emergent evacuation with NSx and EVD placement.    Plan:  - Continuos telemetry monitoring.  - Pacemaker set at VOO 70bpm, in the event urgent surgical intervention is required  - Continue Sildenafil 20mg q8h IV  - lasix home dose is 20mg QD. Agree with lasix 10mg IV BID. Goal net even to negative.  - Monitor blood pressure. Goal MAPs > 75. Agree with norepi and/or vasopressin as needed. TItrate to goal per PICU to maintain CPP.  - Hold home lisinopril, aldactone  - hold home amidarone. No plans to cardiovert at this time. Will need repeat HOLA prior to any attempts to cardiovert.  - anticoagulation care per hematology/PICU/NSx  - rest of care per PICU and NSx  - Please page pediatric cardiology with any concerns or questions.    Thank you for involving us in the care of your patient.     Guicho Oliveros MD, MPH  Pediatric Cardiology Fellow  PAGER: [phone]

## 2021-06-08 NOTE — PROGRESS NOTE PEDS - SUBJECTIVE AND OBJECTIVE BOX
Interval/Overnight Events:    PEEP up then down  saturations slightly better    VITAL SIGNS:  T(C): 37 (21 @ 09:00), Max: 38.3 (21 @ 01:00)  HR: 70 (21 @ 09:00) (70 - 77)  BP: --  ABP: 132/65 (21 @ 09:00) (89/69 - 137/73)  ABP(mean): 86 (21 @ 09:00) (80 - 96)  RR: 13 (21 @ 09:00) (12 - 21)  SpO2: 88% (21 @ 09:00) (85% - 93%)  CVP(mm Hg): 21 (21 @ 05:00) (21 - 38)  End-Tidal CO2:  NIRS:    Physical Exam:    General: NAD  HEENT: EVD in place  Resp: vent-assisted, unlabored, CTAB, good aeration, no rhonchi/rales/wheezing  CV: RRR, nl S1/S2, no m/r/g appreciated, CR < 2s, distal pulses 2+ and equal  Abd: soft, distended, no HSM appreciated  Ext: wwp, no gross deformities  Neuro: sedated, but wakes purposefully  Skin: no rash    =======================RESPIRATORY=======================  [ ] FiO2: ___ 	[ ] Heliox: ____ 		[ ] BiPAP: ___   [ ] NC: __  Liters			[ ] HFNC: __ 	Liters, FiO2: __  [x] Mechanical Ventilation: Mode: SIMV with PS, RR (machine): 12, TV (machine): 500, FiO2: 40, PEEP: 8, PS: 5, ITime: 1, MAP: 13, PIP: 26  [ ] Inhaled Nitric Oxide:  [ ] Extubation Readiness Assessed  Comments:    =====================CARDIOVASCULAR======================  Cardiovascular Medications:  furosemide  IV Intermittent - Peds 10 milliGRAM(s) IV Intermittent every 8 hours  norepinephrine Infusion - Peds 0.11 MICROgram(s)/kG/Min IV Continuous <Continuous>  sildenafil  IV Intermittent - Peds 10 milliGRAM(s) IV Intermittent every 8 hours    Chest Tube Output: ___ in 24 hours, ___ in last 12 hours   [ ] Right     [ ] Left    [ ] Mediastinal  Cardiac Rhythm:	[x] NSR		[ ] Other:    [ ] Central Venous Line	[ ] R	[ ] L	[ ] IJ	[ ] Fem	[ ] SC			Placed:   [ ] Arterial Line		[ ] R	[ ] L	[ ] PT	[ ] DP	[ ] Fem	[ ] Rad	[ ] Ax	Placed:   [ ] PICC:				[ ] Broviac		[ ] Mediport  Comments:    ==========HEMATOLOGY/ONCOLOGY=================  Transfusions:	[ ] PRBC	[ ] Platelets	[ ] FFP		[ ] Cryoprecipitate  DVT Prophylaxis:  Comments:    =================INFECTIOUS DISEASE==================  [ ] Cooling [place] being used. Target Temperature:     ===========FLUIDS/ELECTROLYTES/NUTRITION=============  I&O's Summary    2021 07:  -  2021 07:00  --------------------------------------------------------  IN: 3529.5 mL / OUT: 4207 mL / NET: -677.5 mL    2021 07:  -  2021 09:18  --------------------------------------------------------  IN: 301.4 mL / OUT: 103 mL / NET: 198.4 mL      Daily   Diet:	[ ] Regular	[ ] Soft		[ ] Clears	[x ] NPO  .	[ ] Other:  .	[ ] NGT		[ ] NDT		[ ] GT		[ ] GJT    [ ] Urinary Catheter, Date Placed:   Comments:    ====================NEUROLOGY===================  [ ] SBS:		[ ] GISSELLE-1:	[ ] BIS:	[ ] CAPD:  [ ] EVD set at: ___ , Drainage in last 24 hours: ___ ml    [x] Adequacy of sedation and pain control has been assessed and adjusted  Comments:      ==================PATIENT CARE=================  [ ] There are pressure ulcers/areas of breakdown that are being addressed -   [x] Preventative measures are being taken to decrease risk for skin breakdown.  [x] Necessity of urinary, arterial, and venous catheters discussed    ==================LABS============================  ABG - ( 2021 21:54 )  pH: 7.47  /  pCO2: 40    /  pO2: 63    / HCO3: 28    / Base Excess: 4.7   /  SaO2: 91.5  / Lactate: x                                                12.1                  Neurophils% (auto):   79.1   ( @ 22:23):    8.70 )-----------(203          Lymphocytes% (auto):  7.7                                           39.2                   Eosinphils% (auto):   2.4      Manual%: Neutrophils x    ; Lymphocytes x    ; Eosinophils x    ; Bands%: x    ; Blasts x        (  @ 00:13 )   PT: 17.9 sec;   INR: 1.59 ratio  aPTT: 38.6 sec                            150    |  108    |  13                  Calcium: 9.3   / iCa: 1.11   ( @ 22:23)    ----------------------------<  157       Magnesium: 2.1                              3.9     |  23     |  0.85             Phosphorous: 3.9      TPro  6.4    /  Alb  3.3    /  TBili  1.0    /  DBili  x      /  AST  16     /  ALT  <5     /  AlkPhos  65     2021 13:39  RECENT CULTURES:   @ 06:30 .Sputum Sputum     No growth to date.    Few polymorphonuclear leukocytes per low power field  No Squamous epithelial cells per low power field  No organisms seen per oil power field     @ 06:15 .CSF CSF     No growth    polymorphonuclear leukocytes seen  No organisms seen  by cytocentrifuge     @ 06:13 .Blood Blood     No growth to date.       @ 20:36 .Urine Catheterized     No growth       @ 08:27 .Blood Blood     No growth to date.       @ 01:30 .Urine Catheterized     No growth          =================MEDICATIONS======================  MEDICATIONS  MEDICATIONS  (STANDING):  acetaminophen  IV Intermittent - Peds. 750 milliGRAM(s) IV Intermittent every 6 hours  Andexanet Raymond 480 milliGRAM(s),IV Diluent 48 milliLiter(s) 480 milliGRAM(s) (24 mL/Hr) IV Continuous <Continuous>  cefTRIAXone IV Intermittent - Peds 2000 milliGRAM(s) IV Intermittent every 24 hours  chlorhexidine 0.12% Oral Liquid - Peds 15 milliLiter(s) Swish and Spit every 12 hours  chlorhexidine 2% Topical Cloths - Peds 1 Application(s) Topical daily  famotidine IV Intermittent - Peds 20 milliGRAM(s) IV Intermittent every 12 hours  fat emulsion  (Plant Based) 20% Infusion - Pediatric 0.363 Gm/kG/Day (5 mL/Hr) IV Continuous <Continuous>  furosemide  IV Intermittent - Peds 10 milliGRAM(s) IV Intermittent every 8 hours  heparin   Infusion - Pediatric 0.045 Unit(s)/kG/Hr (3 mL/Hr) IV Continuous <Continuous>  levETIRAcetam IV Intermittent - Peds 1000 milliGRAM(s) IV Intermittent every 12 hours  norepinephrine Infusion - Peds 0.11 MICROgram(s)/kG/Min (10.9 mL/Hr) IV Continuous <Continuous>  Parenteral Nutrition - Pediatric 1 Each (69 mL/Hr) TPN Continuous <Continuous>  propofol Infusion - Peds 2 mG/kG/Hr (13.2 mL/Hr) IV Continuous <Continuous>  sildenafil  IV Intermittent - Peds 10 milliGRAM(s) IV Intermittent every 8 hours  sodium chloride 0.9% -  250 milliLiter(s) (3 mL/Hr) IV Continuous <Continuous>  sodium chloride 0.9% lock flush - Peds 3 milliLiter(s) IV Push every 8 hours    MEDICATIONS  (PRN):  petrolatum, white/mineral oil Ophthalmic Ointment - Peds 1 Application(s) Both EYES two times a day PRN dry eyes  polyvinyl alcohol 1.4%/povidone 0.6% Ophthalmic Solution - Peds 1 Drop(s) Both EYES four times a day PRN Dry Eyes    ===================================================  IMAGING STUDIES:    [ x] XR - improved expansion  [ ] CT   [ ] MR   [ ] US  [ ] Echo  ===========================================================  Parent/Guardian is at the bedside:	[x ] Yes	[ ] No  Patient and Parent/Guardian updated as to the progress/plan of care:	[x ] Yes	[ ] No    [x] The patient remains in critical and unstable condition, and requires ICU care and monitoring, assessment, and treatment  [ ] The patient is improving but requires continued monitoring, assessment, treatment, and adjustment of therapy    [x] The total critical care time spent by attending physician was __35__ minutes, excluding procedure time. Interval/Overnight Events:    PEEP up then down  saturations slightly better    Later this PM - bigeminy and runs of V-tach, unclear origin, does not appear to be pacemaker-induced. FiO2 increased. Loaded with lidocaine and started gtt, back in regular rhythm    VITAL SIGNS:  T(C): 37 (21 @ 09:00), Max: 38.3 (21 @ 01:00)  HR: 70 (21 @ 09:00) (70 - 77)  BP: --  ABP: 132/65 (21 @ 09:00) (89/69 - 137/73)  ABP(mean): 86 (21 @ 09:00) (80 - 96)  RR: 13 (21 @ 09:00) (12 - 21)  SpO2: 88% (21 @ 09:00) (85% - 93%)  CVP(mm Hg): 21 (21 @ 05:00) (21 - 38)  End-Tidal CO2:  NIRS:    Physical Exam:    General: NAD  HEENT: EVD in place  Resp: vent-assisted, unlabored, CTAB, good aeration, no rhonchi/rales/wheezing  CV: RRR, nl S1/S2, no m/r/g appreciated, CR < 2s, distal pulses 2+ and equal  Abd: soft, distended, no HSM appreciated  Ext: wwp, no gross deformities  Neuro: sedated, but wakes purposefully  Skin: no rash    =======================RESPIRATORY=======================  [ ] FiO2: ___ 	[ ] Heliox: ____ 		[ ] BiPAP: ___   [ ] NC: __  Liters			[ ] HFNC: __ 	Liters, FiO2: __  [x] Mechanical Ventilation: Mode: SIMV with PS, RR (machine): 12, TV (machine): 500, FiO2: 40, PEEP: 8, PS: 5, ITime: 1, MAP: 13, PIP: 26  [ ] Inhaled Nitric Oxide:  [ ] Extubation Readiness Assessed  Comments:    =====================CARDIOVASCULAR======================  Cardiovascular Medications:  furosemide  IV Intermittent - Peds 10 milliGRAM(s) IV Intermittent every 8 hours  norepinephrine Infusion - Peds 0.11 MICROgram(s)/kG/Min IV Continuous <Continuous>  sildenafil  IV Intermittent - Peds 10 milliGRAM(s) IV Intermittent every 8 hours    Chest Tube Output: ___ in 24 hours, ___ in last 12 hours   [ ] Right     [ ] Left    [ ] Mediastinal  Cardiac Rhythm:	[x] NSR		[ ] Other:    [ ] Central Venous Line	[ ] R	[ ] L	[ ] IJ	[ ] Fem	[ ] SC			Placed:   [ ] Arterial Line		[ ] R	[ ] L	[ ] PT	[ ] DP	[ ] Fem	[ ] Rad	[ ] Ax	Placed:   [ ] PICC:				[ ] Broviac		[ ] Mediport  Comments:    ==========HEMATOLOGY/ONCOLOGY=================  Transfusions:	[ ] PRBC	[ ] Platelets	[ ] FFP		[ ] Cryoprecipitate  DVT Prophylaxis:  Comments:    =================INFECTIOUS DISEASE==================  [ ] Cooling [place] being used. Target Temperature:     ===========FLUIDS/ELECTROLYTES/NUTRITION=============  I&O's Summary    2021 07: 07:00  --------------------------------------------------------  IN: 3529.5 mL / OUT: 4207 mL / NET: -677.5 mL    2021 07: 09:18  --------------------------------------------------------  IN: 301.4 mL / OUT: 103 mL / NET: 198.4 mL      Daily   Diet:	[ ] Regular	[ ] Soft		[ ] Clears	[x ] NPO  .	[ ] Other:  .	[ ] NGT		[ ] NDT		[ ] GT		[ ] GJT    [ ] Urinary Catheter, Date Placed:   Comments:    ====================NEUROLOGY===================  [ ] SBS:		[ ] GISSELLE-1:	[ ] BIS:	[ ] CAPD:  [ ] EVD set at: ___ , Drainage in last 24 hours: ___ ml    [x] Adequacy of sedation and pain control has been assessed and adjusted  Comments:      ==================PATIENT CARE=================  [ ] There are pressure ulcers/areas of breakdown that are being addressed -   [x] Preventative measures are being taken to decrease risk for skin breakdown.  [x] Necessity of urinary, arterial, and venous catheters discussed    ==================LABS============================  ABG - ( 2021 21:54 )  pH: 7.47  /  pCO2: 40    /  pO2: 63    / HCO3: 28    / Base Excess: 4.7   /  SaO2: 91.5  / Lactate: x                                                12.1                  Neurophils% (auto):   79.1   ( @ 22:23):    8.70 )-----------(203          Lymphocytes% (auto):  7.7                                           39.2                   Eosinphils% (auto):   2.4      Manual%: Neutrophils x    ; Lymphocytes x    ; Eosinophils x    ; Bands%: x    ; Blasts x        (  @ 00:13 )   PT: 17.9 sec;   INR: 1.59 ratio  aPTT: 38.6 sec                            150    |  108    |  13                  Calcium: 9.3   / iCa: 1.11   ( @ 22:23)    ----------------------------<  157       Magnesium: 2.1                              3.9     |  23     |  0.85             Phosphorous: 3.9      TPro  6.4    /  Alb  3.3    /  TBili  1.0    /  DBili  x      /  AST  16     /  ALT  <5     /  AlkPhos  65     2021 13:39  RECENT CULTURES:   @ 06:30 .Sputum Sputum     No growth to date.    Few polymorphonuclear leukocytes per low power field  No Squamous epithelial cells per low power field  No organisms seen per oil power field     @ 06:15 .CSF CSF     No growth    polymorphonuclear leukocytes seen  No organisms seen  by cytocentrifuge     @ 06:13 .Blood Blood     No growth to date.       @ 20:36 .Urine Catheterized     No growth       @ 08:27 .Blood Blood     No growth to date.       @ 01:30 .Urine Catheterized     No growth          =================MEDICATIONS======================  MEDICATIONS  MEDICATIONS  (STANDING):  acetaminophen  IV Intermittent - Peds. 750 milliGRAM(s) IV Intermittent every 6 hours  Andexanet Raymond 480 milliGRAM(s),IV Diluent 48 milliLiter(s) 480 milliGRAM(s) (24 mL/Hr) IV Continuous <Continuous>  cefTRIAXone IV Intermittent - Peds 2000 milliGRAM(s) IV Intermittent every 24 hours  chlorhexidine 0.12% Oral Liquid - Peds 15 milliLiter(s) Swish and Spit every 12 hours  chlorhexidine 2% Topical Cloths - Peds 1 Application(s) Topical daily  famotidine IV Intermittent - Peds 20 milliGRAM(s) IV Intermittent every 12 hours  fat emulsion  (Plant Based) 20% Infusion - Pediatric 0.363 Gm/kG/Day (5 mL/Hr) IV Continuous <Continuous>  furosemide  IV Intermittent - Peds 10 milliGRAM(s) IV Intermittent every 8 hours  heparin   Infusion - Pediatric 0.045 Unit(s)/kG/Hr (3 mL/Hr) IV Continuous <Continuous>  levETIRAcetam IV Intermittent - Peds 1000 milliGRAM(s) IV Intermittent every 12 hours  norepinephrine Infusion - Peds 0.11 MICROgram(s)/kG/Min (10.9 mL/Hr) IV Continuous <Continuous>  Parenteral Nutrition - Pediatric 1 Each (69 mL/Hr) TPN Continuous <Continuous>  propofol Infusion - Peds 2 mG/kG/Hr (13.2 mL/Hr) IV Continuous <Continuous>  sildenafil  IV Intermittent - Peds 10 milliGRAM(s) IV Intermittent every 8 hours  sodium chloride 0.9% -  250 milliLiter(s) (3 mL/Hr) IV Continuous <Continuous>  sodium chloride 0.9% lock flush - Peds 3 milliLiter(s) IV Push every 8 hours    MEDICATIONS  (PRN):  petrolatum, white/mineral oil Ophthalmic Ointment - Peds 1 Application(s) Both EYES two times a day PRN dry eyes  polyvinyl alcohol 1.4%/povidone 0.6% Ophthalmic Solution - Peds 1 Drop(s) Both EYES four times a day PRN Dry Eyes    ===================================================  IMAGING STUDIES:    [ x] XR - improved expansion  [ ] CT   [ ] MR   [ ] US  [ ] Echo  ===========================================================  Parent/Guardian is at the bedside:	[x ] Yes	[ ] No  Patient and Parent/Guardian updated as to the progress/plan of care:	[x ] Yes	[ ] No    [x] The patient remains in critical and unstable condition, and requires ICU care and monitoring, assessment, and treatment  [ ] The patient is improving but requires continued monitoring, assessment, treatment, and adjustment of therapy    [x] The total critical care time spent by attending physician was __35__ minutes, excluding procedure time.

## 2021-06-08 NOTE — PROGRESS NOTE PEDS - ASSESSMENT
Jimbo is a 20 year old male with complex cardiac history with an     Multidisciplinary discussion held today with neuroradiology, neurosurgery, and hematology teams to determine best plan to manage Jimbo's coagulopathy given his significant hemorrhagic stroke and risk of catastrophic injury if he were to develop an addition bleed. We recommend continuing to monitor his coags closely and will plan to send additional Factor levels to determine any other potential interventions. We recommend intervening with blood products prior to planned procedures or with any suspicion for bleeding but would not recommend giving FFP for INR <2 without any other clinical concern for bleeding. Additionally, would not recommend giving cryoprecipitate given his elevated fibrinogen and the risk of hypercoagulability.    Recommendations:  - Can start Vitamin K 5mg IV three days per week  - Recommend sending Factors II, V, VII, VIII, IX, and X with next blood draw  - Daily CBC with diff, coags, fibrinogen, D-dimer  - Will plan to huddle prior to EVD removal for appropriate optimization Jimbo is a 20 year old male with complex cardiac history previously on Xarelto with a large L frontal and subarachnoid hemorrhage s/p L craniectomy and placement of EVD. On 6/6 he was found to have increased ICP and some hemorrhage along the EVD catheter which has since stabilized. He has had no other signs of bleeding. He continues to have coagulopathy of unclear etiology, possibly secondary to hepatic congestion in the setting of his cardiac disease. INR remains in the 1.5-1.7 range and he has been receiving FFP daily. Factor levels drawn last week were mostly physiologic, with Factor VII lowest at 33.6%. JANET at that time was grossly normal with slight delay in clot initiation time (but not clinically significant) with normal clot formation time and firmness, suggesting no increased risk for bleeding at that time.    Multidisciplinary discussion held today with neuroradiology, neurosurgery, and hematology teams to determine best plan to manage Jimbo's coagulopathy given his significant hemorrhagic stroke and risk of catastrophic injury if he were to develop an addition bleed. We recommend continuing to monitor his coags closely and will plan to send additional Factor levels to determine any other potential interventions. We recommend intervening with blood products prior to planned procedures or with any suspicion for bleeding but would not recommend giving FFP for INR <2 without any other clinical concern for bleeding. Additionally, would not recommend giving cryoprecipitate given his elevated fibrinogen and the risk of hypercoagulability.    Recommendations:  - Can start Vitamin K 5mg IV three days per week  - Daily CBC with diff, coags, fibrinogen, D-dimer; please ensure coags are not drawn from a heparinized line  - Recommend sending Factors II, V, VII, VIII, IX, and X with next blood draw  - Will plan to huddle prior to EVD removal or other procedures for appropriate optimization and use of blood products Jimbo is a 20 year old male with complex cardiac history previously on Xarelto with a large L frontal and subarachnoid hemorrhage s/p L craniectomy and placement of EVD. On 6/6 he was found to have increased ICP and some hemorrhage along the EVD catheter which has since stabilized. He has had no other signs of bleeding. He continues to have coagulopathy of unclear etiology, possibly secondary to hepatic congestion in the setting of his cardiac disease. INR remains in the 1.5-1.7 range and he has been receiving FFP daily. Factor levels drawn last week were mostly physiologic, with Factor VII lowest at 33.6%. JANET at that time was grossly normal with slight delay in clot initiation time (but not clinically significant) with normal clot formation time and firmness, suggesting no increased risk for bleeding at that time.    Multidisciplinary discussion held today with neuroradiology, neurosurgery, and hematology teams to determine best plan to manage Jimbo's coagulopathy given his significant hemorrhagic stroke and risk of catastrophic injury if he were to develop an addition bleed. We recommend continuing to monitor his coags closely and will plan to send additional Factor levels to determine any other potential interventions. We recommend intervening with blood products prior to planned procedures or with any suspicion for bleeding but would not recommend giving FFP for INR <2 without any other clinical concern for bleeding. Additionally, would not recommend giving cryoprecipitate given his elevated fibrinogen and the risk of hypercoagulability. Finally, for completeness, can consider completing a comprehensive bleeding work up to ensure no underlying tendency for bleeding (labs listed below).    Recommendations:  - Can start Vitamin K 5mg IV three days per week  - Daily CBC with diff, coags, fibrinogen, D-dimer; please ensure coags are not drawn from a heparinized line  - Recommend sending Factors II, V, VII, VIII, IX, and X with next blood draw  - Will plan to huddle prior to EVD removal or other procedures for appropriate optimization and use of blood products  - Can send the following labs to complete a bleeding work up: thrombin time (reptilase time if thrombin time is abnormal), Factors XII and XIII, pre-kallikrein, kallikrein, HMW kininogen, LMW kininogen, lupus anticoagulant, anti-cardiolipin antibodies, von Willebrand Factor antigen, von Willebrand Factor activity, ALIREZA-1 activity, alpha 2 antiplasmin, plasminogen (labs can be drawn over time with other scheduled labs) Jimbo is a 20 year old male with complex cardiac history previously on Xarelto with a large L frontal and subarachnoid hemorrhage s/p L craniectomy and placement of EVD. On 6/6 he was found to have increased ICP and some hemorrhage along the EVD catheter which has since stabilized. He has had no other signs of bleeding. He continues to have coagulopathy of unclear etiology, possibly secondary to hepatic congestion in the setting of his cardiac disease. INR remains in the 1.5-1.7 range and he has been receiving FFP daily. Factor levels drawn last week were mostly physiologic, with Factor VII lowest at 33.6%. JANET at that time was grossly normal with slight delay in clot initiation time (but not clinically significant) with normal clot formation time and firmness, suggesting no increased risk for bleeding at that time.    Multidisciplinary discussion held today with neuroradiology, neurosurgery, and hematology teams to determine best plan to manage Jimbo's coagulopathy given his significant hemorrhagic stroke and risk of catastrophic injury if he were to develop an addition bleed. We recommend continuing to monitor his coags closely and will plan to send additional Factor levels to determine any other potential interventions. We recommend intervening with blood products prior to planned procedures or with any suspicion for bleeding but would not recommend giving FFP for INR <2 without any other clinical concern for bleeding. Additionally, would not recommend giving cryoprecipitate given his elevated fibrinogen and the risk of hypercoagulability. Finally, for completeness, can consider completing a comprehensive bleeding work up to ensure no underlying tendency for bleeding (labs listed below).    Recommendations:  - Can start Vitamin K 5mg IV three days per week  - Daily CBC with diff, coags, fibrinogen, D-dimer; please ensure coags are not drawn from a heparinized line  - Recommend sending Factors II, V, VII, VIII, IX, and X with next blood draw  - Will plan to huddle prior to EVD removal or other procedures for appropriate optimization and use of blood products  - Can send the following labs to complete a bleeding work up: thrombin time (reptilase time if thrombin time is abnormal), Factors XII and XIII, pre-kallikrein, kallikrein, HMW kininogen, LMW kininogen, lupus anticoagulant, anti-cardiolipin antibodies, von Willebrand Factor antigen, von Willebrand Factor activity, ALIREZA-1 activity, alpha 2 antiplasmin, plasminogen, Vitamin C level (labs can be drawn over time with other scheduled labs)

## 2021-06-08 NOTE — PROGRESS NOTE PEDS - ASSESSMENT
19 y/o M with complex cardiac history presented after cardiac cath with large parenchymal hemorrhage in the anterior left frontal lobe and subarachnoid hemorrhage s/p Left craniectomy, discarded bone flap, placement of EVD on 6/1/2021. Patient with followable neurological exam    PLAN:   - Strict Q1 neurochecks   - Continue EVD @ 10cm/H2O  - Plan for PICU possible extubation today   - Goal INR > 1.7, transfuse FFP, if under 1.7 give cryo until under 1.5 - currently 1.59   - HOB > 30 degrees   - Na goals 145-155, stopped 3% on NS Q6 BMP  - CTH today for stability   - hold full dose AC for now   - heme labs     Discussed case with attending

## 2021-06-08 NOTE — CHART NOTE - NSCHARTNOTEFT_GEN_A_CORE
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  Pt was initially admitted for further monitoring, assessment, and treatment s/p diagnostic cath and failed IART ablation attempt.  Course has been further complicated by left frontal parenchymal hemorrhage requiring emergent evacuation with neurosurgery and EVD placement.  Pt remains ventilated, remains NPO, with NG to suction (750ml out).  Pt is receiving fluid restricted, minimal dextrose in TPN solution and lipids to provide nutrition.  Pt noted with hyperglycemia.     Meds:  Ceftriaxone, Precedex, Lasix, Propofol, Pepcid, Norepinephrine, Sildenafil, Keppra    Wt: 66.1kG (Last obtained: 5/26)    Wt as metabolic 24.2*kG; (*kG defined as maintenance fluid volume in mL/100mL)    GENERAL APPEARANCE: lean,  Well developed,   HEENT: Normocephalic, No Cheilosis,  No Periorbital Edema, Non- Icteric   RESPIRATORY:  Ventilated with ET tube;   ABDOMEN:  Non-Distended  NEUROLOGY: Not Alert; Sedated;  EXTREMITIES: No Cyanosis;  SKIN: No Rashes visible;  No Jaundice    LABS: 	Na:  147  Cl:  107  BUN:  14  Glucose:  167   Magnesium:  2.2  Triglycerides:  174                K:  3.6  CO2:  25  Creatinine:  0.71   Ca/iCa:  9.4   Phosphorus:  2.5	          ASSESSMENT:     Feeding Problems                                  On Parenteral Nutrition                              Hyperglycemia    PARENTERAL INTAKE: Total kcals/day 546;    Grams protein/day 41;       Kcal/*kG/day: Amino Acid 7; Glucose 6; Lipid 10; Total 23            Pt remains NPO, with NG to suction; pt receiving fluid/dextrose restricted TPN/lipids to provide nutrition.  Pt noted with hyperglycemia while receiving Dextrose 2.5% solution.    PLAN:  TPN changes:  Amino acid increased from 2.5 to 3%, and lipid rate increased from 5 to 10ml/hr to provide more calories and nitrogen; dextrose maintained at 2.5% at [facility]/Neurosurgery request (pt also noted with hyperglycemia while receiving minimal dextrose).  K Phos increased from 3 to 7mMol/L, magnesium decreased from 10 to 6meq/L, and no calcium added to TPN since pt is receiving Ceftriaxone, which is not compatible with calcium containing IVF.  [facility] is managing acute fluid and electrolyte changes.

## 2021-06-08 NOTE — PROGRESS NOTE PEDS - ASSESSMENT
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 He has elevated Fontan pressure secondary to LV diastolic dysfunction.  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 Persistent hypoxia, back in IART (not hemodynamically compromising)    PLAN:  Neuro:  - s/p 3% NaCl - target Na > 145  - Keppra (clinical szs)  - R sided hemiparesis  - EVD @ 10 - cont to monitor output  - Monitor ICP Q1hr (goal <20)  - Repeat head CT 6/7 - some blood around catheter but no significant changes  - precedex 0.5 - wean  - propofol 2 - titrate down if still overly sedated when off precedex  - skull flap off, will need prosthetic in future  - tylenol RTC for fever control    Resp:  Full mechanical ventilation for neuroprotection (goal normocarbia, normoxia)  PEEP 8,   FiO2 up to 60%, increase PEEP from 8-->10  start IPV/albuterol for pulmonary toilet  Goal SpO2 > 85% (non-fenestrated but has been more hypoxic at baseline and discussions were ongoing prior to this about home O2)    CV:  Home meds on hold (Lisinopril, Aldactone)  Continue Sildenafil   Hold Amiodarone- no antiarrythmic per EP  Continue Lasix IV Q12hr: goal -500  Goal MAP (for CPP) - liberalize to 75 today, adjust NE for goal MAP  Pacemaker at VOO at 70  IART - will manage as outpt now    Heme:  Xarelto on hold  Goal PLTs >100  Give FFP for INR > 1.7  Per hematogy - JANET appears fairly normal  s/p Vitamin K x3 days    FEN:  NPO/IVF - on NS  Bowel regimen  Still having high Replogle output, will hold off on feeds  Start TPN with low/no dextrose (per NRSGY)    ID  - Ancef --> CTX started 6/6 for fever    Access  Left Fem CVL (6/2), Connor MACHADO (6/1) 20 y/o male DILV, 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 He has elevated Fontan pressure secondary to LV diastolic dysfunction.  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 Persistent hypoxia, back in IART (not hemodynamically compromising)    PLAN:  Neuro:  - s/p 3% NaCl - target Na > 145  - Keppra (clinical szs)  - R sided hemiparesis  - EVD @ 10 - cont to monitor output  - Monitor ICP Q1hr (goal <20)  - Repeat head CT 6/7 - some blood around catheter but no significant changes  - precedex 0.5 - wean  - propofol 2 - titrate down if still overly sedated when off precedex  - skull flap off, will need prosthetic in future  - tylenol RTC for fever control    Resp:  Full mechanical ventilation for neuroprotection (goal normocarbia, normoxia)  PEEP 8, , FiO2 down 45%  If able to come down on FiO2, wean PEEP  start IPV/albuterol for pulmonary toilet  Goal SpO2 > 85% (non-fenestrated but has been more hypoxic at baseline and discussions were ongoing prior to this about home O2)    CV:  Home meds on hold (Lisinopril, Aldactone)  Continue Sildenafil   Hold Amiodarone- no antiarrythmic per EP  Continue Lasix IV Q12hr: goal -500  Goal MAP (for CPP) - liberalize to 75 today, adjust NE for goal MAP  Pacemaker at VOO at 70  IART - will manage as outpt now    Heme:  Xarelto on hold  Goal PLTs >100  Give FFP for INR > 1.7  Per hematogy - JANET appears fairly normal  s/p Vitamin K x3 days    FEN:  NPO/IVF - on NS  Bowel regimen  Still having high Replogle output, will hold off on feeds  Start TPN with low/no dextrose (per NRSGY)    ID  - Ancef --> CTX started 6/6 for LG fever - may be from blood - will de-escalate when afebrile    Access  Left Fem CVL (6/2), Connor MACHADO (6/1) 20 y/o male DILV, 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 He has elevated Fontan pressure secondary to LV diastolic dysfunction.  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 Persistent hypoxia, back in IART (not hemodynamically compromising), and most recently with runs of V-tach (unclear cause) requiring lidocaine initation.    PLAN:  Neuro:  - s/p 3% NaCl - target Na > 145  - Keppra (clinical szs)  - R sided hemiparesis  - EVD @ 15 - cont to monitor output  - Monitor ICP Q1hr (goal <20)  - Repeat head CT 6/8 - stable  - titrate propofol for neuro exam  - skull flap off, will need prosthetic in future  - tylenol RTC for fever control    Resp:  titrate ventilator  PEEP 8, , FiO2 down to 45%  If able to come down on FiO2, wean PEEP  IPV/albuterol for pulmonary toilet  Goal SpO2 > 85% (non-fenestrated but has been more hypoxic at baseline and discussions were ongoing prior to decompensation about home O2)    CV:  Lidocaine gtt started 6/7, EP following  VVI 70  Still in IART  Home meds on hold (Lisinopril, Aldactone)  Continue Sildenafil   Continue Lasix IV Q12hr: goal -500  Goal MAP (for CPP) - liberalize to 75 today, adjust NE for goal MAP    Heme:  Xarelto on hold  Goal PLTs >100  Give FFP for INR > 1.7  Per hematology - JANET appears fairly normal  s/p Vitamin K x3 days    FEN:  NPO/IVF - on NS  Bowel regimen  Still having high Replogle output, will hold off on feeds  Start TPN with low/no dextrose (per NRSGY)    ID  - Ancef --> CTX started 6/6 for LG fever - may be from blood - will de-escalate when afebrile    Access  Left Fem CVL (6/2), Connor MACHADO (6/1)

## 2021-06-08 NOTE — PROGRESS NOTE PEDS - ATTENDING COMMENTS
19yo male with complex medial history admitted with a hemorrhagic stroke.  Concern for continued risk of bleeding.  Did have some bleed around EVD from 6/4-6/6 but has remained stable on imaging since then.  Will continue to monitor closely and administer blood products as needed for any bleeding episode.  Will assess around time of any procedure to ensure he is optimized to prevent bleeding.  Will not administer blood products to correct numerical values without clinical indication.  Can complete bleeding work up.  So far, no particular diagnosis/cause identified.  Mom expressed her understanding.  Will continue to follow closely.

## 2021-06-08 NOTE — PROGRESS NOTE PEDS - SUBJECTIVE AND OBJECTIVE BOX
PAST 24hr EVENTS: febrile overnight to 100.9 overnight. With sedation off, patient follows commands. EVD at 10 and patent, NEIL removed yesterday. On [facility] , some new tract hemorrhage along the EVD    PHYSICAL EXAM:   Vital Signs Last 24 Hrs  T(C): 37.9 (2021 05:00), Max: 38.3 (2021 01:00)  T(F): 100.2 (2021 05:00), Max: 100.9 (2021 01:00)  HR: 71 (2021 06:43) (70 - 77)  BP: --  BP(mean): --  RR: 12 (2021 06:00) (12 - 21)  SpO2: 88% (2021 06:43) (85% - 93%)    intubated , sedated on propofol   Opens eyes to stimulus, PERRL, tracts,   craniectomy site- soft  Motor-localized uppers, withdraws lowers  Incision site C/D/I  flap soft     I&O's Summary    2021 07:01  -  2021 07:00  --------------------------------------------------------  IN: 3529.5 mL / OUT: 4207 mL / NET: -677.5 mL                              12.1   8.70  )-----------( 203      ( 2021 22:23 )             39.2     06-07    150<H>  |  108<H>  |  13  ----------------------------<  157<H>  3.9   |  23  |  0.85    Ca    9.3      2021 22:23  Phos  3.9     06-07  Mg     2.1     06-07    TPro  6.4  /  Alb  3.3  /  TBili  1.0  /  DBili  x   /  AST  16  /  ALT  <5<L>  /  AlkPhos  65  06-07    PT/INR - ( 2021 00:13 )   PT: 17.9 sec;   INR: 1.59 ratio         PTT - ( 2021 00:13 )  PTT:38.6 sec  Urinalysis Basic - ( 2021 22:24 )    Color: Yellow / Appearance: Clear / S.024 / pH: x  Gluc: x / Ketone: Moderate  / Bili: Negative / Urobili: <2 mg/dL   Blood: x / Protein: 30 mg/dL / Nitrite: Negative   Leuk Esterase: Negative / RBC: 16 /HPF / WBC 6 /HPF   Sq Epi: x / Non Sq Epi: 6 /HPF / Bacteria: Negative        MEDICATIONS  (STANDING):  acetaminophen  IV Intermittent - Peds. 750 milliGRAM(s) IV Intermittent every 6 hours  Andexanet Raymond 480 milliGRAM(s),IV Diluent 48 milliLiter(s) 480 milliGRAM(s) (24 mL/Hr) IV Continuous <Continuous>  cefTRIAXone IV Intermittent - Peds 2000 milliGRAM(s) IV Intermittent every 24 hours  chlorhexidine 0.12% Oral Liquid - Peds 15 milliLiter(s) Swish and Spit every 12 hours  chlorhexidine 2% Topical Cloths - Peds 1 Application(s) Topical daily  famotidine IV Intermittent - Peds 20 milliGRAM(s) IV Intermittent every 12 hours  fat emulsion  (Plant Based) 20% Infusion - Pediatric 0.363 Gm/kG/Day (5 mL/Hr) IV Continuous <Continuous>  furosemide  IV Intermittent - Peds 10 milliGRAM(s) IV Intermittent every 8 hours  heparin   Infusion - Pediatric 0.045 Unit(s)/kG/Hr (3 mL/Hr) IV Continuous <Continuous>  levETIRAcetam IV Intermittent - Peds 1000 milliGRAM(s) IV Intermittent every 12 hours  norepinephrine Infusion - Peds 0.11 MICROgram(s)/kG/Min (10.9 mL/Hr) IV Continuous <Continuous>  Parenteral Nutrition - Pediatric 1 Each (69 mL/Hr) TPN Continuous <Continuous>  propofol Infusion - Peds 2 mG/kG/Hr (13.2 mL/Hr) IV Continuous <Continuous>  sildenafil  IV Intermittent - Peds 10 milliGRAM(s) IV Intermittent every 8 hours  sodium chloride 0.9% -  250 milliLiter(s) (3 mL/Hr) IV Continuous <Continuous>  sodium chloride 0.9% lock flush - Peds 3 milliLiter(s) IV Push every 8 hours    MEDICATIONS  (PRN):  petrolatum, white/mineral oil Ophthalmic Ointment - Peds 1 Application(s) Both EYES two times a day PRN dry eyes  polyvinyl alcohol 1.4%/povidone 0.6% Ophthalmic Solution - Peds 1 Drop(s) Both EYES four times a day PRN Dry Eyes      NPO STATUS:   REASON: [] OR procedure   [] imaging with sedation   [] medical need    [] other   RN Informed: [] Yes [] No  Family informed and educated [] Yes [] No    RADIOLOGY:  < from: CT Head No Cont (21 @ 00:00) >  FINDINGS:  Left frontal craniectomy postoperative changes. Stable extent of left frontal sulcal effacement, patchy intraparenchymal hemorrhage and hypoattenuation. Unchanged extension of parenchyma through the craniectomy defect.    Stable position of a left frontal approach ventricular shunt catheter which terminates in the region of the occipital horn of the right lateral ventricle. Stable extent of hemorrhage along the catheter tract. Stable extent of intraventricular hemorrhage predominantly in the occipital horn of the left lateral ventricle. Persistent slitlike right lateral ventricle. Overall stable ventricular caliber.    Basal cisterns remain patent.    Small bilateral subdural hemorrhages along the tentorial leaflets, unchanged.    Paranasal sinuses and mastoid air cells are clear.    IMPRESSION:  No significant interval change.

## 2021-06-08 NOTE — PROGRESS NOTE PEDS - SUBJECTIVE AND OBJECTIVE BOX
INTERVAL HISTORY:  No acute events.    RESPIRATORY SUPPORT: Mode: SIMV with PS, RR (machine): 12, FiO2: 40, PEEP: 8, PS: 5  NUTRITION: NPO     @ 07:01  -   @ 07:00  --------------------------------------------------------  IN: 3529.5 mL / OUT: 4207 mL / NET: -677.5 mL    INTRAVASCULAR ACCESS: CVL    MEDICATIONS:  furosemide  IV Intermittent - Peds 10 milliGRAM(s) IV Intermittent every 8 hours  norepinephrine Infusion - Peds 0.11 MICROgram(s)/kG/Min IV Continuous <Continuous>  sildenafil  IV Intermittent - Peds 10 milliGRAM(s) IV Intermittent every 8 hours  cefTRIAXone IV Intermittent - Peds 2000 milliGRAM(s) IV Intermittent every 24 hours  acetaminophen  IV Intermittent - Peds. 750 milliGRAM(s) IV Intermittent every 6 hours  levETIRAcetam IV Intermittent - Peds 1000 milliGRAM(s) IV Intermittent every 12 hours  propofol Infusion - Peds 2 mG/kG/Hr IV Continuous <Continuous>  famotidine IV Intermittent - Peds 20 milliGRAM(s) IV Intermittent every 12 hours  fat emulsion  (Plant Based) 20% Infusion - Pediatric 0.363 Gm/kG/Day IV Continuous <Continuous>  heparin   Infusion - Pediatric 0.045 Unit(s)/kG/Hr IV Continuous <Continuous>  Parenteral Nutrition - Pediatric 1 Each TPN Continuous <Continuous>  sodium chloride 0.9% -  250 milliLiter(s) IV Continuous <Continuous>  sodium chloride 0.9% lock flush - Peds 3 milliLiter(s) IV Push every 8 hours    PHYSICAL EXAMINATION:  Vital signs -   T(C): 37 (21 @ 09:00), Max: 38.3 (21 @ 01:00)  HR: 70 (21 @ 09:00) (70 - 77)  ABP:  (89/69 - 137/73)  RR: 13 (21 @ 09:00) (12 - 21)  SpO2: 88% (21 @ 09:00) (85% - 93%)  CVP(mm Hg):  (21 - 38)    General - intubated, sedated. Will respond to stimuli  Skin - no rash, no desquamation, no cyanosis.  Eyes / ENT - no conjunctival injection, sclerae anicteric, external ears & nares normal, mucous membranes moist.  Pulmonary - normal inspiratory effort, no retractions, lungs clear to auscultation bilaterally, no wheezes, no rales.  Cardiovascular - normal rate, regular rhythm, normal S1 & single S2, grade 1/6 blowing holosystolic murmur at LMSB, no rubs, no gallops, capillary refill < 2sec, normal pulses.  Gastrointestinal - soft, non-distended, non-tender, no splenomegaly. (+) hepatomegaly.  Musculoskeletal - no joint swelling, no clubbing, no edema.  Neurologic / Psychiatric - intubated, sedated    LABORATORY TESTS:                          12.1  CBC:   8.70 )-----------( 203   (21 @ 22:23)                          39.2               150   |  108   |  13                 Ca: 9.3    BMP:   ----------------------------< 157    M.1   (21 @ 22:23)             3.9    |  23    | 0.85               Ph: 3.9      LFT:     TPro: 6.4 / Alb: 3.3 / TBili: 1.0 / DBili: x / AST: 16 / ALT: <5 / AlkPhos: 65   (21 @ 13:39)    COAG: PT: 17.9 / PTT: 38.6 / INR: 1.59   (21 @ 00:13)       ABG:   pH: 7.47 / pCO2: 40 / pO2: 63 / HCO3: 28 / Base Excess: 4.7 / SaO2: 91.5 / Lactate: x / iCa: 1.11   (21 @ 21:54)    IMAGING STUDIES:  Electrocardiogram - (21) Ventricular paced rhythm @ 75bpm. Underlying IART.    Telemetry - (21) Ventricular paced rhythm @ 75bpm. Underlying IART.    Chest x-ray - (21) Stable mild cardiomegaly with subsegmental left lower lobe atelectasis.    Echocardiogram - (21)   1. This was a technically difficult suboptimal study due to poor echo windows. Findings limited to below.   2. 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   3. Mild mitral valve regurgitation.   4. Trivial tricuspid valve regurgitation.   5. Trivial aortic valve regurgitation.   6. Status post device closure of Fontan fenestration.   7. S/P stent placement in the Fontan pathway. Limited imaging of the inferior Fontan baffle. In this setting, the inferior vena cava to its connection near the atrial portion of the baffle appears patent with no obstruction. S/p device closure of Fontan fenestration. The Fontan fenestration is not seen on this study. The right bidirectional Storm is seen only on color flow mapping. This appears to have laminar low velocity flow.   8. The connection of the right bidirectional Storm to the right pulmonary artery could not be imaged. The right pulmonary artery is seen in a limited portion immediately to the left of the Storm and appears patent. The left pulmonary artery could not be imaged.   9. Extremely poor and limited imaging of the lateral free walls of the single systemic left ventricle. On limited short axis views there appears to be adequate systolic excursion of the posterior wall of the left ventricle and decreased in the anterior wall. The bulboventricular foramen could not be imaged. Suggest alternate imaging for appropriate assessment of function.  10. No pericardial effusion.  11. Small right pleural effusion.    Cath - (21)  Access 4 Fr RFA, 7 Fr RFV x2 with US guidance.  Sat data (%): on 50% FiO2 LPA 73, RUPV 98, Ao 97; CI 2.6 L/min/m2 with Qp:Qs 1 :1.  Pressures (mmHg): Elevated mFontan = mIVC = 20. mPCW=16, No significant gradient across LV to AAo to Vikki (98/58/73).  Normal PVR while on sildenafil.  Angios showed unobstructed Fontan with existing stent within Fontan conduit.    Comment: IART ablation was attempted after the diagnostic cath but unsuccessful. Patient was overdrive paced out of IART. At the end of the case, Ao sat was 94% and LPA 66% on 50% FiO2

## 2021-06-09 LAB
ALBUMIN SERPL ELPH-MCNC: 4.4 G/DL — SIGNIFICANT CHANGE UP (ref 3.3–5)
ALP SERPL-CCNC: 81 U/L — SIGNIFICANT CHANGE UP (ref 60–270)
ALT FLD-CCNC: 6 U/L — SIGNIFICANT CHANGE UP (ref 4–41)
ANION GAP SERPL CALC-SCNC: 18 MMOL/L — HIGH (ref 7–14)
APPEARANCE UR: SIGNIFICANT CHANGE UP
APTT BLD: 44.1 SEC — HIGH (ref 27–36.3)
AST SERPL-CCNC: 20 U/L — SIGNIFICANT CHANGE UP (ref 4–40)
BASOPHILS # BLD AUTO: 0.1 K/UL — SIGNIFICANT CHANGE UP (ref 0–0.2)
BASOPHILS NFR BLD AUTO: 1 % — SIGNIFICANT CHANGE UP (ref 0–2)
BILIRUB SERPL-MCNC: 1.4 MG/DL — HIGH (ref 0.2–1.2)
BILIRUB UR-MCNC: NEGATIVE — SIGNIFICANT CHANGE UP
BLOOD GAS ARTERIAL - LYTES,HGB,ICA,LACT RESULT: SIGNIFICANT CHANGE UP
BLOOD GAS ARTERIAL - LYTES,HGB,ICA,LACT RESULT: SIGNIFICANT CHANGE UP
BUN SERPL-MCNC: 19 MG/DL — SIGNIFICANT CHANGE UP (ref 7–23)
CALCIUM SERPL-MCNC: 9.6 MG/DL — SIGNIFICANT CHANGE UP (ref 8.4–10.5)
CHLORIDE SERPL-SCNC: 104 MMOL/L — SIGNIFICANT CHANGE UP (ref 98–107)
CO2 SERPL-SCNC: 23 MMOL/L — SIGNIFICANT CHANGE UP (ref 22–31)
COLOR SPEC: YELLOW — SIGNIFICANT CHANGE UP
CREAT SERPL-MCNC: 0.75 MG/DL — SIGNIFICANT CHANGE UP (ref 0.5–1.3)
CULTURE RESULTS: SIGNIFICANT CHANGE UP
D DIMER BLD IA.RAPID-MCNC: 3396 NG/ML DDU — HIGH
DIFF PNL FLD: ABNORMAL
EOSINOPHIL # BLD AUTO: 1.03 K/UL — HIGH (ref 0–0.5)
EOSINOPHIL NFR BLD AUTO: 10 % — HIGH (ref 0–6)
FACT II INHIB PPP-ACNC: 114.8 % — SIGNIFICANT CHANGE UP (ref 75–135)
FACT IX PPP CHRO-ACNC: 173.3 % — HIGH (ref 52–150)
FACT V ACT/NOR PPP: 100.5 % — SIGNIFICANT CHANGE UP (ref 75–150)
FACT VII ACT/NOR PPP: 67.5 % — LOW (ref 70–165)
FACT VIII ACT/NOR PPP: 226.6 % — HIGH (ref 45–125)
FACT X ACT/NOR PPP: 90.3 % — SIGNIFICANT CHANGE UP (ref 70–150)
FIBRINOGEN PPP-MCNC: 829 MG/DL — HIGH (ref 290–520)
GLUCOSE SERPL-MCNC: 150 MG/DL — HIGH (ref 70–99)
GLUCOSE UR QL: NEGATIVE — SIGNIFICANT CHANGE UP
GRAM STN FLD: SIGNIFICANT CHANGE UP
HCT VFR BLD CALC: 41.5 % — SIGNIFICANT CHANGE UP (ref 39–50)
HGB BLD-MCNC: 13.2 G/DL — SIGNIFICANT CHANGE UP (ref 13–17)
IANC: 7.42 K/UL — SIGNIFICANT CHANGE UP (ref 1.5–8.5)
INR BLD: 1.57 RATIO — HIGH (ref 0.88–1.16)
KETONES UR-MCNC: NEGATIVE — SIGNIFICANT CHANGE UP
LEUKOCYTE ESTERASE UR-ACNC: NEGATIVE — SIGNIFICANT CHANGE UP
LYMPHOCYTES # BLD AUTO: 1.34 K/UL — SIGNIFICANT CHANGE UP (ref 1–3.3)
LYMPHOCYTES # BLD AUTO: 13 % — SIGNIFICANT CHANGE UP (ref 13–44)
MAGNESIUM SERPL-MCNC: 2.2 MG/DL — SIGNIFICANT CHANGE UP (ref 1.6–2.6)
MCHC RBC-ENTMCNC: 26.7 PG — LOW (ref 27–34)
MCHC RBC-ENTMCNC: 31.8 GM/DL — LOW (ref 32–36)
MCV RBC AUTO: 83.8 FL — SIGNIFICANT CHANGE UP (ref 80–100)
MONOCYTES # BLD AUTO: 0.72 K/UL — SIGNIFICANT CHANGE UP (ref 0–0.9)
MONOCYTES NFR BLD AUTO: 7 % — SIGNIFICANT CHANGE UP (ref 2–14)
NEUTROPHILS # BLD AUTO: 6.3 K/UL — SIGNIFICANT CHANGE UP (ref 1.8–7.4)
NEUTROPHILS NFR BLD AUTO: 61 % — SIGNIFICANT CHANGE UP (ref 43–77)
NITRITE UR-MCNC: NEGATIVE — SIGNIFICANT CHANGE UP
PH UR: 6 — SIGNIFICANT CHANGE UP (ref 5–8)
PHOSPHATE SERPL-MCNC: 3.2 MG/DL — SIGNIFICANT CHANGE UP (ref 2.5–4.5)
PLATELET # BLD AUTO: 217 K/UL — SIGNIFICANT CHANGE UP (ref 150–400)
POTASSIUM SERPL-MCNC: 3.6 MMOL/L — SIGNIFICANT CHANGE UP (ref 3.5–5.3)
POTASSIUM SERPL-SCNC: 3.6 MMOL/L — SIGNIFICANT CHANGE UP (ref 3.5–5.3)
PROT SERPL-MCNC: 8.5 G/DL — HIGH (ref 6–8.3)
PROT UR-MCNC: ABNORMAL
PROTHROM AB SERPL-ACNC: 17.7 SEC — HIGH (ref 10.6–13.6)
RBC # BLD: 4.95 M/UL — SIGNIFICANT CHANGE UP (ref 4.2–5.8)
RBC # FLD: 14.6 % — HIGH (ref 10.3–14.5)
SODIUM SERPL-SCNC: 145 MMOL/L — SIGNIFICANT CHANGE UP (ref 135–145)
SP GR SPEC: >1.03 — HIGH (ref 1.01–1.02)
SPECIMEN SOURCE: SIGNIFICANT CHANGE UP
SPECIMEN SOURCE: SIGNIFICANT CHANGE UP
TRIGL SERPL-MCNC: 186 MG/DL — HIGH
UROBILINOGEN FLD QL: SIGNIFICANT CHANGE UP
WBC # BLD: 10.32 K/UL — SIGNIFICANT CHANGE UP (ref 3.8–10.5)
WBC # FLD AUTO: 10.32 K/UL — SIGNIFICANT CHANGE UP (ref 3.8–10.5)

## 2021-06-09 PROCEDURE — 99291 CRITICAL CARE FIRST HOUR: CPT

## 2021-06-09 PROCEDURE — 99232 SBSQ HOSP IP/OBS MODERATE 35: CPT

## 2021-06-09 PROCEDURE — 71045 X-RAY EXAM CHEST 1 VIEW: CPT | Mod: 26

## 2021-06-09 PROCEDURE — 74018 RADEX ABDOMEN 1 VIEW: CPT | Mod: 26

## 2021-06-09 PROCEDURE — 93770 DETERMINATION VENOUS PRESS: CPT

## 2021-06-09 PROCEDURE — 94681 O2 UPTK CO2 OUTP % O2 XTRC: CPT | Mod: 26

## 2021-06-09 PROCEDURE — 99291 CRITICAL CARE FIRST HOUR: CPT | Mod: 25

## 2021-06-09 RX ORDER — ACETAMINOPHEN 500 MG
750 TABLET ORAL EVERY 6 HOURS
Refills: 0 | Status: DISCONTINUED | OUTPATIENT
Start: 2021-06-09 | End: 2021-06-13

## 2021-06-09 RX ORDER — PROPOFOL 10 MG/ML
66 INJECTION, EMULSION INTRAVENOUS ONCE
Refills: 0 | Status: COMPLETED | OUTPATIENT
Start: 2021-06-09 | End: 2021-06-09

## 2021-06-09 RX ORDER — DEXMEDETOMIDINE HYDROCHLORIDE IN 0.9% SODIUM CHLORIDE 4 UG/ML
0.3 INJECTION INTRAVENOUS
Qty: 200 | Refills: 0 | Status: DISCONTINUED | OUTPATIENT
Start: 2021-06-09 | End: 2021-06-10

## 2021-06-09 RX ORDER — PHYTONADIONE (VIT K1) 5 MG
5 TABLET ORAL
Refills: 0 | Status: DISCONTINUED | OUTPATIENT
Start: 2021-06-09 | End: 2021-06-14

## 2021-06-09 RX ORDER — ELECTROLYTE SOLUTION,INJ
1 VIAL (ML) INTRAVENOUS
Refills: 0 | Status: DISCONTINUED | OUTPATIENT
Start: 2021-06-09 | End: 2021-06-10

## 2021-06-09 RX ORDER — NICARDIPINE HYDROCHLORIDE 30 MG/1
1 CAPSULE, EXTENDED RELEASE ORAL
Qty: 125 | Refills: 0 | Status: DISCONTINUED | OUTPATIENT
Start: 2021-06-09 | End: 2021-06-10

## 2021-06-09 RX ORDER — MORPHINE SULFATE 50 MG/1
2 CAPSULE, EXTENDED RELEASE ORAL ONCE
Refills: 0 | Status: DISCONTINUED | OUTPATIENT
Start: 2021-06-09 | End: 2021-06-09

## 2021-06-09 RX ORDER — I.V. FAT EMULSION 20 G/100ML
1.09 EMULSION INTRAVENOUS
Qty: 72 | Refills: 0 | Status: DISCONTINUED | OUTPATIENT
Start: 2021-06-09 | End: 2021-06-10

## 2021-06-09 RX ORDER — CALCIUM GLUCONATE 100 MG/ML
2000 VIAL (ML) INTRAVENOUS ONCE
Refills: 0 | Status: COMPLETED | OUTPATIENT
Start: 2021-06-09 | End: 2021-06-09

## 2021-06-09 RX ORDER — PHYTONADIONE (VIT K1) 5 MG
5 TABLET ORAL
Refills: 0 | Status: DISCONTINUED | OUTPATIENT
Start: 2021-06-09 | End: 2021-06-09

## 2021-06-09 RX ORDER — DEXMEDETOMIDINE HYDROCHLORIDE IN 0.9% SODIUM CHLORIDE 4 UG/ML
0.3 INJECTION INTRAVENOUS
Qty: 1000 | Refills: 0 | Status: DISCONTINUED | OUTPATIENT
Start: 2021-06-09 | End: 2021-06-11

## 2021-06-09 RX ORDER — FUROSEMIDE 40 MG
10 TABLET ORAL EVERY 8 HOURS
Refills: 0 | Status: DISCONTINUED | OUTPATIENT
Start: 2021-06-09 | End: 2021-06-10

## 2021-06-09 RX ORDER — SENNA PLUS 8.6 MG/1
10 TABLET ORAL DAILY
Refills: 0 | Status: DISCONTINUED | OUTPATIENT
Start: 2021-06-09 | End: 2021-06-10

## 2021-06-09 RX ORDER — ACETAMINOPHEN 500 MG
750 TABLET ORAL EVERY 6 HOURS
Refills: 0 | Status: DISCONTINUED | OUTPATIENT
Start: 2021-06-09 | End: 2021-06-09

## 2021-06-09 RX ORDER — ACETAMINOPHEN 500 MG
650 TABLET ORAL EVERY 6 HOURS
Refills: 0 | Status: DISCONTINUED | OUTPATIENT
Start: 2021-06-09 | End: 2021-06-09

## 2021-06-09 RX ADMIN — SODIUM CHLORIDE 3 MILLILITER(S): 9 INJECTION INTRAMUSCULAR; INTRAVENOUS; SUBCUTANEOUS at 08:36

## 2021-06-09 RX ADMIN — FAMOTIDINE 200 MILLIGRAM(S): 10 INJECTION INTRAVENOUS at 13:10

## 2021-06-09 RX ADMIN — CHLORHEXIDINE GLUCONATE 1 APPLICATION(S): 213 SOLUTION TOPICAL at 22:23

## 2021-06-09 RX ADMIN — Medication 2 MILLIGRAM(S): at 00:56

## 2021-06-09 RX ADMIN — Medication 1 ENEMA: at 02:15

## 2021-06-09 RX ADMIN — I.V. FAT EMULSION 15 GM/KG/DAY: 20 EMULSION INTRAVENOUS at 18:43

## 2021-06-09 RX ADMIN — Medication 25 MILLIGRAM(S): at 11:19

## 2021-06-09 RX ADMIN — Medication 25 MILLIGRAM(S): at 20:31

## 2021-06-09 RX ADMIN — PROPOFOL 66 MILLIGRAM(S): 10 INJECTION, EMULSION INTRAVENOUS at 14:30

## 2021-06-09 RX ADMIN — CEFTRIAXONE 100 MILLIGRAM(S): 500 INJECTION, POWDER, FOR SOLUTION INTRAMUSCULAR; INTRAVENOUS at 06:06

## 2021-06-09 RX ADMIN — Medication 300 MILLIGRAM(S): at 03:27

## 2021-06-09 RX ADMIN — MORPHINE SULFATE 4 MILLIGRAM(S): 50 CAPSULE, EXTENDED RELEASE ORAL at 04:57

## 2021-06-09 RX ADMIN — Medication 9.92 MICROGRAM(S)/KG/MIN: at 00:33

## 2021-06-09 RX ADMIN — CHLORHEXIDINE GLUCONATE 15 MILLILITER(S): 213 SOLUTION TOPICAL at 22:23

## 2021-06-09 RX ADMIN — Medication 1 DROP(S): at 09:20

## 2021-06-09 RX ADMIN — Medication 12.4 MICROGRAM(S)/KG/MIN: at 07:13

## 2021-06-09 RX ADMIN — Medication 12.4 MICROGRAM(S)/KG/MIN: at 19:23

## 2021-06-09 RX ADMIN — Medication 300 MILLIGRAM(S): at 10:00

## 2021-06-09 RX ADMIN — LEVETIRACETAM 266.68 MILLIGRAM(S): 250 TABLET, FILM COATED ORAL at 05:23

## 2021-06-09 RX ADMIN — Medication 40 MILLIGRAM(S): at 04:25

## 2021-06-09 RX ADMIN — PROPOFOL 13.2 MG/KG/HR: 10 INJECTION, EMULSION INTRAVENOUS at 07:14

## 2021-06-09 RX ADMIN — FAMOTIDINE 200 MILLIGRAM(S): 10 INJECTION INTRAVENOUS at 02:44

## 2021-06-09 RX ADMIN — Medication 1 EACH: at 18:42

## 2021-06-09 RX ADMIN — PROPOFOL 13.2 MG/KG/HR: 10 INJECTION, EMULSION INTRAVENOUS at 08:28

## 2021-06-09 RX ADMIN — I.V. FAT EMULSION 10 GM/KG/DAY: 20 EMULSION INTRAVENOUS at 07:15

## 2021-06-09 RX ADMIN — PROPOFOL 13.2 MG/KG/HR: 10 INJECTION, EMULSION INTRAVENOUS at 03:27

## 2021-06-09 RX ADMIN — PROPOFOL 13.2 MG/KG/HR: 10 INJECTION, EMULSION INTRAVENOUS at 15:17

## 2021-06-09 RX ADMIN — Medication 30 MILLIGRAM(S): at 08:27

## 2021-06-09 RX ADMIN — Medication 1 DROP(S): at 01:44

## 2021-06-09 RX ADMIN — Medication 9.92 MICROGRAM(S)/KG/MIN: at 07:14

## 2021-06-09 RX ADMIN — Medication 12.4 MICROGRAM(S)/KG/MIN: at 08:29

## 2021-06-09 RX ADMIN — MORPHINE SULFATE 4 MILLIGRAM(S): 50 CAPSULE, EXTENDED RELEASE ORAL at 03:06

## 2021-06-09 RX ADMIN — PROPOFOL 66 MILLIGRAM(S): 10 INJECTION, EMULSION INTRAVENOUS at 14:40

## 2021-06-09 RX ADMIN — SODIUM CHLORIDE 3 MILLILITER(S): 9 INJECTION INTRAMUSCULAR; INTRAVENOUS; SUBCUTANEOUS at 16:48

## 2021-06-09 RX ADMIN — SODIUM CHLORIDE 3 MILLILITER(S): 9 INJECTION, SOLUTION INTRAVENOUS at 19:24

## 2021-06-09 RX ADMIN — Medication 3 UNIT(S)/KG/HR: at 07:16

## 2021-06-09 RX ADMIN — Medication 2 MILLIGRAM(S): at 11:39

## 2021-06-09 RX ADMIN — DEXMEDETOMIDINE HYDROCHLORIDE IN 0.9% SODIUM CHLORIDE 4.96 MICROGRAM(S)/KG/HR: 4 INJECTION INTRAVENOUS at 23:28

## 2021-06-09 RX ADMIN — SODIUM CHLORIDE 3 MILLILITER(S): 9 INJECTION, SOLUTION INTRAVENOUS at 07:16

## 2021-06-09 RX ADMIN — Medication 3 UNIT(S)/KG/HR: at 19:23

## 2021-06-09 RX ADMIN — Medication 300 MILLIGRAM(S): at 16:00

## 2021-06-09 RX ADMIN — Medication 1 APPLICATION(S): at 17:21

## 2021-06-09 RX ADMIN — CHLORHEXIDINE GLUCONATE 15 MILLILITER(S): 213 SOLUTION TOPICAL at 10:14

## 2021-06-09 RX ADMIN — MORPHINE SULFATE 2 MILLIGRAM(S): 50 CAPSULE, EXTENDED RELEASE ORAL at 03:50

## 2021-06-09 RX ADMIN — SODIUM CHLORIDE 3 MILLILITER(S): 9 INJECTION, SOLUTION INTRAVENOUS at 13:11

## 2021-06-09 RX ADMIN — Medication 4 MILLIGRAM(S): at 04:29

## 2021-06-09 RX ADMIN — Medication 25 MILLIGRAM(S): at 03:55

## 2021-06-09 RX ADMIN — MORPHINE SULFATE 2 MILLIGRAM(S): 50 CAPSULE, EXTENDED RELEASE ORAL at 05:30

## 2021-06-09 RX ADMIN — Medication 40 MILLIGRAM(S): at 18:24

## 2021-06-09 RX ADMIN — Medication 1 APPLICATION(S): at 11:20

## 2021-06-09 RX ADMIN — SODIUM CHLORIDE 3 MILLILITER(S): 9 INJECTION INTRAMUSCULAR; INTRAVENOUS; SUBCUTANEOUS at 02:00

## 2021-06-09 RX ADMIN — LEVETIRACETAM 266.68 MILLIGRAM(S): 250 TABLET, FILM COATED ORAL at 17:21

## 2021-06-09 RX ADMIN — Medication 2 MILLIGRAM(S): at 20:43

## 2021-06-09 RX ADMIN — Medication 1 EACH: at 07:15

## 2021-06-09 RX ADMIN — Medication 12.4 MICROGRAM(S)/KG/MIN: at 15:19

## 2021-06-09 RX ADMIN — Medication 3 UNIT(S)/KG/HR: at 15:18

## 2021-06-09 NOTE — PROCEDURE NOTE - NSPROCDETAILS_GEN_ALL_CORE
guidewire recovered/lumen(s) aspirated and flushed/sterile dressing applied/sterile technique, catheter placed/ultrasound guidance with use of sterile gel and probe cove
guidewire recovered/lumen(s) aspirated and flushed/sterile dressing applied/sterile technique, catheter placed/ultrasound guidance with use of sterile gel and probe cove
location identified, draped/prepped, sterile technique used, needle inserted/introduced/positive blood return obtained via catheter/connected to a pressurized flush line/sutured in place/hemostasis with direct pressure, dressing applied/Seldinger technique/all materials/supplies accounted for at end of procedure

## 2021-06-09 NOTE — PROGRESS NOTE PEDS - ASSESSMENT
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 Presented in IART - s/p EP study with unsuccessful attempt at ablation along with diagnostic cath showing elevated Fontan pressure (20mmHg). He was loaded with amiodarone and ultimately electrically cardioverted out, however reverted back into IART. His course has now been further complicated by L frontal parenchymal hemorrhage requiring emergent evacuation with NSx and EVD placement.    Plan:  - Continuos telemetry monitoring.  - Pacemaker set at DDD 70-120bpm, in the event urgent surgical intervention is required  - Continue Sildenafil 20mg q8h IV  - lasix home dose is 20mg QD. Agree with lasix 10mg IV BID. Goal net even to negative.  - Monitor blood pressure. Goal MAPs > 75. Agree with norepi and/or vasopressin as needed. Titrate to goal per PICU to maintain CPP.  - Hold home lisinopril, aldactone  - hold home amidarone. No plans to cardiovert at this time. Will need repeat HOLA prior to any attempts to cardiovert.  - anticoagulation care per hematology/PICU/NSx  - rest of care per PICU and NSx  - Please page pediatric cardiology with any concerns or questions.    Thank you for involving us in the care of your patient.     Guicho Oliveros MD, MPH  Pediatric Cardiology Fellow  PAGER: [phone] TRINI MÉNDEZ is a 19 yo male with DILV, L-malposed great arteries s/p lateral tunnel Fontan (with subsequent stent placement in Fontan pathway in 2016), with sick sinus syndrome and complete heart block, s/p dual chamber epicardial pacemaker with cardiac resynchronization therapy for left ventricular dysfunction and failing Fontan in 2016. Presented in IART - s/p EP study with unsuccessful attempt at ablation along with diagnostic cath showing elevated Fontan pressure (20mmHg). He was loaded with amiodarone and ultimately electrically cardioverted out, however reverted back into IART. His course has now been further complicated by L frontal parenchymal hemorrhage requiring emergent evacuation with NSx and EVD placement.    Plan:  - Continuos telemetry monitoring.  - Pacemaker set at DDD 70-120bpm, in the event urgent surgical intervention is required  - Continue Sildenafil 20mg q8h IV  - lasix home dose is 20mg QD. Agree with lasix 10mg IV BID. Goal net even to negative.  - Monitor blood pressure. Goal MAPs > 70. Agree with norepi and/or vasopressin as needed. Titrate to goal per PICU to maintain CPP.  - Hold home lisinopril, aldactone  - hold home amidarone. No plans to cardiovert at this time. Will need repeat HOLA prior to any attempts to cardiovert.  - anticoagulation care per hematology/PICU/NSx  - rest of care per PICU and NSx  - Please page pediatric cardiology with any concerns or questions.    Thank you for involving us in the care of your patient.     Guicho Oliveros MD, MPH  Pediatric Cardiology Fellow  PAGER: [phone]

## 2021-06-09 NOTE — PROGRESS NOTE PEDS - PROBLEM SELECTOR PLAN 1
1. CT head this morning  2. continue EVD @ 15cm/H2o, notify Nsx for ICP > 20, or zero output from EVD x 2 consecutive hours  3. continue ABx, keppra  4. Maintain INR < 1.5, f/u with hematology  5. neurochecks Q1H  6. vent management per PICU

## 2021-06-09 NOTE — PROGRESS NOTE PEDS - SUBJECTIVE AND OBJECTIVE BOX
Interval/Overnight Events:    No further episodes of VT after starting lidocaine    VITAL SIGNS:  T(C): 37.6 (21 @ 05:00), Max: 39 (21 @ 20:15)  HR: 70 (21 @ 07:23) (70 - 128)  BP: 127/70 (21 @ 20:00) (127/70 - 127/70)  ABP: 119/58 (21 @ 07:00) (96/59 - 140/68)  ABP(mean): 78 (21 @ 07:00) (67 - 91)  RR: 12 (21 @ 07:00) (12 - 19)  SpO2: 90% (21 @ 07:23) (82% - 91%)  CVP(mm Hg): 12 (21 @ 07:00) (12 - 32)  End-Tidal CO2:  NIRS:    Physical Exam:    General: NAD  HEENT: no acute changes from baseline  Resp: unlabored, CTAB, good aeration, no rhonchi/rales/wheezing  CV: RRR, nl S1/S2, no m/r/g appreciated, CR < 2s, distal pulses 2+ and equal  Abd: soft, NTND, no HSM appreciated  Ext: wwp, no gross deformities  Neuro: alert and oriented, no acute change from baseline  Skin: no rash    =======================RESPIRATORY=======================  [ ] FiO2: ___ 	[ ] Heliox: ____ 		[ ] BiPAP: ___   [ ] NC: __  Liters			[ ] HFNC: __ 	Liters, FiO2: __  [ ] Mechanical Ventilation: Mode: SIMV with PS, RR (machine): 12, TV (machine): 450, FiO2: 40, PEEP: 8, PS: 5, ITime: 1, MAP: 12, PIP: 23  [ ] Inhaled Nitric Oxide:  [ ] Extubation Readiness Assessed  Comments:    =====================CARDIOVASCULAR======================  Cardiovascular Medications:  furosemide  IV Intermittent - Peds 20 milliGRAM(s) IV Intermittent every 8 hours  lidocaine  Infusion - Peds 25 MICROgram(s)/kG/Min IV Continuous <Continuous>  norepinephrine Infusion - Peds 0.1 MICROgram(s)/kG/Min IV Continuous <Continuous>  sildenafil  IV Intermittent - Peds 10 milliGRAM(s) IV Intermittent every 8 hours    Chest Tube Output: ___ in 24 hours, ___ in last 12 hours   [ ] Right     [ ] Left    [ ] Mediastinal  Cardiac Rhythm:	[x] NSR		[ ] Other:    [ ] Central Venous Line	[ ] R	[ ] L	[ ] IJ	[ ] Fem	[ ] SC			Placed:   [ ] Arterial Line		[ ] R	[ ] L	[ ] PT	[ ] DP	[ ] Fem	[ ] Rad	[ ] Ax	Placed:   [ ] PICC:				[ ] Broviac		[ ] Mediport  Comments:    ==========HEMATOLOGY/ONCOLOGY=================  Transfusions:	[ ] PRBC	[ ] Platelets	[ ] FFP		[ ] Cryoprecipitate  DVT Prophylaxis:  Comments:    =================INFECTIOUS DISEASE==================  [ ] Cooling [place] being used. Target Temperature:     ===========FLUIDS/ELECTROLYTES/NUTRITION=============  I&O's Summary    2021 07:01  -  2021 07:00  --------------------------------------------------------  IN: 3402.1 mL / OUT: 4127 mL / NET: -724.9 mL      Daily   Diet:	[ ] Regular	[ ] Soft		[ ] Clears	[ ] NPO  .	[ ] Other:  .	[ ] NGT		[ ] NDT		[ ] GT		[ ] GJT    [ ] Urinary Catheter, Date Placed:   Comments:    ====================NEUROLOGY===================  [ ] SBS:		[ ] GISSELLE-1:	[ ] BIS:	[ ] CAPD:  [ ] EVD set at: ___ , Drainage in last 24 hours: ___ ml    [x] Adequacy of sedation and pain control has been assessed and adjusted  Comments:      ==================PATIENT CARE=================  [ ] There are pressure ulcers/areas of breakdown that are being addressed -   [x] Preventative measures are being taken to decrease risk for skin breakdown.  [x] Necessity of urinary, arterial, and venous catheters discussed    ==================LABS============================  ABG - ( 2021 02:31 )  pH: 7.51  /  pCO2: 35    /  pO2: 60    / HCO3: 29    / Base Excess: 4.3   /  SaO2: 91.7  / Lactate: x                                                13.2                  Neurophils% (auto):   61.0   ( @ 02:35):    10.32)-----------(217          Lymphocytes% (auto):  13.0                                          41.5                   Eosinphils% (auto):   10.0     Manual%: Neutrophils x    ; Lymphocytes x    ; Eosinophils x    ; Bands%: x    ; Blasts x        (  @ 02:35 )   PT: 17.7 sec;   INR: 1.57 ratio  aPTT: 44.1 sec                            145    |  104    |  19                  Calcium: 9.6   / iCa: 1.10   ( @ 02:35)    ----------------------------<  150       Magnesium: 2.2                              3.6     |  23     |  0.75             Phosphorous: 3.2      TPro  8.5    /  Alb  4.4    /  TBili  1.4    /  DBili  x      /  AST  20     /  ALT  6      /  AlkPhos  81     2021 02:35  RECENT CULTURES:   @ 06:30 .Sputum Sputum     No growth to date.    Few polymorphonuclear leukocytes per low power field  No Squamous epithelial cells per low power field  No organisms seen per oil power field     @ 06:15 .CSF CSF     No growth    polymorphonuclear leukocytes seen  No organisms seen  by cytocentrifuge     @ 06:13 .Blood Blood     No growth to date.       @ 20:36 .Urine Catheterized     No growth       @ 08:27 .Blood Blood     No growth to date.       @ 01:30 .Urine Catheterized     No growth          =================MEDICATIONS======================  MEDICATIONS  MEDICATIONS  (STANDING):  acetaminophen  IV Intermittent - Peds. 750 milliGRAM(s) IV Intermittent every 6 hours  Andexanet Raymond 480 milliGRAM(s),IV Diluent 48 milliLiter(s) 480 milliGRAM(s) (24 mL/Hr) IV Continuous <Continuous>  cefTRIAXone IV Intermittent - Peds 2000 milliGRAM(s) IV Intermittent every 24 hours  chlorhexidine 0.12% Oral Liquid - Peds 15 milliLiter(s) Swish and Spit every 12 hours  chlorhexidine 2% Topical Cloths - Peds 1 Application(s) Topical daily  famotidine IV Intermittent - Peds 20 milliGRAM(s) IV Intermittent every 12 hours  fat emulsion  (Plant Based) 20% Infusion - Pediatric 0.726 Gm/kG/Day (10 mL/Hr) IV Continuous <Continuous>  furosemide  IV Intermittent - Peds 20 milliGRAM(s) IV Intermittent every 8 hours  heparin   Infusion - Pediatric 0.045 Unit(s)/kG/Hr (3 mL/Hr) IV Continuous <Continuous>  levETIRAcetam IV Intermittent - Peds 1000 milliGRAM(s) IV Intermittent every 12 hours  lidocaine  Infusion - Peds 25 MICROgram(s)/kG/Min (12.4 mL/Hr) IV Continuous <Continuous>  norepinephrine Infusion - Peds 0.1 MICROgram(s)/kG/Min (9.92 mL/Hr) IV Continuous <Continuous>  Parenteral Nutrition - Pediatric 1 Each (69 mL/Hr) TPN Continuous <Continuous>  phytonadione IV Intermittent - Peds 5 milliGRAM(s) IV Intermittent <User Schedule>  propofol Infusion - Peds 2 mG/kG/Hr (13.2 mL/Hr) IV Continuous <Continuous>  senna Oral Liquid - Peds 10 milliLiter(s) Oral daily  sildenafil  IV Intermittent - Peds 10 milliGRAM(s) IV Intermittent every 8 hours  sodium chloride 0.9% -  250 milliLiter(s) (3 mL/Hr) IV Continuous <Continuous>  sodium chloride 0.9% lock flush - Peds 3 milliLiter(s) IV Push every 8 hours    MEDICATIONS  (PRN):  petrolatum, white/mineral oil Ophthalmic Ointment - Peds 1 Application(s) Both EYES two times a day PRN dry eyes  polyvinyl alcohol 1.4%/povidone 0.6% Ophthalmic Solution - Peds 1 Drop(s) Both EYES four times a day PRN Dry Eyes    ===================================================  IMAGING STUDIES:    [ ] XR   [ ] CT   [ ] MR   [ ] US  [ ] Echo  ===========================================================  Parent/Guardian is at the bedside:	[ ] Yes	[ ] No  Patient and Parent/Guardian updated as to the progress/plan of care:	[ ] Yes	[ ] No    [x] The patient remains in critical and unstable condition, and requires ICU care and monitoring, assessment, and treatment  [ ] The patient is improving but requires continued monitoring, assessment, treatment, and adjustment of therapy    [x] The total critical care time spent by attending physician was __35__ minutes, excluding procedure time. Interval/Overnight Events:    No further episodes of VT after starting lidocaine    VITAL SIGNS:  T(C): 37.6 (21 @ 05:00), Max: 39 (21 @ 20:15)  HR: 70 (21 @ 07:23) (70 - 128)  BP: 127/70 (21 @ 20:00) (127/70 - 127/70)  ABP: 119/58 (21 @ 07:00) (96/59 - 140/68)  ABP(mean): 78 (21 @ 07:00) (67 - 91)  RR: 12 (21 @ 07:00) (12 - 19)  SpO2: 90% (21 @ 07:23) (82% - 91%)  CVP(mm Hg): 12 (21 @ 07:00) (12 - 32)  End-Tidal CO2:  NIRS:    Physical Exam:    General: NAD, intubated  HEENT: EVD in place, PERRLA  Resp: vent-assisted, unlabored, CTAB, fair-good aeration, no rhonchi/rales/wheezing  CV: RRR, nl S1/S2, no m/r/g appreciated, CR < 2s, distal pulses 2+ and equal  Abd: soft, distended but non-tender, no HSM appreciated  Ext: wwp, no gross deformities  Neuro: sedated, moves purposefully  Skin: no rash    =======================RESPIRATORY=======================  [ ] FiO2: ___ 	[ ] Heliox: ____ 		[ ] BiPAP: ___   [ ] NC: __  Liters			[ ] HFNC: __ 	Liters, FiO2: __  [x ] Mechanical Ventilation: Mode: SIMV with PS, RR (machine): 12, TV (machine): 450, FiO2: 40, PEEP: 8, PS: 5, ITime: 1, MAP: 12, PIP: 23  [ ] Inhaled Nitric Oxide:  [ ] Extubation Readiness Assessed  Comments:    =====================CARDIOVASCULAR======================  Cardiovascular Medications:  furosemide  IV Intermittent - Peds 20 milliGRAM(s) IV Intermittent every 8 hours  lidocaine  Infusion - Peds 25 MICROgram(s)/kG/Min IV Continuous <Continuous>  norepinephrine Infusion - Peds 0.1 MICROgram(s)/kG/Min IV Continuous <Continuous>  sildenafil  IV Intermittent - Peds 10 milliGRAM(s) IV Intermittent every 8 hours    Chest Tube Output: ___ in 24 hours, ___ in last 12 hours   [ ] Right     [ ] Left    [ ] Mediastinal  Cardiac Rhythm:	[x] NSR		[ ] Other:    [x ] Central Venous Line	[ ] R	[ ] L	[ ] IJ	[ ] Fem	[ ] SC			Placed:   [ x] Arterial Line		[ ] R	[ ] L	[ ] PT	[ ] DP	[ ] Fem	[ ] Rad	[ ] Ax	Placed:   [ ] PICC:				[ ] Broviac		[ ] Mediport  Comments:    ==========HEMATOLOGY/ONCOLOGY=================  Transfusions:	[ ] PRBC	[ ] Platelets	[ ] FFP		[ ] Cryoprecipitate  DVT Prophylaxis:  Comments:    =================INFECTIOUS DISEASE==================  [ ] Cooling [place] being used. Target Temperature:     ===========FLUIDS/ELECTROLYTES/NUTRITION=============  I&O's Summary    2021 07:01  -  2021 07:00  --------------------------------------------------------  IN: 3402.1 mL / OUT: 4127 mL / NET: -724.9 mL      Daily   Diet:	[ ] Regular	[ ] Soft		[ ] Clears	[x ] NPO  .	[ ] Other:  .	[ ] NGT		[ ] NDT		[ ] GT		[ ] GJT    [ ] Urinary Catheter, Date Placed:   Comments:    ====================NEUROLOGY===================  [ ] SBS:		[ ] GISSELLE-1:	[ ] BIS:	[ ] CAPD:  [ ] EVD set at: ___ , Drainage in last 24 hours: ___ ml    [x] Adequacy of sedation and pain control has been assessed and adjusted  Comments:      ==================PATIENT CARE=================  [ ] There are pressure ulcers/areas of breakdown that are being addressed -   [x] Preventative measures are being taken to decrease risk for skin breakdown.  [x] Necessity of urinary, arterial, and venous catheters discussed    ==================LABS============================  ABG - ( 2021 02:31 )  pH: 7.51  /  pCO2: 35    /  pO2: 60    / HCO3: 29    / Base Excess: 4.3   /  SaO2: 91.7  / Lactate: x                                                13.2                  Neurophils% (auto):   61.0   ( @ 02:35):    10.32)-----------(217          Lymphocytes% (auto):  13.0                                          41.5                   Eosinphils% (auto):   10.0     Manual%: Neutrophils x    ; Lymphocytes x    ; Eosinophils x    ; Bands%: x    ; Blasts x        (  @ 02:35 )   PT: 17.7 sec;   INR: 1.57 ratio  aPTT: 44.1 sec                            145    |  104    |  19                  Calcium: 9.6   / iCa: 1.10   ( @ 02:35)    ----------------------------<  150       Magnesium: 2.2                              3.6     |  23     |  0.75             Phosphorous: 3.2      TPro  8.5    /  Alb  4.4    /  TBili  1.4    /  DBili  x      /  AST  20     /  ALT  6      /  AlkPhos  81     2021 02:35  RECENT CULTURES:   @ 06:30 .Sputum Sputum     No growth to date.    Few polymorphonuclear leukocytes per low power field  No Squamous epithelial cells per low power field  No organisms seen per oil power field     @ 06:15 .CSF CSF     No growth    polymorphonuclear leukocytes seen  No organisms seen  by cytocentrifuge     @ 06:13 .Blood Blood     No growth to date.       @ 20:36 .Urine Catheterized     No growth       @ 08:27 .Blood Blood     No growth to date.       @ 01:30 .Urine Catheterized     No growth          =================MEDICATIONS======================  MEDICATIONS  MEDICATIONS  (STANDING):  acetaminophen  IV Intermittent - Peds. 750 milliGRAM(s) IV Intermittent every 6 hours  Andexanet Raymond 480 milliGRAM(s),IV Diluent 48 milliLiter(s) 480 milliGRAM(s) (24 mL/Hr) IV Continuous <Continuous>  cefTRIAXone IV Intermittent - Peds 2000 milliGRAM(s) IV Intermittent every 24 hours  chlorhexidine 0.12% Oral Liquid - Peds 15 milliLiter(s) Swish and Spit every 12 hours  chlorhexidine 2% Topical Cloths - Peds 1 Application(s) Topical daily  famotidine IV Intermittent - Peds 20 milliGRAM(s) IV Intermittent every 12 hours  fat emulsion  (Plant Based) 20% Infusion - Pediatric 0.726 Gm/kG/Day (10 mL/Hr) IV Continuous <Continuous>  furosemide  IV Intermittent - Peds 20 milliGRAM(s) IV Intermittent every 8 hours  heparin   Infusion - Pediatric 0.045 Unit(s)/kG/Hr (3 mL/Hr) IV Continuous <Continuous>  levETIRAcetam IV Intermittent - Peds 1000 milliGRAM(s) IV Intermittent every 12 hours  lidocaine  Infusion - Peds 25 MICROgram(s)/kG/Min (12.4 mL/Hr) IV Continuous <Continuous>  norepinephrine Infusion - Peds 0.1 MICROgram(s)/kG/Min (9.92 mL/Hr) IV Continuous <Continuous>  Parenteral Nutrition - Pediatric 1 Each (69 mL/Hr) TPN Continuous <Continuous>  phytonadione IV Intermittent - Peds 5 milliGRAM(s) IV Intermittent <User Schedule>  propofol Infusion - Peds 2 mG/kG/Hr (13.2 mL/Hr) IV Continuous <Continuous>  senna Oral Liquid - Peds 10 milliLiter(s) Oral daily  sildenafil  IV Intermittent - Peds 10 milliGRAM(s) IV Intermittent every 8 hours  sodium chloride 0.9% -  250 milliLiter(s) (3 mL/Hr) IV Continuous <Continuous>  sodium chloride 0.9% lock flush - Peds 3 milliLiter(s) IV Push every 8 hours    MEDICATIONS  (PRN):  petrolatum, white/mineral oil Ophthalmic Ointment - Peds 1 Application(s) Both EYES two times a day PRN dry eyes  polyvinyl alcohol 1.4%/povidone 0.6% Ophthalmic Solution - Peds 1 Drop(s) Both EYES four times a day PRN Dry Eyes    ===================================================  IMAGING STUDIES:    [x ] XR   [ ] CT   [ ] MR   [ ] US  [ ] Echo  ===========================================================  Parent/Guardian is at the bedside:	[x ] Yes	[ ] No  Patient and Parent/Guardian updated as to the progress/plan of care:	[x ] Yes	[ ] No    [x] The patient remains in critical and unstable condition, and requires ICU care and monitoring, assessment, and treatment  [ ] The patient is improving but requires continued monitoring, assessment, treatment, and adjustment of therapy    [x] The total critical care time spent by attending physician was __35__ minutes, excluding procedure time.

## 2021-06-09 NOTE — PROGRESS NOTE PEDS - ASSESSMENT
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 He has elevated Fontan pressure secondary to LV diastolic dysfunction.  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 Persistent hypoxia, back in IART (not hemodynamically compromising), and most recently with runs of V-tach (unclear cause) requiring lidocaine initation.    PLAN:  Neuro:  - s/p 3% NaCl - target Na > 145  - Keppra (clinical szs)  - R sided hemiparesis  - EVD @ 15 - cont to monitor output  - Monitor ICP Q1hr (goal <20)  - Repeat head CT 6/8 - stable  - titrate propofol for neuro exam  - skull flap off, will need prosthetic in future  - tylenol RTC for fever control    Resp:  titrate ventilator  PEEP 8, , FiO2 down to 45%  If able to come down on FiO2, wean PEEP  IPV/albuterol for pulmonary toilet  Goal SpO2 > 85% (non-fenestrated but has been more hypoxic at baseline and discussions were ongoing prior to decompensation about home O2)    CV:  Lidocaine gtt started 6/7, EP following  VVI 70  Still in IART  Home meds on hold (Lisinopril, Aldactone)  Continue Sildenafil   Continue Lasix IV Q12hr: goal -500  Goal MAP (for CPP) - liberalize to 75 today, adjust NE for goal MAP    Heme:  Xarelto on hold  Goal PLTs >100  Give FFP to keep INR < _______  Per hematology - JANET appears fairly normal  s/p Vitamin K x3 days    FEN:  NPO/IVF - on NS  Bowel regimen  Still having high Replogle output, will hold off on feeds  Start TPN with low/no dextrose (per NRSGY)    ID  - Ancef --> CTX started 6/6 for LG fever - may be from blood - will de-escalate when afebrile    Access  Left Fem CVL (6/2), Connor Barron AL (6/1) 20 y/o male DILV, 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 He has elevated Fontan pressure secondary to LV diastolic dysfunction.  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 Persistent hypoxia, back in IART (not hemodynamically compromising), and most recently with runs of V-tach (unclear cause) requiring lidocaine initiation.    PLAN:  Neuro:  - s/p 3% NaCl - target Na > 140  - Keppra (clinical szs)  - R sided hemiparesis  - EVD @ 15 - cont to monitor output - NRSGY will raise 20  - Monitor ICP Q1hr (goal <20)  - Repeat head CT 6/8 - stable  [  ] CT 6/9 to follow-up  - titrate propofol for neuro exam  - skull flap off, will need prosthetic in future  - tylenol prn for fever control    Resp:  titrate ventilator  PRVC, PEEP 8, FiO2 down to 40%  If able to come down on FiO2, wean PEEP  IPV/albuterol for pulmonary toilet  Goal SpO2 > 85% (non-fenestrated but has been more hypoxic at baseline and discussions were ongoing prior to decompensation about home O2)  [  ] ERT    CV:  Lidocaine gtt started 6/7, EP following  Mode is DDD but wires without good sensitivity, so effectively VVI 70  Still in IART  Home meds on hold (Lisinopril, Aldactone)  Continue Sildenafil  Continue Lasix IV Q8hr: goal -500  Goal MAP (for CPP) - liberalize to 70 today, adjust NE for goal MAP  echo 6/8 extremely limited windows    Heme:  Xarelto on hold  Goal PLTs >100  Give FFP to keep INR < 2.0 (but before procedures will huddle with hematology to bring INR down < 1.5)  Hematology and NRSGY in discussions  s/p Vitamin K x3 days, now IV Vit K 3x/week    FEN:  NPO/IVF - on NS  Bowel regimen  Still having high Replogle output, will hold off on feeds  TPN with low/no dextrose (per NRSGY)  [  ] KUB    ID  - Ancef --> CTX started 6/6 for LG fever - may be from blood - will de-escalate when afebrile  [  ] ABx lock if CVC not getting replaced  - resend cultures    Access  Left Fem CVL (6/2) [  ] replace today  Connor MACHADO (6/1)  D/c Justin 20 y/o male DILV, 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 He has elevated Fontan pressure secondary to LV diastolic dysfunction.  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 Persistent hypoxia, back in IART (not hemodynamically compromising), and most recently with runs of V-tach (unclear cause) requiring lidocaine initiation.    PLAN:  Neuro:  - s/p 3% NaCl - target Na > 140  - Keppra (clinical szs)  - R sided hemiparesis  - EVD @ 15 - cont to monitor output - NRSGY will raise to 20  - Monitor ICP Q1hr (goal <20)  - Repeat head CT 6/8 - stable  [  ] CT 6/9 to follow-up  - titrate propofol for neuro exam  - skull flap off, will need prosthetic in future  - tylenol prn for fever control    Resp:  titrate ventilator  PRVC, PEEP 8, FiO2 down to 40%  Goal SpO2 > 85% (non-fenestrated but has been more hypoxic at baseline and discussions were ongoing prior to decompensation about home O2)  [  ] extended ERT - consider extubation  IPV/albuterol for pulmonary toilet    CV:  Lidocaine gtt started 6/7, EP following  Mode is DDD but wires without good sensitivity, so effectively VVI 70  Still in IART  Home meds on hold (Lisinopril, Aldactone)  Continue Sildenafil  Continue Lasix IV Q8hr: goal -500  Goal MAP (for CPP) - liberalize to 70 today, adjust NE for goal MAP  echo 6/8 extremely limited windows    Heme:  Xarelto on hold  Goal PLTs >100  Give FFP to keep INR < 2.0 (but before procedures will huddle with hematology to bring INR down < 1.5)  Hematology and NRSGY in discussions  s/p Vitamin K x3 days, now IV Vit K 3x/week    FEN:  NPO/IVF - on NS  Bowel regimen  Still having high Replogle output, will hold off on feeds  TPN with low/no dextrose (per NRSGY)  [  ] KUB    ID  - Ancef --> CTX started 6/6 for LG fever - may be from blood - will de-escalate when afebrile  [  ] ABx lock if CVC not getting replaced  - resend cultures    Access  Left Fem CVL (6/2) [  ] replace today  Connor MACHADO (6/1)  D/pedro luis Anderson

## 2021-06-09 NOTE — PROCEDURE NOTE - NSINFORMCONSENT_GEN_A_CORE
Benefits, risks, and possible complications of procedure explained to patient/caregiver who verbalized understanding and gave written consent.
This was an emergent procedure.
Benefits, risks, and possible complications of procedure explained to patient/caregiver who verbalized understanding and gave verbal consent.

## 2021-06-09 NOTE — CHART NOTE - NSCHARTNOTEFT_GEN_A_CORE
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  Pt was initially admitted for further assessment and treatment s/p diagnostic cath and failed IART ablation attempt.  Course has been further complicated by left frontal parenchymal hemorrhage requiring emergent evacuation with neurosurgery and EVD placement.  Pt remains ventilated, remains NPO, with NG to suction (950ml out).  Pt is receiving fluid restricted, minimal dextrose in TPN solution and lipids to provide nutrition.  Pt noted with hyperglycemia.     Meds: Ceftriaxone, Precedex, Lasix, Propofol, Pepcid, Norepinephrine, Sildenafil, Keppra, Vitamin K    Wt: 66.1kG (Last obtained: 5/26)    Wt as metabolic 24.2*kG; (*kG defined as maintenance fluid volume in mL/100mL)    GENERAL APPEARANCE: lean,  Well developed,   HEENT: Normocephalic, No Cheilosis,  No Periorbital Edema, Non- Icteric   RESPIRATORY:  Ventilated with ET tube;   ABDOMEN:  Non-Distended  NEUROLOGY: Not Alert; Sedated;  EXTREMITIES: No Cyanosis;  SKIN: No Rashes visible; no Jaundice    LABS: 	Na:  145  Cl:  104  BUN:  19  Glucose:  150   Magnesium:  2.2  Triglycerides:  186                K:  3.6  CO2:  23  Creatinine:  0.75   Ca/iCa:  9.6/1.1   Phosphorus:  3.2	          ASSESSMENT:     Feeding Problems                                  On Parenteral Nutrition                              Hyperglycemia    PARENTERAL INTAKE: Total kcals/day 820;    Grams protein/day 50;       Kcal/*kG/day: Amino Acid 8; Glucose 6; Lipid 20; Total 34            Pt remains NPO, with NG to suction; pt receiving fluid/dextrose restricted TPN/lipids to provide nutrition.  Pt noted with hyperglycemia while receiving Dextrose 2.5% solution.  Pt receiving minimal calories from TPN due to fluid and dextrose restriction.    PLAN:  TPN changes:  Lipid rate increased from 10 to 15ml/hr to provide more calories; dextrose maintained at 2.5% at [facility]/Neurosurgery request (pt also noted with hyperglycemia while receiving minimal dextrose).  TPN electrolytes unchanged, and no calcium added to TPN since pt is receiving Ceftriaxone, which is not compatible with calcium containing IVF.  [facility] is managing acute fluid and electrolyte changes.

## 2021-06-09 NOTE — PROGRESS NOTE PEDS - ATTENDING COMMENTS
Agree with above note, assessment & plan (edited where appropriate). 21 yo M s/p lateral tunnel Fontan (DILV, L-malposed great arteries. Also history of sick sinus syndrome and complete heart block, s/p dual chamber epicardial pacemaker with cardiac resynchronization therapy for left ventricular dysfunction and failing Fontan in 2016.  Admitted with IART for ablation; EP study with unsuccessful attempt at ablation; diagnostic cath --> high Fontan pressure (20mmHg). Underwent amiodarone load & cardioversion (unsuccessful) which was complicated by L frontal parenchymal bleed requiring surgical evacuation & EVD placement.      No further VT after lidocaine infusion started yesterday afternoon; continue at current dose and monitor rhythm for titration. Plan for extubation readiness trial as per the ICU/neurosurgery team today. Repeat echocardiogram yesterday did not show any significant issues but was severely limited.     Neuro care as per neurosurgery & PICU team. Patient remains in critical condition and is at risk for complex arrhythmias with resulting hemodynamic collapse.  Plan discussed with mother at bedside & PICU team.

## 2021-06-09 NOTE — CHART NOTE - NSCHARTNOTEFT_GEN_A_CORE
L femoral CVL pulled at approximately 3pm on 6/9. Pressure held for approximately 10min and hemostasis achieved. Site cleaned and dressed.    -Judith PGY3

## 2021-06-09 NOTE — PROGRESS NOTE PEDS - ATTENDING COMMENTS
evd at 15 raise to 20, stable exam, f/u heme, ct thursday am for possible clamp trial with 3d protocol for possible custom cranioplasty

## 2021-06-09 NOTE — CHART NOTE - NSCHARTNOTEFT_GEN_A_CORE
Called to evaluate patient with decreased EVD output  EVD was raised from 15CM/H2O to 20CM/H2O earlier. EVD remains patent. ICP mainly 15-20  Patient mildly agitated  ICU Vital Signs Last 24 Hrs  T(C): 37.1 (09 Jun 2021 23:00), Max: 38.6 (09 Jun 2021 02:00)  T(F): 98.7 (09 Jun 2021 23:00), Max: 101.4 (09 Jun 2021 02:00)  HR: 70 (09 Jun 2021 23:09) (70 - 73)  BP: --  BP(mean): --  ABP: 161/75 (09 Jun 2021 23:09) (96/59 - 171/79)  ABP(mean): 99 (09 Jun 2021 23:09) (68 - 108)  RR: 22 (09 Jun 2021 23:09) (12 - 34)  SpO2: 90% (09 Jun 2021 23:09) (84% - 96%)    Awake and alert  PERRL  Following simple commands  HAMZAH    Above discussed with Dr Danielson  No change in management at this time

## 2021-06-09 NOTE — PROCEDURE NOTE - NSINDICATIONS_GEN_A_CORE
venous access
arterial puncture to obtain ABG's/monitoring purposes
critical illness/hypertonic/irritant infusion
IART/arrhythmia

## 2021-06-09 NOTE — PROGRESS NOTE PEDS - SUBJECTIVE AND OBJECTIVE BOX
INTERVAL HISTORY: Persistently febrile.     RESPIRATORY SUPPORT: Mode: SIMV with PS, RR (machine): 12, FiO2: 40, PEEP: 8, PS: 5  NUTRITION: NPO       @ 07:01  -   @ 07:00  --------------------------------------------------------  IN: 3402.1 mL / OUT: 4127 mL / NET: -724.9 mL    INTRAVASCULAR ACCESS: L Fem CVL, RRA    MEDICATIONS:  furosemide  IV Intermittent - Peds 10 milliGRAM(s) IV Intermittent every 8 hours  lidocaine  Infusion - Peds 25 MICROgram(s)/kG/Min IV Continuous <Continuous>  norepinephrine Infusion - Peds 0.1 MICROgram(s)/kG/Min IV Continuous <Continuous>  sildenafil  IV Intermittent - Peds 10 milliGRAM(s) IV Intermittent every 8 hours  cefTRIAXone IV Intermittent - Peds 2000 milliGRAM(s) IV Intermittent every 24 hours  acetaminophen  IV Intermittent - Peds. 750 milliGRAM(s) IV Intermittent every 6 hours  levETIRAcetam IV Intermittent - Peds 1000 milliGRAM(s) IV Intermittent every 12 hours  propofol Infusion - Peds 2 mG/kG/Hr IV Continuous <Continuous>  famotidine IV Intermittent - Peds 20 milliGRAM(s) IV Intermittent every 12 hours  senna Oral Liquid - Peds 10 milliLiter(s) Oral daily  fat emulsion  (Plant Based) 20% Infusion - Pediatric 0.726 Gm/kG/Day IV Continuous <Continuous>  heparin   Infusion - Pediatric 0.045 Unit(s)/kG/Hr IV Continuous <Continuous>  Parenteral Nutrition - Pediatric 1 Each TPN Continuous <Continuous>  phytonadione IV Intermittent - Peds 5 milliGRAM(s) IV Intermittent <User Schedule>  sodium chloride 0.9% -  250 milliLiter(s) IV Continuous <Continuous>  sodium chloride 0.9% lock flush - Peds 3 milliLiter(s) IV Push every 8 hours    PHYSICAL EXAMINATION:  Vital signs -   T(C): 37.3 (21 @ 08:00), Max: 39 (21 @ 20:15)  HR: 70 (21 @ 08:00) (70 - 128)  BP: 127/70 (21 @ 20:00) (127/70 - 127/70)  ABP:  (96/59 - 140/68)  RR: 12 (21 @ 08:00) (12 - 19)  SpO2: 89% (21 @ 08:00) (82% - 91%)  CVP(mm Hg):  (11 - 32)    General - intubated, sedated. Will respond to stimuli  Skin - no rash, no desquamation, no cyanosis.  Eyes / ENT - no conjunctival injection, sclerae anicteric, external ears & nares normal, mucous membranes moist.  Pulmonary - normal inspiratory effort, no retractions, lungs clear to auscultation bilaterally, no wheezes, no rales.  Cardiovascular - normal rate, regular rhythm, normal S1 & single S2, grade 1/6 blowing holosystolic murmur at LMSB, no rubs, no gallops, capillary refill < 2sec, normal pulses.  Gastrointestinal - soft, non-distended, non-tender, no splenomegaly. (+) hepatomegaly.  Musculoskeletal - no joint swelling, no clubbing, no edema.  Neurologic / Psychiatric - intubated, sedated    LABORATORY TESTS:                          13.2  CBC:   10.32 )-----------( 217   (21 @ 02:35)                          41.5               145   |  104   |  19                 Ca: 9.6    BMP:   ----------------------------< 150    M.2   (21 @ 02:35)             3.6    |  23    | 0.75               Ph: 3.2      LFT:     TPro: 8.5 / Alb: 4.4 / TBili: 1.4 / DBili: x / AST: 20 / ALT: 6 / AlkPhos: 81   (21 @ 02:35)    COAG: PT: 17.7 / PTT: 44.1 / INR: 1.57   (21 @ 02:35)       ABG:   pH: 7.51 / pCO2: 35 / pO2: 60 / HCO3: 29 / Base Excess: 4.3 / SaO2: 91.7 / Lactate: x / iCa: 1.15   (21 @ 02:31)    IMAGING STUDIES:  Electrocardiogram - (21) Ventricular paced rhythm @ 75bpm. Underlying IART.    Telemetry - (21) Ventricular paced rhythm @ 75bpm. Underlying IART.    Chest x-ray - (21) Stable mild cardiomegaly with subsegmental left lower lobe atelectasis.    Echocardiogram - (21)   1. This was a technically difficult suboptimal study due to poor echo windows. Findings limited to below.   2. 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   3. Mild mitral valve regurgitation.   4. Trivial tricuspid valve regurgitation.   5. Trivial aortic valve regurgitation.   6. Status post device closure of Fontan fenestration.   7. S/P stent placement in the Fontan pathway. Limited imaging of the inferior Fontan baffle. In this setting, the inferior vena cava to its connection near the atrial portion of the baffle appears patent with no obstruction. S/p device closure of Fontan fenestration. The Fontan fenestration is not seen on this study. The right bidirectional Storm is seen only on color flow mapping. This appears to have laminar low velocity flow.   8. The connection of the right bidirectional Storm to the right pulmonary artery could not be imaged. The right pulmonary artery is seen in a limited portion immediately to the left of the Storm and appears patent. The left pulmonary artery could not be imaged.   9. Extremely poor and limited imaging of the lateral free walls of the single systemic left ventricle. On limited short axis views there appears to be adequate systolic excursion of the posterior wall of the left ventricle and decreased in the anterior wall. The bulboventricular foramen could not be imaged. Suggest alternate imaging for appropriate assessment of function.  10. No pericardial effusion.  11. Small right pleural effusion.    Cath - (21)  Access 4 Fr RFA, 7 Fr RFV x2 with US guidance.  Sat data (%): on 50% FiO2 LPA 73, RUPV 98, Ao 97; CI 2.6 L/min/m2 with Qp:Qs 1 :1.  Pressures (mmHg): Elevated mFontan = mIVC = 20. mPCW=16, No significant gradient across LV to AAo to Vikki (98/58/73).  Normal PVR while on sildenafil.  Angios showed unobstructed Fontan with existing stent within Fontan conduit.    Comment: IART ablation was attempted after the diagnostic cath but unsuccessful. Patient was overdrive paced out of IART. At the end of the case, Ao sat was 94% and LPA 66% on 50% FiO2

## 2021-06-09 NOTE — PROGRESS NOTE PEDS - SUBJECTIVE AND OBJECTIVE BOX
POD # 8 s/p left frontal craniectomy for ICH and insertion of EVD for IVH    Patient with one ICP spike to 26 (8PM), no other significant events. EVD @ 15cm/H2o, drained 157cc/24H.   Drainage still blood tinged,  ICP ranged from 9-26, currently 10. INR 1.57.  Lidocaine drip started yesterday for bigemeny and runs of V-tach.     HPI:  19yo male with complex cardiac h/o DILV, L-malposition of great arteries, sick sinus syndrome and AV mihir disease with tachybradycardia syndrome with a dual chamber pacemaker (currently with RV lead fracture and is currently only LV paced 2/2 complete heart block), PSH of status post Qlxgy-Pefx-Tedgkfi anastomosis and aortic arch reconstruction followed by a fenestrated lateral tunnel Fontan completion (now s/p fenestration closure). here s/p cardiac catheterization and ablation. Procedure was complicated by unsuccessful ablation and post extubation patient was noted to have increased hemoptysis and desaturations, patient was reintubated for airway protection and given propofol for sedation.  (26 May 2021 20:19)    PAST MEDICAL & SURGICAL HISTORY:  Double inlet left ventricle  D-TGA (dextro-transposition of great arteries)  Sick sinus syndrome  Atrial tachycardia  Hepatomegaly  Other ascites  Pulmonary hypertension  Pacemaker  AV block  Coagulopathy  S/P cardiac cath2001-assessment of anatomy prior to 1st surgery  2004- coil emolization of collateral vessels  2011-balloon angioplasty of superior narrowing of Fontan circuit, balloon angioplasty of LPA, ASD device placed for closure of Fontan fenestraion  10/24/2016- Cath of Fontan  S/P Nirmal-Taussig shunt  Zbyyk-Pmxs-Apqldas anastomosis with modified right BT shunt and creation of pulmonary atresia  S/P celestine-Fontan operation  3/15/2003- Celestine-Fontan with ligation of BT shunt and left pulmonary artery plasty  Cardiac pacemaker 2004    PHYSICAL EXAM: intubated, sedated on propofol  opens eyes to stimulation, PERRL, tracts, not following commands  craniectomy site- soft  Motor- localizes BUE, withdraws BLE  Incision site C/D/I    Diet:  Regular (  )  NPO       ( x )    Drains:  ventriculostomy   ( x )  Lumbar drain       (  )  NEIL drain               (  )  Hemovac              (  )    Vital Signs Last 24 Hrs  T(C): 37.6 (2021 05:00), Max: 39 (2021 20:15)  T(F): 99.6 (2021 05:00), Max: 102.2 (2021 20:15)  HR: 70 (2021 07:23) (70 - 128)  BP: 127/70 (2021 20:00) (127/70 - 127/70)  BP(mean): 85 (2021 20:00) (85 - 85)  RR: 12 (2021 07:00) (12 - 19)  SpO2: 90% (2021 07:23) (82% - 91%)  I&O's Summary    2021 07:01  -  2021 07:00  --------------------------------------------------------  IN: 3402.1 mL / OUT: 4127 mL / NET: -724.9 mL      MEDICATIONS  (STANDING):  acetaminophen  IV Intermittent - Peds. 750 milliGRAM(s) IV Intermittent every 6 hours  Andexanet Raymond 480 milliGRAM(s),IV Diluent 48 milliLiter(s) 480 milliGRAM(s) (24 mL/Hr) IV Continuous <Continuous>  cefTRIAXone IV Intermittent - Peds 2000 milliGRAM(s) IV Intermittent every 24 hours  chlorhexidine 0.12% Oral Liquid - Peds 15 milliLiter(s) Swish and Spit every 12 hours  chlorhexidine 2% Topical Cloths - Peds 1 Application(s) Topical daily  famotidine IV Intermittent - Peds 20 milliGRAM(s) IV Intermittent every 12 hours  fat emulsion  (Plant Based) 20% Infusion - Pediatric 0.726 Gm/kG/Day (10 mL/Hr) IV Continuous <Continuous>  furosemide  IV Intermittent - Peds 20 milliGRAM(s) IV Intermittent every 8 hours  heparin   Infusion - Pediatric 0.045 Unit(s)/kG/Hr (3 mL/Hr) IV Continuous <Continuous>  levETIRAcetam IV Intermittent - Peds 1000 milliGRAM(s) IV Intermittent every 12 hours  lidocaine  Infusion - Peds 25 MICROgram(s)/kG/Min (12.4 mL/Hr) IV Continuous <Continuous>  norepinephrine Infusion - Peds 0.1 MICROgram(s)/kG/Min (9.92 mL/Hr) IV Continuous <Continuous>  Parenteral Nutrition - Pediatric 1 Each (69 mL/Hr) TPN Continuous <Continuous>  phytonadione IV Intermittent - Peds 5 milliGRAM(s) IV Intermittent <User Schedule>  propofol Infusion - Peds 2 mG/kG/Hr (13.2 mL/Hr) IV Continuous <Continuous>  senna Oral Liquid - Peds 10 milliLiter(s) Oral daily  sildenafil  IV Intermittent - Peds 10 milliGRAM(s) IV Intermittent every 8 hours  sodium chloride 0.9% -  250 milliLiter(s) (3 mL/Hr) IV Continuous <Continuous>  sodium chloride 0.9% lock flush - Peds 3 milliLiter(s) IV Push every 8 hours    MEDICATIONS  (PRN):  petrolatum, white/mineral oil Ophthalmic Ointment - Peds 1 Application(s) Both EYES two times a day PRN dry eyes  polyvinyl alcohol 1.4%/povidone 0.6% Ophthalmic Solution - Peds 1 Drop(s) Both EYES four times a day PRN Dry Eyes    LABS:                        13.2   10.32 )-----------( 217      ( 2021 02:35 )             41.5     06-    145  |  104  |  19  ----------------------------<  150<H>  3.6   |  23  |  0.75    Ca    9.6      2021 02:35  Phos  3.2     06-  Mg     2.2     -    TPro  8.5<H>  /  Alb  4.4  /  TBili  1.4<H>  /  DBili  x   /  AST  20  /  ALT  6   /  AlkPhos  81  06-09    PT/INR - ( 2021 02:35 )   PT: 17.7 sec;   INR: 1.57 ratio         PTT - ( 2021 02:35 )  PTT:44.1 sec

## 2021-06-10 LAB
ALBUMIN SERPL ELPH-MCNC: 4 G/DL — SIGNIFICANT CHANGE UP (ref 3.3–5)
ALP SERPL-CCNC: 79 U/L — SIGNIFICANT CHANGE UP (ref 60–270)
ALT FLD-CCNC: 7 U/L — SIGNIFICANT CHANGE UP (ref 4–41)
ANION GAP SERPL CALC-SCNC: 17 MMOL/L — HIGH (ref 7–14)
AST SERPL-CCNC: 28 U/L — SIGNIFICANT CHANGE UP (ref 4–40)
BILIRUB SERPL-MCNC: 1.3 MG/DL — HIGH (ref 0.2–1.2)
BLD GP AB SCN SERPL QL: NEGATIVE — SIGNIFICANT CHANGE UP
BLOOD GAS ARTERIAL - LYTES,HGB,ICA,LACT RESULT: SIGNIFICANT CHANGE UP
BUN SERPL-MCNC: 27 MG/DL — HIGH (ref 7–23)
CALCIUM SERPL-MCNC: 9.4 MG/DL — SIGNIFICANT CHANGE UP (ref 8.4–10.5)
CHLORIDE SERPL-SCNC: 107 MMOL/L — SIGNIFICANT CHANGE UP (ref 98–107)
CO2 SERPL-SCNC: 23 MMOL/L — SIGNIFICANT CHANGE UP (ref 22–31)
CREAT SERPL-MCNC: 0.79 MG/DL — SIGNIFICANT CHANGE UP (ref 0.5–1.3)
CULTURE RESULTS: NO GROWTH — SIGNIFICANT CHANGE UP
CULTURE RESULTS: SIGNIFICANT CHANGE UP
D DIMER BLD IA.RAPID-MCNC: 7613 NG/ML DDU — SIGNIFICANT CHANGE UP
FIBRINOGEN PPP-MCNC: 683 MG/DL — HIGH (ref 290–520)
GGT SERPL-CCNC: 106 U/L — HIGH (ref 8–61)
GLUCOSE SERPL-MCNC: 129 MG/DL — HIGH (ref 70–99)
MAGNESIUM SERPL-MCNC: 2.1 MG/DL — SIGNIFICANT CHANGE UP (ref 1.6–2.6)
PHOSPHATE SERPL-MCNC: 3.4 MG/DL — SIGNIFICANT CHANGE UP (ref 2.5–4.5)
POTASSIUM SERPL-MCNC: 3.9 MMOL/L — SIGNIFICANT CHANGE UP (ref 3.5–5.3)
POTASSIUM SERPL-SCNC: 3.9 MMOL/L — SIGNIFICANT CHANGE UP (ref 3.5–5.3)
PROT SERPL-MCNC: 7.9 G/DL — SIGNIFICANT CHANGE UP (ref 6–8.3)
RH IG SCN BLD-IMP: NEGATIVE — SIGNIFICANT CHANGE UP
SODIUM SERPL-SCNC: 147 MMOL/L — HIGH (ref 135–145)
SPECIMEN SOURCE: SIGNIFICANT CHANGE UP
SPECIMEN SOURCE: SIGNIFICANT CHANGE UP
TRIGL SERPL-MCNC: 205 MG/DL — HIGH

## 2021-06-10 PROCEDURE — 99291 CRITICAL CARE FIRST HOUR: CPT

## 2021-06-10 PROCEDURE — 99233 SBSQ HOSP IP/OBS HIGH 50: CPT | Mod: 25

## 2021-06-10 PROCEDURE — 76870 US EXAM SCROTUM: CPT | Mod: 26

## 2021-06-10 PROCEDURE — 99222 1ST HOSP IP/OBS MODERATE 55: CPT

## 2021-06-10 PROCEDURE — 77011 CT SCAN FOR LOCALIZATION: CPT | Mod: 26

## 2021-06-10 PROCEDURE — 99232 SBSQ HOSP IP/OBS MODERATE 35: CPT

## 2021-06-10 PROCEDURE — 71275 CT ANGIOGRAPHY CHEST: CPT | Mod: 26

## 2021-06-10 PROCEDURE — 71045 X-RAY EXAM CHEST 1 VIEW: CPT | Mod: 26

## 2021-06-10 RX ORDER — ELECTROLYTE SOLUTION,INJ
1 VIAL (ML) INTRAVENOUS
Refills: 0 | Status: DISCONTINUED | OUTPATIENT
Start: 2021-06-10 | End: 2021-06-10

## 2021-06-10 RX ORDER — I.V. FAT EMULSION 20 G/100ML
1.09 EMULSION INTRAVENOUS
Qty: 72 | Refills: 0 | Status: DISCONTINUED | OUTPATIENT
Start: 2021-06-10 | End: 2021-06-10

## 2021-06-10 RX ORDER — LEVETIRACETAM 250 MG/1
1500 TABLET, FILM COATED ORAL EVERY 12 HOURS
Refills: 0 | Status: DISCONTINUED | OUTPATIENT
Start: 2021-06-10 | End: 2021-06-16

## 2021-06-10 RX ORDER — CEFAZOLIN SODIUM 1 G
1980 VIAL (EA) INJECTION EVERY 8 HOURS
Refills: 0 | Status: DISCONTINUED | OUTPATIENT
Start: 2021-06-11 | End: 2021-06-20

## 2021-06-10 RX ORDER — CALCIUM GLUCONATE 100 MG/ML
2000 VIAL (ML) INTRAVENOUS ONCE
Refills: 0 | Status: COMPLETED | OUTPATIENT
Start: 2021-06-10 | End: 2021-06-10

## 2021-06-10 RX ORDER — FUROSEMIDE 40 MG
15 TABLET ORAL EVERY 8 HOURS
Refills: 0 | Status: DISCONTINUED | OUTPATIENT
Start: 2021-06-10 | End: 2021-06-11

## 2021-06-10 RX ORDER — I.V. FAT EMULSION 20 G/100ML
1.45 EMULSION INTRAVENOUS
Qty: 96 | Refills: 0 | Status: DISCONTINUED | OUTPATIENT
Start: 2021-06-10 | End: 2021-06-10

## 2021-06-10 RX ADMIN — Medication 25 MILLIGRAM(S): at 04:50

## 2021-06-10 RX ADMIN — Medication 3 UNIT(S)/KG/HR: at 11:30

## 2021-06-10 RX ADMIN — SODIUM CHLORIDE 3 MILLILITER(S): 9 INJECTION INTRAMUSCULAR; INTRAVENOUS; SUBCUTANEOUS at 02:04

## 2021-06-10 RX ADMIN — Medication 3 UNIT(S)/KG/HR: at 07:26

## 2021-06-10 RX ADMIN — Medication 2 MILLIGRAM(S): at 18:32

## 2021-06-10 RX ADMIN — DEXMEDETOMIDINE HYDROCHLORIDE IN 0.9% SODIUM CHLORIDE 8.26 MICROGRAM(S)/KG/HR: 4 INJECTION INTRAVENOUS at 00:27

## 2021-06-10 RX ADMIN — SODIUM CHLORIDE 3 MILLILITER(S): 9 INJECTION INTRAMUSCULAR; INTRAVENOUS; SUBCUTANEOUS at 18:51

## 2021-06-10 RX ADMIN — Medication 2 MILLIGRAM(S): at 04:00

## 2021-06-10 RX ADMIN — Medication 12.4 MICROGRAM(S)/KG/MIN: at 07:27

## 2021-06-10 RX ADMIN — I.V. FAT EMULSION 20 GM/KG/DAY: 20 EMULSION INTRAVENOUS at 18:53

## 2021-06-10 RX ADMIN — DEXMEDETOMIDINE HYDROCHLORIDE IN 0.9% SODIUM CHLORIDE 8.26 MICROGRAM(S)/KG/HR: 4 INJECTION INTRAVENOUS at 07:27

## 2021-06-10 RX ADMIN — SODIUM CHLORIDE 3 MILLILITER(S): 9 INJECTION, SOLUTION INTRAVENOUS at 07:25

## 2021-06-10 RX ADMIN — Medication 25 MILLIGRAM(S): at 12:05

## 2021-06-10 RX ADMIN — Medication 40 MILLIGRAM(S): at 17:46

## 2021-06-10 RX ADMIN — LEVETIRACETAM 266.68 MILLIGRAM(S): 250 TABLET, FILM COATED ORAL at 16:47

## 2021-06-10 RX ADMIN — DEXMEDETOMIDINE HYDROCHLORIDE IN 0.9% SODIUM CHLORIDE 8.26 MICROGRAM(S)/KG/HR: 4 INJECTION INTRAVENOUS at 05:21

## 2021-06-10 RX ADMIN — DEXMEDETOMIDINE HYDROCHLORIDE IN 0.9% SODIUM CHLORIDE 4.96 MICROGRAM(S)/KG/HR: 4 INJECTION INTRAVENOUS at 16:13

## 2021-06-10 RX ADMIN — CHLORHEXIDINE GLUCONATE 1 APPLICATION(S): 213 SOLUTION TOPICAL at 22:08

## 2021-06-10 RX ADMIN — FAMOTIDINE 200 MILLIGRAM(S): 10 INJECTION INTRAVENOUS at 13:37

## 2021-06-10 RX ADMIN — Medication 25 MILLIGRAM(S): at 20:37

## 2021-06-10 RX ADMIN — LEVETIRACETAM 266.68 MILLIGRAM(S): 250 TABLET, FILM COATED ORAL at 05:24

## 2021-06-10 RX ADMIN — Medication 12.4 MICROGRAM(S)/KG/MIN: at 19:28

## 2021-06-10 RX ADMIN — Medication 3 MILLIGRAM(S): at 20:51

## 2021-06-10 RX ADMIN — I.V. FAT EMULSION 15 GM/KG/DAY: 20 EMULSION INTRAVENOUS at 07:28

## 2021-06-10 RX ADMIN — SODIUM CHLORIDE 3 MILLILITER(S): 9 INJECTION, SOLUTION INTRAVENOUS at 11:31

## 2021-06-10 RX ADMIN — DEXMEDETOMIDINE HYDROCHLORIDE IN 0.9% SODIUM CHLORIDE 4.96 MICROGRAM(S)/KG/HR: 4 INJECTION INTRAVENOUS at 19:27

## 2021-06-10 RX ADMIN — FAMOTIDINE 200 MILLIGRAM(S): 10 INJECTION INTRAVENOUS at 02:15

## 2021-06-10 RX ADMIN — Medication 3 MILLIGRAM(S): at 11:28

## 2021-06-10 RX ADMIN — Medication 1 EACH: at 18:53

## 2021-06-10 RX ADMIN — SODIUM CHLORIDE 3 MILLILITER(S): 9 INJECTION INTRAMUSCULAR; INTRAVENOUS; SUBCUTANEOUS at 10:31

## 2021-06-10 RX ADMIN — SODIUM CHLORIDE 3 MILLILITER(S): 9 INJECTION, SOLUTION INTRAVENOUS at 19:22

## 2021-06-10 RX ADMIN — Medication 12.4 MICROGRAM(S)/KG/MIN: at 11:29

## 2021-06-10 RX ADMIN — Medication 1 EACH: at 07:28

## 2021-06-10 RX ADMIN — Medication 3 UNIT(S)/KG/HR: at 19:21

## 2021-06-10 RX ADMIN — CEFTRIAXONE 100 MILLIGRAM(S): 500 INJECTION, POWDER, FOR SOLUTION INTRAMUSCULAR; INTRAVENOUS at 06:13

## 2021-06-10 NOTE — PROGRESS NOTE PEDS - PROBLEM SELECTOR PLAN 1
1. CT head this morning  2. continue EVD @ 20cm/H2o, notify Nsx for ICP > 20, or zero output from EVD x 2 consecutive hours  3. continue ABx, keppra  4. Maintain INR < 1.5, f/u with hematology  5. neurochecks Q1H  Case d/w attending

## 2021-06-10 NOTE — PROGRESS NOTE PEDS - SUBJECTIVE AND OBJECTIVE BOX
Interval/Overnight Events:    VITAL SIGNS:  T(C): 37.2 (06-10-21 @ 05:00), Max: 38.1 (21 @ 15:00)  HR: 70 (06-10-21 @ 07:28) (70 - 70)  BP: --  ABP: 130/63 (06-10-21 @ 06:00) (100/50 - 171/79)  ABP(mean): 84 (06-10-21 @ 06:00) (65 - 108)  RR: 21 (06-10-21 @ 07:28) (12 - 34)  SpO2: 91% (06-10-21 @ 07:28) (84% - 96%)  CVP(mm Hg): 25 (06-10-21 @ 06:00) (11 - 30)  End-Tidal CO2:  NIRS:    Physical Exam:    General: NAD  HEENT: no acute changes from baseline  Resp: unlabored, CTAB, good aeration, no rhonchi/rales/wheezing  CV: RRR, nl S1/S2, no m/r/g appreciated, CR < 2s, distal pulses 2+ and equal  Abd: soft, NTND, no HSM appreciated  Ext: wwp, no gross deformities  Neuro: alert and oriented, no acute change from baseline  Skin: no rash    =======================RESPIRATORY=======================  [ ] FiO2: ___ 	[ ] Heliox: ____ 		[ ] BiPAP: ___   [ ] NC: __  Liters			[ ] HFNC: __ 	Liters, FiO2: __  [ ] Mechanical Ventilation:   [ ] Inhaled Nitric Oxide:  [ ] Extubation Readiness Assessed  Comments:    =====================CARDIOVASCULAR======================  Cardiovascular Medications:  furosemide  IV Intermittent - Peds 15 milliGRAM(s) IV Intermittent every 8 hours  lidocaine  Infusion - Peds 25 MICROgram(s)/kG/Min IV Continuous <Continuous>  niCARdipine Infusion - Peds 1 MICROgram(s)/kG/Min IV Continuous <Continuous>  sildenafil  IV Intermittent - Peds 10 milliGRAM(s) IV Intermittent every 8 hours    Chest Tube Output: ___ in 24 hours, ___ in last 12 hours   [ ] Right     [ ] Left    [ ] Mediastinal  Cardiac Rhythm:	[x] NSR		[ ] Other:    [ ] Central Venous Line	[ ] R	[ ] L	[ ] IJ	[ ] Fem	[ ] SC			Placed:   [ ] Arterial Line		[ ] R	[ ] L	[ ] PT	[ ] DP	[ ] Fem	[ ] Rad	[ ] Ax	Placed:   [ ] PICC:				[ ] Broviac		[ ] Mediport  Comments:    ==========HEMATOLOGY/ONCOLOGY=================  Transfusions:	[ ] PRBC	[ ] Platelets	[ ] FFP		[ ] Cryoprecipitate  DVT Prophylaxis:  Comments:    =================INFECTIOUS DISEASE==================  [ ] Cooling [place] being used. Target Temperature:     ===========FLUIDS/ELECTROLYTES/NUTRITION=============  I&O's Summary    2021 07:01  -  10 Peter 2021 07:00  --------------------------------------------------------  IN: 2828.3 mL / OUT: 2523 mL / NET: 305.3 mL      Daily   Diet:	[ ] Regular	[ ] Soft		[ ] Clears	[ ] NPO  .	[ ] Other:  .	[ ] NGT		[ ] NDT		[ ] GT		[ ] GJT    [ ] Urinary Catheter, Date Placed:   Comments:    ====================NEUROLOGY===================  [ ] SBS:		[ ] GISSELLE-1:	[ ] BIS:	[ ] CAPD:  [ ] EVD set at: ___ , Drainage in last 24 hours: ___ ml    [x] Adequacy of sedation and pain control has been assessed and adjusted  Comments:      ==================PATIENT CARE=================  [ ] There are pressure ulcers/areas of breakdown that are being addressed -   [x] Preventative measures are being taken to decrease risk for skin breakdown.  [x] Necessity of urinary, arterial, and venous catheters discussed    ==================LABS============================  ABG - ( 10 Peter 2021 02:51 )  pH: 7.50  /  pCO2: 37    /  pO2: 66    / HCO3: 29    / Base Excess: 4.8   /  SaO2: 92.6  / Lactate: x                                                11.5                  Neurophils% (auto):   74.2   (06-10 @ 03:29):    7.02 )-----------(161          Lymphocytes% (auto):  9.3                                           36.9                   Eosinphils% (auto):   5.8      Manual%: Neutrophils x    ; Lymphocytes x    ; Eosinophils x    ; Bands%: x    ; Blasts x        ( 06-10 @ 03:29 )   PT: 17.3 sec;   INR: 1.55 ratio  aPTT: 35.2 sec                            147    |  107    |  27                  Calcium: 9.4   / iCa: 0.97   (06-10 @ 03:29)    ----------------------------<  129       Magnesium: 2.1                              3.9     |  23     |  0.79             Phosphorous: 3.4      TPro  7.9    /  Alb  4.0    /  TBili  1.3    /  DBili  x      /  AST  28     /  ALT  7      /  AlkPhos  79     10 Peter 2021 03:29  RECENT CULTURES:   @ 14:41 .Sputum Sputum trap       No polymorphonuclear leukocytes per low power field  No Squamous epithelial cells per low power field  No organisms seen     @ 06:15 .CSF CSF     No growth    polymorphonuclear leukocytes seen  No organisms seen  by cytocentrifuge     @ 06:13 .Blood Blood     No growth to date.       @ 21:47 .Sputum Sputum     No growth at 48 hours    Few polymorphonuclear leukocytes per low power field  No Squamous epithelial cells per low power field  No organisms seen per oil power field    06-06 @ 20:36 .Urine Catheterized     No growth      06-05 @ 08:27 .Blood Blood     No growth to date.          =================MEDICATIONS======================  MEDICATIONS  MEDICATIONS  (STANDING):  Andexanet Raymond 480 milliGRAM(s),IV Diluent 48 milliLiter(s) 480 milliGRAM(s) (24 mL/Hr) IV Continuous <Continuous>  cefTRIAXone IV Intermittent - Peds 2000 milliGRAM(s) IV Intermittent every 24 hours  chlorhexidine 0.12% Oral Liquid - Peds 15 milliLiter(s) Swish and Spit every 12 hours  chlorhexidine 2% Topical Cloths - Peds 1 Application(s) Topical daily  dexMEDEtomidine Infusion - Peds 0.5 MICROgram(s)/kG/Hr (8.26 mL/Hr) IV Continuous <Continuous>  famotidine IV Intermittent - Peds 20 milliGRAM(s) IV Intermittent every 12 hours  fat emulsion  (Plant Based) 20% Infusion - Pediatric 1.089 Gm/kG/Day (15 mL/Hr) IV Continuous <Continuous>  furosemide  IV Intermittent - Peds 15 milliGRAM(s) IV Intermittent every 8 hours  heparin   Infusion - Pediatric 0.045 Unit(s)/kG/Hr (3 mL/Hr) IV Continuous <Continuous>  levETIRAcetam IV Intermittent - Peds 1000 milliGRAM(s) IV Intermittent every 12 hours  lidocaine  Infusion - Peds 25 MICROgram(s)/kG/Min (12.4 mL/Hr) IV Continuous <Continuous>  niCARdipine Infusion - Peds 1 MICROgram(s)/kG/Min (7.93 mL/Hr) IV Continuous <Continuous>  Parenteral Nutrition - Pediatric 1 Each (69 mL/Hr) TPN Continuous <Continuous>  phytonadione IV Intermittent - Peds 5 milliGRAM(s) IV Intermittent <User Schedule>  senna Oral Liquid - Peds 10 milliLiter(s) Oral daily  sildenafil  IV Intermittent - Peds 10 milliGRAM(s) IV Intermittent every 8 hours  sodium chloride 0.9% -  250 milliLiter(s) (3 mL/Hr) IV Continuous <Continuous>  sodium chloride 0.9% lock flush - Peds 3 milliLiter(s) IV Push every 8 hours    MEDICATIONS  (PRN):  acetaminophen  IV Intermittent - Peds. 750 milliGRAM(s) IV Intermittent every 6 hours PRN Moderate Pain (4 -  6)  petrolatum, white/mineral oil Ophthalmic Ointment - Peds 1 Application(s) Both EYES two times a day PRN dry eyes  polyvinyl alcohol 1.4%/povidone 0.6% Ophthalmic Solution - Peds 1 Drop(s) Both EYES four times a day PRN Dry Eyes    ===================================================  IMAGING STUDIES:    [ ] XR   [ ] CT   [ ] MR   [ ] US  [ ] Echo  ===========================================================  Parent/Guardian is at the bedside:	[ ] Yes	[ ] No  Patient and Parent/Guardian updated as to the progress/plan of care:	[ ] Yes	[ ] No    [x] The patient remains in critical and unstable condition, and requires ICU care and monitoring, assessment, and treatment  [ ] The patient is improving but requires continued monitoring, assessment, treatment, and adjustment of therapy    [x] The total critical care time spent by attending physician was __35__ minutes, excluding procedure time. Interval/Overnight Events:    Exubated yesterday  Escalated to HFNC for hypoxia, not for WOB  Some agitation/delerium    VITAL SIGNS:  T(C): 37.2 (06-10-21 @ 05:00), Max: 38.1 (21 @ 15:00)  HR: 70 (06-10-21 @ 07:28) (70 - 70)  BP: --  ABP: 130/63 (06-10-21 @ 06:00) (100/50 - 171/79)  ABP(mean): 84 (06-10-21 @ 06:00) (65 - 108)  RR: 21 (06-10-21 @ 07:28) (12 - 34)  SpO2: 91% (06-10-21 @ 07:28) (84% - 96%)  CVP(mm Hg): 25 (06-10-21 @ 06:00) (11 - 30)  End-Tidal CO2:  NIRS:    Physical Exam:    General: NAD  HEENT: no acute changes from baseline  Resp: dim in bases, unlabored  CV: RRR, nl S1/S2, no m/r/g appreciated, CR < 2s, distal pulses 2+ and equal  Abd: soft, distended but improving, no HSM appreciated  Ext: wwp, no gross deformities  Neuro: awake, follows simple commands, R hemiparesis  Skin: no rash    =======================RESPIRATORY=======================  [ ] FiO2: ___ 	[ ] Heliox: ____ 		[ ] BiPAP: ___   [ ] NC: __  Liters			[x ] HFNC: _60L 40%_ 	Liters, FiO2: __  [ ] Mechanical Ventilation:   [ ] Inhaled Nitric Oxide:  [ ] Extubation Readiness Assessed  Comments:    =====================CARDIOVASCULAR======================  Cardiovascular Medications:  furosemide  IV Intermittent - Peds 15 milliGRAM(s) IV Intermittent every 8 hours  lidocaine  Infusion - Peds 25 MICROgram(s)/kG/Min IV Continuous <Continuous>  niCARdipine Infusion - Peds 1 MICROgram(s)/kG/Min IV Continuous <Continuous>  sildenafil  IV Intermittent - Peds 10 milliGRAM(s) IV Intermittent every 8 hours    Chest Tube Output: ___ in 24 hours, ___ in last 12 hours   [ ] Right     [ ] Left    [ ] Mediastinal  Cardiac Rhythm:	[x] NSR		[ ] Other:    [x ] Central Venous Line	[ ] R	[ ] L	[ ] IJ	[ ] Fem	[ ] SC			Placed:   [ x] Arterial Line		[ ] R	[ ] L	[ ] PT	[ ] DP	[ ] Fem	[ ] Rad	[ ] Ax	Placed:   [ ] PICC:				[ ] Broviac		[ ] Mediport  Comments:    ==========HEMATOLOGY/ONCOLOGY=================  Transfusions:	[ ] PRBC	[ ] Platelets	[ ] FFP		[ ] Cryoprecipitate  DVT Prophylaxis:  Comments:    =================INFECTIOUS DISEASE==================  [ ] Cooling [place] being used. Target Temperature:     ===========FLUIDS/ELECTROLYTES/NUTRITION=============  I&O's Summary    2021 07:01  -  10 Peter 2021 07:00  --------------------------------------------------------  IN: 2828.3 mL / OUT: 2523 mL / NET: 305.3 mL      Daily   Diet:	[ ] Regular	[ ] Soft		[ ] Clears	[x ] NPO  .	[ ] Other:  .	[ ] NGT		[ ] NDT		[ ] GT		[ ] GJT    [ ] Urinary Catheter, Date Placed:   Comments:    ====================NEUROLOGY===================  [ ] SBS:		[ ] GISSELLE-1:	[ ] BIS:	[ ] CAPD:  [ ] EVD set at: ___ , Drainage in last 24 hours: ___ ml    [x] Adequacy of sedation and pain control has been assessed and adjusted  Comments:      ==================PATIENT CARE=================  [ ] There are pressure ulcers/areas of breakdown that are being addressed -   [x] Preventative measures are being taken to decrease risk for skin breakdown.  [x] Necessity of urinary, arterial, and venous catheters discussed    ==================LABS============================  ABG - ( 10 Peter 2021 02:51 )  pH: 7.50  /  pCO2: 37    /  pO2: 66    / HCO3: 29    / Base Excess: 4.8   /  SaO2: 92.6  / Lactate: x                                                11.5                  Neurophils% (auto):   74.2   (06-10 @ 03:29):    7.02 )-----------(161          Lymphocytes% (auto):  9.3                                           36.9                   Eosinphils% (auto):   5.8      Manual%: Neutrophils x    ; Lymphocytes x    ; Eosinophils x    ; Bands%: x    ; Blasts x        ( 06-10 @ 03:29 )   PT: 17.3 sec;   INR: 1.55 ratio  aPTT: 35.2 sec                            147    |  107    |  27                  Calcium: 9.4   / iCa: 0.97   (06-10 @ 03:29)    ----------------------------<  129       Magnesium: 2.1                              3.9     |  23     |  0.79             Phosphorous: 3.4      TPro  7.9    /  Alb  4.0    /  TBili  1.3    /  DBili  x      /  AST  28     /  ALT  7      /  AlkPhos  79     10 Peter 2021 03:29  RECENT CULTURES:   @ 14:41 .Sputum Sputum trap       No polymorphonuclear leukocytes per low power field  No Squamous epithelial cells per low power field  No organisms seen     @ 06:15 .CSF CSF     No growth    polymorphonuclear leukocytes seen  No organisms seen  by cytocentrifuge     @ 06:13 .Blood Blood     No growth to date.       @ 21:47 .Sputum Sputum     No growth at 48 hours    Few polymorphonuclear leukocytes per low power field  No Squamous epithelial cells per low power field  No organisms seen per oil power field    06-06 @ 20:36 .Urine Catheterized     No growth       @ 08:27 .Blood Blood     No growth to date.          =================MEDICATIONS======================  MEDICATIONS  MEDICATIONS  (STANDING):  Andexanet Raymond 480 milliGRAM(s),IV Diluent 48 milliLiter(s) 480 milliGRAM(s) (24 mL/Hr) IV Continuous <Continuous>  cefTRIAXone IV Intermittent - Peds 2000 milliGRAM(s) IV Intermittent every 24 hours  chlorhexidine 0.12% Oral Liquid - Peds 15 milliLiter(s) Swish and Spit every 12 hours  chlorhexidine 2% Topical Cloths - Peds 1 Application(s) Topical daily  dexMEDEtomidine Infusion - Peds 0.5 MICROgram(s)/kG/Hr (8.26 mL/Hr) IV Continuous <Continuous>  famotidine IV Intermittent - Peds 20 milliGRAM(s) IV Intermittent every 12 hours  fat emulsion  (Plant Based) 20% Infusion - Pediatric 1.089 Gm/kG/Day (15 mL/Hr) IV Continuous <Continuous>  furosemide  IV Intermittent - Peds 15 milliGRAM(s) IV Intermittent every 8 hours  heparin   Infusion - Pediatric 0.045 Unit(s)/kG/Hr (3 mL/Hr) IV Continuous <Continuous>  levETIRAcetam IV Intermittent - Peds 1000 milliGRAM(s) IV Intermittent every 12 hours  lidocaine  Infusion - Peds 25 MICROgram(s)/kG/Min (12.4 mL/Hr) IV Continuous <Continuous>  niCARdipine Infusion - Peds 1 MICROgram(s)/kG/Min (7.93 mL/Hr) IV Continuous <Continuous>  Parenteral Nutrition - Pediatric 1 Each (69 mL/Hr) TPN Continuous <Continuous>  phytonadione IV Intermittent - Peds 5 milliGRAM(s) IV Intermittent <User Schedule>  senna Oral Liquid - Peds 10 milliLiter(s) Oral daily  sildenafil  IV Intermittent - Peds 10 milliGRAM(s) IV Intermittent every 8 hours  sodium chloride 0.9% -  250 milliLiter(s) (3 mL/Hr) IV Continuous <Continuous>  sodium chloride 0.9% lock flush - Peds 3 milliLiter(s) IV Push every 8 hours    MEDICATIONS  (PRN):  acetaminophen  IV Intermittent - Peds. 750 milliGRAM(s) IV Intermittent every 6 hours PRN Moderate Pain (4 -  6)  petrolatum, white/mineral oil Ophthalmic Ointment - Peds 1 Application(s) Both EYES two times a day PRN dry eyes  polyvinyl alcohol 1.4%/povidone 0.6% Ophthalmic Solution - Peds 1 Drop(s) Both EYES four times a day PRN Dry Eyes    ===================================================  IMAGING STUDIES:    [ x] XR   [ ] CT   [ ] MR   [ ] US  [ ] Echo  ===========================================================  Parent/Guardian is at the bedside:	[x ] Yes	[ ] No  Patient and Parent/Guardian updated as to the progress/plan of care:	[x ] Yes	[ ] No    [x] The patient remains in critical and unstable condition, and requires ICU care and monitoring, assessment, and treatment  [ ] The patient is improving but requires continued monitoring, assessment, treatment, and adjustment of therapy    [x] The total critical care time spent by attending physician was __35__ minutes, excluding procedure time.

## 2021-06-10 NOTE — CHART NOTE - NSCHARTNOTEFT_GEN_A_CORE
Called to evaluate patient with upward gaze deviation following return from CT.   EVD clamped earlier today, nurse noted an upward gaze deviation. patient received ativan prior to my exam  ICU Vital Signs Last 24 Hrs  T(C): 36.4 (10 Peter 2021 17:00), Max: 38 (10 Peter 2021 02:00)  T(F): 97.5 (10 Peter 2021 17:00), Max: 100.4 (10 Peter 2021 02:00)  HR: 70 (10 Peter 2021 18:00) (69 - 70)  BP: 119/63 (10 Peter 2021 18:00) (93/55 - 129/78)  BP(mean): 80 (10 Peter 2021 18:00) (67 - 93)  ABP: 117/70 (10 Peter 2021 18:00) (84/58 - 163/80)  ABP(mean): 86 (10 Peter 2021 18:00) (60 - 106)  RR: 24 (10 Peter 2021 18:00) (18 - 29)  SpO2: 90% (10 Peter 2021 18:00) (85% - 96%)  ICP 16    Opens eyes to tactile stimuli  Tracks voice  Pupils 5mm, briskly reactive bilaterally  +Following commands, able to show thumbs, [place]    < from: CT Stereotactic Localization No Cont (06.10.21 @ 14:58) >    Postop changes compatible left frontal craniectomy is again seen with extension of parenchyma through the craniectomy defect.    Leftfrontal shunt catheter is again identified and unchanged in position    Small amount of hemorrhage along the catheter tract is again identified. There is slight decrease hemorrhage identified in the postop bed when compared the prior exam. Abnormal low-attenuation involving the left frontal cortical subcortical region is again seen.    Extra-axial collection, hematoma and staples are seen involving the postop region which measures approximately 1.2 cm widest diameter.    The size and configuration of the ventricles appear unchanged    Intraventricular hemorrhage is again seen and slightly less conspicuous.    The visualized paranasal sinuses mastoid regions appear clear.    Right-sided NG tube is seen.    IMPRESSION: Postop changes as described above.    < end of copied text >    Above discussed with Dr Russo. Will keep EVD clamped for now, agree with consulting neurology for VEEG. Increase keppra dose.

## 2021-06-10 NOTE — PROGRESS NOTE PEDS - ASSESSMENT
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 Presented in IART - s/p EP study with unsuccessful attempt at ablation along with diagnostic cath showing elevated Fontan pressure (20mmHg). He was loaded with amiodarone and ultimately electrically cardioverted out, however reverted back into IART. His course has now been further complicated by L frontal parenchymal hemorrhage requiring emergent evacuation with NSx and EVD placement and VTach of unknown origin.    Plan:  - Continuos telemetry monitoring.  - Pacemaker set at DDD 70-120bpm.   - Continue Sildenafil 20mg q8h IV  - lasix home dose is 20mg QD. Agree with lasix 10mg IV BID. Goal net even to negative.  - Monitor blood pressure. Goal MAPs > 70. Agree with norepi and/or vasopressin as needed. Titrate to goal per PICU to maintain CPP.  - Hold home lisinopril, aldactone. Agree with plan to ensure SBP not persistently higher than 140. Nicardapine as needed. Would try to reinstitute home medications when able to give enteral medications.  - continue lidocaine drip 25mcg/kg/min. Max dose is 40mcg/kg/min, can titrate if as further episodes of Vtach. Would give a 1mg/kg lidocaine bolus if Vtach recurs with increasing drip dose  - anticoagulation care per hematology/PICU/NSx  - rest of care per PICU and NSx  - Please page pediatric cardiology with any concerns or questions.    Thank you for involving us in the care of your patient.     Guicho Oliveros MD, MPH  Pediatric Cardiology Fellow  PAGER: [phone]

## 2021-06-10 NOTE — PHYSICAL THERAPY INITIAL EVALUATION PEDIATRIC - PERTINENT HX OF CURRENT PROBLEM, REHAB EVAL
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 Presented in IART - s/p EP study with unsuccessful attempt at ablation along with diagnostic cath showing elevated Fontan pressure.

## 2021-06-10 NOTE — CONSULT NOTE PEDS - ASSESSMENT
20 y/o male with complex heart disease s/p lateral tunnel Fontan. 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 Yesterday, the PICU team noticed left scrotal swelling with no associated symptoms, US shows left hydrocele, no bowel and no concern for torsion. OE: Left hemiscrotal reducible swelling and soft, mildly distended abdomen.    Plan:  - Monitor for abdominal distension, bowel function, and vomiting  - No need for urgent surgical intervention at this time  - Surgery can see him as an outpatient as long as he shows no signs of intestinal obstruction 20 y/o male with complex heart disease s/p lateral tunnel Fontan. 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 Yesterday, the PICU team noticed left scrotal swelling with no associated symptoms, US shows left hydrocele, no bowel and no concern for torsion. OE: Left hemiscrotum reducible swelling and soft, mildly distended abdomen.    Plan:  - Monitor for abdominal distension, bowel function, and vomiting  - No need for urgent surgical intervention at this time  - Surgery can see him as an outpatient as long as he shows no signs of intestinal obstruction

## 2021-06-10 NOTE — OCCUPATIONAL THERAPY INITIAL EVALUATION ADULT - ADDITIONAL COMMENTS
MOC stated he was Independent in all ADL/iADLs prior to admission. MOC stated he was Independent in all ADL/IADLs prior to admission.

## 2021-06-10 NOTE — PROGRESS NOTE PEDS - ASSESSMENT
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 He has elevated Fontan pressure secondary to LV diastolic dysfunction.  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 Persistent hypoxia, back in IART (not hemodynamically compromising), and most recently with runs of V-tach (unclear cause) requiring lidocaine initiation.    PLAN:  Neuro:  - s/p 3% NaCl - target Na > 140  - Keppra (clinical szs)  - R sided hemiparesis  - EVD @ 15 - cont to monitor output - NRSGY will raise to 20  - Monitor ICP Q1hr (goal <20)  - Repeat head CT 6/8 - stable  [  ] CT 6/9 to follow-up  - titrate propofol for neuro exam  - skull flap off, will need prosthetic in future  - tylenol prn for fever control    Resp:  titrate ventilator  PRVC, PEEP 8, FiO2 down to 40%  Goal SpO2 > 85% (non-fenestrated but has been more hypoxic at baseline and discussions were ongoing prior to decompensation about home O2)  [  ] extended ERT - consider extubation  IPV/albuterol for pulmonary toilet    CV:  Lidocaine gtt started 6/7, EP following  Mode is DDD but wires without good sensitivity, so effectively VVI 70  Still in IART  Home meds on hold (Lisinopril, Aldactone)  Continue Sildenafil  Continue Lasix IV Q8hr: goal -500  Goal MAP (for CPP) - liberalize to 70 today, adjust NE for goal MAP  echo 6/8 extremely limited windows    Heme:  Xarelto on hold  Goal PLTs >100  Give FFP to keep INR < 2.0 (but before procedures will huddle with hematology to bring INR down < 1.5)  Hematology and NRSGY in discussions  s/p Vitamin K x3 days, now IV Vit K 3x/week    FEN:  NPO/IVF - on NS  Bowel regimen  Still having high Replogle output, will hold off on feeds  TPN with low/no dextrose (per NRSGY)  [  ] KUB    ID  - Ancef --> CTX started 6/6 for LG fever - may be from blood - will de-escalate when afebrile  [  ] ABx lock if CVC not getting replaced  - resend cultures    Access  Left Fem CVL (6/2) [  ] replace today  Connor MACHADO (6/1)  D/pedro luis Anderson 20 y/o male DILV, 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 He has elevated Fontan pressure secondary to LV diastolic dysfunction.  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 Persistent hypoxia, back in IART (not hemodynamically compromising), and most recently with runs of V-tach (unclear cause) requiring lidocaine initiation.    PLAN:  Neuro:  - precedex for agitation/delerium - consider seroquel for safety but has Replogle in now and c/f QTc prolongation    - environmental interventions  - s/p 3% NaCl - target Na > 140  - Keppra (clinical szs)  - R sided hemiparesis  - EVD @ 20 - cont to monitor output and ICP (goal < 20)  - Repeat head CT 6/8 - stable  [  ] CT 6/9 to follow-up  - skull flap off, will need prosthetic in future  - tylenol prn for fever control  [  ] PT/rehab consult    Resp:  Extubated to HFNC 60L 40% - primarily for hypoxia  Goal SpO2 > 85% (non-fenestrated but has been more hypoxic at baseline and discussions were ongoing prior to decompensation about home O2)  [  ] CTA to eval for PE    CV:  Lidocaine gtt started 6/7 for V-tach runs, has not recurred, EP following  Mode is DDD but wires without good sensitivity, so effectively VVI 70  Still in IART  Home meds on hold (Lisinopril, Aldactone)  Continue Sildenafil  Continue Lasix IV Q8hr: goal -500  Normal MAP goals now, off NE  SBP < 140  echo 6/8 extremely limited windows    Heme:  Xarelto on hold  Goal PLTs >100  FFP to keep INR < 2.0 (but before procedures will huddle with hematology to bring INR down < 1.5)  Hematology and NRSGY in discussions  s/p Vitamin K x3 days, now IV Vit K 3x/week    FEN:  NPO/IVF - on NS  Bowel regimen  Still having high Replogle output (improving), trial clamp today  TPN with low/no dextrose (per NRSGY)    ID  - Ancef --> CTX started 6/6 for LG fever - may be from blood in brain - will de-escalate when afebrile  - resend cultures    Access  Right Fem CVL (6/9)  Connor MACHADO (6/1) 22 y/o male DILV, 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 He has elevated Fontan pressure secondary to LV diastolic dysfunction.  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 Persistent hypoxia, back in IART (not hemodynamically compromising), and most recently with runs of V-tach (unclear cause) requiring lidocaine initiation.    PLAN:  Neuro:  - precedex for agitation/delerium - consider seroquel for safety but has Replogle in now and c/f QTc prolongation    - environmental interventions  - s/p 3% NaCl - target Na > 140  - Keppra (clinical szs)  - R sided hemiparesis  - EVD @ 20 - cont to monitor output and ICP (goal < 20)  - Repeat head CT 6/8 - stable  [  ] CT 6/9 to follow-up  - skull flap off, will need prosthetic in future  - tylenol prn for fever control  [  ] PT/OT/speech/rehab consult    Resp:  Extubated to HFNC 60L 40% - primarily for hypoxia  Goal SpO2 > 85% (non-fenestrated but has been more hypoxic at baseline and discussions were ongoing prior to decompensation about home O2)  [  ] CTA to eval for PE    CV:  Lidocaine gtt started 6/7 for V-tach runs, has not recurred, EP following  Mode is DDD but wires without good sensitivity, so effectively VVI 70  Still in IART  Home meds on hold (Lisinopril, Aldactone)  Continue Sildenafil  Continue Lasix IV Q8hr: goal -500  Normal MAP goals now, off NE  SBP < 140  echo 6/8 extremely limited windows    Heme:  Xarelto on hold  Goal PLTs >100  FFP to keep INR < 2.0 (but before procedures will huddle with hematology to bring INR down < 1.5)  Hematology and NRSGY in discussions  s/p Vitamin K x3 days, now IV Vit K 3x/week    FEN:  NPO/IVF - on NS  Bowel regimen  Still having high Replogle output (improving), trial clamp today  TPN with low/no dextrose (per NRSGY)    ID  - Ancef --> CTX started 6/6 for LG fever - may be from blood in brain - will de-escalate when afebrile    Access  Right Fem CVL (6/9)  Connor MACHADO (6/1)

## 2021-06-10 NOTE — CHART NOTE - NSCHARTNOTEFT_GEN_A_CORE
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  Pt was initially admitted for further assessment and treatment s/p diagnostic cath and failed IART ablation attempt.  Course has been further complicated by left frontal parenchymal hemorrhage requiring emergent evacuation with neurosurgery and EVD placement.  Pt extubated, on HFNC, remains NPO, with NG to suction (425ml out).  Pt is receiving fluid restricted, minimal dextrose in TPN solution and lipids to provide nutrition.  Pt noted with hypocalcemia (no calcium added to TPN since pt remains on Ceftriaxone).     Meds: Ceftriaxone, Precedex, Lasix, Propofol, Pepcid, Norepinephrine, Sildenafil, Keppra, Vitamin K    Wt: 66.1kG (Last obtained: 5/26)    Wt as metabolic 24.2*kG; (*kG defined as maintenance fluid volume in mL/100mL)    GENERAL APPEARANCE: lean,  Well developed,   HEENT: Normocephalic, No Cheilosis,  No Periorbital Edema, Non- Icteric   RESPIRATORY:  On HFNC;   ABDOMEN:  Non-Distended  NEUROLOGY: Not Alert; Sedated;  EXTREMITIES: No Cyanosis;  SKIN: No Rashes visible; no Jaundice    LABS: 	Na:  147  Cl:  107  BUN: 27  Glucose:  129   Magnesium:  2.1  Triglycerides:  205                K:  3.9 mod H CO2:  23  Creatinine:  0.79   Ca/iCa:  9.4/0.97   Phosphorus:  3.4	          ASSESSMENT:     Feeding Problems                                  On Parenteral Nutrition                              Hypocalcemia    PARENTERAL INTAKE: Total kcals/day 1060;    Grams protein/day 50;       Kcal/*kG/day: Amino Acid 8; Glucose 6; Lipid 30; Total 44            Pt remains NPO, with NG to suction; pt receiving fluid/dextrose restricted TPN/lipids to provide nutrition.  Pt noted with hypocalcemia; no calcium is being added to TPN while pt remains on Ceftriaxone, which is not compatible with calcium containing IVF.  Pt receiving minimal calories from TPN due to fluid and dextrose restriction.    PLAN:  TPN changes:  Lipid rate increased from 15 to 20ml/hr to provide more calories; dextrose maintained at 2.5% at [facility]/Neurosurgery request.  TPN electrolytes unchanged.  Discussed with [facility], who is managing acute fluid and electrolyte changes.

## 2021-06-10 NOTE — PROVIDER CONTACT NOTE (CHANGE IN STATUS NOTIFICATION) - ASSESSMENT
pupils larger to 5 but briskly reactive, throughout entire shift largest is was 4 and reactive bilaterally sluggish at times on R side. during deviation pupils are 5. EVD remains clamped since 1400 and ICPs reamin 16

## 2021-06-10 NOTE — OCCUPATIONAL THERAPY INITIAL EVALUATION ADULT - NS ASR FOLLOW COMMAND OT EVAL
Pt initially sleeping/fatigued in beginning of session, able to wake up towards end of session, sqeeze hands and follow finger/25% of the time/able to follow single-step instructions Pt initially sleeping/fatigued in beginning of session, able to wake up towards end of session, squeeze hands and follow finger as documented in further detail below./25% of the time/able to follow single-step instructions

## 2021-06-10 NOTE — CONSULT NOTE PEDS - SUBJECTIVE AND OBJECTIVE BOX
PEDIATRIC GENERAL SURGERY CONSULT NOTE    TRINI MÉNDEZ  |  [id number]   |   20yMale   |   [facility]  AP      Patient is a 20y old  Male who presents with a chief complaint of cardiac catheterization and ablation (10 Peter 2021 14:25)      HPI: 22 y/o male DILV, 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 He has elevated Fontan pressure secondary to LV diastolic dysfunction.  Had L frontal hemorrhagic stroke on 21 requiring decompressive craniectomy and urgent evacuation in OR. Persistent hypoxia, back in IART (not hemodynamically compromising), and most recently with runs of V-tach (unclear cause) requiring lidocaine initiation. Yesterday, the PICU team noticed left scrotal swelling with no associated symptoms, US shows left hydrocele, no bowel and no concern for torsion. Last BM was last night, unknown if he has been passing gas or not.      PAST MEDICAL & SURGICAL HISTORY:  Double inlet left ventricle    D-TGA (dextro-transposition of great arteries)    Sick sinus syndrome    Atrial tachycardia    Hepatomegaly    Other ascites    Pulmonary hypertension    Pacemaker    AV block    Coagulopathy    S/P cardiac cath  2001-assessment of anatomy prior to 1st surgery  2004- coil emolization of collateral vessels  2011-balloon angioplasty of superior narrowing of Fontan circuit, balloon angioplasty of LPA, ASD device placed for closure of Fontan fenestraion  10/24/2016- Cath of Fontan    S/P Nirmal-Taussig shunt  [id number]-[id number] anastomosis with modified right BT shunt and creation of pulmonary atresia    S/P celestine-Fontan operation  3/15/2003- Celestine-Fontan with ligation of BT shunt and left pulmonary artery plasty    Cardiac pacemaker  2004      [ x ] No significant past history as reviewed with the patient and family    FAMILY HISTORY:    [ x ] Family history not pertinent as reviewed with the patient and family    SOCIAL HISTORY:  Vaccination Status:     MEDICATIONS  (STANDING):  Andexanet Raymond 480 milliGRAM(s),IV Diluent 48 milliLiter(s) 480 milliGRAM(s) (24 mL/Hr) IV Continuous <Continuous>  chlorhexidine 2% Topical Cloths - Peds 1 Application(s) Topical daily  dexMEDEtomidine Infusion - Peds 0.3 MICROgram(s)/kG/Hr (4.96 mL/Hr) IV Continuous <Continuous>  famotidine IV Intermittent - Peds 20 milliGRAM(s) IV Intermittent every 12 hours  fat emulsion  (Plant Based) 20% Infusion - Pediatric 1.452 Gm/kG/Day (20 mL/Hr) IV Continuous <Continuous>  fat emulsion  (Plant Based) 20% Infusion - Pediatric 1.089 Gm/kG/Day (15 mL/Hr) IV Continuous <Continuous>  furosemide  IV Intermittent - Peds 15 milliGRAM(s) IV Intermittent every 8 hours  heparin   Infusion - Pediatric 0.045 Unit(s)/kG/Hr (3 mL/Hr) IV Continuous <Continuous>  levETIRAcetam IV Intermittent - Peds 1000 milliGRAM(s) IV Intermittent every 12 hours  lidocaine  Infusion - Peds 25 MICROgram(s)/kG/Min (12.4 mL/Hr) IV Continuous <Continuous>  Parenteral Nutrition - Pediatric 1 Each (69 mL/Hr) TPN Continuous <Continuous>  Parenteral Nutrition - Pediatric 1 Each (69 mL/Hr) TPN Continuous <Continuous>  phytonadione IV Intermittent - Peds 5 milliGRAM(s) IV Intermittent <User Schedule>  sildenafil  IV Intermittent - Peds 10 milliGRAM(s) IV Intermittent every 8 hours  sodium chloride 0.9% -  250 milliLiter(s) (3 mL/Hr) IV Continuous <Continuous>  sodium chloride 0.9% lock flush - Peds 3 milliLiter(s) IV Push every 8 hours    MEDICATIONS  (PRN):  acetaminophen  IV Intermittent - Peds. 750 milliGRAM(s) IV Intermittent every 6 hours PRN Moderate Pain (4 -  6)    Allergies    No Known Allergies    Intolerances        Vital Signs Last 24 Hrs  T(C): 36.4 (10 Peter 2021 17:00), Max: 38 (10 Peter 2021 02:00)  T(F): 97.5 (10 Peter 2021 17:00), Max: 100.4 (10 Peter 2021 02:00)  HR: 70 (10 Peter 2021 19:19) (69 - 70)  BP: 108/63 (10 Peter 2021 19:00) (93/55 - 129/78)  BP(mean): 76 (10 Peter 2021 19:00) (67 - 93)  RR: 28 (10 Peter 2021 19:19) (18 - 30)  SpO2: 88% (10 Peter 2021 19:19) (85% - 96%)    PHYSICAL EXAM:  General: NAD, on high flow nasal cannula  HEENT: EVD in place, PERRLA  Resp: vent-assisted, unlabored, CTAB, fair-good aeration, no rhonchi/rales/wheezing  CV: RRR, nl S1/S2, no m/r/g appreciated, CR < 2s, distal pulses 2+ and equal  ABDOMEN: NG tube in place and clamped. Soft, Nontender, mildly distended; Bowel sounds present,  External genitalia: Reducible left inguinal swelling  EXTREMITIES:  2+ Peripheral Pulses, No clubbing, cyanosis, or edema  Ext: wwp, no gross deformities  Neuro: sedated, moves purposefully                            11.5   7.02  )-----------( 161      ( 10 Peter 2021 03:29 )             36.9     06-10    145  |  109<H>  |  33<H>  ----------------------------<  135<H>  4.5   |  23  |  0.85    Ca    8.7      10 Peter 2021 16:41  Phos  3.4     06-10  Mg     2.1     06-10    TPro  7.4  /  Alb  3.9  /  TBili  1.1  /  DBili  x   /  AST  33  /  ALT  10  /  AlkPhos  81  06-10    PT/INR - ( 10 Peter 2021 03:29 )   PT: 17.3 sec;   INR: 1.55 ratio         PTT - ( 10 Peter 2021 03:29 )  PTT:35.2 sec  Urinalysis Basic - ( 2021 14:22 )    Color: Yellow / Appearance: hazy / SG: >1.030 / pH: x  Gluc: x / Ketone: Negative  / Bili: Negative / Urobili: <2 mg/dL   Blood: x / Protein: 100 mg/dL / Nitrite: Negative   Leuk Esterase: Negative / RBC: 5-10 /HPF / WBC x   Sq Epi: x / Non Sq Epi: occ /HPF / Bacteria: Moderate    IMAGING STUDIES:    NPO: [ ] Yes  [ ] No  Reason for NPO: [ ] OR/Procedure  [ ] Imaging with sedation  [ ] Medical Necessity  [ ] Other _____  RN Informed: [ ] Yes  [ ] No  Family informed and educated: [ ] Yes, at  06-10-21 @ 20:12 [ ] No, because ______    ASSESSMENT:    PLAN:   PEDIATRIC GENERAL SURGERY CONSULT NOTE    TRINI MÉNDEZ  |  [id number]   |   21y Male   |   [facility]  AP      Patient is a 21y old  Male who presents with a chief complaint of cardiac catheterization and ablation (10 Peter 2021 14:25)      HPI: 22 y/o male DILV, 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 He has elevated Fontan pressure secondary to LV diastolic dysfunction.  Had L frontal hemorrhagic stroke on 21 requiring decompressive craniectomy and urgent evacuation in OR. Persistent hypoxia, back in IART (not hemodynamically compromising), and most recently with runs of V-tach (unclear cause) requiring lidocaine initiation. Yesterday, the PICU team noticed left scrotal swelling with no associated symptoms, US shows left hydrocele, no bowel and no concern for torsion. Last BM was last night, unknown if he has been passing gas or not.      PAST MEDICAL & SURGICAL HISTORY:  Double inlet left ventricle    D-TGA (dextro-transposition of great arteries)    Sick sinus syndrome    Atrial tachycardia    Hepatomegaly    Other ascites    Pulmonary hypertension    Pacemaker    AV block    Coagulopathy    S/P cardiac cath  2001-assessment of anatomy prior to 1st surgery  2004- coil emolization of collateral vessels  2011-balloon angioplasty of superior narrowing of Fontan circuit, balloon angioplasty of LPA, ASD device placed for closure of Fontan fenestraion  10/24/2016- Cath of Fontan    S/P Nirmal-Taussig shunt  Rferg-Olip-Hgeyswl anastomosis with modified right BT shunt and creation of pulmonary atresia    S/P celestine-Fontan operation  3/15/2003- Celestine-Fontan with ligation of BT shunt and left pulmonary artery plasty    Cardiac pacemaker  2004      [ x ] No significant past history as reviewed with the patient and family    FAMILY HISTORY:    [ x ] Family history not pertinent as reviewed with the patient and family    SOCIAL HISTORY:  Vaccination Status:     MEDICATIONS  (STANDING):  Andexanet Raymond 480 milliGRAM(s),IV Diluent 48 milliLiter(s) 480 milliGRAM(s) (24 mL/Hr) IV Continuous <Continuous>  chlorhexidine 2% Topical Cloths - Peds 1 Application(s) Topical daily  dexMEDEtomidine Infusion - Peds 0.3 MICROgram(s)/kG/Hr (4.96 mL/Hr) IV Continuous <Continuous>  famotidine IV Intermittent - Peds 20 milliGRAM(s) IV Intermittent every 12 hours  fat emulsion  (Plant Based) 20% Infusion - Pediatric 1.452 Gm/kG/Day (20 mL/Hr) IV Continuous <Continuous>  fat emulsion  (Plant Based) 20% Infusion - Pediatric 1.089 Gm/kG/Day (15 mL/Hr) IV Continuous <Continuous>  furosemide  IV Intermittent - Peds 15 milliGRAM(s) IV Intermittent every 8 hours  heparin   Infusion - Pediatric 0.045 Unit(s)/kG/Hr (3 mL/Hr) IV Continuous <Continuous>  levETIRAcetam IV Intermittent - Peds 1000 milliGRAM(s) IV Intermittent every 12 hours  lidocaine  Infusion - Peds 25 MICROgram(s)/kG/Min (12.4 mL/Hr) IV Continuous <Continuous>  Parenteral Nutrition - Pediatric 1 Each (69 mL/Hr) TPN Continuous <Continuous>  Parenteral Nutrition - Pediatric 1 Each (69 mL/Hr) TPN Continuous <Continuous>  phytonadione IV Intermittent - Peds 5 milliGRAM(s) IV Intermittent <User Schedule>  sildenafil  IV Intermittent - Peds 10 milliGRAM(s) IV Intermittent every 8 hours  sodium chloride 0.9% -  250 milliLiter(s) (3 mL/Hr) IV Continuous <Continuous>  sodium chloride 0.9% lock flush - Peds 3 milliLiter(s) IV Push every 8 hours    MEDICATIONS  (PRN):  acetaminophen  IV Intermittent - Peds. 750 milliGRAM(s) IV Intermittent every 6 hours PRN Moderate Pain (4 -  6)    Allergies    No Known Allergies    Intolerances        Vital Signs Last 24 Hrs  T(C): 36.4 (10 Peter 2021 17:00), Max: 38 (10 Peter 2021 02:00)  T(F): 97.5 (10 Peter 2021 17:00), Max: 100.4 (10 Peter 2021 02:00)  HR: 70 (10 Peter 2021 19:19) (69 - 70)  BP: 108/63 (10 Peter 2021 19:00) (93/55 - 129/78)  BP(mean): 76 (10 Peter 2021 19:00) (67 - 93)  RR: 28 (10 Peter 2021 19:19) (18 - 30)  SpO2: 88% (10 Peter 2021 19:19) (85% - 96%)    PHYSICAL EXAM:  General: NAD, on high flow nasal cannula  HEENT: EVD in place, PERRLA  Resp: vent-assisted, unlabored, CTAB, fair-good aeration, no rhonchi/rales/wheezing  CV: RRR, nl S1/S2, no m/r/g appreciated, CR < 2s, distal pulses 2+ and equal  ABDOMEN: NG tube in place and clamped. Soft, Nontender, mildly distended; Bowel sounds present,  External genitalia: Reducible left inguinal swelling  EXTREMITIES:  2+ Peripheral Pulses, No clubbing, cyanosis, or edema  Ext: wwp, no gross deformities  Neuro: sedated, moves purposefully                            11.5   7.02  )-----------( 161      ( 10 Peter 2021 03:29 )             36.9     06-10    145  |  109<H>  |  33<H>  ----------------------------<  135<H>  4.5   |  23  |  0.85    Ca    8.7      10 Peter 2021 16:41  Phos  3.4     06-10  Mg     2.1     06-10    TPro  7.4  /  Alb  3.9  /  TBili  1.1  /  DBili  x   /  AST  33  /  ALT  10  /  AlkPhos  81  06-10    PT/INR - ( 10 Peter 2021 03:29 )   PT: 17.3 sec;   INR: 1.55 ratio         PTT - ( 10 Peter 2021 03:29 )  PTT:35.2 sec  Urinalysis Basic - ( 2021 14:22 )    Color: Yellow / Appearance: hazy / SG: >1.030 / pH: x  Gluc: x / Ketone: Negative  / Bili: Negative / Urobili: <2 mg/dL   Blood: x / Protein: 100 mg/dL / Nitrite: Negative   Leuk Esterase: Negative / RBC: 5-10 /HPF / WBC x   Sq Epi: x / Non Sq Epi: occ /HPF / Bacteria: Moderate    IMAGING STUDIES:    NPO: [ ] Yes  [ ] No  Reason for NPO: [ ] OR/Procedure  [ ] Imaging with sedation  [ ] Medical Necessity  [ ] Other _____  RN Informed: [ ] Yes  [ ] No  Family informed and educated: [ ] Yes, at  06-10-21 @ 20:12 [ ] No, because ______    ASSESSMENT:    PLAN:

## 2021-06-10 NOTE — OCCUPATIONAL THERAPY INITIAL EVALUATION ADULT - PARENT CAREGIVER TRAINING, OT EVAL
Provided MOC with written handout for Low stimulation techniques, encouraged PROM to extremities Provided MOC with written handout for Low stimulation techniques, encouraged PROM to extremities.

## 2021-06-10 NOTE — CONSULT NOTE PEDS - SUBJECTIVE AND OBJECTIVE BOX
Pediatric Rehabilitation Medicine   Consultation Note    Patient is a 20y old  Male who presents with a chief complaint of Fontan with IART (10 Peter 2021 09:21)    HPI:  19yo male with complex cardiac h/o DILV, L-malposition of great arteries, sick sinus syndrome and AV mihir disease with tachybradycardia syndrome with a dual chamber pacemaker (currently with RV lead fracture and is currently only LV paced 2/2 complete heart block), PSH of status post Dwdkq-Peip-Fsdubna anastomosis and aortic arch reconstruction followed by a fenestrated lateral tunnel Fontan completion (now s/p fenestration closure). here s/p cardiac catheterization and ablation. Procedure was complicated by unsuccessful ablation and post extubation patient was noted to have increased hemoptysis and desaturations, patient was reintubated for airway protection and given propofol for sedation.  (26 May 2021 20:19)    Interval history: Patient was weaned to room air on .  Underwent cardioversion  through pacing. Four hours later he had an episode of staring and unresponsiveness. Head CT showed new severe hemorrhagic stroke in left frontal lobe in the ROCCO-MCA watershed territory with extension to the intraventricular regions. He was reintubated  for AMS and required urgent surgical decompressive craniectomy and hemorrhage evacuation with EVD placement overnight on . Started on Keppra. Serial head CTs showed a new hemorrhage along the EVD catheter tract on . Further head CTs did not show new bleeding. Xarelto stopped and reversal agents given in the OR as well as FPP and blood products. He received subQ vit K for 3 days and had additional units of FFP to get INR under 1.5 to prevent further bleeding. Hematology was consulted for persistently elevated INR, thought to be possibly due to hepatic congestion secondary to elevated fontan pressures. Patient received prophylactic antibiotics while EVD in place. Escalated to ceftriaxone when patient became febrile on  and ID consulted. Patient was started on TPN for nutrition. He was extubated to HFNC on .    Patient was seen and examined in his room with mom at bedside. Being weaned off Precedex, intermittently follows some one-step commands, extubated on , did not vocalize in our prescence. History obtained from chart and from patient's mother due to his mental status.        REVIEW OF SYSTEMS:   Normal except as otherwise noted in HPI.  CONSTITUTIONAL: No fevers or chills.   PSYCH: Mood is stable.  No difficulty sleeping.    EYES: No recent visual changes.   ENT: No dysphagia or dry mouth.   NECK: No pain or stiffness.  CARDIOVASCULAR: No chest pain or peripheral edema.  RESPIRATORY: No coughing or wheezing. No shortness of breath.  GASTROINTESTINAL: No abdominal pain. No nausea/vomiting. No diarrhea/constipation. Appetite is good. Patient’s last BM was [**].   GENITOURINARY: No new incontinence or retention.  MUSCULOSKELETAL: No painful joints. No loss of range of motion.  NEUROLOGICAL: No headache.  No numbness/tingling/weakness.  SKIN: No itching/rashes/lesions.  HEMATOLOGIC: No bleeding or clotting problems.    ENDOCRINE: No palpitations.    VITALS  T(C): 36.6 (06-10-21 @ 14:00), Max: 38.1 (21 @ 15:00)  HR: 70 (06-10-21 @ 14:00) (69 - 70)  BP: 112/70 (06-10-21 @ 14:00) (112/70 - 129/78)  RR: 23 (06-10-21 @ 14:00) (14 - 34)  SpO2: 92% (06-10-21 @ 14:00) (84% - 96%)  Wt(kg): --    PAST MEDICAL & SURGICAL HISTORY  Double inlet left ventricle    D-TGA (dextro-transposition of great arteries)    Sick sinus syndrome    Atrial tachycardia    Hepatomegaly    Other ascites    Pulmonary hypertension    Pacemaker    AV block    S/P Fontan procedure    Coagulopathy    Cardiac anomaly, congenital    Status post Fontan operation    S/P bidirectional Storm shunt    S/P Stansel operation    S/P Fontan procedure    S/P Fontan procedure    S/P cardiac cath    S/P Nirmal-Taussig shunt    S/P randell-Fontan operation    Cardiac pacemaker      SOCIAL HISTORY    FAMILY HISTORY   No pertinent family history in first degree relatives      RECENT LABS/IMAGING  CBC Full  -  ( 10 Peter 2021 03:29 )  WBC Count : 7.02 K/uL  RBC Count : 4.36 M/uL  Hemoglobin : 11.5 g/dL  Hematocrit : 36.9 %  Platelet Count - Automated : 161 K/uL  Mean Cell Volume : 84.6 fL  Mean Cell Hemoglobin : 26.4 pg  Mean Cell Hemoglobin Concentration : 31.2 gm/dL  Auto Neutrophil # : 5.21 K/uL  Auto Lymphocyte # : 0.65 K/uL  Auto Monocyte # : 0.57 K/uL  Auto Eosinophil # : 0.41 K/uL  Auto Basophil # : 0.04 K/uL  Auto Neutrophil % : 74.2 %  Auto Lymphocyte % : 9.3 %  Auto Monocyte % : 8.1 %  Auto Eosinophil % : 5.8 %  Auto Basophil % : 0.6 %    06-10    147<H>  |  107  |  27<H>  ----------------------------<  129<H>  3.9   |  23  |  0.79    Ca    9.4      10 2021 03:29  Phos  3.4     06-10  Mg     2.1     06-10    TPro  7.9  /  Alb  4.0  /  TBili  1.3<H>  /  DBili  x   /  AST  28  /  ALT  7   /  AlkPhos  79  06-10    ALLERGIES  No Known Allergies    MEDICATIONS   acetaminophen  IV Intermittent - Peds. 750 milliGRAM(s) IV Intermittent every 6 hours PRN  Andexanet Raymond 480 milliGRAM(s),IV Diluent 48 milliLiter(s) 480 milliGRAM(s) IV Continuous <Continuous>  cefTRIAXone IV Intermittent - Peds 2000 milliGRAM(s) IV Intermittent every 24 hours  chlorhexidine 2% Topical Cloths - Peds 1 Application(s) Topical daily  dexMEDEtomidine Infusion - Peds 0.5 MICROgram(s)/kG/Hr IV Continuous <Continuous>  famotidine IV Intermittent - Peds 20 milliGRAM(s) IV Intermittent every 12 hours  fat emulsion  (Plant Based) 20% Infusion - Pediatric 1.452 Gm/kG/Day IV Continuous <Continuous>  fat emulsion  (Plant Based) 20% Infusion - Pediatric 1.089 Gm/kG/Day IV Continuous <Continuous>  furosemide  IV Intermittent - Peds 15 milliGRAM(s) IV Intermittent every 8 hours  heparin   Infusion - Pediatric 0.045 Unit(s)/kG/Hr IV Continuous <Continuous>  levETIRAcetam IV Intermittent - Peds 1000 milliGRAM(s) IV Intermittent every 12 hours  lidocaine  Infusion - Peds 25 MICROgram(s)/kG/Min IV Continuous <Continuous>  Parenteral Nutrition - Pediatric 1 Each TPN Continuous <Continuous>  Parenteral Nutrition - Pediatric 1 Each TPN Continuous <Continuous>  petrolatum, white/mineral oil Ophthalmic Ointment - Peds 1 Application(s) Both EYES two times a day PRN  phytonadione IV Intermittent - Peds 5 milliGRAM(s) IV Intermittent <User Schedule>  polyvinyl alcohol 1.4%/povidone 0.6% Ophthalmic Solution - Peds 1 Drop(s) Both EYES four times a day PRN  senna Oral Liquid - Peds 10 milliLiter(s) Oral daily  sildenafil  IV Intermittent - Peds 10 milliGRAM(s) IV Intermittent every 8 hours  sodium chloride 0.9% -  250 milliLiter(s) IV Continuous <Continuous>  sodium chloride 0.9% lock flush - Peds 3 milliLiter(s) IV Push every 8 hours    ----------------------------------------------------------------------------------------  PHYSICAL EXAM  General:  Well-developed, well-nourished individual in no acute distress.   Skin:  Grossly negative for erythema, breakdown, or concerning lesions in affected area.  Eyes:  Gaze conjugate. No nystagmus.  No redness appreciated.   ENT:  No dysmorphic features or significant facial asymmetry. No significant head shape asymmetry or abnormality.   Lymph:  No lymphadenopathy is appreciated in the involved extremity.   Vessels:  Pedal pulses intact.  No lower extremity edema.   Lung:  Breathing is comfortable and regular.  No dyspnea noted during examination.   Abdominal:  No abdominal tenderness or distension.   Mental:  Age appropriate mood and affect.  Normal speech and thought processing for age.      NEUROLOGIC  Cranial nerves:  Grossly intact bilaterally.   Gait: Normal spring and stride.  Toe- and heel-walking are normal.   Additional gross motor:  Able to run, skip and gallop.  Able to broad jump and hop on one foot.   Balance: Tandem gait and Romberg tests are normal.  Single leg stance was normal for age.   Strength:  All major muscle groups of the bilateral upper and lower extremities have normal and symmetric muscle strength and bulk as could be tested for child's age.   Coordination:  Normal in upper and lower extremities, including finger-nose-finger and rapid alternating movements.   Reflexes:   Bilateral upper and lower extremity muscle stretch reflexes are physiologic and symmetric.  Plantar responses downgoing bilaterally.   Muscle tone:   No spasticity or hypotonia noted in axial or appendicular musculature.   Sensation:  Normal light touch sensation throughout upper and lower extremities.     MUSCULOSKELETAL  Spine:  Normal pain-free range of motion.  Spine straight with no evidence of kyphosis or scoliosis.  No torticollis.  Palpation:  Inspection and palpation of the spine and extremities are unremarkable.  Joint ROM:  Full and pain free without obvious instability or laxity in the major joints of all four extremities.  No gross appendicular deformities.           Pediatric Rehabilitation Medicine   Consultation Note    Patient is a 20y old  Male who presents with a chief complaint of Fontan with IART (10 Peter 2021 09:21)    HPI:  21yo male with complex cardiac h/o DILV, L-malposition of great arteries, sick sinus syndrome and AV mihir disease with tachybradycardia syndrome with a dual chamber pacemaker (currently with RV lead fracture and is currently only LV paced 2/2 complete heart block), PSH of status post Ointd-Dtuj-Pzqsjbz anastomosis and aortic arch reconstruction followed by a fenestrated lateral tunnel Fontan completion (now s/p fenestration closure). here s/p cardiac catheterization and ablation. Procedure was complicated by unsuccessful ablation and post extubation patient was noted to have increased hemoptysis and desaturations, patient was reintubated for airway protection and given propofol for sedation.  (26 May 2021 20:19)    Interval history: Patient was weaned to room air on .  Underwent cardioversion  through pacing. Four hours later he had an episode of staring and unresponsiveness. Head CT showed new severe hemorrhagic stroke in left frontal lobe in the ROCCO-MCA watershed territory with extension to the intraventricular regions. He was reintubated  for AMS and required urgent surgical decompressive craniectomy and hemorrhage evacuation with EVD placement overnight on . Started on Keppra. Serial head CTs showed a new hemorrhage along the EVD catheter tract on . Further head CTs did not show new bleeding. Xarelto stopped and reversal agents given in the OR as well as FPP and blood products. He received subQ vit K for 3 days and had additional units of FFP to get INR under 1.5 to prevent further bleeding. Hematology was consulted for persistently elevated INR, thought to be possibly due to hepatic congestion secondary to elevated fontan pressures. Patient received prophylactic antibiotics while EVD in place. Escalated to ceftriaxone when patient became febrile on  and ID consulted. Patient was started on TPN for nutrition. He was extubated to HFNC on .    Patient was seen and examined in his room with mom at bedside. Being weaned off Precedex, intermittently follows some one-step commands (opens eyes, tracks, wiggles fingers and toes). Shook his head "no" when asked if he is in pain. He has right sided plegia and at least anti-gravity strength in the left upper and lower extremities. Extubated on , he is mouthing words but not vocalizing. History obtained from chart and from patient's mother.    Developmental hx: Born at 42 weeks via NVD. Discharged without NICU care. Presented to the hospital one month after birth with cyanosis from congenital heart condition. Met developmental milestones but had medical restrictions related to physical exertion. Functionally was independent with all ADLs and gait PTA, college sophomore studying business. +RHD    Social hx: Lives in apartment building with family, 3 [address] and 3 full flights to apt, no elevator. Per mom he has option to stay with maternal grandmother who has pvt home, 0 [address], 1st floor bathroom and set up for bedroom. Mom able to assist with care FT.        REVIEW OF SYSTEMS:   Limited due to sedation, mental status. Normal except as otherwise noted in HPI.  CONSTITUTIONAL: No fevers or chills.   PSYCH: Some agitation (pulling at lines). No difficulty sleeping.    EYES: No recent visual changes.   ENT: No dysphagia or dry mouth. +HFNC  NECK: No pain or stiffness.  CARDIOVASCULAR: No chest pain or peripheral edema.  RESPIRATORY: No coughing or wheezing. No shortness of breath.  GASTROINTESTINAL: No abdominal pain. No nausea/vomiting. +TPN, +constipation. Patient’s last BM was .   GENITOURINARY: No new incontinence or retention. No cortez  MUSCULOSKELETAL: No painful joints. No loss of range of motion.  NEUROLOGICAL: No headache.  No numbness/tingling. +Right sided weakness.  SKIN: No itching/rashes/lesions.  ENDOCRINE: No intolerance to heat/cold.    VITALS  T(C): 36.6 (06-10-21 @ 14:00), Max: 38.1 (21 @ 15:00)  HR: 70 (06-10-21 @ 14:00) (69 - 70)  BP: 112/70 (06-10-21 @ 14:00) (112/70 - 129/78)  RR: 23 (06-10-21 @ 14:00) (14 - 34)  SpO2: 92% (06-10-21 @ 14:00) (84% - 96%)  Wt(kg): --    PAST MEDICAL & SURGICAL HISTORY  Double inlet left ventricle    D-TGA (dextro-transposition of great arteries)    Sick sinus syndrome    Atrial tachycardia    Hepatomegaly    Other ascites    Pulmonary hypertension    Pacemaker    AV block    S/P Fontan procedure    Coagulopathy    Cardiac anomaly, congenital    Status post Fontan operation    S/P bidirectional Storm shunt    S/P Stansel operation    S/P Fontan procedure    S/P Fontan procedure    S/P cardiac cath    S/P Nirmal-Taussig shunt    S/P randell-Fontan operation    Cardiac pacemaker      SOCIAL HISTORY    FAMILY HISTORY   No pertinent family history in first degree relatives      RECENT LABS/IMAGING  CBC Full  -  ( 10 Peter 2021 03:29 )  WBC Count : 7.02 K/uL  RBC Count : 4.36 M/uL  Hemoglobin : 11.5 g/dL  Hematocrit : 36.9 %  Platelet Count - Automated : 161 K/uL  Mean Cell Volume : 84.6 fL  Mean Cell Hemoglobin : 26.4 pg  Mean Cell Hemoglobin Concentration : 31.2 gm/dL  Auto Neutrophil # : 5.21 K/uL  Auto Lymphocyte # : 0.65 K/uL  Auto Monocyte # : 0.57 K/uL  Auto Eosinophil # : 0.41 K/uL  Auto Basophil # : 0.04 K/uL  Auto Neutrophil % : 74.2 %  Auto Lymphocyte % : 9.3 %  Auto Monocyte % : 8.1 %  Auto Eosinophil % : 5.8 %  Auto Basophil % : 0.6 %    06-10    147<H>  |  107  |  27<H>  ----------------------------<  129<H>  3.9   |  23  |  0.79    Ca    9.4      10 Peter 2021 03:29  Phos  3.4     06-10  Mg     2.1     06-10    TPro  7.9  /  Alb  4.0  /  TBili  1.3<H>  /  DBili  x   /  AST  28  /  ALT  7   /  AlkPhos  79  06-10    ALLERGIES  No Known Allergies    MEDICATIONS   acetaminophen  IV Intermittent - Peds. 750 milliGRAM(s) IV Intermittent every 6 hours PRN  Andexanet Raymond 480 milliGRAM(s),IV Diluent 48 milliLiter(s) 480 milliGRAM(s) IV Continuous <Continuous>  cefTRIAXone IV Intermittent - Peds 2000 milliGRAM(s) IV Intermittent every 24 hours  chlorhexidine 2% Topical Cloths - Peds 1 Application(s) Topical daily  dexMEDEtomidine Infusion - Peds 0.5 MICROgram(s)/kG/Hr IV Continuous <Continuous>  famotidine IV Intermittent - Peds 20 milliGRAM(s) IV Intermittent every 12 hours  fat emulsion  (Plant Based) 20% Infusion - Pediatric 1.452 Gm/kG/Day IV Continuous <Continuous>  fat emulsion  (Plant Based) 20% Infusion - Pediatric 1.089 Gm/kG/Day IV Continuous <Continuous>  furosemide  IV Intermittent - Peds 15 milliGRAM(s) IV Intermittent every 8 hours  heparin   Infusion - Pediatric 0.045 Unit(s)/kG/Hr IV Continuous <Continuous>  levETIRAcetam IV Intermittent - Peds 1000 milliGRAM(s) IV Intermittent every 12 hours  lidocaine  Infusion - Peds 25 MICROgram(s)/kG/Min IV Continuous <Continuous>  Parenteral Nutrition - Pediatric 1 Each TPN Continuous <Continuous>  Parenteral Nutrition - Pediatric 1 Each TPN Continuous <Continuous>  petrolatum, white/mineral oil Ophthalmic Ointment - Peds 1 Application(s) Both EYES two times a day PRN  phytonadione IV Intermittent - Peds 5 milliGRAM(s) IV Intermittent <User Schedule>  polyvinyl alcohol 1.4%/povidone 0.6% Ophthalmic Solution - Peds 1 Drop(s) Both EYES four times a day PRN  senna Oral Liquid - Peds 10 milliLiter(s) Oral daily  sildenafil  IV Intermittent - Peds 10 milliGRAM(s) IV Intermittent every 8 hours  sodium chloride 0.9% -  250 milliLiter(s) IV Continuous <Continuous>  sodium chloride 0.9% lock flush - Peds 3 milliLiter(s) IV Push every 8 hours    ----------------------------------------------------------------------------------------  PHYSICAL EXAM  General:  Well-developed, well-nourished individual lying in hospital bed. Intermittently alert and following commands.  Skin:  Grossly negative for erythema, breakdown, or concerning pressure ulcers.  Eyes:  EOMI, tracks objects.  No redness appreciated.   HEENT:  No dysmorphic features or significant facial asymmetry. +left craniotomy   Lymph:  No lymphadenopathy is appreciated.   Vessels:  Pedal pulses intact.  No lower extremity edema.   Lung:  Breathing is comfortable and regular.  No dyspnea noted during examination.   Abdominal:  No abdominal tenderness. Mild abdominal distension.   Mental:  Unable to fully assess due to sedation.    NEUROLOGIC  Cranial nerves:  Grossly intact. Testing limited by mental status.  Gait: Not observed.  Balance: Not tested.  Strength:  Moves all extremities. Antigravity strength on the right.  Coordination:  Not tested.  Reflexes:   Brisk reflexes on right.  Muscle tone:   No spasticity or hypotonia noted in axial or appendicular musculature. Mild heel cord tightness on right. Full ROM at bilateral ankles and elbows.   Sensation:  Withdraws extremities to pain. Unable to test light touch.     Jimbo is a 21yo male with complex cardiac h/o DILV, L-malposition of great arteries, sick sinus syndrome and AV mihir disease with tachybradycardia syndrome with a dual chamber pacemaker (currently with RV lead fracture and is currently only LV paced 2/2 complete heart block), PSH of status post Oqvte-Fekv-Sulqlts anastomosis and aortic arch reconstruction followed by a fenestrated lateral tunnel Fontan completion (now s/p fenestration closure). here s/p cardiac catheterization and ablation. Procedure was complicated by unsuccessful ablation and post extubation patient was noted to have increased hemoptysis and desaturations, patient was reintubated for airway protection and given propofol for sedation.  (26 May 2021 20:19)    Patient was weaned to room air on .  Underwent cardioversion  through pacing. Four hours later he had an episode of staring and unresponsiveness. Head CT showed new severe hemorrhagic stroke in left frontal lobe in the ROCCO-MCA watershed territory with extension to the intraventricular regions. He was reintubated  for AMS and required urgent surgical decompressive craniectomy and hemorrhage evacuation with EVD placement overnight on . Started on Keppra. Serial head CTs showed a new hemorrhage along the EVD catheter tract on . Further head CTs did not show new bleeding. Xarelto stopped and reversal agents given in the OR as well as FPP and blood products. He received subQ vit K for 3 days and had additional units of FFP to get INR under 1.5 to prevent further bleeding. Hematology was consulted for persistently elevated INR, thought to be possibly due to hepatic congestion secondary to elevated fontan pressures. Patient received prophylactic antibiotics while EVD in place. Escalated to ceftriaxone when patient became febrile on  and ID consulted. Patient was started on TPN for nutrition. He was extubated to HFNC on .    Patient was seen and examined in his room with mom at bedside. Being weaned off Precedex, intermittently follows some one-step commands (opens eyes, tracks, wiggles fingers and toes). Shook his head "no" when asked if he is in pain. He has right sided plegia and at least anti-gravity strength in the left upper and lower extremities. Extubated on , he is mouthing words but not vocalizing. History obtained from chart and from patient's mother.    Developmental hx: Born at 42 weeks via NVD. Discharged without NICU care. Presented to the hospital one month after birth with cyanosis from congenital heart condition. Met developmental milestones but had medical restrictions related to physical exertion. Functionally was independent with all ADLs and gait PTA, college sophomore studying business. +RHD    REVIEW OF SYSTEMS:   Limited due to sedation, mental status. Normal except as otherwise noted in HPI.  CONSTITUTIONAL: No fevers or chills.   PSYCH: Some agitation (pulling at lines). No difficulty sleeping.    ENT: No dysphagia or dry mouth. +HFNC  CARDIOVASCULAR: No peripheral edema.  RESPIRATORY: No shortness of breath.  GASTROINTESTINAL: +TPN, +constipation. Patient’s last BM was .   GENITOURINARY: No cortez  MUSCULOSKELETAL: No loss of range of motion.  NEUROLOGICAL: +Right sided weakness.  ENDOCRINE: No intolerance to heat/cold.    PAST MEDICAL & SURGICAL HISTORY  Double inlet left ventricle  D-TGA (dextro-transposition of great arteries)  Sick sinus syndrome  Atrial tachycardia  Hepatomegaly  Other ascites  Pulmonary hypertension  Pacemaker  AV block  S/P Fontan procedure  Coagulopathy  Cardiac anomaly, congenital  S/P bidirectional Storm shunt  S/P Stansel operation  S/P Fontan procedure  S/P cardiac cath  S/P Nirmal-Taussig shunt    SOCIAL HISTORY  Lives in apartment building with family, 3 [address] and 3 full flights to apt, no elevator. Per mom he has option to stay with maternal grandmother who has pvt home, 0 [address], 1st floor bathroom and set up for bedroom. Mom able to assist with care FT.    FAMILY HISTORY   No pertinent family history in first degree relatives    ALLERGIES  No Known Allergies    MEDICATIONS   acetaminophen  IV Intermittent - Peds. 750 milliGRAM(s) IV Intermittent every 6 hours PRN  Andexanet Raymond 480 milliGRAM(s),IV Diluent 48 milliLiter(s) 480 milliGRAM(s) IV Continuous <Continuous>  cefTRIAXone IV Intermittent - Peds 2000 milliGRAM(s) IV Intermittent every 24 hours  chlorhexidine 2% Topical Cloths - Peds 1 Application(s) Topical daily  dexMEDEtomidine Infusion - Peds 0.5 MICROgram(s)/kG/Hr IV Continuous <Continuous>  famotidine IV Intermittent - Peds 20 milliGRAM(s) IV Intermittent every 12 hours  fat emulsion  (Plant Based) 20% Infusion - Pediatric 1.452 Gm/kG/Day IV Continuous <Continuous>  fat emulsion  (Plant Based) 20% Infusion - Pediatric 1.089 Gm/kG/Day IV Continuous <Continuous>  furosemide  IV Intermittent - Peds 15 milliGRAM(s) IV Intermittent every 8 hours  heparin   Infusion - Pediatric 0.045 Unit(s)/kG/Hr IV Continuous <Continuous>  levETIRAcetam IV Intermittent - Peds 1000 milliGRAM(s) IV Intermittent every 12 hours  lidocaine  Infusion - Peds 25 MICROgram(s)/kG/Min IV Continuous <Continuous>  Parenteral Nutrition - Pediatric 1 Each TPN Continuous <Continuous>  Parenteral Nutrition - Pediatric 1 Each TPN Continuous <Continuous>  petrolatum, white/mineral oil Ophthalmic Ointment - Peds 1 Application(s) Both EYES two times a day PRN  phytonadione IV Intermittent - Peds 5 milliGRAM(s) IV Intermittent <User Schedule>  polyvinyl alcohol 1.4%/povidone 0.6% Ophthalmic Solution - Peds 1 Drop(s) Both EYES four times a day PRN  senna Oral Liquid - Peds 10 milliLiter(s) Oral daily  sildenafil  IV Intermittent - Peds 10 milliGRAM(s) IV Intermittent every 8 hours  sodium chloride 0.9% -  250 milliLiter(s) IV Continuous <Continuous>  sodium chloride 0.9% lock flush - Peds 3 milliLiter(s) IV Push every 8 hours    VITALS  T(C): 36.6 (06-10-21 @ 14:00), Max: 38.1 (21 @ 15:00)  HR: 70 (06-10-21 @ 14:00) (69 - 70)  BP: 112/70 (06-10-21 @ 14:00) (112/70 - 129/78)  RR: 23 (06-10-21 @ 14:00) (14 - 34)  SpO2: 92% (06-10-21 @ 14:00) (84% - 96%)    ----------------------------------------------------------------------------------------  PHYSICAL EXAM  General:  Well-developed, well-nourished individual lying in hospital bed. Intermittently following simple commands.  Skin:  Grossly negative for erythema, breakdown, or concerning pressure ulcers.  Eyes:  EOMI, tracks objects.   HEENT:  No dysmorphic features or significant facial asymmetry. +left craniotomy   Vessels:  No lower extremity edema.   Lung:  Breathing is comfortable and regular.  Abdominal:  No abdominal tenderness. Mild abdominal distension.   Mental:  Unable to fully assess due to sedation.    NEUROLOGIC  Cranial nerves:  Grossly intact. Testing limited by mental status.  Gait: Not observed.  Balance: Not tested.  Strength:  Moves all extremities. Antigravity strength on the right.  Coordination:  Not tested.  Reflexes:   Brisk reflexes on right.  Muscle tone:   No spasticity or hypotonia noted in axial or appendicular musculature. Mild heel cord tightness on right. Full ROM at bilateral ankles and elbows.   Sensation:  Withdraws extremities to pain. Unable to test light touch.

## 2021-06-10 NOTE — OCCUPATIONAL THERAPY INITIAL EVALUATION ADULT - PERTINENT HX OF CURRENT PROBLEM, REHAB EVAL
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 Presented in IART - s/p EP study with unsuccessful attempt at ablation along with diagnostic cath showing elevated Fontan pressure. 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 Presented in IART - s/p EP study c unsuccessful attempt at ablation along c diagnostic cath showing elevated Fontan pressure. 6/1 pt c L frontal stroke now s/p craniotomy and EVD POD 9.

## 2021-06-10 NOTE — OCCUPATIONAL THERAPY INITIAL EVALUATION ADULT - VISUAL ASSESSMENT: TRACKING
Pt able to track finger from Midline to L then fatigue. Pt able to track from midline to R, up and down on 2nd attempt

## 2021-06-10 NOTE — PROVIDER CONTACT NOTE (CHANGE IN STATUS NOTIFICATION) - SITUATION
Pt noted to have eye deviation upwards non responsive when deviating. No vital sign changes. transduced ICP via EVD remains 16

## 2021-06-10 NOTE — CONSULT NOTE PEDS - ASSESSMENT
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    Plan:  1)     Plan was reviewed with Patient/Parent/Caregiver as described above and all questions answered accordingly. Patient/Parent/Caregiver demonstrated understanding of therapy options and was in agreement with treatment plan. Pediatric PM&R will continue to follow.  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    Plan:  At this time, Jimbo has acute medical issues that require preferential attention. When seen and examined by PM&R he was still under sedation although being weaned. He would benefit from PT/OT/SLP services while hospitalized and will likely be a good candidate for inpatient acute rehab once he is medically optimized. ICH with right hemiplegia places him at risk for developing spasticity and range of motion restrictions in his right upper and lower extremities. Can consider resting splint for right hand, and he should have daily ROM and stretching of the heel cords to prevent contractures. Currently agitation described as line pulling by RN at bedside is being managed with enhanced supervision and environmental changes. Should he require medical treatment for agitation, typical first line is propranolol/labetalol but given his cardiac comorbidities, will defer to cardiology. Second line is IM Zyprexa while NPO. Recommend advancement to tube feeds as soon as possible to maintain nutritional status and bowel regimen with senna/Miralax.    Plan was reviewed with Jimbo's mother as described above and all questions answered accordingly. Jimbo's mother demonstrated understanding of therapy options and was in agreement with treatment plan. Pediatric PM&R will continue to follow.  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    Plan:  At this time, Jimbo has acute medical issues that require preferential attention. When seen and examined by PM&R he was still under sedation although being weaned. He would benefit from PT/OT/SLP services while hospitalized and will likely be a good candidate for inpatient acute rehab once he is medically optimized. ICH with right hemiplegia places him at risk for developing spasticity and range of motion restrictions in his right upper and lower extremities. Can consider resting splint for right hand, and he should have daily ROM and stretching of the heel cords to prevent contractures. Currently agitation described as line pulling by RN at bedside is being managed with enhanced supervision and environmental changes. Should he require medical treatment for agitation, typical first line is propranolol/labetalol but given his cardiac comorbidities, will defer to cardiology. Second line is IM Zyprexa while NPO. Recommend advancement to tube feeds as soon as possible to maintain nutritional status and bowel regimen with senna/Miralax.    Pediatric PM&R will continue to follow.

## 2021-06-10 NOTE — PROGRESS NOTE PEDS - SUBJECTIVE AND OBJECTIVE BOX
OVERNIGHT EVENTS:  EVD raised to 20cm of H20 yesterday. Drain output 78cc/24H. ICP 15-20 overnight.    HPI:  21yo male with complex cardiac h/o DILV, L-malposition of great arteries, sick sinus syndrome and AV mihir disease with tachybradycardia syndrome with a dual chamber pacemaker (currently with RV lead fracture and is currently only LV paced 2/2 complete heart block), PSH of status post Hlboa-Anps-Njblabb anastomosis and aortic arch reconstruction followed by a fenestrated lateral tunnel Fontan completion (now s/p fenestration closure). here s/p cardiac catheterization and ablation. Procedure was complicated by unsuccessful ablation and post extubation patient was noted to have increased hemoptysis and desaturations, patient was reintubated for airway protection and given propofol for sedation.  (26 May 2021 20:19)      Vital Signs Last 24 Hrs  T(C): 37.2 (10 Peter 2021 05:00), Max: 38.1 (2021 15:00)  T(F): 98.9 (10 Peter 2021 05:00), Max: 100.5 (2021 15:00)  HR: 70 (10 Peter 2021 07:28) (70 - 70)  BP: --  BP(mean): --  RR: 21 (10 Peter 2021 07:28) (12 - 34)  SpO2: 91% (10 Peter 2021 07:28) (84% - 96%)    I&O's Summary    2021 07:01  -  10 Peter 2021 07:00  --------------------------------------------------------  IN: 2828.3 mL / OUT: 2523 mL / NET: 305.3 mL        PHYSICAL EXAM:  extubated on nasal cpap  opens eyes to stimulation, PERRL, tracts, not following commands  craniectomy site- soft  Motor- localizes BUE, withdraws BLE  Incision site C/D/I    EVD @20cm of H20    LABS:                        11.5   7.02  )-----------( 161      ( 10 Peter 2021 03:29 )             36.9     06-10    147<H>  |  107  |  27<H>  ----------------------------<  129<H>  3.9   |  23  |  0.79    Ca    9.4      10 Peter 2021 03:29  Phos  3.4     06-10  Mg     2.1     06-10    TPro  7.9  /  Alb  4.0  /  TBili  1.3<H>  /  DBili  x   /  AST  28  /  ALT  7   /  AlkPhos  79  06-10    PT/INR - ( 10 Peter 2021 03:29 )   PT: 17.3 sec;   INR: 1.55 ratio         PTT - ( 10 Peter 2021 03:29 )  PTT:35.2 sec  Urinalysis Basic - ( 2021 14:22 )    Color: Yellow / Appearance: hazy / SG: >1.030 / pH: x  Gluc: x / Ketone: Negative  / Bili: Negative / Urobili: <2 mg/dL   Blood: x / Protein: 100 mg/dL / Nitrite: Negative   Leuk Esterase: Negative / RBC: 5-10 /HPF / WBC x   Sq Epi: x / Non Sq Epi: occ /HPF / Bacteria: Moderate        CULTURES:    Culture Results:   No growth ( @ 06:15)  Culture Results:   No growth to date. ( @ 06:13)      MEDICATIONS:  Antibiotics:  cefTRIAXone IV Intermittent - Peds 2000 milliGRAM(s) IV Intermittent every 24 hours    Neuro:  acetaminophen  IV Intermittent - Peds. 750 milliGRAM(s) IV Intermittent every 6 hours PRN  dexMEDEtomidine Infusion - Peds 0.5 MICROgram(s)/kG/Hr IV Continuous <Continuous>  levETIRAcetam IV Intermittent - Peds 1000 milliGRAM(s) IV Intermittent every 12 hours    Anticoagulation  heparin   Infusion - Pediatric 0.045 Unit(s)/kG/Hr IV Continuous <Continuous>    OTHER:  Andexanet Raymond 480 milliGRAM(s),IV Diluent 48 milliLiter(s) 480 milliGRAM(s) IV Continuous <Continuous>  chlorhexidine 0.12% Oral Liquid - Peds 15 milliLiter(s) Swish and Spit every 12 hours  chlorhexidine 2% Topical Cloths - Peds 1 Application(s) Topical daily  famotidine IV Intermittent - Peds 20 milliGRAM(s) IV Intermittent every 12 hours  furosemide  IV Intermittent - Peds 15 milliGRAM(s) IV Intermittent every 8 hours  lidocaine  Infusion - Peds 25 MICROgram(s)/kG/Min IV Continuous <Continuous>  niCARdipine Infusion - Peds 1 MICROgram(s)/kG/Min IV Continuous <Continuous>  petrolatum, white/mineral oil Ophthalmic Ointment - Peds 1 Application(s) Both EYES two times a day PRN  polyvinyl alcohol 1.4%/povidone 0.6% Ophthalmic Solution - Peds 1 Drop(s) Both EYES four times a day PRN  senna Oral Liquid - Peds 10 milliLiter(s) Oral daily  sildenafil  IV Intermittent - Peds 10 milliGRAM(s) IV Intermittent every 8 hours    IVF:  fat emulsion  (Plant Based) 20% Infusion - Pediatric 1.089 Gm/kG/Day IV Continuous <Continuous>  Parenteral Nutrition - Pediatric 1 Each TPN Continuous <Continuous>  phytonadione IV Intermittent - Peds 5 milliGRAM(s) IV Intermittent <User Schedule>  sodium chloride 0.9% -  250 milliLiter(s) IV Continuous <Continuous>  sodium chloride 0.9% lock flush - Peds 3 milliLiter(s) IV Push every 8 hours      DVT PROPHYLAXIS:  [] Venodynes                                [] Heparin/Lovenox    RADIOLOGY & ADDITIONAL TESTS:

## 2021-06-10 NOTE — OCCUPATIONAL THERAPY INITIAL EVALUATION ADULT - MANUAL MUSCLE TESTING RESULTS, REHAB EVAL
PT moves L arm to face against gravity, able to sqeeze hand L>R./not tested due to Pt moves L arm to face against gravity, able to squeeze hand L>R./not tested due to

## 2021-06-10 NOTE — CONSULT NOTE PEDS - ATTENDING COMMENTS
Hemorrhagic infarction from cardioembolic source seems most likely. Focal unaware seizure brought this to attention. It would be appropriate to hold rivaroxaban. Consult hematology to evaluate for reversal.
or for life saving rapidly expansile ich on xeralta and abnromal coags will correct prior to incision with d/w with cards and heme
Continue therapies at bedside and advance as tolerated. Consider propranolol for agitation. Zyprexa IM also an option. If delirium is more manifested by lethargy, can consider amantadine which may act as more of a stimulant once he's off sedation.

## 2021-06-10 NOTE — OCCUPATIONAL THERAPY INITIAL EVALUATION ADULT - GENERAL OBSERVATIONS, REHAB EVAL
Pt rec'd supine in bed, +EVD +Replogal +HFNC +R radial A-line +P PIV +R femoral central line +pulse ox +tele +enhanced supervision, MOC present, co treat PT, RN cleared for OT eval. Pt rec'd supine in bed, +EVD +Repogle +HFNC +R radial A-line +P PIV +R femoral central line +pulse ox +tele +enhanced supervision, MOC present, co treat PT, RN cleared for OT eval.

## 2021-06-10 NOTE — PHYSICAL THERAPY INITIAL EVALUATION PEDIATRIC - GENERAL OBSERVATIONS, REHAB EVAL
Pt rec'd supine in bed, +EVD +Replogal +HFNC +R radial A-line +P PIV +R femoral central line +pulse ox +tele +enhanced supervision, MOC present, co treat PT, RN cleared for OT eval.

## 2021-06-10 NOTE — PHYSICAL THERAPY INITIAL EVALUATION PEDIATRIC - NS INVR PLANNED THERAPY PEDS PT EVAL
bed mobility training/functional activities/gait training/parent/caregiver education & training/positioning/ROM/strengthening/stretching/transfer training

## 2021-06-10 NOTE — OCCUPATIONAL THERAPY INITIAL EVALUATION ADULT - PLANNED THERAPY INTERVENTIONS, OT EVAL
ADL retraining/IADL retraining/balance training/bed mobility training/cognitive, visual perceptual/fine motor coordination training/neuromuscular re-education/parent/caregiver training.../ROM/strengthening/transfer training

## 2021-06-10 NOTE — PROGRESS NOTE PEDS - ATTENDING COMMENTS
Agree with above note, assessment & plan (edited where appropriate). 21 yo M s/p lateral tunnel Fontan (DILV, L-malposed great arteries. Also history of sick sinus syndrome and complete heart block, s/p dual chamber epicardial pacemaker with cardiac resynchronization therapy for left ventricular dysfunction and failing Fontan in 2016.  Admitted with IART for ablation; EP study with unsuccessful attempt at ablation; diagnostic cath --> high Fontan pressure (20mmHg). Underwent amiodarone load & cardioversion (unsuccessful) which was complicated by L frontal parenchymal bleed requiring surgical evacuation & EVD placement.      Patient extubated to high flow oxygen yesterday and tolerated with intermittent desaturations requiring titration of supplemental oxygen. Will continue to monitor respiratory status.   No further VT after lidocaine infusion started on 6/8. Plan to continue for now until tolerating enteral feeds; will likely transition to mexilitine when able to take oral medications as per EP input.      Home anti-HTN medications are on hold currently due to patient's unstable clinical status. Will monitor BP closely with hope to restart lisinopril & aldactone prior to discharge.     Neuro care as per neurosurgery & PICU team. Patient remains in critical condition and is at risk for complex arrhythmias with resulting hemodynamic collapse.  Plan discussed with mother at bedside & PICU team.    Anticipate need to discharge to acute rehab facility. Social work involved for beginning of coordination of care with recommendations of PT/OT & PM/R.

## 2021-06-10 NOTE — PROGRESS NOTE PEDS - SUBJECTIVE AND OBJECTIVE BOX
INTERVAL HISTORY: Extubated yesterday afternoon.    RESPIRATORY SUPPORT: HFNC - due to desaturations  NUTRITION: NPO/TPN     @ 07:01  -  06-10 @ 07:00  --------------------------------------------------------  IN: 2828.3 mL / OUT: 2523 mL / NET: 305.3 mL    INTRAVASCULAR ACCESS:     MEDICATIONS:  furosemide  IV Intermittent - Peds 15 milliGRAM(s) IV Intermittent every 8 hours  lidocaine  Infusion - Peds 25 MICROgram(s)/kG/Min IV Continuous <Continuous>  niCARdipine Infusion - Peds 1 MICROgram(s)/kG/Min IV Continuous <Continuous>  sildenafil  IV Intermittent - Peds 10 milliGRAM(s) IV Intermittent every 8 hours  cefTRIAXone IV Intermittent - Peds 2000 milliGRAM(s) IV Intermittent every 24 hours  dexMEDEtomidine Infusion - Peds 0.5 MICROgram(s)/kG/Hr IV Continuous <Continuous>  levETIRAcetam IV Intermittent - Peds 1000 milliGRAM(s) IV Intermittent every 12 hours  famotidine IV Intermittent - Peds 20 milliGRAM(s) IV Intermittent every 12 hours  senna Oral Liquid - Peds 10 milliLiter(s) Oral daily  fat emulsion  (Plant Based) 20% Infusion - Pediatric 1.089 Gm/kG/Day IV Continuous <Continuous>  heparin   Infusion - Pediatric 0.045 Unit(s)/kG/Hr IV Continuous <Continuous>  Parenteral Nutrition - Pediatric 1 Each TPN Continuous <Continuous>  phytonadione IV Intermittent - Peds 5 milliGRAM(s) IV Intermittent <User Schedule>  sodium chloride 0.9% -  250 milliLiter(s) IV Continuous <Continuous>  sodium chloride 0.9% lock flush - Peds 3 milliLiter(s) IV Push every 8 hours    PHYSICAL EXAMINATION:  Vital signs -   T(C): 36.5 (06-10-21 @ 08:20), Max: 38.1 (21 @ 15:00)  HR: 70 (06-10-21 @ 09:00) (70 - 70)  BP: 117/74 (06-10-21 @ 08:20) (117/74 - 117/74)  ABP:  (100/50 - 171/79)  RR: 23 (06-10-21 @ 09:00) (12 - 34)  SpO2: 91% (06-10-21 @ 09:00) (84% - 96%)  CVP(mm Hg):  (11 - 30)    General - extubated, responds to commands, not speaking  Skin - no rash, no desquamation, no cyanosis.  Eyes / ENT - no conjunctival injection, sclerae anicteric, external ears & nares normal, mucous membranes moist.  Pulmonary - normal inspiratory effort, no retractions, lungs clear to auscultation bilaterally, no wheezes, no rales.  Cardiovascular - normal rate, regular rhythm, normal S1 & single S2, grade 1/6 blowing holosystolic murmur at LMSB, no rubs, no gallops, capillary refill < 2sec, normal pulses.  Gastrointestinal - soft, non-distended, non-tender, no splenomegaly. (+) hepatomegaly.  Musculoskeletal - no joint swelling, no clubbing, no edema.  Neurologic / Psychiatric - responding to commands.    LABORATORY TESTS:                          11.5  CBC:   7.02 )-----------( 161   (06-10-21 @ 03:29)                          36.9               147   |  107   |  27                 Ca: 9.4    BMP:   ----------------------------< 129    M.1   (06-10-21 @ 03:29)             3.9    |  23    | 0.79               Ph: 3.4      LFT:     TPro: 7.9 / Alb: 4.0 / TBili: 1.3 / DBili: x / AST: 28 / ALT: 7 / AlkPhos: 79   (06-10-21 @ 03:29)    COAG: PT: 17.3 / PTT: 35.2 / INR: 1.55   (06-10-21 @ 03:29)       ABG:   pH: 7.50 / pCO2: 37 / pO2: 66 / HCO3: 29 / Base Excess: 4.8 / SaO2: 92.6 / Lactate: x / iCa: 1.18   (06-10-21 @ 02:51)      IMAGING STUDIES:  Electrocardiogram - (21) Ventricular paced rhythm @ 75bpm. Underlying IART.    Telemetry - (21) Ventricular paced rhythm @ 75bpm. Underlying IART.    Chest x-ray - (21) Stable mild cardiomegaly with subsegmental left lower lobe atelectasis.    Echocardiogram - (21)   1. This was a technically difficult suboptimal study due to poor echo windows. Findings limited to below.   2. 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   3. Mild mitral valve regurgitation.   4. Trivial tricuspid valve regurgitation.   5. Trivial aortic valve regurgitation.   6. Status post device closure of Fontan fenestration.   7. S/P stent placement in the Fontan pathway. Limited imaging of the inferior Fontan baffle. In this setting, the inferior vena cava to its connection near the atrial portion of the baffle appears patent with no obstruction. S/p device closure of Fontan fenestration. The Fontan fenestration is not seen on this study. The right bidirectional Storm is seen only on color flow mapping. This appears to have laminar low velocity flow.   8. The connection of the right bidirectional Storm to the right pulmonary artery could not be imaged. The right pulmonary artery is seen in a limited portion immediately to the left of the Storm and appears patent. The left pulmonary artery could not be imaged.   9. Extremely poor and limited imaging of the lateral free walls of the single systemic left ventricle. On limited short axis views there appears to be adequate systolic excursion of the posterior wall of the left ventricle and decreased in the anterior wall. The bulboventricular foramen could not be imaged. Suggest alternate imaging for appropriate assessment of function.  10. No pericardial effusion.  11. Small right pleural effusion.    Cath - (21)  Access 4 Fr RFA, 7 Fr RFV x2 with US guidance.  Sat data (%): on 50% FiO2 LPA 73, RUPV 98, Ao 97; CI 2.6 L/min/m2 with Qp:Qs 1 :1.  Pressures (mmHg): Elevated mFontan = mIVC = 20. mPCW=16, No significant gradient across LV to AAo to Vikki (98/58/73).  Normal PVR while on sildenafil.  Angios showed unobstructed Fontan with existing stent within Fontan conduit.    Comment: IART ablation was attempted after the diagnostic cath but unsuccessful. Patient was overdrive paced out of IART. At the end of the case, Ao sat was 94% and LPA 66% on 50% FiO2  INTERVAL HISTORY: Extubated yesterday afternoon.    RESPIRATORY SUPPORT: HFNC - due to desaturations  NUTRITION: NPO/TPN     @ 07:01  -  06-10 @ 07:00  --------------------------------------------------------  IN: 2828.3 mL / OUT: 2523 mL / NET: 305.3 mL    INTRAVASCULAR ACCESS: RF CVL, RRA, PIVx1    MEDICATIONS:  furosemide  IV Intermittent - Peds 15 milliGRAM(s) IV Intermittent every 8 hours  lidocaine  Infusion - Peds 25 MICROgram(s)/kG/Min IV Continuous <Continuous>  niCARdipine Infusion - Peds 1 MICROgram(s)/kG/Min IV Continuous <Continuous>  sildenafil  IV Intermittent - Peds 10 milliGRAM(s) IV Intermittent every 8 hours  cefTRIAXone IV Intermittent - Peds 2000 milliGRAM(s) IV Intermittent every 24 hours  dexMEDEtomidine Infusion - Peds 0.5 MICROgram(s)/kG/Hr IV Continuous <Continuous>  levETIRAcetam IV Intermittent - Peds 1000 milliGRAM(s) IV Intermittent every 12 hours  famotidine IV Intermittent - Peds 20 milliGRAM(s) IV Intermittent every 12 hours  senna Oral Liquid - Peds 10 milliLiter(s) Oral daily  fat emulsion  (Plant Based) 20% Infusion - Pediatric 1.089 Gm/kG/Day IV Continuous <Continuous>  heparin   Infusion - Pediatric 0.045 Unit(s)/kG/Hr IV Continuous <Continuous>  Parenteral Nutrition - Pediatric 1 Each TPN Continuous <Continuous>  phytonadione IV Intermittent - Peds 5 milliGRAM(s) IV Intermittent <User Schedule>  sodium chloride 0.9% -  250 milliLiter(s) IV Continuous <Continuous>  sodium chloride 0.9% lock flush - Peds 3 milliLiter(s) IV Push every 8 hours    PHYSICAL EXAMINATION:  Vital signs -   T(C): 36.5 (06-10-21 @ 08:20), Max: 38.1 (21 @ 15:00)  HR: 70 (06-10-21 @ 09:00) (70 - 70)  BP: 117/74 (06-10-21 @ 08:20) (117/74 - 117/74)  ABP:  (100/50 - 171/79)  RR: 23 (06-10-21 @ 09:00) (12 - 34)  SpO2: 91% (06-10-21 @ 09:00) (84% - 96%)  CVP(mm Hg):  (11 - 30)    General - extubated, responds to commands, not speaking  Skin - no rash, no desquamation, no cyanosis.  Eyes / ENT - no conjunctival injection, sclerae anicteric, external ears & nares normal, mucous membranes moist.  Pulmonary - normal inspiratory effort, no retractions, lungs clear to auscultation bilaterally, no wheezes, no rales.  Cardiovascular - normal rate, regular rhythm, normal S1 & single S2, grade 1/6 blowing holosystolic murmur at LMSB, no rubs, no gallops, capillary refill < 2sec, normal pulses.  Gastrointestinal - soft, non-distended, non-tender, no splenomegaly. (+) hepatomegaly.  Musculoskeletal - no joint swelling, no clubbing, no edema.  Neurologic / Psychiatric - responding to commands.    LABORATORY TESTS:                          11.5  CBC:   7.02 )-----------( 161   (06-10-21 @ 03:29)                          36.9               147   |  107   |  27                 Ca: 9.4    BMP:   ----------------------------< 129    M.1   (06-10-21 @ 03:29)             3.9    |  23    | 0.79               Ph: 3.4      LFT:     TPro: 7.9 / Alb: 4.0 / TBili: 1.3 / DBili: x / AST: 28 / ALT: 7 / AlkPhos: 79   (06-10-21 @ 03:29)    COAG: PT: 17.3 / PTT: 35.2 / INR: 1.55   (06-10-21 @ 03:29)       ABG:   pH: 7.50 / pCO2: 37 / pO2: 66 / HCO3: 29 / Base Excess: 4.8 / SaO2: 92.6 / Lactate: x / iCa: 1.18   (06-10-21 @ 02:51)      IMAGING STUDIES:  Electrocardiogram - (21) Ventricular paced rhythm @ 75bpm. Underlying IART.    Telemetry - (21) Ventricular paced rhythm @ 75bpm. Underlying IART.    Chest x-ray - (21) Stable mild cardiomegaly with subsegmental left lower lobe atelectasis.    Echocardiogram - (21)   1. This was a technically difficult suboptimal study due to poor echo windows. Findings limited to below.   2. 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   3. Mild mitral valve regurgitation.   4. Trivial tricuspid valve regurgitation.   5. Trivial aortic valve regurgitation.   6. Status post device closure of Fontan fenestration.   7. S/P stent placement in the Fontan pathway. Limited imaging of the inferior Fontan baffle. In this setting, the inferior vena cava to its connection near the atrial portion of the baffle appears patent with no obstruction. S/p device closure of Fontan fenestration. The Fontan fenestration is not seen on this study. The right bidirectional Storm is seen only on color flow mapping. This appears to have laminar low velocity flow.   8. The connection of the right bidirectional Storm to the right pulmonary artery could not be imaged. The right pulmonary artery is seen in a limited portion immediately to the left of the Storm and appears patent. The left pulmonary artery could not be imaged.   9. Extremely poor and limited imaging of the lateral free walls of the single systemic left ventricle. On limited short axis views there appears to be adequate systolic excursion of the posterior wall of the left ventricle and decreased in the anterior wall. The bulboventricular foramen could not be imaged. Suggest alternate imaging for appropriate assessment of function.  10. No pericardial effusion.  11. Small right pleural effusion.    Cath - (21)  Access 4 Fr RFA, 7 Fr RFV x2 with US guidance.  Sat data (%): on 50% FiO2 LPA 73, RUPV 98, Ao 97; CI 2.6 L/min/m2 with Qp:Qs 1 :1.  Pressures (mmHg): Elevated mFontan = mIVC = 20. mPCW=16, No significant gradient across LV to AAo to Vikki (98/58/73).  Normal PVR while on sildenafil.  Angios showed unobstructed Fontan with existing stent within Fontan conduit.    Comment: IART ablation was attempted after the diagnostic cath but unsuccessful. Patient was overdrive paced out of IART. At the end of the case, Ao sat was 94% and LPA 66% on 50% FiO2

## 2021-06-11 LAB
ALBUMIN SERPL ELPH-MCNC: 4 G/DL — SIGNIFICANT CHANGE UP (ref 3.3–5)
ALP SERPL-CCNC: 94 U/L — SIGNIFICANT CHANGE UP (ref 60–270)
ALT FLD-CCNC: 11 U/L — SIGNIFICANT CHANGE UP (ref 4–41)
ANION GAP SERPL CALC-SCNC: 16 MMOL/L — HIGH (ref 7–14)
ANION GAP SERPL CALC-SCNC: 18 MMOL/L — HIGH (ref 7–14)
ANION GAP SERPL CALC-SCNC: 18 MMOL/L — HIGH (ref 7–14)
AST SERPL-CCNC: 36 U/L — SIGNIFICANT CHANGE UP (ref 4–40)
BILIRUB SERPL-MCNC: 1.3 MG/DL — HIGH (ref 0.2–1.2)
BUN SERPL-MCNC: 31 MG/DL — HIGH (ref 7–23)
BUN SERPL-MCNC: 32 MG/DL — HIGH (ref 7–23)
BUN SERPL-MCNC: 33 MG/DL — HIGH (ref 7–23)
CALCIUM SERPL-MCNC: 8.8 MG/DL — SIGNIFICANT CHANGE UP (ref 8.4–10.5)
CALCIUM SERPL-MCNC: 9 MG/DL — SIGNIFICANT CHANGE UP (ref 8.4–10.5)
CALCIUM SERPL-MCNC: 9.5 MG/DL — SIGNIFICANT CHANGE UP (ref 8.4–10.5)
CHLORIDE SERPL-SCNC: 109 MMOL/L — HIGH (ref 98–107)
CHLORIDE SERPL-SCNC: 109 MMOL/L — HIGH (ref 98–107)
CHLORIDE SERPL-SCNC: 110 MMOL/L — HIGH (ref 98–107)
CO2 SERPL-SCNC: 16 MMOL/L — LOW (ref 22–31)
CO2 SERPL-SCNC: 19 MMOL/L — LOW (ref 22–31)
CO2 SERPL-SCNC: 21 MMOL/L — LOW (ref 22–31)
CREAT SERPL-MCNC: 0.72 MG/DL — SIGNIFICANT CHANGE UP (ref 0.5–1.3)
CREAT SERPL-MCNC: 0.87 MG/DL — SIGNIFICANT CHANGE UP (ref 0.5–1.3)
CREAT SERPL-MCNC: 0.88 MG/DL — SIGNIFICANT CHANGE UP (ref 0.5–1.3)
CULTURE RESULTS: SIGNIFICANT CHANGE UP
D DIMER BLD IA.RAPID-MCNC: 6798 NG/ML DDU — SIGNIFICANT CHANGE UP
FIBRINOGEN PPP-MCNC: 637 MG/DL — HIGH (ref 290–520)
GLUCOSE SERPL-MCNC: 126 MG/DL — HIGH (ref 70–99)
GLUCOSE SERPL-MCNC: 128 MG/DL — HIGH (ref 70–99)
GLUCOSE SERPL-MCNC: 136 MG/DL — HIGH (ref 70–99)
INR BLD: 1.5 RATIO — HIGH (ref 0.88–1.16)
MAGNESIUM SERPL-MCNC: 2.2 MG/DL — SIGNIFICANT CHANGE UP (ref 1.6–2.6)
MAGNESIUM SERPL-MCNC: 2.3 MG/DL — SIGNIFICANT CHANGE UP (ref 1.6–2.6)
MAGNESIUM SERPL-MCNC: 2.5 MG/DL — SIGNIFICANT CHANGE UP (ref 1.6–2.6)
PHOSPHATE SERPL-MCNC: 2.6 MG/DL — SIGNIFICANT CHANGE UP (ref 2.5–4.5)
PHOSPHATE SERPL-MCNC: 2.7 MG/DL — SIGNIFICANT CHANGE UP (ref 2.5–4.5)
PHOSPHATE SERPL-MCNC: SIGNIFICANT CHANGE UP MG/DL (ref 2.5–4.5)
POTASSIUM SERPL-MCNC: 3.5 MMOL/L — SIGNIFICANT CHANGE UP (ref 3.5–5.3)
POTASSIUM SERPL-MCNC: 3.9 MMOL/L — SIGNIFICANT CHANGE UP (ref 3.5–5.3)
POTASSIUM SERPL-MCNC: SIGNIFICANT CHANGE UP MMOL/L (ref 3.5–5.3)
POTASSIUM SERPL-SCNC: 3.5 MMOL/L — SIGNIFICANT CHANGE UP (ref 3.5–5.3)
POTASSIUM SERPL-SCNC: 3.9 MMOL/L — SIGNIFICANT CHANGE UP (ref 3.5–5.3)
POTASSIUM SERPL-SCNC: SIGNIFICANT CHANGE UP MMOL/L (ref 3.5–5.3)
PROT SERPL-MCNC: 7.8 G/DL — SIGNIFICANT CHANGE UP (ref 6–8.3)
PROTHROM AB SERPL-ACNC: 16.9 SEC — HIGH (ref 10.6–13.6)
SODIUM SERPL-SCNC: 143 MMOL/L — SIGNIFICANT CHANGE UP (ref 135–145)
SODIUM SERPL-SCNC: 146 MMOL/L — HIGH (ref 135–145)
SODIUM SERPL-SCNC: 147 MMOL/L — HIGH (ref 135–145)
SPECIMEN SOURCE: SIGNIFICANT CHANGE UP
TRIGL SERPL-MCNC: 250 MG/DL — HIGH

## 2021-06-11 PROCEDURE — 99221 1ST HOSP IP/OBS SF/LOW 40: CPT

## 2021-06-11 PROCEDURE — 99232 SBSQ HOSP IP/OBS MODERATE 35: CPT

## 2021-06-11 PROCEDURE — 95718 EEG PHYS/QHP 2-12 HR W/VEEG: CPT

## 2021-06-11 PROCEDURE — 71045 X-RAY EXAM CHEST 1 VIEW: CPT | Mod: 26

## 2021-06-11 PROCEDURE — 70450 CT HEAD/BRAIN W/O DYE: CPT | Mod: 26

## 2021-06-11 PROCEDURE — 99233 SBSQ HOSP IP/OBS HIGH 50: CPT | Mod: 25

## 2021-06-11 PROCEDURE — 99232 SBSQ HOSP IP/OBS MODERATE 35: CPT | Mod: 25

## 2021-06-11 PROCEDURE — 99291 CRITICAL CARE FIRST HOUR: CPT

## 2021-06-11 RX ORDER — FUROSEMIDE 40 MG
15 TABLET ORAL EVERY 12 HOURS
Refills: 0 | Status: DISCONTINUED | OUTPATIENT
Start: 2021-06-11 | End: 2021-06-11

## 2021-06-11 RX ORDER — ELECTROLYTE SOLUTION,INJ
1 VIAL (ML) INTRAVENOUS
Refills: 0 | Status: DISCONTINUED | OUTPATIENT
Start: 2021-06-11 | End: 2021-06-11

## 2021-06-11 RX ORDER — FUROSEMIDE 40 MG
15 TABLET ORAL EVERY 8 HOURS
Refills: 0 | Status: DISCONTINUED | OUTPATIENT
Start: 2021-06-11 | End: 2021-06-12

## 2021-06-11 RX ORDER — I.V. FAT EMULSION 20 G/100ML
1.31 EMULSION INTRAVENOUS
Qty: 86.4 | Refills: 0 | Status: DISCONTINUED | OUTPATIENT
Start: 2021-06-11 | End: 2021-06-12

## 2021-06-11 RX ORDER — CALCIUM GLUCONATE 100 MG/ML
2000 VIAL (ML) INTRAVENOUS ONCE
Refills: 0 | Status: COMPLETED | OUTPATIENT
Start: 2021-06-11 | End: 2021-06-11

## 2021-06-11 RX ORDER — FUROSEMIDE 40 MG
15 TABLET ORAL ONCE
Refills: 0 | Status: COMPLETED | OUTPATIENT
Start: 2021-06-11 | End: 2021-06-11

## 2021-06-11 RX ADMIN — FAMOTIDINE 200 MILLIGRAM(S): 10 INJECTION INTRAVENOUS at 03:58

## 2021-06-11 RX ADMIN — DEXMEDETOMIDINE HYDROCHLORIDE IN 0.9% SODIUM CHLORIDE 4.96 MICROGRAM(S)/KG/HR: 4 INJECTION INTRAVENOUS at 07:27

## 2021-06-11 RX ADMIN — Medication 12.4 MICROGRAM(S)/KG/MIN: at 11:55

## 2021-06-11 RX ADMIN — Medication 3 UNIT(S)/KG/HR: at 19:38

## 2021-06-11 RX ADMIN — LEVETIRACETAM 400 MILLIGRAM(S): 250 TABLET, FILM COATED ORAL at 04:10

## 2021-06-11 RX ADMIN — Medication 40 MILLIGRAM(S): at 18:28

## 2021-06-11 RX ADMIN — FAMOTIDINE 200 MILLIGRAM(S): 10 INJECTION INTRAVENOUS at 14:10

## 2021-06-11 RX ADMIN — Medication 198 MILLIGRAM(S): at 22:38

## 2021-06-11 RX ADMIN — Medication 198 MILLIGRAM(S): at 05:56

## 2021-06-11 RX ADMIN — Medication 3 MILLIGRAM(S): at 03:50

## 2021-06-11 RX ADMIN — Medication 12.4 MICROGRAM(S)/KG/MIN: at 07:29

## 2021-06-11 RX ADMIN — Medication 30 MILLIGRAM(S): at 09:44

## 2021-06-11 RX ADMIN — SODIUM CHLORIDE 3 MILLILITER(S): 9 INJECTION INTRAMUSCULAR; INTRAVENOUS; SUBCUTANEOUS at 02:46

## 2021-06-11 RX ADMIN — Medication 3 UNIT(S)/KG/HR: at 16:29

## 2021-06-11 RX ADMIN — Medication 198 MILLIGRAM(S): at 15:59

## 2021-06-11 RX ADMIN — SODIUM CHLORIDE 3 MILLILITER(S): 9 INJECTION INTRAMUSCULAR; INTRAVENOUS; SUBCUTANEOUS at 10:00

## 2021-06-11 RX ADMIN — Medication 25 MILLIGRAM(S): at 20:56

## 2021-06-11 RX ADMIN — SODIUM CHLORIDE 3 MILLILITER(S): 9 INJECTION, SOLUTION INTRAVENOUS at 19:37

## 2021-06-11 RX ADMIN — Medication 25 MILLIGRAM(S): at 03:50

## 2021-06-11 RX ADMIN — SODIUM CHLORIDE 3 MILLILITER(S): 9 INJECTION, SOLUTION INTRAVENOUS at 07:30

## 2021-06-11 RX ADMIN — Medication 3 UNIT(S)/KG/HR: at 07:29

## 2021-06-11 RX ADMIN — SODIUM CHLORIDE 3 MILLILITER(S): 9 INJECTION INTRAMUSCULAR; INTRAVENOUS; SUBCUTANEOUS at 18:00

## 2021-06-11 RX ADMIN — DEXMEDETOMIDINE HYDROCHLORIDE IN 0.9% SODIUM CHLORIDE 4.96 MICROGRAM(S)/KG/HR: 4 INJECTION INTRAVENOUS at 08:20

## 2021-06-11 RX ADMIN — LEVETIRACETAM 400 MILLIGRAM(S): 250 TABLET, FILM COATED ORAL at 16:05

## 2021-06-11 RX ADMIN — Medication 3 MILLIGRAM(S): at 16:06

## 2021-06-11 RX ADMIN — Medication 300 MILLIGRAM(S): at 14:03

## 2021-06-11 RX ADMIN — Medication 3 MILLIGRAM(S): at 21:02

## 2021-06-11 RX ADMIN — Medication 12.4 MICROGRAM(S)/KG/MIN: at 19:39

## 2021-06-11 RX ADMIN — Medication 25 MILLIGRAM(S): at 13:06

## 2021-06-11 RX ADMIN — I.V. FAT EMULSION 18 GM/KG/DAY: 20 EMULSION INTRAVENOUS at 19:42

## 2021-06-11 RX ADMIN — SODIUM CHLORIDE 3 MILLILITER(S): 9 INJECTION, SOLUTION INTRAVENOUS at 16:28

## 2021-06-11 NOTE — PROGRESS NOTE PEDS - ATTENDING COMMENTS
I have seen and examined the patient, spoken with the patient's mother and reviewed the above note and I agree with the assessment and plan. Left communicating hydrocele.  No evidence of hernia at this time. Both testes descended.  Discussed with patient's mother with drawing. Outpatient follow-up w/ adult urology for elective repair after current acute issues resolved.

## 2021-06-11 NOTE — PROGRESS NOTE PEDS - PROBLEM SELECTOR PLAN 1
1. CT head this morning  2. c/w clamp EVD, monitor ICP q1h  3. continue ABx, keppra  4. Maintain INR < 1.5, f/u with hematology  5. neurochecks Q1H  Case d/w attending

## 2021-06-11 NOTE — PROGRESS NOTE PEDS - SUBJECTIVE AND OBJECTIVE BOX
21 yo M admitted for cardioversion of interatrial re-entrant tachycardia (IART) found to have L-sided stroke with hemorrhagic conversion requiring intervention.    Interval History: Overnight, neurology re-engaged for change in mental status and upward eye deviation. Mental status has been waxing and waning since yesterday evening. CT head done and EEG running with results below. Keppra increased overnight to 1500 mg BID as patient at risk of seizures.       MEDICATIONS  (STANDING):  Andexanet Raymond 480 milliGRAM(s),IV Diluent 48 milliLiter(s) 480 milliGRAM(s) (24 mL/Hr) IV Continuous <Continuous>  ceFAZolin  IV Intermittent - Peds 1980 milliGRAM(s) IV Intermittent every 8 hours  chlorhexidine 2% Topical Cloths - Peds 1 Application(s) Topical daily  dexMEDEtomidine Infusion - Peds 0.3 MICROgram(s)/kG/Hr (4.96 mL/Hr) IV Continuous <Continuous>  famotidine IV Intermittent - Peds 20 milliGRAM(s) IV Intermittent every 12 hours  fat emulsion  (Plant Based) 20% Infusion - Pediatric 1.452 Gm/kG/Day (20 mL/Hr) IV Continuous <Continuous>  fat emulsion  (Plant Based) 20% Infusion - Pediatric 1.307 Gm/kG/Day (18 mL/Hr) IV Continuous <Continuous>  furosemide  IV Intermittent - Peds 15 milliGRAM(s) IV Intermittent every 12 hours  heparin   Infusion - Pediatric 0.045 Unit(s)/kG/Hr (3 mL/Hr) IV Continuous <Continuous>  levETIRAcetam IV Intermittent - Peds 1500 milliGRAM(s) IV Intermittent every 12 hours  lidocaine  Infusion - Peds 25 MICROgram(s)/kG/Min (12.4 mL/Hr) IV Continuous <Continuous>  Parenteral Nutrition - Pediatric 1 Each (69 mL/Hr) TPN Continuous <Continuous>  Parenteral Nutrition - Pediatric 1 Each (69 mL/Hr) TPN Continuous <Continuous>  phytonadione IV Intermittent - Peds 5 milliGRAM(s) IV Intermittent <User Schedule>  sildenafil  IV Intermittent - Peds 10 milliGRAM(s) IV Intermittent every 8 hours  sodium chloride 0.9% -  250 milliLiter(s) (3 mL/Hr) IV Continuous <Continuous>  sodium chloride 0.9% lock flush - Peds 3 milliLiter(s) IV Push every 8 hours    MEDICATIONS  (PRN):  acetaminophen  IV Intermittent - Peds. 750 milliGRAM(s) IV Intermittent every 6 hours PRN Moderate Pain (4 -  6)      ICU Vital Signs Last 24 Hrs  T(C): 38 (2021 12:00), Max: 38 (2021 12:00)  T(F): 100.4 (2021 12:00), Max: 100.4 (2021 12:00)  HR: 70 (2021 13:00) (70 - 70)  BP: 121/70 (2021 08:00) (93/55 - 125/64)  BP(mean): 86 (2021 08:00) (67 - 86)  ABP: 134/67 (2021 13:00) (91/55 - 148/76)  ABP(mean): 89 (2021 13:00) (67 - 103)  RR: 34 (2021 13:00) (21 - 35)  SpO2: 89% (2021 13:00) (88% - 94%)      GENERAL PHYSICAL EXAM  General:       Sitting up in bed, eyes closed or rolled back, intermittently interactive   Respiratory:    Tachypneic and on HFNC     NEUROLOGIC EXAM  Mental Status:    Awake, intermittently stuporous, unable to answer questions but able to follow simple commands   Cranial Nerves:    Pupils 4 mm and sluggishly reactive b/l, gaze preference to L, eyes cross midline, no apparent facial asymmetry  Motor:                Extremities x4 antigravity briefly in response to noxious stim, wiggles fingers and toes   DTR:                   Brisk but symmetric biceps, brachioradialis, patellas and Achilles  Sensation:           Intact to noxious stimuli x4 extremities   Coord:                Unable to participate   Gait:                    Unable to participate         Lab Results:                        12.0   6.34  )-----------( 157      ( 2021 01:31 )             38.2       06-11    147<H>  |  110<H>  |  32<H>  ----------------------------<  128<H>  3.9   |  21<L>  |  0.88    Ca    9.5      2021 01:31  Phos  2.6     06-11  Mg     2.2     06-11    TPro  7.8  /  Alb  4.0  /  TBili  1.3<H>  /  DBili  x   /  AST  36  /  ALT  11  /  AlkPhos  94        Prothrombin Time and INR, Plasma (21 @ 01:31)   Prothrombin Time, Plasma: 16.9 sec   INR: 1.50      EEG Results: In progress, preliminary interpretation no seizure correlate to upward eye deviation, generalized background slowing     IMAGING:     < from: CT Head No Cont (21 @ 08:54) >  Comparison is made with the prior CT of 6/10/2021.    No significant change is identified.    There is a left frontal craniectomy. There is a catheter extending through the craniectomy defect into the right lateral ventricle. The ventricles are not enlarged. There is a small amount of hemorrhage layering in the occipital horns bilaterally. A small amount of left parietal subdural hemorrhage is identified. Minimal residual hemorrhage in the left frontal brain parenchyma is identified.    There is no midline shift.      IMPRESSION: No change from 6/10/2021. Left frontal craniectomy. Minimal residual left frontal parenchymal hemorrhage. Large left sided ventricular catheter. No hydrocephalus. Mild intraventricular hemorrhage unchanged.      GABRIELA CARR MD; Attending Radiologist  This document has been electronically signed. 2021  9:08AM    < end of copied text >

## 2021-06-11 NOTE — PROGRESS NOTE PEDS - ASSESSMENT
21 yo M with complex cardiac hx including double inlet left ventricle, L-malposition of great arteries, sick sinus syndrome and AV mihir disease with a dual chamber pacemaker and multiple cardiac surgeries including Fontan procedure admitted for IART w/ recent failed ablation and attempted cardioversion. Neurology re-engaged for worsening mental status overnight after recent [place] stroke with hemorrhagic conversion resulting in dense R hemiparesis (resolving over time) and requiring EVD placement and monitoring with serial head CTs. Patient now demonstrating waxing and waning poor mental status with eye rolling episodes and somnolence. CT head done this morning and stable without worsening hemorrhage or new strokes. EEG running while patient exhibiting eye-rolling episodes of concern and no seizure correlate seen on rounds today. Keppra dose increased overnight for neuroprotection.     Impression: Most likely ICU delirium with waxing and waning mental status which is a risk consistent with Jimbo's prolonged and traumatic hospital course.  Less likely worsening hemorrhage or new stroke given CT head stable. Also less concerning for seizures considering EEG thus far demonstrates generalized background slowing and no seizure pattern even during eye deviation episodes of concern (preliminary read).     Recommendations:   [ ] continue Keppra 1500mg BID  [ ] low threshold to repeat CT head w/o contrast PRN for worsening mental status or neuro exam  [ ] adhere to measures to reduce delirium: frequent reorientation, maintain sleep/wake routine, avoid sleeping during the day, keep lights on during the day, use of glasses if patient needs them    Patient seen and discussed with neurology attending, Dr. Aguilera.

## 2021-06-11 NOTE — EEG REPORT - NS EEG TEXT BOX
Study Name: Jimbo Myers    Start: 19:49 on 06/10/2021  End: 07:38 on 06/11/2021  Duration: 10 hours 34 minutes    Indication:  episodes of apnea    Medications: None listed    Technique: This is a 21-channel EEG recording done in the awake and drowsy states. A digital recording was obtained placing electrodes utilizing the International 10-20 System of electrode placement.   A single channel EKG was also recorded.  Standard montages were used for review.    Background: The background activity was continuous and reactive and comprised of polymorphic delta slowing with superimposed faster theta and sometimes alpha activity No PDR was seen. Tracing was limited by significant artifact throughout the course of the study by 03:32 artifact became so severe tracing was no longer amenable to interpretation. Stage II sleep transients not seen    Slowing: continuous delta range slowing with superimposed theta    Attenuation and asymmetry:  None.    Interictal Activity: None.    Activation Procedures: Hyperventilation and Intermittent photic stimulation not done        EKG: No clear abnormalities were noted.    Impression: Abnormal EEG in an encephalopathic patient  moderate diffuse cerebral dysfunction    Clinical Correlation:   Moderate diffuse encephalopathy   Suboptimal EEG due to the presence of significant movement and lead artifact limiting diagnostic potential of the study. If epilepsy remains a significant concern consider repeat study.     Minh Ramsay MD PGY-5  Epilepsy Fellow    This Preliminary report is based on fellow review. Final report pending attending review.    Reading Room: [phone]  On Call Service After Hours: [phone] Study Name: Jimbo Myers    Start: 19:49 on 06/10/2021  End: 07:38 on 06/11/2021  Duration: 10 hours 34 minutes ( leads off for most part after 03:00)    Indication:  episodes of apnea    Medications: None listed    Technique: This is a 21-channel EEG recording done in the awake and drowsy states. A digital recording was obtained placing electrodes utilizing the International 10-20 System of electrode placement.   A single channel EKG was also recorded.  Standard montages were used for review.    Background: The background activity was continuous and reactive and comprised of polymorphic delta slowing with superimposed faster theta and sometimes alpha activity No PDR was seen. Tracing was limited by significant artifact throughout the course of the study by 03:32 artifact became so severe tracing was no longer amenable to interpretation. Stage II sleep transients not seen. The lead placement on the left was further compromised and posterior channels were not recording well.     Slowing: continuous delta range slowing with superimposed theta    Attenuation and asymmetry:  L >> R attenuation of voltage    Interictal Activity: None.    Activation Procedures: Hyperventilation and Intermittent photic stimulation not done        EKG: No clear abnormalities were noted.    Impression: Abnormal EEG in an encephalopathic patient  moderate diffuse cerebral dysfunction worse on the L    Clinical Correlation:   Moderate diffuse encephalopathy   Suboptimal EEG due to the presence of significant movement and lead artifact limiting diagnostic potential of the study. If epilepsy remains a significant concern consider repeat study.     Minh Ramsay MD PGY-5  Epilepsy Fellow    Attending Attestation : I have reviewed the study and agree with the findings as described above.

## 2021-06-11 NOTE — PROGRESS NOTE PEDS - SUBJECTIVE AND OBJECTIVE BOX
EDIATRIC GENERAL SURGERY CONSULT NOTE    TRINI MÉNDEZ  |  [id number]   |   20Y Male   |   [facility] 2004 AP    Patient examined at bedside. Limited subjective due to sedation.    Vital Signs Last 24 Hrs  T(C): 36.8 (10 Peter 2021 23:00), Max: 38 (10 Peter 2021 02:00)  T(F): 98.2 (10 Peter 2021 23:00), Max: 100.4 (10 Peter 2021 02:00)  HR: 70 (11 Jun 2021 00:03) (69 - 70)  BP: 125/64 (10 Peter 2021 20:00) (93/55 - 129/78)  BP(mean): 83 (10 Peter 2021 20:00) (67 - 93)  RR: 29 (11 Jun 2021 00:00) (18 - 31)  SpO2: 92% (11 Jun 2021 00:00) (85% - 94%)    PHYSICAL EXAM:  General: NAD, on high flow nasal cannula  HEENT: EVD in place, PERRLA  Resp: vent-assisted, unlabored, CTAB, fair-good aeration, no rhonchi/rales/wheezing  CV: RRR, nl S1/S2, no m/r/g appreciated, CR < 2s, distal pulses 2+ and equal  ABDOMEN: NG tube in place and clamped. Soft, Nontender, mildly distended; Bowel sounds present,  External genitalia: Reducible left inguinal swelling  EXTREMITIES:  2+ Peripheral Pulses, No clubbing, cyanosis, or edema  Ext: wwp, no gross deformities  Neuro: sedated, moves purposefully                                     11.5   7.02  )-----------( 161      ( 10 Peter 2021 03:29 )             36.9       06-10    145  |  109<H>  |  33<H>  ----------------------------<  135<H>  4.5   |  23  |  0.85    Ca    8.7      10 Peter 2021 16:41  Phos  3.4     06-10  Mg     2.1     06-10    TPro  7.4  /  Alb  3.9  /  TBili  1.1  /  DBili  x   /  AST  33  /  ALT  10  /  AlkPhos  81  06-10          ABG - ( 10 Peter 2021 02:51 )  pH, Arterial: 7.50  pH, Blood: x     /  pCO2: 37    /  pO2: 66    / HCO3: 29    / Base Excess: 4.8   /  SaO2: 92.6                Urinalysis Basic - ( 09 Jun 2021 14:22 )    Color: Yellow / Appearance: hazy / SG: >1.030 / pH: x  Gluc: x / Ketone: Negative  / Bili: Negative / Urobili: <2 mg/dL   Blood: x / Protein: 100 mg/dL / Nitrite: Negative   Leuk Esterase: Negative / RBC: 5-10 /HPF / WBC x   Sq Epi: x / Non Sq Epi: occ /HPF / Bacteria: Moderate      PT/INR - ( 10 Peter 2021 03:29 )   PT: 17.3 sec;   INR: 1.55 ratio         PTT - ( 10 Peter 2021 03:29 )  PTT:35.2 sec      CAPILLARY BLOOD GLUCOSE

## 2021-06-11 NOTE — PROGRESS NOTE PEDS - SUBJECTIVE AND OBJECTIVE BOX
Interval/Overnight Events:    VITAL SIGNS:  T(C): 36.6 (21 @ 05:00), Max: 37 (06-10-21 @ 20:00)  HR: 70 (21 @ 07:00) (69 - 70)  BP: 125/64 (06-10-21 @ 20:00) (93/55 - 129/78)  ABP: 142/72 (21 @ 07:00) (84/58 - 142/72)  ABP(mean): 95 (21 @ 07:00) (60 - 103)  RR: 25 (21 @ 07:00) (21 - 31)  SpO2: 89% (21 @ 07:00) (88% - 94%)  CVP(mm Hg): 25 (21 @ 07:00) (11 - 58)  End-Tidal CO2:  NIRS:    Physical Exam:    General: NAD  HEENT: no acute changes from baseline  Resp: unlabored, CTAB, good aeration, no rhonchi/rales/wheezing  CV: RRR, nl S1/S2, no m/r/g appreciated, CR < 2s, distal pulses 2+ and equal  Abd: soft, NTND, no HSM appreciated  Ext: wwp, no gross deformities  Neuro: alert and oriented, no acute change from baseline  Skin: no rash    =======================RESPIRATORY=======================  [ ] FiO2: ___ 	[ ] Heliox: ____ 		[ ] BiPAP: ___   [ ] NC: __  Liters			[ ] HFNC: __ 	Liters, FiO2: __  [ ] Mechanical Ventilation:   [ ] Inhaled Nitric Oxide:  [ ] Extubation Readiness Assessed  Comments:    =====================CARDIOVASCULAR======================  Cardiovascular Medications:  furosemide  IV Intermittent - Peds 15 milliGRAM(s) IV Intermittent every 8 hours  lidocaine  Infusion - Peds 25 MICROgram(s)/kG/Min IV Continuous <Continuous>  sildenafil  IV Intermittent - Peds 10 milliGRAM(s) IV Intermittent every 8 hours    Chest Tube Output: ___ in 24 hours, ___ in last 12 hours   [ ] Right     [ ] Left    [ ] Mediastinal  Cardiac Rhythm:	[x] NSR		[ ] Other:    [ ] Central Venous Line	[ ] R	[ ] L	[ ] IJ	[ ] Fem	[ ] SC			Placed:   [ ] Arterial Line		[ ] R	[ ] L	[ ] PT	[ ] DP	[ ] Fem	[ ] Rad	[ ] Ax	Placed:   [ ] PICC:				[ ] Broviac		[ ] Mediport  Comments:    ==========HEMATOLOGY/ONCOLOGY=================  Transfusions:	[ ] PRBC	[ ] Platelets	[ ] FFP		[ ] Cryoprecipitate  DVT Prophylaxis:  Comments:    =================INFECTIOUS DISEASE==================  [ ] Cooling [place] being used. Target Temperature:     ===========FLUIDS/ELECTROLYTES/NUTRITION=============  I&O's Summary    10 Peter 2021 07:01  -  2021 07:00  --------------------------------------------------------  IN: 2826.3 mL / OUT: 2580 mL / NET: 246.3 mL      Daily   Diet:	[ ] Regular	[ ] Soft		[ ] Clears	[ ] NPO  .	[ ] Other:  .	[ ] NGT		[ ] NDT		[ ] GT		[ ] GJT    [ ] Urinary Catheter, Date Placed:   Comments:    ====================NEUROLOGY===================  [ ] SBS:		[ ] GISSELLE-1:	[ ] BIS:	[ ] CAPD:  [ ] EVD set at: ___ , Drainage in last 24 hours: ___ ml    [x] Adequacy of sedation and pain control has been assessed and adjusted  Comments:      ==================PATIENT CARE=================  [ ] There are pressure ulcers/areas of breakdown that are being addressed -   [x] Preventative measures are being taken to decrease risk for skin breakdown.  [x] Necessity of urinary, arterial, and venous catheters discussed    ==================LABS============================  ABG - ( 10 Peter 2021 02:51 )  pH: 7.50  /  pCO2: 37    /  pO2: 66    / HCO3: 29    / Base Excess: 4.8   /  SaO2: 92.6  / Lactate: x                                                12.0                  Neurophils% (auto):   68.7   ( @ 01:31):    6.34 )-----------(157          Lymphocytes% (auto):  11.7                                          38.2                   Eosinphils% (auto):   6.8      Manual%: Neutrophils x    ; Lymphocytes x    ; Eosinophils x    ; Bands%: x    ; Blasts x        (  @ 01:31 )   PT: 16.9 sec;   INR: 1.50 ratio  aPTT: 53.3 sec                            147    |  110    |  32                  Calcium: 9.5   / iCa: x      ( @ 01:31)    ----------------------------<  128       Magnesium: 2.2                              3.9     |  21     |  0.88             Phosphorous: 2.6      TPro  7.8    /  Alb  4.0    /  TBili  1.3    /  DBili  x      /  AST  36     /  ALT  11     /  AlkPhos  94     2021 01:31  RECENT CULTURES:   @ 16:41 .Blood Blood     No growth to date.       @ 14:41 .Sputum Sputum trap     Normal Respiratory Snow present    No polymorphonuclear leukocytes per low power field  No Squamous epithelial cells per low power field  No organisms seen     @ 09:45 .Urine Catheterized     No growth       @ 06:15 .CSF CSF     No growth    polymorphonuclear leukocytes seen  No organisms seen  by cytocentrifuge     @ 06:13 .Blood Blood     No growth to date.       @ 21:47 .Sputum Sputum     No growth at 48 hours    Few polymorphonuclear leukocytes per low power field  No Squamous epithelial cells per low power field  No organisms seen per oil power field    06-06 @ 20:36 .Urine Catheterized     No growth          =================MEDICATIONS======================  MEDICATIONS  MEDICATIONS  (STANDING):  Andexanet Raymond 480 milliGRAM(s),IV Diluent 48 milliLiter(s) 480 milliGRAM(s) (24 mL/Hr) IV Continuous <Continuous>  ceFAZolin  IV Intermittent - Peds 1980 milliGRAM(s) IV Intermittent every 8 hours  chlorhexidine 2% Topical Cloths - Peds 1 Application(s) Topical daily  dexMEDEtomidine Infusion - Peds 0.3 MICROgram(s)/kG/Hr (4.96 mL/Hr) IV Continuous <Continuous>  famotidine IV Intermittent - Peds 20 milliGRAM(s) IV Intermittent every 12 hours  fat emulsion  (Plant Based) 20% Infusion - Pediatric 1.452 Gm/kG/Day (20 mL/Hr) IV Continuous <Continuous>  furosemide  IV Intermittent - Peds 15 milliGRAM(s) IV Intermittent every 8 hours  heparin   Infusion - Pediatric 0.045 Unit(s)/kG/Hr (3 mL/Hr) IV Continuous <Continuous>  levETIRAcetam IV Intermittent - Peds 1500 milliGRAM(s) IV Intermittent every 12 hours  lidocaine  Infusion - Peds 25 MICROgram(s)/kG/Min (12.4 mL/Hr) IV Continuous <Continuous>  Parenteral Nutrition - Pediatric 1 Each (69 mL/Hr) TPN Continuous <Continuous>  phytonadione IV Intermittent - Peds 5 milliGRAM(s) IV Intermittent <User Schedule>  sildenafil  IV Intermittent - Peds 10 milliGRAM(s) IV Intermittent every 8 hours  sodium chloride 0.9% -  250 milliLiter(s) (3 mL/Hr) IV Continuous <Continuous>  sodium chloride 0.9% lock flush - Peds 3 milliLiter(s) IV Push every 8 hours    MEDICATIONS  (PRN):  acetaminophen  IV Intermittent - Peds. 750 milliGRAM(s) IV Intermittent every 6 hours PRN Moderate Pain (4 -  6)    ===================================================  IMAGING STUDIES:    [ ] XR   [ ] CT   [ ] MR   [ ] US  [ ] Echo  ===========================================================  Parent/Guardian is at the bedside:	[ ] Yes	[ ] No  Patient and Parent/Guardian updated as to the progress/plan of care:	[ ] Yes	[ ] No    [x] The patient remains in critical and unstable condition, and requires ICU care and monitoring, assessment, and treatment  [ ] The patient is improving but requires continued monitoring, assessment, treatment, and adjustment of therapy    [x] The total critical care time spent by attending physician was __35__ minutes, excluding procedure time. Interval/Overnight Events:    Some concerns for decreased alertness yesterday  Neuro to place EEG  CT stable    VITAL SIGNS:  T(C): 36.6 (21 @ 05:00), Max: 37 (06-10-21 @ 20:00)  HR: 70 (21 @ 07:00) (69 - 70)  BP: 125/64 (06-10-21 @ 20:00) (93/55 - 129/78)  ABP: 142/72 (21 @ 07:00) (84/58 - 142/72)  ABP(mean): 95 (21 @ 07:00) (60 - 103)  RR: 25 (21 @ 07:00) (21 - 31)  SpO2: 89% (21 @ 07:00) (88% - 94%)  CVP(mm Hg): 25 (21 @ 07:00) (11 - 58)  End-Tidal CO2:  NIRS:    Physical Exam:    General: NAD  HEENT: no acute changes from baseline  Resp: unlabored, CTAB, good aeration, no rhonchi/rales/wheezing  CV: RRR, nl S1/S2, no m/r/g appreciated, CR < 2s, distal pulses 2+ and equal  Abd: soft, distended but improving, no HSM appreciated  Ext: wwp, no gross deformities  Neuro: not focusing, pushing away from exam purposefully but not consistently following commands  Skin: no rash  Other: L hydrocele    =======================RESPIRATORY=======================  [ ] FiO2: ___ 	[ ] Heliox: ____ 		[ ] BiPAP: ___   [ ] NC: __  Liters			[x ] HFNC: _40L 30%_ 	Liters, FiO2: __  [ ] Mechanical Ventilation:   [ ] Inhaled Nitric Oxide:  [ ] Extubation Readiness Assessed  Comments:    =====================CARDIOVASCULAR======================  Cardiovascular Medications:  furosemide  IV Intermittent - Peds 15 milliGRAM(s) IV Intermittent every 8 hours  lidocaine  Infusion - Peds 25 MICROgram(s)/kG/Min IV Continuous <Continuous>  sildenafil  IV Intermittent - Peds 10 milliGRAM(s) IV Intermittent every 8 hours    Chest Tube Output: ___ in 24 hours, ___ in last 12 hours   [ ] Right     [ ] Left    [ ] Mediastinal  Cardiac Rhythm:	[x] NSR		[ ] Other:    [ ] Central Venous Line	[ ] R	[ ] L	[ ] IJ	[ ] Fem	[ ] SC			Placed:   [ ] Arterial Line		[ ] R	[ ] L	[ ] PT	[ ] DP	[ ] Fem	[ ] Rad	[ ] Ax	Placed:   [ ] PICC:				[ ] Broviac		[ ] Mediport  Comments:    ==========HEMATOLOGY/ONCOLOGY=================  Transfusions:	[ ] PRBC	[ ] Platelets	[ ] FFP		[ ] Cryoprecipitate  DVT Prophylaxis:  Comments:    =================INFECTIOUS DISEASE==================  [ ] Cooling [place] being used. Target Temperature:     ===========FLUIDS/ELECTROLYTES/NUTRITION=============  I&O's Summary    10 Peter 2021 07:01  -  2021 07:00  --------------------------------------------------------  IN: 2826.3 mL / OUT: 2580 mL / NET: 246.3 mL      Daily   Diet:	[ ] Regular	[ ] Soft		[ ] Clears	[x ] NPO  .	[ ] Other:  .	[ ] NGT		[ ] NDT		[ ] GT		[ ] GJT    [ ] Urinary Catheter, Date Placed:   Comments:    ====================NEUROLOGY===================  [ ] SBS:		[ ] GISSELLE-1:	[ ] BIS:	[ ] CAPD:  [ ] EVD set at: ___ , Drainage in last 24 hours: ___ ml    [x] Adequacy of sedation and pain control has been assessed and adjusted  Comments:      ==================PATIENT CARE=================  [ ] There are pressure ulcers/areas of breakdown that are being addressed -   [x] Preventative measures are being taken to decrease risk for skin breakdown.  [x] Necessity of urinary, arterial, and venous catheters discussed    ==================LABS============================  ABG - ( 10 Peter 2021 02:51 )  pH: 7.50  /  pCO2: 37    /  pO2: 66    / HCO3: 29    / Base Excess: 4.8   /  SaO2: 92.6  / Lactate: x                                                12.0                  Neurophils% (auto):   68.7   ( 01:31):    6.34 )-----------(157          Lymphocytes% (auto):  11.7                                          38.2                   Eosinphils% (auto):   6.8      Manual%: Neutrophils x    ; Lymphocytes x    ; Eosinophils x    ; Bands%: x    ; Blasts x        (  @ 01:31 )   PT: 16.9 sec;   INR: 1.50 ratio  aPTT: 53.3 sec                            147    |  110    |  32                  Calcium: 9.5   / iCa: x      ( @ 01:31)    ----------------------------<  128       Magnesium: 2.2                              3.9     |  21     |  0.88             Phosphorous: 2.6      TPro  7.8    /  Alb  4.0    /  TBili  1.3    /  DBili  x      /  AST  36     /  ALT  11     /  AlkPhos  94     2021 01:31  RECENT CULTURES:   @ 16:41 .Blood Blood     No growth to date.       @ 14:41 .Sputum Sputum trap     Normal Respiratory Snow present    No polymorphonuclear leukocytes per low power field  No Squamous epithelial cells per low power field  No organisms seen     @ 09:45 .Urine Catheterized     No growth       @ 06:15 .CSF CSF     No growth    polymorphonuclear leukocytes seen  No organisms seen  by cytocentrifuge     @ 06:13 .Blood Blood     No growth to date.       @ 21:47 .Sputum Sputum     No growth at 48 hours    Few polymorphonuclear leukocytes per low power field  No Squamous epithelial cells per low power field  No organisms seen per oil power field     @ 20:36 .Urine Catheterized     No growth          =================MEDICATIONS======================  MEDICATIONS  MEDICATIONS  (STANDING):  Andexanet Raymond 480 milliGRAM(s),IV Diluent 48 milliLiter(s) 480 milliGRAM(s) (24 mL/Hr) IV Continuous <Continuous>  ceFAZolin  IV Intermittent - Peds 1980 milliGRAM(s) IV Intermittent every 8 hours  chlorhexidine 2% Topical Cloths - Peds 1 Application(s) Topical daily  dexMEDEtomidine Infusion - Peds 0.3 MICROgram(s)/kG/Hr (4.96 mL/Hr) IV Continuous <Continuous>  famotidine IV Intermittent - Peds 20 milliGRAM(s) IV Intermittent every 12 hours  fat emulsion  (Plant Based) 20% Infusion - Pediatric 1.452 Gm/kG/Day (20 mL/Hr) IV Continuous <Continuous>  furosemide  IV Intermittent - Peds 15 milliGRAM(s) IV Intermittent every 8 hours  heparin   Infusion - Pediatric 0.045 Unit(s)/kG/Hr (3 mL/Hr) IV Continuous <Continuous>  levETIRAcetam IV Intermittent - Peds 1500 milliGRAM(s) IV Intermittent every 12 hours  lidocaine  Infusion - Peds 25 MICROgram(s)/kG/Min (12.4 mL/Hr) IV Continuous <Continuous>  Parenteral Nutrition - Pediatric 1 Each (69 mL/Hr) TPN Continuous <Continuous>  phytonadione IV Intermittent - Peds 5 milliGRAM(s) IV Intermittent <User Schedule>  sildenafil  IV Intermittent - Peds 10 milliGRAM(s) IV Intermittent every 8 hours  sodium chloride 0.9% -  250 milliLiter(s) (3 mL/Hr) IV Continuous <Continuous>  sodium chloride 0.9% lock flush - Peds 3 milliLiter(s) IV Push every 8 hours    MEDICATIONS  (PRN):  acetaminophen  IV Intermittent - Peds. 750 milliGRAM(s) IV Intermittent every 6 hours PRN Moderate Pain (4 -  6)    ===================================================  IMAGING STUDIES:    [ ] XR   [ ] CT   [ ] MR   [ ] US  [ ] Echo  ===========================================================  Parent/Guardian is at the bedside:	[x ] Yes	[ ] No  Patient and Parent/Guardian updated as to the progress/plan of care:	[x ] Yes	[ ] No    [x] The patient remains in critical and unstable condition, and requires ICU care and monitoring, assessment, and treatment  [ ] The patient is improving but requires continued monitoring, assessment, treatment, and adjustment of therapy    [x] The total critical care time spent by attending physician was __35__ minutes, excluding procedure time. Interval/Overnight Events:    Some concerns for decreased alertness yesterday, upward gaze deviation  Neuro placed EEG - no seizures  CT stable    VITAL SIGNS:  T(C): 36.6 (21 @ 05:00), Max: 37 (06-10-21 @ 20:00)  HR: 70 (21 @ 07:00) (69 - 70)  BP: 125/64 (06-10-21 @ 20:00) (93/55 - 129/78)  ABP: 142/72 (21 @ 07:00) (84/58 - 142/72)  ABP(mean): 95 (21 @ 07:00) (60 - 103)  RR: 25 (21 @ 07:00) (21 - 31)  SpO2: 89% (21 @ 07:00) (88% - 94%)  CVP(mm Hg): 25 (21 @ 07:00) (11 - 58)  End-Tidal CO2:  NIRS:    Physical Exam:    General: NAD  HEENT: no acute changes from baseline  Resp: unlabored, CTAB, good aeration, no rhonchi/rales/wheezing  CV: RRR, nl S1/S2, no m/r/g appreciated, CR < 2s, distal pulses 2+ and equal  Abd: soft, distended but improving, no HSM appreciated  Ext: wwp, no gross deformities  Neuro: not focusing, pushing away from exam purposefully but not consistently following commands  Skin: no rash  Other: L hydrocele    =======================RESPIRATORY=======================  [ ] FiO2: ___ 	[ ] Heliox: ____ 		[ ] BiPAP: ___   [ ] NC: __  Liters			[x ] HFNC: _40L 30%_ 	Liters, FiO2: __  [ ] Mechanical Ventilation:   [ ] Inhaled Nitric Oxide:  [ ] Extubation Readiness Assessed  Comments:    =====================CARDIOVASCULAR======================  Cardiovascular Medications:  furosemide  IV Intermittent - Peds 15 milliGRAM(s) IV Intermittent every 8 hours  lidocaine  Infusion - Peds 25 MICROgram(s)/kG/Min IV Continuous <Continuous>  sildenafil  IV Intermittent - Peds 10 milliGRAM(s) IV Intermittent every 8 hours    Chest Tube Output: ___ in 24 hours, ___ in last 12 hours   [ ] Right     [ ] Left    [ ] Mediastinal  Cardiac Rhythm:	[x] NSR		[ ] Other:    [ ] Central Venous Line	[ ] R	[ ] L	[ ] IJ	[ ] Fem	[ ] SC			Placed:   [ ] Arterial Line		[ ] R	[ ] L	[ ] PT	[ ] DP	[ ] Fem	[ ] Rad	[ ] Ax	Placed:   [ ] PICC:				[ ] Broviac		[ ] Mediport  Comments:    ==========HEMATOLOGY/ONCOLOGY=================  Transfusions:	[ ] PRBC	[ ] Platelets	[ ] FFP		[ ] Cryoprecipitate  DVT Prophylaxis:  Comments:    =================INFECTIOUS DISEASE==================  [ ] Cooling [place] being used. Target Temperature:     ===========FLUIDS/ELECTROLYTES/NUTRITION=============  I&O's Summary    10 Peter 2021 07:01  -  2021 07:00  --------------------------------------------------------  IN: 2826.3 mL / OUT: 2580 mL / NET: 246.3 mL      Daily   Diet:	[ ] Regular	[ ] Soft		[ ] Clears	[x ] NPO  .	[ ] Other:  .	[ ] NGT		[ ] NDT		[ ] GT		[ ] GJT    [ ] Urinary Catheter, Date Placed:   Comments:    ====================NEUROLOGY===================  [ ] SBS:		[ ] GISSELLE-1:	[ ] BIS:	[ ] CAPD:  [ ] EVD set at: ___ , Drainage in last 24 hours: ___ ml    [x] Adequacy of sedation and pain control has been assessed and adjusted  Comments:      ==================PATIENT CARE=================  [ ] There are pressure ulcers/areas of breakdown that are being addressed -   [x] Preventative measures are being taken to decrease risk for skin breakdown.  [x] Necessity of urinary, arterial, and venous catheters discussed    ==================LABS============================  ABG - ( 10 Peter 2021 02:51 )  pH: 7.50  /  pCO2: 37    /  pO2: 66    / HCO3: 29    / Base Excess: 4.8   /  SaO2: 92.6  / Lactate: x                                                12.0                  Neurophils% (auto):   68.7   ( @ 01:31):    6.34 )-----------(157          Lymphocytes% (auto):  11.7                                          38.2                   Eosinphils% (auto):   6.8      Manual%: Neutrophils x    ; Lymphocytes x    ; Eosinophils x    ; Bands%: x    ; Blasts x        (  @ 01:31 )   PT: 16.9 sec;   INR: 1.50 ratio  aPTT: 53.3 sec                            147    |  110    |  32                  Calcium: 9.5   / iCa: x      ( @ 01:31)    ----------------------------<  128       Magnesium: 2.2                              3.9     |  21     |  0.88             Phosphorous: 2.6      TPro  7.8    /  Alb  4.0    /  TBili  1.3    /  DBili  x      /  AST  36     /  ALT  11     /  AlkPhos  94     2021 01:31  RECENT CULTURES:   @ 16:41 .Blood Blood     No growth to date.       @ 14:41 .Sputum Sputum trap     Normal Respiratory Snow present    No polymorphonuclear leukocytes per low power field  No Squamous epithelial cells per low power field  No organisms seen     @ 09:45 .Urine Catheterized     No growth       @ 06:15 .CSF CSF     No growth    polymorphonuclear leukocytes seen  No organisms seen  by cytocentrifuge     @ 06:13 .Blood Blood     No growth to date.       @ 21:47 .Sputum Sputum     No growth at 48 hours    Few polymorphonuclear leukocytes per low power field  No Squamous epithelial cells per low power field  No organisms seen per oil power field     @ 20:36 .Urine Catheterized     No growth          =================MEDICATIONS======================  MEDICATIONS  MEDICATIONS  (STANDING):  Andexanet Raymond 480 milliGRAM(s),IV Diluent 48 milliLiter(s) 480 milliGRAM(s) (24 mL/Hr) IV Continuous <Continuous>  ceFAZolin  IV Intermittent - Peds 1980 milliGRAM(s) IV Intermittent every 8 hours  chlorhexidine 2% Topical Cloths - Peds 1 Application(s) Topical daily  dexMEDEtomidine Infusion - Peds 0.3 MICROgram(s)/kG/Hr (4.96 mL/Hr) IV Continuous <Continuous>  famotidine IV Intermittent - Peds 20 milliGRAM(s) IV Intermittent every 12 hours  fat emulsion  (Plant Based) 20% Infusion - Pediatric 1.452 Gm/kG/Day (20 mL/Hr) IV Continuous <Continuous>  furosemide  IV Intermittent - Peds 15 milliGRAM(s) IV Intermittent every 8 hours  heparin   Infusion - Pediatric 0.045 Unit(s)/kG/Hr (3 mL/Hr) IV Continuous <Continuous>  levETIRAcetam IV Intermittent - Peds 1500 milliGRAM(s) IV Intermittent every 12 hours  lidocaine  Infusion - Peds 25 MICROgram(s)/kG/Min (12.4 mL/Hr) IV Continuous <Continuous>  Parenteral Nutrition - Pediatric 1 Each (69 mL/Hr) TPN Continuous <Continuous>  phytonadione IV Intermittent - Peds 5 milliGRAM(s) IV Intermittent <User Schedule>  sildenafil  IV Intermittent - Peds 10 milliGRAM(s) IV Intermittent every 8 hours  sodium chloride 0.9% -  250 milliLiter(s) (3 mL/Hr) IV Continuous <Continuous>  sodium chloride 0.9% lock flush - Peds 3 milliLiter(s) IV Push every 8 hours    MEDICATIONS  (PRN):  acetaminophen  IV Intermittent - Peds. 750 milliGRAM(s) IV Intermittent every 6 hours PRN Moderate Pain (4 -  6)    ===================================================  IMAGING STUDIES:    [ ] XR   [ ] CT   [ ] MR   [ ] US  [ ] Echo  ===========================================================  Parent/Guardian is at the bedside:	[x ] Yes	[ ] No  Patient and Parent/Guardian updated as to the progress/plan of care:	[x ] Yes	[ ] No    [x] The patient remains in critical and unstable condition, and requires ICU care and monitoring, assessment, and treatment  [ ] The patient is improving but requires continued monitoring, assessment, treatment, and adjustment of therapy    [x] The total critical care time spent by attending physician was __35__ minutes, excluding procedure time.

## 2021-06-11 NOTE — PROGRESS NOTE PEDS - SUBJECTIVE AND OBJECTIVE BOX
OVERNIGHT EVENTS:  EVD clamped, ICP WNL, Seizure episode yesterday keppra increased on veeg. intermittent episodes of eyes rolling backward.     HPI:  21yo male with complex cardiac h/o DILV, L-malposition of great arteries, sick sinus syndrome and AV mihir disease with tachybradycardia syndrome with a dual chamber pacemaker (currently with RV lead fracture and is currently only LV paced 2/2 complete heart block), PSH of status post Mvqan-Jdpx-Mkwsnpq anastomosis and aortic arch reconstruction followed by a fenestrated lateral tunnel Fontan completion (now s/p fenestration closure). here s/p cardiac catheterization and ablation. Procedure was complicated by unsuccessful ablation and post extubation patient was noted to have increased hemoptysis and desaturations, patient was reintubated for airway protection and given propofol for sedation.  (26 May 2021 20:19)      ICU Vital Signs Last 24 Hrs  T(C): 36.6 (2021 05:00), Max: 37 (10 Peter 2021 20:00)  T(F): 97.8 (2021 05:00), Max: 98.6 (10 Peter 2021 20:00)  HR: 70 (2021 07:35) (69 - 70)  BP: 125/64 (10 Peter 2021 20:00) (93/55 - 129/78)  BP(mean): 83 (10 Peter 2021 20:00) (67 - 93)  ABP: 142/72 (2021 07:00) (84/58 - 142/72)  ABP(mean): 95 (2021 07:00) (60 - 103)  RR: 35 (2021 07:35) (21 - 35)  SpO2: 92% (2021 07:35) (88% - 94%)    PHYSICAL EXAM:  extubated on nasal cpap  opens eyes to stimulation, PERRL, tracts, not following commands  craniectomy site- full  Motor- localizes BUE, withdraws BLE  Incision site C/D/I    EVD @20cm of H20  clamped     LABS:                        12.0   6.34  )-----------( 157      ( 2021 01:31 )             38.2     06-11    147<H>  |  110<H>  |  32<H>  ----------------------------<  128<H>  3.9   |  21<L>  |  0.88    Ca    9.5      2021 01:31  Phos  2.6       Mg     2.2         TPro  7.8  /  Alb  4.0  /  TBili  1.3<H>  /  DBili  x   /  AST  36  /  ALT  11  /  AlkPhos  94      PT/INR - ( 2021 01:31 )   PT: 16.9 sec;   INR: 1.50 ratio         PTT - ( 2021 01:31 )  PTT:53.3 sec  Urinalysis Basic - ( 2021 14:22 )    Color: Yellow / Appearance: hazy / SG: >1.030 / pH: x  Gluc: x / Ketone: Negative  / Bili: Negative / Urobili: <2 mg/dL   Blood: x / Protein: 100 mg/dL / Nitrite: Negative   Leuk Esterase: Negative / RBC: 5-10 /HPF / WBC x   Sq Epi: x / Non Sq Epi: occ /HPF / Bacteria: Moderate        CULTURES:    Culture Results:   No growth ( @ 06:15)  Culture Results:   No growth to date. ( @ 06:13)      MEDICATIONS:  Antibiotics:  cefTRIAXone IV Intermittent - Peds 2000 milliGRAM(s) IV Intermittent every 24 hours    Neuro:  acetaminophen  IV Intermittent - Peds. 750 milliGRAM(s) IV Intermittent every 6 hours PRN  dexMEDEtomidine Infusion - Peds 0.5 MICROgram(s)/kG/Hr IV Continuous <Continuous>  levETIRAcetam IV Intermittent - Peds 1000 milliGRAM(s) IV Intermittent every 12 hours    Anticoagulation  heparin   Infusion - Pediatric 0.045 Unit(s)/kG/Hr IV Continuous <Continuous>    OTHER:  Andexanet Raymond 480 milliGRAM(s),IV Diluent 48 milliLiter(s) 480 milliGRAM(s) IV Continuous <Continuous>  chlorhexidine 0.12% Oral Liquid - Peds 15 milliLiter(s) Swish and Spit every 12 hours  chlorhexidine 2% Topical Cloths - Peds 1 Application(s) Topical daily  famotidine IV Intermittent - Peds 20 milliGRAM(s) IV Intermittent every 12 hours  furosemide  IV Intermittent - Peds 15 milliGRAM(s) IV Intermittent every 8 hours  lidocaine  Infusion - Peds 25 MICROgram(s)/kG/Min IV Continuous <Continuous>  niCARdipine Infusion - Peds 1 MICROgram(s)/kG/Min IV Continuous <Continuous>  petrolatum, white/mineral oil Ophthalmic Ointment - Peds 1 Application(s) Both EYES two times a day PRN  polyvinyl alcohol 1.4%/povidone 0.6% Ophthalmic Solution - Peds 1 Drop(s) Both EYES four times a day PRN  senna Oral Liquid - Peds 10 milliLiter(s) Oral daily  sildenafil  IV Intermittent - Peds 10 milliGRAM(s) IV Intermittent every 8 hours    IVF:  fat emulsion  (Plant Based) 20% Infusion - Pediatric 1.089 Gm/kG/Day IV Continuous <Continuous>  Parenteral Nutrition - Pediatric 1 Each TPN Continuous <Continuous>  phytonadione IV Intermittent - Peds 5 milliGRAM(s) IV Intermittent <User Schedule>  sodium chloride 0.9% -  250 milliLiter(s) IV Continuous <Continuous>  sodium chloride 0.9% lock flush - Peds 3 milliLiter(s) IV Push every 8 hours      DVT PROPHYLAXIS:  [] Venodynes                                [] Heparin/Lovenox    RADIOLOGY & ADDITIONAL TESTS:

## 2021-06-11 NOTE — PROGRESS NOTE PEDS - ASSESSMENT
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 Presented in IART - s/p EP study with unsuccessful attempt at ablation along with diagnostic cath showing elevated Fontan pressure (20mmHg). He was loaded with amiodarone and ultimately electrically cardioverted out, however reverted back into IART. His course has now been further complicated by L frontal parenchymal hemorrhage requiring emergent evacuation with NSx and EVD placement and VTach of unknown origin.    Plan:  - Continuos telemetry monitoring.  - Pacemaker set at DDD 70-120bpm.   - Continue Sildenafil 20mg q8h IV  - lasix home dose is 20mg QD. Agree with lasix 10mg IV BID. Goal net even to negative.  - Monitor blood pressure. Goal MAPs > 70. Agree with norepi and/or vasopressin as needed. Titrate to goal per PICU to maintain CPP.  - Hold home lisinopril, aldactone. Agree with plan to ensure SBP not persistently higher than 140. Nicardapine as needed. Would try to reinstitute home medications when able to give enteral medications.  - continue lidocaine drip 25mcg/kg/min. Max dose is 40mcg/kg/min, can titrate if as further episodes of Vtach. Would give a 1mg/kg lidocaine bolus if Vtach recurs with increasing drip dose  - f/u lidocaine level  - if stable with enteral feeds will transition to lidocaine drip to mexilitine. Would start 150mg q8. Should keep lidocaine on for ~ 48 hrs (minimum of 5 doses of mexilitene) before turning off  - anticoagulation care per hematology/PICU/NSx  - rest of care per PICU and NSx  - Please page pediatric cardiology with any concerns or questions.    Thank you for involving us in the care of your patient.     Guicho Oliveros MD, MPH  Pediatric Cardiology Fellow  PAGER: [phone]

## 2021-06-11 NOTE — PROGRESS NOTE PEDS - ATTENDING COMMENTS
Agree with above note, assessment & plan (edited where appropriate). 19 yo M s/p lateral tunnel Fontan (DILV, L-malposed great arteries. Also history of sick sinus syndrome and complete heart block, s/p dual chamber epicardial pacemaker with cardiac resynchronization therapy for left ventricular dysfunction and failing Fontan in 2016.  Admitted with IART for ablation; EP study with unsuccessful attempt at ablation; diagnostic cath --> high Fontan pressure (20mmHg). Underwent amiodarone load & cardioversion (unsuccessful) which was complicated by L frontal parenchymal bleed requiring surgical evacuation & EVD placement.      Patient on high flow oxygen; however continues to have desaturations. Chest CT yesterday confirmed the diagnosis of multiple venovenous collaterals which may account for some of his desaturation. Will continue to try to wean some oxygen for sat > 85%. Will continue to monitor respiratory status; also labile likely secondary to neurologic compromise.     Overnight concerning findings on telemetry reviewed by EP; no VT noted however continues to have underlying IART. Plan to transition to mexilitine when tolerating enteral feeds.      Home anti-HTN medications are on hold currently due to patient's unstable clinical status. Will monitor BP closely with hope to restart lisinopril & aldactone prior to discharge. Continue Lasix & Sildenafil    Neuro care as per neurosurgery & PICU team. Overnight concerns about mental status lability; EEG showed no evidence of seizures. Likely diagnosis is ICU psychosis/delirium.      Patient remains in critical condition and is at risk for complex arrhythmias with resulting hemodynamic collapse.  Plan discussed with mother at bedside & PICU team.    Anticipate need to discharge to acute rehab facility. Social work involved for beginning of coordination of care with recommendations of PT/OT & PM/R.

## 2021-06-11 NOTE — PROGRESS NOTE PEDS - ASSESSMENT
22 y/o male with complex heart disease s/p lateral tunnel Fontan. 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 Yesterday, the PICU team noticed left scrotal swelling with no associated symptoms, US shows left hydrocele, no bowel and no concern for torsion. OE: Left hemiscrotum reducible swelling and soft, mildly distended abdomen.    Plan:  - No need for urgent surgical intervention at this time  - Continue to monitor for abdominal distension, bowel function, and vomiting  -  Outpatient follow up as long as he shows no signs of intestinal obstruction    Pediatric Surgery  [id number] 22 y/o male with complex heart disease s/p lateral tunnel Fontan. 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 Yesterday, the PICU team noticed left scrotal swelling with no associated symptoms, US shows left hydrocele, no bowel and no concern for torsion. OE: Left hemiscrotum reducible swelling and soft, mildly distended abdomen.    Plan:  - No need for urgent surgical intervention at this time  - Continue to monitor for abdominal distension, bowel function, and vomiting  - Outpatient follow up in 1 week as long as he shows no signs of intestinal obstruction: [facility], [address], [address], [place] [address], Phone [phone], Fax [phone].  - Pediatric surgery available for any other further questions or recommendations at pager #[phone].    Pediatric Surgery  [id number] 22 y/o male with complex heart disease s/p lateral tunnel Fontan. 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 Yesterday, the PICU team noticed left scrotal swelling with no associated symptoms, US shows left hydrocele, no bowel and no concern for torsion. OE: Left hemiscrotum reducible swelling and soft, mildly distended abdomen.    Plan:  - No need for urgent surgical intervention at this time  - Continue to monitor for abdominal distension, bowel function, and vomiting  - Outpatient follow up in 1 week with Adult Urology as long as he shows no signs of intestinal obstruction  - Pediatric surgery available for any other further questions or recommendations at pager #[phone].    Pediatric Surgery  [id number]

## 2021-06-11 NOTE — PROGRESS NOTE PEDS - SUBJECTIVE AND OBJECTIVE BOX
INTERVAL HISTORY: Overnight, had episodes of nonresponsiveness vs. change in mental status. No further VTach.    RESPIRATORY SUPPORT: HFNC  NUTRITION: NPO      06-10 @ 07:01  -   @ 07:00  --------------------------------------------------------  IN: 2826.3 mL / OUT: 2580 mL / NET: 246.3 mL    INTRAVASCULAR ACCESS: RFCVL, RRA    MEDICATIONS:  furosemide  IV Intermittent - Peds 15 milliGRAM(s) IV Intermittent every 12 hours  lidocaine  Infusion - Peds 25 MICROgram(s)/kG/Min IV Continuous <Continuous>  sildenafil  IV Intermittent - Peds 10 milliGRAM(s) IV Intermittent every 8 hours  ceFAZolin  IV Intermittent - Peds 1980 milliGRAM(s) IV Intermittent every 8 hours  dexMEDEtomidine Infusion - Peds 0.3 MICROgram(s)/kG/Hr IV Continuous <Continuous>  levETIRAcetam IV Intermittent - Peds 1500 milliGRAM(s) IV Intermittent every 12 hours  famotidine IV Intermittent - Peds 20 milliGRAM(s) IV Intermittent every 12 hours  fat emulsion  (Plant Based) 20% Infusion - Pediatric 1.452 Gm/kG/Day IV Continuous <Continuous>  heparin   Infusion - Pediatric 0.045 Unit(s)/kG/Hr IV Continuous <Continuous>  Parenteral Nutrition - Pediatric 1 Each TPN Continuous <Continuous>  phytonadione IV Intermittent - Peds 5 milliGRAM(s) IV Intermittent <User Schedule>  sodium chloride 0.9% -  250 milliLiter(s) IV Continuous <Continuous>  sodium chloride 0.9% lock flush - Peds 3 milliLiter(s) IV Push every 8 hours    PHYSICAL EXAMINATION:  Vital signs -   T(C): 37.1 (21 @ 08:00), Max: 37.1 (21 @ 08:00)  HR: 70 (21 @ 10:50) (69 - 70)  BP: 121/70 (21 @ 08:00) (93/55 - 125/64)  ABP:  (91/55 - 142/72)  RR: 31 (21 @ 10:50) (21 - 35)  SpO2: 92% (21 @ 10:50) (88% - 94%)  CVP(mm Hg):  (11 - 58)    General - slow to respond to commands, not speaking  Skin - no rash, no desquamation, no cyanosis.  Eyes / ENT - no conjunctival injection, sclerae anicteric, external ears & nares normal, mucous membranes moist.  Pulmonary - normal inspiratory effort, no retractions, lungs clear to auscultation bilaterally, no wheezes, no rales.  Cardiovascular - normal rate, regular rhythm, normal S1 & single S2, grade 1/6 blowing holosystolic murmur at LMSB, no rubs, no gallops, capillary refill < 2sec, normal pulses.  Gastrointestinal - soft, non-distended, non-tender, no splenomegaly. (+) hepatomegaly.  Musculoskeletal - no joint swelling, no clubbing, no edema.  Neurologic / Psychiatric - slow to respond to commands    LABORATORY TESTS:                          12.0  CBC:   6.34 )-----------( 157   (21 @ 01:31)                          38.2               147   |  110   |  32                 Ca: 9.5    BMP:   ----------------------------< 128    M.2   (21 @ 01:31)             3.9    |  21    | 0.88               Ph: 2.6      LFT:     TPro: 7.8 / Alb: 4.0 / TBili: 1.3 / DBili: x / AST: 36 / ALT: 11 / AlkPhos: 94   (21 @ 01:31)    COAG: PT: 16.9 / PTT: 53.3 / INR: 1.50   (21 @ 01:31)       ABG:   pH: 7.50 / pCO2: 37 / pO2: 66 / HCO3: 29 / Base Excess: 4.8 / SaO2: 92.6 / Lactate: x / iCa: 1.18   (06-10-21 @ 02:51)    IMAGING STUDIES:  Electrocardiogram - (21) Ventricular paced rhythm @ 75bpm. Underlying IART.    Telemetry - (21) Ventricular paced rhythm @ 75bpm. Underlying IART.    Chest x-ray - (21) Stable mild cardiomegaly with subsegmental left lower lobe atelectasis.    Echocardiogram - (21)   1. This was a technically difficult suboptimal study due to poor echo windows. Findings limited to below.   2. 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   3. Mild mitral valve regurgitation.   4. Trivial tricuspid valve regurgitation.   5. Trivial aortic valve regurgitation.   6. Status post device closure of Fontan fenestration.   7. S/P stent placement in the Fontan pathway. Limited imaging of the inferior Fontan baffle. In this setting, the inferior vena cava to its connection near the atrial portion of the baffle appears patent with no obstruction. S/p device closure of Fontan fenestration. The Fontan fenestration is not seen on this study. The right bidirectional Storm is seen only on color flow mapping. This appears to have laminar low velocity flow.   8. The connection of the right bidirectional Storm to the right pulmonary artery could not be imaged. The right pulmonary artery is seen in a limited portion immediately to the left of the Storm and appears patent. The left pulmonary artery could not be imaged.   9. Extremely poor and limited imaging of the lateral free walls of the single systemic left ventricle. On limited short axis views there appears to be adequate systolic excursion of the posterior wall of the left ventricle and decreased in the anterior wall. The bulboventricular foramen could not be imaged. Suggest alternate imaging for appropriate assessment of function.  10. No pericardial effusion.  11. Small right pleural effusion.    Cath - (21)  Access 4 Fr RFA, 7 Fr RFV x2 with US guidance.  Sat data (%): on 50% FiO2 LPA 73, RUPV 98, Ao 97; CI 2.6 L/min/m2 with Qp:Qs 1 :1.  Pressures (mmHg): Elevated mFontan = mIVC = 20. mPCW=16, No significant gradient across LV to AAo to Vikki (98/58/73).  Normal PVR while on sildenafil.  Angios showed unobstructed Fontan with existing stent within Fontan conduit.    Comment: IART ablation was attempted after the diagnostic cath but unsuccessful. Patient was overdrive paced out of IART. At the end of the case, Ao sat was 94% and LPA 66% on 50% FiO2

## 2021-06-11 NOTE — PROGRESS NOTE PEDS - ASSESSMENT
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 He has elevated Fontan pressure secondary to LV diastolic dysfunction.  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 Persistent hypoxia, back in IART (not hemodynamically compromising), and most recently with runs of V-tach (unclear cause) requiring lidocaine initiation.    PLAN:  Neuro:  - precedex for agitation/delerium - consider seroquel for safety but has Replogle in now and c/f QTc prolongation    - environmental interventions  - s/p 3% NaCl - target Na > 140  - Keppra (clinical szs)  - R sided hemiparesis  - EVD @ 20 - cont to monitor output and ICP (goal < 20)  - Repeat head CT 6/8 - stable  [  ] CT 6/9 to follow-up  - skull flap off, will need prosthetic in future  - tylenol prn for fever control  [  ] PT/OT/speech/rehab consult    Resp:  Extubated to HFNC 60L 40% - primarily for hypoxia  Goal SpO2 > 85% (non-fenestrated but has been more hypoxic at baseline and discussions were ongoing prior to decompensation about home O2)  [  ] CTA to eval for PE    CV:  Lidocaine gtt started 6/7 for V-tach runs, has not recurred, EP following  Mode is DDD but wires without good sensitivity, so effectively VVI 70  Still in IART  Home meds on hold (Lisinopril, Aldactone)  Continue Sildenafil  Continue Lasix IV Q8hr: goal -500  Normal MAP goals now, off NE  SBP < 140  echo 6/8 extremely limited windows    Heme:  Xarelto on hold  Goal PLTs >100  FFP to keep INR < 2.0 (but before procedures will huddle with hematology to bring INR down < 1.5)  Hematology and NRSGY in discussions  s/p Vitamin K x3 days, now IV Vit K 3x/week    FEN:  NPO/IVF - on NS  Bowel regimen  Still having high Replogle output (improving), trial clamp today  TPN with low/no dextrose (per NRSGY)    ID  - Ancef --> CTX started 6/6 for LG fever - may be from blood in brain - will de-escalate when afebrile    Other  - L hydrocele  - outpt f/u    Access  Right Fem CVL (6/9) (replaced the L fem CVL)  Connor MACHADO (6/1) 20 y/o male DILV, 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 He has elevated Fontan pressure secondary to LV diastolic dysfunction.  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 Persistent hypoxia, back in IART (not hemodynamically compromising), and most recently with runs of V-tach (unclear cause) requiring lidocaine initiation. Significant issues with neuroagitation and delerium.    PLAN:  Neuro:  - precedex for agitation/delerium [  ] trial off - consider seroquel for safety but has Replogle in now and c/f QTc prolongation    - environmental interventions  [  ] EEG per neuro  - s/p 3% NaCl - target Na > 140  - Keppra (clinical szs)  - R sided hemiparesis  - EVD clamped  - Repeat head CT 6/11 - stable  - skull flap off, will need prosthetic in future  - tylenol prn for fever control  [  ] PT/OT/speech/rehab consult    Resp:  HFNC 40L 30% - continue to wean  - CTA obtained to eval for PE - difficult to protocol because of Fontan anatomy, no PE seen, but does show VV collaterals (likely contributing to hypoxia)  Goal SpO2 > 85% - may need to lower goals to 80% (non-fenestrated but has been more hypoxic at baseline and discussions were ongoing prior to decompensation about home O2)    CV:  Lidocaine gtt started 6/7 for V-tach runs, has not recurred, EP following    - having some runs again [  ] EP  Mode is DDD but atrial wires without good sensitivity, so effectively VVI 70  Still in IART  Home meds on hold (Lisinopril, Aldactone)  Continue Sildenafil  Continue Lasix IV Q8hr --> q12h : goal euvolemia  Normal MAP goals now, off NE  SBP < 140  echo 6/8 extremely limited windows    Heme:  Xarelto on hold  Goal PLTs >100  FFP to keep INR < 2.0 (but before procedures will huddle with hematology to bring INR down < 1.5)  Hematology and NRSGY in discussions  s/p Vitamin K x3 days, now IV Vit K 3x/week    FEN:  NPO/IVF - on NS  Bowel regimen  Still having high Replogle output (improving), trial clamp today  TPN with low/no dextrose (per NRSGY)    ID  - CTX started 6/6 for LG fever  - neg r/o (may be from blood in brain) --> go back to Ancef while EVD in    Other  - L hydrocele  - outpt f/u    Access  Right Fem CVL (6/9) (replaced the L fem CVL)  Connor MACHADO (6/1) 20 y/o male DILV, 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 He has elevated Fontan pressure secondary to LV diastolic dysfunction.  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 Persistent hypoxia, back in IART (not hemodynamically compromising), and most recently with runs of V-tach (unclear cause) requiring lidocaine initiation. Significant issues with neuroagitation and delerium.    PLAN:  Neuro:  - precedex for agitation/delerium [  ] trial off - consider seroquel for safety but has Replogle in now and c/f QTc prolongation    - environmental interventions  [  ] EEG per neuro  - s/p 3% NaCl - target Na > 140  - Keppra (clinical szs)  - R sided hemiparesis  - EVD clamped  - Repeat head CT 6/11 - stable  - skull flap off, will need prosthetic in future  - tylenol prn for fever control  [  ] PT/OT/speech/rehab consult    Resp:  HFNC 40L 30% - continue to wean  - CTA obtained to eval for PE - difficult to protocol because of Fontan anatomy, no PE seen, but does show VV collaterals (likely contributing to hypoxia)  Goal SpO2 > 85% - may need to lower goals to 80% (non-fenestrated but has been more hypoxic at baseline and discussions were ongoing prior to decompensation about home O2)    CV:  Lidocaine gtt started 6/7 for V-tach runs, has not recurred, EP following    - having some runs again [  ] EP  Mode is DDD but atrial wires without good sensitivity, so effectively VVI 70  Still in IART  Home meds on hold (Lisinopril, Aldactone)  Continue Sildenafil  Continue Lasix IV Q8hr --> q12h : goal euvolemia  Normal MAP goals now, off NE  SBP < 140  echo 6/8 extremely limited windows    Heme:  Xarelto on hold  Goal PLTs >100  FFP to keep INR < 2.0 (but before procedures will huddle with hematology to bring INR down < 1.5)  Hematology and NRSGY in discussions  s/p Vitamin K x3 days, now IV Vit K 3x/week    FEN:  NPO/TPN with low/no dextrose (per NRSGY)  Bowel regimen  Tolerated Replogle clamping - start NG feeds    ID  - CTX started 6/6 for LG fever  - neg r/o (may be from blood in brain) --> go back to Ancef while EVD in    Other  - L hydrocele  - outpt f/u    Access  Right Fem CVL (6/9) (replaced the L fem CVL)  Connor MACHADO (6/1) 20 y/o male DILV, 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 He has elevated Fontan pressure secondary to LV diastolic dysfunction.  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 Persistent hypoxia likely from VV collaterals (confirmed on CTA), back in IART (not hemodynamically compromising), and recently with runs of V-tach (unclear cause) requiring lidocaine initiation. Significant issues with neuroagitation and delerium.    PLAN:  Neuro:  - precedex for agitation/delerium [  ] trial off - consider seroquel for safety but has Replogle in now and c/f QTc prolongation    - environmental interventions  - vEEG on per neuro - no sz's so far  - Na > 140  - Keppra (clinical szs)  - R sided hemiparesis  - EVD clamped  - Repeat head CT 6/11 - stable  - skull flap off, will need prosthetic  - tylenol prn for fever control  [  ] PT/OT/speech/rehab consult    Resp:  HFNC 40L 30% - continue to wean  - CTA obtained to eval for PE - difficult to protocol because of Fontan anatomy and contrast timing, no PE seen, but does show VV collaterals (likely main reason for hypoxia)  Goal SpO2 > 85% - may need to lower goals to 80% with collaterals    CV:  Lidocaine gtt started 6/7 for V-tach runs, has not recurred, EP following    - having some runs of his underlying atrial tachycardia per EP - monitor for now    - eventual plans to transition to mexilitene  Mode is DDD but atrial wires without good sensitivity, so effectively VVI 70  Still in IART  Home meds on hold (Lisinopril, Aldactone)  Continue Sildenafil  Continue Lasix IV Q8hr --> q12h : goal euvolemia  SBP < 140  echo 6/8 extremely limited windows    Heme:  Xarelto on hold  Goal PLTs >100  FFP to keep INR < 2.0 (but before procedures e.g. EVD removal will huddle with hematology to bring INR down < 1.5 if not there already)  Hematology and NRSGY in discussions  s/p Vitamin K x3 days, now IV Vit K 3x/week    FEN:  NPO/TPN with low/no dextrose (per NRSGY)  Bowel regimen  Tolerated Replogle clamping - start NG feeds    ID  - CTX started 6/6 for LG fever  - neg r/o (may be from blood in brain) --> go back to Ancef while EVD in    Other  - L hydrocele  - outpt f/u    Access  Right Fem CVL (6/9) (replaced the L fem CVL at that time)  Righ Rad AL (6/1)

## 2021-06-11 NOTE — CHART NOTE - NSCHARTNOTEFT_GEN_A_CORE
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  Pt was initially admitted for further assessment and treatment s/p diagnostic cath and failed IART ablation attempt.  Course has been further complicated by left frontal parenchymal hemorrhage requiring emergent evacuation with neurosurgery and EVD placement.  Significant issues with neuroagitation and delirium now noted.   Interval History:  Pt continues on volume-restricted TPN (with minimal dextrose concentration as [facility] by [facility] starting NG feeds of Pedialyte today at 10mL/hr.     MEDICATIONS  (STANDING):  Andexanet Raymond 480 milliGRAM(s), IV Diluent 48 milliLiter(s) 480 milliGRAM(s) (24 mL/Hr) IV Continuous <Continuous>  ceFAZolin  IV Intermittent - Peds 1980 milliGRAM(s) IV Intermittent every 8 hours  chlorhexidine 2% Topical Cloths - Peds 1 Application(s) Topical daily  dexMEDEtomidine Infusion - Peds 0.3 MICROgram(s)/kG/Hr (4.96 mL/Hr) IV Continuous <Continuous>  famotidine IV Intermittent - Peds 20 milliGRAM(s) IV Intermittent every 12 hours  fat emulsion  (Plant Based) 20% Infusion - Pediatric 1.452 Gm/kG/Day (20 mL/Hr) IV Continuous <Continuous>  furosemide  IV Intermittent - Peds 15 milliGRAM(s) IV Intermittent every 12 hours  heparin   Infusion - Pediatric 0.045 Unit(s)/kG/Hr (3 mL/Hr) IV Continuous <Continuous>  levETIRAcetam IV Intermittent - Peds 1500 milliGRAM(s) IV Intermittent every 12 hours  lidocaine  Infusion - Peds 25 MICROgram(s)/kG/Min (12.4 mL/Hr) IV Continuous <Continuous>  Parenteral Nutrition - Pediatric 1 Each (69 mL/Hr) TPN Continuous <Continuous>  phytonadione IV Intermittent - Peds 5 milliGRAM(s) IV Intermittent <User Schedule>  sildenafil  IV Intermittent - Peds 10 milliGRAM(s) IV Intermittent every 8 hours  sodium chloride 0.9% -  250 milliLiter(s) (3 mL/Hr) IV Continuous <Continuous>  sodium chloride 0.9% lock flush - Peds 3 milliLiter(s) IV Push every 8 hours    PHYSICAL EXAM  WEIGHT: 66.1kg ( @ 07:11)   Weight as metabolic k.2*kg (defined as maintenance fluid volume in ml/100ml)    GENERAL APPEARANCE: Lean; Well developed,   HEENT: Normocephalic, No Cheilosis; No Periorbital Edema; Non- Icteric   RESPIRATORY:  On HFNC;   NEUROLOGY: Not alert; sedated;  EXTREMITIES: No cyanosis;  SKIN: No Rashes visible; no Jaundice    LABS      147|  110  |  32  ----------------------------<  128  3.9   |  21  |  0.88    Ca    9.5      2021 01:31  Phos  2.6       Mg     2.2         TPro  7.8  /  Alb  4.0  /  TBili  1.3  /  DBili  x   /  AST  36  /  ALT  11  /  AlkPhos  94      Triglycerides, Serum: 250 mg/dL ( @ 01:31)    ASSESSMENT:  Feeding Problems;  On Parenteral Nutrition;  Hypertriglycedemia     Parenteral Intake:  Total kcal/day: 1300  Grams protein/day: 50  Kcal/*kg/day: Amino Acid 8; Glucose 6; Lipid 40; Total 54    Pt with cardiac history as above with course now complicated by left frontal parenchymal hemorrhage requiring emergent evacuation with neurosurgery and EVD placement.  Pt has remained NPO and receiving fluid restricted TPN for nutritional support.  Parenteral calories have been limited due to fluid and dextrose restriction of the TPN.  To start on NG feeds of Pedialyte today.     PLAN:  To continue TPN; due to increase in serum triglycerides, have decreased lipid rate from 20 to 18mL/hr.  KCl decreased from 35 to 30mEq/L as KPhos increased from 7 to 10mMol/L (total K remains ~45mEq/L in TPN solution).  As pt now off Ceftriaxone, have added Calcium 7mEq/L into TPN solution. Other TPN electrolytes unchanged.     Acute fluid and electrolyte changes as per primary management team.

## 2021-06-12 LAB
ALBUMIN SERPL ELPH-MCNC: 4.4 G/DL — SIGNIFICANT CHANGE UP (ref 3.3–5)
ALP SERPL-CCNC: 129 U/L — SIGNIFICANT CHANGE UP (ref 60–270)
ALT FLD-CCNC: 22 U/L — SIGNIFICANT CHANGE UP (ref 4–41)
ANION GAP SERPL CALC-SCNC: 16 MMOL/L — HIGH (ref 7–14)
AST SERPL-CCNC: 50 U/L — HIGH (ref 4–40)
BILIRUB SERPL-MCNC: 1.5 MG/DL — HIGH (ref 0.2–1.2)
BUN SERPL-MCNC: 25 MG/DL — HIGH (ref 7–23)
CALCIUM SERPL-MCNC: 10.2 MG/DL — SIGNIFICANT CHANGE UP (ref 8.4–10.5)
CHLORIDE SERPL-SCNC: 104 MMOL/L — SIGNIFICANT CHANGE UP (ref 98–107)
CO2 SERPL-SCNC: 23 MMOL/L — SIGNIFICANT CHANGE UP (ref 22–31)
CREAT SERPL-MCNC: 0.96 MG/DL — SIGNIFICANT CHANGE UP (ref 0.5–1.3)
CULTURE RESULTS: SIGNIFICANT CHANGE UP
GLUCOSE SERPL-MCNC: 135 MG/DL — HIGH (ref 70–99)
MAGNESIUM SERPL-MCNC: 2.1 MG/DL — SIGNIFICANT CHANGE UP (ref 1.6–2.6)
PHOSPHATE SERPL-MCNC: 3.1 MG/DL — SIGNIFICANT CHANGE UP (ref 2.5–4.5)
POTASSIUM SERPL-MCNC: 3.6 MMOL/L — SIGNIFICANT CHANGE UP (ref 3.5–5.3)
POTASSIUM SERPL-SCNC: 3.6 MMOL/L — SIGNIFICANT CHANGE UP (ref 3.5–5.3)
PROT SERPL-MCNC: 8.4 G/DL — HIGH (ref 6–8.3)
SODIUM SERPL-SCNC: 143 MMOL/L — SIGNIFICANT CHANGE UP (ref 135–145)
SPECIMEN SOURCE: SIGNIFICANT CHANGE UP
SURGICAL PATHOLOGY STUDY: SIGNIFICANT CHANGE UP
TRIGL SERPL-MCNC: 217 MG/DL — HIGH

## 2021-06-12 PROCEDURE — 99291 CRITICAL CARE FIRST HOUR: CPT

## 2021-06-12 PROCEDURE — 99292 CRITICAL CARE ADDL 30 MIN: CPT

## 2021-06-12 PROCEDURE — 71045 X-RAY EXAM CHEST 1 VIEW: CPT | Mod: 26

## 2021-06-12 PROCEDURE — 99231 SBSQ HOSP IP/OBS SF/LOW 25: CPT

## 2021-06-12 RX ORDER — FUROSEMIDE 40 MG
15 TABLET ORAL EVERY 12 HOURS
Refills: 0 | Status: DISCONTINUED | OUTPATIENT
Start: 2021-06-12 | End: 2021-06-13

## 2021-06-12 RX ORDER — SENNA PLUS 8.6 MG/1
15 TABLET ORAL DAILY
Refills: 0 | Status: DISCONTINUED | OUTPATIENT
Start: 2021-06-12 | End: 2021-06-17

## 2021-06-12 RX ORDER — LANOLIN ALCOHOL/MO/W.PET/CERES
1 CREAM (GRAM) TOPICAL AT BEDTIME
Refills: 0 | Status: DISCONTINUED | OUTPATIENT
Start: 2021-06-12 | End: 2021-06-14

## 2021-06-12 RX ORDER — POLYETHYLENE GLYCOL 3350 17 G/17G
17 POWDER, FOR SOLUTION ORAL DAILY
Refills: 0 | Status: DISCONTINUED | OUTPATIENT
Start: 2021-06-12 | End: 2021-06-29

## 2021-06-12 RX ORDER — SODIUM CHLORIDE 9 MG/ML
3 INJECTION INTRAMUSCULAR; INTRAVENOUS; SUBCUTANEOUS EVERY 8 HOURS
Refills: 0 | Status: DISCONTINUED | OUTPATIENT
Start: 2021-06-12 | End: 2021-06-13

## 2021-06-12 RX ORDER — SODIUM CHLORIDE 9 MG/ML
1000 INJECTION, SOLUTION INTRAVENOUS
Refills: 0 | Status: DISCONTINUED | OUTPATIENT
Start: 2021-06-12 | End: 2021-06-15

## 2021-06-12 RX ADMIN — I.V. FAT EMULSION 18 GM/KG/DAY: 20 EMULSION INTRAVENOUS at 07:28

## 2021-06-12 RX ADMIN — SODIUM CHLORIDE 3 MILLILITER(S): 9 INJECTION INTRAMUSCULAR; INTRAVENOUS; SUBCUTANEOUS at 01:04

## 2021-06-12 RX ADMIN — Medication 25 MILLIGRAM(S): at 04:24

## 2021-06-12 RX ADMIN — Medication 0.1 MILLIGRAM(S): at 21:28

## 2021-06-12 RX ADMIN — Medication 198 MILLIGRAM(S): at 21:46

## 2021-06-12 RX ADMIN — FAMOTIDINE 200 MILLIGRAM(S): 10 INJECTION INTRAVENOUS at 13:08

## 2021-06-12 RX ADMIN — SODIUM CHLORIDE 3 MILLILITER(S): 9 INJECTION INTRAMUSCULAR; INTRAVENOUS; SUBCUTANEOUS at 08:17

## 2021-06-12 RX ADMIN — Medication 0.1 MILLIGRAM(S): at 13:08

## 2021-06-12 RX ADMIN — Medication 3 UNIT(S)/KG/HR: at 10:51

## 2021-06-12 RX ADMIN — Medication 198 MILLIGRAM(S): at 13:08

## 2021-06-12 RX ADMIN — SODIUM CHLORIDE 3 MILLILITER(S): 9 INJECTION, SOLUTION INTRAVENOUS at 10:52

## 2021-06-12 RX ADMIN — Medication 25 MILLIGRAM(S): at 20:06

## 2021-06-12 RX ADMIN — Medication 3 MILLIGRAM(S): at 17:57

## 2021-06-12 RX ADMIN — Medication 3 MILLIGRAM(S): at 05:11

## 2021-06-12 RX ADMIN — Medication 198 MILLIGRAM(S): at 06:05

## 2021-06-12 RX ADMIN — Medication 12.4 MICROGRAM(S)/KG/MIN: at 19:15

## 2021-06-12 RX ADMIN — Medication 12.4 MICROGRAM(S)/KG/MIN: at 05:27

## 2021-06-12 RX ADMIN — SODIUM CHLORIDE 3 MILLILITER(S): 9 INJECTION, SOLUTION INTRAVENOUS at 07:29

## 2021-06-12 RX ADMIN — LEVETIRACETAM 400 MILLIGRAM(S): 250 TABLET, FILM COATED ORAL at 04:24

## 2021-06-12 RX ADMIN — Medication 1 MILLIGRAM(S): at 21:28

## 2021-06-12 RX ADMIN — Medication 25 MILLIGRAM(S): at 11:32

## 2021-06-12 RX ADMIN — Medication 3 UNIT(S)/KG/HR: at 07:28

## 2021-06-12 RX ADMIN — CHLORHEXIDINE GLUCONATE 1 APPLICATION(S): 213 SOLUTION TOPICAL at 20:07

## 2021-06-12 RX ADMIN — Medication 3 UNIT(S)/KG/HR: at 19:16

## 2021-06-12 RX ADMIN — Medication 12.4 MICROGRAM(S)/KG/MIN: at 13:14

## 2021-06-12 RX ADMIN — Medication 12.4 MICROGRAM(S)/KG/MIN: at 07:27

## 2021-06-12 RX ADMIN — SODIUM CHLORIDE 3 MILLILITER(S): 9 INJECTION, SOLUTION INTRAVENOUS at 19:16

## 2021-06-12 RX ADMIN — SODIUM CHLORIDE 3 MILLILITER(S): 9 INJECTION, SOLUTION INTRAVENOUS at 12:00

## 2021-06-12 RX ADMIN — CHLORHEXIDINE GLUCONATE 1 APPLICATION(S): 213 SOLUTION TOPICAL at 00:00

## 2021-06-12 RX ADMIN — FAMOTIDINE 200 MILLIGRAM(S): 10 INJECTION INTRAVENOUS at 02:18

## 2021-06-12 RX ADMIN — SODIUM CHLORIDE 3 MILLILITER(S): 9 INJECTION INTRAMUSCULAR; INTRAVENOUS; SUBCUTANEOUS at 16:37

## 2021-06-12 RX ADMIN — Medication 2 DROP(S): at 20:07

## 2021-06-12 RX ADMIN — LEVETIRACETAM 400 MILLIGRAM(S): 250 TABLET, FILM COATED ORAL at 16:30

## 2021-06-12 NOTE — PROGRESS NOTE PEDS - SUBJECTIVE AND OBJECTIVE BOX
POD #  11 s/p left craniectomy for ICH and insertion of EVD for IVH    patient seen and examined with mother bedside, EVD is clamped since 6/10, ICP ranged -4 to 12 overnight, currently 10 .  CTH on  was unchanged from previous CTH the day before.    HPI:  21yo male with complex cardiac h/o DILV, L-malposition of great arteries, sick sinus syndrome and AV mihir disease with tachybradycardia syndrome with a dual chamber pacemaker (currently with RV lead fracture and is currently only LV paced 2/2 complete heart block), PSH of status post Hsflg-Fbea-Jfenkxh anastomosis and aortic arch reconstruction followed by a fenestrated lateral tunnel Fontan completion (now s/p fenestration closure). here s/p cardiac catheterization and ablation. Procedure was complicated by unsuccessful ablation and post extubation patient was noted to have increased hemoptysis and desaturations, patient was reintubated for airway protection and given propofol for sedation.  (26 May 2021 20:19)    PAST MEDICAL & SURGICAL HISTORY:  Double inlet left ventricle  D-TGA (dextro-transposition of great arteries)  Sick sinus syndrome  Atrial tachycardia  Hepatomegaly  Other ascites  Pulmonary hypertension  Pacemaker  AV block  Coagulopathy  S/P cardiac cath2001-assessment of anatomy prior to 1st surgery  2004- coil emolization of collateral vessels  2011-balloon angioplasty of superior narrowing of Fontan circuit, balloon angioplasty of LPA, ASD device placed for closure of Fontan fenestraion  10/24/2016- Cath of Fontan  S/P Nirmal-Taussig shunt  Udwzr-Pyvl-Njvebwk anastomosis with modified right BT shunt and creation of pulmonary atresia  S/P celestine-Fontan operation  3/15/2003- Celestine-Fontan with ligation of BT shunt and left pulmonary artery plasty  Cardiac pacemaker  2004    PHYSICAL EXAM:  awake, alert, tracts, PERRL, non verbal, following simple commands  craniectomy site- full, not tense  motor- right hemiplegic, moves LUE/LLE antigravity  sensory- grimaces to pain in all 4 extremities  Incision site C/D/I    Diet:  Regular (  )  NPO       (x  ) NGT feeds    Drains:  ventriculostomy   ( x ) clamped  Lumbar drain       (  )  NEIL drain               (  )  Hemovac              (  )    Vital Signs Last 24 Hrs  T(C): 36.8 (2021 08:00), Max: 38 (2021 12:00)  T(F): 98.2 (2021 08:00), Max: 100.4 (2021 12:00)  HR: 70 (2021 09:00) (70 - 70)  BP: 126/67 (2021 06:00) (126/67 - 139/73)  BP(mean): 78 (2021 06:00) (78 - 92)  RR: 32 (2021 09:00) (23 - 35)  SpO2: 90% (2021 09:00) (83% - 92%)  I&O's Summary    2021 07:01  -  2021 07:00  --------------------------------------------------------  IN: 2954 mL / OUT: 2579 mL / NET: 375 mL    2021 07:01  -  2021 09:16  --------------------------------------------------------  IN: 212.8 mL / OUT: 590 mL / NET: -377.2 mL      MEDICATIONS  (STANDING):  ceFAZolin  IV Intermittent - Peds 1980 milliGRAM(s) IV Intermittent every 8 hours  chlorhexidine 2% Topical Cloths - Peds 1 Application(s) Topical daily  famotidine IV Intermittent - Peds 20 milliGRAM(s) IV Intermittent every 12 hours  fat emulsion  (Plant Based) 20% Infusion - Pediatric 1.307 Gm/kG/Day (18 mL/Hr) IV Continuous <Continuous>  furosemide  IV Intermittent - Peds 15 milliGRAM(s) IV Intermittent every 8 hours  heparin   Infusion - Pediatric 0.045 Unit(s)/kG/Hr (3 mL/Hr) IV Continuous <Continuous>  levETIRAcetam IV Intermittent - Peds 1500 milliGRAM(s) IV Intermittent every 12 hours  lidocaine  Infusion - Peds 25 MICROgram(s)/kG/Min (12.4 mL/Hr) IV Continuous <Continuous>  Parenteral Nutrition - Pediatric 1 Each (69 mL/Hr) TPN Continuous <Continuous>  phytonadione IV Intermittent - Peds 5 milliGRAM(s) IV Intermittent <User Schedule>  sildenafil  IV Intermittent - Peds 10 milliGRAM(s) IV Intermittent every 8 hours  sodium chloride 0.9% -  250 milliLiter(s) (3 mL/Hr) IV Continuous <Continuous>  sodium chloride 0.9% lock flush - Peds 3 milliLiter(s) IV Push every 8 hours    MEDICATIONS  (PRN):  acetaminophen  IV Intermittent - Peds. 750 milliGRAM(s) IV Intermittent every 6 hours PRN Moderate Pain (4 -  6)    LABS:                        12.0   8.07  )-----------( 184      ( 2021 03:03 )             38.4     06-12    143  |  104  |  25<H>  ----------------------------<  135<H>  3.6   |  23  |  0.96    Ca    10.2      2021 03:03  Phos  3.1     06-12  Mg     2.1     06-12    TPro  8.4<H>  /  Alb  4.4  /  TBili  1.5<H>  /  DBili  x   /  AST  50<H>  /  ALT  22  /  AlkPhos  129  06-12    PT/INR - ( 2021 03:03 )   PT: 17.5 sec;   INR: 1.57 ratio         PTT - ( 2021 03:03 )  PTT:34.1 sec

## 2021-06-12 NOTE — PROGRESS NOTE PEDS - SUBJECTIVE AND OBJECTIVE BOX
CC:     Interval/Overnight Events:      VITAL SIGNS:  T(C): 36.8 (21 @ 08:00), Max: 38 (21 @ 12:00)  HR: 70 (21 @ 08:00) (70 - 70)  BP: 126/67 (21 @ 06:00) (126/67 - 139/73)  ABP: 127/71 (21 @ 08:00) (95/81 - 148/76)  ABP(mean): 91 (21 @ 08:00) (68 - 98)  RR: 27 (21 @ 08:00) (23 - 35)  SpO2: 89% (21 @ 08:00) (83% - 92%)  CVP(mm Hg): 24 (21 @ 08:00) (7 - 25)    ==============================RESPIRATORY========================  FiO2: 	    Mechanical Ventilation:     ABG - ( 2021 22:35 )  pH: 7.55  /  pCO2: 27    /  pO2: 53    / HCO3: 26    / Base Excess: 1.4   /  SaO2: 88.6  / Lactate: x        Respiratory Medications:        ============================CARDIOVASCULAR=======================  Cardiac Rhythm:	 NSR    Cardiovascular Medications:  furosemide  IV Intermittent - Peds 15 milliGRAM(s) IV Intermittent every 8 hours  lidocaine  Infusion - Peds 25 MICROgram(s)/kG/Min IV Continuous <Continuous>  sildenafil  IV Intermittent - Peds 10 milliGRAM(s) IV Intermittent every 8 hours        =====================FLUIDS/ELECTROLYTES/NUTRITION===================  I&O's Summary    2021 07:01  -  2021 07:00  --------------------------------------------------------  IN: 2954 mL / OUT: 2579 mL / NET: 375 mL    2021 07:01  -  2021 08:27  --------------------------------------------------------  IN: 212.8 mL / OUT: 590 mL / NET: -377.2 mL      Daily       143  |  104  |  25  ----------------------------<  135  3.6   |  23  |  0.96    Ca    10.2      2021 03:03  Phos  3.1       Mg     2.1         TPro  8.4  /  Alb  4.4  /  TBili  1.5  /  DBili  x   /  AST  50  /  ALT  22  /  AlkPhos  129        Diet:     Gastrointestinal Medications:  famotidine IV Intermittent - Peds 20 milliGRAM(s) IV Intermittent every 12 hours  fat emulsion  (Plant Based) 20% Infusion - Pediatric 1.307 Gm/kG/Day IV Continuous <Continuous>  Parenteral Nutrition - Pediatric 1 Each TPN Continuous <Continuous>  phytonadione IV Intermittent - Peds 5 milliGRAM(s) IV Intermittent <User Schedule>  sodium chloride 0.9% -  250 milliLiter(s) IV Continuous <Continuous>  sodium chloride 0.9% lock flush - Peds 3 milliLiter(s) IV Push every 8 hours      Fluid Management:  Fluid Status: [ ] Hypovolemic      [ ] Euvolemic         [ ] Fluid overloaded  Fluid Status Goal for next 24hr.:   [ ] Net Negative    ______   ml       [ ] Net Positive ____        ml      [ ] Intake=Output  [ ] No specific fluid goal  Fluid Intake Plan: ________________  Fluid Removal Plan: [ ] Not applicable  [ ] Diuretic Plan:  [ ] CRRT Plan:  [ ] Unchanged   [ ] No Fluid Removal     [ ] Prescribed weight loss of ___ml/hr.     [ ] Intake=Output       [ ] Fluid removal of ____    ml/hr.    ========================HEMATOLOGIC/ONCOLOGIC====================                                            12.0                  Neurophils% (auto):   71.6   ( @ 03:03):    8.07 )-----------(184          Lymphocytes% (auto):  11.0                                          38.4                   Eosinphils% (auto):   2.5      Manual%: Neutrophils x    ; Lymphocytes x    ; Eosinophils x    ; Bands%: x    ; Blasts x          (  @ 03:03 )   PT: 17.5 sec;   INR: 1.57 ratio  aPTT: 34.1 sec                          12.0   8.07  )-----------( 184      ( 2021 03:03 )             38.4                         12.0   6.34  )-----------( 157      ( 2021 01:31 )             38.2                         11.5   7.02  )-----------( 161      ( 10 Peter 2021 03:29 )             36.9       Transfusions:	  Hematologic/Oncologic Medications:  heparin   Infusion - Pediatric 0.045 Unit(s)/kG/Hr IV Continuous <Continuous>    DVT Prophylaxis:    ============================INFECTIOUS DISEASE========================  Antimicrobials/Immunologic Medications:  ceFAZolin  IV Intermittent - Peds 1980 milliGRAM(s) IV Intermittent every 8 hours            =============================NEUROLOGY============================  Adequacy of sedation and pain control has been assessed and adjusted    SBS:  		  GISSELLE-1:	      Neurologic Medications:  acetaminophen  IV Intermittent - Peds. 750 milliGRAM(s) IV Intermittent every 6 hours PRN  levETIRAcetam IV Intermittent - Peds 1500 milliGRAM(s) IV Intermittent every 12 hours      OTHER MEDICATIONS:  Endocrine/Metabolic Medications:    Genitourinary Medications:    Topical/Other Medications:  chlorhexidine 2% Topical Cloths - Peds 1 Application(s) Topical daily      =======================PATIENT CARE ===================  [ ] There are pressure ulcers/areas of breakdown that are being addressed  [ ] Preventive measures are being taken to decrease risk for skin breakdown  [ ] Necessity of urinary, arterial, and venous catheters discussed    ============================PHYSICAL EXAM============================  General: 	In no acute distress  Respiratory:	Lungs clear to auscultation bilaterally. Good aeration. No rales,   .		rhonchi, retractions or wheezing. Effort even and unlabored.  CV:		Regular rate and rhythm. Normal S1/S2. No murmurs, rubs, or   .		gallop. Capillary refill < 2 seconds. Distal pulses 2+ and equal.  Abdomen:	Soft, non-distended. Bowel sounds present. No palpable   .		hepatosplenomegaly.  Skin:		No rash.  Extremities:	Warm and well perfused. No gross extremity deformities.  Neurologic:	Alert and oriented. No acute change from baseline exam.    ============================IMAGING STUDIES=========================        =============================SOCIAL=================================  Parent/Guardian is at the bedside  Patient and Parent/Guardian updated as to the progress/plan of care    The patient remains in critical and unstable condition, and requires ICU care and monitoring    The patient is improving but requires continued monitoring and adjustment of therapy    Total critical care time spent by attending physician was 35 minutes excluding procedure time. CC:     Interval/Overnight Events: Desaturations into the 70s --HFNC increased and FiO2 increased. An additional dose of lasix given for a "wet looking" X-ray (HFNC and lasix were both reduced yesterday and then increased again)      VITAL SIGNS:  T(C): 36.8 (21 @ 08:00), Max: 38 (21 @ 12:00)  HR: 70 (21 @ 08:00) (70 - 70)  BP: 126/67 (21 @ 06:00) (126/67 - 139/73)  ABP: 127/71 (21 @ 08:00) (95/81 - 148/76)  ABP(mean): 91 (21 @ 08:00) (68 - 98)  RR: 27 (21 @ 08:00) (23 - 35)  SpO2: 89% (21 @ 08:00) (83% - 92%)  CVP(mm Hg): 24 (21 @ 08:00) (7 - 25)    ==============================RESPIRATORY========================  FiO2: 	0.5    HFNC @ 50LPM    ABG - ( 2021 22:35 )  pH: 7.55  /  pCO2: 27    /  pO2: 53    / HCO3: 26    / Base Excess: 1.4   /  SaO2: 88.6  / Lactate: x                ============================CARDIOVASCULAR=======================  Cardiac Rhythm:	 NSR    Cardiovascular Medications:  furosemide  IV Intermittent - Peds 15 milliGRAM(s) IV Intermittent every 8 hours--change to every 12  lidocaine  Infusion - Peds 25 MICROgram(s)/kG/Min IV Continuous <Continuous>  sildenafil  IV Intermittent - Peds 10 milliGRAM(s) IV Intermittent every 8 hours    Start Clonidine 0.1 mg every 8 hours--hold for SBP<100    =====================FLUIDS/ELECTROLYTES/NUTRITION===================  I&O's Summary    2021 07:  -  2021 07:00  --------------------------------------------------------  IN: 2954 mL / OUT: 2579 mL / NET: 375 mL    2021 07:  -  2021 08:27  --------------------------------------------------------  IN: 212.8 mL / OUT: 590 mL / NET: -377.2 mL      Daily       143  |  104  |  25  ----------------------------<  135  3.6   |  23  |  0.96    Ca    10.2      2021 03:03  Phos  3.1       Mg     2.1         TPro  8.4  /  Alb  4.4  /  TBili  1.5  /  DBili  x   /  AST  50  /  ALT  22  /  AlkPhos  129        Diet: Pedialyte at 70 ml/hr    Gastrointestinal Medications:  famotidine IV Intermittent - Peds 20 milliGRAM(s) IV Intermittent every 12 hours  fat emulsion  (Plant Based) 20% Infusion - Pediatric 1.307 Gm/kG/Day IV Continuous <Continuous>  Parenteral Nutrition - Pediatric 1 Each TPN Continuous <Continuous>  phytonadione IV Intermittent - Peds 5 milliGRAM(s) IV Intermittent <User Schedule>  sodium chloride 0.9% -  250 milliLiter(s) IV Continuous <Continuous>  sodium chloride 0.9% lock flush - Peds 3 milliLiter(s) IV Push every 8 hours      Fluid Management:  Fluid Status: [ ] Hypovolemic      [ X] Euvolemic         [ ] Fluid overloaded  Fluid Status Goal for next 24hr.:   [ ] Net Negative    ______   ml       [ ] Net Positive ____        ml      [X ] Intake=Output  [ ] No specific fluid goal  Fluid Intake Plan: Continue current feeding   Fluid Removal Plan: [ ] Not applicable  [ X] Diuretic Plan: Decrease lasix to twice daily      ========================HEMATOLOGIC/ONCOLOGIC====================                                            12.0                  Neurophils% (auto):   71.6   ( @ 03:03):    8.07 )-----------(184          Lymphocytes% (auto):  11.0                                          38.4                   Eosinphils% (auto):   2.5      Manual%: Neutrophils x    ; Lymphocytes x    ; Eosinophils x    ; Bands%: x    ; Blasts x          (  @ 03:03 )   PT: 17.5 sec;   INR: 1.57 ratio  aPTT: 34.1 sec                          12.0   8.07  )-----------( 184      ( 2021 03:03 )             38.4                         12.0   6.34  )-----------( 157      ( 2021 01:31 )             38.2                         11.5   7.02  )-----------( 161      ( 10 Peter 2021 03:29 )             36.9       Transfusions:	  Hematologic/Oncologic Medications:  heparin   Infusion - Pediatric 0.045 Unit(s)/kG/Hr IV Continuous <Continuous>    DVT Prophylaxis:    ============================INFECTIOUS DISEASE========================  Antimicrobials/Immunologic Medications:  ceFAZolin  IV Intermittent - Peds 1980 milliGRAM(s) IV Intermittent every 8 hours            =============================NEUROLOGY============================  Adequacy of pain/delirium/withdrawal control has been assessed and adjusted      CAPD: Positive 		  GISSELLE-1:	3  ICP 6-12  EVD clamped    Neurologic Medications:  acetaminophen  IV Intermittent - Peds. 750 milliGRAM(s) IV Intermittent every 6 hours PRN  levETIRAcetam IV Intermittent - Peds 1500 milliGRAM(s) IV Intermittent every 12 hours      Topical/Other Medications:  chlorhexidine 2% Topical Cloths - Peds 1 Application(s) Topical daily      =======================PATIENT CARE ===================  [ ] There are pressure ulcers/areas of breakdown that are being addressed  [ ] Preventive measures are being taken to decrease risk for skin breakdown  [ ] Necessity of urinary, arterial, and venous catheters discussed    ============================PHYSICAL EXAM============================  General: 	In no acute distress  Respiratory:	Lungs clear to auscultation bilaterally. Good aeration. No rales,   .		rhonchi, retractions or wheezing. Effort even and unlabored.  CV:		Regular rate and rhythm. Normal S1/S2. No murmurs, rubs, or   .		gallop. Capillary refill < 2 seconds. Distal pulses 2+ and equal.  Abdomen:	Soft, non-distended. Bowel sounds present. No palpable   .		hepatosplenomegaly.  Skin:		No rash.  Extremities:	Warm and well perfused. No gross extremity deformities.  Neurologic:	Alert and oriented. No acute change from baseline exam.    ============================IMAGING STUDIES=========================        =============================SOCIAL=================================  Parent/Guardian is at the bedside  Patient and Parent/Guardian updated as to the progress/plan of care    The patient remains in critical and unstable condition, and requires ICU care and monitoring    The patient is improving but requires continued monitoring and adjustment of therapy    Total critical care time spent by attending physician was 35 minutes excluding procedure time. CC:     Interval/Overnight Events: Desaturations into the 70s --HFNC increased and FiO2 increased. An additional dose of lasix given for a "wet looking" X-ray (HFNC and lasix were both reduced yesterday and then increased again)      VITAL SIGNS:  T(C): 36.8 (21 @ 08:00), Max: 38 (21 @ 12:00)  HR: 70 (21 @ 08:00) (70 - 70)  BP: 126/67 (21 @ 06:00) (126/67 - 139/73)  ABP: 127/71 (21 @ 08:00) (95/81 - 148/76)  ABP(mean): 91 (21 @ 08:00) (68 - 98)  RR: 27 (21 @ 08:00) (23 - 35)  SpO2: 89% (21 @ 08:00) (83% - 92%)  CVP(mm Hg): 24 (21 @ 08:00) (7 - 25)    ==============================RESPIRATORY========================  FiO2: 	0.5    HFNC @ 50LPM    ABG - ( 2021 22:35 )  pH: 7.55  /  pCO2: 27    /  pO2: 53    / HCO3: 26    / Base Excess: 1.4   /  SaO2: 88.6  / Lactate: x                ============================CARDIOVASCULAR=======================  Cardiac Rhythm:	 NSR    Cardiovascular Medications:  furosemide  IV Intermittent - Peds 15 milliGRAM(s) IV Intermittent every 8 hours--change to every 12  lidocaine  Infusion - Peds 25 MICROgram(s)/kG/Min IV Continuous <Continuous>  sildenafil  IV Intermittent - Peds 10 milliGRAM(s) IV Intermittent every 8 hours    Start Clonidine 0.1 mg every 8 hours--hold for SBP<100    =====================FLUIDS/ELECTROLYTES/NUTRITION===================  I&O's Summary    2021 07:  -  2021 07:00  --------------------------------------------------------  IN: 2954 mL / OUT: 2579 mL / NET: 375 mL    2021 07:  -  2021 08:27  --------------------------------------------------------  IN: 212.8 mL / OUT: 590 mL / NET: -377.2 mL      Daily       143  |  104  |  25  ----------------------------<  135  3.6   |  23  |  0.96    Ca    10.2      2021 03:03  Phos  3.1       Mg     2.1         TPro  8.4  /  Alb  4.4  /  TBili  1.5  /  DBili  x   /  AST  50  /  ALT  22  /  AlkPhos  129        Diet: Pedialyte at 70 ml/hr    Gastrointestinal Medications:  famotidine IV Intermittent - Peds 20 milliGRAM(s) IV Intermittent every 12 hours  fat emulsion  (Plant Based) 20% Infusion - Pediatric 1.307 Gm/kG/Day IV Continuous <Continuous>  Parenteral Nutrition - Pediatric 1 Each TPN Continuous <Continuous>  phytonadione IV Intermittent - Peds 5 milliGRAM(s) IV Intermittent <User Schedule>  sodium chloride 0.9% -  250 milliLiter(s) IV Continuous <Continuous>  sodium chloride 0.9% lock flush - Peds 3 milliLiter(s) IV Push every 8 hours      Fluid Management:  Fluid Status: [ ] Hypovolemic      [ X] Euvolemic         [ ] Fluid overloaded  Fluid Status Goal for next 24hr.:   [ ] Net Negative    ______   ml       [ ] Net Positive ____        ml      [X ] Intake=Output  [ ] No specific fluid goal  Fluid Intake Plan: Continue current feeding   Fluid Removal Plan: [ ] Not applicable  [ X] Diuretic Plan: Decrease lasix to twice daily      ========================HEMATOLOGIC/ONCOLOGIC====================                                            12.0                  Neurophils% (auto):   71.6   ( @ 03:03):    8.07 )-----------(184          Lymphocytes% (auto):  11.0                                          38.4                   Eosinphils% (auto):   2.5      Manual%: Neutrophils x    ; Lymphocytes x    ; Eosinophils x    ; Bands%: x    ; Blasts x          (  @ 03:03 )   PT: 17.5 sec;   INR: 1.57 ratio  aPTT: 34.1 sec                          12.0   8.07  )-----------( 184      ( 2021 03:03 )             38.4                         12.0   6.34  )-----------( 157      ( 2021 01:31 )             38.2                         11.5   7.02  )-----------( 161      ( 10 Peter 2021 03:29 )             36.9       Transfusions:	  Hematologic/Oncologic Medications:  heparin   Infusion - Pediatric 0.045 Unit(s)/kG/Hr IV Continuous <Continuous>    DVT Prophylaxis:    ============================INFECTIOUS DISEASE========================  Antimicrobials/Immunologic Medications:  ceFAZolin  IV Intermittent - Peds 1980 milliGRAM(s) IV Intermittent every 8 hours            =============================NEUROLOGY============================  Adequacy of pain/delirium/withdrawal control has been assessed and adjusted      CAPD: Positive 		  GISSELLE-1:	3  ICP 6-12  EVD clamped    Neurologic Medications:  acetaminophen  IV Intermittent - Peds. 750 milliGRAM(s) IV Intermittent every 6 hours PRN  levETIRAcetam IV Intermittent - Peds 1500 milliGRAM(s) IV Intermittent every 12 hours      Topical/Other Medications:  chlorhexidine 2% Topical Cloths - Peds 1 Application(s) Topical daily      =======================PATIENT CARE ===================  [ ] There are pressure ulcers/areas of breakdown that are being addressed  [ X] Preventive measures are being taken to decrease risk for skin breakdown  [ ] Necessity of urinary, arterial, and venous catheters discussed    ============================PHYSICAL EXAM============================  General: 	In no acute distress. NG and EVD in place  Respiratory:	Lungs clear to auscultation bilaterally. Good aeration. No rales,   .		rhonchi, retractions or wheezing. Effort even and unlabored.  CV:		 No  rubs, or gallop. Capillary refill < 2 seconds. Distal pulses 2+ and equal.  Abdomen:	Soft,  mildly distended. Bowel sounds present. No palpable   .		hepatosplenomegaly.  Skin:		No rash.  Extremities:	Warm and well perfused. No gross extremity deformities.  Neurologic:	Moving right extremities less than left--not following commands consistently though as per mother does point to the diaper area when he needs to go to the bathroom. Awake but non-verbal.     ============================IMAGING STUDIES=========================  < from: CT Head No Cont (21 @ 08:54) >  INTERPRETATION:  CLINICAL INDICATION: Post left frontal craniotomy and drainage of left frontal parenchymal hemorrhage, complex heart disease    5mm axial sections of the brain were obtained from base to vertex, without the intravenous administration of contrast material. Coronal and sagittal computer generated reconstructed views are available.    Comparison is made with the prior CT of 6/10/2021.    No significant change is identified.    There is a left frontal craniectomy. There is a catheter extending through the craniectomy defect into the right lateral ventricle. The ventricles are not enlarged. There is a small amount of hemorrhage layering in the occipital horns bilaterally. A small amount of left parietal subdural hemorrhage is identified. Minimal residual hemorrhage in the left frontal brain parenchyma is identified.    There is no midline shift.      IMPRESSION: No change from 6/10/2021. Left frontal craniectomy. Minimal residual left frontal parenchymal hemorrhage. Large left sided ventricular catheter. No hydrocephalus. Mild intraventricular hemorrhage unchanged.    < end of copied text >        =============================SOCIAL=================================  Parent/Guardian is at the bedside  Patient and Parent/Guardian updated as to the progress/plan of care    The patient remains in critical and unstable condition, and requires ICU care and monitoring        Total critical care time spent by attending physician was 35 minutes excluding procedure time.

## 2021-06-12 NOTE — PROGRESS NOTE PEDS - PROBLEM SELECTOR PLAN 1
1. neurochecks and ICP monitoring Q1H  2. notify NSx for ICP > 20  3. EVD to remain clamped, will get f/u CTH in am 6/12, and possibly d/c drain if coags stable  4. continue AED 1. neurochecks and ICP monitoring Q1H  2. notify NSx for ICP > 20  3. EVD to remain clamped, will get f/u CTH in am 6/13, and possibly d/c drain if coags stable  4. continue AED

## 2021-06-12 NOTE — PROGRESS NOTE PEDS - SUBJECTIVE AND OBJECTIVE BOX
INTERVAL HISTORY: Overnight had desaturations to 70s. Lasix was increased to Q8H and also respiratory support was increased to HFNC 50 L, 50%.   - This morning sats were in high 80s.     RESPIRATORY SUPPORT:   NUTRITION: Pedialyte continuous     Intake and output:      @ 07:01  -   @ 07:00  --------------------------------------------------------  IN: 2954 mL / OUT: 2579 mL / NET: 375 mL      INTRAVASCULAR ACCESS: RFCVL, RRA    MEDICATIONS:  furosemide  IV Intermittent - Peds 15 milliGRAM(s) IV Intermittent every 8 hours  lidocaine  Infusion - Peds 25 MICROgram(s)/kG/Min IV Continuous <Continuous>  sildenafil  IV Intermittent - Peds 10 milliGRAM(s) IV Intermittent every 8 hours  ceFAZolin  IV Intermittent - Peds 1980 milliGRAM(s) IV Intermittent every 8 hours  levETIRAcetam IV Intermittent - Peds 1500 milliGRAM(s) IV Intermittent every 12 hours  famotidine IV Intermittent - Peds 20 milliGRAM(s) IV Intermittent every 12 hours  fat emulsion  (Plant Based) 20% Infusion - Pediatric 1.307 Gm/kG/Day IV Continuous <Continuous>  heparin   Infusion - Pediatric 0.045 Unit(s)/kG/Hr IV Continuous <Continuous>  Parenteral Nutrition - Pediatric 1 Each TPN Continuous <Continuous>  phytonadione IV Intermittent - Peds 5 milliGRAM(s) IV Intermittent <User Schedule>  sodium chloride 0.9% -  250 milliLiter(s) IV Continuous <Continuous>  sodium chloride 0.9% lock flush - Peds 3 milliLiter(s) IV Push every 8 hours    PHYSICAL EXAMINATION:  Vital signs -   T(C): 37 (21 @ 05:00), Max: 38 (21 @ 12:00)  HR: 70 (21 @ 07:09) (70 - 70)  BP: 126/67 (21 @ 06:00) (121/70 - 139/73)  ABP:  (95/81 - 148/76)  RR: 28 (21 @ 07:09) (23 - 35)  SpO2: 85% (21 @ 07:09) (83% - 92%)  CVP(mm Hg):  (7 - 25)    General - slow to respond to commands, not speaking  Skin -  no cyanosis.  Eyes / ENT - no conjunctival injection, sclerae anicteric, external ears & nares normal, mucous membranes moist.  Pulmonary - lungs clear to auscultation bilaterally, no wheezes, no rales.  Cardiovascular - normal rate, regular rhythm, normal S1 & single S2, grade 1/6 blowing holosystolic murmur at LMSB, no rubs, no gallops, capillary refill < 2sec, normal pulses.  Gastrointestinal - soft, non-distended, non-tender, no splenomegaly. (+) hepatomegaly.  Musculoskeletal - no joint swelling, no clubbing, no edema.  Neurologic / Psychiatric - slow to respond to commands    LABORATORY TESTS:                          12.0  CBC:   8.07 )-----------( 184   (21 @ 03:03)                          38.4               143   |  104   |  25                 Ca: 10.2   BMP:   ----------------------------< 135    M.1   (21 @ 03:03)             3.6    |  23    | 0.96               Ph: 3.1      LFT:     TPro: 8.4 / Alb: 4.4 / TBili: 1.5 / DBili: x / AST: 50 / ALT: 22 / AlkPhos: 129   (21 @ 03:03)    COAG: PT: 17.5 / PTT: 34.1 / INR: 1.57   (21 @ 03:03)       ABG:   pH: 7.55 / pCO2: 27 / pO2: 53 / HCO3: 26 / Base Excess: 1.4 / SaO2: 88.6 / Lactate: x / iCa: x   (21 @ 22:35)      IMAGING STUDIES:  Electrocardiogram - (21) Ventricular paced rhythm @ 75bpm. Underlying IART.    Telemetry - (21) Ventricular paced rhythm @ 70bpm. Underlying IART.    Echocardiogram - (21)   1. This was a technically difficult suboptimal study due to poor echo windows. Findings limited to below.   2. 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   3. Mild mitral valve regurgitation.   4. Trivial tricuspid valve regurgitation.   5. Trivial aortic valve regurgitation.   6. Status post device closure of Fontan fenestration.   7. S/P stent placement in the Fontan pathway. Limited imaging of the inferior Fontan baffle. In this setting, the inferior vena cava to its connection near the atrial portion of the baffle appears patent with no obstruction. S/p device closure of Fontan fenestration. The Fontan fenestration is not seen on this study. The right bidirectional Storm is seen only on color flow mapping. This appears to have laminar low velocity flow.   8. The connection of the right bidirectional Storm to the right pulmonary artery could not be imaged. The right pulmonary artery is seen in a limited portion immediately to the left of the Storm and appears patent. The left pulmonary artery could not be imaged.   9. Extremely poor and limited imaging of the lateral free walls of the single systemic left ventricle. On limited short axis views there appears to be adequate systolic excursion of the posterior wall of the left ventricle and decreased in the anterior wall. The bulboventricular foramen could not be imaged. Suggest alternate imaging for appropriate assessment of function.  10. No pericardial effusion.  11. Small right pleural effusion.    Cath - (21)  Access 4 Fr RFA, 7 Fr RFV x2 with US guidance.  Sat data (%): on 50% FiO2 LPA 73, RUPV 98, Ao 97; CI 2.6 L/min/m2 with Qp:Qs 1 :1.  Pressures (mmHg): Elevated mFontan = mIVC = 20. mPCW=16, No significant gradient across LV to AAo to Vikki (98/58/73).  Normal PVR while on sildenafil.  Angios showed unobstructed Fontan with existing stent within Fontan conduit.    Comment: IART ablation was attempted after the diagnostic cath but unsuccessful. Patient was overdrive paced out of IART. At the end of the case, Ao sat was 94% and LPA 66% on 50% FiO2   INTERVAL HISTORY: Overnight had desaturations to 70s. Lasix was increased to Q8H and also respiratory support was increased to HFNC 50 L, 50%. Of note the HFNC was weaned yesterday morning along with lasix.   - This morning sats were in high 80s.   - ICP is ranging 6-9 mmHg.     RESPIRATORY SUPPORT:   NUTRITION: Pedialyte continuous     Intake and output:      @ 07:01  -   @ 07:00  --------------------------------------------------------  IN: 2954 mL / OUT: 2579 mL / NET: 375 mL      INTRAVASCULAR ACCESS: RFCVL, RRA    MEDICATIONS:  furosemide  IV Intermittent - Peds 15 milliGRAM(s) IV Intermittent every 8 hours  lidocaine  Infusion - Peds 25 MICROgram(s)/kG/Min IV Continuous <Continuous>  sildenafil  IV Intermittent - Peds 10 milliGRAM(s) IV Intermittent every 8 hours  ceFAZolin  IV Intermittent - Peds 1980 milliGRAM(s) IV Intermittent every 8 hours  levETIRAcetam IV Intermittent - Peds 1500 milliGRAM(s) IV Intermittent every 12 hours  famotidine IV Intermittent - Peds 20 milliGRAM(s) IV Intermittent every 12 hours  fat emulsion  (Plant Based) 20% Infusion - Pediatric 1.307 Gm/kG/Day IV Continuous <Continuous>  heparin   Infusion - Pediatric 0.045 Unit(s)/kG/Hr IV Continuous <Continuous>  Parenteral Nutrition - Pediatric 1 Each TPN Continuous <Continuous>  phytonadione IV Intermittent - Peds 5 milliGRAM(s) IV Intermittent <User Schedule>  sodium chloride 0.9% -  250 milliLiter(s) IV Continuous <Continuous>  sodium chloride 0.9% lock flush - Peds 3 milliLiter(s) IV Push every 8 hours    PHYSICAL EXAMINATION:  Vital signs -   T(C): 37 (21 @ 05:00), Max: 38 (21 @ 12:00)  HR: 70 (21 @ 07:09) (70 - 70)  BP: 126/67 (21 @ 06:00) (121/70 - 139/73)  ABP:  (95/81 - 148/76)  RR: 28 (21 @ 07:09) (23 - 35)  SpO2: 85% (21 @ 07:09) (83% - 92%)  CVP(mm Hg):  (7 - 25)    General - slow to respond to commands, not speaking  Skin -  no cyanosis.  Eyes / ENT - mucous membranes moist.  Pulmonary - lungs clear to auscultation bilaterally, no wheezes, no rales.  Cardiovascular - normal rate, regular rhythm, normal S1 & single S2, grade 1/6 blowing holosystolic murmur at LMSB, no rubs, no gallops, capillary refill < 2sec, normal pulses.  Gastrointestinal - soft, non-distended, non-tender, no splenomegaly. (+) hepatomegaly.  Musculoskeletal - no joint swelling, no clubbing, no edema.  Neurologic / Psychiatric - slow to respond to commands    LABORATORY TESTS:                          12.0  CBC:   8.07 )-----------( 184   (21 @ 03:03)                          38.4               143   |  104   |  25                 Ca: 10.2   BMP:   ----------------------------< 135    M.1   (21 @ 03:03)             3.6    |  23    | 0.96               Ph: 3.1      LFT:     TPro: 8.4 / Alb: 4.4 / TBili: 1.5 / DBili: x / AST: 50 / ALT: 22 / AlkPhos: 129   (21 @ 03:03)    COAG: PT: 17.5 / PTT: 34.1 / INR: 1.57   (21 @ 03:03)       ABG:   pH: 7.55 / pCO2: 27 / pO2: 53 / HCO3: 26 / Base Excess: 1.4 / SaO2: 88.6 / Lactate: x / iCa: x   (21 @ 22:35)      IMAGING STUDIES:  Electrocardiogram - (21) Ventricular paced rhythm @ 75bpm. Underlying IART.    Telemetry - (21) Ventricular paced rhythm @ 70bpm. Underlying IART.    Echocardiogram - (21)   1. This was a technically difficult suboptimal study due to poor echo windows. Findings limited to below.   2. 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   3. Mild mitral valve regurgitation.   4. Trivial tricuspid valve regurgitation.   5. Trivial aortic valve regurgitation.   6. Status post device closure of Fontan fenestration.   7. S/P stent placement in the Fontan pathway. Limited imaging of the inferior Fontan baffle. In this setting, the inferior vena cava to its connection near the atrial portion of the baffle appears patent with no obstruction. S/p device closure of Fontan fenestration. The Fontan fenestration is not seen on this study. The right bidirectional Storm is seen only on color flow mapping. This appears to have laminar low velocity flow.   8. The connection of the right bidirectional Storm to the right pulmonary artery could not be imaged. The right pulmonary artery is seen in a limited portion immediately to the left of the Storm and appears patent. The left pulmonary artery could not be imaged.   9. Extremely poor and limited imaging of the lateral free walls of the single systemic left ventricle. On limited short axis views there appears to be adequate systolic excursion of the posterior wall of the left ventricle and decreased in the anterior wall. The bulboventricular foramen could not be imaged. Suggest alternate imaging for appropriate assessment of function.  10. No pericardial effusion.  11. Small right pleural effusion.      Cath - (21)  Access 4 Fr RFA, 7 Fr RFV x2 with US guidance.  Sat data (%): on 50% FiO2 LPA 73, RUPV 98, Ao 97; CI 2.6 L/min/m2 with Qp:Qs 1 :1.  Pressures (mmHg): Elevated mFontan = mIVC = 20. mPCW=16, No significant gradient across LV to AAo to Vikki (98/58/73).  Normal PVR while on sildenafil.  Angios showed unobstructed Fontan with existing stent within Fontan conduit.    Comment: IART ablation was attempted after the diagnostic cath but unsuccessful. Patient was overdrive paced out of IART. At the end of the case, Ao sat was 94% and LPA 66% on 50% FiO2   INTERVAL HISTORY: Overnight had desaturations to 70s. Lasix was increased to Q8H and also respiratory support was increased to HFNC 50 L, 50%. Of note the HFNC was weaned yesterday morning along with lasix.   - This morning sats were in high 80s.   - ICP is ranging 6-9 mmHg.   - No episodes of VT.     RESPIRATORY SUPPORT:   NUTRITION: Pedialyte continuous     Intake and output:      @ 07:01  -   @ 07:00  --------------------------------------------------------  IN: 2954 mL / OUT: 2579 mL / NET: 375 mL      INTRAVASCULAR ACCESS: RFCVL, RRA    MEDICATIONS:  furosemide  IV Intermittent - Peds 15 milliGRAM(s) IV Intermittent every 8 hours  lidocaine  Infusion - Peds 25 MICROgram(s)/kG/Min IV Continuous <Continuous>  sildenafil  IV Intermittent - Peds 10 milliGRAM(s) IV Intermittent every 8 hours  ceFAZolin  IV Intermittent - Peds 1980 milliGRAM(s) IV Intermittent every 8 hours  levETIRAcetam IV Intermittent - Peds 1500 milliGRAM(s) IV Intermittent every 12 hours  famotidine IV Intermittent - Peds 20 milliGRAM(s) IV Intermittent every 12 hours  fat emulsion  (Plant Based) 20% Infusion - Pediatric 1.307 Gm/kG/Day IV Continuous <Continuous>  heparin   Infusion - Pediatric 0.045 Unit(s)/kG/Hr IV Continuous <Continuous>  Parenteral Nutrition - Pediatric 1 Each TPN Continuous <Continuous>  phytonadione IV Intermittent - Peds 5 milliGRAM(s) IV Intermittent <User Schedule>  sodium chloride 0.9% -  250 milliLiter(s) IV Continuous <Continuous>  sodium chloride 0.9% lock flush - Peds 3 milliLiter(s) IV Push every 8 hours    PHYSICAL EXAMINATION:  Vital signs -   T(C): 37 (21 @ 05:00), Max: 38 (21 @ 12:00)  HR: 70 (21 @ 07:09) (70 - 70)  BP: 126/67 (21 @ 06:00) (121/70 - 139/73)  ABP:  (95/81 - 148/76)  RR: 28 (21 @ 07:09) (23 - 35)  SpO2: 85% (21 @ 07:09) (83% - 92%)  CVP(mm Hg):  (7 - 25)    General - slow to respond to commands, not speaking  Skin -  no cyanosis.  Eyes / ENT - mucous membranes moist.  Pulmonary - lungs clear to auscultation bilaterally, no wheezes, no rales.  Cardiovascular - normal rate, regular rhythm, normal S1 & single S2, grade 1/6 blowing holosystolic murmur at LMSB, no rubs, no gallops, capillary refill < 2sec, normal pulses.  Gastrointestinal - soft, non-distended, non-tender, no splenomegaly. (+) hepatomegaly.  Musculoskeletal - no joint swelling, no clubbing, no edema.  Neurologic / Psychiatric - slow to respond to commands    LABORATORY TESTS:                          12.0  CBC:   8.07 )-----------( 184   (21 @ 03:03)                          38.4               143   |  104   |  25                 Ca: 10.2   BMP:   ----------------------------< 135    M.1   (21 @ 03:03)             3.6    |  23    | 0.96               Ph: 3.1      LFT:     TPro: 8.4 / Alb: 4.4 / TBili: 1.5 / DBili: x / AST: 50 / ALT: 22 / AlkPhos: 129   (21 @ 03:03)    COAG: PT: 17.5 / PTT: 34.1 / INR: 1.57   (21 @ 03:03)       ABG:   pH: 7.55 / pCO2: 27 / pO2: 53 / HCO3: 26 / Base Excess: 1.4 / SaO2: 88.6 / Lactate: x / iCa: x   (21 @ 22:35)      IMAGING STUDIES:  Electrocardiogram - (21) Ventricular paced rhythm @ 75bpm. Underlying IART.    Telemetry - (21) Ventricular paced rhythm @ 70bpm. Underlying IART.    Echocardiogram - (21)   1. This was a technically difficult suboptimal study due to poor echo windows. Findings limited to below.   2. 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   3. Mild mitral valve regurgitation.   4. Trivial tricuspid valve regurgitation.   5. Trivial aortic valve regurgitation.   6. Status post device closure of Fontan fenestration.   7. S/P stent placement in the Fontan pathway. Limited imaging of the inferior Fontan baffle. In this setting, the inferior vena cava to its connection near the atrial portion of the baffle appears patent with no obstruction. S/p device closure of Fontan fenestration. The Fontan fenestration is not seen on this study. The right bidirectional Storm is seen only on color flow mapping. This appears to have laminar low velocity flow.   8. The connection of the right bidirectional Storm to the right pulmonary artery could not be imaged. The right pulmonary artery is seen in a limited portion immediately to the left of the Storm and appears patent. The left pulmonary artery could not be imaged.   9. Extremely poor and limited imaging of the lateral free walls of the single systemic left ventricle. On limited short axis views there appears to be adequate systolic excursion of the posterior wall of the left ventricle and decreased in the anterior wall. The bulboventricular foramen could not be imaged. Suggest alternate imaging for appropriate assessment of function.  10. No pericardial effusion.  11. Small right pleural effusion.      Cath - (21)  Access 4 Fr RFA, 7 Fr RFV x2 with US guidance.  Sat data (%): on 50% FiO2 LPA 73, RUPV 98, Ao 97; CI 2.6 L/min/m2 with Qp:Qs 1 :1.  Pressures (mmHg): Elevated mFontan = mIVC = 20. mPCW=16, No significant gradient across LV to AAo to Vikki (98/58/73).  Normal PVR while on sildenafil.  Angios showed unobstructed Fontan with existing stent within Fontan conduit.    Comment: IART ablation was attempted after the diagnostic cath but unsuccessful. Patient was overdrive paced out of IART. At the end of the case, Ao sat was 94% and LPA 66% on 50% FiO2   INTERVAL HISTORY: Overnight had desaturations to 70s. Lasix was increased to Q8H and also respiratory support was increased to HFNC 50 L, 50%. Of note the HFNC was weaned yesterday morning along with lasix.   - This morning sats were in high 80s.   - ICP is ranging 6-9 mmHg.   - No episodes of VT.   - In addition, mother reports that he is more restless this am and has not slept well overnight.       RESPIRATORY SUPPORT:   NUTRITION: Pedialyte continuous     Intake and output:      @ 07:01  -   @ 07:00  --------------------------------------------------------  IN: 2954 mL / OUT: 2579 mL / NET: 375 mL      INTRAVASCULAR ACCESS: RFCVL, RRA    MEDICATIONS:  furosemide  IV Intermittent - Peds 15 milliGRAM(s) IV Intermittent every 8 hours  lidocaine  Infusion - Peds 25 MICROgram(s)/kG/Min IV Continuous <Continuous>  sildenafil  IV Intermittent - Peds 10 milliGRAM(s) IV Intermittent every 8 hours  ceFAZolin  IV Intermittent - Peds 1980 milliGRAM(s) IV Intermittent every 8 hours  levETIRAcetam IV Intermittent - Peds 1500 milliGRAM(s) IV Intermittent every 12 hours  famotidine IV Intermittent - Peds 20 milliGRAM(s) IV Intermittent every 12 hours  fat emulsion  (Plant Based) 20% Infusion - Pediatric 1.307 Gm/kG/Day IV Continuous <Continuous>  heparin   Infusion - Pediatric 0.045 Unit(s)/kG/Hr IV Continuous <Continuous>  Parenteral Nutrition - Pediatric 1 Each TPN Continuous <Continuous>  phytonadione IV Intermittent - Peds 5 milliGRAM(s) IV Intermittent <User Schedule>  sodium chloride 0.9% -  250 milliLiter(s) IV Continuous <Continuous>  sodium chloride 0.9% lock flush - Peds 3 milliLiter(s) IV Push every 8 hours    PHYSICAL EXAMINATION:  Vital signs -   T(C): 37 (21 @ 05:00), Max: 38 (21 @ 12:00)  HR: 70 (21 @ 07:09) (70 - 70)  BP: 126/67 (21 @ 06:00) (121/70 - 139/73)  ABP:  (95/81 - 148/76)  RR: 28 (21 @ 07:09) (23 - 35)  SpO2: 85% (21 @ 07:09) (83% - 92%)  CVP(mm Hg):  (7 - 25)    General - slow to respond to commands, not speaking  Skin -  no cyanosis.  Eyes / ENT - mucous membranes moist.  Pulmonary - lungs clear to auscultation bilaterally, no wheezes, no rales.  Cardiovascular - normal rate, regular rhythm, normal S1 & single S2, grade 1/6 blowing holosystolic murmur at LMSB, no rubs, no gallops, capillary refill < 2sec, normal pulses.  Gastrointestinal - soft, non-distended, non-tender, no splenomegaly. (+) hepatomegaly.  Musculoskeletal - no joint swelling, no clubbing, no edema.  Neurologic / Psychiatric - slow to respond to commands; does not move right upper and lower extremities as much as left.     LABORATORY TESTS:                          12.0  CBC:   8.07 )-----------( 184   (21 @ 03:03)                          38.4               143   |  104   |  25                 Ca: 10.2   BMP:   ----------------------------< 135    M.1   (21 @ 03:03)             3.6    |  23    | 0.96               Ph: 3.1      LFT:     TPro: 8.4 / Alb: 4.4 / TBili: 1.5 / DBili: x / AST: 50 / ALT: 22 / AlkPhos: 129   (21 @ 03:03)    COAG: PT: 17.5 / PTT: 34.1 / INR: 1.57   (21 @ 03:03)       ABG:   pH: 7.55 / pCO2: 27 / pO2: 53 / HCO3: 26 / Base Excess: 1.4 / SaO2: 88.6 / Lactate: x / iCa: x   (21 @ 22:35)      IMAGING STUDIES:  Electrocardiogram - (21) Ventricular paced rhythm @ 75bpm. Underlying IART.    Telemetry - (21) Ventricular paced rhythm @ 70bpm. Underlying IART.    Echocardiogram - (21)   1. This was a technically difficult suboptimal study due to poor echo windows. Findings limited to below.   2. 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   3. Mild mitral valve regurgitation.   4. Trivial tricuspid valve regurgitation.   5. Trivial aortic valve regurgitation.   6. Status post device closure of Fontan fenestration.   7. S/P stent placement in the Fontan pathway. Limited imaging of the inferior Fontan baffle. In this setting, the inferior vena cava to its connection near the atrial portion of the baffle appears patent with no obstruction. S/p device closure of Fontan fenestration. The Fontan fenestration is not seen on this study. The right bidirectional Storm is seen only on color flow mapping. This appears to have laminar low velocity flow.   8. The connection of the right bidirectional Storm to the right pulmonary artery could not be imaged. The right pulmonary artery is seen in a limited portion immediately to the left of the Storm and appears patent. The left pulmonary artery could not be imaged.   9. Extremely poor and limited imaging of the lateral free walls of the single systemic left ventricle. On limited short axis views there appears to be adequate systolic excursion of the posterior wall of the left ventricle and decreased in the anterior wall. The bulboventricular foramen could not be imaged. Suggest alternate imaging for appropriate assessment of function.  10. No pericardial effusion.  11. Small right pleural effusion.      Cath - (21)  Access 4 Fr RFA, 7 Fr RFV x2 with US guidance.  Sat data (%): on 50% FiO2 LPA 73, RUPV 98, Ao 97; CI 2.6 L/min/m2 with Qp:Qs 1 :1.  Pressures (mmHg): Elevated mFontan = mIVC = 20. mPCW=16, No significant gradient across LV to AAo to Vikki (98/58/73).  Normal PVR while on sildenafil.  Angios showed unobstructed Fontan with existing stent within Fontan conduit.    Comment: IART ablation was attempted after the diagnostic cath but unsuccessful. Patient was overdrive paced out of IART. At the end of the case, Ao sat was 94% and LPA 66% on 50% FiO2

## 2021-06-12 NOTE — CHART NOTE - NSCHARTNOTEFT_GEN_A_CORE
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  Pt was initially admitted for further assessment and treatment s/p diagnostic cath and failed IART ablation attempt.  Course has been further complicated by left frontal parenchymal hemorrhage requiring emergent evacuation with neurosurgery and EVD placement.  Significant issues with neuroagitation and delirium now noted.     Interval History: TPN discontinued last night per [facility].  NG feeds of Pedialyte advanced to 70mL/hr and to transition to half strength Jevity 1.2 at same rate today.  If tolerated, to then further advance to full strength as per [facility] at same rate of 70mL/hr.      MEDICATIONS  (STANDING):  ceFAZolin  IV Intermittent - Peds 1980 milliGRAM(s) IV Intermittent every 8 hours  chlorhexidine 2% Topical Cloths - Peds 1 Application(s) Topical daily  cloNIDine  Oral Liquid - Peds 0.1 milliGRAM(s) Oral every 8 hours  famotidine IV Intermittent - Peds 20 milliGRAM(s) IV Intermittent every 12 hours  fat emulsion  (Plant Based) 20% Infusion - Pediatric 1.307 Gm/kG/Day (18 mL/Hr) IV Continuous <Continuous>  furosemide  IV Intermittent - Peds 15 milliGRAM(s) IV Intermittent every 12 hours  heparin   Infusion - Pediatric 0.045 Unit(s)/kG/Hr (3 mL/Hr) IV Continuous <Continuous>  levETIRAcetam IV Intermittent - Peds 1500 milliGRAM(s) IV Intermittent every 12 hours  lidocaine  Infusion - Peds 25 MICROgram(s)/kG/Min (12.4 mL/Hr) IV Continuous <Continuous>  Parenteral Nutrition - Pediatric 1 Each (69 mL/Hr) TPN Continuous <Continuous>  phytonadione IV Intermittent - Peds 5 milliGRAM(s) IV Intermittent <User Schedule>  sildenafil  IV Intermittent - Peds 10 milliGRAM(s) IV Intermittent every 8 hours  sodium chloride 0.9% -  250 milliLiter(s) (3 mL/Hr) IV Continuous <Continuous>  sodium chloride 0.9% lock flush - Peds 3 milliLiter(s) IV Push every 8 hours      ASSESSMENT:  Feeding Problems    Pt with cardiac history as above with course now complicated by left frontal parenchymal hemorrhage requiring emergent evacuation with neurosurgery and EVD placement.  Pt had remained NPO so was receiving fluid restricted TPN for nutritional support, though parenteral calories were limited due to fluid and dextrose restriction of the TPN.  Enteral feedings now being advanced and TPN discontinued as per [facility].     PLAN:  Feeding advancement as per [facility].  Re-consult TPN team as needed.

## 2021-06-12 NOTE — PROGRESS NOTE PEDS - ASSESSMENT
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 Presented in IART - s/p EP study with unsuccessful attempt at ablation along with diagnostic cath showing elevated Fontan pressure (20mmHg). He was loaded with amiodarone and ultimately electrically cardioverted out, however reverted back into IART. His course has now been further complicated by L frontal parenchymal hemorrhage requiring emergent evacuation with NSx and EVD placement and VTach of unknown origin.    Plan:  - Continuos telemetry monitoring.  - Pacemaker set at DDD 70-120bpm.   - Continue Sildenafil 20mg q8h IV  - lasix home dose is 20mg QD. Agree with lasix 10mg IV BID. Goal net even to negative.  - Monitor blood pressure. Goal MAPs > 70. Agree with norepi and/or vasopressin as needed. Titrate to goal per PICU to maintain CPP.  - Hold home lisinopril, aldactone. Agree with plan to ensure SBP not persistently higher than 140. Nicardapine as needed. Would try to reinstitute home medications when able to give enteral medications.  - continue lidocaine drip 25mcg/kg/min. Max dose is 40mcg/kg/min, can titrate if as further episodes of Vtach. Would give a 1mg/kg lidocaine bolus if Vtach recurs with increasing drip dose  - f/u lidocaine level  - if stable with enteral feeds will transition to lidocaine drip to mexilitine. Would start 150mg q8. Should keep lidocaine on for ~ 48 hrs (minimum of 5 doses of mexilitene) before turning off  - anticoagulation care per hematology/PICU/NSx  - rest of care per PICU and NSx  - Please page pediatric cardiology with any concerns or questions.   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 Presented in IART - s/p EP study with unsuccessful attempt at ablation along with diagnostic cath showing elevated Fontan pressure (20mmHg). He was loaded with amiodarone and ultimately electrically cardioverted out, however reverted back into IART. His course has now been further complicated by L frontal parenchymal hemorrhage requiring emergent evacuation with NSx and EVD placement and VTach of unknown origin.    Plan:  - Continuos telemetry monitoring.  - Pacemaker set at DDD 70-120bpm.   - Continue Sildenafil 20mg q8h IV  - Decrease lasix to 15 mg Q12H. Goal net even. Monitor creat.   - Sats goal >85%.   - Monitor blood pressure. Goal MAPs > 70. Agree with norepi and/or vasopressin as needed. Titrate to goal per PICU to maintain CPP.  - Hold home lisinopril, aldactone. Agree with plan to ensure SBP not persistently higher than 140. Nicardapine as needed. Would try to reinstitute home medications when able to give enteral medications.  - continue lidocaine drip 25mcg/kg/min. Max dose is 40mcg/kg/min, can titrate if as further episodes of Vtach. Would give a 1mg/kg lidocaine bolus if Vtach recurs with increasing drip dose  - f/u lidocaine level  - if stable with enteral feeds will transition to lidocaine drip to mexilitine. Would start 150mg q8. Should keep lidocaine on for ~ 48 hrs (minimum of 5 doses of mexilitene) before turning off.   - Currently on Pedialyte via NGT. Today we will introduce formula as tolerated.    - anticoagulation care per hematology/PICU/NSx  - rest of care per PICU and NSx  - L frontal parenchymal hemorrhage requiring emergent evacuation with NSx and EVD placement-->  EVD clamped.   - Please page pediatric cardiology with any concerns or questions.   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 Presented in IART - s/p EP study with unsuccessful attempt at ablation along with diagnostic cath showing elevated Fontan pressure (20mmHg). He was loaded with amiodarone and ultimately electrically cardioverted out, however reverted back into IART and continues to be in IART. His course is further complicated by L frontal parenchymal hemorrhage requiring emergent evacuation with NSx and EVD placement (which is no clamped since Thursday 6/10) and VTach of unknown origin.  He continues to be critically ill.     Plan:    CVS:  - Continuos telemetry monitoring.  - Pacemaker set at DDD 70-120bpm.   - Continue Sildenafil 20mg q8h IV  - Decrease lasix to 15 mg Q12H. Goal net even. Monitor creat.   - Sats goal >85%.   - Monitor blood pressure. Goal MAPs > 70. Agree with norepi and/or vasopressin as needed. Titrate to goal per PICU to maintain CPP.  - Hold home lisinopril, aldactone. Agree with plan to ensure SBP not persistently higher than 140. Nicardapine as needed. Would try to reinstitute home medications when able to give enteral medications.  - Continue lidocaine drip 25mcg/kg/min. Max dose is 40mcg/kg/min, can titrate if as further episodes of Vtach. Would give a 1mg/kg lidocaine bolus if Vtach recurs with increasing drip dose  - f/u lidocaine level  - if stable with enteral feeds will transition to lidocaine drip to mexilitine. Would start 150mg q8. Should keep lidocaine on for ~ 48 hrs (minimum of 5 doses of mexilitene) before turning off.    Resp:   - On HFNC 50L, 50%. Wean as tolerated.   - CTA obtained to eval for PE - difficult to protocol because of Fontan anatomy and contrast timing, no PE seen, but does show VV collaterals (likely main reason for hypoxia)      FEN/GI:   - Currently on Pedialyte via NGT. Today we will introduce formula (Jevity) as tolerated.   - Pepsid.     ID:   -Ancef while EVD in place.     Heme:   - Anticoagulation care per hematology/PICU/NSx  - Xarelto on hold  - Goal PLTs >100  - FFP to keep INR < 2.0 (but before procedures e.g. EVD removal will huddle with hematology to bring INR down < 1.5 if not there already)    Neuro:   - L frontal parenchymal hemorrhage requiring emergent evacuation with NSx and EVD placement-->  EVD clamped (6/10)--> NSx following  - VEEG on per neuro - no seizures so far  - On Keppra       TRINI MÉNDEZ is a 21 yo male with DILV, L-malposed great arteries s/p lateral tunnel Fontan (with subsequent stent placement in Fontan pathway in 2016), with sick sinus syndrome and complete heart block, s/p dual chamber epicardial pacemaker with cardiac resynchronization therapy for left ventricular dysfunction and failing Fontan in 2016. Presented in IART - s/p EP study with unsuccessful attempt at ablation along with diagnostic cath showing elevated Fontan pressure (20mmHg). He was loaded with amiodarone and ultimately electrically cardioverted out, however reverted back into IART and continues to be in IART. His course is further complicated by L frontal parenchymal hemorrhage requiring emergent evacuation with NSx and EVD placement (which is no clamped since Thursday 6/10) and VTach of unknown origin.  Overnight, noted to have desaturated and FiO2 reverted back to 50% from 30% and Lasix made q8 from q12. Sats this am in mid 80s.  However, Creatinine showing a rise.   He continues to be critically ill.     Plan:    CVS:  - Continuos telemetry monitoring.  - Pacemaker set at DDD 70-120bpm.   - Continue Sildenafil 20mg q8h IV  - Decrease lasix to 15 mg Q12H. Goal net even. Monitor creat.   - Sats goal >85%.   - Monitor blood pressure. Goal MAPs > 70. Agree with norepi and/or vasopressin as needed. Titrate to goal per PICU to maintain CPP.  - Hold home lisinopril, aldactone. Agree with plan to ensure SBP not persistently higher than 140. Nicardapine as needed. Would try to reinstitute home medications when able to give enteral medications.  - Continue lidocaine drip 25mcg/kg/min. Max dose is 40mcg/kg/min, can titrate if as further episodes of Vtach. Would give a 1mg/kg lidocaine bolus if Vtach recurs with increasing drip dose  - f/u lidocaine level  - if stable with enteral feeds will transition to lidocaine drip to mexilitine. Would start 150mg q8. Should keep lidocaine on for ~ 48 hrs (minimum of 5 doses of mexilitene) before turning off.    Resp:   - On HFNC 50L, 50%. Wean as tolerated.   - CTA obtained to eval for PE - difficult to protocol because of Fontan anatomy and contrast timing, no PE seen, but does show VV collaterals (likely main reason for hypoxia)      FEN/GI:   - Currently on Pedialyte via NGT. Today we will introduce formula (Jevity) as tolerated.   - Pepsid.     ID:   -Ancef while EVD in place.     Heme:   - Anticoagulation care per hematology/PICU/NSx  - Xarelto on hold  - Goal PLTs >100  - FFP to keep INR < 2.0 (but before procedures e.g. EVD removal will huddle with hematology to bring INR down < 1.5 if not there already)    Neuro:   - L frontal parenchymal hemorrhage requiring emergent evacuation with NSx and EVD placement-->  EVD clamped (6/10)--> NSx following  - VEEG on per neuro - no seizures so far  - On Keppra

## 2021-06-12 NOTE — PROGRESS NOTE PEDS - ASSESSMENT
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 He has elevated Fontan pressure secondary to LV diastolic dysfunction.  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 Persistent hypoxia likely from VV collaterals (confirmed on CTA), back in IART (not hemodynamically compromising), and recently with runs of V-tach (unclear cause) requiring lidocaine initiation. Significant issues with neuroagitation and delerium.    PLAN:  Neuro:  - precedex for agitation/delerium [  ] trial off - consider seroquel for safety but has Replogle in now and c/f QTc prolongation    - environmental interventions  - vEEG on per neuro - no sz's so far  - Na > 140  - Keppra (clinical szs)  - R sided hemiparesis  - EVD clamped  - Repeat head CT 6/11 - stable  - skull flap off, will need prosthetic  - tylenol prn for fever control  [  ] PT/OT/speech/rehab consult    Resp:  HFNC 40L 30% - continue to wean  - CTA obtained to eval for PE - difficult to protocol because of Fontan anatomy and contrast timing, no PE seen, but does show VV collaterals (likely main reason for hypoxia)  Goal SpO2 > 85% - may need to lower goals to 80% with collaterals    CV:  Lidocaine gtt started 6/7 for V-tach runs, has not recurred, EP following    - having some runs of his underlying atrial tachycardia per EP - monitor for now    - eventual plans to transition to mexilitene  Mode is DDD but atrial wires without good sensitivity, so effectively VVI 70  Still in IART  Home meds on hold (Lisinopril, Aldactone)  Continue Sildenafil  Continue Lasix IV Q8hr --> q12h : goal euvolemia  SBP < 140  echo 6/8 extremely limited windows    Heme:  Xarelto on hold  Goal PLTs >100  FFP to keep INR < 2.0 (but before procedures e.g. EVD removal will huddle with hematology to bring INR down < 1.5 if not there already)  Hematology and NRSGY in discussions  s/p Vitamin K x3 days, now IV Vit K 3x/week    FEN:  NPO/TPN with low/no dextrose (per NRSGY)  Bowel regimen  Tolerated Replogle clamping - start NG feeds    ID  - CTX started 6/6 for LG fever  - neg r/o (may be from blood in brain) --> go back to Ancef while EVD in    Other  - L hydrocele  - outpt f/u    Access  Right Fem CVL (6/9) (replaced the L fem CVL at that time)  Righ Rad AL (6/1) 20 y/o male DILV, 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 He has elevated Fontan pressure secondary to LV diastolic dysfunction.  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 Persistent hypoxia likely from VV collaterals (confirmed on CTA), back in IART (not hemodynamically compromising), and recently with runs of V-tach (unclear cause) requiring lidocaine initiation. Significant issues with neuroagitation and delerium. EVD clamped. VV collaterals seen on CTA.    PLAN:  Neuro:  - precedex for agitation/delerium [  ] trial off - consider seroquel for safety but has Replogle in now and c/f QTc prolongation    - environmental interventions  - vEEG on per neuro - no sz's so far  - Na > 140  - Keppra (clinical szs)  - R sided hemiparesis  - EVD clamped  - Repeat head CT 6/11 - stable--Repeat CT today??  - skull flap off, will need prosthetic  - tylenol prn for fever control  [  X] PT/OT/speech/rehab consult    Resp:  HFNC 50L 0.5 FiO2 - will continue to moniotr Respiratory status/ SpO2 and adjust as needed  - CTA obtained to eval for PE - difficult to protocol because of Fontan anatomy and contrast timing, no PE seen, but does show VV collaterals (likely main reason for hypoxia)  Goal SpO2 > 85% - may need to lower goals to 80% with collaterals    CV:  Lidocaine gtt started 6/7 for V-tach runs, has not recurred, EP following    - having some runs of his underlying atrial tachycardia per EP - monitor for now    - eventual plans to transition to mexilitene  Mode is DDD but atrial wires without good sensitivity, so effectively VVI 70  Still in IART  Home meds on hold (Lisinopril, Aldactone)  Continue Sildenafil  Continue Lasix IV Q8hr --> q12h : goal eeven  SBP < 140  echo 6/8 extremely limited windows    Heme:  Xarelto on hold  Goal PLTs >100  FFP to keep INR < 2.0 (but before procedures e.g. EVD removal will huddle with hematology to bring INR down < 1.5 if not there already)  Hematology and NRSGY in discussions  s/p Vitamin K x3 days, now IV Vit K 3x/week    FEN:  NPO/TPN with low/no dextrose (per NRSGY)  Pepcid -renally dosed   Bowel regimen: Senna and Miralax PRN  Tolerated Replogle clamping - started NG feeds with Pedialyte @ 70 ml/hr--will do 1/2 S Jevity at the same rate and go to full strength if tolerated. 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 He has elevated Fontan pressure secondary to LV diastolic dysfunction.  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 Persistent hypoxia likely from VV collaterals (confirmed on CTA), back in IART (not hemodynamically compromising), and recently with runs of V-tach (unclear cause) requiring lidocaine initiation. Significant issues with neuroagitation and delerium. EVD clamped. VV collaterals seen on CTA.    PLAN:  Neuro:  - Now off precedex -will start CLonidine 0.1 mg every 8 hours (hold for SBP< 100)--will hold off of Antipsychotics for now-given the ventricular tachydysrhythmia and the risk for Qtc prolongation     - environmental interventions  - vEEG on per neuro - no sz's so far  - Na > 140-  - Keppra (clinical szs)  - R sided hemiparesis  - EVD clamped  - Repeat head CT 6/11 - stable--Repeat CT today??  - skull flap off, will need prosthetic  - tylenol prn for fever control  [  ] PT/OT/speech/rehab consult    Resp:  HFNC 50L 0.5 FiO2 - will continue to moniotr Respiratory status/ SpO2 and adjust as needed  - CTA obtained to eval for PE - difficult to protocol because of Fontan anatomy and contrast timing, no PE seen, but does show VV collaterals (likely main reason for hypoxia)  Goal SpO2 > 85% - may need to lower goals to 80% with collaterals    CV:  Lidocaine gtt started 6/7 for V-tach runs, has not recurred, EP following    - having some runs of his underlying atrial tachycardia per EP - monitor for now    - eventual plans to transition to mexilitene  Mode is DDD but atrial wires without good sensitivity, so effectively VVI 70  Still in IART  Home meds on hold (Lisinopril, Aldactone)  Continue Sildenafil  Continue Lasix IV Q8hr --> q12h : goal even to slightly positive (Negative for LOS Is and Os and Scr trending up/ recent contrast exposure)  SBP < 140  echo 6/8 extremely limited windows    Heme:  Xarelto on hold  Goal PLTs >100  FFP to keep INR < 2.0 (but before procedures e.g. EVD removal will huddle with hematology to bring INR down < 1.5 if not there already)  Hematology and NRSGY in discussions  s/p Vitamin K x3 days, now IV Vit K 3x/week    FEN:  Pepcid -renally dosed   Bowel regimen: Senna and Miralax PRN  Started NG feeds with Pedialyte @ 70 ml/hr--will do 1/2 S Jevity at the same rate and go to full strength if tolerated. Discontinue TPN    ID  - CTX started 6/6 for LG fever  - neg r/o (may be from blood in brain) --> go back to Ancef while EVD in    Other  - L hydrocele  - outpt f/u    Access  Right Fem CVL (6/9) (replaced the L fem CVL at that time)  Righ Rad AL (6/1)

## 2021-06-13 PROCEDURE — 99292 CRITICAL CARE ADDL 30 MIN: CPT

## 2021-06-13 PROCEDURE — 70450 CT HEAD/BRAIN W/O DYE: CPT | Mod: 26

## 2021-06-13 PROCEDURE — 99291 CRITICAL CARE FIRST HOUR: CPT

## 2021-06-13 RX ORDER — ACETAMINOPHEN 500 MG
650 TABLET ORAL EVERY 6 HOURS
Refills: 0 | Status: DISCONTINUED | OUTPATIENT
Start: 2021-06-13 | End: 2021-06-17

## 2021-06-13 RX ORDER — FUROSEMIDE 40 MG
10 TABLET ORAL EVERY 12 HOURS
Refills: 0 | Status: DISCONTINUED | OUTPATIENT
Start: 2021-06-13 | End: 2021-06-16

## 2021-06-13 RX ORDER — FUROSEMIDE 40 MG
10 TABLET ORAL EVERY 12 HOURS
Refills: 0 | Status: DISCONTINUED | OUTPATIENT
Start: 2021-06-13 | End: 2021-06-13

## 2021-06-13 RX ADMIN — Medication 650 MILLIGRAM(S): at 04:00

## 2021-06-13 RX ADMIN — Medication 3 MILLIGRAM(S): at 05:15

## 2021-06-13 RX ADMIN — LEVETIRACETAM 400 MILLIGRAM(S): 250 TABLET, FILM COATED ORAL at 03:43

## 2021-06-13 RX ADMIN — FAMOTIDINE 200 MILLIGRAM(S): 10 INJECTION INTRAVENOUS at 02:00

## 2021-06-13 RX ADMIN — SODIUM CHLORIDE 3 MILLILITER(S): 9 INJECTION, SOLUTION INTRAVENOUS at 07:26

## 2021-06-13 RX ADMIN — Medication 650 MILLIGRAM(S): at 10:22

## 2021-06-13 RX ADMIN — Medication 650 MILLIGRAM(S): at 19:18

## 2021-06-13 RX ADMIN — Medication 0.1 MILLIGRAM(S): at 05:11

## 2021-06-13 RX ADMIN — Medication 198 MILLIGRAM(S): at 05:17

## 2021-06-13 RX ADMIN — SODIUM CHLORIDE 3 MILLILITER(S): 9 INJECTION INTRAMUSCULAR; INTRAVENOUS; SUBCUTANEOUS at 10:31

## 2021-06-13 RX ADMIN — Medication 2 DROP(S): at 10:21

## 2021-06-13 RX ADMIN — SODIUM CHLORIDE 3 MILLILITER(S): 9 INJECTION INTRAMUSCULAR; INTRAVENOUS; SUBCUTANEOUS at 18:32

## 2021-06-13 RX ADMIN — Medication 2 DROP(S): at 14:34

## 2021-06-13 RX ADMIN — Medication 3 UNIT(S)/KG/HR: at 17:27

## 2021-06-13 RX ADMIN — LEVETIRACETAM 400 MILLIGRAM(S): 250 TABLET, FILM COATED ORAL at 16:22

## 2021-06-13 RX ADMIN — Medication 650 MILLIGRAM(S): at 10:45

## 2021-06-13 RX ADMIN — Medication 12.4 MICROGRAM(S)/KG/MIN: at 07:22

## 2021-06-13 RX ADMIN — Medication 1 MILLIGRAM(S): at 21:34

## 2021-06-13 RX ADMIN — SODIUM CHLORIDE 3 MILLILITER(S): 9 INJECTION, SOLUTION INTRAVENOUS at 17:26

## 2021-06-13 RX ADMIN — Medication 198 MILLIGRAM(S): at 13:42

## 2021-06-13 RX ADMIN — Medication 198 MILLIGRAM(S): at 21:34

## 2021-06-13 RX ADMIN — Medication 0.1 MILLIGRAM(S): at 21:34

## 2021-06-13 RX ADMIN — Medication 0.1 MILLIGRAM(S): at 13:33

## 2021-06-13 RX ADMIN — Medication 12.4 MICROGRAM(S)/KG/MIN: at 19:22

## 2021-06-13 RX ADMIN — Medication 25 MILLIGRAM(S): at 03:44

## 2021-06-13 RX ADMIN — Medication 2 DROP(S): at 18:31

## 2021-06-13 RX ADMIN — Medication 650 MILLIGRAM(S): at 18:48

## 2021-06-13 RX ADMIN — Medication 3 UNIT(S)/KG/HR: at 19:18

## 2021-06-13 RX ADMIN — FAMOTIDINE 200 MILLIGRAM(S): 10 INJECTION INTRAVENOUS at 03:00

## 2021-06-13 RX ADMIN — Medication 12.4 MICROGRAM(S)/KG/MIN: at 13:23

## 2021-06-13 RX ADMIN — Medication 2 DROP(S): at 23:00

## 2021-06-13 RX ADMIN — SODIUM CHLORIDE 3 MILLILITER(S): 9 INJECTION INTRAMUSCULAR; INTRAVENOUS; SUBCUTANEOUS at 04:00

## 2021-06-13 RX ADMIN — Medication 10 MILLIGRAM(S): at 17:26

## 2021-06-13 RX ADMIN — SODIUM CHLORIDE 3 MILLILITER(S): 9 INJECTION, SOLUTION INTRAVENOUS at 07:23

## 2021-06-13 RX ADMIN — FAMOTIDINE 200 MILLIGRAM(S): 10 INJECTION INTRAVENOUS at 14:33

## 2021-06-13 RX ADMIN — Medication 25 MILLIGRAM(S): at 21:34

## 2021-06-13 RX ADMIN — Medication 650 MILLIGRAM(S): at 03:00

## 2021-06-13 RX ADMIN — SODIUM CHLORIDE 3 MILLILITER(S): 9 INJECTION, SOLUTION INTRAVENOUS at 19:19

## 2021-06-13 RX ADMIN — Medication 25 MILLIGRAM(S): at 12:04

## 2021-06-13 RX ADMIN — CHLORHEXIDINE GLUCONATE 1 APPLICATION(S): 213 SOLUTION TOPICAL at 21:33

## 2021-06-13 NOTE — PROGRESS NOTE PEDS - ASSESSMENT
20y male s/p L craniectomy for ICH/SAH, + EVD  -CT head this am w/o contrast  -Possible frontal pseudomeningocele, may need to reopen EVD - will see after CT head

## 2021-06-13 NOTE — PROGRESS NOTE PEDS - ASSESSMENT
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 He has elevated Fontan pressure secondary to LV diastolic dysfunction.  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 Persistent hypoxia likely from VV collaterals (confirmed on CTA), back in IART (not hemodynamically compromising), and recently with runs of V-tach (unclear cause) requiring lidocaine initiation. Significant issues with neuroagitation and delerium. EVD clamped. VV collaterals seen on CTA.    PLAN:  Neuro:  - Now off precedex -will start CLonidine 0.1 mg every 8 hours (hold for SBP< 100)--will hold off of Antipsychotics for now-given the ventricular tachydysrhythmia and the risk for Qtc prolongation     - environmental interventions  - vEEG on per neuro - no sz's so far  - Na > 140-  - Keppra (clinical szs)  - R sided hemiparesis  - EVD clamped  - Repeat head CT 6/11 - stable--Repeat CT today??  - skull flap off, will need prosthetic  - tylenol prn for fever control  [  ] PT/OT/speech/rehab consult    Resp:  HFNC 50L 0.5 FiO2 - will continue to moniotr Respiratory status/ SpO2 and adjust as needed  - CTA obtained to eval for PE - difficult to protocol because of Fontan anatomy and contrast timing, no PE seen, but does show VV collaterals (likely main reason for hypoxia)  Goal SpO2 > 85% - may need to lower goals to 80% with collaterals    CV:  Lidocaine gtt started 6/7 for V-tach runs, has not recurred, EP following    - having some runs of his underlying atrial tachycardia per EP - monitor for now    - eventual plans to transition to mexilitene  Mode is DDD but atrial wires without good sensitivity, so effectively VVI 70  Still in IART  Home meds on hold (Lisinopril, Aldactone)  Continue Sildenafil  Continue Lasix IV Q8hr --> q12h : goal even to slightly positive (Negative for LOS Is and Os and Scr trending up/ recent contrast exposure)  SBP < 140  echo 6/8 extremely limited windows    Heme:  Xarelto on hold  Goal PLTs >100  FFP to keep INR < 2.0 (but before procedures e.g. EVD removal will huddle with hematology to bring INR down < 1.5 if not there already)  Hematology and NRSGY in discussions  s/p Vitamin K x3 days, now IV Vit K 3x/week    FEN:  Pepcid -renally dosed   Bowel regimen: Senna and Miralax PRN  Started NG feeds with Pedialyte @ 70 ml/hr--will do 1/2 S Jevity at the same rate and go to full strength if tolerated. 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 He has elevated Fontan pressure secondary to LV diastolic dysfunction.  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 Persistent hypoxia likely from VV collaterals (confirmed on CTA), back in IART (not hemodynamically compromising), and recently with runs of V-tach (unclear cause) requiring lidocaine initiation. Significant issues with neuroagitation and delerium. EVD clamped. VV collaterals seen on CTA.    PLAN:  Neuro:  - Now off precedex -will start CLonidine 0.1 mg every 8 hours (hold for SBP< 100)--will hold off of Antipsychotics for now-given the ventricular tachydysrhythmia and the risk for Qtc prolongation     - environmental interventions  - vEEG on per neuro - no sz's so far  - Na > 140-  - Keppra (clinical szs)  - R sided hemiparesis  - EVD clamped-will likely be removed today--consider FFP before removal  - Repeat head CT 6/11 - stable--Repeat CT today??  - skull flap off, will need prosthetic  - tylenol prn for fever control  [  ] PT/OT/speech/rehab consult    Resp:  HFNC 50L 0.5 FiO2 - will continue to moniotr Respiratory status/ SpO2 and adjust as needed  - CTA obtained to eval for PE - difficult to protocol because of Fontan anatomy and contrast timing, no PE seen, but does show VV collaterals (likely main reason for hypoxia)  Goal SpO2 > 85% - may need to lower goals to 80% with collaterals    CV:  Lidocaine gtt started 6/7 for V-tach runs, has not recurred, EP following    - having some runs of his underlying atrial tachycardia per EP - monitor for now    - eventual plans to transition to mexilitene  Mode is DDD but atrial wires without good sensitivity, so effectively VVI 70  Still in IART  Home meds on hold (Lisinopril, Aldactone)  Continue Sildenafil  Continue Lasix IV Q8hr --> q12h : goal even to slightly positive (Negative for LOS Is and Os and Scr trending up/ recent contrast exposure)  SBP < 140  echo 6/8 extremely limited windows    Heme:  Xarelto on hold  Goal PLTs >100  FFP to keep INR < 2.0 (but before procedures e.g. EVD removal will huddle with hematology to bring INR down < 1.5 if not there already)  Hematology and NRSGY in discussions  s/p Vitamin K x3 days, now IV Vit K 3x/week    FEN:  Pepcid -renally dosed   Bowel regimen: Senna and Miralax PRN  Started NG feeds with Pedialyte @ 70 ml/hr--will do 1/2 S Jevity at the same rate and go to full strength if tolerated. 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 He has elevated Fontan pressure secondary to LV diastolic dysfunction.  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 Persistent hypoxia likely from VV collaterals (confirmed on CTA), back in IART (not hemodynamically compromising), and recently with runs of V-tach (unclear cause) requiring lidocaine initiation. Significant issues with neuroagitation and delerium. EVD clamped. VV collaterals seen on CTA.    PLAN:  Neuro:  - Now off precedex -started on  CLonidine 0.1 mg every 8 hours on 6/12 (hold for SBP< 100)--will hold off of Antipsychotics for now-given the ventricular tachydysrhythmia and the risk for Qtc prolongation. Delirium seems improved.     - environmental interventions  - vEEG on per neuro - no sz's so far  - Na > 140-  - Keppra (clinical szs)  - R sided hemiparesis  - EVD clamped-will likely be removed today--consider FFP before removal  - Repeat head CT 6/11 - stable--Repeat CT today??  - skull flap off, will need prosthetic  - tylenol prn for fever control  [  ] PT/OT/speech/rehab consult    Resp:  HFNC 50L 0.5 FiO2 - will continue to moniotr Respiratory status/ SpO2 and adjust as needed  - CTA obtained to eval for PE - difficult to protocol because of Fontan anatomy and contrast timing, no PE seen, but does show VV collaterals (likely main reason for hypoxia)  Goal SpO2 > 85% - may need to lower goals to 80% with collaterals    CV:  Lidocaine gtt started 6/7 for V-tach runs, has not recurred, EP following    - having some runs of his underlying atrial tachycardia per EP - monitor for now    - eventual plans to transition to mexilitene  Mode is DDD but atrial wires without good sensitivity, so effectively VVI 70  Still in IART  Home meds on hold (Lisinopril, Aldactone)  Continue Sildenafil  Continue Lasix IV Q8hr --> q12h : goal even to slightly positive (Negative for LOS Is and Os and Scr trending up/ recent contrast exposure)  SBP < 140  echo 6/8 extremely limited windows    Heme:  Xarelto on hold  Goal PLTs >100  FFP to keep INR < 2.0 (but before procedures e.g. EVD removal will huddle with hematology to bring INR down < 1.5 if not there already)  Hematology and NRSGY in discussions  s/p Vitamin K x3 days, now IV Vit K 3x/week    FEN:  Pepcid -renally dosed   Bowel regimen: Senna and Miralax PRN  Started NG feeds with Pedialyte @ 70 ml/hr--will do 1/2 S Jevity at the same rate and go to full strength if tolerated. 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 He has elevated Fontan pressure secondary to LV diastolic dysfunction.  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 Persistent hypoxia likely from VV collaterals (confirmed on CTA), back in IART (not hemodynamically compromising), and recently with runs of V-tach (unclear cause) requiring lidocaine initiation. Significant issues with neuroagitation and delerium. EVD clamped. VV collaterals seen on CTA.    PLAN:  Neuro:  - Now off precedex -started on  CLonidine 0.1 mg every 8 hours on 6/12 (hold for SBP< 100)--will hold off of Antipsychotics for now-given the ventricular tachydysrhythmia and the risk for Qtc prolongation. Delirium seems improved.     - environmental interventions  - vEEG on per neuro - no sz's so far  - Na > 140-  - Keppra (clinical szs)  - R sided hemiparesis  - EVD clamped-will likely be removed today--consider FFP before removal (discuss with Neurosurgery)  - Repeat head CT 6/11 - stable--Repeat CT today??  - skull flap off, will need prosthetic  - tylenol prn for fever control  [  ] PT/OT/speech/rehab consult    Resp:  HFNC 50L 0.5 FiO2 - will continue to moniotr Respiratory status/ SpO2 and adjust as needed  - CTA obtained to eval for PE - difficult to protocol because of Fontan anatomy and contrast timing, no PE seen, but does show VV collaterals (likely main reason for hypoxia)  Goal SpO2 > 85% - may need to lower goals to 80% with collaterals    CV:  Lidocaine gtt started 6/7 for V-tach runs, has not recurred, EP following    - having some runs of his underlying atrial tachycardia per EP - monitor for now    - eventual plans to transition to mexilitene  Mode is DDD but atrial wires without good sensitivity, so effectively VVI 70  Still in IART  Home meds on hold (Lisinopril, Aldactone)  Continue Sildenafil  Continue Lasix IV Q8hr --> q12h : Fluid goal even to slightly positive (Negative for LOS Is and Os and Scr trending up/ recent contrast exposure)  SBP < 140  echo 6/8 extremely limited windows    Heme:  Xarelto on hold  Goal PLTs >100  FFP to keep INR < 2.0 (but before procedures e.g. EVD removal will huddle with hematology to bring INR down < 1.5 if not there already)  Hematology and NRSGY in discussions  s/p Vitamin K x3 days, now IV Vit K 3x/week    FEN:  Pepcid -renally dosed   Bowel regimen: Senna and Miralax PRN  Started NG feeds with Pedialyte @ 70 ml/hr--will do 1/2 S Jevity at the same rate and go to full strength if tolerated. Discontinue TPN    ID  - CTX started 6/6 for LG fever  - neg r/o (may be from blood in brain) --> go back to Ancef while EVD in    Other  - L hydrocele  - outpt f/u    Access  Right Fem CVL (6/9) (replaced the L fem CVL at that time)  [facility] (6/1)

## 2021-06-13 NOTE — PROGRESS NOTE PEDS - ATTENDING COMMENTS
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 Presented in IART - s/p EP study with unsuccessful attempt at ablation along with diagnostic cath showing elevated Fontan pressure (20mmHg). He was loaded with amiodarone and ultimately electrically cardioverted out, however reverted back into IART and continues to be in IART. His course is further complicated by L frontal parenchymal hemorrhage requiring emergent evacuation with NSx and EVD placement (which is no clamped since Thursday 6/10) and VTach of unknown origin.  Sats in mid to high 80s.   He continues to be critically ill.     Given that he is maintaining his sats on FiO2 of 40-50% and negro snot appear to be fluid overloaded and with Creatinine still high, will decrease the dose of Lasix to 10 mg iv q12 hrs ( home dosing was 20 mg po qd).     Has been transitioned to po feeds and tolerating it. will transition to Mexilitene and dc Lidocaine after the 5th dose.     Continue rest of management as per PICU and Neurosurgery.

## 2021-06-13 NOTE — PROGRESS NOTE PEDS - ASSESSMENT
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 Presented in IART - s/p EP study with unsuccessful attempt at ablation along with diagnostic cath showing elevated Fontan pressure (20mmHg). He was loaded with amiodarone and ultimately electrically cardioverted out, however reverted back into IART and continues to be in IART. His course is further complicated by L frontal parenchymal hemorrhage requiring emergent evacuation with NSx and EVD placement (which is no clamped since Thursday 6/10) and VTach of unknown origin.  Overnight, noted to have desaturated and FiO2 reverted back to 50% from 30% and Lasix made q8 from q12. Sats this am in mid 80s.  However, Creatinine showing a rise.   He continues to be critically ill.     Plan:    CVS:  - Continuos telemetry monitoring.  - Pacemaker set at DDD 70-120bpm.   - Continue Sildenafil 20mg q8h IV  - Decrease lasix to 15 mg Q12H. Goal net even. Monitor creat.   - Sats goal >85%.   - Monitor blood pressure. Goal MAPs > 70. Agree with norepi and/or vasopressin as needed. Titrate to goal per PICU to maintain CPP.  - Hold home lisinopril, aldactone. Agree with plan to ensure SBP not persistently higher than 140. Nicardapine as needed. Would try to reinstitute home medications when able to give enteral medications.  - Continue lidocaine drip 25mcg/kg/min. Max dose is 40mcg/kg/min, can titrate if as further episodes of Vtach. Would give a 1mg/kg lidocaine bolus if Vtach recurs with increasing drip dose  - f/u lidocaine level  - if stable with enteral feeds will transition to lidocaine drip to mexilitine. Would start 150mg q8. Should keep lidocaine on for ~ 48 hrs (minimum of 5 doses of mexilitene) before turning off.    Resp:   - On HFNC 50L, 50%. Wean as tolerated.   - CTA obtained to eval for PE - difficult to protocol because of Fontan anatomy and contrast timing, no PE seen, but does show VV collaterals (likely main reason for hypoxia)      FEN/GI:   - Currently on Pedialyte via NGT. Today we will introduce formula (Jevity) as tolerated.   - Pepsid.     ID:   -Ancef while EVD in place.     Heme:   - Anticoagulation care per hematology/PICU/NSx  - Xarelto on hold  - Goal PLTs >100  - FFP to keep INR < 2.0 (but before procedures e.g. 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 Presented in IART - s/p EP study with unsuccessful attempt at ablation along with diagnostic cath showing elevated Fontan pressure (20mmHg). He was loaded with amiodarone and ultimately electrically cardioverted out, however reverted back into IART and continues to be in IART. His course is further complicated by L frontal parenchymal hemorrhage requiring emergent evacuation with NSx and EVD placement (which is no clamped since Thursday 6/10) and VTach of unknown origin.  Overnight, noted to have desaturated and FiO2 reverted back to 50% from 30% and Lasix made q8 from q12. Sats this am in mid 80s.  However, Creatinine showing a rise.   He continues to be critically ill.     Plan:    CVS:  - Continuos telemetry monitoring.  - Pacemaker set at DDD 70-120bpm.   - Continue Sildenafil 10 mg q8h IV  - Decrease lasix to 15 mg Q12H. Goal net even. Monitor creat.   - Sats goal >85%.   - Monitor blood pressure. Goal MAPs > 70. Agree with norepi and/or vasopressin as needed. Titrate to goal per PICU to maintain CPP.  - Hold home lisinopril, aldactone. Agree with plan to ensure SBP not persistently higher than 140. Nicardapine as needed. Would try to reinstitute home medications when able to give enteral medications.  - Continue lidocaine drip 25mcg/kg/min. Max dose is 40mcg/kg/min, can titrate if as further episodes of Vtach. Would give a 1mg/kg lidocaine bolus if Vtach recurs with increasing drip dose  - f/u lidocaine level  - if stable with enteral feeds will transition to lidocaine drip to mexilitine. Would start 150mg q8. Should keep lidocaine on for ~ 48 hrs (minimum of 5 doses of mexilitene) before turning off.    Resp:   - On HFNC 50L, 50%. Wean as tolerated.   - CTA obtained to eval for PE - difficult to protocol because of Fontan anatomy and contrast timing, no PE seen, but does show VV collaterals (likely main reason for hypoxia)      FEN/GI:   - Currently on Pedialyte via NGT. Today we will introduce formula (Jevity) as tolerated.   - Pepsid.     ID:   -Ancef while EVD in place.     Heme:   - Anticoagulation care per hematology/PICU/NSx  - Xarelto on hold  - Goal PLTs >100  - FFP to keep INR < 2.0 (but before procedures e.g. 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 Presented in IART - s/p EP study with unsuccessful attempt at ablation along with diagnostic cath showing elevated Fontan pressure (20mmHg). He was loaded with amiodarone and ultimately electrically cardioverted out, however reverted back into IART and continues to be in IART. His course is further complicated by L frontal parenchymal hemorrhage requiring emergent evacuation with NSx and EVD placement (which is no clamped since Thursday 6/10) and VTach of unknown origin.  Overnight, noted to have desaturated and FiO2 reverted back to 50% from 30% and Lasix made q8 from q12. Sats this am in mid 80s.  However, Creatinine showing a rise.   He continues to be critically ill.     Plan:    [facility]S:  - Continuos telemetry monitoring.  - Pacemaker set at DDD 70-120bpm.   - Continue Sildenafil 10 mg q8h IV  - Decrease lasix to 10 mg Q12H. Goal net even. Monitor creat.   - Sats goal >85%.   - Monitor blood pressure. Goal MAPs > 70. Agree with norepi and/or vasopressin as needed. Titrate to goal per PICU to maintain CPP.  - Hold home lisinopril, aldactone. Agree with plan to ensure SBP not persistently higher than 140. Nicardapine as needed. Would try to reinstitute home medications when able to give enteral medications.  - Continue lidocaine drip 25mcg/kg/min. Max dose is 40mcg/kg/min, can titrate if as further episodes of Vtach. Would give a 1mg/kg lidocaine bolus if Vtach recurs with increasing drip dose  - f/u lidocaine level  -Start Mexlitine today at 150mg q8h. Turn off lidocaine after 5 doses of mexilitene.     Resp:   - On HFNC 50L, 50%. Wean as tolerated.   - CTA obtained to eval for PE - difficult to protocol because of Fontan anatomy and contrast timing, no PE seen, but does show VV collaterals (likely main reason for hypoxia)      FEN/GI:   - Currently on formula via NGT.   - Pepsid.   - Senna    ID:   -Ancef while EVD in place.     Heme:   - Anticoagulation care per hematology/PICU/NSx  - Xarelto on hold  - Goal PLTs >100  - FFP to keep INR < 2.0 (but before procedures e.g. 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 Presented in IART - s/p EP study with unsuccessful attempt at ablation along with diagnostic cath showing elevated Fontan pressure (20mmHg). He was loaded with amiodarone and ultimately electrically cardioverted out, however reverted back into IART and continues to be in IART. His course is further complicated by L frontal parenchymal hemorrhage requiring emergent evacuation with NSx and EVD placement (which is no clamped since Thursday 6/10) and VTach of unknown origin.  Overnight, noted to have desaturated and FiO2 reverted back to 50% from 30% and Lasix made q8 from q12. Sats this am in mid 80s.  However, Creatinine showing a rise.   He continues to be critically ill.     Plan:    CVS:  - Continuos telemetry monitoring.  - Pacemaker set at DDD 70-120bpm.   - Continue Sildenafil 10 mg q8h IV  - Decrease lasix to 10 mg Q12H. Goal net even. Monitor creat.   - Sats goal >85%.   - Monitor blood pressure. Goal MAPs > 70. Agree with norepi and/or vasopressin as needed. Titrate to goal per PICU to maintain CPP.  - Hold home lisinopril, aldactone. Agree with plan to ensure SBP not persistently higher than 140. Nicardapine as needed. Would try to reinstitute home medications when able to give enteral medications.  - Continue lidocaine drip 25mcg/kg/min. Max dose is 40mcg/kg/min, can titrate if as further episodes of Vtach. Would give a 1mg/kg lidocaine bolus if Vtach recurs with increasing drip dose  - f/u lidocaine level  -Start Mexlitine today at 150mg q8h. Turn off lidocaine after 5 doses of mexilitene.     Resp:   - On HFNC 50L, 50%. Wean as tolerated.   - CTA obtained to eval for PE - difficult to protocol because of Fontan anatomy and contrast timing, no PE seen, but does show VV collaterals (likely main reason for hypoxia)      FEN/GI:   - Currently on formula via NGT.   - Pepsid.   - Senna    ID:   -Ancef while EVD in place.     Heme:   - Anticoagulation care per hematology/PICU/NSx  - Xarelto on hold  - Goal PLTs >100  - FFP to keep INR < 2.0 (but before procedures e.g. EVD removal will huddle with hematology to bring INR down < 1.5 if not there already)    Neuro:   - L frontal parenchymal hemorrhage requiring emergent evacuation with NSx and EVD placement-->  EVD clamped (6/10)--> NSx following  - VEEG on per neuro - no seizures so far  - On Keppra  - On Clonidine patch  - On melatonin for sleep.           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 Presented in IART - s/p EP study with unsuccessful attempt at ablation along with diagnostic cath showing elevated Fontan pressure (20mmHg). He was loaded with amiodarone and ultimately electrically cardioverted out, however reverted back into IART and continues to be in IART. His course is further complicated by L frontal parenchymal hemorrhage requiring emergent evacuation with NSx and EVD placement (which is no clamped since Thursday 6/10) and VTach of unknown origin.  Sats in mid to high 80s.   He continues to be critically ill.     Plan:    CVS:  - Continuos telemetry monitoring.  - Pacemaker set at DDD 70-120bpm.   - Continue Sildenafil 10 mg q8h IV  - Decrease lasix to 10 mg Q12H. Goal net even. Monitor creat.   - Sats goal >85%.   - Monitor blood pressure. Goal MAPs > 70. Agree with norepi and/or vasopressin as needed. Titrate to goal per PICU to maintain CPP.  - Hold home lisinopril, aldactone. Agree with plan to ensure SBP not persistently higher than 140. Nicardapine as needed. Would try to reinstitute home medications when able to give enteral medications.  - Continue lidocaine drip 25mcg/kg/min. Max dose is 40mcg/kg/min, can titrate if as further episodes of Vtach. Would give a 1mg/kg lidocaine bolus if Vtach recurs with increasing drip dose  - f/u lidocaine level  -Start Mexlitine today at 150mg q8h. Turn off lidocaine after 5 doses of mexilitene.     Resp:   - On HFNC 50L, 50%. Wean as tolerated.   - CTA obtained to eval for PE - difficult to protocol because of Fontan anatomy and contrast timing, no PE seen, but does show VV collaterals (likely main reason for hypoxia)      FEN/GI:   - Currently on formula via NGT.   - Pepsid.   - Senna    ID:   -Ancef while EVD in place.     Heme:   - Anticoagulation care per hematology/PICU/NSx  - Xarelto on hold  - Goal PLTs >100  - FFP to keep INR < 2.0 (but before procedures e.g. EVD removal will huddle with hematology to bring INR down < 1.5 if not there already)    Neuro:   - L frontal parenchymal hemorrhage requiring emergent evacuation with NSx and EVD placement-->  EVD clamped (6/10)--> NSx following  - VEEG on per neuro - no seizures so far  - On Keppra  - On Clonidine patch  - On melatonin for sleep.

## 2021-06-13 NOTE — PROGRESS NOTE PEDS - SUBJECTIVE AND OBJECTIVE BOX
INTERVAL HISTORY: No acute events and continues to be on HFNC 50L, 40%.   - The sats are in mid to high 80s.   - No episodes of VT.     RESPIRATORY SUPPORT: HFNC 50L, 40%.    NUTRITION: Full formula NG feeds    Intake and output:      @ 07:01  -   @ 07:00  --------------------------------------------------------  IN: 2455.2 mL / OUT: 3007 mL / NET: -551.8 mL    INTRAVASCULAR ACCESS: RFCVL, RRA    MEDICATIONS:  cloNIDine  Oral Liquid - Peds 0.1 milliGRAM(s) Oral every 8 hours  furosemide  IV Intermittent - Peds 15 milliGRAM(s) IV Intermittent every 12 hours  lidocaine  Infusion - Peds 25 MICROgram(s)/kG/Min IV Continuous <Continuous>  sildenafil  IV Intermittent - Peds 10 milliGRAM(s) IV Intermittent every 8 hours  sodium chloride 0.9% for Nebulization - Peds 3 milliLiter(s) Nebulizer every 8 hours  ceFAZolin  IV Intermittent - Peds 1980 milliGRAM(s) IV Intermittent every 8 hours  levETIRAcetam IV Intermittent - Peds 1500 milliGRAM(s) IV Intermittent every 12 hours  melatonin Oral Liquid - Peds 1 milliGRAM(s) Oral at bedtime  famotidine IV Intermittent - Peds 20 milliGRAM(s) IV Intermittent every 12 hours  heparin   Infusion - Pediatric 0.045 Unit(s)/kG/Hr IV Continuous <Continuous>  phytonadione IV Intermittent - Peds 5 milliGRAM(s) IV Intermittent <User Schedule>  sodium chloride 0.9% -  250 milliLiter(s) IV Continuous <Continuous>  sodium chloride 0.9% lock flush - Peds 3 milliLiter(s) IV Push every 8 hours  sodium chloride 0.9%. - Pediatric 1000 milliLiter(s) IV Continuous <Continuous>    PHYSICAL EXAMINATION:  Vital signs -   T(C): 37.3 (21 @ 08:00), Max: 37.3 (21 @ 20:00)  HR: 70 (21 @ 08:00) (70 - 72)  BP: 128/66 (21 @ 08:00) (115/73 - 133/65)  ABP:  (81/55 - 142/76)  RR: 23 (21 @ 08:00) (22 - 35)  SpO2: 88% (21 @ 08:00) (85% - 90%)  CVP(mm Hg):  (12 - 30)    General - slow to respond to commands, not speaking  Skin -  no cyanosis.  Eyes / ENT - mucous membranes moist.  Pulmonary - lungs clear to auscultation bilaterally, no wheezes, no rales.  Cardiovascular - normal rate, regular rhythm, normal S1 & single S2, grade 1/6 blowing holosystolic murmur at LMSB, no rubs, no gallops, capillary refill < 2sec, normal pulses.  Gastrointestinal - soft, non-distended, non-tender (+) ~2-3 hepatomegaly.  Musculoskeletal - no joint swelling, no clubbing, no edema.  Neurologic / Psychiatric - slow to respond to commands; does not move right upper and lower extremities as much as left.       LABORATORY TESTS:                          12.4  CBC:   9.16 )-----------( 173   (21 @ 02:45)                          39.7               139   |  101   |  25                 Ca: 10.0   BMP:   ----------------------------< 160    M.3   (21 @ 02:33)             3.7    |  21    | 0.94               Ph: 3.2      LFT:     TPro: 8.3 / Alb: 4.5 / TBili: 1.4 / DBili: x / AST: 58 / ALT: 25 / AlkPhos: 151   (21 @ 02:33)    COAG: PT: 17.0 / PTT: 34.5 / INR: 1.51   (21 @ 02:45)       ABG:   pH: 7.55 / pCO2: 27 / pO2: 53 / HCO3: 26 / Base Excess: 1.4 / SaO2: 88.6 / Lactate: x / iCa: x   (21 @ 22:35)      IMAGING STUDIES:  Electrocardiogram - (21) Ventricular paced rhythm @ 75bpm. Underlying IART.    Telemetry - (21) Ventricular paced rhythm @ 70bpm. Underlying IART.  No episode of VT.     Echocardiogram - (21)   1. This was a technically difficult suboptimal study due to poor echo windows. Findings limited to below.   2. 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   3. Mild mitral valve regurgitation.   4. Trivial tricuspid valve regurgitation.   5. Trivial aortic valve regurgitation.     6. Status post device closure of Fontan fenestration.   7. S/P stent placement in the Fontan pathway. Limited imaging of the inferior Fontan baffle. In this setting, the inferior vena cava to its connection near the atrial portion of the baffle appears patent with no obstruction. S/p device closure of Fontan fenestration. The Fontan fenestration is not seen on this study. The right bidirectional Storm is seen only on color flow mapping. This appears to have laminar low velocity flow.   8. The connection of the right bidirectional Storm to the right pulmonary artery could not be imaged. The right pulmonary artery is seen in a limited portion immediately to the left of the Storm and appears patent. The left pulmonary artery could not be imaged.   9. Extremely poor and limited imaging of the lateral free walls of the single systemic left ventricle. On limited short axis views there appears to be adequate systolic excursion of the posterior wall of the left ventricle and decreased in the anterior wall. The bulboventricular foramen could not be imaged. Suggest alternate imaging for appropriate assessment of function.  10. No pericardial effusion.  11. Small right pleural effusion.      Cath - (21)  Access 4 Fr RFA, 7 Fr RFV x2 with US guidance.  Sat data (%): on 50% FiO2 LPA 73, RUPV 98, Ao 97; CI 2.6 L/min/m2 with Qp:Qs 1 :1.  Pressures (mmHg): Elevated mFontan = mIVC = 20. mPCW=16, No significant gradient across LV to AAo to Vikki (98/58/73).  Normal PVR while on sildenafil.  Angios showed unobstructed Fontan with existing stent within Fontan conduit.    Comment: IART ablation was attempted after the diagnostic cath but unsuccessful. Patient was overdrive paced out of IART. At the end of the case, Ao sat was 94% and LPA 66% on 50% FiO2     INTERVAL HISTORY: No acute events and continues to be on HFNC 50L, 40%.   - The sats are in mid to high 80s.   - No episodes of VT.   - CT head--> interval improvement in hemorrhages.     RESPIRATORY SUPPORT: HFNC 50L, 40%.    NUTRITION: Full formula NG feeds    Intake and output:      @ 07:01  -   @ 07:00  --------------------------------------------------------  IN: 2455.2 mL / OUT: 3007 mL / NET: -551.8 mL    INTRAVASCULAR ACCESS: RFCVL, RRA, 2 PIV    MEDICATIONS:  cloNIDine  Oral Liquid - Peds 0.1 milliGRAM(s) Oral every 8 hours  furosemide  IV Intermittent - Peds 15 milliGRAM(s) IV Intermittent every 12 hours  lidocaine  Infusion - Peds 25 MICROgram(s)/kG/Min IV Continuous <Continuous>  sildenafil  IV Intermittent - Peds 10 milliGRAM(s) IV Intermittent every 8 hours  sodium chloride 0.9% for Nebulization - Peds 3 milliLiter(s) Nebulizer every 8 hours  ceFAZolin  IV Intermittent - Peds 1980 milliGRAM(s) IV Intermittent every 8 hours  levETIRAcetam IV Intermittent - Peds 1500 milliGRAM(s) IV Intermittent every 12 hours  melatonin Oral Liquid - Peds 1 milliGRAM(s) Oral at bedtime  famotidine IV Intermittent - Peds 20 milliGRAM(s) IV Intermittent every 12 hours  heparin   Infusion - Pediatric 0.045 Unit(s)/kG/Hr IV Continuous <Continuous>  phytonadione IV Intermittent - Peds 5 milliGRAM(s) IV Intermittent <User Schedule>  sodium chloride 0.9% -  250 milliLiter(s) IV Continuous <Continuous>  sodium chloride 0.9% lock flush - Peds 3 milliLiter(s) IV Push every 8 hours  sodium chloride 0.9%. - Pediatric 1000 milliLiter(s) IV Continuous <Continuous>    PHYSICAL EXAMINATION:  Vital signs -   T(C): 37.3 (21 @ 08:00), Max: 37.3 (21 @ 20:00)  HR: 70 (21 @ 08:00) (70 - 72)  BP: 128/66 (21 @ 08:00) (115/73 - 133/65)  ABP:  (81/55 - 142/76)  RR: 23 (21 @ 08:00) (22 - 35)  SpO2: 88% (21 @ 08:00) (85% - 90%)  CVP(mm Hg):  (12 - 30)    General - slow to respond to commands, not speaking  Skin -  no cyanosis.  Eyes / ENT - mucous membranes moist.  Pulmonary - lungs clear to auscultation bilaterally, no wheezes, no rales.  Cardiovascular - normal rate, regular rhythm, normal S1 & single S2, grade 1/6 blowing holosystolic murmur at LMSB, no rubs, no gallops, capillary refill < 2sec, normal pulses.  Gastrointestinal - soft, non-distended, non-tender (+) ~2-3 hepatomegaly.  Musculoskeletal - no joint swelling, no clubbing, no edema.  Neurologic / Psychiatric - slow to respond to commands; does not move right upper and lower extremities as much as left.       LABORATORY TESTS:                          12.4  CBC:   9.16 )-----------( 173   (21 @ 02:45)                          39.7               139   |  101   |  25                 Ca: 10.0   BMP:   ----------------------------< 160    M.3   (21 @ 02:33)             3.7    |  21    | 0.94               Ph: 3.2      LFT:     TPro: 8.3 / Alb: 4.5 / TBili: 1.4 / DBili: x / AST: 58 / ALT: 25 / AlkPhos: 151   (21 @ 02:33)    COAG: PT: 17.0 / PTT: 34.5 / INR: 1.51   (21 @ 02:45)       ABG:   pH: 7.55 / pCO2: 27 / pO2: 53 / HCO3: 26 / Base Excess: 1.4 / SaO2: 88.6 / Lactate: x / iCa: x   (21 @ 22:35)      IMAGING STUDIES:  Electrocardiogram - (21) Ventricular paced rhythm @ 75bpm. Underlying IART.    Telemetry - (21) Ventricular paced rhythm @ 70bpm. Underlying IART.  No episode of VT.     Echocardiogram - (21)   1. This was a technically difficult suboptimal study due to poor echo windows. Findings limited to below.   2. 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   3. Mild mitral valve regurgitation.   4. Trivial tricuspid valve regurgitation.   5. Trivial aortic valve regurgitation.     6. Status post device closure of Fontan fenestration.   7. S/P stent placement in the Fontan pathway. Limited imaging of the inferior Fontan baffle. In this setting, the inferior vena cava to its connection near the atrial portion of the baffle appears patent with no obstruction. S/p device closure of Fontan fenestration. The Fontan fenestration is not seen on this study. The right bidirectional Storm is seen only on color flow mapping. This appears to have laminar low velocity flow.   8. The connection of the right bidirectional Storm to the right pulmonary artery could not be imaged. The right pulmonary artery is seen in a limited portion immediately to the left of the Storm and appears patent. The left pulmonary artery could not be imaged.   9. Extremely poor and limited imaging of the lateral free walls of the single systemic left ventricle. On limited short axis views there appears to be adequate systolic excursion of the posterior wall of the left ventricle and decreased in the anterior wall. The bulboventricular foramen could not be imaged. Suggest alternate imaging for appropriate assessment of function.  10. No pericardial effusion.  11. Small right pleural effusion.      CT Head No Cont (21 @ 11:28)   Mild interval decrease in previously seen left frontal lobe hemorrhage, left parietal subdural hemorrhage, and layering hemorrhage in the occipital horns.    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          Cath - (21)  Access 4 Fr RFA, 7 Fr RFV x2 with US guidance.  Sat data (%): on 50% FiO2 LPA 73, RUPV 98, Ao 97; CI 2.6 L/min/m2 with Qp:Qs 1 :1.  Pressures (mmHg): Elevated mFontan = mIVC = 20. mPCW=16, No significant gradient across LV to AAo to Vikki (98/58/73).  Normal PVR while on sildenafil.  Angios showed unobstructed Fontan with existing stent within Fontan conduit.    Comment: IART ablation was attempted after the diagnostic cath but unsuccessful. Patient was overdrive paced out of IART. At the end of the case, Ao sat was 94% and LPA 66% on 50% FiO2

## 2021-06-13 NOTE — PROGRESS NOTE PEDS - SUBJECTIVE AND OBJECTIVE BOX
HPI:  19yo male with complex cardiac h/o DILV, L-malposition of great arteries, sick sinus syndrome and AV mihir disease with tachybradycardia syndrome with a dual chamber pacemaker (currently with RV lead fracture and is currently only LV paced 2/2 complete heart block), PSH of status post Uxuzd-[id number]-Rliigsm anastomosis and aortic arch reconstruction followed by a fenestrated lateral tunnel Fontan completion (now s/p fenestration closure). here s/p cardiac catheterization and ablation. Procedure was complicated by unsuccessful ablation and post extubation patient was noted to have increased hemoptysis and desaturations, patient was reintubated for airway protection and given propofol for sedation.  (26 May 2021 20:19)      OVERNIGHT EVENTS:      Vital Signs Last 24 Hrs  T(C): 37 (2021 05:00), Max: 37.3 (2021 20:00)  T(F): 98.6 (2021 05:00), Max: 99.1 (2021 20:00)  HR: 70 (2021 07:00) (70 - 72)  BP: 133/65 (2021 01:50) (115/73 - 133/65)  BP(mean): 83 (2021 01:50) (83 - 87)  RR: 32 (2021 07:00) (22 - 35)  SpO2: 88% (2021 07:00) (85% - 90%)    I&O's Summary    2021 07:01  -  2021 07:00  --------------------------------------------------------  IN: 2455.2 mL / OUT: 3007 mL / NET: -551.8 mL      PHYSICAL EXAM:  Mental Status: Awake, OE, nonverbal  PERRL, following some commands   Motor:  weaker R side, able to grasp hand on R, slight wiggle of R toes  L side stronger following commands in L hand   Grimaces to pain in all 4 extremities  Incision/Wound: c/d/i    TUBES/LINES:  [ ] A-line :  [ ] Ventriculostomy- clamped      DIET:  [ ] NPO    LABS:                        12.4   9.16  )-----------( 173      ( 2021 02:45 )             39.7         139  |  101  |  25<H>  ----------------------------<  160<H>  3.7   |  21<L>  |  0.94    Ca    10.0      2021 02:33  Phos  3.2       Mg     2.3         TPro  8.3  /  Alb  4.5  /  TBili  1.4<H>  /  DBili  x   /  AST  58<H>  /  ALT  25  /  AlkPhos  151      PT/INR - ( 2021 02:45 )   PT: 17.0 sec;   INR: 1.51 ratio    PTT - ( 2021 02:45 )  PTT:34.5 sec      CULTURES:    Culture Results:   No growth to date. ( @ 16:41)  Culture Results:   No growth ( @ 09:45)        Allergies    No Known Allergies      MEDICATIONS:  Antibiotics:  ceFAZolin  IV Intermittent - Peds 1980 milliGRAM(s) IV Intermittent every 8 hours    Neuro:  acetaminophen   Oral Liquid - Peds. 650 milliGRAM(s) Oral every 6 hours PRN  levETIRAcetam IV Intermittent - Peds 1500 milliGRAM(s) IV Intermittent every 12 hours  melatonin Oral Liquid - Peds 1 milliGRAM(s) Oral at bedtime    Anticoagulation  heparin   Infusion - Pediatric 0.045 Unit(s)/kG/Hr IV Continuous <Continuous>    OTHER:  chlorhexidine 2% Topical Cloths - Peds 1 Application(s) Topical daily  cloNIDine  Oral Liquid - Peds 0.1 milliGRAM(s) Oral every 8 hours  famotidine IV Intermittent - Peds 20 milliGRAM(s) IV Intermittent every 12 hours  furosemide  IV Intermittent - Peds 15 milliGRAM(s) IV Intermittent every 12 hours  lidocaine  Infusion - Peds 25 MICROgram(s)/kG/Min IV Continuous <Continuous>  polyethylene glycol 3350 Oral Powder - Peds 17 Gram(s) Oral daily PRN  polyvinyl alcohol 1.4%/povidone 0.6% Ophthalmic Solution - Peds 2 Drop(s) Both EYES four times a day  senna Oral Liquid - Peds 15 milliLiter(s) Oral daily PRN  sildenafil  IV Intermittent - Peds 10 milliGRAM(s) IV Intermittent every 8 hours  sodium chloride 0.9% for Nebulization - Peds 3 milliLiter(s) Nebulizer every 8 hours    IVF:  phytonadione IV Intermittent - Peds 5 milliGRAM(s) IV Intermittent <User Schedule>  sodium chloride 0.9% -  250 milliLiter(s) IV Continuous <Continuous>  sodium chloride 0.9% lock flush - Peds 3 milliLiter(s) IV Push every 8 hours  sodium chloride 0.9%. - Pediatric 1000 milliLiter(s) IV Continuous <Continuous>      RADIOLOGY & ADDITIONAL TESTS:

## 2021-06-13 NOTE — PROGRESS NOTE PEDS - SUBJECTIVE AND OBJECTIVE BOX
CC:     Interval/Overnight Events:      VITAL SIGNS:  T(C): 37 (21 @ 05:00), Max: 37.3 (21 @ 20:00)  HR: 70 (21 @ 07:00) (70 - 72)  BP: 133/65 (21 @ 01:50) (115/73 - 133/65)  ABP: 113/63 (21 @ 07:00) (81/55 - 142/76)  ABP(mean): 81 (21 @ 07:00) (66 - 102)  RR: 32 (21 @ 07:00) (22 - 35)  SpO2: 88% (21 @ 07:00) (85% - 90%)  CVP(mm Hg): 12 (21 @ 07:00) (12 - 30)    ==============================RESPIRATORY========================  FiO2: 	    Mechanical Ventilation:     ABG - ( 2021 22:35 )  pH: 7.55  /  pCO2: 27    /  pO2: 53    / HCO3: 26    / Base Excess: 1.4   /  SaO2: 88.6  / Lactate: x        Respiratory Medications:  sodium chloride 0.9% for Nebulization - Peds 3 milliLiter(s) Nebulizer every 8 hours        ============================CARDIOVASCULAR=======================  Cardiac Rhythm:	 NSR    Cardiovascular Medications:  cloNIDine  Oral Liquid - Peds 0.1 milliGRAM(s) Oral every 8 hours  furosemide  IV Intermittent - Peds 15 milliGRAM(s) IV Intermittent every 12 hours  lidocaine  Infusion - Peds 25 MICROgram(s)/kG/Min IV Continuous <Continuous>  sildenafil  IV Intermittent - Peds 10 milliGRAM(s) IV Intermittent every 8 hours        =====================FLUIDS/ELECTROLYTES/NUTRITION===================  I&O's Summary    2021 07:01  -  2021 07:00  --------------------------------------------------------  IN: 2455.2 mL / OUT: 3007 mL / NET: -551.8 mL      Daily       139  |  101  |  25  ----------------------------<  160  3.7   |  21  |  0.94    Ca    10.0      2021 02:33  Phos  3.2       Mg     2.3         TPro  8.3  /  Alb  4.5  /  TBili  1.4  /  DBili  x   /  AST  58  /  ALT  25  /  AlkPhos  151        Diet:     Gastrointestinal Medications:  famotidine IV Intermittent - Peds 20 milliGRAM(s) IV Intermittent every 12 hours  phytonadione IV Intermittent - Peds 5 milliGRAM(s) IV Intermittent <User Schedule>  polyethylene glycol 3350 Oral Powder - Peds 17 Gram(s) Oral daily PRN  senna Oral Liquid - Peds 15 milliLiter(s) Oral daily PRN  sodium chloride 0.9% -  250 milliLiter(s) IV Continuous <Continuous>  sodium chloride 0.9% lock flush - Peds 3 milliLiter(s) IV Push every 8 hours  sodium chloride 0.9%. - Pediatric 1000 milliLiter(s) IV Continuous <Continuous>      Fluid Management:  Fluid Status: [ ] Hypovolemic      [ ] Euvolemic         [ ] Fluid overloaded  Fluid Status Goal for next 24hr.:   [ ] Net Negative    ______   ml       [ ] Net Positive ____        ml      [ ] Intake=Output  [ ] No specific fluid goal  Fluid Intake Plan: ________________  Fluid Removal Plan: [ ] Not applicable  [ ] Diuretic Plan:  [ ] CRRT Plan:  [ ] Unchanged   [ ] No Fluid Removal     [ ] Prescribed weight loss of ___ml/hr.     [ ] Intake=Output       [ ] Fluid removal of ____    ml/hr.    ========================HEMATOLOGIC/ONCOLOGIC====================                                            12.4                  Neurophils% (auto):   82.0   ( @ 02:45):    9.16 )-----------(173          Lymphocytes% (auto):  8.0                                           39.7                   Eosinphils% (auto):   2.0      Manual%: Neutrophils x    ; Lymphocytes x    ; Eosinophils x    ; Bands%: x    ; Blasts x          (  @ 02:45 )   PT: 17.0 sec;   INR: 1.51 ratio  aPTT: 34.5 sec                          12.4   9.16  )-----------( 173      ( 2021 02:45 )             39.7                         12.0   8.07  )-----------( 184      ( 2021 03:03 )             38.4                         12.0   6.34  )-----------( 157      ( 2021 01:31 )             38.2       Transfusions:	  Hematologic/Oncologic Medications:  heparin   Infusion - Pediatric 0.045 Unit(s)/kG/Hr IV Continuous <Continuous>    DVT Prophylaxis:    ============================INFECTIOUS DISEASE========================  Antimicrobials/Immunologic Medications:  ceFAZolin  IV Intermittent - Peds 1980 milliGRAM(s) IV Intermittent every 8 hours            =============================NEUROLOGY============================  Adequacy of sedation and pain control has been assessed and adjusted    SBS:  		  GISSELLE-1:	      Neurologic Medications:  acetaminophen   Oral Liquid - Peds. 650 milliGRAM(s) Oral every 6 hours PRN  levETIRAcetam IV Intermittent - Peds 1500 milliGRAM(s) IV Intermittent every 12 hours  melatonin Oral Liquid - Peds 1 milliGRAM(s) Oral at bedtime      OTHER MEDICATIONS:  Endocrine/Metabolic Medications:    Genitourinary Medications:    Topical/Other Medications:  chlorhexidine 2% Topical Cloths - Peds 1 Application(s) Topical daily  polyvinyl alcohol 1.4%/povidone 0.6% Ophthalmic Solution - Peds 2 Drop(s) Both EYES four times a day      =======================PATIENT CARE ===================  [ ] There are pressure ulcers/areas of breakdown that are being addressed  [ ] Preventive measures are being taken to decrease risk for skin breakdown  [ ] Necessity of urinary, arterial, and venous catheters discussed    ============================PHYSICAL EXAM============================  General: 	In no acute distress  Respiratory:	Lungs clear to auscultation bilaterally. Good aeration. No rales,   .		rhonchi, retractions or wheezing. Effort even and unlabored.  CV:		Regular rate and rhythm. Normal S1/S2. No murmurs, rubs, or   .		gallop. Capillary refill < 2 seconds. Distal pulses 2+ and equal.  Abdomen:	Soft, non-distended. Bowel sounds present. No palpable   .		hepatosplenomegaly.  Skin:		No rash.  Extremities:	Warm and well perfused. No gross extremity deformities.  Neurologic:	Alert and oriented. No acute change from baseline exam.    ============================IMAGING STUDIES=========================        =============================SOCIAL=================================  Parent/Guardian is at the bedside  Patient and Parent/Guardian updated as to the progress/plan of care    The patient remains in critical and unstable condition, and requires ICU care and monitoring    The patient is improving but requires continued monitoring and adjustment of therapy    Total critical care time spent by attending physician was 35 minutes excluding procedure time. CC:     Interval/Overnight Events: None      VITAL SIGNS:  T(C): 37 (21 @ 05:00), Max: 37.3 (21 @ 20:00)  HR: 70 (21 @ 07:00) (70 - 72)  BP: 133/65 (21 @ 01:50) (115/73 - 133/65)  ABP: 113/63 (21 @ 07:00) (81/55 - 142/76)  ABP(mean): 81 (21 @ 07:00) (66 - 102)  RR: 32 (21 @ 07:00) (22 - 35)  SpO2: 88% (21 @ 07:00) (85% - 90%)  CVP(mm Hg): 12 (21 @ 07:00) (12 - 30)    ==============================RESPIRATORY========================  FiO2: 	0.4-0.45    HFNC @ 50 LPM    ABG - ( 2021 22:35 )  pH: 7.55  /  pCO2: 27    /  pO2: 53    / HCO3: 26    / Base Excess: 1.4   /  SaO2: 88.6  / Lactate: x        Respiratory Medications:  sodium chloride 0.9% for Nebulization - Peds 3 milliLiter(s) Nebulizer every 8 hours    Goal SpO2> 85    ============================CARDIOVASCULAR=======================  Cardiac Rhythm:	 Normal sinus rhythm      Cardiovascular Medications:  cloNIDine  Oral Liquid - Peds 0.1 milliGRAM(s) Oral every 8 hours  furosemide  IV Intermittent - Peds 15 milliGRAM(s) IV Intermittent every 12 hours  lidocaine  Infusion - Peds 25 MICROgram(s)/kG/Min IV Continuous Mexiltene 150 mg every 8 and turn off Lidocaine a few hours after 5th dose  sildenafil  IV Intermittent - Peds 10 milliGRAM(s) IV Intermittent every 8 hours    Lisinopril on hold    =====================FLUIDS/ELECTROLYTES/NUTRITION===================  I&O's Summary    2021 07:01  -  2021 07:00  --------------------------------------------------------  IN: 2455.2 mL / OUT: 3007 mL / NET: -551.8 mL      Daily       139  |  101  |  25  ----------------------------<  160  3.7   |  21  |  0.94    Ca    10.0      2021 02:33  Phos  3.2       Mg     2.3         TPro  8.3  /  Alb  4.5  /  TBili  1.4  /  DBili  x   /  AST  58  /  ALT  25  /  AlkPhos  151        Diet: Jevity 1.0 miryam/ml at 70 ml/hr    Gastrointestinal Medications:  famotidine IV Intermittent - Peds 20 milliGRAM(s) IV Intermittent every 12 hours  phytonadione IV Intermittent - Peds 5 milliGRAM(s) IV Intermittent <User Schedule>  polyethylene glycol 3350 Oral Powder - Peds 17 Gram(s) Oral daily PRN  senna Oral Liquid - Peds 15 milliLiter(s) Oral daily PRN  sodium chloride 0.9% -  250 milliLiter(s) IV Continuous <Continuous>  sodium chloride 0.9% lock flush - Peds 3 milliLiter(s) IV Push every 8 hours  sodium chloride 0.9%. - Pediatric 1000 milliLiter(s) IV Continuous <Continuous>      Fluid Management:  Fluid Status: [ X] Mildy Hypomvolemic    to   [X ] Euvolemic          Fluid Status Goal for next 24hr.:   [ ] Net Negative    ______   ml       [ ] Net Positive ____        ml      [ ] Intake=Output  [ ] No specific fluid goal  Fluid Intake Plan: -Continue   Fluid Removal Plan: [ ] Not applicable  [ X] Diuretic Plan: Reduce lasix to 10 mg twice daily      ========================HEMATOLOGIC/ONCOLOGIC====================                                            12.4                  Neurophils% (auto):   82.0   ( @ 02:45):    9.16 )-----------(173          Lymphocytes% (auto):  8.0                                           39.7                   Eosinphils% (auto):   2.0      Manual%: Neutrophils x    ; Lymphocytes x    ; Eosinophils x    ; Bands%: x    ; Blasts x          (  @ 02:45 )   PT: 17.0 sec;   INR: 1.51 ratio  aPTT: 34.5 sec                          12.4   9.16  )-----------( 173      ( 2021 02:45 )             39.7                         12.0   8.07  )-----------( 184      ( 2021 03:03 )             38.4                         12.0   6.34  )-----------( 157      ( 2021 01:31 )             38.2       Transfusions:	  Hematologic/Oncologic Medications:  heparin   Infusion - Pediatric 0.045 Unit(s)/kG/Hr IV Continuous <Continuous>    DVT Prophylaxis:    ============================INFECTIOUS DISEASE========================  Antimicrobials/Immunologic Medications:  ceFAZolin  IV Intermittent - Peds 1980 milliGRAM(s) IV Intermittent every 8 hours            =============================NEUROLOGY============================  Adequacy of sedation and pain control has been assessed and adjusted    SBS:  		  GISSELLE-1:	      Neurologic Medications:  acetaminophen   Oral Liquid - Peds. 650 milliGRAM(s) Oral every 6 hours PRN  levETIRAcetam IV Intermittent - Peds 1500 milliGRAM(s) IV Intermittent every 12 hours  melatonin Oral Liquid - Peds 1 milliGRAM(s) Oral at bedtime      OTHER MEDICATIONS:  Endocrine/Metabolic Medications:    Genitourinary Medications:    Topical/Other Medications:  chlorhexidine 2% Topical Cloths - Peds 1 Application(s) Topical daily  polyvinyl alcohol 1.4%/povidone 0.6% Ophthalmic Solution - Peds 2 Drop(s) Both EYES four times a day      =======================PATIENT CARE ===================  [ ] There are pressure ulcers/areas of breakdown that are being addressed  [ ] Preventive measures are being taken to decrease risk for skin breakdown  [ ] Necessity of urinary, arterial, and venous catheters discussed    ============================PHYSICAL EXAM============================  General: 	In no acute distress  Respiratory:	Lungs clear to auscultation bilaterally. Good aeration. No rales,   .		rhonchi, retractions or wheezing. Effort even and unlabored.  CV:		Regular rate and rhythm. Normal S1/S2. No murmurs, rubs, or   .		gallop. Capillary refill < 2 seconds. Distal pulses 2+ and equal.  Abdomen:	Soft, non-distended. Bowel sounds present. No palpable   .		hepatosplenomegaly.  Skin:		No rash.  Extremities:	Warm and well perfused. No gross extremity deformities.  Neurologic:	Alert and oriented. No acute change from baseline exam.    ============================IMAGING STUDIES=========================  < from: CT Head No Cont (21 @ 11:28) >  IMPRESSION:  Mild interval decrease in previously seen left frontal lobe hemorrhage, left parietal subdural hemorrhage, and layering hemorrhage in the occipital horns.    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    < end of copied text >        =============================SOCIAL=================================  Parent/Guardian is at the bedside  Patient and Parent/Guardian updated as to the progress/plan of care    The patient remains in critical and unstable condition, and requires ICU care and monitoring    The patient is improving but requires continued monitoring and adjustment of therapy    Total critical care time spent by attending physician was 35 minutes excluding procedure time. CC:     Interval/Overnight Events: None. Sister reports that he seems calmer and more interactiv with attempts at verbal communication--"I love you" to FAther.; "I am hot" and moved the bed covers away      VITAL SIGNS:  T(C): 37 (21 @ 05:00), Max: 37.3 (21 @ 20:00)  HR: 70 (21 @ 07:00) (70 - 72)  BP: 133/65 (21 @ 01:50) (115/73 - 133/65)  ABP: 113/63 (21 @ 07:00) (81/55 - 142/76)  ABP(mean): 81 (21 @ 07:00) (66 - 102)  RR: 32 (21 @ 07:00) (22 - 35)  SpO2: 88% (21 @ 07:00) (85% - 90%)  CVP(mm Hg): 12 (21 @ 07:00) (12 - 30)    ==============================RESPIRATORY========================  FiO2: 	0.4-0.45    HFNC @ 50 LPM    ABG - ( 2021 22:35 )  pH: 7.55  /  pCO2: 27    /  pO2: 53    / HCO3: 26    / Base Excess: 1.4   /  SaO2: 88.6  / Lactate: x        Respiratory Medications:  sodium chloride 0.9% for Nebulization - Peds 3 milliLiter(s) Nebulizer every 8 hours    Goal SpO2> 85    ============================CARDIOVASCULAR=======================  Cardiac Rhythm:	 Normal sinus rhythm      Cardiovascular Medications:  cloNIDine  Oral Liquid - Peds 0.1 milliGRAM(s) Oral every 8 hours  furosemide  IV Intermittent - Peds 15 milliGRAM(s) IV Intermittent every 12 hours  lidocaine  Infusion - Peds 25 MICROgram(s)/kG/Min IV Continuous Mexiltene 150 mg every 8 and turn off Lidocaine a few hours after 5th dose  sildenafil  IV Intermittent - Peds 10 milliGRAM(s) IV Intermittent every 8 hours    Lisinopril on hold    =====================FLUIDS/ELECTROLYTES/NUTRITION===================  I&O's Summary    2021 07:01  -  2021 07:00  --------------------------------------------------------  IN: 2455.2 mL / OUT: 3007 mL / NET: -551.8 mL      Daily       139  |  101  |  25  ----------------------------<  160  3.7   |  21  |  0.94    Ca    10.0      2021 02:33  Phos  3.2       Mg     2.3         TPro  8.3  /  Alb  4.5  /  TBili  1.4  /  DBili  x   /  AST  58  /  ALT  25  /  AlkPhos  151        Diet: Jevity 1.0 miryam/ml at 70 ml/hr    Gastrointestinal Medications:  famotidine IV Intermittent - Peds 20 milliGRAM(s) IV Intermittent every 12 hours  phytonadione IV Intermittent - Peds 5 milliGRAM(s) IV Intermittent <User Schedule>  polyethylene glycol 3350 Oral Powder - Peds 17 Gram(s) Oral daily PRN  senna Oral Liquid - Peds 15 milliLiter(s) Oral daily PRN  sodium chloride 0.9% -  250 milliLiter(s) IV Continuous <Continuous>  sodium chloride 0.9% lock flush - Peds 3 milliLiter(s) IV Push every 8 hours  sodium chloride 0.9%. - Pediatric 1000 milliLiter(s) IV Continuous <Continuous>      Fluid Management:  Fluid Status: [ X] Mildy Hypovolemic  (Negative Fluid balance for LOS and High Scr)      Fluid Status Goal for next 24hr.:  Fluid balance of 0 to +200 over the next 24 hours  Fluid Intake Plan: -Continue current feeding regimen  Fluid Removal Plan: [ ] Not applicable  [ X] Diuretic Plan: Reduce lasix to 10 mg twice daily      ========================HEMATOLOGIC/ONCOLOGIC====================                                            12.4                  Neurophils% (auto):   82.0   ( @ 02:45):    9.16 )-----------(173          Lymphocytes% (auto):  8.0                                           39.7                   Eosinphils% (auto):   2.0      Manual%: Neutrophils x    ; Lymphocytes x    ; Eosinophils x    ; Bands%: x    ; Blasts x          (  @ 02:45 )   PT: 17.0 sec;   INR: 1.51 ratio  aPTT: 34.5 sec                          12.4   9.16  )-----------( 173      ( 2021 02:45 )             39.7                         12.0   8.07  )-----------( 184      ( 2021 03:03 )             38.4                         12.0   6.34  )-----------( 157      ( 2021 01:31 )             38.2       Transfusions:	  Hematologic/Oncologic Medications:  heparin   Infusion - Pediatric 0.045 Unit(s)/kG/Hr IV Continuous <Continuous>    DVT Prophylaxis:    ============================INFECTIOUS DISEASE========================  Antimicrobials/Immunologic Medications:  ceFAZolin  IV Intermittent - Peds 1980 milliGRAM(s) IV Intermittent every 8 hours            =============================NEUROLOGY============================  Adequacy of Delirium/pain control has been assessed and adjusted  CAPD have been positive  Neurologic Medications:  acetaminophen   Oral Liquid - Peds. 650 milliGRAM(s) Oral every 6 hours PRN  levETIRAcetam IV Intermittent - Peds 1500 milliGRAM(s) IV Intermittent every 12 hours  melatonin Oral Liquid - Peds 1 milliGRAM(s) Oral at bedtime      Topical/Other Medications:  chlorhexidine 2% Topical Cloths - Peds 1 Application(s) Topical daily  polyvinyl alcohol 1.4%/povidone 0.6% Ophthalmic Solution - Peds 2 Drop(s) Both EYES four times a day      =======================PATIENT CARE ===================  [ ] There are pressure ulcers/areas of breakdown that are being addressed  [ X] Preventive measures are being taken to decrease risk for skin breakdown  [ ] Necessity of urinary, arterial, and venous catheters discussed    ============================PHYSICAL EXAM============================  General: 	HFNC in place--seems to be focusing during exam and answering by shaking head -indicated yes to team member who asked "Do you remember me". NG in place  Respiratory:	Diminished air entry bilaterally. Mildly labored on current support  CV:		Regular rate and rhythm. Normal S1/S2. No  rubs, or   .		gallop. Capillary refill < 2 seconds. Distal pulses 2+ and equal.  Abdomen:	Soft, non-distended. Bowel sounds present. No palpable   .		hepatosplenomegaly.  Skin:		No rash.  Extremities:	Warm and well perfused. No gross extremity deformities.  Neurologic:	Awake--moving all extremities but left > right No acute change from baseline exam.    ============================IMAGING STUDIES=========================  < from: CT Head No Cont (21 @ 11:28) >  IMPRESSION:  Mild interval decrease in previously seen left frontal lobe hemorrhage, left parietal subdural hemorrhage, and layering hemorrhage in the occipital horns.    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    =============================SOCIAL=================================  Parent/Guardian is at the bedside  Patient and Parent/Guardian updated as to the progress/plan of care    The patient remains in critical and unstable condition, and requires ICU care and monitoring        Total critical care time spent by attending physician was 35 minutes excluding procedure time.

## 2021-06-14 DIAGNOSIS — I49.9 CARDIAC ARRHYTHMIA, UNSPECIFIED: ICD-10-CM

## 2021-06-14 DIAGNOSIS — G81.91 HEMIPLEGIA, UNSPECIFIED AFFECTING RIGHT DOMINANT SIDE: ICD-10-CM

## 2021-06-14 DIAGNOSIS — Z74.09 OTHER REDUCED MOBILITY: ICD-10-CM

## 2021-06-14 LAB
BLOOD GAS ARTERIAL - LYTES,HGB,ICA,LACT RESULT: SIGNIFICANT CHANGE UP
CULTURE RESULTS: SIGNIFICANT CHANGE UP
SPECIMEN SOURCE: SIGNIFICANT CHANGE UP

## 2021-06-14 PROCEDURE — 71045 X-RAY EXAM CHEST 1 VIEW: CPT | Mod: 26

## 2021-06-14 PROCEDURE — 76604 US EXAM CHEST: CPT | Mod: 26

## 2021-06-14 PROCEDURE — 99233 SBSQ HOSP IP/OBS HIGH 50: CPT

## 2021-06-14 PROCEDURE — 99291 CRITICAL CARE FIRST HOUR: CPT

## 2021-06-14 RX ORDER — ALBUTEROL 90 UG/1
2.5 AEROSOL, METERED ORAL EVERY 4 HOURS
Refills: 0 | Status: DISCONTINUED | OUTPATIENT
Start: 2021-06-14 | End: 2021-06-15

## 2021-06-14 RX ORDER — LACTOBACILLUS RHAMNOSUS GG 10B CELL
1 CAPSULE ORAL DAILY
Refills: 0 | Status: DISCONTINUED | OUTPATIENT
Start: 2021-06-14 | End: 2021-06-22

## 2021-06-14 RX ORDER — LANOLIN ALCOHOL/MO/W.PET/CERES
3 CREAM (GRAM) TOPICAL AT BEDTIME
Refills: 0 | Status: DISCONTINUED | OUTPATIENT
Start: 2021-06-14 | End: 2021-06-16

## 2021-06-14 RX ORDER — PHYTONADIONE (VIT K1) 5 MG
5 TABLET ORAL
Refills: 0 | Status: DISCONTINUED | OUTPATIENT
Start: 2021-06-14 | End: 2021-06-23

## 2021-06-14 RX ORDER — SODIUM CHLORIDE 9 MG/ML
4 INJECTION INTRAMUSCULAR; INTRAVENOUS; SUBCUTANEOUS EVERY 4 HOURS
Refills: 0 | Status: DISCONTINUED | OUTPATIENT
Start: 2021-06-14 | End: 2021-06-15

## 2021-06-14 RX ADMIN — LEVETIRACETAM 400 MILLIGRAM(S): 250 TABLET, FILM COATED ORAL at 05:00

## 2021-06-14 RX ADMIN — ALBUTEROL 2.5 MILLIGRAM(S): 90 AEROSOL, METERED ORAL at 16:24

## 2021-06-14 RX ADMIN — SODIUM CHLORIDE 3 MILLILITER(S): 9 INJECTION, SOLUTION INTRAVENOUS at 19:20

## 2021-06-14 RX ADMIN — Medication 12.4 MICROGRAM(S)/KG/MIN: at 19:19

## 2021-06-14 RX ADMIN — Medication 10 MILLIGRAM(S): at 16:48

## 2021-06-14 RX ADMIN — Medication 198 MILLIGRAM(S): at 21:58

## 2021-06-14 RX ADMIN — Medication 2 DROP(S): at 18:00

## 2021-06-14 RX ADMIN — Medication 198 MILLIGRAM(S): at 14:21

## 2021-06-14 RX ADMIN — Medication 0.1 MILLIGRAM(S): at 05:24

## 2021-06-14 RX ADMIN — SODIUM CHLORIDE 3 MILLILITER(S): 9 INJECTION INTRAMUSCULAR; INTRAVENOUS; SUBCUTANEOUS at 18:00

## 2021-06-14 RX ADMIN — Medication 198 MILLIGRAM(S): at 06:15

## 2021-06-14 RX ADMIN — Medication 3 MILLIGRAM(S): at 22:05

## 2021-06-14 RX ADMIN — Medication 20 MILLIGRAM(S): at 15:24

## 2021-06-14 RX ADMIN — Medication 12.4 MICROGRAM(S)/KG/MIN: at 11:35

## 2021-06-14 RX ADMIN — Medication 3 UNIT(S)/KG/HR: at 07:30

## 2021-06-14 RX ADMIN — SODIUM CHLORIDE 3 MILLILITER(S): 9 INJECTION, SOLUTION INTRAVENOUS at 07:30

## 2021-06-14 RX ADMIN — Medication 20 MILLIGRAM(S): at 22:00

## 2021-06-14 RX ADMIN — CHLORHEXIDINE GLUCONATE 1 APPLICATION(S): 213 SOLUTION TOPICAL at 22:01

## 2021-06-14 RX ADMIN — Medication 3 UNIT(S)/KG/HR: at 19:20

## 2021-06-14 RX ADMIN — Medication 2 DROP(S): at 15:24

## 2021-06-14 RX ADMIN — Medication 2 DROP(S): at 21:59

## 2021-06-14 RX ADMIN — Medication 5 MILLIGRAM(S): at 09:44

## 2021-06-14 RX ADMIN — Medication 1 PACKET(S): at 09:44

## 2021-06-14 RX ADMIN — Medication 25 MILLIGRAM(S): at 05:13

## 2021-06-14 RX ADMIN — ALBUTEROL 2.5 MILLIGRAM(S): 90 AEROSOL, METERED ORAL at 21:15

## 2021-06-14 RX ADMIN — LEVETIRACETAM 400 MILLIGRAM(S): 250 TABLET, FILM COATED ORAL at 16:48

## 2021-06-14 RX ADMIN — SODIUM CHLORIDE 3 MILLILITER(S): 9 INJECTION INTRAMUSCULAR; INTRAVENOUS; SUBCUTANEOUS at 10:00

## 2021-06-14 RX ADMIN — Medication 10 MILLIGRAM(S): at 05:24

## 2021-06-14 RX ADMIN — Medication 0.1 MILLIGRAM(S): at 13:07

## 2021-06-14 RX ADMIN — SODIUM CHLORIDE 4 MILLILITER(S): 9 INJECTION INTRAMUSCULAR; INTRAVENOUS; SUBCUTANEOUS at 16:24

## 2021-06-14 RX ADMIN — Medication 3 UNIT(S)/KG/HR: at 17:57

## 2021-06-14 RX ADMIN — SODIUM CHLORIDE 4 MILLILITER(S): 9 INJECTION INTRAMUSCULAR; INTRAVENOUS; SUBCUTANEOUS at 21:16

## 2021-06-14 RX ADMIN — SODIUM CHLORIDE 3 MILLILITER(S): 9 INJECTION, SOLUTION INTRAVENOUS at 16:47

## 2021-06-14 RX ADMIN — Medication 12.4 MICROGRAM(S)/KG/MIN: at 07:29

## 2021-06-14 RX ADMIN — FAMOTIDINE 200 MILLIGRAM(S): 10 INJECTION INTRAVENOUS at 14:19

## 2021-06-14 RX ADMIN — SODIUM CHLORIDE 3 MILLILITER(S): 9 INJECTION INTRAMUSCULAR; INTRAVENOUS; SUBCUTANEOUS at 02:00

## 2021-06-14 RX ADMIN — SODIUM CHLORIDE 3 MILLILITER(S): 9 INJECTION, SOLUTION INTRAVENOUS at 19:30

## 2021-06-14 RX ADMIN — Medication 2 DROP(S): at 09:44

## 2021-06-14 NOTE — PROGRESS NOTE PEDS - SUBJECTIVE AND OBJECTIVE BOX
Interval/Overnight Events:  _________________________________________________________________  Respiratory:      _________________________________________________________________  Cardiac:  Cardiac Rhythm: Sinus rhythm    cloNIDine  Oral Liquid - Peds 0.1 milliGRAM(s) Oral every 8 hours  furosemide   Oral Liquid - Peds 10 milliGRAM(s) Enteral Tube every 12 hours  lidocaine  Infusion - Peds 25 MICROgram(s)/kG/Min IV Continuous <Continuous>  sildenafil  IV Intermittent - Peds 10 milliGRAM(s) IV Intermittent every 8 hours    _________________________________________________________________  Hematologic:    heparin   Infusion - Pediatric 0.045 Unit(s)/kG/Hr IV Continuous <Continuous>    ________________________________________________________________  Infectious:    ceFAZolin  IV Intermittent - Peds 1980 milliGRAM(s) IV Intermittent every 8 hours    RECENT CULTURES:   @ 16:41 .Blood Blood     No growth to date.       @ 09:45 .Urine Catheterized     No growth    No polymorphonuclear leukocytes per low power field  No Squamous epithelial cells per low power field  No organisms seen        ________________________________________________________________  Fluids/Electrolytes/Nutrition:  I&O's Summary    2021 07:  -  2021 07:00  --------------------------------------------------------  IN: 1578.2 mL / OUT: 1655 mL / NET: -76.8 mL    2021 07:  -  2021 09:16  --------------------------------------------------------  IN: 182.8 mL / OUT: 0 mL / NET: 182.8 mL      Diet:    famotidine IV Intermittent - Peds 20 milliGRAM(s) IV Intermittent every 12 hours  phytonadione  Oral Liquid - Peds 5 milliGRAM(s) Enteral Tube <User Schedule>  polyethylene glycol 3350 Oral Powder - Peds 17 Gram(s) Oral daily PRN  senna Oral Liquid - Peds 15 milliLiter(s) Oral daily PRN  sodium chloride 0.9% -  250 milliLiter(s) IV Continuous <Continuous>  sodium chloride 0.9% lock flush - Peds 3 milliLiter(s) IV Push every 8 hours  sodium chloride 0.9%. - Pediatric 1000 milliLiter(s) IV Continuous <Continuous>    _________________________________________________________________  Neurologic:  Adequacy of sedation and pain control has been assessed and adjusted    acetaminophen   Oral Liquid - Peds. 650 milliGRAM(s) Oral every 6 hours PRN  levETIRAcetam IV Intermittent - Peds 1500 milliGRAM(s) IV Intermittent every 12 hours  melatonin Oral Liquid - Peds 1 milliGRAM(s) Oral at bedtime    ________________________________________________________________  Additional Meds:    chlorhexidine 2% Topical Cloths - Peds 1 Application(s) Topical daily  lactobacillus Oral Powder (CULTURELLE KIDS) - Peds 1 Packet(s) Oral daily  Mexiletine 10 mg/mL Oral Suspension 150 milliGRAM(s) 150 milliGRAM(s) Oral every 8 hours  polyvinyl alcohol 1.4%/povidone 0.6% Ophthalmic Solution - Peds 2 Drop(s) Both EYES four times a day    ________________________________________________________________  Access:    Necessity of urinary, arterial, and venous catheters discussed  ________________________________________________________________  Labs:                                            11.3                  Neurophils% (auto):   85.0   ( @ 06:11):    9.41 )-----------(154          Lymphocytes% (auto):  4.7                                           36.1                   Eosinphils% (auto):   3.1      Manual%: Neutrophils x    ; Lymphocytes x    ; Eosinophils x    ; Bands%: x    ; Blasts x                                  138    |  105    |  25                  Calcium: 9.6   / iCa: x      ( @ 06:11)    ----------------------------<  143       Magnesium: x                                4.2     |  19     |  0.70             Phosphorous: x        TPro  7.9    /  Alb  4.0    /  TBili  1.1    /  DBili  x      /  AST  52     /  ALT  36     /  AlkPhos  153    2021 06:11  (  @ 06:11 )   PT: 16.9 sec;   INR: 1.51 ratio  aPTT: 93.3 sec    _________________________________________________________________  Imaging:    _________________________________________________________________  PE:  T(C): 37.4 (21 @ 08:00), Max: 38.2 (21 @ 11:40)  HR: 70 (21 @ 08:34) (70 - 78)  BP: 123/68 (21 @ 20:00) (123/68 - 123/68)  ABP: 109/49 (21 @ 08:00) (88/50 - 136/71)  ABP(mean): 69 (21 @ 08:00) (65 - 107)  RR: 26 (21 @ 08:34) (21 - 32)  SpO2: 91% (21 @ 08:34) (86% - 92%)  CVP(mm Hg): 14 (21 @ 08:00) (14 - 30)      General:	In no distress  Respiratory:      Effort even and unlabored. Clear bilaterally. Good aeration. No rales,   .		rhonchi, retractions or wheezing.   CV:		Regular rate and rhythm. Normal S1/S2. No murmurs, rubs, or   .		gallop. Capillary refill < 2 seconds. Distal pulses 2+ and equal.  Abdomen:	Soft, non-distended. Bowel sounds present. No palpable   .		hepatosplenomegaly.  Skin:		No rash.  Extremities:	Warm and well perfused. No gross extremity deformities.  Neurologic:	Alert and oriented. No acute change from baseline exam.  ________________________________________________________________  Patient and Parent/Guardian was updated as to the progress/plan of care.    The patient remains in critical and unstable condition, and requires ICU care and monitoring. Total critical care time spent by attending physician was minutes, excluding procedure time.    The patient is improving but requires continued monitoring and adjustment of therapy.   Interval/Overnight Events:  waxing and waning mental status   _________________________________________________________________  Respiratory:    HFNC 50, 45% to sat low 90s,     _________________________________________________________________  Cardiac:  Cardiac Rhythm: V paced at 70bpm    cloNIDine  Oral Liquid - Peds 0.1 milliGRAM(s) Oral every 8 hours  furosemide   Oral Liquid - Peds 10 milliGRAM(s) Enteral Tube every 12 hours  lidocaine  Infusion - Peds 25 MICROgram(s)/kG/Min IV Continuous <Continuous>  sildenafil  IV Intermittent - Peds 10 milliGRAM(s) IV Intermittent every 8 hours    _________________________________________________________________  Hematologic:    heparin   Infusion - Pediatric 0.045 Unit(s)/kG/Hr IV Continuous <Continuous>    ________________________________________________________________  Infectious:    ceFAZolin  IV Intermittent - Peds 1980 milliGRAM(s) IV Intermittent every 8 hours    RECENT CULTURES:   @ 16:41 .Blood Blood     No growth to date.       @ 09:45 .Urine Catheterized     No growth    No polymorphonuclear leukocytes per low power field  No Squamous epithelial cells per low power field  No organisms seen        ________________________________________________________________  Fluids/Electrolytes/Nutrition:  I&O's Summary    2021 07:  -  2021 07:00  --------------------------------------------------------  IN: 1578.2 mL / OUT: 1655 mL / NET: -76.8 mL    2021 07:01  -  2021 09:16  --------------------------------------------------------  IN: 182.8 mL / OUT: 0 mL / NET: 182.8 mL      Diet: NG feeds continuous     famotidine IV Intermittent - Peds 20 milliGRAM(s) IV Intermittent every 12 hours  phytonadione  Oral Liquid - Peds 5 milliGRAM(s) Enteral Tube <User Schedule>  polyethylene glycol 3350 Oral Powder - Peds 17 Gram(s) Oral daily PRN  senna Oral Liquid - Peds 15 milliLiter(s) Oral daily PRN  sodium chloride 0.9% -  250 milliLiter(s) IV Continuous <Continuous>  sodium chloride 0.9% lock flush - Peds 3 milliLiter(s) IV Push every 8 hours  sodium chloride 0.9%. - Pediatric 1000 milliLiter(s) IV Continuous <Continuous>    _________________________________________________________________  Neurologic:  Adequacy of sedation and pain control has been assessed and adjusted    acetaminophen   Oral Liquid - Peds. 650 milliGRAM(s) Oral every 6 hours PRN  levETIRAcetam IV Intermittent - Peds 1500 milliGRAM(s) IV Intermittent every 12 hours  melatonin Oral Liquid - Peds 1 milliGRAM(s) Oral at bedtime    ________________________________________________________________  Additional Meds:    chlorhexidine 2% Topical Cloths - Peds 1 Application(s) Topical daily  lactobacillus Oral Powder (CULTURELLE KIDS) - Peds 1 Packet(s) Oral daily  Mexiletine 10 mg/mL Oral Suspension 150 milliGRAM(s) 150 milliGRAM(s) Oral every 8 hours  polyvinyl alcohol 1.4%/povidone 0.6% Ophthalmic Solution - Peds 2 Drop(s) Both EYES four times a day    ________________________________________________________________  Access:    Necessity of urinary, arterial, and venous catheters discussed  ________________________________________________________________  Labs:                                            11.3                  Neurophils% (auto):   85.0   ( @ 06:11):    9.41 )-----------(154          Lymphocytes% (auto):  4.7                                           36.1                   Eosinphils% (auto):   3.1      Manual%: Neutrophils x    ; Lymphocytes x    ; Eosinophils x    ; Bands%: x    ; Blasts x                                  138    |  105    |  25                  Calcium: 9.6   / iCa: x      ( @ 06:11)    ----------------------------<  143       Magnesium: x                                4.2     |  19     |  0.70             Phosphorous: x        TPro  7.9    /  Alb  4.0    /  TBili  1.1    /  DBili  x      /  AST  52     /  ALT  36     /  AlkPhos  153    2021 06:11  (  @ 06:11 )   PT: 16.9 sec;   INR: 1.51 ratio  aPTT: 93.3 sec    _________________________________________________________________  Imaging:    _________________________________________________________________  PE:  T(C): 37.4 (21 @ 08:00), Max: 38.2 (21 @ 11:40)  HR: 70 (21 @ 08:34) (70 - 78)  BP: 123/68 (21 @ 20:00) (123/68 - 123/68)  ABP: 109/49 (21 @ 08:00) (88/50 - 136/71)  ABP(mean): 69 (21 @ 08:00) (65 - 107)  RR: 26 (21 @ 08:34) (21 - 32)  SpO2: 91% (21 @ 08:34) (86% - 92%)  CVP(mm Hg): 14 (21 @ 08:00) (14 - 30)      General:	In no distress  Respiratory:      Effort even and unlabored. Clear bilaterally. Good aeration. No rales,   .		rhonchi, retractions or wheezing.   CV:		Regular rate and rhythm. Normal S1/S2. No murmurs, rubs, or   .		gallop. Capillary refill < 2 seconds. Distal pulses 2+ and equal.  Abdomen:	Soft, non-distended. Bowel sounds present. No palpable   .		hepatosplenomegaly.  Skin:		No rash.  Extremities:	Warm and well perfused. No gross extremity deformities.  Neurologic:	Alert and oriented. No acute change from baseline exam.  ________________________________________________________________  Patient and Parent/Guardian was updated as to the progress/plan of care.    The patient remains in critical and unstable condition, and requires ICU care and monitoring. Total critical care time spent by attending physician was minutes, excluding procedure time.    The patient is improving but requires continued monitoring and adjustment of therapy.   Interval/Overnight Events:  waxing and waning mental status   _________________________________________________________________  Respiratory:    HFNC 50, 45% to sat low 90s,     _________________________________________________________________  Cardiac:  Cardiac Rhythm: V paced at 70bpm    cloNIDine  Oral Liquid - Peds 0.1 milliGRAM(s) Oral every 8 hours  furosemide   Oral Liquid - Peds 10 milliGRAM(s) Enteral Tube every 12 hours  lidocaine  Infusion - Peds 25 MICROgram(s)/kG/Min IV Continuous <Continuous>  sildenafil  IV Intermittent - Peds 10 milliGRAM(s) IV Intermittent every 8 hours    _________________________________________________________________  Hematologic:    heparin   Infusion - Pediatric 0.045 Unit(s)/kG/Hr IV Continuous <Continuous>    ________________________________________________________________  Infectious:    ceFAZolin  IV Intermittent - Peds 1980 milliGRAM(s) IV Intermittent every 8 hours    RECENT CULTURES:   @ 16:41 .Blood Blood     No growth to date.       @ 09:45 .Urine Catheterized     No growth    No polymorphonuclear leukocytes per low power field  No Squamous epithelial cells per low power field  No organisms seen        ________________________________________________________________  Fluids/Electrolytes/Nutrition:  I&O's Summary    2021 07:  -  2021 07:00  --------------------------------------------------------  IN: 1578.2 mL / OUT: 1655 mL / NET: -76.8 mL    2021 07:01  -  2021 09:16  --------------------------------------------------------  IN: 182.8 mL / OUT: 0 mL / NET: 182.8 mL      Diet: NG feeds continuous     famotidine IV Intermittent - Peds 20 milliGRAM(s) IV Intermittent every 12 hours  phytonadione  Oral Liquid - Peds 5 milliGRAM(s) Enteral Tube <User Schedule>  polyethylene glycol 3350 Oral Powder - Peds 17 Gram(s) Oral daily PRN  senna Oral Liquid - Peds 15 milliLiter(s) Oral daily PRN  sodium chloride 0.9% -  250 milliLiter(s) IV Continuous <Continuous>  sodium chloride 0.9% lock flush - Peds 3 milliLiter(s) IV Push every 8 hours  sodium chloride 0.9%. - Pediatric 1000 milliLiter(s) IV Continuous <Continuous>    _________________________________________________________________  Neurologic:  Adequacy of sedation and pain control has been assessed and adjusted    acetaminophen   Oral Liquid - Peds. 650 milliGRAM(s) Oral every 6 hours PRN  levETIRAcetam IV Intermittent - Peds 1500 milliGRAM(s) IV Intermittent every 12 hours  melatonin Oral Liquid - Peds 1 milliGRAM(s) Oral at bedtime    ________________________________________________________________  Additional Meds:    chlorhexidine 2% Topical Cloths - Peds 1 Application(s) Topical daily  lactobacillus Oral Powder (CULTURELLE KIDS) - Peds 1 Packet(s) Oral daily  Mexiletine 10 mg/mL Oral Suspension 150 milliGRAM(s) 150 milliGRAM(s) Oral every 8 hours  polyvinyl alcohol 1.4%/povidone 0.6% Ophthalmic Solution - Peds 2 Drop(s) Both EYES four times a day    ________________________________________________________________  Access:    Necessity of urinary, arterial, and venous catheters discussed  ________________________________________________________________  Labs:                                            11.3                  Neurophils% (auto):   85.0   ( @ 06:11):    9.41 )-----------(154          Lymphocytes% (auto):  4.7                                           36.1                   Eosinphils% (auto):   3.1      Manual%: Neutrophils x    ; Lymphocytes x    ; Eosinophils x    ; Bands%: x    ; Blasts x                                  138    |  105    |  25                  Calcium: 9.6   / iCa: x      ( @ 06:11)    ----------------------------<  143       Magnesium: x                                4.2     |  19     |  0.70             Phosphorous: x        TPro  7.9    /  Alb  4.0    /  TBili  1.1    /  DBili  x      /  AST  52     /  ALT  36     /  AlkPhos  153    2021 06:11  (  @ 06:11 )   PT: 16.9 sec;   INR: 1.51 ratio  aPTT: 93.3 sec    _________________________________________________________________  Imaging:    _________________________________________________________________  PE:  T(C): 37.4 (21 @ 08:00), Max: 38.2 (21 @ 11:40)  HR: 70 (21 @ 08:34) (70 - 78)  BP: 123/68 (21 @ 20:00) (123/68 - 123/68)  ABP: 109/49 (21 @ 08:00) (88/50 - 136/71)  ABP(mean): 69 (21 @ 08:00) (65 - 107)  RR: 26 (21 @ 08:34) (21 - 32)  SpO2: 91% (21 @ 08:34) (86% - 92%)  CVP(mm Hg): 14 (21 @ 08:00) (14 - 30)      General: 	HFNC in place--unfocused today, NG in place  Respiratory:	Diminished air entry bilaterally. Mildly labored on current support  CV:		Regular rate and rhythm normal S1 & single S2, grade 1/6 blowing holosystolic murmur at LMSB, Capillary refill < 2 seconds. Distal pulses 2+ and equal.  Abdomen:	Soft, non-distended. Bowel sounds present. hepatomegaly 2-3cm  Skin:		No rash.  Extremities:	Warm and well perfused.   Neurologic:	Awake--moving all extremities but left > right No acute change from baseline exam.      ________________________________________________________________  Patient and Parent/Guardian was updated as to the progress/plan of care.    The patient remains in critical and unstable condition, and requires ICU care and monitoring. Total critical care time spent by attending physician was 40 minutes, excluding procedure time.

## 2021-06-14 NOTE — PROGRESS NOTE PEDS - SUBJECTIVE AND OBJECTIVE BOX
HPI:  21yo male with complex cardiac h/o DILV, L-malposition of great arteries, sick sinus syndrome and AV mihir disease with tachybradycardia syndrome with a dual chamber pacemaker (currently with RV lead fracture and is currently only LV paced 2/2 complete heart block), PSH of status post Gptaf-Sjso-Wjslufg anastomosis and aortic arch reconstruction followed by a fenestrated lateral tunnel Fontan completion (now s/p fenestration closure). here s/p cardiac catheterization and ablation. Procedure was complicated by unsuccessful ablation and post extubation patient was noted to have increased hemoptysis and desaturations, patient was reintubated for airway protection and given propofol for sedation.  (26 May 2021 20:19)      OVERNIGHT EVENTS: no issues o/n     ICU Vital Signs Last 24 Hrs  T(C): 37 (2021 23:00), Max: 38.2 (2021 11:40)  T(F): 98.6 (2021 23:00), Max: 100.7 (2021 11:40)  HR: 70 (2021 07:00) (70 - 78)  BP: 123/68 (2021 20:00) (123/68 - 123/68)  BP(mean): 84 (2021 20:00) (84 - 84)  ABP: 132/64 (2021 07:00) (88/50 - 136/71)  ABP(mean): 87 (2021 07:00) (65 - 107)  RR: 22 (2021 07:00) (21 - 33)  SpO2: 91% (2021 07:00) (86% - 91%)    I&O's Summary    2021 07:01  -  2021 07:00  --------------------------------------------------------  IN: 1572.2 mL / OUT: 1655 mL / NET: -82.8 mL      PHYSICAL EXAM:  Mental Status: Awake, OE, nonverbal, grimaces to pain  PERRL, following some commands   Motor:  weaker R side, able to grasp hand on R, slight wiggle of R toes  L side stronger   Incision/Wound: c/d/i    TUBES/LINES:  [ ] A-line :  [ ] Ventriculostomy- clamped      DIET:  [ ] NPO    LABS:                        11.3   9.41  )-----------( 154      ( 2021 06:11 )             36.1   06-14    138  |  105  |  25<H>  ----------------------------<  143<H>  4.2   |  19<L>  |  0.70    Ca    9.6      2021 06:11  Phos  3.2     06-13  MEDICATIONS  (STANDING):  ceFAZolin  IV Intermittent - Peds 1980 milliGRAM(s) IV Intermittent every 8 hours  chlorhexidine 2% Topical Cloths - Peds 1 Application(s) Topical daily  cloNIDine  Oral Liquid - Peds 0.1 milliGRAM(s) Oral every 8 hours  famotidine IV Intermittent - Peds 20 milliGRAM(s) IV Intermittent every 12 hours  furosemide   Oral Liquid - Peds 10 milliGRAM(s) Enteral Tube every 12 hours  heparin   Infusion - Pediatric 0.045 Unit(s)/kG/Hr (3 mL/Hr) IV Continuous <Continuous>  lactobacillus Oral Powder (CULTURELLE KIDS) - Peds 1 Packet(s) Oral daily  levETIRAcetam IV Intermittent - Peds 1500 milliGRAM(s) IV Intermittent every 12 hours  lidocaine  Infusion - Peds 25 MICROgram(s)/kG/Min (12.4 mL/Hr) IV Continuous <Continuous>  melatonin Oral Liquid - Peds 1 milliGRAM(s) Oral at bedtime  Mexiletine 10 mg/mL Oral Suspension 150 milliGRAM(s) 150 milliGRAM(s) Oral every 8 hours  phytonadione  Oral Liquid - Peds 5 milliGRAM(s) Enteral Tube <User Schedule>  polyvinyl alcohol 1.4%/povidone 0.6% Ophthalmic Solution - Peds 2 Drop(s) Both EYES four times a day  sildenafil  IV Intermittent - Peds 10 milliGRAM(s) IV Intermittent every 8 hours  sodium chloride 0.9% -  250 milliLiter(s) (3 mL/Hr) IV Continuous <Continuous>  sodium chloride 0.9% lock flush - Peds 3 milliLiter(s) IV Push every 8 hours  sodium chloride 0.9%. - Pediatric 1000 milliLiter(s) (3 mL/Hr) IV Continuous <Continuous>    MEDICATIONS  (PRN):  acetaminophen   Oral Liquid - Peds. 650 milliGRAM(s) Oral every 6 hours PRN Moderate Pain (4 - 6)  polyethylene glycol 3350 Oral Powder - Peds 17 Gram(s) Oral daily PRN Constipation  senna Oral Liquid - Peds 15 milliLiter(s) Oral daily PRN Constipation  Mg     2.3     06-13    TPro  7.9  /  Alb  4.0  /  TBili  1.1  /  DBili  x   /  AST  52<H>  /  ALT  36  /  AlkPhos  153  06-14    PT/INR - ( 2021 06:11 )   PT: 16.9 sec;   INR: 1.51 ratio         PTT - ( 2021 06:11 )  PTT:93.3 sec      Allergies    No Known Allergies    RADIOLOGY & ADDITIONAL TESTS:  IMPRESSION:  Mild interval decrease in previously seen left frontal lobe hemorrhage, left parietal subdural hemorrhage, and layering hemorrhage in the occipital horns.    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   HPI:  19yo male with complex cardiac h/o DILV, L-malposition of great arteries, sick sinus syndrome and AV mihir disease with tachybradycardia syndrome with a dual chamber pacemaker (currently with RV lead fracture and is currently only LV paced 2/2 complete heart block), PSH of status post Ufzkz-Srwy-Loiwpch anastomosis and aortic arch reconstruction followed by a fenestrated lateral tunnel Fontan completion (now s/p fenestration closure). here s/p cardiac catheterization and ablation. Procedure was complicated by unsuccessful ablation and post extubation patient was noted to have increased hemoptysis and desaturations, patient was reintubated for airway protection and given propofol for sedation.  (26 May 2021 20:19)      OVERNIGHT EVENTS: no issues o/n     ICU Vital Signs Last 24 Hrs  T(C): 37 (2021 23:00), Max: 38.2 (2021 11:40)  T(F): 98.6 (2021 23:00), Max: 100.7 (2021 11:40)  HR: 70 (2021 07:00) (70 - 78)  BP: 123/68 (2021 20:00) (123/68 - 123/68)  BP(mean): 84 (2021 20:00) (84 - 84)  ABP: 132/64 (2021 07:00) (88/50 - 136/71)  ABP(mean): 87 (2021 07:00) (65 - 107)  RR: 22 (2021 07:00) (21 - 33)  SpO2: 91% (2021 07:00) (86% - 91%)    I&O's Summary    2021 07:01  -  2021 07:00  --------------------------------------------------------  IN: 1572.2 mL / OUT: 1655 mL / NET: -82.8 mL      PHYSICAL EXAM:  Mental Status: Awake, OE, nonverbal, grimaces to pain, intermittent eye rolling   PERRL, following some commands   Motor:  weaker R side, able to grasp hand on R, slight wiggle of R toes  L side stronger   Incision/Wound: c/d/i    TUBES/LINES:  [ ] A-line :  [ ] Ventriculostomy- clamped      DIET:  [ ] NPO    LABS:                        11.3   9.41  )-----------( 154      ( 2021 06:11 )             36.1   06-14    138  |  105  |  25<H>  ----------------------------<  143<H>  4.2   |  19<L>  |  0.70    Ca    9.6      2021 06:11  Phos  3.2     06-13  MEDICATIONS  (STANDING):  ceFAZolin  IV Intermittent - Peds 1980 milliGRAM(s) IV Intermittent every 8 hours  chlorhexidine 2% Topical Cloths - Peds 1 Application(s) Topical daily  cloNIDine  Oral Liquid - Peds 0.1 milliGRAM(s) Oral every 8 hours  famotidine IV Intermittent - Peds 20 milliGRAM(s) IV Intermittent every 12 hours  furosemide   Oral Liquid - Peds 10 milliGRAM(s) Enteral Tube every 12 hours  heparin   Infusion - Pediatric 0.045 Unit(s)/kG/Hr (3 mL/Hr) IV Continuous <Continuous>  lactobacillus Oral Powder (CULTURELLE KIDS) - Peds 1 Packet(s) Oral daily  levETIRAcetam IV Intermittent - Peds 1500 milliGRAM(s) IV Intermittent every 12 hours  lidocaine  Infusion - Peds 25 MICROgram(s)/kG/Min (12.4 mL/Hr) IV Continuous <Continuous>  melatonin Oral Liquid - Peds 1 milliGRAM(s) Oral at bedtime  Mexiletine 10 mg/mL Oral Suspension 150 milliGRAM(s) 150 milliGRAM(s) Oral every 8 hours  phytonadione  Oral Liquid - Peds 5 milliGRAM(s) Enteral Tube <User Schedule>  polyvinyl alcohol 1.4%/povidone 0.6% Ophthalmic Solution - Peds 2 Drop(s) Both EYES four times a day  sildenafil  IV Intermittent - Peds 10 milliGRAM(s) IV Intermittent every 8 hours  sodium chloride 0.9% -  250 milliLiter(s) (3 mL/Hr) IV Continuous <Continuous>  sodium chloride 0.9% lock flush - Peds 3 milliLiter(s) IV Push every 8 hours  sodium chloride 0.9%. - Pediatric 1000 milliLiter(s) (3 mL/Hr) IV Continuous <Continuous>    MEDICATIONS  (PRN):  acetaminophen   Oral Liquid - Peds. 650 milliGRAM(s) Oral every 6 hours PRN Moderate Pain (4 - 6)  polyethylene glycol 3350 Oral Powder - Peds 17 Gram(s) Oral daily PRN Constipation  senna Oral Liquid - Peds 15 milliLiter(s) Oral daily PRN Constipation  Mg     2.3     06-13    TPro  7.9  /  Alb  4.0  /  TBili  1.1  /  DBili  x   /  AST  52<H>  /  ALT  36  /  AlkPhos  153  06-14    PT/INR - ( 2021 06:11 )   PT: 16.9 sec;   INR: 1.51 ratio         PTT - ( 2021 06:11 )  PTT:93.3 sec      Allergies    No Known Allergies    RADIOLOGY & ADDITIONAL TESTS:  IMPRESSION:  Mild interval decrease in previously seen left frontal lobe hemorrhage, left parietal subdural hemorrhage, and layering hemorrhage in the occipital horns.    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

## 2021-06-14 NOTE — PROGRESS NOTE PEDS - ASSESSMENT
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 He has elevated Fontan pressure secondary to LV diastolic dysfunction.  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 Persistent hypoxia likely from VV collaterals (confirmed on CTA), back in IART (not hemodynamically compromising), and recently with runs of V-tach (unclear cause) requiring lidocaine initiation. Significant issues with neuroagitation and delerium. EVD clamped. VV collaterals seen on CTA.    PLAN:  Neuro:  - Now off precedex -started on  CLonidine 0.1 mg every 8 hours on 6/12 (hold for SBP< 100)--will hold off of Antipsychotics for now-given the ventricular tachydysrhythmia and the risk for Qtc prolongation. Delirium seems improved.     - environmental interventions  - vEEG on per neuro - no sz's so far  - Na > 140-  - Keppra (clinical szs)  - R sided hemiparesis  - EVD clamped-will likely be removed today--consider FFP before removal (discuss with Neurosurgery)  - Repeat head CT 6/11 - stable--Repeat CT today??  - skull flap off, will need prosthetic  - tylenol prn for fever control  [  ] PT/OT/speech/rehab consult    Resp:  HFNC 50L 0.5 FiO2 - will continue to moniotr Respiratory status/ SpO2 and adjust as needed  - CTA obtained to eval for PE - difficult to protocol because of Fontan anatomy and contrast timing, no PE seen, but does show VV collaterals (likely main reason for hypoxia)  Goal SpO2 > 85% - may need to lower goals to 80% with collaterals    CV:  Lidocaine gtt started 6/7 for V-tach runs, has not recurred, EP following    - having some runs of his underlying atrial tachycardia per EP - monitor for now    - eventual plans to transition to mexilitene  Mode is DDD but atrial wires without good sensitivity, so effectively VVI 70  Still in IART  Home meds on hold (Lisinopril, Aldactone)  Continue Sildenafil  Continue Lasix IV Q8hr --> q12h : Fluid goal even to slightly positive (Negative for LOS Is and Os and Scr trending up/ recent contrast exposure)  SBP < 140  echo 6/8 extremely limited windows    Heme:  Xarelto on hold  Goal PLTs >100  FFP to keep INR < 2.0 (but before procedures e.g. EVD removal will huddle with hematology to bring INR down < 1.5 if not there already)  Hematology and NRSGY in discussions  s/p Vitamin K x3 days, now IV Vit K 3x/week    FEN:  Pepcid -renally dosed   Bowel regimen: Senna and Miralax PRN  Started NG feeds with Pedialyte @ 70 ml/hr--will do 1/2 S Jevity at the same rate and go to full strength if tolerated. 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 He has elevated Fontan pressure secondary to LV diastolic dysfunction.  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 Persistent hypoxia likely from VV collaterals (confirmed on CTA), back in IART (not hemodynamically compromising), and recently with runs of V-tach (unclear cause) requiring lidocaine initiation. Significant issues with neuroagitation and delerium.  VV collaterals seen on CTA.    PLAN:  Neuro:  melatonin Qday  transition to propranolol (stop clonidine) per PMR recs (hold for SBP< 100)  --will hold off of Antipsychotics for now-given the ventricular tachydysrhythmia and the risk for Qtc prolongation. Delirium seems improved.     - environmental interventions  - vEEG on per neuro - no sz's so far  - Keppra (clinical szs)  - R sided hemiparesis  - EVD to 0   - Repeat head CT 6/11 - stable--  - skull flap off, will need prosthetic  - tylenol prn for fever control  [  ] PT/OT/speech/rehab consult    Resp:  HFNC 50L 0.5 FiO2 - will continue to moniotr Respiratory status/ SpO2 and adjust as needed  - CTA obtained to eval for PE - difficult to protocol because of Fontan anatomy and contrast timing, no PE seen, but does show VV collaterals (likely main reason for hypoxia)  Goal SpO2 > 85% - may need to lower goals to 80% with collaterals  pulmonary toilet/ cough assist    CV:  Lidocaine gtt started 6/7 for V-tach runs, has not recurred, EP following--> transitioning to mexilitine    - having some runs of his underlying atrial tachycardia per EP - monitor for now  Mode is DDD but atrial wires without good sensitivity, so effectively VVI 70  Still in IART  Home meds on hold (Lisinopril, Aldactone)  Continue Sildenafil (make po today)  Continue Lasix IV Q8hr --> q12h : Fluid goal even to slightly positive (Negative for LOS Is and Os and Scr trending up/ recent contrast exposure)  SBP < 140  echo 6/8 extremely limited windows  HOLA when goes for neurosugical procedure (when intubated)    Heme:  Xarelto on hold  Goal PLTs >100  FFP to keep INR < 2.0 (but before procedures e.g. EVD removal will huddle with hematology to bring INR down < 1.5 if not there already)  Hematology and NRSGY in discussions  s/p Vitamin K x3 days,   now po Vit K 3x/week    FEN:  Pepcid -renally dosed   Bowel regimen: Senna and Miralax PRN  Started NG feeds  @ 70 ml/hr--at full strength, condense today  nutrition consult    ID  - CTX started 6/6 for LG fever  - neg r/o (may be from blood in brain) --> go back to Ancef while EVD in    Other  - L hydrocele  - outpt f/u    Access  Right Fem CVL (6/9) (replaced the L fem CVL at that time)  Connor MACHADO (6/1)      daily labs CBC cmp coags daily

## 2021-06-14 NOTE — PROGRESS NOTE PEDS - ASSESSMENT
21 yo M with complex cardiac hx including double inlet left ventricle, L-malposition of great arteries, sick sinus syndrome and AV imhir disease with a dual chamber pacemaker and multiple cardiac surgeries including Fontan procedure admitted for IART w/ recent failed ablation and attempted cardioversion. Neurology re-engaged for worsening mental status overnight after recent [place] stroke with hemorrhagic conversion resulting in dense R hemiparesis (resolving over time) and requiring EVD placement and monitoring with serial head CTs. Patient now demonstrating waxing and waning poor mental status with eye rolling episodes and somnolence. CT head done this morning and stable without worsening hemorrhage or new strokes. Neurology re-engaged for episodes of upward eye deviation, which were previously captured on EEG to be non-epileptic in nature. Mother endorses no other concerns for seizures at this time and patient continues to be stable.     Impression: Most likely ICU delirium with waxing and waning mental status which is a risk consistent with Jimbo's prolonged and traumatic hospital course.  Less likely worsening hemorrhage or new stroke given CT head stable. Also less concerning for seizures considering EEG thus far demonstrates generalized background slowing and no seizure pattern even during eye deviation episodes of concern.     Recommendations:   [ ] continue Keppra 1500mg BID  [ ] low threshold to repeat CT head w/o contrast PRN for worsening mental status or neuro exam  [ ] adhere to measures to reduce delirium: frequent reorientation, maintain sleep/wake routine, avoid sleeping during the day, keep lights on during the day, use of glasses if patient needs them    Patient seen and discussed with neurology attending, Dr. Jama.      19 yo M with complex cardiac hx including double inlet left ventricle, L-malposition of great arteries, sick sinus syndrome and AV mihir disease with a dual chamber pacemaker and multiple cardiac surgeries including Fontan procedure admitted for IART w/ recent failed ablation and attempted cardioversion. Neurology re-engaged for worsening mental status overnight after recent ROCCO-M[place] stroke with hemorrhagic conversion resulting in dense R hemiparesis (resolving over time) and requiring EVD placement and monitoring with serial head CTs. Patient now demonstrating waxing and waning poor mental status with eye rolling episodes and somnolence. CT head done this morning and stable without worsening hemorrhage or new strokes. Neurology re-engaged for episodes of upward eye deviation, which were previously captured on EEG to be non-epileptic in nature. No subclinal seizures either. Mother endorses no other concerns for seizures at this time and patient continues to be stable.     Impression: Most likely ICU delirium with waxing and waning mental status which is a risk consistent with Jimbo's prolonged and traumatic hospital course.  Less likely worsening hemorrhage or new stroke given CT head stable. Also less concerning for seizures considering EEG thus far demonstrates generalized background slowing and no seizure pattern even during eye deviation episodes of concern.     Recommendations:   [ ] continue Keppra 1500mg BID  [ ] low threshold to repeat CT head w/o contrast PRN for worsening mental status or neuro exam  [ ] adhere to measures to reduce delirium: frequent reorientation, maintain sleep/wake routine, avoid sleeping during the day, keep lights on during the day, use of glasses if patient needs them    Patient seen and discussed with neurology attending, Dr. Jama.

## 2021-06-14 NOTE — PROGRESS NOTE PEDS - SUBJECTIVE AND OBJECTIVE BOX
INTERVAL HISTORY: *    RESPIRATORY SUPPORT:   NUTRITION: * (*kcal/kg/day)       @ 07:01  -   @ 07:00  --------------------------------------------------------  IN: 1572.2 mL / OUT: 1655 mL / NET: -82.8 mL      CHEST TUBE OUTPUT: * mL/24h, * mL/12h  INTRAVASCULAR ACCESS: *    MEDICATIONS:  cloNIDine  Oral Liquid - Peds 0.1 milliGRAM(s) Oral every 8 hours  furosemide   Oral Liquid - Peds 10 milliGRAM(s) Enteral Tube every 12 hours  lidocaine  Infusion - Peds 25 MICROgram(s)/kG/Min IV Continuous <Continuous>  sildenafil  IV Intermittent - Peds 10 milliGRAM(s) IV Intermittent every 8 hours  ceFAZolin  IV Intermittent - Peds 1980 milliGRAM(s) IV Intermittent every 8 hours  levETIRAcetam IV Intermittent - Peds 1500 milliGRAM(s) IV Intermittent every 12 hours  melatonin Oral Liquid - Peds 1 milliGRAM(s) Oral at bedtime  famotidine IV Intermittent - Peds 20 milliGRAM(s) IV Intermittent every 12 hours  heparin   Infusion - Pediatric 0.045 Unit(s)/kG/Hr IV Continuous <Continuous>  phytonadione IV Intermittent - Peds 5 milliGRAM(s) IV Intermittent <User Schedule>  sodium chloride 0.9% -  250 milliLiter(s) IV Continuous <Continuous>  sodium chloride 0.9% lock flush - Peds 3 milliLiter(s) IV Push every 8 hours  sodium chloride 0.9%. - Pediatric 1000 milliLiter(s) IV Continuous <Continuous>    PHYSICAL EXAMINATION:  Vital signs -   T(C): 37 (21 @ 23:00), Max: 38.2 (21 @ 11:40)  HR: 70 (21 @ 07:00) (70 - 78)  BP: 123/68 (21 @ 20:00) (123/68 - 128/66)  ABP:  (88/50 - 136/71)  RR: 22 (21 @ 07:00) (21 - 33)  SpO2: 91% (21 @ 07:00) (86% - 91%)  CVP(mm Hg):  (15 - 30)  General - non-dysmorphic appearance, well-developed, in no distress.  Skin - no rash, no desquamation, no cyanosis.  Eyes / ENT - no conjunctival injection, sclerae anicteric, external ears & nares normal, mucous membranes moist.  Pulmonary - normal inspiratory effort, no retractions, lungs clear to auscultation bilaterally, no wheezes, no rales.  Cardiovascular - normal rate, regular rhythm, normal S1 & S2, no murmurs, no rubs, no gallops, capillary refill < 2sec, normal pulses.  Gastrointestinal - soft, non-distended, non-tender, no hepatosplenomegaly (liver palpable *cm below right costal margin).  Musculoskeletal - no joint swelling, no clubbing, no edema.  Neurologic / Psychiatric - alert, oriented as age-appropriate, affect appropriate, moves all extremities, normal tone.    LABORATORY TESTS:                          11.3  CBC:   9.41 )-----------( 154   (21 @ 06:11)                          36.1               138   |  105   |  25                 Ca: 9.6    BMP:   ----------------------------< 143    Mg: x     (21 @ 06:11)             4.2    |  19    | 0.70               Ph: x        LFT:     TPro: 7.9 / Alb: 4.0 / TBili: 1.1 / DBili: x / AST: 52 / ALT: 36 / AlkPhos: 153   (21 @ 06:11)    COAG: PT: 16.9 / PTT: 93.3 / INR: 1.51   (21 @ 06:11)       ABG:   pH: 7.55 / pCO2: 27 / pO2: 53 / HCO3: 26 / Base Excess: 1.4 / SaO2: 88.6 / Lactate: x / iCa: x   (21 @ 22:35)        IMAGING STUDIES:  Electrocardiogram - (*date)      Telemetry - (*dates) normal sinus rhythm, no ectopy, no arrhythmias.    Chest x-ray - (*date)     Echocardiogram - (*date)     Other - (*date)  INTERVAL HISTORY: no acute events.    RESPIRATORY SUPPORT: 50L, 50%  NUTRITION: full tube feeds     @ 07:01  -   @ 07:00  --------------------------------------------------------  IN: 1572.2 mL / OUT: 1655 mL / NET: -82.8 mL    INTRAVASCULAR ACCESS: RFCVL    MEDICATIONS:  cloNIDine  Oral Liquid - Peds 0.1 milliGRAM(s) Oral every 8 hours  furosemide   Oral Liquid - Peds 10 milliGRAM(s) Enteral Tube every 12 hours  lidocaine  Infusion - Peds 25 MICROgram(s)/kG/Min IV Continuous <Continuous>  sildenafil  IV Intermittent - Peds 10 milliGRAM(s) IV Intermittent every 8 hours  ceFAZolin  IV Intermittent - Peds 1980 milliGRAM(s) IV Intermittent every 8 hours  levETIRAcetam IV Intermittent - Peds 1500 milliGRAM(s) IV Intermittent every 12 hours  melatonin Oral Liquid - Peds 1 milliGRAM(s) Oral at bedtime  famotidine IV Intermittent - Peds 20 milliGRAM(s) IV Intermittent every 12 hours  heparin   Infusion - Pediatric 0.045 Unit(s)/kG/Hr IV Continuous <Continuous>  phytonadione IV Intermittent - Peds 5 milliGRAM(s) IV Intermittent <User Schedule>  sodium chloride 0.9% -  250 milliLiter(s) IV Continuous <Continuous>  sodium chloride 0.9% lock flush - Peds 3 milliLiter(s) IV Push every 8 hours  sodium chloride 0.9%. - Pediatric 1000 milliLiter(s) IV Continuous <Continuous>    PHYSICAL EXAMINATION:  Vital signs -   T(C): 37 (21 @ 23:00), Max: 38.2 (21 @ 11:40)  HR: 70 (21 @ 07:00) (70 - 78)  BP: 123/68 (21 @ 20:00) (123/68 - 128/66)  ABP:  (88/50 - 136/71)  RR: 22 (21 @ 07:00) (21 - 33)  SpO2: 91% (06-14-21 @ 07:00) (86% - 91%)  CVP(mm Hg):  (15 - 30)  General - non-dysmorphic appearance, well-developed, in no distress.  Skin - no rash, no desquamation, no cyanosis.  Eyes / ENT - no conjunctival injection, sclerae anicteric, external ears & nares normal, mucous membranes moist.  Pulmonary - normal inspiratory effort, no retractions, lungs clear to auscultation bilaterally, no wheezes, no rales.  Cardiovascular - normal rate, regular rhythm, normal S1 & S2, no murmurs, no rubs, no gallops, capillary refill < 2sec, normal pulses.  Gastrointestinal - soft, non-distended, non-tender, no hepatosplenomegaly (liver palpable *cm below right costal margin).  Musculoskeletal - no joint swelling, no clubbing, no edema.  Neurologic / Psychiatric - alert, oriented as age-appropriate, affect appropriate, moves all extremities, normal tone.    LABORATORY TESTS:                          11.3  CBC:   9.41 )-----------( 154   (21 @ 06:11)                          36.1               138   |  105   |  25                 Ca: 9.6    BMP:   ----------------------------< 143    Mg: x     (21 @ 06:11)             4.2    |  19    | 0.70               Ph: x        LFT:     TPro: 7.9 / Alb: 4.0 / TBili: 1.1 / DBili: x / AST: 52 / ALT: 36 / AlkPhos: 153   (21 @ 06:11)    COAG: PT: 16.9 / PTT: 93.3 / INR: 1.51   (21 @ 06:11)       ABG:   pH: 7.55 / pCO2: 27 / pO2: 53 / HCO3: 26 / Base Excess: 1.4 / SaO2: 88.6 / Lactate: x / iCa: x   (21 @ 22:35)        IMAGING STUDIES:  Electrocardiogram - (*date)      Telemetry - (*dates) normal sinus rhythm, no ectopy, no arrhythmias.    Chest x-ray - (*date)     Echocardiogram - (*date)     Other - (*date)  INTERVAL HISTORY: no acute events.    RESPIRATORY SUPPORT: 50L, 50%  NUTRITION: full tube feeds     @ 07:01  -   @ 07:00  --------------------------------------------------------  IN: 1572.2 mL / OUT: 1655 mL / NET: -82.8 mL    INTRAVASCULAR ACCESS: RFCVL    MEDICATIONS:  cloNIDine  Oral Liquid - Peds 0.1 milliGRAM(s) Oral every 8 hours  furosemide   Oral Liquid - Peds 10 milliGRAM(s) Enteral Tube every 12 hours  lidocaine  Infusion - Peds 25 MICROgram(s)/kG/Min IV Continuous <Continuous>  sildenafil  IV Intermittent - Peds 10 milliGRAM(s) IV Intermittent every 8 hours  ceFAZolin  IV Intermittent - Peds 1980 milliGRAM(s) IV Intermittent every 8 hours  levETIRAcetam IV Intermittent - Peds 1500 milliGRAM(s) IV Intermittent every 12 hours  melatonin Oral Liquid - Peds 1 milliGRAM(s) Oral at bedtime  famotidine IV Intermittent - Peds 20 milliGRAM(s) IV Intermittent every 12 hours  heparin   Infusion - Pediatric 0.045 Unit(s)/kG/Hr IV Continuous <Continuous>  phytonadione IV Intermittent - Peds 5 milliGRAM(s) IV Intermittent <User Schedule>  sodium chloride 0.9% -  250 milliLiter(s) IV Continuous <Continuous>  sodium chloride 0.9% lock flush - Peds 3 milliLiter(s) IV Push every 8 hours  sodium chloride 0.9%. - Pediatric 1000 milliLiter(s) IV Continuous <Continuous>    PHYSICAL EXAMINATION:  Vital signs -   T(C): 37 (21 @ 23:00), Max: 38.2 (21 @ 11:40)  HR: 70 (21 @ 07:00) (70 - 78)  BP: 123/68 (21 @ 20:00) (123/68 - 128/66)  ABP:  (88/50 - 136/71)  RR: 22 (21 @ 07:00) (21 - 33)  SpO2: 91% (21 @ 07:00) (86% - 91%)  CVP(mm Hg):  (15 - 30)    General - slow to respond to commands, not speaking  Skin - no rash, no desquamation, no cyanosis.  Eyes / ENT - no conjunctival injection, sclerae anicteric, external ears & nares normal, mucous membranes moist.  Pulmonary - normal inspiratory effort, no retractions, lungs clear to auscultation bilaterally, no wheezes, no rales.  Cardiovascular - normal rate, regular rhythm, normal S1 & single S2, grade 1/6 blowing holosystolic murmur at LMSB, no rubs, no gallops, capillary refill < 2sec, normal pulses.  Gastrointestinal - soft, non-distended, non-tender, no splenomegaly. (+) hepatomegaly.  Musculoskeletal - no joint swelling, no clubbing, no edema.  Neurologic / Psychiatric - slow to respond to commands    LABORATORY TESTS:                          11.3  CBC:   9.41 )-----------( 154   (21 @ 06:11)                          36.1               138   |  105   |  25                 Ca: 9.6    BMP:   ----------------------------< 143    Mg: x     (21 @ 06:11)             4.2    |  19    | 0.70               Ph: x        LFT:     TPro: 7.9 / Alb: 4.0 / TBili: 1.1 / DBili: x / AST: 52 / ALT: 36 / AlkPhos: 153   (21 @ 06:11)    COAG: PT: 16.9 / PTT: 93.3 / INR: 1.51   (21 @ 06:11)       ABG:   pH: 7.55 / pCO2: 27 / pO2: 53 / HCO3: 26 / Base Excess: 1.4 / SaO2: 88.6 / Lactate: x / iCa: x   (21 @ 22:35)      IMAGING STUDIES:  Electrocardiogram - (21) Ventricular paced rhythm @ 75bpm. Underlying IART.    Telemetry - (6/15/21) Ventricular paced rhythm @ 75bpm. Underlying IART.    Chest x-ray - (21) Stable mild cardiomegaly with subsegmental left lower lobe atelectasis.    Echocardiogram - (21)   1. This was a technically difficult suboptimal study due to poor echo windows. Findings limited to below.   2. 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   3. Mild mitral valve regurgitation.   4. Trivial tricuspid valve regurgitation.   5. Trivial aortic valve regurgitation.   6. Status post device closure of Fontan fenestration.   7. S/P stent placement in the Fontan pathway. Limited imaging of the inferior Fontan baffle. In this setting, the inferior vena cava to its connection near the atrial portion of the baffle appears patent with no obstruction. S/p device closure of Fontan fenestration. The Fontan fenestration is not seen on this study. The right bidirectional Storm is seen only on color flow mapping. This appears to have laminar low velocity flow.   8. The connection of the right bidirectional Storm to the right pulmonary artery could not be imaged. The right pulmonary artery is seen in a limited portion immediately to the left of the Storm and appears patent. The left pulmonary artery could not be imaged.   9. Extremely poor and limited imaging of the lateral free walls of the single systemic left ventricle. On limited short axis views there appears to be adequate systolic excursion of the posterior wall of the left ventricle and decreased in the anterior wall. The bulboventricular foramen could not be imaged. Suggest alternate imaging for appropriate assessment of function.  10. No pericardial effusion.  11. Small right pleural effusion.    Cath - (21)  Access 4 Fr RFA, 7 Fr RFV x2 with US guidance.  Sat data (%): on 50% FiO2 LPA 73, RUPV 98, Ao 97; CI 2.6 L/min/m2 with Qp:Qs 1 :1.  Pressures (mmHg): Elevated mFontan = mIVC = 20. mPCW=16, No significant gradient across LV to AAo to Vikki (98/58/73).  Normal PVR while on sildenafil.  Angios showed unobstructed Fontan with existing stent within Fontan conduit.    Comment: IART ablation was attempted after the diagnostic cath but unsuccessful. Patient was overdrive paced out of IART. At the end of the case, Ao sat was 94% and LPA 66% on 50% FiO2

## 2021-06-14 NOTE — PROGRESS NOTE PEDS - PROBLEM SELECTOR PLAN 1
- keep EVD clamped  - q1h neuro checks  - q1h ICP    d/w attending - Open evd at 0cmh20  - q1h neuro checks  - q1h ICP  - would recommend rpt Veeg.     d/w attending

## 2021-06-14 NOTE — PROGRESS NOTE PEDS - ASSESSMENT
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 Presented in IART - s/p EP study with unsuccessful attempt at ablation along with diagnostic cath showing elevated Fontan pressure (20mmHg). He was loaded with amiodarone and ultimately electrically cardioverted out, however reverted back into IART and continues to be in IART. His course is further complicated by L frontal parenchymal hemorrhage requiring emergent evacuation with NSx and EVD placement (which is no clamped since Thursday 6/10) and VTach of unknown origin.  Sats in mid to high 80s.   He continues to be critically ill.     CVS:  - Continuos telemetry monitoring.  - Pacemaker set at DDD 70-120bpm.   - Sildenafil 10 mg q8h IV. Switch back to enteral 20mg q8.  - lasix to 10 mg Q12H (home dose). Goal net even. Monitor creat.   - Sats goal >85%.   - Monitor blood pressure. Goal MAPs > 70. Agree with norepi and/or vasopressin as needed. Titrate to goal per PICU to maintain CPP.  - Hold home lisinopril, aldactone. Agree with plan to ensure SBP not persistently higher than 140. Nicardapine as needed. Would try to reinstitute home medications when able to give enteral medications. Need to consider that now on clonidine  - Continue lidocaine drip 25mcg/kg/min. Max dose is 40mcg/kg/min, can titrate if as further episodes of Vtach. Would give a 1mg/kg lidocaine bolus if Vtach recurs with increasing drip dose  - f/u lidocaine level  - Mexlitine today at 150mg q8h. Turn off lidocaine after 5 doses of mexilitene. (tomorrow)    Resp:   - On HFNC 50L, 50%. Wean as tolerated.   - CTA obtained to eval for PE - difficult to protocol because of Fontan anatomy and contrast timing, no PE seen, but does show VV collaterals (likely main reason for hypoxia)      FEN/GI:   - Currently on formula via NGT.   - Pepsid.   - Senna    ID:   -Ancef while EVD in place.     Heme:   - Anticoagulation care per hematology/PICU/NSx  - Xarelto on hold  - Goal PLTs >100  - FFP to keep INR < 2.0 (but before procedures e.g. EVD removal will huddle with hematology to bring INR down < 1.5 if not there already)    Neuro:   - L frontal parenchymal hemorrhage requiring emergent evacuation with NSx and EVD placement-->  EVD clamped (6/10)--> NSx following  - VEEG on per neuro - no seizures so far  - On Keppra  - On Clonidine patch  - On melatonin for sleep.

## 2021-06-14 NOTE — CHART NOTE - NSCHARTNOTEFT_GEN_A_CORE
20 y.o. 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 He has elevated Fontan pressure secondary to LV diastolic dysfunction. Had L frontal hemorrhagic stroke on 21 requiring decompressive craniectomy and urgent evacuation in OR; per MD notes. Extubated , on HFNC. Large pseudomeningocele, plan for cranioplasty per neurosurgery.   Pt was NPO -, TPN -.  Enteral feeds initiated , half strength Jevity 1.2 with Pedialyte.   Advanced to full Jevity 1.2 this am.  Currently at goal of 70 mL/hr continuously.  Regimen providing 1680 mL total volume, 2016 kcal, 93.2 g pro  Tolerating well. No emesis. No abdominal distention.   Noted +BM x7 . +probiotics  No weights taken since admission.     Estimated energy needs: 30-35 kcal/kg using IBW of 63.8 [id number] kcal/day  Estimated protein needs: 1.2-1.3 g/kg using IBW of 63.8 k-83 g pro/day    PLAN/RECS:  1. Continue enteral feeds of Jevity 1.2 @ 70 cc/hr as tolerated   2. Please obtain updated weight   3. Monitor EN tolerance, weights, labs     GOAL:  Pt will meet >75% of estimated nutrient needs with good tolerance.

## 2021-06-14 NOTE — PROGRESS NOTE PEDS - ATTENDING COMMENTS
Agree with above. Pt's AMS still not back to normal since last week, but no subclinical seizures on prior EEG Friday.   Abnormal upwards gaze deviation captured on EEG and no electrical correlate. No concerns for other abnormal movements  Will keep closely watching

## 2021-06-14 NOTE — PROGRESS NOTE PEDS - NUTRITIONAL ASSESSMENT
HFNC 50L 45%  pulmonay toilet cough vset   lido--to mexilintine  sildenafil 20mg po q8  lasix po Q12  (holding lisinpril and aldactone)  HOLA when goes for neurosugical procedure (when intubated)  NG feeds to condense (with bowel regimen)   probiotics  nutrition consult   cefazolin while EVD in   heme   po Vitamin K three times /wk  heme consult   melatonin  PMR  ok for propranolol   clonidoine for delerium   holing on seroquel given drug interactions   neuo for seizures (eye rollowing )  possible or for flap, VPS??  keppra   when antucoagulat e  BP goals  na goals    daily labs CBC cmp coags daily       - pre-op labs, HLOA  --plan to take central line out when lido gtt done  - miquel

## 2021-06-14 NOTE — PROGRESS NOTE PEDS - SUBJECTIVE AND OBJECTIVE BOX
19 yo M admitted for cardioversion of interatrial re-entrant tachycardia (IART) found to have L-sided stroke with hemorrhagic conversion requiring intervention.    Interval History: No acute events overnight, continues to have upward eye deviation episodes but stable.     MEDICATIONS  (STANDING):  ceFAZolin  IV Intermittent - Peds 1980 milliGRAM(s) IV Intermittent every 8 hours  chlorhexidine 2% Topical Cloths - Peds 1 Application(s) Topical daily  famotidine IV Intermittent - Peds 20 milliGRAM(s) IV Intermittent every 12 hours  furosemide   Oral Liquid - Peds 10 milliGRAM(s) Enteral Tube every 12 hours  heparin   Infusion - Pediatric 0.045 Unit(s)/kG/Hr (3 mL/Hr) IV Continuous <Continuous>  lactobacillus Oral Powder (CULTURELLE KIDS) - Peds 1 Packet(s) Oral daily  levETIRAcetam IV Intermittent - Peds 1500 milliGRAM(s) IV Intermittent every 12 hours  melatonin Oral Liquid - Peds 3 milliGRAM(s) Oral at bedtime  Mexiletine 10 mg/mL Oral Suspension 150 milliGRAM(s) 150 milliGRAM(s) Oral every 8 hours  phytonadione  Oral Liquid - Peds 5 milliGRAM(s) Enteral Tube <User Schedule>  polyvinyl alcohol 1.4%/povidone 0.6% Ophthalmic Solution - Peds 2 Drop(s) Both EYES four times a day  propranolol  Oral Liquid - Peds 10 milliGRAM(s) Enteral Tube every 8 hours  sildenafil   Oral Tab/Cap - Peds 20 milliGRAM(s) Oral every 8 hours  sodium chloride 0.9% -  250 milliLiter(s) (3 mL/Hr) IV Continuous <Continuous>  sodium chloride 0.9% lock flush - Peds 3 milliLiter(s) IV Push every 8 hours  sodium chloride 0.9%. - Pediatric 1000 milliLiter(s) (3 mL/Hr) IV Continuous <Continuous>  sodium chloride 0.9%. - Pediatric 1000 milliLiter(s) (3 mL/Hr) IV Continuous <Continuous>    MEDICATIONS  (PRN):  acetaminophen   Oral Liquid - Peds. 650 milliGRAM(s) Oral every 6 hours PRN Moderate Pain (4 - 6)  polyethylene glycol 3350 Oral Powder - Peds 17 Gram(s) Oral daily PRN Constipation  senna Oral Liquid - Peds 15 milliLiter(s) Oral daily PRN Constipation      ICU Vital Signs Last 24 Hrs  T(C): 36.7 (15 Peter 2021 05:00), Max: 37.4 (2021 08:00)  T(F): 98 (15 Peter 2021 05:00), Max: 99.3 (2021 08:00)  HR: 70 (15 Peter 2021 05:00) (70 - 70)  BP: 122/62 (2021 23:10) (104/66 - 122/71)  BP(mean): 85 (2021 20:00) (85 - 85)  ABP: 117/54 (15 Peter 2021 05:00) (104/62 - 175/87)  ABP(mean): 74 (15 Peter 2021 05:00) (69 - 118)  RR: 31 (15 Peter 2021 05:00) (18 - 36)  SpO2: 87% (15 Peter 2021 05:00) (81% - 93%)        GENERAL PHYSICAL EXAM  General:       Sitting up in bed, eyes closed or rolled back, intermittently interactive   Respiratory:    Tachypneic and on HFNC     NEUROLOGIC EXAM  Mental Status:    Awake, intermittently stuporous, unable to answer questions but able to follow simple commands   Cranial Nerves:    Pupils 4 mm and sluggishly reactive b/l, gaze preference to L, eyes cross midline, no apparent facial asymmetry  Motor:                Extremities x4 antigravity briefly in response to noxious stim, wiggles fingers and toes   DTR:                   Brisk but symmetric biceps, brachioradialis, patellas and Achilles  Sensation:           Intact to noxious stimuli x4 extremities   Coord:                Unable to participate   Gait:                    Unable to participate         Lab Results:                        12.0   6.34  )-----------( 157      ( 2021 01:31 )             38.2       06-11    147<H>  |  110<H>  |  32<H>  ----------------------------<  128<H>  3.9   |  21<L>  |  0.88    Ca    9.5      2021 01:31  Phos  2.6     06-11  Mg     2.2     06-11    TPro  7.8  /  Alb  4.0  /  TBili  1.3<H>  /  DBili  x   /  AST  36  /  ALT  11  /  AlkPhos  94        Prothrombin Time and INR, Plasma (21 @ 01:31)   Prothrombin Time, Plasma: 16.9 sec   INR: 1.50      EEG Results: In progress, preliminary interpretation no seizure correlate to upward eye deviation, generalized background slowing     IMAGING:     < from: CT Head No Cont (21 @ 08:54) >  Comparison is made with the prior CT of 6/10/2021.    No significant change is identified.    There is a left frontal craniectomy. There is a catheter extending through the craniectomy defect into the right lateral ventricle. The ventricles are not enlarged. There is a small amount of hemorrhage layering in the occipital horns bilaterally. A small amount of left parietal subdural hemorrhage is identified. Minimal residual hemorrhage in the left frontal brain parenchyma is identified.    There is no midline shift.      IMPRESSION: No change from 6/10/2021. Left frontal craniectomy. Minimal residual left frontal parenchymal hemorrhage. Large left sided ventricular catheter. No hydrocephalus. Mild intraventricular hemorrhage unchanged.      GABRIELA CARR MD; Attending Radiologist  This document has been electronically signed. 2021  9:08AM    < end of copied text >     21 yo M admitted for cardioversion of interatrial re-entrant tachycardia (IART) found to have L-sided stroke with hemorrhagic conversion requiring intervention.    Interval History: No acute events overnight, continues to have upward eye deviation episodes that were captured on EEG without electrical correlate. No other abnormal movements    MEDICATIONS  (STANDING):  ceFAZolin  IV Intermittent - Peds 1980 milliGRAM(s) IV Intermittent every 8 hours  chlorhexidine 2% Topical Cloths - Peds 1 Application(s) Topical daily  famotidine IV Intermittent - Peds 20 milliGRAM(s) IV Intermittent every 12 hours  furosemide   Oral Liquid - Peds 10 milliGRAM(s) Enteral Tube every 12 hours  heparin   Infusion - Pediatric 0.045 Unit(s)/kG/Hr (3 mL/Hr) IV Continuous <Continuous>  lactobacillus Oral Powder (CULTURELLE KIDS) - Peds 1 Packet(s) Oral daily  levETIRAcetam IV Intermittent - Peds 1500 milliGRAM(s) IV Intermittent every 12 hours  melatonin Oral Liquid - Peds 3 milliGRAM(s) Oral at bedtime  Mexiletine 10 mg/mL Oral Suspension 150 milliGRAM(s) 150 milliGRAM(s) Oral every 8 hours  phytonadione  Oral Liquid - Peds 5 milliGRAM(s) Enteral Tube <User Schedule>  polyvinyl alcohol 1.4%/povidone 0.6% Ophthalmic Solution - Peds 2 Drop(s) Both EYES four times a day  propranolol  Oral Liquid - Peds 10 milliGRAM(s) Enteral Tube every 8 hours  sildenafil   Oral Tab/Cap - Peds 20 milliGRAM(s) Oral every 8 hours  sodium chloride 0.9% -  250 milliLiter(s) (3 mL/Hr) IV Continuous <Continuous>  sodium chloride 0.9% lock flush - Peds 3 milliLiter(s) IV Push every 8 hours  sodium chloride 0.9%. - Pediatric 1000 milliLiter(s) (3 mL/Hr) IV Continuous <Continuous>  sodium chloride 0.9%. - Pediatric 1000 milliLiter(s) (3 mL/Hr) IV Continuous <Continuous>    MEDICATIONS  (PRN):  acetaminophen   Oral Liquid - Peds. 650 milliGRAM(s) Oral every 6 hours PRN Moderate Pain (4 - 6)  polyethylene glycol 3350 Oral Powder - Peds 17 Gram(s) Oral daily PRN Constipation  senna Oral Liquid - Peds 15 milliLiter(s) Oral daily PRN Constipation      ICU Vital Signs Last 24 Hrs  T(C): 36.7 (15 Peter 2021 05:00), Max: 37.4 (2021 08:00)  T(F): 98 (15 Peter 2021 05:00), Max: 99.3 (2021 08:00)  HR: 70 (15 Peter 2021 05:00) (70 - 70)  BP: 122/62 (2021 23:10) (104/66 - 122/71)  BP(mean): 85 (2021 20:00) (85 - 85)  ABP: 117/54 (15 Peter 2021 05:00) (104/62 - 175/87)  ABP(mean): 74 (15 Peter 2021 05:00) (69 - 118)  RR: 31 (15 Peter 2021 05:00) (18 - 36)  SpO2: 87% (15 Peter 2021 05:00) (81% - 93%)        GENERAL PHYSICAL EXAM  General:       Sitting up in bed, eyes closed or rolled back, intermittently interactive   Respiratory:    Tachypneic and on HFNC     NEUROLOGIC EXAM  Mental Status:    Awake, intermittently stuporous, unable to answer questions but able to follow simple commands   Cranial Nerves:    Pupils 4 mm and sluggishly reactive b/l, gaze preference to L, eyes cross midline, no apparent facial asymmetry  Motor:                Extremities x4 antigravity briefly in response to noxious stim, wiggles fingers and toes   DTR:                   Brisk but symmetric biceps, brachioradialis, patellas and Achilles  Sensation:           Intact to noxious stimuli x4 extremities   Coord:                Unable to participate   Gait:                    Unable to participate         Lab Results:                        12.0   6.34  )-----------( 157      ( 2021 01:31 )             38.2       06-11    147<H>  |  110<H>  |  32<H>  ----------------------------<  128<H>  3.9   |  21<L>  |  0.88    Ca    9.5      2021 01:31  Phos  2.6     06-11  Mg     2.2     06-11    TPro  7.8  /  Alb  4.0  /  TBili  1.3<H>  /  DBili  x   /  AST  36  /  ALT  11  /  AlkPhos  94  06-      Prothrombin Time and INR, Plasma (21 @ 01:31)   Prothrombin Time, Plasma: 16.9 sec   INR: 1.50      EEG Results: In progress, preliminary interpretation no seizure correlate to upward eye deviation, generalized background slowing     IMAGING:     < from: CT Head No Cont (21 @ 08:54) >  Comparison is made with the prior CT of 6/10/2021.    No significant change is identified.    There is a left frontal craniectomy. There is a catheter extending through the craniectomy defect into the right lateral ventricle. The ventricles are not enlarged. There is a small amount of hemorrhage layering in the occipital horns bilaterally. A small amount of left parietal subdural hemorrhage is identified. Minimal residual hemorrhage in the left frontal brain parenchyma is identified.    There is no midline shift.      IMPRESSION: No change from 6/10/2021. Left frontal craniectomy. Minimal residual left frontal parenchymal hemorrhage. Large left sided ventricular catheter. No hydrocephalus. Mild intraventricular hemorrhage unchanged.      GABRIELA CARR MD; Attending Radiologist  This document has been electronically signed. 2021  9:08AM    < end of copied text >

## 2021-06-15 ENCOUNTER — APPOINTMENT (OUTPATIENT)
Dept: PEDIATRIC CARDIOLOGY | Facility: CLINIC | Age: 21
End: 2021-06-15

## 2021-06-15 LAB
ANION GAP SERPL CALC-SCNC: 14 MMOL/L — SIGNIFICANT CHANGE UP (ref 7–14)
BUN SERPL-MCNC: 22 MG/DL — SIGNIFICANT CHANGE UP (ref 7–23)
CALCIUM SERPL-MCNC: 9.2 MG/DL — SIGNIFICANT CHANGE UP (ref 8.4–10.5)
CHLORIDE SERPL-SCNC: 102 MMOL/L — SIGNIFICANT CHANGE UP (ref 98–107)
CO2 SERPL-SCNC: 20 MMOL/L — LOW (ref 22–31)
CREAT SERPL-MCNC: 0.68 MG/DL — SIGNIFICANT CHANGE UP (ref 0.5–1.3)
GLUCOSE SERPL-MCNC: 150 MG/DL — HIGH (ref 70–99)
MAGNESIUM SERPL-MCNC: 2.2 MG/DL — SIGNIFICANT CHANGE UP (ref 1.6–2.6)
PHOSPHATE SERPL-MCNC: 2.9 MG/DL — SIGNIFICANT CHANGE UP (ref 2.5–4.5)
POTASSIUM SERPL-MCNC: 4 MMOL/L — SIGNIFICANT CHANGE UP (ref 3.5–5.3)
POTASSIUM SERPL-SCNC: 4 MMOL/L — SIGNIFICANT CHANGE UP (ref 3.5–5.3)
SODIUM SERPL-SCNC: 136 MMOL/L — SIGNIFICANT CHANGE UP (ref 135–145)

## 2021-06-15 PROCEDURE — 99233 SBSQ HOSP IP/OBS HIGH 50: CPT

## 2021-06-15 PROCEDURE — 99232 SBSQ HOSP IP/OBS MODERATE 35: CPT

## 2021-06-15 PROCEDURE — 70450 CT HEAD/BRAIN W/O DYE: CPT | Mod: 26

## 2021-06-15 PROCEDURE — 99291 CRITICAL CARE FIRST HOUR: CPT

## 2021-06-15 RX ORDER — FAMOTIDINE 10 MG/ML
33 INJECTION INTRAVENOUS EVERY 12 HOURS
Refills: 0 | Status: DISCONTINUED | OUTPATIENT
Start: 2021-06-15 | End: 2021-06-16

## 2021-06-15 RX ORDER — SODIUM CHLORIDE 9 MG/ML
1000 INJECTION, SOLUTION INTRAVENOUS
Refills: 0 | Status: DISCONTINUED | OUTPATIENT
Start: 2021-06-15 | End: 2021-06-15

## 2021-06-15 RX ADMIN — LEVETIRACETAM 400 MILLIGRAM(S): 250 TABLET, FILM COATED ORAL at 03:20

## 2021-06-15 RX ADMIN — SODIUM CHLORIDE 3 MILLILITER(S): 9 INJECTION INTRAMUSCULAR; INTRAVENOUS; SUBCUTANEOUS at 03:00

## 2021-06-15 RX ADMIN — SODIUM CHLORIDE 3 MILLILITER(S): 9 INJECTION, SOLUTION INTRAVENOUS at 07:42

## 2021-06-15 RX ADMIN — Medication 3 MILLIGRAM(S): at 22:08

## 2021-06-15 RX ADMIN — Medication 20 MILLIGRAM(S): at 14:49

## 2021-06-15 RX ADMIN — Medication 2 DROP(S): at 14:48

## 2021-06-15 RX ADMIN — LEVETIRACETAM 400 MILLIGRAM(S): 250 TABLET, FILM COATED ORAL at 17:04

## 2021-06-15 RX ADMIN — FAMOTIDINE 33 MILLIGRAM(S): 10 INJECTION INTRAVENOUS at 17:05

## 2021-06-15 RX ADMIN — FAMOTIDINE 200 MILLIGRAM(S): 10 INJECTION INTRAVENOUS at 03:10

## 2021-06-15 RX ADMIN — SODIUM CHLORIDE 3 MILLILITER(S): 9 INJECTION, SOLUTION INTRAVENOUS at 18:48

## 2021-06-15 RX ADMIN — Medication 198 MILLIGRAM(S): at 22:05

## 2021-06-15 RX ADMIN — Medication 198 MILLIGRAM(S): at 14:48

## 2021-06-15 RX ADMIN — Medication 10 MILLIGRAM(S): at 17:05

## 2021-06-15 RX ADMIN — Medication 2 DROP(S): at 22:08

## 2021-06-15 RX ADMIN — Medication 20 MILLIGRAM(S): at 22:08

## 2021-06-15 RX ADMIN — SODIUM CHLORIDE 3 MILLILITER(S): 9 INJECTION, SOLUTION INTRAVENOUS at 19:42

## 2021-06-15 RX ADMIN — Medication 20 MILLIGRAM(S): at 06:47

## 2021-06-15 RX ADMIN — Medication 650 MILLIGRAM(S): at 00:10

## 2021-06-15 RX ADMIN — Medication 198 MILLIGRAM(S): at 05:47

## 2021-06-15 RX ADMIN — Medication 2 MILLIGRAM(S): at 00:41

## 2021-06-15 RX ADMIN — Medication 10 MILLIGRAM(S): at 05:47

## 2021-06-15 RX ADMIN — Medication 3 UNIT(S)/KG/HR: at 07:41

## 2021-06-15 RX ADMIN — Medication 2 MILLIGRAM(S): at 00:47

## 2021-06-15 RX ADMIN — SODIUM CHLORIDE 3 MILLILITER(S): 9 INJECTION INTRAMUSCULAR; INTRAVENOUS; SUBCUTANEOUS at 18:54

## 2021-06-15 RX ADMIN — Medication 1 PACKET(S): at 11:08

## 2021-06-15 RX ADMIN — SODIUM CHLORIDE 3 MILLILITER(S): 9 INJECTION INTRAMUSCULAR; INTRAVENOUS; SUBCUTANEOUS at 11:08

## 2021-06-15 RX ADMIN — Medication 2 DROP(S): at 17:16

## 2021-06-15 RX ADMIN — Medication 2 DROP(S): at 10:00

## 2021-06-15 NOTE — PROGRESS NOTE PEDS - ATTENDING COMMENTS
Continue therapies at bedside and advance as tolerated. Consider propranolol for agitation. Zyprexa IM also an option. If delirium is more manifested by lethargy, can consider amantadine which may act as more of a stimulant once he's off sedation.

## 2021-06-15 NOTE — PROGRESS NOTE PEDS - SUBJECTIVE AND OBJECTIVE BOX
INTERVAL HISTORY: No acute events    RESPIRATORY SUPPORT: HFNC 35L, 40%  NUTRITION: full tube feeds       @ 07:01  -  06-15 @ 07:00  --------------------------------------------------------  IN: 2445.8 mL / OUT: 2615 mL / NET: -169.2 mL      INTRAVASCULAR ACCESS: A line, RFCVL    MEDICATIONS:  furosemide   Oral Liquid - Peds 10 milliGRAM(s) Enteral Tube every 12 hours  propranolol  Oral Liquid - Peds 10 milliGRAM(s) Enteral Tube every 8 hours  sildenafil   Oral Tab/Cap - Peds 20 milliGRAM(s) Oral every 8 hours  ceFAZolin  IV Intermittent - Peds 1980 milliGRAM(s) IV Intermittent every 8 hours  levETIRAcetam IV Intermittent - Peds 1500 milliGRAM(s) IV Intermittent every 12 hours  melatonin Oral Liquid - Peds 3 milliGRAM(s) Oral at bedtime  famotidine IV Intermittent - Peds 20 milliGRAM(s) IV Intermittent every 12 hours  heparin   Infusion - Pediatric 0.045 Unit(s)/kG/Hr IV Continuous <Continuous>  phytonadione  Oral Liquid - Peds 5 milliGRAM(s) Enteral Tube <User Schedule>  sodium chloride 0.9% -  250 milliLiter(s) IV Continuous <Continuous>  sodium chloride 0.9% lock flush - Peds 3 milliLiter(s) IV Push every 8 hours  sodium chloride 0.9%. - Pediatric 1000 milliLiter(s) IV Continuous <Continuous>  sodium chloride 0.9%. - Pediatric 1000 milliLiter(s) IV Continuous <Continuous>    PHYSICAL EXAMINATION:  Vital signs -   T(C): 36.7 (06-15-21 @ 05:00), Max: 36.9 (21 @ 23:10)  HR: 70 (06-15-21 @ 07:31) (70 - 70)  BP: 122/62 (21 @ 23:10) (104/66 - 122/71)  ABP:  (104/62 - 175/87)  RR: 24 (06-15-21 @ 07:31) (18 - 36)  SpO2: 92% (06-15-21 @ 07:31) (81% - 93%)  CVP(mm Hg):  (10 - 33)    General - slow to respond to commands, not speaking  Skin - no rash, no desquamation, no cyanosis.  Eyes / ENT - no conjunctival injection, sclerae anicteric, external ears & nares normal, mucous membranes moist.  Pulmonary - normal inspiratory effort, no retractions, lungs clear to auscultation bilaterally, no wheezes, no rales.  Cardiovascular - normal rate, regular rhythm, normal S1 & single S2, grade 1/6 blowing holosystolic murmur at LMSB, no rubs, no gallops, capillary refill < 2sec, normal pulses.  Gastrointestinal - soft, non-distended, non-tender, no splenomegaly. (+) hepatomegaly.  Musculoskeletal - no joint swelling, no clubbing, no edema.  Neurologic / Psychiatric - slow to respond to commands    LABORATORY TESTS:                          11.2  CBC:   9.74 )-----------( 162   (06-15-21 @ 05:25)                          35.0               133   |  102   |  23                 Ca: 9.5    BMP:   ----------------------------< 148    Mg: x     (06-15-21 @ 05:25)             4.6    |  19    | 0.79               Ph: x        LFT:     TPro: 7.7 / Alb: 4.1 / TBili: 0.9 / DBili: x / AST: 33 / ALT: 22 / AlkPhos: 138   (06-15-21 @ 05:25)    COAG: PT: 16.4 / PTT: 75.0 / INR: 1.45   (06-15-21 @ 05:25)       ABG:   pH: 7.50 / pCO2: 29 / pO2: 52 / HCO3: 24 / Base Excess: -0.9 / SaO2: 87.1 / Lactate: x / iCa: 1.15   (21 @ 18:06)      IMAGING STUDIES:  Electrocardiogram - (21) Ventricular paced rhythm @ 75bpm. Underlying IART.    Telemetry - (6/15/21) Ventricular paced rhythm @ 75bpm. Underlying IART.    Chest x-ray - (21) Stable mild cardiomegaly with subsegmental left lower lobe atelectasis.    Echocardiogram - (21)   1. This was a technically difficult suboptimal study due to poor echo windows. Findings limited to below.   2. 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   3. Mild mitral valve regurgitation.   4. Trivial tricuspid valve regurgitation.   5. Trivial aortic valve regurgitation.   6. Status post device closure of Fontan fenestration.   7. S/P stent placement in the Fontan pathway. Limited imaging of the inferior Fontan baffle. In this setting, the inferior vena cava to its connection near the atrial portion of the baffle appears patent with no obstruction. S/p device closure of Fontan fenestration. The Fontan fenestration is not seen on this study. The right bidirectional Storm is seen only on color flow mapping. This appears to have laminar low velocity flow.   8. The connection of the right bidirectional Storm to the right pulmonary artery could not be imaged. The right pulmonary artery is seen in a limited portion immediately to the left of the Storm and appears patent. The left pulmonary artery could not be imaged.   9. Extremely poor and limited imaging of the lateral free walls of the single systemic left ventricle. On limited short axis views there appears to be adequate systolic excursion of the posterior wall of the left ventricle and decreased in the anterior wall. The bulboventricular foramen could not be imaged. Suggest alternate imaging for appropriate assessment of function.  10. No pericardial effusion.  11. Small right pleural effusion.    Cath - (21)  Access 4 Fr RFA, 7 Fr RFV x2 with US guidance.  Sat data (%): on 50% FiO2 LPA 73, RUPV 98, Ao 97; CI 2.6 L/min/m2 with Qp:Qs 1 :1.  Pressures (mmHg): Elevated mFontan = mIVC = 20. mPCW=16, No significant gradient across LV to AAo to Vikki (98/58/73).  Normal PVR while on sildenafil.  Angios showed unobstructed Fontan with existing stent within Fontan conduit.    Comment: IART ablation was attempted after the diagnostic cath but unsuccessful. Patient was overdrive paced out of IART. At the end of the case, Ao sat was 94% and LPA 66% on 50% FiO2 INTERVAL HISTORY: No acute events    RESPIRATORY SUPPORT: HFNC 35L, 40%  NUTRITION: full tube feeds       @ 07:01  -  06-15 @ 07:00  --------------------------------------------------------  IN: 2445.8 mL / OUT: 2615 mL / NET: -169.2 mL      INTRAVASCULAR ACCESS: A line, RFCVL    MEDICATIONS:  furosemide   Oral Liquid - Peds 10 milliGRAM(s) Enteral Tube every 12 hours  propranolol  Oral Liquid - Peds 10 milliGRAM(s) Enteral Tube every 8 hours  sildenafil   Oral Tab/Cap - Peds 20 milliGRAM(s) Oral every 8 hours  ceFAZolin  IV Intermittent - Peds 1980 milliGRAM(s) IV Intermittent every 8 hours  levETIRAcetam IV Intermittent - Peds 1500 milliGRAM(s) IV Intermittent every 12 hours  melatonin Oral Liquid - Peds 3 milliGRAM(s) Oral at bedtime  famotidine IV Intermittent - Peds 20 milliGRAM(s) IV Intermittent every 12 hours  heparin   Infusion - Pediatric 0.045 Unit(s)/kG/Hr IV Continuous <Continuous>  phytonadione  Oral Liquid - Peds 5 milliGRAM(s) Enteral Tube <User Schedule>  sodium chloride 0.9% -  250 milliLiter(s) IV Continuous <Continuous>  sodium chloride 0.9% lock flush - Peds 3 milliLiter(s) IV Push every 8 hours  sodium chloride 0.9%. - Pediatric 1000 milliLiter(s) IV Continuous <Continuous>  sodium chloride 0.9%. - Pediatric 1000 milliLiter(s) IV Continuous <Continuous>    PHYSICAL EXAMINATION:  Vital signs -   T(C): 36.7 (06-15-21 @ 05:00), Max: 36.9 (21 @ 23:10)  HR: 70 (06-15-21 @ 07:31) (70 - 70)  BP: 122/62 (21 @ 23:10) (104/66 - 122/71)  ABP:  (104/62 - 175/87)  RR: 24 (06-15-21 @ 07:31) (18 - 36)  SpO2: 92% (06-15-21 @ 07:31) (81% - 93%)  CVP(mm Hg):  (10 - 33)    General - more awake and alert. Interactice and appropriate  Skin - no rash, no desquamation, no cyanosis.  Eyes / ENT - no conjunctival injection, sclerae anicteric, external ears & nares normal, mucous membranes moist.  Pulmonary - normal inspiratory effort, no retractions, lungs clear to auscultation bilaterally, no wheezes, no rales.  Cardiovascular - normal rate, regular rhythm, normal S1 & single S2, grade 1/6 blowing holosystolic murmur at LMSB, no rubs, no gallops, capillary refill < 2sec, normal pulses.  Gastrointestinal - soft, non-distended, non-tender, no splenomegaly. (+) hepatomegaly.  Musculoskeletal - no joint swelling, no clubbing, no edema.  Neurologic / Psychiatric - more awake and alert    LABORATORY TESTS:                          11.2  CBC:   9.74 )-----------( 162   (06-15-21 @ 05:25)                          35.0               133   |  102   |  23                 Ca: 9.5    BMP:   ----------------------------< 148    Mg: x     (06-15-21 @ 05:25)             4.6    |  19    | 0.79               Ph: x        LFT:     TPro: 7.7 / Alb: 4.1 / TBili: 0.9 / DBili: x / AST: 33 / ALT: 22 / AlkPhos: 138   (06-15-21 @ 05:25)    COAG: PT: 16.4 / PTT: 75.0 / INR: 1.45   (06-15-21 @ 05:25)       ABG:   pH: 7.50 / pCO2: 29 / pO2: 52 / HCO3: 24 / Base Excess: -0.9 / SaO2: 87.1 / Lactate: x / iCa: 1.15   (21 @ 18:06)      IMAGING STUDIES:  Electrocardiogram - (21) Ventricular paced rhythm @ 75bpm. Underlying IART.    Telemetry - (6/15/21) Ventricular paced rhythm @ 75bpm. Underlying IART.    Chest x-ray - (21) Stable mild cardiomegaly with subsegmental left lower lobe atelectasis.    Echocardiogram - (21)   1. This was a technically difficult suboptimal study due to poor echo windows. Findings limited to below.   2. 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   3. Mild mitral valve regurgitation.   4. Trivial tricuspid valve regurgitation.   5. Trivial aortic valve regurgitation.   6. Status post device closure of Fontan fenestration.   7. S/P stent placement in the Fontan pathway. Limited imaging of the inferior Fontan baffle. In this setting, the inferior vena cava to its connection near the atrial portion of the baffle appears patent with no obstruction. S/p device closure of Fontan fenestration. The Fontan fenestration is not seen on this study. The right bidirectional Storm is seen only on color flow mapping. This appears to have laminar low velocity flow.   8. The connection of the right bidirectional Storm to the right pulmonary artery could not be imaged. The right pulmonary artery is seen in a limited portion immediately to the left of the Storm and appears patent. The left pulmonary artery could not be imaged.   9. Extremely poor and limited imaging of the lateral free walls of the single systemic left ventricle. On limited short axis views there appears to be adequate systolic excursion of the posterior wall of the left ventricle and decreased in the anterior wall. The bulboventricular foramen could not be imaged. Suggest alternate imaging for appropriate assessment of function.  10. No pericardial effusion.  11. Small right pleural effusion.    Cath - (21)  Access 4 Fr RFA, 7 Fr RFV x2 with US guidance.  Sat data (%): on 50% FiO2 LPA 73, RUPV 98, Ao 97; CI 2.6 L/min/m2 with Qp:Qs 1 :1.  Pressures (mmHg): Elevated mFontan = mIVC = 20. mPCW=16, No significant gradient across LV to AAo to Vikki (98/58/73).  Normal PVR while on sildenafil.  Angios showed unobstructed Fontan with existing stent within Fontan conduit.    Comment: IART ablation was attempted after the diagnostic cath but unsuccessful. Patient was overdrive paced out of IART. At the end of the case, Ao sat was 94% and LPA 66% on 50% FiO2

## 2021-06-15 NOTE — PROGRESS NOTE PEDS - SUBJECTIVE AND OBJECTIVE BOX
NEUROSURGERY NOTE   TRINI DEV / [id number] / 06-15-21 @ 07:21    PAST 24hr EVENTS:  Patient seen and examined at bedside. Noted to have 10 second b/l upper extremity seizure activity last night. EVD open at 0cm/H2O.     HPI: 20y Male    PHYSICAL EXAM:   Mental Status: Awake, OE, nonverbal, grimaces to pain, intermittent eye rolling   PERRL, following some commands   Motor:  weaker R side, able to grasp hand on R, slight wiggle of R toes  L side stronger   Noted frontal pseudomeningocele collection  Incision/Wound: c/d/i    Vital Signs Last 24 Hrs  T(C): 36.7 (15 Peter 2021 05:00), Max: 37.4 (2021 08:00)  T(F): 98 (15 Peter 2021 05:00), Max: 99.3 (2021 08:00)  HR: 70 (15 Peter 2021 05:00) (70 - 70)  BP: 122/62 (2021 23:10) (104/66 - 122/71)  BP(mean): 85 (2021 20:00) (85 - 85)  RR: 31 (15 Peter 2021 05:00) (18 - 36)  SpO2: 87% (15 Peter 2021 05:00) (81% - 93%)    I&O's Summary    2021 07:01  -  15 Peter 2021 07:00  --------------------------------------------------------  IN: 2136.8 mL / OUT: 2363 mL / NET: -226.2 mL                              11.2   9.74  )-----------( 162      ( 15 Peter 2021 05:25 )             35.0     06-15    133<L>  |  102  |  23  ----------------------------<  148<H>  4.6   |  19<L>  |  0.79    Ca    9.5      15 Peter 2021 05:25    TPro  7.7  /  Alb  4.1  /  TBili  0.9  /  DBili  x   /  AST  33  /  ALT  22  /  AlkPhos  138  06-15    PT/INR - ( 15 Peter 2021 05:25 )   PT: 16.4 sec;   INR: 1.45 ratio         PTT - ( 15 Peter 2021 05:25 )  PTT:75.0 sec      MEDICATIONS  (STANDING):  ceFAZolin  IV Intermittent - Peds 1980 milliGRAM(s) IV Intermittent every 8 hours  chlorhexidine 2% Topical Cloths - Peds 1 Application(s) Topical daily  famotidine IV Intermittent - Peds 20 milliGRAM(s) IV Intermittent every 12 hours  furosemide   Oral Liquid - Peds 10 milliGRAM(s) Enteral Tube every 12 hours  heparin   Infusion - Pediatric 0.045 Unit(s)/kG/Hr (3 mL/Hr) IV Continuous <Continuous>  lactobacillus Oral Powder (CULTURELLE KIDS) - Peds 1 Packet(s) Oral daily  levETIRAcetam IV Intermittent - Peds 1500 milliGRAM(s) IV Intermittent every 12 hours  melatonin Oral Liquid - Peds 3 milliGRAM(s) Oral at bedtime  Mexiletine 10 mg/mL Oral Suspension 150 milliGRAM(s) 150 milliGRAM(s) Oral every 8 hours  phytonadione  Oral Liquid - Peds 5 milliGRAM(s) Enteral Tube <User Schedule>  polyvinyl alcohol 1.4%/povidone 0.6% Ophthalmic Solution - Peds 2 Drop(s) Both EYES four times a day  propranolol  Oral Liquid - Peds 10 milliGRAM(s) Enteral Tube every 8 hours  sildenafil   Oral Tab/Cap - Peds 20 milliGRAM(s) Oral every 8 hours  sodium chloride 0.9% -  250 milliLiter(s) (3 mL/Hr) IV Continuous <Continuous>  sodium chloride 0.9% lock flush - Peds 3 milliLiter(s) IV Push every 8 hours  sodium chloride 0.9%. - Pediatric 1000 milliLiter(s) (3 mL/Hr) IV Continuous <Continuous>  sodium chloride 0.9%. - Pediatric 1000 milliLiter(s) (3 mL/Hr) IV Continuous <Continuous>    MEDICATIONS  (PRN):  acetaminophen   Oral Liquid - Peds. 650 milliGRAM(s) Oral every 6 hours PRN Moderate Pain (4 - 6)  polyethylene glycol 3350 Oral Powder - Peds 17 Gram(s) Oral daily PRN Constipation  senna Oral Liquid - Peds 15 milliLiter(s) Oral daily PRN Constipation      NPO STATUS:   REASON: [] OR procedure   [] imaging with sedation   [] medical need    [] other   RN Informed: [] Yes [] No  Family informed and educated [] Yes [] No    RADIOLOGY:  < from: CT Head No Cont (21 @ 11:28) >    FINDINGS:  VENTRICLES AND SULCI:  Redemonstrated ventriculostomy catheter terminating in the right lateral ventricle. No hydrocephalus. Minimal layering hemorrhage in the bilateral occipital horns, decreased since 2021.  INTRA-AXIAL:  Redemonstrated small foci of hemorrhage in the left frontal lobe, decreased since 2021. No midline shift.  EXTRA-AXIAL:Previously described left parietal subdural hemorrhage appears decreased.  VISUALIZED SINUSES:  Clear.  VISUALIZED MASTOIDS:  Clear.  CALVARIUM:  Left frontal craniectomy with protrusion of the left frontal lobe through the calvarial defect. 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    IMPRESSION:  Mild interval decrease in previously seen left frontal lobe hemorrhage, left parietal subdural hemorrhage, and layering hemorrhage in the occipital horns.    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

## 2021-06-15 NOTE — PROGRESS NOTE PEDS - ATTENDING COMMENTS
One short seizure like activity this am in the setting of congestion, lasting 5-10 seconds. No more seizure episodes after that and MS much improved as per mom.  Will consider adding VPA if more clinical seizures

## 2021-06-15 NOTE — PROGRESS NOTE PEDS - ASSESSMENT
20y male s/p L craniectomy for ICH/SAH, + EVD    PLAN   - Continue EVD @ 0cm/H2O, Q1 ICP checks   - Plan for MRI w/wo tomorrow, will need intubation   - OR Friday for VPS/Cranioplasty with titanium   - Neurology / VEEG for seizure like activity   - Sodium 133 today on 3%   -  and INR 1.45   - Discussed case with attending

## 2021-06-15 NOTE — PROGRESS NOTE PEDS - SUBJECTIVE AND OBJECTIVE BOX
Interval/Overnight Events:  seizure overnight, resolved with ativan x2   EVD at 0cm  _________________________________________________________________  Respiratory:    HFNC 35L 40%  _________________________________________________________________  Cardiac:  Cardiac Rhythm: Sinus rhythm    furosemide   Oral Liquid - Peds 10 milliGRAM(s) Enteral Tube every 12 hours  propranolol  Oral Liquid - Peds 10 milliGRAM(s) Enteral Tube every 8 hours  sildenafil   Oral Tab/Cap - Peds 20 milliGRAM(s) Oral every 8 hours    _________________________________________________________________  Hematologic:    heparin   Infusion - Pediatric 0.045 Unit(s)/kG/Hr IV Continuous <Continuous>    ________________________________________________________________  Infectious:    ceFAZolin  IV Intermittent - Peds 1980 milliGRAM(s) IV Intermittent every 8 hours    RECENT CULTURES:      ________________________________________________________________  Fluids/Electrolytes/Nutrition:  I&O's Summary    2021 07:01  -  15 Peter 2021 07:00  --------------------------------------------------------  IN: 2445.8 mL / OUT: 2615 mL / NET: -169.2 mL    15 Peter 2021 07:01  -  15 Peter 2021 12:39  --------------------------------------------------------  IN: 492 mL / OUT: 450 mL / NET: 42 mL      Diet: NG feeds    famotidine IV Intermittent - Peds 20 milliGRAM(s) IV Intermittent every 12 hours  phytonadione  Oral Liquid - Peds 5 milliGRAM(s) Enteral Tube <User Schedule>  polyethylene glycol 3350 Oral Powder - Peds 17 Gram(s) Oral daily PRN  senna Oral Liquid - Peds 15 milliLiter(s) Oral daily PRN  sodium chloride 0.9% -  250 milliLiter(s) IV Continuous <Continuous>  sodium chloride 0.9% lock flush - Peds 3 milliLiter(s) IV Push every 8 hours  sodium chloride 0.9%. - Pediatric 1000 milliLiter(s) IV Continuous <Continuous>  sodium chloride 0.9%. - Pediatric 1000 milliLiter(s) IV Continuous <Continuous>    _________________________________________________________________  Neurologic:  Adequacy of sedation and pain control has been assessed and adjusted    acetaminophen   Oral Liquid - Peds. 650 milliGRAM(s) Oral every 6 hours PRN  levETIRAcetam IV Intermittent - Peds 1500 milliGRAM(s) IV Intermittent every 12 hours  melatonin Oral Liquid - Peds 3 milliGRAM(s) Oral at bedtime    ________________________________________________________________  Additional Meds:    chlorhexidine 2% Topical Cloths - Peds 1 Application(s) Topical daily  lactobacillus Oral Powder (CULTURELLE KIDS) - Peds 1 Packet(s) Oral daily  Mexiletine 10 mg/mL Oral Suspension 150 milliGRAM(s) 150 milliGRAM(s) Oral every 8 hours  polyvinyl alcohol 1.4%/povidone 0.6% Ophthalmic Solution - Peds 2 Drop(s) Both EYES four times a day    ________________________________________________________________  Access:    Necessity of urinary, arterial, and venous catheters discussed  ________________________________________________________________  Labs:  ABG - ( 2021 18:06 )  pH: 7.50  /  pCO2: 29    /  pO2: 52    / HCO3: 24    / Base Excess: -0.9  /  SaO2: 87.1  / Lactate: x                                                11.2                  Neurophils% (auto):   81.6   (06-15 @ 05:25):    9.74 )-----------(162          Lymphocytes% (auto):  6.4                                           35.0                   Eosinphils% (auto):   2.1      Manual%: Neutrophils x    ; Lymphocytes x    ; Eosinophils x    ; Bands%: x    ; Blasts x                                  133    |  102    |  23                  Calcium: 9.5   / iCa: x      (06-15 @ 05:25)    ----------------------------<  148       Magnesium: x                                4.6     |  19     |  0.79             Phosphorous: x        TPro  7.7    /  Alb  4.1    /  TBili  0.9    /  DBili  x      /  AST  33     /  ALT  22     /  AlkPhos  138    15 Peter 2021 05:25  ( 06-15 @ 05:25 )   PT: 16.4 sec;   INR: 1.45 ratio  aPTT: 75.0 sec    _________________________________________________________________  Imaging:    _________________________________________________________________  PE:  T(C): 36.9 (06-15-21 @ 11:00), Max: 36.9 (21 @ 23:10)  HR: 70 (06-15-21 @ 12:00) (70 - 70)  BP: 128/68 (06-15-21 @ 08:00) (104/66 - 128/68)  ABP: 124/63 (06-15-21 @ 12:00) (97/48 - 175/87)  ABP(mean): 85 (06-15-21 @ 12:00) (65 - 118)  RR: 22 (06-15-21 @ 12:00) (18 - 36)  SpO2: 89% (06-15-21 @ 12:00) (81% - 93%)  CVP(mm Hg): 22 (06-15-21 @ 12:00) (11 - 33)    General: 	HFNC in place--unfocused today, NG in place  Respiratory:	Diminished air entry bilaterally. Mildly labored on current support  CV:		Regular rate and rhythm normal S1 & single S2, grade 1/6 blowing holosystolic murmur at LMSB, Capillary refill < 2 seconds. Distal pulses 2+ and equal.  Abdomen:	Soft, non-distended. Bowel sounds present. hepatomegaly 2-3cm  Skin:		No rash.  Extremities:	Warm and well perfused.   Neurologic:	Awake--moving all extremities but left > right No acute change from baseline exam.  ________________________________________________________________  Patient and Parent/Guardian was updated as to the progress/plan of care.    The patient remains in critical and unstable condition, and requires ICU care and monitoring. Total critical care time spent by attending physician was minutes, excluding procedure time.    The patient is improving but requires continued monitoring and adjustment of therapy.   Interval/Overnight Events:  seizure overnight, resolved with ativan x2   EVD at 0cm  _________________________________________________________________  Respiratory:    HFNC 35L 40%  _________________________________________________________________  Cardiac:  Cardiac Rhythm: Sinus rhythm    furosemide   Oral Liquid - Peds 10 milliGRAM(s) Enteral Tube every 12 hours  propranolol  Oral Liquid - Peds 10 milliGRAM(s) Enteral Tube every 8 hours  sildenafil   Oral Tab/Cap - Peds 20 milliGRAM(s) Oral every 8 hours    _________________________________________________________________  Hematologic:    heparin   Infusion - Pediatric 0.045 Unit(s)/kG/Hr IV Continuous <Continuous>    ________________________________________________________________  Infectious:    ceFAZolin  IV Intermittent - Peds 1980 milliGRAM(s) IV Intermittent every 8 hours    RECENT CULTURES:      ________________________________________________________________  Fluids/Electrolytes/Nutrition:  I&O's Summary    2021 07:01  -  15 Peter 2021 07:00  --------------------------------------------------------  IN: 2445.8 mL / OUT: 2615 mL / NET: -169.2 mL    15 Peter 2021 07:01  -  15 Peter 2021 12:39  --------------------------------------------------------  IN: 492 mL / OUT: 450 mL / NET: 42 mL      Diet: NG feeds    famotidine IV Intermittent - Peds 20 milliGRAM(s) IV Intermittent every 12 hours  phytonadione  Oral Liquid - Peds 5 milliGRAM(s) Enteral Tube <User Schedule>  polyethylene glycol 3350 Oral Powder - Peds 17 Gram(s) Oral daily PRN  senna Oral Liquid - Peds 15 milliLiter(s) Oral daily PRN  sodium chloride 0.9% -  250 milliLiter(s) IV Continuous <Continuous>  sodium chloride 0.9% lock flush - Peds 3 milliLiter(s) IV Push every 8 hours  sodium chloride 0.9%. - Pediatric 1000 milliLiter(s) IV Continuous <Continuous>  sodium chloride 0.9%. - Pediatric 1000 milliLiter(s) IV Continuous <Continuous>    _________________________________________________________________  Neurologic:  Adequacy of sedation and pain control has been assessed and adjusted    acetaminophen   Oral Liquid - Peds. 650 milliGRAM(s) Oral every 6 hours PRN  levETIRAcetam IV Intermittent - Peds 1500 milliGRAM(s) IV Intermittent every 12 hours  melatonin Oral Liquid - Peds 3 milliGRAM(s) Oral at bedtime    ________________________________________________________________  Additional Meds:    chlorhexidine 2% Topical Cloths - Peds 1 Application(s) Topical daily  lactobacillus Oral Powder (CULTURELLE KIDS) - Peds 1 Packet(s) Oral daily  Mexiletine 10 mg/mL Oral Suspension 150 milliGRAM(s) 150 milliGRAM(s) Oral every 8 hours  polyvinyl alcohol 1.4%/povidone 0.6% Ophthalmic Solution - Peds 2 Drop(s) Both EYES four times a day    ________________________________________________________________  Access:    Necessity of urinary, arterial, and venous catheters discussed  ________________________________________________________________  Labs:  ABG - ( 2021 18:06 )  pH: 7.50  /  pCO2: 29    /  pO2: 52    / HCO3: 24    / Base Excess: -0.9  /  SaO2: 87.1  / Lactate: x                                                11.2                  Neurophils% (auto):   81.6   (06-15 @ 05:25):    9.74 )-----------(162          Lymphocytes% (auto):  6.4                                           35.0                   Eosinphils% (auto):   2.1      Manual%: Neutrophils x    ; Lymphocytes x    ; Eosinophils x    ; Bands%: x    ; Blasts x                                  133    |  102    |  23                  Calcium: 9.5   / iCa: x      (06-15 @ 05:25)    ----------------------------<  148       Magnesium: x                                4.6     |  19     |  0.79             Phosphorous: x        TPro  7.7    /  Alb  4.1    /  TBili  0.9    /  DBili  x      /  AST  33     /  ALT  22     /  AlkPhos  138    15 Peter 2021 05:25  ( 06-15 @ 05:25 )   PT: 16.4 sec;   INR: 1.45 ratio  aPTT: 75.0 sec    _________________________________________________________________  Imaging:    _________________________________________________________________  PE:  T(C): 36.9 (06-15-21 @ 11:00), Max: 36.9 (21 @ 23:10)  HR: 70 (06-15-21 @ 12:00) (70 - 70)  BP: 128/68 (06-15-21 @ 08:00) (104/66 - 128/68)  ABP: 124/63 (06-15-21 @ 12:00) (97/48 - 175/87)  ABP(mean): 85 (06-15-21 @ 12:00) (65 - 118)  RR: 22 (06-15-21 @ 12:00) (18 - 36)  SpO2: 89% (06-15-21 @ 12:00) (81% - 93%)  CVP(mm Hg): 22 (06-15-21 @ 12:00) (11 - 33)    General: 	HFNC in place--focused and responsive today,   Respiratory:	Diminished air entry bilaterally. Mildly labored on current support  CV:		Regular rate and rhythm normal S1 & single S2, grade 1/6 blowing holosystolic murmur at LMSB, Capillary refill < 2 seconds. Distal pulses 2+ and equal.  Abdomen:	Soft, non-distended. Bowel sounds present. hepatomegaly 2-3cm  Skin:		No rash.  Extremities:	Warm and well perfused.   Neurologic:	Awake--moving all extremities but left > right following commands No acute change from baseline exam.  ________________________________________________________________  Patient and Parent/Guardian was updated as to the progress/plan of care.    The patient remains in critical and unstable condition, and requires ICU care and monitoring. Total critical care time spent by attending physician was 40minutes, excluding procedure time.

## 2021-06-15 NOTE — PROGRESS NOTE PEDS - ASSESSMENT
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    Plan:  1) Continue propranolol 10mg TID. Can increase as needed for agitation.   2) Continue PT/OT/ST at bedside.   3) Reviewed with mom the importance of stretching heel cords in bed.   4) Once medically stable, he still requires and could tolerate 3 hours of intensive inpatient rehabilitation including physical therapy with goals of increasing strength and functional independence with mobility, occupational therapy with goals of increasing functional independence with fine motor and self care skills, speech therapy to improve communication and feeding, child psychology to evaluate and address psychosocial needs regarding recent functional decline, skilled rehabilitative nursing to help monitor response to medical therapeutics,  for ongoing social needs, and skilled physiatry medical management to address complex medical and physiatric needs and to coordinate the team rehabilitative approach.      Pediatric PM&R will continue to follow.

## 2021-06-15 NOTE — PROGRESS NOTE PEDS - ASSESSMENT
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 Presented in IART - s/p EP study with unsuccessful attempt at ablation along with diagnostic cath showing elevated Fontan pressure (20mmHg). He was loaded with amiodarone and ultimately electrically cardioverted out, however reverted back into IART and continues to be in IART. His course is further complicated by L frontal parenchymal hemorrhage requiring emergent evacuation with NSx and EVD placement (which is no clamped since Thursday 6/10) and VTach of unknown origin.  Sats in mid to high 80s.   He continues to be critically ill.     CVS:  - Continuos telemetry monitoring.  - Pacemaker set at DDD 70-120bpm.   - Sildenafil 10 mg q8h IV. Switch back to enteral 20mg q8.  - lasix to 10 mg Q12H (home dose). Goal net even. Monitor creat.   - Sats goal >85%.   - Monitor blood pressure. Goal MAPs > 70. Agree with norepi and/or vasopressin as needed. Titrate to goal per PICU to maintain CPP.  - Hold home lisinopril, aldactone. Agree with plan to ensure SBP not persistently higher than 140. Nicardapine as needed. Would try to reinstitute home medications when able to give enteral medications. Need to consider that now on clonidine  - can stop lidocaine drip today  - f/u lidocaine level  - Mexlitine today at 150mg q8h. Turn off lidocaine after 5 doses of mexilitene. (tomorrow)    Resp:   - Wean as tolerated.   - CTA obtained to eval for PE - difficult to protocol because of Fontan anatomy and contrast timing, no PE seen, but does show VV collaterals (likely main reason for hypoxia)      FEN/GI:   - Currently on formula via NGT.   - Pepsid.   - Senna    ID:   -Ancef while EVD in place.     Heme:   - Anticoagulation care per hematology/PICU/NSx  - Xarelto on hold  - Goal PLTs >100  - FFP to keep INR < 2.0 (but before procedures e.g. EVD removal will huddle with hematology to bring INR down < 1.5 if not there already)    Neuro:   - L frontal parenchymal hemorrhage requiring emergent evacuation with NSx and EVD placement-->  EVD clamped (6/10)--> NSx following  - VEEG on per neuro - no seizures so far  - On Keppra  - On Clonidine patch  - On melatonin for sleep.   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 Presented in IART - s/p EP study with unsuccessful attempt at ablation along with diagnostic cath showing elevated Fontan pressure (20mmHg). He was loaded with amiodarone and ultimately electrically cardioverted out, however reverted back into IART and continues to be in IART. His course is further complicated by L frontal parenchymal hemorrhage requiring emergent evacuation with NSx and EVD placement (which is no clamped since Thursday 6/10) and VTach of unknown origin.  Sats in mid to high 80s.   He continues to be critically ill.     CVS:  - Continuos telemetry monitoring.  - Pacemaker set at DDD 70-120bpm.   - Sildenafil 10 mg q8h IV. Switch back to enteral 20mg q8.  - lasix to 10 mg Q12H (home dose). Goal net even. Monitor creat.   - Sats goal >88%.   - Monitor blood pressure. Goal MAPs > 70. Agree with norepi and/or vasopressin as needed. Titrate to goal per PICU to maintain CPP.  - Hold home lisinopril, aldactone. Agree with plan to ensure SBP not persistently higher than 140. Nicardapine as needed. Would try to reinstitute home medications when able to give enteral medications. Need to consider that now on clonidine  - can stop lidocaine drip today  - f/u lidocaine level  - Mexlitine today at 150mg q8h. Turn off lidocaine after 5 doses of mexilitene. (tomorrow)    Resp:   - Wean as tolerated.   - CTA obtained to eval for PE - difficult to protocol because of Fontan anatomy and contrast timing, no PE seen, but does show VV collaterals (likely main reason for hypoxia)      FEN/GI:   - Currently on formula via NGT.   - Pepsid.   - Senna    ID:   -Ancef while EVD in place.     Heme:   - Anticoagulation care per hematology/PICU/NSx  - Xarelto on hold  - Goal PLTs >100  - FFP to keep INR < 2.0 (but before procedures e.g. EVD removal will huddle with hematology to bring INR down < 1.5 if not there already)    Neuro:   - L frontal parenchymal hemorrhage requiring emergent evacuation with NSx and EVD placement-->  EVD clamped (6/10)--> NSx following  - VEEG on per neuro - no seizures so far  - On Keppra  - On Clonidine patch  - On melatonin for sleep.

## 2021-06-15 NOTE — PROGRESS NOTE PEDS - SUBJECTIVE AND OBJECTIVE BOX
Jimbo is a 20 year-old male admitted for cardioversion of interatrial re-entrant tachycardia (IART) found to have L-sided stroke with hemorrhagic conversion requiring intervention.    Interval history:   Patient was seen and examined in his room with mom at bedside. She reports concern for possible seizure but otherwise he is actually more alert and interacting better with others than prior. She was excited he was able to give her a kiss today. Tolerating propranolol well and mom reports agitation has improved. He also is reportedly moving his right leg slightly more but still having difficulty with the right arm.     REVIEW OF SYSTEMS:   CONSTITUTIONAL: No fevers or chills.   PSYCH: No agitation currently.   CARDIOVASCULAR: No peripheral edema.  RESPIRATORY: No shortness of breath.  GASTROINTESTINAL: +TPN, +constipation.  MUSCULOSKELETAL: No loss of range of motion.  NEUROLOGICAL: +Right sided weakness.  ENDOCRINE: No intolerance to heat/cold.    PAST MEDICAL & SURGICAL HISTORY  Double inlet left ventricle  D-TGA (dextro-transposition of great arteries)  Sick sinus syndrome  Atrial tachycardia  Hepatomegaly  Other ascites  Pulmonary hypertension  Pacemaker  AV block  S/P Fontan procedure  Coagulopathy  Cardiac anomaly, congenital  S/P bidirectional Storm shunt  S/P Stansel operation  S/P Fontan procedure  S/P cardiac cath  S/P Nirmal-Taussig shunt    SOCIAL HISTORY  Lives in apartment building with family, 3 [address]AR[address] and 3 full flights to apt, no elevator. Per mom he has option to stay with maternal grandmother who has pvt home, 0 [address], 1st floor bathroom and set up for bedroom. Mom able to assist with care FT.    FAMILY HISTORY   No pertinent family history in first degree relatives    ALLERGIES  No Known Allergies    MEDICATIONS  (STANDING):  ceFAZolin  IV Intermittent - Peds 1980 milliGRAM(s) IV Intermittent every 8 hours  chlorhexidine 2% Topical Cloths - Peds 1 Application(s) Topical daily  famotidine  Oral Liquid - Peds 33 milliGRAM(s) Oral every 12 hours  furosemide   Oral Liquid - Peds 10 milliGRAM(s) Enteral Tube every 12 hours  lactobacillus Oral Powder (CULTURELLE KIDS) - Peds 1 Packet(s) Oral daily  levETIRAcetam IV Intermittent - Peds 1500 milliGRAM(s) IV Intermittent every 12 hours  melatonin Oral Liquid - Peds 3 milliGRAM(s) Oral at bedtime  metolazone Oral Liquid - Peds 20 milliGRAM(s) Oral daily  Mexiletine 10 mg/mL Oral Suspension 150 milliGRAM(s) 150 milliGRAM(s) Oral every 8 hours  phytonadione  Oral Liquid - Peds 5 milliGRAM(s) Enteral Tube <User Schedule>  polyvinyl alcohol 1.4%/povidone 0.6% Ophthalmic Solution - Peds 2 Drop(s) Both EYES four times a day  propranolol  Oral Liquid - Peds 10 milliGRAM(s) Enteral Tube every 8 hours  sildenafil   Oral Tab/Cap - Peds 20 milliGRAM(s) Oral every 8 hours  sodium chloride 0.9% -  250 milliLiter(s) (3 mL/Hr) IV Continuous <Continuous>  sodium chloride 0.9% lock flush - Peds 3 milliLiter(s) IV Push every 8 hours    MEDICATIONS  (PRN):  acetaminophen   Oral Liquid - Peds. 650 milliGRAM(s) Oral every 6 hours PRN Moderate Pain (4 - 6)  polyethylene glycol 3350 Oral Powder - Peds 17 Gram(s) Oral daily PRN Constipation  senna Oral Liquid - Peds 15 milliLiter(s) Oral daily PRN Constipation\    ICU Vital Signs Last 24 Hrs  T(C): 36.9 (15 Peter 2021 11:00), Max: 36.9 (2021 23:10)  T(F): 98.4 (15 Peter 2021 11:00), Max: 98.4 (2021 23:10)  HR: 70 (15 Peter 2021 19:15) (70 - 70)  BP: 128/68 (15 Peter 2021 08:00) (122/62 - 128/68)  BP(mean): 86 (15 Peter 2021 08:00) (85 - 86)  ABP: 130/68 (15 Peter 2021 18:00) (97/48 - 175/87)  ABP(mean): 91 (15 Peter 2021 18:00) (65 - 118)  RR: 30 (15 Peter 2021 19:15) (18 - 36)  SpO2: 92% (15 Peter 2021 19:15) (81% - 93%)    ----------------------------------------------------------------------------------------  PHYSICAL EXAM  General:  Well-developed, well-nourished individual lying in hospital bed. Sleeping through visit today.   Skin:  Grossly negative for erythema, breakdown, or concerning pressure ulcers.  Vessels:  No lower extremity edema.   Lung:  Breathing is comfortable and regular.  Mental:  Sleeping. Withdraws to pain.   Neurologic: No clonus. No hyperreflexia. No spasticity.   Musculoskeletal: No range of motion restrictions except heel cord tightness. Dorsiflexion with knees extended was only about 5 degrees bilaterally.

## 2021-06-15 NOTE — PROGRESS NOTE PEDS - ASSESSMENT
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 He has elevated Fontan pressure secondary to LV diastolic dysfunction.  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 Persistent hypoxia likely from VV collaterals (confirmed on CTA), back in IART (not hemodynamically compromising), and recently with runs of V-tach (unclear cause) requiring lidocaine initiation. Significant issues with neuroagitation and delerium.  VV collaterals seen on CTA.    PLAN:  Neuro:  melatonin Qday  transition to propranolol (stop clonidine) per PMR recs (hold for SBP< 100)  --will hold off of Antipsychotics for now-given the ventricular tachydysrhythmia and the risk for Qtc prolongation. Delirium seems improved.     - environmental interventions  - vEEG on per neuro - no sz's so far  - Keppra (clinical szs)  - consider dekakote if seizures persist (will see if drug interactuon)   - R sided hemiparesis  - EVD to 0   - Repeat head CT 6/11 - stable--  - skull flap off, will need prosthetic  - tylenol prn for fever control  [  ] PT/OT/speech/rehab consult  -cannot have mri    Resp:  HFNC 50L 0.5 FiO2 - will continue to moniotr Respiratory status/ SpO2 and adjust as needed  - CTA obtained to eval for PE - difficult to protocol because of Fontan anatomy and contrast timing, no PE seen, but does show VV collaterals (likely main reason for hypoxia)  Goal SpO2 > 85% - may need to lower goals to 80% with collaterals  pulmonary toilet/ cough assist    CV:  continue mexilitine    - having some runs of his underlying atrial tachycardia per EP - monitor for now  Mode is DDD but atrial wires without good sensitivity, so effectively VVI 70  Still in IART  Home meds on hold (Lisinopril, Aldactone)  Continue Sildenafil (make po today)  Continue Lasix IV Q8hr --> q12h : Fluid goal even to slightly positive (Negative for LOS Is and Os and Scr trending up/ recent contrast exposure)  SBP < 140  echo 6/8 extremely limited windows  HOLA when goes for neurosugical procedure (when intubated)    Heme:  Xarelto on hold  Goal PLTs >100  FFP to keep INR < 2.0 (but before procedures e.g. EVD removal will huddle with hematology to bring INR down < 1.5 if not there already)  Hematology and NRSGY in discussions  s/p Vitamin K x3 days,   now po Vit K 3x/week    FEN:  Pepcid -renally dosed   Bowel regimen: Senna and Miralax PRN  Started NG feeds  @ 70 ml/hr--at full strength, condense today  nutrition consult    ID  - CTX started 6/6 for LG fever  - neg r/o (may be from blood in brain) --> go back to Ancef while EVD in    Other  - L hydrocele  - outpt f/u    Access  Right Fem CVL (6/9) (replaced the L fem CVL at that time)--> out today  Connor MACHADO (6/1)      daily labs CBC cmp coags daily        20 y/o male DILV, 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 He has elevated Fontan pressure secondary to LV diastolic dysfunction.  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 Persistent hypoxia likely from VV collaterals (confirmed on CTA), back in IART (not hemodynamically compromising), and recently with runs of V-tach (unclear cause) requiring lidocaine initiation. Significant issues with neuroagitation and delerium.      PLAN:  Neuro:  melatonin Qday  continue propranolol per PMR recs (hold for SBP< 100)  --will hold off of Antipsychotics for now-given the ventricular tachydysrhythmia and the risk for Qtc prolongation. Delirium seems improved.     - environmental interventions  - vEEG on per neuro - no sz's so far  - Keppra (clinical szs)  - consider dekakote if seizures persist (will see if drug interactuon)   - R sided hemiparesis  - EVD to 0   - Repeat head CT 6/11 - stable--  - skull flap off, will need prosthetic  - tylenol prn for fever control  [  ] PT/OT/speech/rehab consult  -cannot have mri    Resp:  HFNC 50L 0.5 FiO2 - will continue to moniotr Respiratory status/ SpO2 and adjust as needed  - CTA obtained to eval for PE - difficult to protocol because of Fontan anatomy and contrast timing, no PE seen, but does show VV collaterals (likely main reason for hypoxia)  Goal SpO2 > 85% - may need to lower goals to 80% with collaterals  pulmonary toilet/ cough assist    CV:  continue mexilitine    - having some runs of his underlying atrial tachycardia per EP - monitor for now  Mode is DDD but atrial wires without good sensitivity, so effectively VVI 70  Still in IART  Home meds on hold (Lisinopril, Aldactone)  Continue Sildenafil   Continue Lasix IV Q8hr --> q12h :   start metolazone Qday with Fluid goal negative given plueral effussion on sono  SBP < 140  echo 6/8 extremely limited windows  HOLA when goes for neurosugical procedure (when intubated)    Heme:  Xarelto on hold  Goal PLTs >100  FFP to keep INR < 2.0 (but before procedures e.g. EVD removal will huddle with hematology to bring INR down < 1.5 if not there already)  Hematology and NRSGY in discussions  s/p Vitamin K x3 days,   now po Vit K 3x/week    FEN:  Pepcid -renally dosed   Bowel regimen: Senna and Miralax PRN   NG feeds  @ 70 ml/hr--at full strength, condense today  nutrition consult    ID  - CTX started 6/6 for LG fever  - neg r/o (may be from blood in brain) --> go back to Ancef while EVD in    Other  - L hydrocele  - outpt f/u    Access  Right Fem CVL (6/9) (replaced the L fem CVL at that time)--> out today  Connor MACHADO (6/1)      daily labs CBC cmp coags daily

## 2021-06-15 NOTE — CHART NOTE - NSCHARTNOTEFT_GEN_A_CORE
Right femoral central venous line removed at bedside. Pressure held until hemostasis achieved. Occlusive dressing applied to site.   Manuel SCHREIBER PGY 2

## 2021-06-15 NOTE — PROGRESS NOTE PEDS - ASSESSMENT
21 yo M with complex cardiac hx including double inlet left ventricle, L-malposition of great arteries, sick sinus syndrome and AV mihir disease with a dual chamber pacemaker and multiple cardiac surgeries including Fontan procedure admitted for IART w/ recent failed ablation and attempted cardioversion. Neurology re-engaged for worsening mental status overnight after recent ROCCO-[place] stroke with hemorrhagic conversion resulting in dense R hemiparesis (resolving over time) and requiring EVD placement and monitoring with serial head CTs. Patient now demonstrating waxing and waning poor mental status with eye rolling episodes and somnolence. CT head done this morning and stable without worsening hemorrhage or new strokes. Neurology re-engaged for episodes of upward eye deviation, which were previously captured on EEG to be non-epileptic in nature. Although brief episode noted overnight, will continue to monitor clinically for any further episodes. Would consider Depakote as a potential adjunctive therapy if he continues to have generalized tonic clonic seizures. His exam has also improved significantly over the week with more responsiveness and interaction with providers.     Recommendations:   [ ] continue Keppra 1500mg BID  [ ] low threshold to repeat CT head w/o contrast PRN for worsening mental status or neuro exam  [ ] adhere to measures to reduce delirium: frequent reorientation, maintain sleep/wake routine, avoid sleeping during the day, keep lights on during the day, use of glasses if patient needs them    Patient seen and discussed with neurology attending, Dr. Jama.

## 2021-06-15 NOTE — PROGRESS NOTE PEDS - SUBJECTIVE AND OBJECTIVE BOX
21 yo M admitted for cardioversion of interatrial re-entrant tachycardia (IART) found to have L-sided stroke with hemorrhagic conversion requiring intervention.    Interval History: 10 second episode of bilateral upper and lower extremity shaking in the setting of increased congestion and difficulty clearing secretions. No further episodes noted afterwards, was given ativan 2mg after resolution of the seizure like activity.    MEDICATIONS  (STANDING):  ceFAZolin  IV Intermittent - Peds 1980 milliGRAM(s) IV Intermittent every 8 hours  chlorhexidine 2% Topical Cloths - Peds 1 Application(s) Topical daily  famotidine IV Intermittent - Peds 20 milliGRAM(s) IV Intermittent every 12 hours  furosemide   Oral Liquid - Peds 10 milliGRAM(s) Enteral Tube every 12 hours  heparin   Infusion - Pediatric 0.045 Unit(s)/kG/Hr (3 mL/Hr) IV Continuous <Continuous>  lactobacillus Oral Powder (CULTURELLE KIDS) - Peds 1 Packet(s) Oral daily  levETIRAcetam IV Intermittent - Peds 1500 milliGRAM(s) IV Intermittent every 12 hours  melatonin Oral Liquid - Peds 3 milliGRAM(s) Oral at bedtime  Mexiletine 10 mg/mL Oral Suspension 150 milliGRAM(s) 150 milliGRAM(s) Oral every 8 hours  phytonadione  Oral Liquid - Peds 5 milliGRAM(s) Enteral Tube <User Schedule>  polyvinyl alcohol 1.4%/povidone 0.6% Ophthalmic Solution - Peds 2 Drop(s) Both EYES four times a day  propranolol  Oral Liquid - Peds 10 milliGRAM(s) Enteral Tube every 8 hours  sildenafil   Oral Tab/Cap - Peds 20 milliGRAM(s) Oral every 8 hours  sodium chloride 0.9% -  250 milliLiter(s) (3 mL/Hr) IV Continuous <Continuous>  sodium chloride 0.9% lock flush - Peds 3 milliLiter(s) IV Push every 8 hours  sodium chloride 0.9%. - Pediatric 1000 milliLiter(s) (3 mL/Hr) IV Continuous <Continuous>  sodium chloride 0.9%. - Pediatric 1000 milliLiter(s) (3 mL/Hr) IV Continuous <Continuous>    MEDICATIONS  (PRN):  acetaminophen   Oral Liquid - Peds. 650 milliGRAM(s) Oral every 6 hours PRN Moderate Pain (4 - 6)  polyethylene glycol 3350 Oral Powder - Peds 17 Gram(s) Oral daily PRN Constipation  senna Oral Liquid - Peds 15 milliLiter(s) Oral daily PRN Constipation    ICU Vital Signs Last 24 Hrs  T(C): 36.9 (15 Peter 2021 11:00), Max: 36.9 (2021 23:10)  T(F): 98.4 (15 Peter 2021 11:00), Max: 98.4 (2021 23:10)  HR: 70 (15 Peter 2021 12:00) (70 - 70)  BP: 128/68 (15 Peter 2021 08:00) (104/66 - 128/68)  BP(mean): 86 (15 Peter 2021 08:00) (85 - 86)  ABP: 124/63 (15 Peter 2021 12:00) (97/48 - 175/87)  ABP(mean): 85 (15 Peter 2021 12:00) (65 - 118)  RR: 22 (15 Peter 2021 12:00) (18 - 36)  SpO2: 89% (15 Peter 2021 12:00) (81% - 93%)      GENERAL PHYSICAL EXAM  General:       Sitting up in bed, eyes open and responsive/interactive with provider     NEUROLOGIC EXAM  Mental Status:    Awake, alert, able to answer questions and able to follow simple commands   Cranial Nerves:    Pupils 4 mm and reactive b/l, eyes cross midline, no apparent facial asymmetry  Motor:                Extremities x4 antigravity briefly in response to noxious stim, and able to follow commands, R sided proximal weakness > distal weakness.  DTR:                   Brisk but symmetric biceps, brachioradialis, patellas and Achilles  Sensation:           Intact to noxious stimuli and touch x4 extremities           Lab Results:                        12.0   6.34  )-----------( 157      ( 2021 01:31 )             38.2       06-11    147<H>  |  110<H>  |  32<H>  ----------------------------<  128<H>  3.9   |  21<L>  |  0.88    Ca    9.5      2021 01:31  Phos  2.6     06-11  Mg     2.2     06-11    TPro  7.8  /  Alb  4.0  /  TBili  1.3<H>  /  DBili  x   /  AST  36  /  ALT  11  /  AlkPhos  94  -      Prothrombin Time and INR, Plasma (21 @ 01:31)   Prothrombin Time, Plasma: 16.9 sec   INR: 1.50      EEG Results: In progress, preliminary interpretation no seizure correlate to upward eye deviation, generalized background slowing     IMAGING:     < from: CT Head No Cont (06.15.21 @ 02:23) >  IMPRESSION: Extensive postop changes again seen with areas of hemorrhage involving left frontal region as well as along the shunt catheter tract.    < end of copied text >    < end of copied text >     19 yo M admitted for cardioversion of interatrial re-entrant tachycardia (IART) found to have L-sided stroke with hemorrhagic conversion requiring intervention.    Interval History: 5-10 second episode of bilateral upper and lower extremity shaking in the setting of increased congestion and difficulty clearing secretions. No further episodes noted afterwards, was given ativan 2mg after resolution of the seizure like activity. Mom reports his mental status is much better today    MEDICATIONS  (STANDING):  ceFAZolin  IV Intermittent - Peds 1980 milliGRAM(s) IV Intermittent every 8 hours  chlorhexidine 2% Topical Cloths - Peds 1 Application(s) Topical daily  famotidine IV Intermittent - Peds 20 milliGRAM(s) IV Intermittent every 12 hours  furosemide   Oral Liquid - Peds 10 milliGRAM(s) Enteral Tube every 12 hours  heparin   Infusion - Pediatric 0.045 Unit(s)/kG/Hr (3 mL/Hr) IV Continuous <Continuous>  lactobacillus Oral Powder (CULTURELLE KIDS) - Peds 1 Packet(s) Oral daily  levETIRAcetam IV Intermittent - Peds 1500 milliGRAM(s) IV Intermittent every 12 hours  melatonin Oral Liquid - Peds 3 milliGRAM(s) Oral at bedtime  Mexiletine 10 mg/mL Oral Suspension 150 milliGRAM(s) 150 milliGRAM(s) Oral every 8 hours  phytonadione  Oral Liquid - Peds 5 milliGRAM(s) Enteral Tube <User Schedule>  polyvinyl alcohol 1.4%/povidone 0.6% Ophthalmic Solution - Peds 2 Drop(s) Both EYES four times a day  propranolol  Oral Liquid - Peds 10 milliGRAM(s) Enteral Tube every 8 hours  sildenafil   Oral Tab/Cap - Peds 20 milliGRAM(s) Oral every 8 hours  sodium chloride 0.9% -  250 milliLiter(s) (3 mL/Hr) IV Continuous <Continuous>  sodium chloride 0.9% lock flush - Peds 3 milliLiter(s) IV Push every 8 hours  sodium chloride 0.9%. - Pediatric 1000 milliLiter(s) (3 mL/Hr) IV Continuous <Continuous>  sodium chloride 0.9%. - Pediatric 1000 milliLiter(s) (3 mL/Hr) IV Continuous <Continuous>    MEDICATIONS  (PRN):  acetaminophen   Oral Liquid - Peds. 650 milliGRAM(s) Oral every 6 hours PRN Moderate Pain (4 - 6)  polyethylene glycol 3350 Oral Powder - Peds 17 Gram(s) Oral daily PRN Constipation  senna Oral Liquid - Peds 15 milliLiter(s) Oral daily PRN Constipation    ICU Vital Signs Last 24 Hrs  T(C): 36.9 (15 Peter 2021 11:00), Max: 36.9 (2021 23:10)  T(F): 98.4 (15 Peter 2021 11:00), Max: 98.4 (2021 23:10)  HR: 70 (15 Peter 2021 12:00) (70 - 70)  BP: 128/68 (15 Peter 2021 08:00) (104/66 - 128/68)  BP(mean): 86 (15 Peter 2021 08:00) (85 - 86)  ABP: 124/63 (15 Peter 2021 12:00) (97/48 - 175/87)  ABP(mean): 85 (15 Peter 2021 12:00) (65 - 118)  RR: 22 (15 Peter 2021 12:00) (18 - 36)  SpO2: 89% (15 Peter 2021 12:00) (81% - 93%)      GENERAL PHYSICAL EXAM  General:       Sitting up in bed, eyes open and responsive/interactive with provider     NEUROLOGIC EXAM  Mental Status:    Awake, alert, able to answer questions and able to follow simple commands   Cranial Nerves:    Pupils 4 mm and reactive b/l, eyes cross midline, no apparent facial asymmetry  Motor:                Extremities x4 antigravity briefly in response to noxious stim, and able to follow commands, R sided proximal weakness > distal weakness.  DTR:                   Brisk but symmetric biceps, brachioradialis, patellas and Achilles  Sensation:           Intact to noxious stimuli and touch x4 extremities           Lab Results:                        12.0   6.34  )-----------( 157      ( 2021 01:31 )             38.2       06-11    147<H>  |  110<H>  |  32<H>  ----------------------------<  128<H>  3.9   |  21<L>  |  0.88    Ca    9.5      2021 01:31  Phos  2.6     06-11  Mg     2.2     06-11    TPro  7.8  /  Alb  4.0  /  TBili  1.3<H>  /  DBili  x   /  AST  36  /  ALT  11  /  AlkPhos  94  06-11      Prothrombin Time and INR, Plasma (21 @ 01:31)   Prothrombin Time, Plasma: 16.9 sec   INR: 1.50      EEG Results: In progress, preliminary interpretation no seizure correlate to upward eye deviation, generalized background slowing     IMAGING:     < from: CT Head No Cont (06.15.21 @ 02:23) >  IMPRESSION: Extensive postop changes again seen with areas of hemorrhage involving left frontal region as well as along the shunt catheter tract.    < end of copied text >    < end of copied text >

## 2021-06-16 PROCEDURE — 77011 CT SCAN FOR LOCALIZATION: CPT | Mod: 26

## 2021-06-16 PROCEDURE — 99291 CRITICAL CARE FIRST HOUR: CPT

## 2021-06-16 RX ORDER — LEVETIRACETAM 250 MG/1
1500 TABLET, FILM COATED ORAL
Refills: 0 | Status: DISCONTINUED | OUTPATIENT
Start: 2021-06-16 | End: 2021-06-29

## 2021-06-16 RX ORDER — FUROSEMIDE 40 MG
10 TABLET ORAL EVERY 12 HOURS
Refills: 0 | Status: DISCONTINUED | OUTPATIENT
Start: 2021-06-16 | End: 2021-06-29

## 2021-06-16 RX ORDER — LEVETIRACETAM 250 MG/1
1500 TABLET, FILM COATED ORAL EVERY 12 HOURS
Refills: 0 | Status: DISCONTINUED | OUTPATIENT
Start: 2021-06-16 | End: 2021-06-16

## 2021-06-16 RX ORDER — LANOLIN ALCOHOL/MO/W.PET/CERES
3 CREAM (GRAM) TOPICAL AT BEDTIME
Refills: 0 | Status: DISCONTINUED | OUTPATIENT
Start: 2021-06-16 | End: 2021-06-17

## 2021-06-16 RX ORDER — FAMOTIDINE 10 MG/ML
20 INJECTION INTRAVENOUS
Refills: 0 | Status: DISCONTINUED | OUTPATIENT
Start: 2021-06-16 | End: 2021-06-21

## 2021-06-16 RX ADMIN — FAMOTIDINE 20 MILLIGRAM(S): 10 INJECTION INTRAVENOUS at 17:52

## 2021-06-16 RX ADMIN — SODIUM CHLORIDE 3 MILLILITER(S): 9 INJECTION, SOLUTION INTRAVENOUS at 15:47

## 2021-06-16 RX ADMIN — Medication 20 MILLIGRAM(S): at 14:30

## 2021-06-16 RX ADMIN — Medication 198 MILLIGRAM(S): at 22:09

## 2021-06-16 RX ADMIN — Medication 2 DROP(S): at 09:44

## 2021-06-16 RX ADMIN — Medication 2 DROP(S): at 22:09

## 2021-06-16 RX ADMIN — Medication 20 MILLIGRAM(S): at 22:13

## 2021-06-16 RX ADMIN — LEVETIRACETAM 1500 MILLIGRAM(S): 250 TABLET, FILM COATED ORAL at 15:51

## 2021-06-16 RX ADMIN — SODIUM CHLORIDE 3 MILLILITER(S): 9 INJECTION INTRAMUSCULAR; INTRAVENOUS; SUBCUTANEOUS at 02:17

## 2021-06-16 RX ADMIN — FAMOTIDINE 33 MILLIGRAM(S): 10 INJECTION INTRAVENOUS at 05:09

## 2021-06-16 RX ADMIN — Medication 5 MILLIGRAM(S): at 08:53

## 2021-06-16 RX ADMIN — Medication 198 MILLIGRAM(S): at 06:09

## 2021-06-16 RX ADMIN — Medication 1 PACKET(S): at 09:43

## 2021-06-16 RX ADMIN — Medication 20 MILLIGRAM(S): at 06:09

## 2021-06-16 RX ADMIN — Medication 10 MILLIGRAM(S): at 17:49

## 2021-06-16 RX ADMIN — SODIUM CHLORIDE 3 MILLILITER(S): 9 INJECTION, SOLUTION INTRAVENOUS at 07:20

## 2021-06-16 RX ADMIN — Medication 10 MILLIGRAM(S): at 05:09

## 2021-06-16 RX ADMIN — SODIUM CHLORIDE 3 MILLILITER(S): 9 INJECTION INTRAMUSCULAR; INTRAVENOUS; SUBCUTANEOUS at 09:45

## 2021-06-16 RX ADMIN — SODIUM CHLORIDE 3 MILLILITER(S): 9 INJECTION, SOLUTION INTRAVENOUS at 19:30

## 2021-06-16 RX ADMIN — LEVETIRACETAM 400 MILLIGRAM(S): 250 TABLET, FILM COATED ORAL at 05:09

## 2021-06-16 RX ADMIN — LEVETIRACETAM 1500 MILLIGRAM(S): 250 TABLET, FILM COATED ORAL at 22:10

## 2021-06-16 RX ADMIN — Medication 2 DROP(S): at 14:50

## 2021-06-16 RX ADMIN — Medication 198 MILLIGRAM(S): at 14:30

## 2021-06-16 RX ADMIN — SODIUM CHLORIDE 3 MILLILITER(S): 9 INJECTION INTRAMUSCULAR; INTRAVENOUS; SUBCUTANEOUS at 18:44

## 2021-06-16 NOTE — SWALLOW BEDSIDE ASSESSMENT PEDIATRIC - SLP PERTINENT HISTORY OF CURRENT PROBLEM
Jimbo is a 20 year-old male admitted for cardioversion of interatrial re-entrant tachycardia (IART) found to have L-sided stroke with hemorrhagic conversion requiring decompressive craniectomy on 6/1/21

## 2021-06-16 NOTE — SWALLOW BEDSIDE ASSESSMENT PEDIATRIC - SLP GENERAL OBSERVATIONS
Patient received in bed, +NGT, on RA, +IV, +EVD. MOC present at bedside, PCA present. Permission to evaluate by nsg. Patient with low volume when speaking and communicated primarily via single word utterances and gestures. Patient required extended wait time to respond to questions and execute directives.

## 2021-06-16 NOTE — SWALLOW BEDSIDE ASSESSMENT PEDIATRIC - ORAL PREPARATORY PHASE PEDS
Adequate jaw stability for open cup drinking, Slow but efficient mastication noted demonstrated for solid trials. Cohesive bolus formation demonstrated.

## 2021-06-16 NOTE — PROGRESS NOTE PEDS - SUBJECTIVE AND OBJECTIVE BOX
INTERVAL HISTORY: No acute events, more interactive and awake.     RESPIRATORY SUPPORT: RA  NUTRITION: Jevity bolus feeds      -15 @ 07:01  -   @ 07:00  --------------------------------------------------------  IN: 1836 mL / OUT: 3432 mL / NET: -1596 mL    INTRAVASCULAR ACCESS: PIV x 3, RRA    MEDICATIONS:  furosemide   Oral Liquid - Peds 10 milliGRAM(s) Enteral Tube every 12 hours  metolazone Oral Liquid - Peds 20 milliGRAM(s) Oral daily  propranolol  Oral Liquid - Peds 10 milliGRAM(s) Enteral Tube every 8 hours  sildenafil   Oral Tab/Cap - Peds 20 milliGRAM(s) Oral every 8 hours  ceFAZolin  IV Intermittent - Peds 1980 milliGRAM(s) IV Intermittent every 8 hours  levETIRAcetam  Oral Liquid - Peds 1500 milliGRAM(s) Oral every 12 hours  melatonin Oral Liquid - Peds 3 milliGRAM(s) Oral at bedtime  famotidine  Oral Liquid - Peds 33 milliGRAM(s) Oral every 12 hours  phytonadione  Oral Liquid - Peds 5 milliGRAM(s) Enteral Tube <User Schedule>  sodium chloride 0.9% -  250 milliLiter(s) IV Continuous <Continuous>  sodium chloride 0.9% lock flush - Peds 3 milliLiter(s) IV Push every 8 hours    PHYSICAL EXAMINATION:  Vital signs -   T(C): 36.8 (21 @ 11:00), Max: 37 (21 @ 02:00)  HR: 70 (21 @ 11:00) (70 - 70)  BP: 120/79 (21 @ 11:00) (119/63 - 123/71)  ABP:  (92/70 - 130/68)  RR: 20 (21 @ 11:00) (16 - 32)  SpO2: 87% (21 @ 11:00) (87% - 92%)  CVP(mm Hg):  (13 - 23)    General - more awake and alert. Interactice and appropriate  Skin - no rash, no desquamation, no cyanosis.  Eyes / ENT - no conjunctival injection, sclerae anicteric, external ears & nares normal, mucous membranes moist.  Pulmonary - normal inspiratory effort, no retractions, lungs clear to auscultation bilaterally, no wheezes, no rales.  Cardiovascular - normal rate, regular rhythm, normal S1 & single S2, grade 1/6 blowing holosystolic murmur at LMSB, no rubs, no gallops, capillary refill < 2sec, normal pulses.  Gastrointestinal - soft, non-distended, non-tender, no splenomegaly. (+) hepatomegaly.  Musculoskeletal - no joint swelling, no clubbing, no edema.  Neurologic / Psychiatric - more awake and alert    LABORATORY TESTS:                          12.3  CBC:   8.87 )-----------( 226   (21 @ 02:22)                          39.4               134   |  96    |  19                 Ca: 10.1   BMP:   ----------------------------< 167    M.2   (21 @ 02:22)             3.7    |  20    | 0.70               Ph: 3.6      LFT:     TPro: 8.4 / Alb: 4.5 / TBili: 0.9 / DBili: x / AST: 34 / ALT: 21 / AlkPhos: 151   (21 @ 02:22)    COAG: PT: 16.0 / PTT: 36.3 / INR: 1.41   (21 @ 02:22)       ABG:   pH: 7.50 / pCO2: 29 / pO2: 52 / HCO3: 24 / Base Excess: -0.9 / SaO2: 87.1 / Lactate: x / iCa: 1.15   (21 @ 18:06)    IMAGING STUDIES:  Electrocardiogram - (21) Ventricular paced rhythm @ 75bpm. Underlying IART.    Telemetry - (6/15/21) Ventricular paced rhythm @ 75bpm. Underlying IART.    Chest x-ray - (21) Stable mild cardiomegaly with subsegmental left lower lobe atelectasis.    Echocardiogram - (21)   1. This was a technically difficult suboptimal study due to poor echo windows. Findings limited to below.   2. 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   3. Mild mitral valve regurgitation.   4. Trivial tricuspid valve regurgitation.   5. Trivial aortic valve regurgitation.   6. Status post device closure of Fontan fenestration.   7. S/P stent placement in the Fontan pathway. Limited imaging of the inferior Fontan baffle. In this setting, the inferior vena cava to its connection near the atrial portion of the baffle appears patent with no obstruction. S/p device closure of Fontan fenestration. The Fontan fenestration is not seen on this study. The right bidirectional Storm is seen only on color flow mapping. This appears to have laminar low velocity flow.   8. The connection of the right bidirectional Storm to the right pulmonary artery could not be imaged. The right pulmonary artery is seen in a limited portion immediately to the left of the Storm and appears patent. The left pulmonary artery could not be imaged.   9. Extremely poor and limited imaging of the lateral free walls of the single systemic left ventricle. On limited short axis views there appears to be adequate systolic excursion of the posterior wall of the left ventricle and decreased in the anterior wall. The bulboventricular foramen could not be imaged. Suggest alternate imaging for appropriate assessment of function.  10. No pericardial effusion.  11. Small right pleural effusion.    Cath - (21)  Access 4 Fr RFA, 7 Fr RFV x2 with US guidance.  Sat data (%): on 50% FiO2 LPA 73, RUPV 98, Ao 97; CI 2.6 L/min/m2 with Qp:Qs 1 :1.  Pressures (mmHg): Elevated mFontan = mIVC = 20. mPCW=16, No significant gradient across LV to AAo to Vikki (98/58/73).  Normal PVR while on sildenafil.  Angios showed unobstructed Fontan with existing stent within Fontan conduit.    Comment: IART ablation was attempted after the diagnostic cath but unsuccessful. Patient was overdrive paced out of IART. At the end of the case, Ao sat was 94% and LPA 66% on 50% FiO2

## 2021-06-16 NOTE — SWALLOW BEDSIDE ASSESSMENT PEDIATRIC - ASR SWALLOW LABIAL MOBILITY
Patient with difficulty executing alternating movements (i.e., retraction, protrusion)/impaired retraction/impaired pursing/impaired seal/impaired coordination

## 2021-06-16 NOTE — SWALLOW BEDSIDE ASSESSMENT PEDIATRIC - SWALLOW EVAL: RECOMMENDED DIET
Initiate oral diet of regular solids and thin fluids as tolerated by patient with supplementary non-oral means of nutrition/hydration per MD

## 2021-06-16 NOTE — SWALLOW BEDSIDE ASSESSMENT PEDIATRIC - PHARYNGEAL PHASE
Patient with throat clear 1x w/ hard solid secondary to large bolus consumption; not demonstrated when prompted to take small bites. NO overt s/s of penetration/aspiration or cardiopulmonary changes demonstrated for puree and soft solids. Mild swallow trigger delay based on digital palpation. No wet vocal quality. NO overt s/s of penetration/aspiration or cardiopulmonary changes demonstrated

## 2021-06-16 NOTE — PROGRESS NOTE PEDS - SUBJECTIVE AND OBJECTIVE BOX
Interval/Overnight Events:  CAPD score <9   more aware   _________________________________________________________________  Respiratory:  room air sats 88%    _________________________________________________________________  Cardiac:  Cardiac Rhythm: VVO @70    furosemide   Oral Liquid - Peds 10 milliGRAM(s) Enteral Tube every 12 hours  metolazone Oral Liquid - Peds 20 milliGRAM(s) Oral daily  propranolol  Oral Liquid - Peds 10 milliGRAM(s) Enteral Tube every 8 hours  sildenafil   Oral Tab/Cap - Peds 20 milliGRAM(s) Oral every 8 hours    _________________________________________________________________  Hematologic:      ________________________________________________________________  Infectious:    ceFAZolin  IV Intermittent - Peds 1980 milliGRAM(s) IV Intermittent every 8 hours    RECENT CULTURES:      ________________________________________________________________  Fluids/Electrolytes/Nutrition:  I&O's Summary    15 Peter 2021 07:01  -  2021 07:00  --------------------------------------------------------  IN: 1836 mL / OUT: 3432 mL / NET: -1596 mL    2021 07:01  -  2021 12:25  --------------------------------------------------------  IN: 222 mL / OUT: 456 mL / NET: -234 mL      Diet: NG feeds     famotidine  Oral Liquid - Peds 33 milliGRAM(s) Oral every 12 hours  phytonadione  Oral Liquid - Peds 5 milliGRAM(s) Enteral Tube <User Schedule>  polyethylene glycol 3350 Oral Powder - Peds 17 Gram(s) Oral daily PRN  senna Oral Liquid - Peds 15 milliLiter(s) Oral daily PRN  sodium chloride 0.9% -  250 milliLiter(s) IV Continuous <Continuous>  sodium chloride 0.9% lock flush - Peds 3 milliLiter(s) IV Push every 8 hours    _________________________________________________________________  Neurologic:  Adequacy of sedation and pain control has been assessed and adjusted    acetaminophen   Oral Liquid - Peds. 650 milliGRAM(s) Oral every 6 hours PRN  levETIRAcetam  Oral Liquid - Peds 1500 milliGRAM(s) Oral every 12 hours  melatonin Oral Liquid - Peds 3 milliGRAM(s) Oral at bedtime    ________________________________________________________________  Additional Meds:    lactobacillus Oral Powder (CULTURELLE KIDS) - Peds 1 Packet(s) Oral daily  Mexiletine 10 mg/mL Oral Suspension 150 milliGRAM(s) 150 milliGRAM(s) Oral every 8 hours  polyvinyl alcohol 1.4%/povidone 0.6% Ophthalmic Solution - Peds 2 Drop(s) Both EYES four times a day    ________________________________________________________________  Access:    Necessity of urinary, arterial, and venous catheters discussed  ________________________________________________________________  Labs:  AB - ( 2021 18:06 )  pH: 7.50  /  pCO2: 29    /  pO2: 52    / HCO3: 24    / Base Excess: -0.9  /  SaO2: 87.1  / Lactate: x                                                12.3                  Neurophils% (auto):   76.9   ( @ 02:22):    8.87 )-----------(226          Lymphocytes% (auto):  7.7                                           39.4                   Eosinphils% (auto):   2.3      Manual%: Neutrophils x    ; Lymphocytes x    ; Eosinophils x    ; Bands%: x    ; Blasts x                                  134    |  96     |  19                  Calcium: 10.1  / iCa: x      ( @ 02:22)    ----------------------------<  167       Magnesium: 2.2                              3.7     |  20     |  0.70             Phosphorous: 3.6      TPro  8.4    /  Alb  4.5    /  TBili  0.9    /  DBili  x      /  AST  34     /  ALT  21     /  AlkPhos  151    2021 02:22  (  @ 02:22 )   PT: 16.0 sec;   INR: 1.41 ratio  aPTT: 36.3 sec    _________________________________________________________________  Imaging:    _________________________________________________________________  PE:  T(C): 36.8 (21 @ 11:00), Max: 37 (21 @ 02:00)  HR: 70 (21 @ 11:00) (70 - 70)  BP: 120/79 (21 @ 11:00) (119/63 - 123/71)  ABP: 112/58 (21 @ 11:00) (92/70 - 130/68)  ABP(mean): 77 (21 @ 11:00) (65 - 91)  RR: 20 (21 @ 11:00) (16 - 32)  SpO2: 87% (21 @ 11:00) (87% - 92%)  CVP(mm Hg): 21 (06-15-21 @ 15:00) (13 - 23)      General:	In no distress  Respiratory:      Effort even and unlabored. Clear bilaterally. Good aeration. No rales,   .		rhonchi, retractions or wheezing.   CV:		Regular rate and rhythm. Normal S1/S2. No murmurs, rubs, or   .		gallop. Capillary refill < 2 seconds. Distal pulses 2+ and equal.  Abdomen:	Soft, non-distended. Bowel sounds present. No palpable   .		hepatosplenomegaly.  Skin:		No rash.  Extremities:	Warm and well perfused. No gross extremity deformities.  Neurologic:	Alert and oriented. No acute change from baseline exam.  ________________________________________________________________  Patient and Parent/Guardian was updated as to the progress/plan of care.    The patient remains in critical and unstable condition, and requires ICU care and monitoring. Total critical care time spent by attending physician was minutes, excluding procedure time.    The patient is improving but requires continued monitoring and adjustment of therapy.   Interval/Overnight Events:  CAPD score <9   more aware   _________________________________________________________________  Respiratory:  room air sats 88%    _________________________________________________________________  Cardiac:  Cardiac Rhythm: VVO @70    furosemide   Oral Liquid - Peds 10 milliGRAM(s) Enteral Tube every 12 hours  metolazone Oral Liquid - Peds 20 milliGRAM(s) Oral daily  propranolol  Oral Liquid - Peds 10 milliGRAM(s) Enteral Tube every 8 hours  sildenafil   Oral Tab/Cap - Peds 20 milliGRAM(s) Oral every 8 hours    _________________________________________________________________  Hematologic:      ________________________________________________________________  Infectious:    ceFAZolin  IV Intermittent - Peds 1980 milliGRAM(s) IV Intermittent every 8 hours    RECENT CULTURES:      ________________________________________________________________  Fluids/Electrolytes/Nutrition:  I&O's Summary    15 Peter 2021 07:01  -  2021 07:00  --------------------------------------------------------  IN: 1836 mL / OUT: 3432 mL / NET: -1596 mL    2021 07:01  -  2021 12:25  --------------------------------------------------------  IN: 222 mL / OUT: 456 mL / NET: -234 mL      Diet: NG feeds     famotidine  Oral Liquid - Peds 33 milliGRAM(s) Oral every 12 hours  phytonadione  Oral Liquid - Peds 5 milliGRAM(s) Enteral Tube <User Schedule>  polyethylene glycol 3350 Oral Powder - Peds 17 Gram(s) Oral daily PRN  senna Oral Liquid - Peds 15 milliLiter(s) Oral daily PRN  sodium chloride 0.9% -  250 milliLiter(s) IV Continuous <Continuous>  sodium chloride 0.9% lock flush - Peds 3 milliLiter(s) IV Push every 8 hours    _________________________________________________________________  Neurologic:  Adequacy of sedation and pain control has been assessed and adjusted    acetaminophen   Oral Liquid - Peds. 650 milliGRAM(s) Oral every 6 hours PRN  levETIRAcetam  Oral Liquid - Peds 1500 milliGRAM(s) Oral every 12 hours  melatonin Oral Liquid - Peds 3 milliGRAM(s) Oral at bedtime    ________________________________________________________________  Additional Meds:    lactobacillus Oral Powder (CULTURELLE KIDS) - Peds 1 Packet(s) Oral daily  Mexiletine 10 mg/mL Oral Suspension 150 milliGRAM(s) 150 milliGRAM(s) Oral every 8 hours  polyvinyl alcohol 1.4%/povidone 0.6% Ophthalmic Solution - Peds 2 Drop(s) Both EYES four times a day    ________________________________________________________________  Access:    Necessity of urinary, arterial, and venous catheters discussed  ________________________________________________________________  Labs:  AB - ( 2021 18:06 )  pH: 7.50  /  pCO2: 29    /  pO2: 52    / HCO3: 24    / Base Excess: -0.9  /  SaO2: 87.1  / Lactate: x                                                12.3                  Neurophils% (auto):   76.9   ( @ 02:22):    8.87 )-----------(226          Lymphocytes% (auto):  7.7                                           39.4                   Eosinphils% (auto):   2.3      Manual%: Neutrophils x    ; Lymphocytes x    ; Eosinophils x    ; Bands%: x    ; Blasts x                                  134    |  96     |  19                  Calcium: 10.1  / iCa: x      ( @ 02:22)    ----------------------------<  167       Magnesium: 2.2                              3.7     |  20     |  0.70             Phosphorous: 3.6      TPro  8.4    /  Alb  4.5    /  TBili  0.9    /  DBili  x      /  AST  34     /  ALT  21     /  AlkPhos  151    2021 02:22  (  @ 02:22 )   PT: 16.0 sec;   INR: 1.41 ratio  aPTT: 36.3 sec    _________________________________________________________________  Imaging:    _________________________________________________________________  PE:  T(C): 36.8 (21 @ 11:00), Max: 37 (21 @ 02:00)  HR: 70 (21 @ 11:00) (70 - 70)  BP: 120/79 (21 @ 11:00) (119/63 - 123/71)  ABP: 112/58 (21 @ 11:00) (92/70 - 130/68)  ABP(mean): 77 (21 @ 11:00) (65 - 91)  RR: 20 (21 @ 11:00) (16 - 32)  SpO2: 87% (21 @ 11:00) (87% - 92%)  CVP(mm Hg): 21 (06-15-21 @ 15:00) (13 - 23)      General:	In no distress, following commands significant improvement in mentation  Respiratory:      Effort even and unlabored. Clear bilaterally. Good aeration. No rales,   .		rhonchi, retractions or wheezing.   CV:		Regular rate and rhythm. Normal S1/single S2. hiolosystolic murmur at apex  . Capillary refill < 2 seconds. Distal pulses 2+ and equal.  Abdomen:	Soft, non-distended. Bowel sounds present.   Skin:		No rash.  Extremities:	Warm and well perfused.   Neurologic:	Alert Weaker R side, able to grasp hand on R, slight wiggle of R toes  L side stronger, Moves LUE/LLE spontaneously  No acute change from baseline exam.  ________________________________________________________________  Patient and Parent/Guardian was updated as to the progress/plan of care.    The patient remains in critical and unstable condition, and requires ICU care and monitoring. Total critical care time spent by attending physician was 45 minutes, excluding procedure time.

## 2021-06-16 NOTE — SWALLOW BEDSIDE ASSESSMENT PEDIATRIC - ASR SWALLOW ASPIRATION MONITOR
Monitor for s/s aspiration/penetration. If noted: d/c PO intake, provide non-oral nutrition/hydration/medication, and contact this service at pager [phone]/change of breathing pattern/cough/gurgly voice/fever/pneumonia/throat clearing/upper respiratory infection

## 2021-06-16 NOTE — SWALLOW BEDSIDE ASSESSMENT PEDIATRIC - IMPRESSIONS
Patient is a 20 year old male seen today for a clinical swallow evaluation s/p L frontal hemorrhagic stroke. Patient presents with mild oropharyngeal dysphagia evidenced by slow and labored oromotor movements for mastication and bolus formation with mild swallow trigger delay based on digital palpation. Patient consumed solids and thin fluids w/ NO overt s/s of penetration/aspiration or cardiopulmonary changes demonstrated. Recommend initiate oral diet of regular solids and thin fluids as tolerated by patient with supplementary non-oral means of nutrition/hydration per MD. This department will follow as necessary for ongoing assessment and intervention.

## 2021-06-16 NOTE — PROGRESS NOTE PEDS - ASSESSMENT
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 Presented in IART - s/p EP study with unsuccessful attempt at ablation along with diagnostic cath showing elevated Fontan pressure (20mmHg). He was loaded with amiodarone and ultimately electrically cardioverted out, however reverted back into IART and continues to be in IART. His course is further complicated by L frontal parenchymal hemorrhage requiring emergent evacuation with NSx and EVD placement (which is no clamped since Thursday 6/10) and VTach of unknown origin.  Sats in mid to high 80s.   He continues to be critically ill.     CVS:  - Continuos telemetry monitoring.  - Pacemaker set at DDD 70-120bpm.   - Sildenafil 10 mg q8h IV. Switch back to enteral 20mg q8.  - lasix to 10 mg Q12H (home dose). Goal net even. Monitor creat.   - Sats goal >88%.   - Monitor blood pressure. Goal MAPs > 70. Agree with norepi and/or vasopressin as needed. Titrate to goal per PICU to maintain CPP.  - Hold home lisinopril, aldactone. Agree with plan to ensure SBP not persistently higher than 140. Nicardapine as needed. Would try to reinstitute home medications when able to give enteral medications. Need to consider that now on clonidine  - Mexlitine 150mg q8    Resp:   - Wean as tolerated. on RA  - CTA obtained to eval for PE - difficult to protocol because of Fontan anatomy and contrast timing, no PE seen, but does show VV collaterals (likely main reason for hypoxia)      FEN/GI:   - Currently on formula via NGT.   - Pepsid.   - Senna    ID:   -Ancef while EVD in place.     Heme:   - Anticoagulation care per hematology/PICU/NSx  - Xarelto on hold  - Goal PLTs >100  - FFP to keep INR < 2.0 (but before procedures e.g. EVD removal will huddle with hematology to bring INR down < 1.5 if not there already)    Neuro:   - L frontal parenchymal hemorrhage requiring emergent evacuation with NSx and EVD placement-->  EVD clamped (6/10)--> NSx following  - VEEG on per neuro - no seizures so far  - On Keppra  - On Clonidine patch, propranolol, melatnonin PRN

## 2021-06-16 NOTE — PROGRESS NOTE PEDS - ATTENDING COMMENTS
greatly improved exam with evd at 0, also additional depakote must be stopped due to antiplt and he requires more surgery, wound c/d/i, will need cranioplasty and shunt friday

## 2021-06-16 NOTE — PROGRESS NOTE PEDS - SUBJECTIVE AND OBJECTIVE BOX
PAST 24hr EVENTS: EVD open at 0cm H20, patent, patient awake and alert.     PHYSICAL EXAM:   Vital Signs Last 24 Hrs  T(C): 36.8 (2021 05:00), Max: 37 (2021 02:00)  T(F): 98.2 (2021 05:00), Max: 98.6 (2021 02:00)  HR: 70 (2021 07:00) (70 - 70)  BP: 119/63 (15 Peter 2021 20:00) (119/63 - 119/63)  BP(mean): 78 (15 Peter 2021 20:00) (78 - 78)  RR: 23 (2021 07:00) (16 - 32)  SpO2: 91% (2021 07:00) (88% - 92%)    Awake, OE, speaks small words  PERRL, following some commands   Weaker R side, able to grasp hand on R, slight wiggle of R toes  L side stronger, Moves LUE/LLE spontaneously   Frontal pseudomeningocele collection, craniectomy site full/soft   Incision/Wound: c/d/i  EVD patent open at 0cm H20     I&O's Summary    15 Peter 2021 07:01  -  2021 07:00  --------------------------------------------------------  IN: 1836 mL / OUT: 3432 mL / NET: -1596 mL                              12.3   8.87  )-----------( 226      ( 2021 02:22 )             39.4     06-16    134<L>  |  96<L>  |  19  ----------------------------<  167<H>  3.7   |  20<L>  |  0.70    Ca    10.1      2021 02:22  Phos  3.6     06-16  Mg     2.2     06-16    TPro  8.4<H>  /  Alb  4.5  /  TBili  0.9  /  DBili  x   /  AST  34  /  ALT  21  /  AlkPhos  151  06-16    PT/INR - ( 2021 02:22 )   PT: 16.0 sec;   INR: 1.41 ratio         PTT - ( 2021 02:22 )  PTT:36.3 sec      MEDICATIONS  (STANDING):  ceFAZolin  IV Intermittent - Peds 1980 milliGRAM(s) IV Intermittent every 8 hours  famotidine  Oral Liquid - Peds 33 milliGRAM(s) Oral every 12 hours  furosemide   Oral Liquid - Peds 10 milliGRAM(s) Enteral Tube every 12 hours  lactobacillus Oral Powder (CULTURELLE KIDS) - Peds 1 Packet(s) Oral daily  levETIRAcetam IV Intermittent - Peds 1500 milliGRAM(s) IV Intermittent every 12 hours  melatonin Oral Liquid - Peds 3 milliGRAM(s) Oral at bedtime  metolazone Oral Liquid - Peds 20 milliGRAM(s) Oral daily  Mexiletine 10 mg/mL Oral Suspension 150 milliGRAM(s) 150 milliGRAM(s) Oral every 8 hours  phytonadione  Oral Liquid - Peds 5 milliGRAM(s) Enteral Tube <User Schedule>  polyvinyl alcohol 1.4%/povidone 0.6% Ophthalmic Solution - Peds 2 Drop(s) Both EYES four times a day  propranolol  Oral Liquid - Peds 10 milliGRAM(s) Enteral Tube every 8 hours  sildenafil   Oral Tab/Cap - Peds 20 milliGRAM(s) Oral every 8 hours  sodium chloride 0.9% -  250 milliLiter(s) (3 mL/Hr) IV Continuous <Continuous>  sodium chloride 0.9% lock flush - Peds 3 milliLiter(s) IV Push every 8 hours    MEDICATIONS  (PRN):  acetaminophen   Oral Liquid - Peds. 650 milliGRAM(s) Oral every 6 hours PRN Moderate Pain (4 - 6)  polyethylene glycol 3350 Oral Powder - Peds 17 Gram(s) Oral daily PRN Constipation  senna Oral Liquid - Peds 15 milliLiter(s) Oral daily PRN Constipation      RADIOLOGY:  < from: CT Head No Cont (06.15.21 @ 02:23) >    Postop changes compatible with a left frontal craniectomy is again identified. Left frontal shunt catheter is again identified with small amount of hemorrhage along the shunt catheter tract. The catheter position appears unchanged. Abnormal low-attenuation involving the left frontal cortical subcortical region is seen with some associated areas of hemorrhage. These findings were present on the prior study. There is extension of parenchyma through the craniectomy defect again identified. There is evidence of an extra-axial collection identified in the postop region which measures approximately 1.4 cm widest diameter.    Evaluation of the rest the brain parenchyma and osseous structures appear unchanged.    The visualized paranasal sinuses mastoid and middle ear regions appear clear.    IMPRESSION: Extensive postop changes again seen with areas of hemorrhage involving left frontal region as well as along the shunt catheter tract.

## 2021-06-16 NOTE — PROGRESS NOTE PEDS - ASSESSMENT
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 He has elevated Fontan pressure secondary to LV diastolic dysfunction.  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 Persistent hypoxia likely from VV collaterals (confirmed on CTA), back in IART (not hemodynamically compromising), and recently with runs of V-tach (unclear cause) requiring lidocaine initiation. Significant issues with neuroagitation and delerium.      PLAN:  Neuro:  melatonin Qday  continue propranolol per PMR recs (hold for SBP< 100)  --will hold off of Antipsychotics for now-given the ventricular tachydysrhythmia and the risk for Qtc prolongation. Delirium seems improved.     - environmental interventions  - vEEG on per neuro - no sz's so far  - Keppra (clinical szs)  - consider dekakote if seizures persist (will see if drug interactuon)   - R sided hemiparesis  - EVD to 0   - Repeat head CT 6/11 - stable--  - skull flap off, will need prosthetic  - tylenol prn for fever control  [  ] PT/OT/speech/rehab consult  -cannot have mri    Resp:  now on room air   - CTA obtained to eval for PE - difficult to protocol because of Fontan anatomy and contrast timing, no PE seen, but does show VV collaterals (likely main reason for hypoxia)  Goal SpO2 > 85% - may need to lower goals to 80% with collaterals      CV:  continue mexilitine    - having some runs of his underlying atrial tachycardia per EP - monitor for now  Mode is DDD but atrial wires without good sensitivity, so effectively VVI 70  Still in IART  Home meds on hold (Lisinopril, Aldactone)  Continue Sildenafil   Continue Lasix po q12  Metolazone as needed for increased oxygen requirement   sono r chest (6/15) with moderate effusion clinically improving   SBP < 140  echo 6/8 extremely limited windows  HOLA when goes for neurosugical procedure (when intubated)    Heme:  Xarelto on hold  Goal PLTs >100  FFP to keep INR < 2.0 (but before procedures e.g. EVD removal will huddle with hematology to bring INR down < 1.5 if not there already)  Hematology and NRSGY in discussions  s/p Vitamin K x3 days,   now po Vit K 3x/week    FEN:  Pepcid -renally dosed   Bowel regimen: Senna and Miralax PRN  NG feeds  @ 70 ml/hr--at full strength, condense today (and try Peptamen)  nutrition consult  speech and swallow    ID  - CTX started 6/6 for LG fever  - neg r/o (may be from blood in brain) --> go back to [facility] while EVD in    Other  - L hydrocele  - outpt f/u    Access  Connor MACHADO (6/1)  PIV     pre-op labs-cbc, chem 20, coags t/s. covid swab  stereotactic CT today         20 y/o male DILV, 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 He has elevated Fontan pressure secondary to LV diastolic dysfunction.  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 Persistent hypoxia likely from VV collaterals (confirmed on CTA), back in IART (not hemodynamically compromising), and recently with runs of V-tach (unclear cause) requiring lidocaine initiation. Significant issues with neuroagitation and delerium.      PLAN:  Neuro:  melatonin Qday  continue propranolol per PMR recs (hold for SBP< 100)  --will hold off of Antipsychotics for now-given the ventricular tachydysrhythmia and the risk for Qtc prolongation. Delirium seems much improved.     - environmental interventions  - vEEG on per neuro - no sz's so far  - Keppra (clinical szs)  - consider dekakote if seizures persist (will see if drug interactuon)   - R sided hemiparesis  - EVD to 0   - Repeat head CT- stable--  - skull flap off, will need prosthetic  - tylenol prn for fever control  [  ] PT/OT/speech/rehab consult  -cannot have mri  -plan for OR     Resp:  now on room air with sats high 80s  - CTA show VV collaterals (likely main reason for hypoxia)  Goal SpO2 > 85% - may need to lower goals to 80% with collaterals      CV:  continue mexilitine    - having some runs of his underlying atrial tachycardia per EP - monitor for now  Mode is DDD but atrial wires without good sensitivity, so effectively VVI 70 (will make VVO for OR friday)  Still in IART  Home meds on hold (Lisinopril, Aldactone)  Continue Sildenafil   Continue Lasix po q12  Metolazone as needed for increased oxygen requirement   sono r chest (6/15) with moderate effusion clinically improving with diuresis  SBP < 140  echo 6/8 extremely limited windows  HOLA when goes for neurosugical procedure (when intubated)    Heme:  Xarelto on hold  Goal PLTs >100  FFP to keep INR < 2.0 (but before procedures e.g. EVD removal will huddle with hematology to bring INR down < 1.5 if not there already)  Hematology and NRSGY in discussions  s/p Vitamin K x3 days,   now po Vit K 3x/week    FEN:  Pepcid -renally dosed   Bowel regimen: Senna and Miralax PRN  NG feeds  @ 70 ml/hr--at full strength, condense today (and try Peptamen)  nutrition consult  speech and swallow    ID  - CTX started 6/6 for LG fever  - neg r/o (may be from blood in brain) --> go back to [facility] while EVD in    Other  - L hydrocele  - outpt f/u    Access  Connor MACHADO (6/1)  PIV     pre-op labs-cbc, chem 20, coags t/s. covid swab  stereotactic CT today  cardiac anesthesia consult

## 2021-06-16 NOTE — PROGRESS NOTE PEDS - ASSESSMENT
21 y/o M with complex cardiac history presented after cardiac cath with large parenchymal hemorrhage in the anterior left frontal lobe and subarachnoid hemorrhage s/p Left craniectomy, discarded bone flap, placement of EVD on 6/1/2021. 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 Patient extubated with followable neurological exam     PLAN:   - VPS/cranioplasty on Friday with cardiothoracic surgery (will help with avoiding pacemaker wires)   - stereo CTH today   - cont keppra per neurology   - INR goal under 1.5   - patient unable to get MRI 2/2 pacemaker     Case discussed with attending neurosurgeon.

## 2021-06-17 LAB
APTT BLD: 26.2 SEC — LOW (ref 27–36.3)
BASOPHILS # BLD AUTO: 0.11 K/UL — SIGNIFICANT CHANGE UP (ref 0–0.2)
BASOPHILS NFR BLD AUTO: 1.2 % — SIGNIFICANT CHANGE UP (ref 0–2)
BLD GP AB SCN SERPL QL: NEGATIVE — SIGNIFICANT CHANGE UP
EOSINOPHIL # BLD AUTO: 0.18 K/UL — SIGNIFICANT CHANGE UP (ref 0–0.5)
EOSINOPHIL NFR BLD AUTO: 1.9 % — SIGNIFICANT CHANGE UP (ref 0–6)
FIBRINOGEN PPP-MCNC: 639 MG/DL — HIGH (ref 290–520)
HCT VFR BLD CALC: 45.1 % — SIGNIFICANT CHANGE UP (ref 39–50)
HGB BLD-MCNC: 14.3 G/DL — SIGNIFICANT CHANGE UP (ref 13–17)
IANC: 6.56 K/UL — SIGNIFICANT CHANGE UP (ref 1.5–8.5)
IMM GRANULOCYTES NFR BLD AUTO: 3.1 % — HIGH (ref 0–1.5)
INR BLD: 1.42 RATIO — HIGH (ref 0.88–1.16)
LUPUS ANTICOAGULANT PROFILE RESULT: SIGNIFICANT CHANGE UP
LYMPHOCYTES # BLD AUTO: 1.06 K/UL — SIGNIFICANT CHANGE UP (ref 1–3.3)
LYMPHOCYTES # BLD AUTO: 11.3 % — LOW (ref 13–44)
MCHC RBC-ENTMCNC: 26.1 PG — LOW (ref 27–34)
MCHC RBC-ENTMCNC: 31.7 GM/DL — LOW (ref 32–36)
MCV RBC AUTO: 82.3 FL — SIGNIFICANT CHANGE UP (ref 80–100)
MONOCYTES # BLD AUTO: 1.19 K/UL — HIGH (ref 0–0.9)
MONOCYTES NFR BLD AUTO: 12.7 % — SIGNIFICANT CHANGE UP (ref 2–14)
NEUTROPHILS # BLD AUTO: 6.56 K/UL — SIGNIFICANT CHANGE UP (ref 1.8–7.4)
NEUTROPHILS NFR BLD AUTO: 69.8 % — SIGNIFICANT CHANGE UP (ref 43–77)
NRBC # BLD: 0 /100 WBCS — SIGNIFICANT CHANGE UP
NRBC # FLD: 0 K/UL — SIGNIFICANT CHANGE UP
PLATELET # BLD AUTO: 338 K/UL — SIGNIFICANT CHANGE UP (ref 150–400)
PROTHROM AB SERPL-ACNC: 15.9 SEC — HIGH (ref 10.6–13.6)
RBC # BLD: 5.48 M/UL — SIGNIFICANT CHANGE UP (ref 4.2–5.8)
RBC # FLD: 16.1 % — HIGH (ref 10.3–14.5)
RH IG SCN BLD-IMP: NEGATIVE — SIGNIFICANT CHANGE UP
SARS-COV-2 RNA SPEC QL NAA+PROBE: SIGNIFICANT CHANGE UP
THROMBIN TIME: 23.4 SEC — SIGNIFICANT CHANGE UP (ref 16–26)
WBC # BLD: 9.39 K/UL — SIGNIFICANT CHANGE UP (ref 3.8–10.5)
WBC # FLD AUTO: 9.39 K/UL — SIGNIFICANT CHANGE UP (ref 3.8–10.5)

## 2021-06-17 PROCEDURE — 99232 SBSQ HOSP IP/OBS MODERATE 35: CPT

## 2021-06-17 PROCEDURE — 71045 X-RAY EXAM CHEST 1 VIEW: CPT | Mod: 26

## 2021-06-17 PROCEDURE — 99291 CRITICAL CARE FIRST HOUR: CPT

## 2021-06-17 RX ORDER — ALBUTEROL 90 UG/1
2.5 AEROSOL, METERED ORAL
Refills: 0 | Status: DISCONTINUED | OUTPATIENT
Start: 2021-06-17 | End: 2021-06-22

## 2021-06-17 RX ORDER — LANOLIN ALCOHOL/MO/W.PET/CERES
5 CREAM (GRAM) TOPICAL AT BEDTIME
Refills: 0 | Status: DISCONTINUED | OUTPATIENT
Start: 2021-06-17 | End: 2021-06-23

## 2021-06-17 RX ORDER — ACETAMINOPHEN 500 MG
650 TABLET ORAL EVERY 6 HOURS
Refills: 0 | Status: DISCONTINUED | OUTPATIENT
Start: 2021-06-17 | End: 2021-06-29

## 2021-06-17 RX ORDER — SODIUM CHLORIDE 9 MG/ML
1000 INJECTION, SOLUTION INTRAVENOUS
Refills: 0 | Status: DISCONTINUED | OUTPATIENT
Start: 2021-06-17 | End: 2021-06-18

## 2021-06-17 RX ORDER — SODIUM CHLORIDE 9 MG/ML
0.5 INJECTION INTRAMUSCULAR; INTRAVENOUS; SUBCUTANEOUS DAILY
Refills: 0 | Status: DISCONTINUED | OUTPATIENT
Start: 2021-06-17 | End: 2021-06-22

## 2021-06-17 RX ADMIN — LEVETIRACETAM 1500 MILLIGRAM(S): 250 TABLET, FILM COATED ORAL at 10:14

## 2021-06-17 RX ADMIN — SODIUM CHLORIDE 0.5 MILLILITER(S): 9 INJECTION INTRAMUSCULAR; INTRAVENOUS; SUBCUTANEOUS at 11:38

## 2021-06-17 RX ADMIN — Medication 2 DROP(S): at 14:00

## 2021-06-17 RX ADMIN — Medication 1 PACKET(S): at 09:51

## 2021-06-17 RX ADMIN — SODIUM CHLORIDE 3 MILLILITER(S): 9 INJECTION INTRAMUSCULAR; INTRAVENOUS; SUBCUTANEOUS at 02:05

## 2021-06-17 RX ADMIN — Medication 10 MILLIGRAM(S): at 17:49

## 2021-06-17 RX ADMIN — Medication 2 DROP(S): at 09:52

## 2021-06-17 RX ADMIN — ALBUTEROL 2.5 MILLIGRAM(S): 90 AEROSOL, METERED ORAL at 11:37

## 2021-06-17 RX ADMIN — FAMOTIDINE 20 MILLIGRAM(S): 10 INJECTION INTRAVENOUS at 09:51

## 2021-06-17 RX ADMIN — Medication 198 MILLIGRAM(S): at 05:37

## 2021-06-17 RX ADMIN — Medication 5 MILLIGRAM(S): at 23:21

## 2021-06-17 RX ADMIN — FAMOTIDINE 20 MILLIGRAM(S): 10 INJECTION INTRAVENOUS at 21:35

## 2021-06-17 RX ADMIN — SODIUM CHLORIDE 3 MILLILITER(S): 9 INJECTION INTRAMUSCULAR; INTRAVENOUS; SUBCUTANEOUS at 09:55

## 2021-06-17 RX ADMIN — SODIUM CHLORIDE 3 MILLILITER(S): 9 INJECTION, SOLUTION INTRAVENOUS at 07:19

## 2021-06-17 RX ADMIN — SODIUM CHLORIDE 3 MILLILITER(S): 9 INJECTION, SOLUTION INTRAVENOUS at 19:18

## 2021-06-17 RX ADMIN — Medication 10 MILLIGRAM(S): at 05:45

## 2021-06-17 RX ADMIN — Medication 198 MILLIGRAM(S): at 21:35

## 2021-06-17 RX ADMIN — Medication 20 MILLIGRAM(S): at 21:35

## 2021-06-17 RX ADMIN — Medication 198 MILLIGRAM(S): at 14:37

## 2021-06-17 RX ADMIN — Medication 20 MILLIGRAM(S): at 14:37

## 2021-06-17 RX ADMIN — LEVETIRACETAM 1500 MILLIGRAM(S): 250 TABLET, FILM COATED ORAL at 23:21

## 2021-06-17 RX ADMIN — Medication 2 DROP(S): at 17:43

## 2021-06-17 RX ADMIN — SODIUM CHLORIDE 3 MILLILITER(S): 9 INJECTION, SOLUTION INTRAVENOUS at 16:03

## 2021-06-17 RX ADMIN — Medication 20 MILLIGRAM(S): at 06:37

## 2021-06-17 NOTE — PROGRESS NOTE PEDS - SUBJECTIVE AND OBJECTIVE BOX
POD # 16 s/p left frontal craniectomy for ICH    patient seen and examined with mother bedside, no significant events overnight, patient pre-op for removal of EVD tomorrow , and insertion of VPS, and cranioplasty.  EVD at 0cm ?Hx0, drained 264cc/24H.  ICP 2-13 currently 5    HPI:  19yo male with complex cardiac h/o DILV, L-malposition of great arteries, sick sinus syndrome and AV mihir disease with tachybradycardia syndrome with a dual chamber pacemaker (currently with RV lead fracture and is currently only LV paced 2/2 complete heart block), PSH of status post Jaozw-Aost-Gykgyas anastomosis and aortic arch reconstruction followed by a fenestrated lateral tunnel Fontan completion (now s/p fenestration closure). here s/p cardiac catheterization and ablation. Procedure was complicated by unsuccessful ablation and post extubation patient was noted to have increased hemoptysis and desaturations, patient was reintubated for airway protection and given propofol for sedation.  (26 May 2021 20:19)    PAST MEDICAL & SURGICAL HISTORY:  Double inlet left ventricle  D-TGA (dextro-transposition of great arteries)  Sick sinus syndrome  Atrial tachycardia  Hepatomegaly  Other ascites  Pulmonary hypertension  Pacemaker  AV block  Coagulopathy    S/P cardiac cath  2001-assessment of anatomy prior to 1st surgery  2004- coil emolization of collateral vessels  2011-balloon angioplasty of superior narrowing of Fontan circuit, balloon angioplasty of LPA, ASD device placed for closure of Fontan fenestraion  10/24/2016- Cath of Fontan  S/P Nirmal-Taussig shunt  Rzrfb-Qifu-Qdljbvg anastomosis with modified right BT shunt and creation of pulmonary atresia  S/P celestine-Fontan operation  3/15/2003- Celestine-Fontan with ligation of BT shunt and left pulmonary artery plasty  Cardiac pacemaker  2004    PHYSICAL EXAM:  awake, alert, tracts, minimal verbal, follows commands briskly  craniectomy site- soft  motor- GOODSON antigravity, RUE/RLE weaker then LUE/LLE  sensory- SILT  incision site- c/d/i    Diet:  Regular ( x )  NPO after midnight   NPO       (  )    Drains:  ventriculostomy   (x  )  Lumbar drain       (  )  NEIL drain               (  )  Hemovac              (  )    Vital Signs Last 24 Hrs  T(C): 36.7 (2021 08:12), Max: 36.9 (2021 20:00)  T(F): 98 (2021 08:12), Max: 98.4 (2021 20:00)  HR: 70 (2021 08:12) (70 - 70)  BP: 116/63 (2021 08:12) (115/71 - 123/74)  BP(mean): 77 (2021 08:12) (76 - 90)  RR: 20 (2021 08:12) (18 - 30)  SpO2: 86% (2021 08:12) (82% - 90%)  I&O's Summary    2021 07:01  -  2021 07:00  --------------------------------------------------------  IN: 447 mL / OUT: 2264 mL / NET: -1817 mL    2021 07:01  -  2021 08:59  --------------------------------------------------------  IN: 6 mL / OUT: 212 mL / NET: -206 mL    MEDICATIONS  (STANDING):  ceFAZolin  IV Intermittent - Peds 1980 milliGRAM(s) IV Intermittent every 8 hours  famotidine  Oral Tab/Cap - Peds 20 milliGRAM(s) Oral two times a day  furosemide   Oral Tab/Cap - Peds 10 milliGRAM(s) Oral every 12 hours  lactobacillus Oral Powder (CULTURELLE KIDS) - Peds 1 Packet(s) Oral daily  levETIRAcetam  Oral Tab/Cap - Peds 1500 milliGRAM(s) Oral two times a day  Mexiletine 150 milliGRAM(s) 150 milliGRAM(s) Oral every 8 hours  phytonadione  Oral Liquid - Peds 5 milliGRAM(s) Enteral Tube <User Schedule>  polyvinyl alcohol 1.4%/povidone 0.6% Ophthalmic Solution - Peds 2 Drop(s) Both EYES four times a day  propranolol  Oral Tab/Cap - Peds 10 milliGRAM(s) Oral every 8 hours  sildenafil   Oral Tab/Cap - Peds 20 milliGRAM(s) Oral every 8 hours  sodium chloride 0.9% -  250 milliLiter(s) (3 mL/Hr) IV Continuous <Continuous>  sodium chloride 0.9% lock flush - Peds 3 milliLiter(s) IV Push every 8 hours  sodium chloride 0.9%. - Pediatric 1000 milliLiter(s) (100 mL/Hr) IV Continuous <Continuous>    MEDICATIONS  (PRN):  acetaminophen   Oral Liquid - Peds. 650 milliGRAM(s) Oral every 6 hours PRN Moderate Pain (4 - 6)  melatonin Oral Tab/Cap - Peds 3 milliGRAM(s) Oral at bedtime PRN Insomnia  polyethylene glycol 3350 Oral Powder - Peds 17 Gram(s) Oral daily PRN Constipation  senna Oral Liquid - Peds 15 milliLiter(s) Oral daily PRN Constipation    LABS:                        12.3   8.87  )-----------( 226      ( 2021 02:22 )             39.4     06-16    134<L>  |  96<L>  |  19  ----------------------------<  167<H>  3.7   |  20<L>  |  0.70    Ca    10.1      2021 02:22  Phos  3.6     06-16  Mg     2.2     06-16    TPro  8.4<H>  /  Alb  4.5  /  TBili  0.9  /  DBili  x   /  AST  34  /  ALT  21  /  AlkPhos  151  06-16    PT/INR - ( 2021 02:22 )   PT: 16.0 sec;   INR: 1.41 ratio         PTT - ( 2021 02:22 )  PTT:36.3 sec

## 2021-06-17 NOTE — SPEECH LANGUAGE PATHOLOGY EVALUATION - COMMENTS
19yo male seen for a speech-language evaluation s/p L hemorrhagic stroke s/p prolonged intubation with concerns for hoarse voice and difficulty speaking.  Pt. received awake in bed on RA, +IV, +EVD, with MOA and nursing assistant present, cleared by nursing to evaluate.  Pt. noted with flat affect, appearing drowsy per MOC and with reduced initiation of interactions.  Pt. noted to have difficulty following commands and benefited from increased processing/wait time to respond.  With attempts to verbally communicate, pt. noted without phonation and rather mouthed and whispered words.  With verbal and tactile cues, brief moments of phonation appreciated, however reduced volume and duration noted.  MOC reported that pt. communicates in sentences (without phonation unless provided verbal reminders to do so), however during this evaluation, prompted single word utterances with use of pointing and head nodding was the primary means of communication.  Initiate speech and language therapy while patient is in house as schedule permits. Please note that all therapy sessions will be documented in the Pediatric Plan of Care Flowsheet. Unable to produce voicing/phonation unless provided with max verbal cues to do so and with cues, able to sustain phonation for less than 2 seconds with low volume Pt. with primary means of communication in single word utterances via whispering and mouthing, and use of gestures to support such as pointing and head nodding.

## 2021-06-17 NOTE — SPEECH LANGUAGE PATHOLOGY EVALUATION - SLP PRAGMATICS
flat affect throughout with limited attempts at initiating communicative interactions/flat affect/initiation problems

## 2021-06-17 NOTE — PROGRESS NOTE PEDS - ASSESSMENT
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 He has elevated Fontan pressure secondary to LV diastolic dysfunction.  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 Persistent hypoxia likely from VV collaterals (confirmed on CTA), back in IART (not hemodynamically compromising), and recently with runs of V-tach (unclear cause) requiring lidocaine initiation. Significant issues with neuroagitation and delerium.      PLAN:  Neuro:  melatonin Qday  continue propranolol per PMR recs (hold for SBP< 100)   Delirium seems much improved.      environmental interventions  - Keppra (clinical szs)  - consider dekakote if seizures persist (will see if drug interactuon)   - R sided hemiparesis  - EVD to 0   - Repeat head CT- stable--  - skull flap off, will need prosthetic  - tylenol prn for fever control  -PT/OT/speech/rehab consult  -cannot have mri  -plan for OR tomorrow for flap and  shunt    Resp:  NC with sats high 80s  - CTA show VV collaterals (likely main reason for hypoxia)  Goal SpO2 > 85% - may need to lower goals to 80% with collaterals      CV:  continue mexilitine for hx of  VT  Mode is DDD but atrial wires without good sensitivity, so effectively VVI 70 (will make VVO for OR friday)  Still in IART  Home meds on hold (Lisinopril, Aldactone)  Continue Sildenafil   Continue Lasix po q12  Metolazone as needed for increased oxygen requirement   sono r chest (6/15) with moderate effusion clinically improving with diuresis  SBP < 140  echo 6/8 extremely limited windows  HOLA when goes for neurosugical procedure (when intubated)    Heme:  Xarelto on hold  Goal PLTs >100  FFP to keep INR < 2.0 (but before procedures e.g. EVD removal will huddle with hematology to bring INR down < 1.5 if not there already)  Hematology and NRSGY in discussions  s/p Vitamin K x3 days,   now po Vit K 3x/week    FEN:  Pepcid -renally dosed   Bowel regimen: Senna and Miralax PRN  cleared by speech for oral diet of regular solids and thin fluids as tolerated   nutrition consult  speech and swallow consult     ID  - CTX started 6/6 for LG fever  - neg r/o (may be from blood in brain) --> go back to Anc while EVD in    Other  - L hydrocele  - outpt f/u    Access  Connor MACHADO (6/1)  PIV     pre-op labs-cbc, chem 20, coags t/s. covid swab  cardiac anesthesia consult

## 2021-06-17 NOTE — SPEECH LANGUAGE PATHOLOGY EVALUATION - SLP PERTINENT HISTORY OF CURRENT PROBLEM
Pt. is 19yo male admitted for cardioversion of interatrial re-entrant tachycardia (IART) found to have L-sided stroke with hemorrhagic conversion requiring decompressive craniectomy on 6/1/21

## 2021-06-17 NOTE — SPEECH LANGUAGE PATHOLOGY EVALUATION - SLP GENERAL OBSERVATIONS
Patient received in bed, on RA, +IV, +EVD. MOC present at bedside, PCA present. Permission to evaluate by nsg. Patient with whispering and mouthing when speaking and communicated primarily via single word utterances and gestures.

## 2021-06-17 NOTE — PROGRESS NOTE PEDS - PROBLEM SELECTOR PLAN 1
1. NPO after midnight with IVF  2. consent for OR  3. f/u AM labs  4. contineu AED  5. continue EVD @ 0cm/H20, record output Q1H  6. notify NSx for ICP >20

## 2021-06-17 NOTE — SPEECH LANGUAGE PATHOLOGY EVALUATION - SPECIFY REASON(S)
To assess "hoarse voice and difficulty speaking" in a patient with intracranial hemorrhage with EVD placement, s/p prolonged intubation

## 2021-06-17 NOTE — PROGRESS NOTE PEDS - SUBJECTIVE AND OBJECTIVE BOX
Interval/Overnight Events:  desats overnight--required NC overnight  _________________________________________________________________  Respiratory:    2L NC    _________________________________________________________________  Cardiac:  Cardiac Rhythm: Sinus rhythm    furosemide   Oral Tab/Cap - Peds 10 milliGRAM(s) Oral every 12 hours  propranolol  Oral Tab/Cap - Peds 10 milliGRAM(s) Oral every 8 hours  sildenafil   Oral Tab/Cap - Peds 20 milliGRAM(s) Oral every 8 hours    _________________________________________________________________  Hematologic:      ________________________________________________________________  Infectious:    ceFAZolin  IV Intermittent - Peds 1980 milliGRAM(s) IV Intermittent every 8 hours    RECENT CULTURES:      ________________________________________________________________  Fluids/Electrolytes/Nutrition:  I&O's Summary    2021 07:01  -  2021 07:00  --------------------------------------------------------  IN: 447 mL / OUT: 2264 mL / NET: -1817 mL    2021 07:01  -  2021 09:27  --------------------------------------------------------  IN: 6 mL / OUT: 212 mL / NET: -206 mL      Diet: Recommend initiate oral diet of regular solids and thin fluids as tolerated     famotidine  Oral Tab/Cap - Peds 20 milliGRAM(s) Oral two times a day  phytonadione  Oral Liquid - Peds 5 milliGRAM(s) Enteral Tube <User Schedule>  polyethylene glycol 3350 Oral Powder - Peds 17 Gram(s) Oral daily PRN  sodium chloride 0.9% -  250 milliLiter(s) IV Continuous <Continuous>  sodium chloride 0.9% lock flush - Peds 3 milliLiter(s) IV Push every 8 hours  sodium chloride 0.9%. - Pediatric 1000 milliLiter(s) IV Continuous <Continuous>    _________________________________________________________________  Neurologic:  Adequacy of sedation and pain control has been assessed and adjusted    acetaminophen   Oral Tab/Cap - Peds. 650 milliGRAM(s) Oral every 6 hours PRN  levETIRAcetam  Oral Tab/Cap - Peds 1500 milliGRAM(s) Oral two times a day  melatonin Oral Tab/Cap - Peds 3 milliGRAM(s) Oral at bedtime PRN    ________________________________________________________________  Additional Meds:    lactobacillus Oral Powder (CULTURELLE KIDS) - Peds 1 Packet(s) Oral daily  Mexiletine 150 milliGRAM(s) 150 milliGRAM(s) Oral every 8 hours  polyvinyl alcohol 1.4%/povidone 0.6% Ophthalmic Solution - Peds 2 Drop(s) Both EYES four times a day    ________________________________________________________________  Access:    Necessity of urinary, arterial, and venous catheters discussed  ________________________________________________________________  Labs:      _________________________________________________________________  Imaging:    _________________________________________________________________  PE:  T(C): 36.7 (21 @ 08:12), Max: 36.9 (21 @ 20:00)  HR: 70 (21 @ 09:10) (70 - 70)  BP: 116/63 (21 @ 08:12) (115/71 - 123/74)  ABP: 114/58 (21 @ 09:10) (90/62 - 128/69)  ABP(mean): 76 (21 @ 09:10) (70 - 89)  RR: 25 (21 @ 09:10) (18 - 30)  SpO2: 86% (21 @ 09:10) (82% - 90%)  CVP(mm Hg): --      General:	In no distress, following commands significant improvement in mentation  Respiratory:      Effort even and unlabored. Clear bilaterally. Good aeration. No rales,   .		rhonchi, retractions or wheezing.   CV:		Regular rate and rhythm. Normal S1/single S2. holosystolic murmur at apex. Capillary refill < 2 seconds. Distal pulses 2+ and equal.  Abdomen:	Soft, non-distended. Bowel sounds present.   Skin:		No rash.  Extremities:	Warm and well perfused.   Neurologic:	Alert Weaker R side, able to grasp hand on R, slight wiggle of R toes  L side stronger, Moves LUE/LLE spontaneously  No acute change from baseline exam.  ________________________________________________________________  Patient and Parent/Guardian was updated as to the progress/plan of care.    The patient remains in critical and unstable condition, and requires ICU care and monitoring. Total critical care time spent by attending physician was 40 minutes, excluding procedure time.

## 2021-06-17 NOTE — PROGRESS NOTE PEDS - SUBJECTIVE AND OBJECTIVE BOX
INTERVAL HISTORY: No acute events    RESPIRATORY SUPPORT: RA  NUTRITION: Regular diet PO       @ 07:01  -   @ 07:00  --------------------------------------------------------  IN: 447 mL / OUT: 2264 mL / NET: -1817 mL    INTRAVASCULAR ACCESS: PI_V x 3    MEDICATIONS:  furosemide   Oral Tab/Cap - Peds 10 milliGRAM(s) Oral every 12 hours  propranolol  Oral Tab/Cap - Peds 10 milliGRAM(s) Oral every 8 hours  sildenafil   Oral Tab/Cap - Peds 20 milliGRAM(s) Oral every 8 hours  ceFAZolin  IV Intermittent - Peds 1980 milliGRAM(s) IV Intermittent every 8 hours  levETIRAcetam  Oral Tab/Cap - Peds 1500 milliGRAM(s) Oral two times a day  famotidine  Oral Tab/Cap - Peds 20 milliGRAM(s) Oral two times a day  phytonadione  Oral Liquid - Peds 5 milliGRAM(s) Enteral Tube <User Schedule>  sodium chloride 0.9% -  250 milliLiter(s) IV Continuous <Continuous>  sodium chloride 0.9% lock flush - Peds 3 milliLiter(s) IV Push every 8 hours  sodium chloride 0.9%. - Pediatric 1000 milliLiter(s) IV Continuous <Continuous>    PHYSICAL EXAMINATION:  Vital signs -   T(C): 36.7 (21 @ 08:12), Max: 36.9 (21 @ 20:00)  HR: 70 (21 @ 08:12) (70 - 70)  BP: 116/63 (21 @ 08:12) (115/71 - 123/74)  ABP:  (90/62 - 128/69)  RR: 20 (21 @ 08:12) (18 - 30)  SpO2: 86% (21 @ 08:12) (82% - 90%)    General - awake, alert, interactive  Skin - no rash, no desquamation, no cyanosis.  Eyes / ENT - no conjunctival injection, sclerae anicteric, external ears & nares normal, mucous membranes moist.  Pulmonary - normal inspiratory effort, no retractions, lungs clear to auscultation bilaterally, no wheezes, no rales.  Cardiovascular - normal rate, regular rhythm, normal S1 & single S2, grade 1/6 blowing holosystolic murmur at LMSB, no rubs, no gallops, capillary refill < 2sec, normal pulses.  Gastrointestinal - soft, non-distended, non-tender, no splenomegaly. (+) hepatomegaly.  Musculoskeletal - no joint swelling, no clubbing, no edema.  Neurologic / Psychiatric - alert, interactive    LABORATORY TESTS:                          12.3  CBC:   8.87 )-----------( 226   (21 @ 02:22)                          39.4               134   |  96    |  19                 Ca: 10.1   BMP:   ----------------------------< 167    M.2   (21 @ 02:22)             3.7    |  20    | 0.70               Ph: 3.6      LFT:     TPro: 8.4 / Alb: 4.5 / TBili: 0.9 / DBili: x / AST: 34 / ALT: 21 / AlkPhos: 151   (21 @ 02:22)    COAG: PT: 16.0 / PTT: 36.3 / INR: 1.41   (21 @ 02:22)       ABG:   pH: 7.50 / pCO2: 29 / pO2: 52 / HCO3: 24 / Base Excess: -0.9 / SaO2: 87.1 / Lactate: x / iCa: 1.15   (21 @ 18:06)      IMAGING STUDIES:  Electrocardiogram - (21) Ventricular paced rhythm @ 75bpm. Underlying IART.    Telemetry - (6/15/21) Ventricular paced rhythm @ 75bpm. Underlying IART.    Chest x-ray - (21) Stable mild cardiomegaly with subsegmental left lower lobe atelectasis.    Echocardiogram - (21)   1. This was a technically difficult suboptimal study due to poor echo windows. Findings limited to below.   2. 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   3. Mild mitral valve regurgitation.   4. Trivial tricuspid valve regurgitation.   5. Trivial aortic valve regurgitation.   6. Status post device closure of Fontan fenestration.   7. S/P stent placement in the Fontan pathway. Limited imaging of the inferior Fontan baffle. In this setting, the inferior vena cava to its connection near the atrial portion of the baffle appears patent with no obstruction. S/p device closure of Fontan fenestration. The Fontan fenestration is not seen on this study. The right bidirectional Storm is seen only on color flow mapping. This appears to have laminar low velocity flow.   8. The connection of the right bidirectional Storm to the right pulmonary artery could not be imaged. The right pulmonary artery is seen in a limited portion immediately to the left of the Storm and appears patent. The left pulmonary artery could not be imaged.   9. Extremely poor and limited imaging of the lateral free walls of the single systemic left ventricle. On limited short axis views there appears to be adequate systolic excursion of the posterior wall of the left ventricle and decreased in the anterior wall. The bulboventricular foramen could not be imaged. Suggest alternate imaging for appropriate assessment of function.  10. No pericardial effusion.  11. Small right pleural effusion.    Cath - (21)  Access 4 Fr RFA, 7 Fr RFV x2 with US guidance.  Sat data (%): on 50% FiO2 LPA 73, RUPV 98, Ao 97; CI 2.6 L/min/m2 with Qp:Qs 1 :1.  Pressures (mmHg): Elevated mFontan = mIVC = 20. mPCW=16, No significant gradient across LV to AAo to Vikki (98/58/73).  Normal PVR while on sildenafil.  Angios showed unobstructed Fontan with existing stent within Fontan conduit.    Comment: IART ablation was attempted after the diagnostic cath but unsuccessful. Patient was overdrive paced out of IART. At the end of the case, Ao sat was 94% and LPA 66% on 50% FiO2 INTERVAL HISTORY: Desaturation episode overnight, not associated with mental status change, started on2L NC    RESPIRATORY SUPPORT: 2L NC  NUTRITION: Regular diet PO       @ 07:01  -   @ 07:00  --------------------------------------------------------  IN: 447 mL / OUT: 2264 mL / NET: -1817 mL    INTRAVASCULAR ACCESS: PI_V x 3    MEDICATIONS:  furosemide   Oral Tab/Cap - Peds 10 milliGRAM(s) Oral every 12 hours  propranolol  Oral Tab/Cap - Peds 10 milliGRAM(s) Oral every 8 hours  sildenafil   Oral Tab/Cap - Peds 20 milliGRAM(s) Oral every 8 hours  ceFAZolin  IV Intermittent - Peds 1980 milliGRAM(s) IV Intermittent every 8 hours  levETIRAcetam  Oral Tab/Cap - Peds 1500 milliGRAM(s) Oral two times a day  famotidine  Oral Tab/Cap - Peds 20 milliGRAM(s) Oral two times a day  phytonadione  Oral Liquid - Peds 5 milliGRAM(s) Enteral Tube <User Schedule>  sodium chloride 0.9% -  250 milliLiter(s) IV Continuous <Continuous>  sodium chloride 0.9% lock flush - Peds 3 milliLiter(s) IV Push every 8 hours  sodium chloride 0.9%. - Pediatric 1000 milliLiter(s) IV Continuous <Continuous>    PHYSICAL EXAMINATION:  Vital signs -   T(C): 36.7 (21 @ 08:12), Max: 36.9 (21 @ 20:00)  HR: 70 (21 @ 08:12) (70 - 70)  BP: 116/63 (21 @ 08:12) (115/71 - 123/74)  ABP:  (90/62 - 128/69)  RR: 20 (21 @ 08:12) (18 - 30)  SpO2: 86% (21 @ 08:12) (82% - 90%)    General - awake, alert, interactive  Skin - no rash, no desquamation, no cyanosis.  Eyes / ENT - no conjunctival injection, sclerae anicteric, external ears & nares normal, mucous membranes moist.  Pulmonary - normal inspiratory effort, no retractions, lungs clear to auscultation bilaterally, no wheezes, no rales.  Cardiovascular - normal rate, regular rhythm, normal S1 & single S2, grade 1/6 blowing holosystolic murmur at LMSB, no rubs, no gallops, capillary refill < 2sec, normal pulses.  Gastrointestinal - soft, non-distended, non-tender, no splenomegaly. (+) hepatomegaly.  Musculoskeletal - no joint swelling, no clubbing, no edema.  Neurologic / Psychiatric - alert, interactive    LABORATORY TESTS:                          12.3  CBC:   8.87 )-----------( 226   (21 @ 02:22)                          39.4               134   |  96    |  19                 Ca: 10.1   BMP:   ----------------------------< 167    M.2   (21 @ 02:22)             3.7    |  20    | 0.70               Ph: 3.6      LFT:     TPro: 8.4 / Alb: 4.5 / TBili: 0.9 / DBili: x / AST: 34 / ALT: 21 / AlkPhos: 151   (21 @ 02:22)    COAG: PT: 16.0 / PTT: 36.3 / INR: 1.41   (21 @ 02:22)       ABG:   pH: 7.50 / pCO2: 29 / pO2: 52 / HCO3: 24 / Base Excess: -0.9 / SaO2: 87.1 / Lactate: x / iCa: 1.15   (21 @ 18:06)      IMAGING STUDIES:  Electrocardiogram - (21) Ventricular paced rhythm @ 75bpm. Underlying IART.    Telemetry - (6/15/21) Ventricular paced rhythm @ 75bpm. Underlying IART.    Chest x-ray - (21) Stable mild cardiomegaly with subsegmental left lower lobe atelectasis.    Echocardiogram - (21)   1. This was a technically difficult suboptimal study due to poor echo windows. Findings limited to below.   2. 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   3. Mild mitral valve regurgitation.   4. Trivial tricuspid valve regurgitation.   5. Trivial aortic valve regurgitation.   6. Status post device closure of Fontan fenestration.   7. S/P stent placement in the Fontan pathway. Limited imaging of the inferior Fontan baffle. In this setting, the inferior vena cava to its connection near the atrial portion of the baffle appears patent with no obstruction. S/p device closure of Fontan fenestration. The Fontan fenestration is not seen on this study. The right bidirectional Storm is seen only on color flow mapping. This appears to have laminar low velocity flow.   8. The connection of the right bidirectional Storm to the right pulmonary artery could not be imaged. The right pulmonary artery is seen in a limited portion immediately to the left of the Storm and appears patent. The left pulmonary artery could not be imaged.   9. Extremely poor and limited imaging of the lateral free walls of the single systemic left ventricle. On limited short axis views there appears to be adequate systolic excursion of the posterior wall of the left ventricle and decreased in the anterior wall. The bulboventricular foramen could not be imaged. Suggest alternate imaging for appropriate assessment of function.  10. No pericardial effusion.  11. Small right pleural effusion.    Cath - (21)  Access 4 Fr RFA, 7 Fr RFV x2 with US guidance.  Sat data (%): on 50% FiO2 LPA 73, RUPV 98, Ao 97; CI 2.6 L/min/m2 with Qp:Qs 1 :1.  Pressures (mmHg): Elevated mFontan = mIVC = 20. mPCW=16, No significant gradient across LV to AAo to Vikki (98/58/73).  Normal PVR while on sildenafil.  Angios showed unobstructed Fontan with existing stent within Fontan conduit.    Comment: IART ablation was attempted after the diagnostic cath but unsuccessful. Patient was overdrive paced out of IART. At the end of the case, Ao sat was 94% and LPA 66% on 50% FiO2

## 2021-06-17 NOTE — PROGRESS NOTE PEDS - ASSESSMENT
19 y/o M, s/p craniectomy for ICH, and insertion of EVD, pre-op for VPS and cranioplasty tomorrow under general anesthesia.

## 2021-06-17 NOTE — PROGRESS NOTE PEDS - ASSESSMENT
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 Presented in IART - s/p EP study with unsuccessful attempt at ablation along with diagnostic cath showing elevated Fontan pressure (20mmHg). He was loaded with amiodarone and ultimately electrically cardioverted out, however reverted back into IART and continues to be in IART. His course is further complicated by L frontal parenchymal hemorrhage requiring emergent evacuation with NSx and EVD placement (which is no clamped since Thursday 6/10) and VTach of unknown origin.  Sats in mid to high 80s.   He continues to be critically ill.     CVS:  - Continuos telemetry monitoring.  - Pacemaker set at DDD 70-120bpm. Plan to switch to VOO today in preparation for OR.  - Sildenafil 10 mg q8h IV. Switch back to enteral 20mg q8.  - lasix to 10 mg Q12H (home dose). Goal net even. Monitor creat.   - Sats goal >88%.   - Monitor blood pressure. Goal MAPs > 70. Agree with norepi and/or vasopressin as needed. Titrate to goal per PICU to maintain CPP.  - Hold home lisinopril, aldactone. Agree with plan to ensure SBP not persistently higher than 140. Nicardapine as needed. Would try to reinstitute home medications when able to give enteral medications. Need to consider that now on clonidine  - Mexlitine 150mg q8    Resp:   - Wean as tolerated. on RA  - CTA obtained to eval for PE - difficult to protocol because of Fontan anatomy and contrast timing, no PE seen, but does show VV collaterals (likely main reason for hypoxia)      FEN/GI:   - Currently on formula via NGT.   - Pepsid.   - Senna    ID:   -Ancef while EVD in place.     Heme:   - Anticoagulation care per hematology/PICU/NSx  - Xarelto on hold  - Goal PLTs >100  - FFP to keep INR < 2.0 (but before procedures e.g. EVD removal will huddle with hematology to bring INR down < 1.5 if not there already)    Neuro:   - L frontal parenchymal hemorrhage requiring emergent evacuation with NSx and EVD placement-->  EVD clamped (6/10)--> NSx following  - VEEG on per neuro - no seizures so far  - On Keppra  - On Clonidine patch, propranolol, melatnonin PRN TRINI MÉNDEZ is a 21 yo male with DILV, L-malposed great arteries s/p lateral tunnel Fontan (with subsequent stent placement in Fontan pathway in 2016), with sick sinus syndrome and complete heart block, s/p dual chamber epicardial pacemaker with cardiac resynchronization therapy for left ventricular dysfunction and failing Fontan in 2016. Presented in IART - s/p EP study with unsuccessful attempt at ablation along with diagnostic cath showing elevated Fontan pressure (20mmHg). He was loaded with amiodarone and ultimately electrically cardioverted out, however reverted back into IART and continues to be in IART. His course is further complicated by L frontal parenchymal hemorrhage requiring emergent evacuation with NSx and EVD placement (which is no clamped since Thursday 6/10) and VTach of unknown origin.  Sats in mid to high 80s.   He continues to be critically ill.     CVS:  - Continuos telemetry monitoring.  - Pacemaker set at DDD 70-120bpm. Plan to switch to VOO today in preparation for OR.  - Sildenafil 10 mg q8h IV. Switch back to enteral 20mg q8.  - lasix to 10 mg Q12H (home dose). Goal net even. Monitor creat.   - Sats goal >85%.   - Monitor blood pressure. Goal MAPs > 70. Agree with norepi and/or vasopressin as needed. Titrate to goal per PICU to maintain CPP.  - Hold home lisinopril, aldactone. Agree with plan to ensure SBP not persistently higher than 140. Nicardapine as needed. Would try to reinstitute home medications when able to give enteral medications. Need to consider that now on clonidine  - Mexlitine 150mg q8    Resp:   - Wean as tolerated. on RA  - CTA obtained to eval for PE - difficult to protocol because of Fontan anatomy and contrast timing, no PE seen, but does show VV collaterals (likely main reason for hypoxia)      FEN/GI:   - Currently on formula via NGT.   - Pepsid.   - Senna    ID:   -Ancef while EVD in place.     Heme:   - Anticoagulation care per hematology/PICU/NSx  - Xarelto on hold  - Goal PLTs >100  - FFP to keep INR < 2.0 (but before procedures e.g. EVD removal will huddle with hematology to bring INR down < 1.5 if not there already)    Neuro:   - L frontal parenchymal hemorrhage requiring emergent evacuation with NSx and EVD placement-->  EVD clamped (6/10)--> NSx following  - VEEG on per neuro - no seizures so far  - On Keppra  - On Clonidine patch, propranolol, melatnonin PRN

## 2021-06-17 NOTE — PROGRESS NOTE PEDS - ASSESSMENT
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    He continues to make impressive progress with his mental and physical gains. Will need to re-evaluate after surgery to assess for any setbacks or new deficits. He very well may not require inpatient rehab services though if improvements continues. PT will need to assess him on stairs once cleared for OOB since he has 3 flights to climb to get in his home.     Plan:  1) Agitation under control. Continue propranolol 10mg TID. Will reassess after surgery on whether to continue or not.   2) Discussed with mom about increasing melatonin or adding trazodone to help with sleep. She preferred just increasing melatonin for now. If needed over weekend, can consider starting trazodone 25mg QHS.   3) Continue PT/OT/ST at bedside.     Pediatric PM&R will continue to follow.

## 2021-06-17 NOTE — PROGRESS NOTE PEDS - SUBJECTIVE AND OBJECTIVE BOX
Jimbo is a 20 year-old male admitted for cardioversion of interatrial re-entrant tachycardia (IART) found to have L-sided stroke with hemorrhagic conversion requiring intervention.    Interval history:   Patient was seen and examined in his room with mom at bedside. She reports significant improvements over the last few days. Jimbo is reportedly moving the right side well, speaking, following commands, and no longer agitated.     REVIEW OF SYSTEMS:   CONSTITUTIONAL: No fevers or chills.   PSYCH: No agitation currently.   CARDIOVASCULAR: No peripheral edema.  RESPIRATORY: No shortness of breath.  GASTROINTESTINAL: +TPN, +constipation.  MUSCULOSKELETAL: No loss of range of motion.  NEUROLOGICAL: +Right sided weakness.  ENDOCRINE: No intolerance to heat/cold.    PAST MEDICAL & SURGICAL HISTORY  Double inlet left ventricle  D-TGA (dextro-transposition of great arteries)  Sick sinus syndrome  Atrial tachycardia  Hepatomegaly  Other ascites  Pulmonary hypertension  Pacemaker  AV block  S/P Fontan procedure  Coagulopathy  Cardiac anomaly, congenital  S/P bidirectional Storm shunt  S/P Stansel operation  S/P Fontan procedure  S/P cardiac cath  S/P Nirmal-Taussig shunt    SOCIAL HISTORY  Lives in apartment building with family, 3 FARIDEH and 3 full flights to apt, no elevator. Per mom he has option to stay with maternal grandmother who has pvt home, 0 [address], 1st floor bathroom and set up for bedroom. Mom able to assist with care FT.    FAMILY HISTORY   No pertinent family history in first degree relatives    ALLERGIES  No Known Allergies    MEDICATIONS  (STANDING):  ALBUTerol  Intermittent Nebulization - Peds 2.5 milliGRAM(s) Nebulizer <User Schedule>  ceFAZolin  IV Intermittent - Peds 1980 milliGRAM(s) IV Intermittent every 8 hours  famotidine  Oral Tab/Cap - Peds 20 milliGRAM(s) Oral two times a day  furosemide   Oral Tab/Cap - Peds 10 milliGRAM(s) Oral every 12 hours  lactobacillus Oral Powder (CULTURELLE KIDS) - Peds 1 Packet(s) Oral daily  levETIRAcetam  Oral Tab/Cap - Peds 1500 milliGRAM(s) Oral two times a day  Mexiletine 150 milliGRAM(s) 150 milliGRAM(s) Oral every 8 hours  phytonadione  Oral Liquid - Peds 5 milliGRAM(s) Enteral Tube <User Schedule>  polyvinyl alcohol 1.4%/povidone 0.6% Ophthalmic Solution - Peds 2 Drop(s) Both EYES four times a day  propranolol  Oral Tab/Cap - Peds 10 milliGRAM(s) Oral every 8 hours  sildenafil   Oral Tab/Cap - Peds 20 milliGRAM(s) Oral every 8 hours  sodium chloride 0.9% -  250 milliLiter(s) (3 mL/Hr) IV Continuous <Continuous>  sodium chloride 0.9% lock flush - Peds 3 milliLiter(s) IV Push every 8 hours  sodium chloride 0.9%. - Pediatric 1000 milliLiter(s) (66 mL/Hr) IV Continuous <Continuous>  sodium chloride 3% for Nebulization - Peds 0.5 milliLiter(s) Nebulizer daily    MEDICATIONS  (PRN):  acetaminophen   Oral Tab/Cap - Peds. 650 milliGRAM(s) Oral every 6 hours PRN Mild Pain (1 - 3), Moderate Pain (4 - 6), Severe Pain (7 - 10)  melatonin Oral Tab/Cap - Peds 5 milliGRAM(s) Oral at bedtime PRN Insomnia  polyethylene glycol 3350 Oral Powder - Peds 17 Gram(s) Oral daily PRN Constipation    ICU Vital Signs Last 24 Hrs  T(C): 37 (2021 17:00), Max: 37 (2021 17:00)  T(F): 98.6 (2021 17:00), Max: 98.6 (2021 17:00)  HR: 70 (2021 17:00) (70 - 71)  BP: 116/77 (2021 17:00) (115/71 - 125/69)  BP(mean): 88 (2021 17:00) (76 - 89)  ABP: 115/63 (2021 17:00) (90/62 - 124/64)  ABP(mean): 80 (2021 17:00) (70 - 84)  RR: 23 (2021 17:00) (20 - 27)  SpO2: 87% (2021 17:00) (82% - 92%)    ----------------------------------------------------------------------------------------  PHYSICAL EXAM  General:  Well-developed, well-nourished individual lying in hospital bed.   Mental:  Awake, answering questions appropriately with yes/no. Hoarse/quiet voice.   Skin:  Grossly negative for erythema, breakdown, or concerning pressure ulcers.  Vessels:  No lower extremity edema.   Lung:  Breathing is comfortable and regular.  Neurologic: No clonus. No hyperreflexia. No spasticity. Full strength in right upper and lower limb as could be tested in bed.   Musculoskeletal: No range of motion restrictions except heel cord tightness. Dorsiflexion with knees extended was only about 5 degrees on the left and 0 degrees on the right.

## 2021-06-18 LAB
ALBUMIN SERPL ELPH-MCNC: 4.5 G/DL — SIGNIFICANT CHANGE UP (ref 3.3–5)
ALP SERPL-CCNC: 187 U/L — SIGNIFICANT CHANGE UP (ref 60–270)
ALT FLD-CCNC: 21 U/L — SIGNIFICANT CHANGE UP (ref 4–41)
ANION GAP SERPL CALC-SCNC: 17 MMOL/L — HIGH (ref 7–14)
APPEARANCE CSF: ABNORMAL
APPEARANCE SPUN FLD: COLORLESS — SIGNIFICANT CHANGE UP
APTT BLD: 174.9 SEC — SIGNIFICANT CHANGE UP (ref 27–36.3)
AST SERPL-CCNC: 45 U/L — HIGH (ref 4–40)
BACTERIAL AG PNL SER: 0 % — SIGNIFICANT CHANGE UP
BASOPHILS # BLD AUTO: 0 K/UL — SIGNIFICANT CHANGE UP (ref 0–0.2)
BASOPHILS NFR BLD AUTO: 0 % — SIGNIFICANT CHANGE UP (ref 0–2)
BILIRUB SERPL-MCNC: 1.6 MG/DL — HIGH (ref 0.2–1.2)
BLOOD GAS ARTERIAL - LYTES,HGB,ICA,LACT RESULT: SIGNIFICANT CHANGE UP
BUN SERPL-MCNC: 31 MG/DL — HIGH (ref 7–23)
CA-I BLD-SCNC: 1.07 MMOL/L — LOW (ref 1.15–1.29)
CA-I BLD-SCNC: 1.17 MMOL/L — SIGNIFICANT CHANGE UP (ref 1.15–1.29)
CALCIUM SERPL-MCNC: 9.9 MG/DL — SIGNIFICANT CHANGE UP (ref 8.4–10.5)
CARDIOLIPIN AB SER-ACNC: POSITIVE
CHLORIDE SERPL-SCNC: 95 MMOL/L — LOW (ref 98–107)
CO2 SERPL-SCNC: 21 MMOL/L — LOW (ref 22–31)
COLOR CSF: ABNORMAL
CREAT SERPL-MCNC: 1 MG/DL — SIGNIFICANT CHANGE UP (ref 0.5–1.3)
EOSINOPHIL # BLD AUTO: 0.36 K/UL — SIGNIFICANT CHANGE UP (ref 0–0.5)
EOSINOPHIL # CSF: 1 % — SIGNIFICANT CHANGE UP
EOSINOPHIL NFR BLD AUTO: 2 % — SIGNIFICANT CHANGE UP (ref 0–6)
FACT XIIA PPP-ACNC: 75 % — SIGNIFICANT CHANGE UP (ref 42–150)
GLUCOSE CSF-MCNC: 98 MG/DL — HIGH (ref 40–70)
GLUCOSE SERPL-MCNC: 177 MG/DL — HIGH (ref 70–99)
GRAM STN FLD: SIGNIFICANT CHANGE UP
HCT VFR BLD CALC: 46.4 % — SIGNIFICANT CHANGE UP (ref 39–50)
HGB BLD-MCNC: 14.8 G/DL — SIGNIFICANT CHANGE UP (ref 13–17)
HMWK ACTIVITY ACT/NOR PPP: 31.7 SEC — SIGNIFICANT CHANGE UP
HMWK PPP-ACNC: 32.2 SEC — SIGNIFICANT CHANGE UP (ref 27.5–36.5)
HMWK PPP-ACNC: >200 — SIGNIFICANT CHANGE UP
IANC: 13.68 K/UL — HIGH (ref 1.5–8.5)
INR BLD: 1.57 RATIO — HIGH (ref 0.88–1.16)
LYMPHOCYTES # BLD AUTO: 15 % — SIGNIFICANT CHANGE UP (ref 13–44)
LYMPHOCYTES # BLD AUTO: 2.7 K/UL — SIGNIFICANT CHANGE UP (ref 1–3.3)
LYMPHOCYTES # CSF: 38 % — SIGNIFICANT CHANGE UP
MAGNESIUM SERPL-MCNC: 2.3 MG/DL — SIGNIFICANT CHANGE UP (ref 1.6–2.6)
MCHC RBC-ENTMCNC: 26.7 PG — LOW (ref 27–34)
MCHC RBC-ENTMCNC: 31.9 GM/DL — LOW (ref 32–36)
MCV RBC AUTO: 83.8 FL — SIGNIFICANT CHANGE UP (ref 80–100)
MONOCYTES # BLD AUTO: 1.62 K/UL — HIGH (ref 0–0.9)
MONOCYTES NFR BLD AUTO: 9 % — SIGNIFICANT CHANGE UP (ref 2–14)
MONOS+MACROS NFR CSF: 10 % — SIGNIFICANT CHANGE UP
NEUTROPHILS # BLD AUTO: 12.97 K/UL — HIGH (ref 1.8–7.4)
NEUTROPHILS # CSF: 51 % — SIGNIFICANT CHANGE UP
NEUTROPHILS NFR BLD AUTO: 71 % — SIGNIFICANT CHANGE UP (ref 43–77)
NRBC NFR CSF: 32 CELLS/UL — HIGH (ref 0–5)
OTHER CELLS CSF MANUAL: 0 % — SIGNIFICANT CHANGE UP
PHOSPHATE SERPL-MCNC: 6.3 MG/DL — HIGH (ref 2.5–4.5)
PK PPP-MCNC: 36.4 SEC — SIGNIFICANT CHANGE UP
PK PPP-MCNC: 37 SEC — HIGH (ref 27.5–36.5)
PK PPP-MCNC: 67.2 SEC — SIGNIFICANT CHANGE UP
PLATELET # BLD AUTO: 406 K/UL — HIGH (ref 150–400)
POTASSIUM SERPL-MCNC: 3.7 MMOL/L — SIGNIFICANT CHANGE UP (ref 3.5–5.3)
POTASSIUM SERPL-SCNC: 3.7 MMOL/L — SIGNIFICANT CHANGE UP (ref 3.5–5.3)
PROT CSF-MCNC: 121 MG/DL — HIGH (ref 15–45)
PROT SERPL-MCNC: 9.1 G/DL — HIGH (ref 6–8.3)
PROTHROM AB SERPL-ACNC: 17.7 SEC — HIGH (ref 10.6–13.6)
RBC # BLD: 5.54 M/UL — SIGNIFICANT CHANGE UP (ref 4.2–5.8)
RBC # CSF: HIGH CELLS/UL (ref 0–0)
RBC # FLD: 15.9 % — HIGH (ref 10.3–14.5)
SODIUM SERPL-SCNC: 133 MMOL/L — LOW (ref 135–145)
SPECIMEN SOURCE: SIGNIFICANT CHANGE UP
TUBE TYPE: SIGNIFICANT CHANGE UP
VWF AG ACT/NOR PPP IA: 231 % — HIGH (ref 50–150)
VWF:RCO ACT/NOR PPP PL AGG: 158 % — HIGH (ref 43–126)
WBC # BLD: 18.01 K/UL — HIGH (ref 3.8–10.5)
WBC # FLD AUTO: 18.01 K/UL — HIGH (ref 3.8–10.5)

## 2021-06-18 PROCEDURE — 71045 X-RAY EXAM CHEST 1 VIEW: CPT | Mod: 26

## 2021-06-18 PROCEDURE — 99291 CRITICAL CARE FIRST HOUR: CPT

## 2021-06-18 PROCEDURE — 70450 CT HEAD/BRAIN W/O DYE: CPT | Mod: 26,GC

## 2021-06-18 RX ORDER — DEXMEDETOMIDINE HYDROCHLORIDE IN 0.9% SODIUM CHLORIDE 4 UG/ML
1 INJECTION INTRAVENOUS
Qty: 1000 | Refills: 0 | Status: DISCONTINUED | OUTPATIENT
Start: 2021-06-18 | End: 2021-06-18

## 2021-06-18 RX ORDER — PROPOFOL 10 MG/ML
33 INJECTION, EMULSION INTRAVENOUS ONCE
Refills: 0 | Status: COMPLETED | OUTPATIENT
Start: 2021-06-18 | End: 2021-06-18

## 2021-06-18 RX ORDER — PROPOFOL 10 MG/ML
33 INJECTION, EMULSION INTRAVENOUS ONCE
Refills: 0 | Status: DISCONTINUED | OUTPATIENT
Start: 2021-06-18 | End: 2021-06-18

## 2021-06-18 RX ORDER — SUGAMMADEX 100 MG/ML
66 INJECTION, SOLUTION INTRAVENOUS ONCE
Refills: 0 | Status: DISCONTINUED | OUTPATIENT
Start: 2021-06-18 | End: 2021-06-18

## 2021-06-18 RX ADMIN — Medication 5 MILLIGRAM(S): at 09:17

## 2021-06-18 RX ADMIN — Medication 2 DROP(S): at 18:43

## 2021-06-18 RX ADMIN — FAMOTIDINE 20 MILLIGRAM(S): 10 INJECTION INTRAVENOUS at 21:43

## 2021-06-18 RX ADMIN — Medication 20 MILLIGRAM(S): at 05:43

## 2021-06-18 RX ADMIN — SODIUM CHLORIDE 66 MILLILITER(S): 9 INJECTION, SOLUTION INTRAVENOUS at 21:44

## 2021-06-18 RX ADMIN — SODIUM CHLORIDE 3 MILLILITER(S): 9 INJECTION INTRAMUSCULAR; INTRAVENOUS; SUBCUTANEOUS at 01:22

## 2021-06-18 RX ADMIN — Medication 20 MILLIGRAM(S): at 21:43

## 2021-06-18 RX ADMIN — Medication 2 DROP(S): at 09:17

## 2021-06-18 RX ADMIN — Medication 10 MILLIGRAM(S): at 05:43

## 2021-06-18 RX ADMIN — Medication 198 MILLIGRAM(S): at 14:00

## 2021-06-18 RX ADMIN — SODIUM CHLORIDE 3 MILLILITER(S): 9 INJECTION INTRAMUSCULAR; INTRAVENOUS; SUBCUTANEOUS at 09:33

## 2021-06-18 RX ADMIN — Medication 2 DROP(S): at 21:43

## 2021-06-18 RX ADMIN — SODIUM CHLORIDE 3 MILLILITER(S): 9 INJECTION, SOLUTION INTRAVENOUS at 19:23

## 2021-06-18 RX ADMIN — Medication 198 MILLIGRAM(S): at 05:43

## 2021-06-18 RX ADMIN — FAMOTIDINE 20 MILLIGRAM(S): 10 INJECTION INTRAVENOUS at 09:19

## 2021-06-18 RX ADMIN — LEVETIRACETAM 1500 MILLIGRAM(S): 250 TABLET, FILM COATED ORAL at 21:42

## 2021-06-18 RX ADMIN — Medication 198 MILLIGRAM(S): at 21:42

## 2021-06-18 RX ADMIN — Medication 20 MILLIGRAM(S): at 16:30

## 2021-06-18 RX ADMIN — Medication 1 PACKET(S): at 09:19

## 2021-06-18 RX ADMIN — SODIUM CHLORIDE 66 MILLILITER(S): 9 INJECTION, SOLUTION INTRAVENOUS at 00:30

## 2021-06-18 RX ADMIN — Medication 2 DROP(S): at 16:30

## 2021-06-18 RX ADMIN — LEVETIRACETAM 1500 MILLIGRAM(S): 250 TABLET, FILM COATED ORAL at 09:19

## 2021-06-18 RX ADMIN — SODIUM CHLORIDE 3 MILLILITER(S): 9 INJECTION INTRAMUSCULAR; INTRAVENOUS; SUBCUTANEOUS at 18:39

## 2021-06-18 RX ADMIN — PROPOFOL 33 MILLIGRAM(S): 10 INJECTION, EMULSION INTRAVENOUS at 16:23

## 2021-06-18 RX ADMIN — Medication 10 MILLIGRAM(S): at 18:01

## 2021-06-18 RX ADMIN — SODIUM CHLORIDE 3 MILLILITER(S): 9 INJECTION, SOLUTION INTRAVENOUS at 07:29

## 2021-06-18 RX ADMIN — SODIUM CHLORIDE 3 MILLILITER(S): 9 INJECTION, SOLUTION INTRAVENOUS at 17:47

## 2021-06-18 NOTE — PROGRESS NOTE PEDS - ATTENDING COMMENTS
or today for cranioplasty and shunt, r/b/a d/w with family, Dr. Maynard for avoidance of pacemaker, f/u cards, f/u picu

## 2021-06-18 NOTE — PROGRESS NOTE PEDS - SUBJECTIVE AND OBJECTIVE BOX
PAST 24hr EVENTS: Preop for today, no issues overnight     PHYSICAL EXAM:   Vital Signs Last 24 Hrs  T(C): 36.8 (2021 07:44), Max: 37 (2021 17:00)  T(F): 98.2 (2021 07:44), Max: 98.6 (2021 17:00)  HR: 70 (2021 07:44) (70 - 71)  BP: 117/73 (2021 07:44) (105/77 - 125/69)  BP(mean): 84 (2021 05:00) (83 - 90)  RR: 22 (2021 07:44) (18 - 28)  SpO2: 89% (2021 07:44) (83% - 93%)    awake, alert, tracts, minimal verbal, follows commands briskly  craniectomy site- soft  motor- GOODSON antigravity, RUE/RLE weaker then LUE/LLE  sensory- SILT  incision site- c/d/i  EVD patent     I&O's Summary    2021 07:01  -  2021 07:00  --------------------------------------------------------  IN: 1428 mL / OUT: 1838 mL / NET: -410 mL                              14.3   9.39  )-----------( 338      ( 2021 17:00 )             45.1     06-18    134<L>  |  92<L>  |  28<H>  ----------------------------<  108<H>  3.6   |  20<L>  |  0.83    Ca    10.7<H>      2021 06:57  Phos  4.6     06-18  Mg     2.3     06-18    TPro  8.9<H>  /  Alb  4.8  /  TBili  1.4<H>  /  DBili  x   /  AST  42<H>  /  ALT  22  /  AlkPhos  192  06-18    PT/INR - ( 2021 12:17 )   PT: 15.9 sec;   INR: 1.42 ratio         PTT - ( 2021 12:17 )  PTT:26.2 sec      MEDICATIONS  (STANDING):  ALBUTerol  Intermittent Nebulization - Peds 2.5 milliGRAM(s) Nebulizer <User Schedule>  ceFAZolin  IV Intermittent - Peds 1980 milliGRAM(s) IV Intermittent every 8 hours  famotidine  Oral Tab/Cap - Peds 20 milliGRAM(s) Oral two times a day  furosemide   Oral Tab/Cap - Peds 10 milliGRAM(s) Oral every 12 hours  lactobacillus Oral Powder (CULTURELLE KIDS) - Peds 1 Packet(s) Oral daily  levETIRAcetam  Oral Tab/Cap - Peds 1500 milliGRAM(s) Oral two times a day  Mexiletine 150 milliGRAM(s) 150 milliGRAM(s) Oral every 8 hours  phytonadione  Oral Liquid - Peds 5 milliGRAM(s) Enteral Tube <User Schedule>  polyvinyl alcohol 1.4%/povidone 0.6% Ophthalmic Solution - Peds 2 Drop(s) Both EYES four times a day  propranolol  Oral Tab/Cap - Peds 10 milliGRAM(s) Oral every 8 hours  sildenafil   Oral Tab/Cap - Peds 20 milliGRAM(s) Oral every 8 hours  sodium chloride 0.9% -  250 milliLiter(s) (3 mL/Hr) IV Continuous <Continuous>  sodium chloride 0.9% lock flush - Peds 3 milliLiter(s) IV Push every 8 hours  sodium chloride 0.9%. - Pediatric 1000 milliLiter(s) (66 mL/Hr) IV Continuous <Continuous>  sodium chloride 3% for Nebulization - Peds 0.5 milliLiter(s) Nebulizer daily    MEDICATIONS  (PRN):  acetaminophen   Oral Tab/Cap - Peds. 650 milliGRAM(s) Oral every 6 hours PRN Mild Pain (1 - 3), Moderate Pain (4 - 6), Severe Pain (7 - 10)  melatonin Oral Tab/Cap - Peds 5 milliGRAM(s) Oral at bedtime PRN Insomnia  polyethylene glycol 3350 Oral Powder - Peds 17 Gram(s) Oral daily PRN Constipation      NPO STATUS:   REASON: [x] OR procedure   [] imaging with sedation   [] medical need    [] other   RN Informed: [] Yes [] No  Family informed and educated [] Yes [] No    RADIOLOGY:  < from: CT Stereotactic Localization No Cont (21 @ 14:59) >    FINDINGS:  VENTRICLES AND SULCI:  Normal in size and configuration. A left frontal approach intraventricular catheter is again seen with the tip just beyond the atrium of the right lateral ventricle as seen previously.  INTRA-AXIAL:  Low-density changes are again seen within the left frontal lobe interspersed with areasof mild hemorrhage, stable in appearance. Left frontal lobe swelling is seen with mild transcranial herniation as noted previously. No other changes are seen.  EXTRA-AXIAL:  Extracalvarial fluid is seen adjacent to the craniectomy site which appears smaller from the prior exam measuring 9 mm in mediolateral dimension as compared with 1.5 cm.  VISUALIZED SINUSES:  Clear.  VISUALIZED MASTOIDS:  Clear.  CALVARIUM:  Status post left frontal craniectomy as seen previously  MISCELLANEOUS:  None.    IMPRESSION:  Mildly decreased extracalvarial fluid adjacent to the craniectomy site when compared with the prior exam.    No other significant interval changes.

## 2021-06-18 NOTE — PROGRESS NOTE PEDS - SUBJECTIVE AND OBJECTIVE BOX
INTERVAL HISTORY: NPO since midnight for NS procedure today. Made VOO 70bpm this morning    RESPIRATORY SUPPORT: JOYCE  NUTRITION: NPO       @ 07:01  -   @ 07:00  --------------------------------------------------------  IN: 1428 mL / OUT: 1838 mL / NET: -410 mL    INTRAVASCULAR ACCESS: PIV, RRA    MEDICATIONS:  furosemide   Oral Tab/Cap - Peds 10 milliGRAM(s) Oral every 12 hours  propranolol  Oral Tab/Cap - Peds 10 milliGRAM(s) Oral every 8 hours  sildenafil   Oral Tab/Cap - Peds 20 milliGRAM(s) Oral every 8 hours  ALBUTerol  Intermittent Nebulization - Peds 2.5 milliGRAM(s) Nebulizer <User Schedule>  sodium chloride 3% for Nebulization - Peds 0.5 milliLiter(s) Nebulizer daily  ceFAZolin  IV Intermittent - Peds 1980 milliGRAM(s) IV Intermittent every 8 hours  levETIRAcetam  Oral Tab/Cap - Peds 1500 milliGRAM(s) Oral two times a day  famotidine  Oral Tab/Cap - Peds 20 milliGRAM(s) Oral two times a day  phytonadione  Oral Liquid - Peds 5 milliGRAM(s) Enteral Tube <User Schedule>  sodium chloride 0.9% -  250 milliLiter(s) IV Continuous <Continuous>  sodium chloride 0.9% lock flush - Peds 3 milliLiter(s) IV Push every 8 hours  sodium chloride 0.9%. - Pediatric 1000 milliLiter(s) IV Continuous <Continuous>    PHYSICAL EXAMINATION:  Vital signs - Weight (kg): 66.1 ( @ 07:44)  T(C): 36.4 (21 @ 08:18), Max: 37 (21 @ 17:00)  HR: 70 (21 @ 08:18) (70 - 71)  BP: 108/75 (21 @ 08:18) (105/77 - 125/69)  ABP:  (92/54 - 129/68)  RR: 20 (21 @ 08:18) (18 - 28)  SpO2: 89% (21 @ 08:18) (84% - 93%)    General - awake, alert, interactive  Skin - no rash, no desquamation, no cyanosis.  Eyes / ENT - no conjunctival injection, sclerae anicteric, external ears & nares normal, mucous membranes moist.  Pulmonary - normal inspiratory effort, no retractions, lungs clear to auscultation bilaterally, no wheezes, no rales.  Cardiovascular - normal rate, regular rhythm, normal S1 & single S2, grade 1/6 blowing holosystolic murmur at LMSB, no rubs, no gallops, capillary refill < 2sec, normal pulses.  Gastrointestinal - soft, non-distended, non-tender, no splenomegaly. (+) hepatomegaly.  Musculoskeletal - no joint swelling, no clubbing, no edema.  Neurologic / Psychiatric - alert,   LABORATORY TESTS:                          14.3  CBC:   9.39 )-----------( 338   (21 @ 17:00)                          45.1               134   |  92    |  28                 Ca: 10.7   BMP:   ----------------------------< 108    M.3   (21 @ 06:57)             3.6    |  20    | 0.83               Ph: 4.6      LFT:     TPro: 8.9 / Alb: 4.8 / TBili: 1.4 / DBili: x / AST: 42 / ALT: 22 / AlkPhos: 192   (21 @ 06:57)    COAG: PT: 15.9 / PTT: 26.2 / INR: 1.42   (21 @ 12:17)       ABG:   pH: 7.50 / pCO2: 29 / pO2: 52 / HCO3: 24 / Base Excess: -0.9 / SaO2: 87.1 / Lactate: x / iCa: 1.15   (21 @ 18:06)      IMAGING STUDIES:  Electrocardiogram - (21) Ventricular paced rhythm @ 75bpm. Underlying IART.    Telemetry - (6/15/21) Ventricular paced rhythm @ 75bpm. Underlying IART.    Chest x-ray - (21) Stable mild cardiomegaly with subsegmental left lower lobe atelectasis.    Echocardiogram - (21)   1. This was a technically difficult suboptimal study due to poor echo windows. Findings limited to below.   2. 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   3. Mild mitral valve regurgitation.   4. Trivial tricuspid valve regurgitation.   5. Trivial aortic valve regurgitation.   6. Status post device closure of Fontan fenestration.   7. S/P stent placement in the Fontan pathway. Limited imaging of the inferior Fontan baffle. In this setting, the inferior vena cava to its connection near the atrial portion of the baffle appears patent with no obstruction. S/p device closure of Fontan fenestration. The Fontan fenestration is not seen on this study. The right bidirectional Storm is seen only on color flow mapping. This appears to have laminar low velocity flow.   8. The connection of the right bidirectional Storm to the right pulmonary artery could not be imaged. The right pulmonary artery is seen in a limited portion immediately to the left of the Storm and appears patent. The left pulmonary artery could not be imaged.   9. Extremely poor and limited imaging of the lateral free walls of the single systemic left ventricle. On limited short axis views there appears to be adequate systolic excursion of the posterior wall of the left ventricle and decreased in the anterior wall. The bulboventricular foramen could not be imaged. Suggest alternate imaging for appropriate assessment of function.  10. No pericardial effusion.  11. Small right pleural effusion.    Cath - (21)  Access 4 Fr RFA, 7 Fr RFV x2 with US guidance.  Sat data (%): on 50% FiO2 LPA 73, RUPV 98, Ao 97; CI 2.6 L/min/m2 with Qp:Qs 1 :1.  Pressures (mmHg): Elevated mFontan = mIVC = 20. mPCW=16, No significant gradient across LV to AAo to Vikki (98/58/73).  Normal PVR while on sildenafil.  Angios showed unobstructed Fontan with existing stent within Fontan conduit.    Comment: IART ablation was attempted after the diagnostic cath but unsuccessful. Patient was overdrive paced out of IART. At the end of the case, Ao sat was 94% and LPA 66% on 50% FiO2

## 2021-06-18 NOTE — BRIEF OPERATIVE NOTE - NSICDXBRIEFPROCEDURE_GEN_ALL_CORE_FT
PROCEDURES:  Craniotomy, for intracerebellar hematoma evacuation 02-Jun-2021 10:53:26  Alondra Alarcon  
PROCEDURES:  Craniotomy, for intracerebellar hematoma evacuation 02-Jun-2021 10:53:26  Alondra Alarcon  Cranioplasty for skull defect larger than 5 cm diameter 18-Jun-2021 16:14:35  Alondra Alarcon  Revision of peritoneal catheter component of ventriculoperitoneal shunt 18-Jun-2021 16:14:58  Alondra Alarcon

## 2021-06-18 NOTE — BRIEF OPERATIVE NOTE - OPERATION/FINDINGS
Cranioplasty with placement of  shunt. Strata at 0.5 The valve is over split temporalis muscle.
Left craniectomy for evacuation of left frontal hematoma. Under very high pressure, large clot delivered itself. Placed Left frontal EVD.

## 2021-06-18 NOTE — PROGRESS NOTE PEDS - ASSESSMENT
21 y/o M with complex cardiac history presented after cardiac cath with large parenchymal hemorrhage in the anterior left frontal lobe and subarachnoid hemorrhage s/p Left craniectomy, discarded bone flap, placement of EVD on 6/1/2021. 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 Patient extubated with followable neurological exam.     PLAN:   - preop for VPS / cranioplasty placement today    Case discussed with attending neurosurgeon.

## 2021-06-18 NOTE — CHART NOTE - NSCHARTNOTEFT_GEN_A_CORE
Surgery: cranioplasty, VPS  Consent: done       PAST 24HR EVENTS: no acute issues    T(C): 36.4 (06-18-21 @ 02:00), Max: 37 (06-17-21 @ 17:00)  HR: 70 (06-18-21 @ 03:00) (70 - 71)  BP: 105/77 (06-18-21 @ 02:00) (105/77 - 125/69)  RR: 18 (06-18-21 @ 03:00) (18 - 28)  SpO2: 88% (06-18-21 @ 03:00) (82% - 93%)  Wt(kg): --  06-17    131<L>  |  91<L>  |  29<H>  ----------------------------<  153<H>  3.3<L>   |  20<L>  |  0.86    Ca    10.4      17 Jun 2021 23:53  Phos  5.0     06-17  Mg     2.2     06-17    TPro  9.1<H>  /  Alb  4.7  /  TBili  1.4<H>  /  DBili  x   /  AST  44<H>  /  ALT  24  /  AlkPhos  192  06-17                          14.3   9.39  )-----------( 338      ( 17 Jun 2021 17:00 )             45.1     PT/INR - ( 17 Jun 2021 12:17 )   PT: 15.9 sec;   INR: 1.42 ratio         PTT - ( 17 Jun 2021 12:17 )  PTT:26.2 sec  Type & Screen (in past 72hrs): active  covid: neg  Rpt Coags/ rpt BMP pending

## 2021-06-18 NOTE — BRIEF OPERATIVE NOTE - NSICDXBRIEFPOSTOP_GEN_ALL_CORE_FT
POST-OP DIAGNOSIS:  ICH (intracerebral hemorrhage) 02-Jun-2021 10:54:48  Alondra Alarcon  
POST-OP DIAGNOSIS:  ICH (intracerebral hemorrhage) 02-Jun-2021 10:54:48  Alondra Alarcon

## 2021-06-18 NOTE — PROGRESS NOTE PEDS - ASSESSMENT
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 Presented in IART - s/p EP study with unsuccessful attempt at ablation along with diagnostic cath showing elevated Fontan pressure (20mmHg). He was loaded with amiodarone and ultimately electrically cardioverted out, however reverted back into IART and continues to be in IART. His course is further complicated by L frontal parenchymal hemorrhage requiring emergent evacuation with NSx and EVD placement (which is no clamped since Thursday 6/10) and VTach of unknown origin.    He is due for NS procedure today.     CVS:  - Continuos telemetry monitoring.  - Pacemaker baseline: DDD 70-120bpm. TO VOO 70bpm for OR today  - sildenafil 20mg q8 (home medication) Can switich to sildenafil 10mg q8 if needed  - lasix to 10 mg Q12H (home dose). Goal net even. Monitor creat. Will determine ongoing needs after OR  - Sats goal >85%.   - Hold home lisinopril, aldactone. Agree with plan to ensure SBP not persistently higher than 140. Nicardapine as needed. Would try to reinstitute home medications when able to give enteral medications. Need to consider that now on clonidine  - Mexlitine 150mg q8 (do not hold dose while NPO)    Resp:   - Wean as tolerated. on RA  - CTA obtained to eval for PE - difficult to protocol because of Fontan anatomy and contrast timing, no PE seen, but does show VV collaterals (likely main reason for hypoxia)      FEN/GI:   - NPO for OR  - GI PPx    ID:   - pending post-op NS     Heme:   - Anticoagulation care per hematology/PICU/NSx  - Xarelto on hold. WIll need to consider restarting anticoagulation when safe due to concerns of Fontan clot    Neuro:   - L frontal parenchymal hemorrhage- NSx and neuro and PMR following  - On Keppra  - On Clonidine patch, propranolol, melatnonin PRN

## 2021-06-18 NOTE — PROGRESS NOTE PEDS - ASSESSMENT
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 He has elevated Fontan pressure secondary to LV diastolic dysfunction.  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 Significant issues with neuroagitation and delerium that have significantly improved-- now follows commands and strength more equal     Awaiting VPS placement and skull flap today.     PLAN:  Neuro:  melatonin Qday  continue propranolol per PMR recs (hold for SBP< 100)   Delirium seems much improved.   - Keppra (clinical szs)  - consider dekakote if seizures persist (will see if drug interactuon)   - R sided hemiparesis improving   - EVD to 0   - tylenol prn for fever control  -PT/OT/speech/rehab consult  -cannot have mri  -plan for OR tomorrow for flap and  shunt    Resp:  NC with sats high 80s  - CTA show VV collaterals (likely main reason for hypoxia)  Goal SpO2 > 85% - may need to lower goals to 80% with collaterals      CV:  continue mexilitine for hx of  VT  Mode is DDD but atrial wires without good sensitivity, so effectively VVI 70 (will make VVO for OR friday)  Still in IART  Home meds on hold (Lisinopril, Aldactone)  Continue Sildenafil   Continue Lasix po q12  Metolazone as needed for increased oxygen requirement   sono r chest (6/15) with moderate effusion clinically improving with diuresis  SBP < 140  echo 6/8 extremely limited windows  HOLA when goes for neurosugical procedure (when intubated)    Heme:  Xarelto on hold  Goal PLTs >100  FFP to keep INR < 2.0 (but before procedures e.g. EVD removal will huddle with hematology to bring INR down < 1.5 if not there already)  Hematology and NRSGY in discussions  s/p Vitamin K x3 days,   now po Vit K 3x/week    FEN:  Pepcid -renally dosed   Bowel regimen: Senna and Miralax PRN  NPO for OR but now cleared by speech for oral diet of regular solids and thin fluids as tolerated   nutrition consulting  speech and swallow consulting    ID  -  Ancef while EVD in    Other  - L hydrocele  - outpt f/u    Access  Connor MACHADO (6/1)  PIV

## 2021-06-18 NOTE — CHART NOTE - NSCHARTNOTEFT_GEN_A_CORE
NEUROSURGERY POST OP CHECK 21 @ 17:43    Dx: 20y Male  s/p Left cranioplasty, placement of  shunt    MEDICATIONS  (STANDING):  ALBUTerol  Intermittent Nebulization - Peds 2.5 milliGRAM(s) Nebulizer <User Schedule>  ceFAZolin  IV Intermittent - Peds 1980 milliGRAM(s) IV Intermittent every 8 hours  dexMEDEtomidine Infusion - Peds 1 MICROgram(s)/kG/Hr (16.5 mL/Hr) IV Continuous <Continuous>  famotidine  Oral Tab/Cap - Peds 20 milliGRAM(s) Oral two times a day  furosemide   Oral Tab/Cap - Peds 10 milliGRAM(s) Oral every 12 hours  lactobacillus Oral Powder (CULTURELLE KIDS) - Peds 1 Packet(s) Oral daily  levETIRAcetam  Oral Tab/Cap - Peds 1500 milliGRAM(s) Oral two times a day  Mexiletine 150 milliGRAM(s) 150 milliGRAM(s) Oral every 8 hours  phytonadione  Oral Liquid - Peds 5 milliGRAM(s) Enteral Tube <User Schedule>  polyvinyl alcohol 1.4%/povidone 0.6% Ophthalmic Solution - Peds 2 Drop(s) Both EYES four times a day  propofol  IV Push - Peds 33 milliGRAM(s) IV Push once  propranolol  Oral Tab/Cap - Peds 10 milliGRAM(s) Oral every 8 hours  sildenafil   Oral Tab/Cap - Peds 20 milliGRAM(s) Oral every 8 hours  sodium chloride 0.9% -  250 milliLiter(s) (3 mL/Hr) IV Continuous <Continuous>  sodium chloride 0.9% lock flush - Peds 3 milliLiter(s) IV Push every 8 hours  sodium chloride 0.9%. - Pediatric 1000 milliLiter(s) (66 mL/Hr) IV Continuous <Continuous>  sodium chloride 3% for Nebulization - Peds 0.5 milliLiter(s) Nebulizer daily  sugammadex IV Push - Peds 66 milliGRAM(s) IV Push once    MEDICATIONS  (PRN):  acetaminophen   Oral Tab/Cap - Peds. 650 milliGRAM(s) Oral every 6 hours PRN Mild Pain (1 - 3), Moderate Pain (4 - 6), Severe Pain (7 - 10)  melatonin Oral Tab/Cap - Peds 5 milliGRAM(s) Oral at bedtime PRN Insomnia  polyethylene glycol 3350 Oral Powder - Peds 17 Gram(s) Oral daily PRN Constipation                            14.8   18.01 )-----------( 406      ( 2021 16:20 )             46.4       I&O's Summary    2021 07:01  -  2021 07:00  --------------------------------------------------------  IN: 1428 mL / OUT: 1838 mL / NET: -410 mL    2021 07:01  -  2021 17:43  --------------------------------------------------------  IN: 339 mL / OUT: 556 mL / NET: -217 mL        T(C): --  HR: 70 (21 @ 16:22) (70 - 70)  BP: --  RR: --  SpO2: 89% (21 @ 16:22) (89% - 92%)    PHYSICAL EXAM:   Intubated on CPAP  Satting at 92%  Opening eyes to voice  GOODSON equally  Following commands  NEIL drain 10cc      < from: CT Head No Cont in OR (21 @ 15:35) >      IMPRESSION:  Status post placement of a  shunt via the left frontal approach with its tip in the region of the right lateral ventricle. Right lateral ventricle is relatively slitlike. Trace hemorrhage is suggested in the right lateral ventricle. Status post placement left frontal wire mesh cranioplasty    < end of copied text >      Plan  - Wean to extubate  - Advance diet as tolerated once extubated and cleared by PICU  - Post op CT Ok, expected post op changes

## 2021-06-18 NOTE — PROGRESS NOTE PEDS - SUBJECTIVE AND OBJECTIVE BOX
Interval/Overnight Events:  weaned to room air with increased diuretics NPO overnight while awaiting OR   _________________________________________________________________  Respiratory:    ALBUTerol  Intermittent Nebulization - Peds 2.5 milliGRAM(s) Nebulizer <User Schedule>  sodium chloride 3% for Nebulization - Peds 0.5 milliLiter(s) Nebulizer daily    _________________________________________________________________  Cardiac:  Cardiac Rhythm: Sinus rhythm    furosemide   Oral Tab/Cap - Peds 10 milliGRAM(s) Oral every 12 hours  propranolol  Oral Tab/Cap - Peds 10 milliGRAM(s) Oral every 8 hours  sildenafil   Oral Tab/Cap - Peds 20 milliGRAM(s) Oral every 8 hours    _________________________________________________________________  Hematologic:      ________________________________________________________________  Infectious:    ceFAZolin  IV Intermittent - Peds 1980 milliGRAM(s) IV Intermittent every 8 hours    RECENT CULTURES:      ________________________________________________________________  Fluids/Electrolytes/Nutrition:  I&O's Summary    2021 07:01  -  2021 07:00  --------------------------------------------------------  IN: 1428 mL / OUT: 1838 mL / NET: -410 mL    2021 07:01  -  2021 08:58  --------------------------------------------------------  IN: 0 mL / OUT: 300 mL / NET: -300 mL      Diet: NPO    famotidine  Oral Tab/Cap - Peds 20 milliGRAM(s) Oral two times a day  phytonadione  Oral Liquid - Peds 5 milliGRAM(s) Enteral Tube <User Schedule>  polyethylene glycol 3350 Oral Powder - Peds 17 Gram(s) Oral daily PRN  sodium chloride 0.9% -  250 milliLiter(s) IV Continuous <Continuous>  sodium chloride 0.9% lock flush - Peds 3 milliLiter(s) IV Push every 8 hours  sodium chloride 0.9%. - Pediatric 1000 milliLiter(s) IV Continuous <Continuous>    _________________________________________________________________  Neurologic:  Adequacy of sedation and pain control has been assessed and adjusted    acetaminophen   Oral Tab/Cap - Peds. 650 milliGRAM(s) Oral every 6 hours PRN  levETIRAcetam  Oral Tab/Cap - Peds 1500 milliGRAM(s) Oral two times a day  melatonin Oral Tab/Cap - Peds 5 milliGRAM(s) Oral at bedtime PRN    ________________________________________________________________  Additional Meds:    lactobacillus Oral Powder (CULTURELLE KIDS) - Peds 1 Packet(s) Oral daily  Mexiletine 150 milliGRAM(s) 150 milliGRAM(s) Oral every 8 hours  polyvinyl alcohol 1.4%/povidone 0.6% Ophthalmic Solution - Peds 2 Drop(s) Both EYES four times a day    ________________________________________________________________  Access:    Necessity of urinary, arterial, and venous catheters discussed  ________________________________________________________________  Labs:                                            14.3                  Neurophils% (auto):   69.8   ( @ 17:00):    9.39 )-----------(338          Lymphocytes% (auto):  11.3                                          45.1                   Eosinphils% (auto):   1.9      Manual%: Neutrophils x    ; Lymphocytes x    ; Eosinophils x    ; Bands%: x    ; Blasts x                                  134    |  92     |  28                  Calcium: 10.7  / iCa: 1.07   ( @ 06:57)    ----------------------------<  108       Magnesium: 2.3                              3.6     |  20     |  0.83             Phosphorous: 4.6      TPro  8.9    /  Alb  4.8    /  TBili  1.4    /  DBili  x      /  AST  42     /  ALT  22     /  AlkPhos  192    2021 06:57  (  @ 12:17 )   PT: 15.9 sec;   INR: 1.42 ratio  aPTT: 26.2 sec    _________________________________________________________________  Imaging:    _________________________________________________________________  PE:  T(C): 36.4 (21 @ 08:18), Max: 37 (21 @ 17:00)  HR: 70 (21 @ 08:18) (70 - 71)  BP: 108/75 (21 @ 08:18) (105/77 - 125/69)  ABP: 92/54 (21 @ 08:18) (92/54 - 129/68)  ABP(mean): 70 (21 @ 08:18) (69 - 88)  RR: 20 (21 @ 08:18) (18 - 28)  SpO2: 89% (21 @ 08:18) (83% - 93%)  CVP(mm Hg): --  Weight (kg): 66.1    General:	In no distress slightly tachypneic on room air  Respiratory:      diminised on right Effort even and unlabored. otherwise Clear bilaterally.   CV:		Regular rate and rhythm. Normal S1/single S2. 1/6 holosystolic murmur  Capillary refill < 2 seconds. Distal pulses 2+ and equal.  Abdomen:	Soft, non-distended. Bowel sounds present. hepatomegaly   Skin:		No rash.  Extremities:	Warm and well perfused.   Neurologic:	Alert and oriented EVD in place, motor seems grossly equal- answers quicker and more coherent   ________________________________________________________________  Patient and Parent/Guardian was updated as to the progress/plan of care.     Total critical care time spent by attending physician was 40 minutes, excluding procedure time.    The patient is improving but requires continued monitoring and adjustment of therapy.

## 2021-06-18 NOTE — BRIEF OPERATIVE NOTE - NSICDXBRIEFPREOP_GEN_ALL_CORE_FT
PRE-OP DIAGNOSIS:  Intracerebral hematoma 02-Jun-2021 10:54:02  Alondra Alarcon  
PRE-OP DIAGNOSIS:  Intracerebral hematoma 02-Jun-2021 10:54:02  Alondra Alarcon

## 2021-06-19 LAB
NIGHT BLUE STAIN TISS: SIGNIFICANT CHANGE UP
SPECIMEN SOURCE: SIGNIFICANT CHANGE UP

## 2021-06-19 PROCEDURE — 99233 SBSQ HOSP IP/OBS HIGH 50: CPT

## 2021-06-19 PROCEDURE — 70450 CT HEAD/BRAIN W/O DYE: CPT | Mod: 26

## 2021-06-19 PROCEDURE — 99291 CRITICAL CARE FIRST HOUR: CPT

## 2021-06-19 RX ORDER — SPIRONOLACTONE 25 MG/1
100 TABLET, FILM COATED ORAL DAILY
Refills: 0 | Status: DISCONTINUED | OUTPATIENT
Start: 2021-06-19 | End: 2021-06-19

## 2021-06-19 RX ADMIN — SODIUM CHLORIDE 3 MILLILITER(S): 9 INJECTION INTRAMUSCULAR; INTRAVENOUS; SUBCUTANEOUS at 10:00

## 2021-06-19 RX ADMIN — Medication 650 MILLIGRAM(S): at 11:12

## 2021-06-19 RX ADMIN — Medication 10 MILLIGRAM(S): at 05:13

## 2021-06-19 RX ADMIN — LEVETIRACETAM 1500 MILLIGRAM(S): 250 TABLET, FILM COATED ORAL at 10:00

## 2021-06-19 RX ADMIN — Medication 20 MILLIGRAM(S): at 14:26

## 2021-06-19 RX ADMIN — ALBUTEROL 2.5 MILLIGRAM(S): 90 AEROSOL, METERED ORAL at 11:21

## 2021-06-19 RX ADMIN — Medication 198 MILLIGRAM(S): at 22:02

## 2021-06-19 RX ADMIN — Medication 5 MILLIGRAM(S): at 01:32

## 2021-06-19 RX ADMIN — LEVETIRACETAM 1500 MILLIGRAM(S): 250 TABLET, FILM COATED ORAL at 22:01

## 2021-06-19 RX ADMIN — Medication 650 MILLIGRAM(S): at 19:46

## 2021-06-19 RX ADMIN — Medication 198 MILLIGRAM(S): at 05:38

## 2021-06-19 RX ADMIN — FAMOTIDINE 20 MILLIGRAM(S): 10 INJECTION INTRAVENOUS at 22:02

## 2021-06-19 RX ADMIN — Medication 650 MILLIGRAM(S): at 21:00

## 2021-06-19 RX ADMIN — Medication 650 MILLIGRAM(S): at 05:12

## 2021-06-19 RX ADMIN — Medication 20 MILLIGRAM(S): at 22:02

## 2021-06-19 RX ADMIN — FAMOTIDINE 20 MILLIGRAM(S): 10 INJECTION INTRAVENOUS at 10:01

## 2021-06-19 RX ADMIN — Medication 20 MILLIGRAM(S): at 05:25

## 2021-06-19 RX ADMIN — SODIUM CHLORIDE 3 MILLILITER(S): 9 INJECTION INTRAMUSCULAR; INTRAVENOUS; SUBCUTANEOUS at 02:26

## 2021-06-19 RX ADMIN — SODIUM CHLORIDE 3 MILLILITER(S): 9 INJECTION INTRAMUSCULAR; INTRAVENOUS; SUBCUTANEOUS at 18:38

## 2021-06-19 RX ADMIN — Medication 1 PACKET(S): at 10:00

## 2021-06-19 RX ADMIN — Medication 198 MILLIGRAM(S): at 15:30

## 2021-06-19 RX ADMIN — Medication 10 MILLIGRAM(S): at 17:35

## 2021-06-19 RX ADMIN — SODIUM CHLORIDE 0.5 MILLILITER(S): 9 INJECTION INTRAMUSCULAR; INTRAVENOUS; SUBCUTANEOUS at 11:22

## 2021-06-19 NOTE — PROGRESS NOTE PEDS - ATTENDING COMMENTS
stable exam, wound c/d/i, picu care, q1 neurochecks, repeat scan tomorrow, small amount of ich, correnct inr

## 2021-06-19 NOTE — PROGRESS NOTE PEDS - ATTENDING COMMENTS
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 Presented in IART - s/p EP study with unsuccessful attempt at ablation along with diagnostic cath showing elevated Fontan pressure (20mmHg). He was loaded with amiodarone and ultimately electrically cardioverted out, however reverted back into IART and continues to be in IART. His course is further complicated by L frontal parenchymal hemorrhage requiring emergent evacuation with NSx and EVD placement  and VTach of unknown origin. He is now s/p cranioplasty  with placement of  shunt, POD # 1. Currently on VOO, hemodynamically stable with stable saturations. 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 Presented in IART - s/p EP study with unsuccessful attempt at ablation along with diagnostic cath showing elevated Fontan pressure (20mmHg). He was loaded with amiodarone and ultimately electrically cardioverted out, however reverted back into IART and continues to be in IART. His course is further complicated by L frontal parenchymal hemorrhage requiring emergent evacuation with NSx and EVD placement  and VTach of unknown origin. He is now s/p cranioplasty  with placement of  shunt, POD # 1. Currently on VOO, hemodynamically stable with stable saturations. He is warm and well perfused at present. Mild KEVIN on labs with elevated creatinine, would be cautious in reinitiating home enalapril/aldactone until stabilizes.

## 2021-06-19 NOTE — PROGRESS NOTE PEDS - ASSESSMENT
20y male s/p cranioplasty and VPS placement- strata @0.5  -Continue PICU care  -Continue NEIL drain  -F/u CSF cultures

## 2021-06-19 NOTE — CHART NOTE - NSCHARTNOTEFT_GEN_A_CORE
A line removed from right radial artery.  Pressure held until hemostasis achieved.  Dressing placed with no evidence of ongoing bleeding.  No complications noted.  Site will be monitored for evidence of bleeding or vascular compromise.      Jeffrey Geronimo  EM/IM PGY3  2CN Resident

## 2021-06-19 NOTE — PROGRESS NOTE PEDS - SUBJECTIVE AND OBJECTIVE BOX
Interval/Overnight Events:    VITAL SIGNS:  T(C): 38.6 (21 @ 07:00), Max: 38.6 (21 @ 07:00)  HR: 70 (21 @ 06:00) (70 - 70)  BP: 110/82 (21 @ 20:00) (96/59 - 126/88)  ABP: 137/70 (21 @ 06:00) (91/55 - 154/94)  ABP(mean): 90 (21 @ 06:00) (68 - 119)  RR: 31 (21 @ 06:00) (13 - 31)  SpO2: 88% (21 @ 06:00) (83% - 92%)  CVP(mm Hg): --  End-Tidal CO2:  NIRS:    Physical Exam:    General: NAD  HEENT: no acute changes from baseline  Resp: unlabored, CTAB, good aeration, no rhonchi/rales/wheezing  CV: RRR, nl S1/S2, no m/r/g appreciated, CR < 2s, distal pulses 2+ and equal  Abd: soft, NTND, no HSM appreciated  Ext: wwp, no gross deformities  Neuro: alert and oriented, no acute change from baseline  Skin: no rash    =======================RESPIRATORY=======================  [ ] FiO2: ___ 	[ ] Heliox: ____ 		[ ] BiPAP: ___   [ ] NC: __  Liters			[ ] HFNC: __ 	Liters, FiO2: __  [ ] Mechanical Ventilation: Mode: standby  [ ] Inhaled Nitric Oxide:  [ ] Extubation Readiness Assessed  Comments:    =====================CARDIOVASCULAR======================  Cardiovascular Medications:  furosemide   Oral Tab/Cap - Peds 10 milliGRAM(s) Oral every 12 hours  propranolol  Oral Tab/Cap - Peds 10 milliGRAM(s) Oral every 8 hours  sildenafil   Oral Tab/Cap - Peds 20 milliGRAM(s) Oral every 8 hours    Chest Tube Output: ___ in 24 hours, ___ in last 12 hours   [ ] Right     [ ] Left    [ ] Mediastinal  Cardiac Rhythm:	[x] NSR		[ ] Other:    [ ] Central Venous Line	[ ] R	[ ] L	[ ] IJ	[ ] Fem	[ ] SC			Placed:   [ ] Arterial Line		[ ] R	[ ] L	[ ] PT	[ ] DP	[ ] Fem	[ ] Rad	[ ] Ax	Placed:   [ ] PICC:				[ ] Broviac		[ ] Mediport  Comments:    ==========HEMATOLOGY/ONCOLOGY=================  Transfusions:	[ ] PRBC	[ ] Platelets	[ ] FFP		[ ] Cryoprecipitate  DVT Prophylaxis:  Comments:    =================INFECTIOUS DISEASE==================  [ ] Cooling [place] being used. Target Temperature:     ===========FLUIDS/ELECTROLYTES/NUTRITION=============  I&O's Summary    2021 07:01  -  2021 07:00  --------------------------------------------------------  IN: 978 mL / OUT: 1876 mL / NET: -898 mL      Daily Weight in Gm: 85480 (2021 13:00)  Diet:	[ ] Regular	[ ] Soft		[ ] Clears	[ ] NPO  .	[ ] Other:  .	[ ] NGT		[ ] NDT		[ ] GT		[ ] GJT    [ ] Urinary Catheter, Date Placed:   Comments:    ====================NEUROLOGY===================  [ ] SBS:		[ ] GISSELLE-1:	[ ] BIS:	[ ] CAPD:  [ ] EVD set at: ___ , Drainage in last 24 hours: ___ ml    [x] Adequacy of sedation and pain control has been assessed and adjusted  Comments:      ==================PATIENT CARE=================  [ ] There are pressure ulcers/areas of breakdown that are being addressed -   [x] Preventative measures are being taken to decrease risk for skin breakdown.  [x] Necessity of urinary, arterial, and venous catheters discussed    ==================LABS============================  ABG - ( 2021 16:18 )  pH: 7.30  /  pCO2: 51    /  pO2: 66    / HCO3: 22    / Base Excess: -1.1  /  SaO2: 86.6  / Lactate: x                                                14.8                  Neurophils% (auto):   71.0   ( @ 16:20):    18.01)-----------(406          Lymphocytes% (auto):  15.0                                          46.4                   Eosinphils% (auto):   2.0      Manual%: Neutrophils x    ; Lymphocytes x    ; Eosinophils x    ; Bands%: 1.0  ; Blasts x        (  @ 16:19 )   PT: 17.7 sec;   INR: 1.57 ratio  aPTT: 174.9 sec                            133    |  95     |  31                  Calcium: 9.9   / iCa: x      ( @ 16:20)    ----------------------------<  177       Magnesium: 2.3                              3.7     |  21     |  1.00             Phosphorous: 6.3      TPro  9.1    /  Alb  4.5    /  TBili  1.6    /  DBili  x      /  AST  45     /  ALT  21     /  AlkPhos  187    2021 16:20  RECENT CULTURES:   @ 20:35 .CSF       No polymorphonuclear leukocytes seen per low power field  No organisms seen per oil power field        =================MEDICATIONS======================  MEDICATIONS  MEDICATIONS  (STANDING):  ALBUTerol  Intermittent Nebulization - Peds 2.5 milliGRAM(s) Nebulizer <User Schedule>  ceFAZolin  IV Intermittent - Peds 1980 milliGRAM(s) IV Intermittent every 8 hours  famotidine  Oral Tab/Cap - Peds 20 milliGRAM(s) Oral two times a day  furosemide   Oral Tab/Cap - Peds 10 milliGRAM(s) Oral every 12 hours  lactobacillus Oral Powder (CULTURELLE KIDS) - Peds 1 Packet(s) Oral daily  levETIRAcetam  Oral Tab/Cap - Peds 1500 milliGRAM(s) Oral two times a day  Mexiletine 150 milliGRAM(s) 150 milliGRAM(s) Oral every 8 hours  phytonadione  Oral Liquid - Peds 5 milliGRAM(s) Enteral Tube <User Schedule>  polyvinyl alcohol 1.4%/povidone 0.6% Ophthalmic Solution - Peds 2 Drop(s) Both EYES four times a day  propranolol  Oral Tab/Cap - Peds 10 milliGRAM(s) Oral every 8 hours  sildenafil   Oral Tab/Cap - Peds 20 milliGRAM(s) Oral every 8 hours  sodium chloride 0.9% -  250 milliLiter(s) (3 mL/Hr) IV Continuous <Continuous>  sodium chloride 0.9% lock flush - Peds 3 milliLiter(s) IV Push every 8 hours  sodium chloride 3% for Nebulization - Peds 0.5 milliLiter(s) Nebulizer daily    MEDICATIONS  (PRN):  acetaminophen   Oral Tab/Cap - Peds. 650 milliGRAM(s) Oral every 6 hours PRN Mild Pain (1 - 3), Moderate Pain (4 - 6), Severe Pain (7 - 10)  melatonin Oral Tab/Cap - Peds 5 milliGRAM(s) Oral at bedtime PRN Insomnia  polyethylene glycol 3350 Oral Powder - Peds 17 Gram(s) Oral daily PRN Constipation    ===================================================  IMAGING STUDIES:    [ ] XR   [ ] CT   [ ] MR   [ ] US  [ ] Echo  ===========================================================  Parent/Guardian is at the bedside:	[ ] Yes	[ ] No  Patient and Parent/Guardian updated as to the progress/plan of care:	[ ] Yes	[ ] No    [x] The patient remains in critical and unstable condition, and requires ICU care and monitoring, assessment, and treatment  [ ] The patient is improving but requires continued monitoring, assessment, treatment, and adjustment of therapy    [x] The total critical care time spent by attending physician was __35__ minutes, excluding procedure time. Interval/Overnight Events:    Extubated last night    VITAL SIGNS:  T(C): 38.6 (21 @ 07:00), Max: 38.6 (21 @ 07:00)  HR: 70 (21 @ 06:00) (70 - 70)  BP: 110/82 (21 @ 20:00) (96/59 - 126/88)  ABP: 137/70 (21 @ 06:00) (91/55 - 154/94)  ABP(mean): 90 (21 @ 06:00) (68 - 119)  RR: 31 (21 @ 06:00) (13 - 31)  SpO2: 88% (21 @ 06:00) (83% - 92%)  CVP(mm Hg): --  End-Tidal CO2:  NIRS:    Physical Exam:    General: NAD  HEENT: dressing c/d/i  Resp: unlabored, CTAB, good aeration, no rhonchi/rales/wheezing  CV: RRR, nl S1/S2, no m/r/g appreciated, CR < 2s, distal pulses 2+ and equal  Abd: soft, NTND, no HSM appreciated  Ext: wwp, no gross deformities  Neuro: following commands, some R hemiparesis  Skin: no rash    =======================RESPIRATORY=======================  [ ] FiO2: ___ 	[ ] Heliox: ____ 		[ ] BiPAP: ___   [ ] NC: __  Liters			[ ] HFNC: __ 	Liters, FiO2: __  [ ] Mechanical Ventilation: Mode: standby  [ ] Inhaled Nitric Oxide:  [ ] Extubation Readiness Assessed  Comments:    =====================CARDIOVASCULAR======================  Cardiovascular Medications:  furosemide   Oral Tab/Cap - Peds 10 milliGRAM(s) Oral every 12 hours  propranolol  Oral Tab/Cap - Peds 10 milliGRAM(s) Oral every 8 hours  sildenafil   Oral Tab/Cap - Peds 20 milliGRAM(s) Oral every 8 hours    Chest Tube Output: ___ in 24 hours, ___ in last 12 hours   [ ] Right     [ ] Left    [ ] Mediastinal  Cardiac Rhythm:	[x] NSR		[ ] Other:    [ ] Central Venous Line	[ ] R	[ ] L	[ ] IJ	[ ] Fem	[ ] SC			Placed:   [ ] Arterial Line		[ ] R	[ ] L	[ ] PT	[ ] DP	[ ] Fem	[ ] Rad	[ ] Ax	Placed:   [ ] PICC:				[ ] Broviac		[ ] Mediport  Comments:    ==========HEMATOLOGY/ONCOLOGY=================  Transfusions:	[ ] PRBC	[ ] Platelets	[ ] FFP		[ ] Cryoprecipitate  DVT Prophylaxis:  Comments:    =================INFECTIOUS DISEASE==================  [ ] Cooling [place] being used. Target Temperature:     ===========FLUIDS/ELECTROLYTES/NUTRITION=============  I&O's Summary    2021 07:01  -  2021 07:00  --------------------------------------------------------  IN: 978 mL / OUT: 1876 mL / NET: -898 mL      Daily Weight in Gm: 04308 (2021 13:00)  Diet:	[x ] Regular	[ ] Soft		[ ] Clears	[ ] NPO  .	[ ] Other:  .	[ ] NGT		[ ] NDT		[ ] GT		[ ] GJT    [ ] Urinary Catheter, Date Placed:   Comments:    ====================NEUROLOGY===================  [ ] SBS:		[ ] GISSELLE-1:	[ ] BIS:	[ ] CAPD:  [ ] EVD set at: ___ , Drainage in last 24 hours: ___ ml    [x] Adequacy of sedation and pain control has been assessed and adjusted  Comments:      ==================PATIENT CARE=================  [ ] There are pressure ulcers/areas of breakdown that are being addressed -   [x] Preventative measures are being taken to decrease risk for skin breakdown.  [x] Necessity of urinary, arterial, and venous catheters discussed    ==================LABS============================  ABG - ( 2021 16:18 )  pH: 7.30  /  pCO2: 51    /  pO2: 66    / HCO3: 22    / Base Excess: -1.1  /  SaO2: 86.6  / Lactate: x                                                14.8                  Neurophils% (auto):   71.0   ( @ 16:20):    18.01)-----------(406          Lymphocytes% (auto):  15.0                                          46.4                   Eosinphils% (auto):   2.0      Manual%: Neutrophils x    ; Lymphocytes x    ; Eosinophils x    ; Bands%: 1.0  ; Blasts x        (  @ 16:19 )   PT: 17.7 sec;   INR: 1.57 ratio  aPTT: 174.9 sec                            133    |  95     |  31                  Calcium: 9.9   / iCa: x      ( @ 16:20)    ----------------------------<  177       Magnesium: 2.3                              3.7     |  21     |  1.00             Phosphorous: 6.3      TPro  9.1    /  Alb  4.5    /  TBili  1.6    /  DBili  x      /  AST  45     /  ALT  21     /  AlkPhos  187    2021 16:20  RECENT CULTURES:   @ 20:35 .CSF       No polymorphonuclear leukocytes seen per low power field  No organisms seen per oil power field        =================MEDICATIONS======================  MEDICATIONS  MEDICATIONS  (STANDING):  ALBUTerol  Intermittent Nebulization - Peds 2.5 milliGRAM(s) Nebulizer <User Schedule>  ceFAZolin  IV Intermittent - Peds 1980 milliGRAM(s) IV Intermittent every 8 hours  famotidine  Oral Tab/Cap - Peds 20 milliGRAM(s) Oral two times a day  furosemide   Oral Tab/Cap - Peds 10 milliGRAM(s) Oral every 12 hours  lactobacillus Oral Powder (CULTURELLE KIDS) - Peds 1 Packet(s) Oral daily  levETIRAcetam  Oral Tab/Cap - Peds 1500 milliGRAM(s) Oral two times a day  Mexiletine 150 milliGRAM(s) 150 milliGRAM(s) Oral every 8 hours  phytonadione  Oral Liquid - Peds 5 milliGRAM(s) Enteral Tube <User Schedule>  polyvinyl alcohol 1.4%/povidone 0.6% Ophthalmic Solution - Peds 2 Drop(s) Both EYES four times a day  propranolol  Oral Tab/Cap - Peds 10 milliGRAM(s) Oral every 8 hours  sildenafil   Oral Tab/Cap - Peds 20 milliGRAM(s) Oral every 8 hours  sodium chloride 0.9% -  250 milliLiter(s) (3 mL/Hr) IV Continuous <Continuous>  sodium chloride 0.9% lock flush - Peds 3 milliLiter(s) IV Push every 8 hours  sodium chloride 3% for Nebulization - Peds 0.5 milliLiter(s) Nebulizer daily    MEDICATIONS  (PRN):  acetaminophen   Oral Tab/Cap - Peds. 650 milliGRAM(s) Oral every 6 hours PRN Mild Pain (1 - 3), Moderate Pain (4 - 6), Severe Pain (7 - 10)  melatonin Oral Tab/Cap - Peds 5 milliGRAM(s) Oral at bedtime PRN Insomnia  polyethylene glycol 3350 Oral Powder - Peds 17 Gram(s) Oral daily PRN Constipation    ===================================================  IMAGING STUDIES:    [ ] XR   [ ] CT   [ ] MR   [ ] US  [ ] Echo  ===========================================================  Parent/Guardian is at the bedside:	[x ] Yes	[ ] No  Patient and Parent/Guardian updated as to the progress/plan of care:	[x ] Yes	[ ] No    [x] The patient remains in critical and unstable condition, and requires ICU care and monitoring, assessment, and treatment  [ ] The patient is improving but requires continued monitoring, assessment, treatment, and adjustment of therapy    [x] The total critical care time spent by attending physician was __35__ minutes, excluding procedure time. Interval/Overnight Events:    Extubated last night    VITAL SIGNS:  T(C): 38.6 (21 @ 07:00), Max: 38.6 (21 @ 07:00)  HR: 70 (21 @ 06:00) (70 - 70)  BP: 110/82 (21 @ 20:00) (96/59 - 126/88)  ABP: 137/70 (21 @ 06:00) (91/55 - 154/94)  ABP(mean): 90 (21 @ 06:00) (68 - 119)  RR: 31 (21 @ 06:00) (13 - 31)  SpO2: 88% (21 @ 06:00) (83% - 92%)  CVP(mm Hg): --  End-Tidal CO2:  NIRS:    Physical Exam:    General: NAD  HEENT: dressing c/d/i  Resp: unlabored, CTAB, good aeration, no rhonchi/rales/wheezing  CV: RRR, nl S1/S2, no m/r/g appreciated, CR < 2s, distal pulses 2+ and equal  Abd: soft, NTND, no HSM appreciated  Ext: wwp, no gross deformities  Neuro: following commands, some R hemiparesis (improved)  Skin: no rash    =======================RESPIRATORY=======================  [ ] FiO2: ___ 	[ ] Heliox: ____ 		[ ] BiPAP: ___   [ ] NC: __  Liters			[ ] HFNC: __ 	Liters, FiO2: __  [ ] Mechanical Ventilation: Mode: standby  [ ] Inhaled Nitric Oxide:  [ ] Extubation Readiness Assessed  Comments:    =====================CARDIOVASCULAR======================  Cardiovascular Medications:  furosemide   Oral Tab/Cap - Peds 10 milliGRAM(s) Oral every 12 hours  propranolol  Oral Tab/Cap - Peds 10 milliGRAM(s) Oral every 8 hours  sildenafil   Oral Tab/Cap - Peds 20 milliGRAM(s) Oral every 8 hours    Chest Tube Output: ___ in 24 hours, ___ in last 12 hours   [ ] Right     [ ] Left    [ ] Mediastinal  Cardiac Rhythm:	[x] NSR		[ ] Other:    [ ] Central Venous Line	[ ] R	[ ] L	[ ] IJ	[ ] Fem	[ ] SC			Placed:   [ ] Arterial Line		[ ] R	[ ] L	[ ] PT	[ ] DP	[ ] Fem	[ ] Rad	[ ] Ax	Placed:   [ ] PICC:				[ ] Broviac		[ ] Mediport  Comments:    ==========HEMATOLOGY/ONCOLOGY=================  Transfusions:	[ ] PRBC	[ ] Platelets	[ ] FFP		[ ] Cryoprecipitate  DVT Prophylaxis:  Comments:    =================INFECTIOUS DISEASE==================  [ ] Cooling [place] being used. Target Temperature:     ===========FLUIDS/ELECTROLYTES/NUTRITION=============  I&O's Summary    2021 07:01  -  2021 07:00  --------------------------------------------------------  IN: 978 mL / OUT: 1876 mL / NET: -898 mL      Daily Weight in Gm: 09284 (2021 13:00)  Diet:	[x ] Regular	[ ] Soft		[ ] Clears	[ ] NPO  .	[ ] Other:  .	[ ] NGT		[ ] NDT		[ ] GT		[ ] GJT    [ ] Urinary Catheter, Date Placed:   Comments:    ====================NEUROLOGY===================  [ ] SBS:		[ ] GISSELLE-1:	[ ] BIS:	[ ] CAPD:  [ ] EVD set at: ___ , Drainage in last 24 hours: ___ ml    [x] Adequacy of sedation and pain control has been assessed and adjusted  Comments:      ==================PATIENT CARE=================  [ ] There are pressure ulcers/areas of breakdown that are being addressed -   [x] Preventative measures are being taken to decrease risk for skin breakdown.  [x] Necessity of urinary, arterial, and venous catheters discussed    ==================LABS============================  ABG - ( 2021 16:18 )  pH: 7.30  /  pCO2: 51    /  pO2: 66    / HCO3: 22    / Base Excess: -1.1  /  SaO2: 86.6  / Lactate: x                                                14.8                  Neurophils% (auto):   71.0   ( @ 16:20):    18.01)-----------(406          Lymphocytes% (auto):  15.0                                          46.4                   Eosinphils% (auto):   2.0      Manual%: Neutrophils x    ; Lymphocytes x    ; Eosinophils x    ; Bands%: 1.0  ; Blasts x        (  @ 16:19 )   PT: 17.7 sec;   INR: 1.57 ratio  aPTT: 174.9 sec                            133    |  95     |  31                  Calcium: 9.9   / iCa: x      ( @ 16:20)    ----------------------------<  177       Magnesium: 2.3                              3.7     |  21     |  1.00             Phosphorous: 6.3      TPro  9.1    /  Alb  4.5    /  TBili  1.6    /  DBili  x      /  AST  45     /  ALT  21     /  AlkPhos  187    2021 16:20  RECENT CULTURES:   @ 20:35 .CSF       No polymorphonuclear leukocytes seen per low power field  No organisms seen per oil power field        =================MEDICATIONS======================  MEDICATIONS  MEDICATIONS  (STANDING):  ALBUTerol  Intermittent Nebulization - Peds 2.5 milliGRAM(s) Nebulizer <User Schedule>  ceFAZolin  IV Intermittent - Peds 1980 milliGRAM(s) IV Intermittent every 8 hours  famotidine  Oral Tab/Cap - Peds 20 milliGRAM(s) Oral two times a day  furosemide   Oral Tab/Cap - Peds 10 milliGRAM(s) Oral every 12 hours  lactobacillus Oral Powder (CULTURELLE KIDS) - Peds 1 Packet(s) Oral daily  levETIRAcetam  Oral Tab/Cap - Peds 1500 milliGRAM(s) Oral two times a day  Mexiletine 150 milliGRAM(s) 150 milliGRAM(s) Oral every 8 hours  phytonadione  Oral Liquid - Peds 5 milliGRAM(s) Enteral Tube <User Schedule>  polyvinyl alcohol 1.4%/povidone 0.6% Ophthalmic Solution - Peds 2 Drop(s) Both EYES four times a day  propranolol  Oral Tab/Cap - Peds 10 milliGRAM(s) Oral every 8 hours  sildenafil   Oral Tab/Cap - Peds 20 milliGRAM(s) Oral every 8 hours  sodium chloride 0.9% -  250 milliLiter(s) (3 mL/Hr) IV Continuous <Continuous>  sodium chloride 0.9% lock flush - Peds 3 milliLiter(s) IV Push every 8 hours  sodium chloride 3% for Nebulization - Peds 0.5 milliLiter(s) Nebulizer daily    MEDICATIONS  (PRN):  acetaminophen   Oral Tab/Cap - Peds. 650 milliGRAM(s) Oral every 6 hours PRN Mild Pain (1 - 3), Moderate Pain (4 - 6), Severe Pain (7 - 10)  melatonin Oral Tab/Cap - Peds 5 milliGRAM(s) Oral at bedtime PRN Insomnia  polyethylene glycol 3350 Oral Powder - Peds 17 Gram(s) Oral daily PRN Constipation    ===================================================  IMAGING STUDIES:    [ ] XR   [ ] CT   [ ] MR   [ ] US  [ ] Echo  ===========================================================  Parent/Guardian is at the bedside:	[x ] Yes	[ ] No  Patient and Parent/Guardian updated as to the progress/plan of care:	[x ] Yes	[ ] No    [x] The patient remains in critical and unstable condition, and requires ICU care and monitoring, assessment, and treatment  [ ] The patient is improving but requires continued monitoring, assessment, treatment, and adjustment of therapy    [x] The total critical care time spent by attending physician was __35__ minutes, excluding procedure time.

## 2021-06-19 NOTE — PROGRESS NOTE PEDS - ASSESSMENT
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 He has elevated Fontan pressure secondary to LV diastolic dysfunction.  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   - Keppra (clinical szs)  - consider dekakote if seizures persist (will see if drug interactuon)   - R sided hemiparesis improving   - EVD to 0   - tylenol prn for fever control  -PT/OT/speech/rehab consult  -cannot have mri  -plan for OR tomorrow for flap and  shunt    Resp:  NC with sats high 80s  - CTA show VV collaterals (likely main reason for hypoxia)  Goal SpO2 > 85% - may need to lower goals to 80% with collaterals      CV:  continue mexilitine for hx of  VT  Mode is DDD but atrial wires without good sensitivity, so effectively VVI 70 (will make VVO for OR friday)  Still in IART  Home meds on hold (Lisinopril, Aldactone)  Continue Sildenafil   Continue Lasix po q12  Metolazone as needed for increased oxygen requirement   sono r chest (6/15) with moderate effusion clinically improving with diuresis  SBP < 140  echo 6/8 extremely limited windows  HOLA when goes for neurosugical procedure (when intubated)    Heme:  Xarelto on hold  Goal PLTs >100  FFP to keep INR < 2.0 (but before procedures e.g. 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 He has elevated Fontan pressure secondary to LV diastolic dysfunction.  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   - Keppra (clinical szs)  - R sided hemiparesis improving   - EVD to 0   - tylenol prn for fever control  -PT/OT/speech/rehab consult  -cannot have mri    Resp:  - RA, goal SpO2 > 85% (VV collaterals on CTA)    CV:  continue mexilitine for hx of  VT  VOO 70, normal mode is DDD (though atrial wires don't work so effectively VVI)  Still in IART  Restart home aldactone  lisinopril on hold still  Continue Sildenafil   Continue Lasix po q12  Metolazone as needed for increased oxygen requirement   sono r chest (6/15) with moderate effusion clinically improving with diuresis  echo 6/8 extremely limited windows  HOLA when goes for neurosugical procedure (when intubated)    Heme:  Xarelto on hold  Goal PLTs >100  INR < 1.5  Hematology and NRSGY in discussions  s/p Vitamin K x3 days,   now po Vit K 3x/week    FEN:  Pepcid - renally dosed   Bowel regimen: Senna and Miralax PRN  Cleared by speech for oral diet of regular solids and thin fluids as tolerated   nutrition consulting  speech and swallow consulting    ID  -  Ancef post-op    Other  - L hydrocele  - outpt f/u    Access  Righ Rad AL (6/1) - d/c  PIV

## 2021-06-19 NOTE — CHART NOTE - NSCHARTNOTEFT_GEN_A_CORE
Inpatient Pediatric Transfer Note    Transfer from:  Transfer to:  Handoff given to:    Patient is a 20y old  Male who presents with a chief complaint of cardiac catheterization and ablation (2021 17:38)    HPI:  21yo male with complex cardiac h/o DILV, L-malposition of great arteries, sick sinus syndrome and AV mihir disease with tachybradycardia syndrome with a dual chamber pacemaker (currently with RV lead fracture and is currently only LV paced 2/2 complete heart block), PSH of status post Iqpwk-Uqwe-Enzlhyz anastomosis and aortic arch reconstruction followed by a fenestrated lateral tunnel Fontan completion (now s/p fenestration closure). here s/p cardiac catheterization and ablation. Procedure was complicated by unsuccessful ablation and post extubation patient was noted to have increased hemoptysis and desaturations, patient was reintubated for airway protection and given propofol for sedation.  (26 May 2021 20:19)      HOSPITAL COURSE:    PICU course ( - ):  Resp: Patient remained intubated overnight and was extubated by the next morning. Required nasal cannula to maintain oxygenation, likely due to post-op inflammation, atelectasis, and increasing fontan pressures. Weaned to room air on . Re-intubated on  due to AMS associated with new intracranial bleed. Extubated to HFNC on . CTA chest on 6/10 showed numerous veno-venous anterior mediastinal collaterals. Weaned to RA on . Goal sats > 85%.   CV: On , attempted cardioversion through pacing, although was unsuccessful, and subsequently started medical cardioversion with amiodarone 300mg BID. Restarted on home medications including lisinopril, sildenafil, lasix, aldactone. IART rhythm was broken on  after cardioversion. However, he went back into IART later that night. Pacemaker was changed to VOO at 70 prior to neurosurgery. Amiodarone, aldactone, and lisinopril were held while critically ill. Epinephrine and vasopressin were utilized to maintain higher maps while EVD in place. On  he started having runs of ventricular tachycardia. Arrythmia resolved with lidocaine infusion and changing pacemaker mode to VVI. Transitioned to PO mexiletine on . Pacemaker transitioned back to VOO at 70.   Neuro: Four hours after cardioversion, he had an episode of staring and unresponsiveness. Head CT showed severe hemorrhagic stroke in left frontal lobe in the ROCCO-MCA watershed territory with extension to the intraventricular regions. He required urgent surgical decompressive craniectomy and hemorrhage evacuation with EVD placement overnight on . Neuroprotective measures were taken and he was started on Keppra. Serial head CTs showed a new hemorrhage along the EVD catheter tract on . Serial head CTs did not show new bleeding. EVD clamped on 6/10. VEEG placed on 6/10 due to eye deviation and seizure-like activity did not correlate with seizures. Keppra increased per neurology and neurosurgery. He did have a clinical seizure on  which broke with Ativan. Neurology re-consulted and recommended continuing Keppra at current dose. PM&R consulted for delirium/agitation, recommended starting propranolol for agitation. Delirium improved with propranolol and unclamping EVD. Taken to OR on  for EVD removal, cranioplasty, and VPS placement.   Heme: Given hemoptysis, hemoglobin was monitored and he did not require any transfusions. Hemoptysis resolved overnight -. Home xarelto was held and restarted on . INR on  was 1.32, and it jumped to 4.94 after hemorrhage was identified. Cause for jump in INR is unclear. In the OR, xarelto reversals were given (Andexxa, KCentra), in addition to multiple units of FFP and blood products. Xarelto was stopped and he was received SubQ Vit K for 3 days. He was given multiple units of FFP for goal INR < 1.5 in order to prevent further bleeding. Hematology was consulted for persistently elevated INR. Thought to be possibly due to hepatic congestion secondary to elevated fontan pressures. INR improved w/ Vitamin K. Hypercoagulopathy workup was sent and is pending. Vitamin K PO 5mg 3x/wk started on . Will discuss with hematology duration of treatment.   ID: Received prophylactic antibiotics while EVD in place. Escalated to ceftriaxone when patient became febrile on . Blood cultures, urine cultures, sputum cultures, CSF cultures, and RVP were negative. Ceftriaxone was continued until 6/10 (when fever curve improved). Switched to prophylactic ancef while EVD in place and continued x24hrs post-operatively s/p VPS. Spiked fever ~7am on POD#1.   : Noted to have L scrotal swelling on 6/10. US showed L hydrocele. Surgery consulted and will follow outpatient.  FLAKO: Continued on his regular diet. He was made NPO when he was re-intubated due to AMS associated with intracranial bleed. He was given TPN for nutrition. Enteral feeds were started on . As neurologic status improved, speech and swallow was consulted for bedside swallow evaluation. Evaluation was reassuring against overt signs of aspiration. Allowed to PO regular diet.     Transferred to [facility] on       Vital Signs Last 24 Hrs  T(C): 37.8 (2021 08:00), Max: 38.6 (2021 07:00)  T(F): 100 (2021 08:00), Max: 101.4 (2021 07:00)  HR: 70 (2021 08:00) (70 - 70)  BP: 110/82 (2021 20:00) (96/59 - 126/88)  BP(mean): 92 (2021 20:00) (70 - 100)  RR: 24 (2021 08:00) (13 - 31)  SpO2: 96% (2021 08:00) (83% - 96%)  I&O's Summary    2021 07:01  -  2021 07:00  --------------------------------------------------------  IN: 978 mL / OUT: 1876 mL / NET: -898 mL    2021 07:  -  2021 09:30  --------------------------------------------------------  IN: 6 mL / OUT: 175 mL / NET: -169 mL        MEDICATIONS  (STANDING):  ALBUTerol  Intermittent Nebulization - Peds 2.5 milliGRAM(s) Nebulizer <User Schedule>  ceFAZolin  IV Intermittent - Peds 1980 milliGRAM(s) IV Intermittent every 8 hours  famotidine  Oral Tab/Cap - Peds 20 milliGRAM(s) Oral two times a day  furosemide   Oral Tab/Cap - Peds 10 milliGRAM(s) Oral every 12 hours  lactobacillus Oral Powder (CULTURELLE KIDS) - Peds 1 Packet(s) Oral daily  levETIRAcetam  Oral Tab/Cap - Peds 1500 milliGRAM(s) Oral two times a day  Mexiletine 150 milliGRAM(s) 150 milliGRAM(s) Oral every 8 hours  phytonadione  Oral Liquid - Peds 5 milliGRAM(s) Enteral Tube <User Schedule>  polyvinyl alcohol 1.4%/povidone 0.6% Ophthalmic Solution - Peds 2 Drop(s) Both EYES four times a day  propranolol  Oral Tab/Cap - Peds 10 milliGRAM(s) Oral every 8 hours  sildenafil   Oral Tab/Cap - Peds 20 milliGRAM(s) Oral every 8 hours  sodium chloride 0.9% -  250 milliLiter(s) (3 mL/Hr) IV Continuous <Continuous>  sodium chloride 0.9% lock flush - Peds 3 milliLiter(s) IV Push every 8 hours  sodium chloride 3% for Nebulization - Peds 0.5 milliLiter(s) Nebulizer daily    MEDICATIONS  (PRN):  acetaminophen   Oral Tab/Cap - Peds. 650 milliGRAM(s) Oral every 6 hours PRN Mild Pain (1 - 3), Moderate Pain (4 - 6), Severe Pain (7 - 10)  melatonin Oral Tab/Cap - Peds 5 milliGRAM(s) Oral at bedtime PRN Insomnia  polyethylene glycol 3350 Oral Powder - Peds 17 Gram(s) Oral daily PRN Constipation      PHYSICAL EXAM:  General:	In no acute distress  Respiratory:	Lungs CTA b/l. No rales, rhonchi, retractions or wheezing. Effort even and unlabored.  CV:		RRR. Normal S1/S2. No murmurs, rubs, or gallop. Cap refill < 2 sec. Distal pulses strong  .		and equal.  Abdomen:	Soft, non-distended. Bowel sounds present. No palpable hepatosplenomegaly.  Skin:		No rash.  Extremities:	Warm and well perfused. No gross extremity deformities.  Neurologic:	Alert and oriented. No acute change from baseline exam. Pupils equal and reactive.    LABS                                            14.8                  Neurophils% (auto):   71.0   ( @ 16:20):    18.01)-----------(406          Lymphocytes% (auto):  15.0                                          46.4                   Eosinphils% (auto):   2.0      Manual%: Neutrophils x    ; Lymphocytes x    ; Eosinophils x    ; Bands%: 1.0  ; Blasts x                                    133    |  95     |  31                  Calcium: 9.9   / iCa: x      ( @ 16:20)    ----------------------------<  177       Magnesium: 2.3                              3.7     |  21     |  1.00             Phosphorous: 6.3      TPro  9.1    /  Alb  4.5    /  TBili  1.6    /  DBili  x      /  AST  45     /  ALT  21     /  AlkPhos  187    2021 16:20    (  @ 16:19 )   PT: 17.7 sec;   INR: 1.57 ratio  aPTT: 174.9 sec      ASSESSMENT & PLAN:    19 y/o M w/ complex cardiac history including DILV, L-malposition of great arteries s/p Fontan, sick sinus syndrome, AV mihir disease, complete heart block s/p pacemaker admitted with IART for therapeutic/diagnostic cath. 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 Cardioverson  c/b parenchymal and subarachnoid hemorrage s/p craniectomy/EVD placement  for evacuation of L frontal hematoma, now POD #1 () s/p VPS placement.     #Resp: stable  - RA s/p extubation  (for VPS)  - pulm toilet: albuterol and HTS q24h + incentive spirometrer  q3h  - SpO2 > 85% - 88% (has venovenous collaterals -- extrapulmonary shunts)  - CTA chest (6/10) - numerous venovenous collaterals in anterior mediastinum, non-diagnostic for PE    #CV: runs of vtach 6/8 AM, now resolved  - Mexilitine 150mg q8hr ( - )  - Sildenafil 20mg PO q8h  - Lasix 10mg PO q12  - Goal MAPs > 65, goal systolic < 140  - VOO @ 70 (previously DDD @75, upper limit 120)  - Aldactone 100mg daily - HOLDING  - Amiodarone 300mg QD (-) HOLDING  - Lisinopril 5mg qd HOLDING  - s/p Lidocaine @ 25 mcg/kg/min.  - fluid goal euvolemic     #Heme - elevated INR  - Goal INR < 2.0 (prior to procedures 1.4), plts > 100  - vit K 5mg PO 3x week for *** time period ( - )  - s/p Vit K 5mg x3d ( - ), xarelto 20mg qd, Andexxa, KCentra  - f/u heme recs    #Neuro  -  shunt placed   - ICP goal <20  - Keppra 1500mg BID (-)  - Propranolol 10mg PO q8 (0.5mg/kg/day)   - Melatonin 5mg QHS  - tylenol prn  - s/p precedex @ 1  - suggammadex at bedside   - prop 30 mg boluses PRN  - CT : Parenchymal/subarachnoid hemorrhage in L frontal lobe  - CT : Stable L frontal sulcal effacement w/ patchy intraparenchymal hemorrhage and hypoattenulation. Stable hemorrhage along catheter tract. Stable intraventricular hemorrhage in occipital horm of LL ventricle. Stable b/l subdural hemorrhages along tentorial leaflets   - CT head , , 6/10,  - stable, no new bleed   - CT head  - interval decrease in hemorrhage, unchanged fluid collection (likely meningocele)   - CT head  - improvement  - CT head  s/p  shunt placement (strata @0.5) - trace hemorrhage R lateral ventricle, s/p placement L wire mesh cranioplasty  - CT head  -     #ID  - Ancef (6/10 -) 24h post-op  - Urine cx () NGTD  - Blood cx () NGTD  - CSF cx () NGTD  - Sputum cx () gram stain no organisms, NGTD  - s/p Ancef ( - )  - s/p Ceftriaxone ( -6/10)    #FENGI  - regular diet   - S&S following  - Culturelle QD  - Pepcid PO Q12  - Senna and miralax QD prn      - L scrotal swelling, US hydrocele   - Surgery consulted 6/10, follow up outpatient     #Access:  - PIV x 3   - R radial arterial line ( - )  - s/p R fem CVL ( - 6/15 )    DISPO  - Likely inpatient rehab  - PM&R last saw   - PT/OT involved      Jeffrey Geronimo  EM/IM PGY3  2CN Resident

## 2021-06-19 NOTE — PROGRESS NOTE ADULT - SUBJECTIVE AND OBJECTIVE BOX
ANESTHESIA POSTOP CHECK    20y Male POSTOP DAY 1 S/P Anesthesia for cranioplasty    Vital Signs Last 24 Hrs  T(C): 37.8 (19 Jun 2021 08:00), Max: 38.6 (19 Jun 2021 07:00)  T(F): 100 (19 Jun 2021 08:00), Max: 101.4 (19 Jun 2021 07:00)  HR: 70 (19 Jun 2021 08:00) (70 - 70)  BP: 110/82 (18 Jun 2021 20:00) (96/59 - 126/88)  BP(mean): 92 (18 Jun 2021 20:00) (70 - 100)  RR: 24 (19 Jun 2021 08:00) (13 - 31)  SpO2: 96% (19 Jun 2021 08:00) (83% - 96%)  I&O's Summary    18 Jun 2021 07:01  -  19 Jun 2021 07:00  --------------------------------------------------------  IN: 978 mL / OUT: 1876 mL / NET: -898 mL        [ ] NO APPARENT ANESTHESIA COMPLICATIONS      Comments: Pt. with hemoptysis and desaturation, re-intubated in Or.  Now extubated with no further anesthesia complications.

## 2021-06-19 NOTE — PROGRESS NOTE PEDS - SUBJECTIVE AND OBJECTIVE BOX
HPI:  19yo male with complex cardiac h/o DILV, L-malposition of great arteries, sick sinus syndrome and AV mihir disease with tachybradycardia syndrome with a dual chamber pacemaker (currently with RV lead fracture and is currently only LV paced 2/2 complete heart block), PSH of status post Vnnyq-Lpdq-Thxwwht anastomosis and aortic arch reconstruction followed by a fenestrated lateral tunnel Fontan completion (now s/p fenestration closure). here s/p cardiac catheterization and ablation. Procedure was complicated by unsuccessful ablation and post extubation patient was noted to have increased hemoptysis and desaturations, patient was reintubated for airway protection and given propofol for sedation.  (26 May 2021 20:19)      OVERNIGHT EVENTS: Doing well post op, extubated and tolerating pain.       Vital Signs Last 24 Hrs  T(C): 38.6 (2021 07:00), Max: 38.6 (2021 07:00)  T(F): 101.4 (2021 07:00), Max: 101.4 (2021 07:00)  HR: 70 (2021 06:00) (70 - 70)  BP: 110/82 (2021 20:00) (96/59 - 126/88)  BP(mean): 92 (2021 20:00) (70 - 100)  RR: 31 (2021 06:00) (13 - 31)  SpO2: 88% (2021 06:00) (83% - 92%)    I&O's Summary    2021 07:01  -  2021 07:00  --------------------------------------------------------  IN: 978 mL / OUT: 1876 mL / NET: -898 mL        PHYSICAL EXAM:  Mental Status: Awake, Alert, Affect flat, speaking a few words   PERRL  Motor:  MAEx4, weaker on R side  Incision/Wound: c/d/i    TUBES/LINES:  [ ] A-line :  [ ] NEIL- 15cc      DIET:  [ ] NPO      LABS:                        14.8   18.01 )-----------( 406      ( 2021 16:20 )             46.4         133<L>  |  95<L>  |  31<H>  ----------------------------<  177<H>  3.7   |  21<L>  |  1.00    Ca    9.9      2021 16:20  Phos  6.3       Mg     2.3         TPro  9.1<H>  /  Alb  4.5  /  TBili  1.6<H>  /  DBili  x   /  AST  45<H>  /  ALT  21  /  AlkPhos  187      PT/INR - ( 2021 16:19 )   PT: 17.7 sec;   INR: 1.57 ratio         PTT - ( 2021 16:19 )  PTT:174.9 sec      CULTURES:    Culture Results:   No Growth Final ( @ 10:00)  Culture Results:   No growth ( @ 09:45)  CSF Analysis:   CSF Lymphocytes: 38 % ( @ 16:53)  RBC Count - Spinal Fluid: [id number] cells/uL *H* ( @ 16:53)  Culture - CSF with Gram Stain (21 @ 20:35)    Gram Stain:   No polymorphonuclear leukocytes seen per low power field  No organisms seen per oil power field    Specimen Source: .CSF        Drug Levels:       Allergies    No Known Allergies      MEDICATIONS:  Antibiotics:  ceFAZolin  IV Intermittent - Peds 1980 milliGRAM(s) IV Intermittent every 8 hours    Neuro:  acetaminophen   Oral Tab/Cap - Peds. 650 milliGRAM(s) Oral every 6 hours PRN  levETIRAcetam  Oral Tab/Cap - Peds 1500 milliGRAM(s) Oral two times a day  melatonin Oral Tab/Cap - Peds 5 milliGRAM(s) Oral at bedtime PRN      OTHER:  ALBUTerol  Intermittent Nebulization - Peds 2.5 milliGRAM(s) Nebulizer <User Schedule>  famotidine  Oral Tab/Cap - Peds 20 milliGRAM(s) Oral two times a day  furosemide   Oral Tab/Cap - Peds 10 milliGRAM(s) Oral every 12 hours  lactobacillus Oral Powder (CULTURELLE KIDS) - Peds 1 Packet(s) Oral daily  Mexiletine 150 milliGRAM(s) 150 milliGRAM(s) Oral every 8 hours  polyethylene glycol 3350 Oral Powder - Peds 17 Gram(s) Oral daily PRN  polyvinyl alcohol 1.4%/povidone 0.6% Ophthalmic Solution - Peds 2 Drop(s) Both EYES four times a day  propranolol  Oral Tab/Cap - Peds 10 milliGRAM(s) Oral every 8 hours  sildenafil   Oral Tab/Cap - Peds 20 milliGRAM(s) Oral every 8 hours  sodium chloride 3% for Nebulization - Peds 0.5 milliLiter(s) Nebulizer daily    IVF:  phytonadione  Oral Liquid - Peds 5 milliGRAM(s) Enteral Tube <User Schedule>  sodium chloride 0.9% -  250 milliLiter(s) IV Continuous <Continuous>  sodium chloride 0.9% lock flush - Peds 3 milliLiter(s) IV Push every 8 hours      RADIOLOGY & ADDITIONAL TESTS:

## 2021-06-19 NOTE — PROGRESS NOTE PEDS - ASSESSMENT
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 Presented in IART - s/p EP study with unsuccessful attempt at ablation along with diagnostic cath showing elevated Fontan pressure (20mmHg). He was loaded with amiodarone and ultimately electrically cardioverted out, however reverted back into IART and continues to be in IART. His course is further complicated by L frontal parenchymal hemorrhage requiring emergent evacuation with NSx and EVD placement (which is no clamped since Thursday 6/10) and VTach of unknown origin. He is now s/p cranioplasty      CVS:  - Continuos telemetry monitoring.  - Pacemaker baseline: DDD 70-120bpm. TO VOO 70bpm for OR today  - sildenafil 20mg q8 (home medication) Can switich to sildenafil 10mg q8 if needed  - lasix to 10 mg Q12H (home dose). Goal net even. Monitor creat. Will determine ongoing needs after OR  - Sats goal >85%.   - Hold home lisinopril, aldactone. Agree with plan to ensure SBP not persistently higher than 140. Nicardapine as needed. Would try to reinstitute home medications when able to give enteral medications. Need to consider that now on clonidine  - Mexlitine 150mg q8 (do not hold dose while NPO)    Resp:   - Wean as tolerated. on RA  - CTA obtained to eval for PE - difficult to protocol because of Fontan anatomy and contrast timing, no PE seen, but does show VV collaterals (likely main reason for hypoxia)      FEN/GI:   - NPO for OR  - GI PPx    ID:   - pending post-op NS     Heme:   - Anticoagulation care per hematology/PICU/NSx  - Xarelto on hold. WIll need to consider restarting anticoagulation when safe due to concerns of Fontan clot    Neuro:   - L frontal parenchymal hemorrhage- NSx and neuro and PMR following  - On Keppra  - On Clonidine patch, propranolol, melatnonin PRN   TRINI MÉNDEZ is a 21 yo male with DILV, L-malposed great arteries s/p lateral tunnel Fontan (with subsequent stent placement in Fontan pathway in 2016), with sick sinus syndrome and complete heart block, s/p dual chamber epicardial pacemaker with cardiac resynchronization therapy for left ventricular dysfunction and failing Fontan in 2016. Presented in IART - s/p EP study with unsuccessful attempt at ablation along with diagnostic cath showing elevated Fontan pressure (20mmHg). He was loaded with amiodarone and ultimately electrically cardioverted out, however reverted back into IART and continues to be in IART. His course is further complicated by L frontal parenchymal hemorrhage requiring emergent evacuation with NSx and EVD placement (which is no clamped since Thursday 6/10) and VTach of unknown origin. He is now s/p cranioplasty  with placement of  shunt, POD # 1    CVS:  - Continuos telemetry monitoring.  - Pacemaker baseline: DDD 70-120bpm. TO VOO 70bpm for OR today  - sildenafil 20mg q8 (home medication).   - lasix to 10 mg Q12H (home dose). Goal net even. Monitor creat. Will determine ongoing needs after OR  - Sats goal >85%.   - Hold home lisinopril, aldactone. As per PICU and neurosurgery BP goals should be normal. Home aldacone is 100 mg qd and Lisinopril 5 mg qd.   - Mexlitine 150mg q8 (do not hold dose while NPO)    Resp:   - Wean as tolerated. on RA  - CTA obtained to eval for PE - difficult to protocol because of Fontan anatomy and contrast timing, no PE seen, but does show VV collaterals (likely main reason for hypoxia)      FEN/GI:   - Regular diet     ID:   - Ancef per post op protocol.     Heme:   - Anticoagulation care per hematology/PICU/NSx  - Xarelto on hold. WIll need to consider restarting anticoagulation when safe due to concerns of Fontan clot    Neuro:   - L frontal parenchymal hemorrhage- NSx and neuro and PMR following  - On Keppra  - On Clonidine patch, propranolol, melatnonin PRN   TRINI MÉNDEZ is a 19 yo male with DILV, L-malposed great arteries s/p lateral tunnel Fontan (with subsequent stent placement in Fontan pathway in 2016), with sick sinus syndrome and complete heart block, s/p dual chamber epicardial pacemaker with cardiac resynchronization therapy for left ventricular dysfunction and failing Fontan in 2016. Presented in IART - s/p EP study with unsuccessful attempt at ablation along with diagnostic cath showing elevated Fontan pressure (20mmHg). He was loaded with amiodarone and ultimately electrically cardioverted out, however reverted back into IART and continues to be in IART. His course is further complicated by L frontal parenchymal hemorrhage requiring emergent evacuation with NSx and EVD placement  and VTach of unknown origin. He is now s/p cranioplasty  with placement of  shunt, POD # 1    CVS:  - Continuos telemetry monitoring.  - Pacemaker baseline: DDD 70-120bpm. TO VOO 70bpm for OR today  - sildenafil 20mg q8 (home medication).   - lasix to 10 mg Q12H (home dose). Goal net even. Monitor creat. Will determine ongoing needs after OR  - Sats goal >85%.   - Hold home lisinopril, aldactone. As per PICU and neurosurgery BP goals should be normal. Home aldacone is 100 mg qd and Lisinopril 5 mg qd.   - Mexlitine 150mg q8 (do not hold dose while NPO)    Resp:   - Wean as tolerated. on RA  - CTA obtained to eval for PE - difficult to protocol because of Fontan anatomy and contrast timing, no PE seen, but does show VV collaterals (likely main reason for hypoxia)      FEN/GI:   - Regular diet     ID:   - Ancef per post op protocol.     Heme:   - Anticoagulation care per hematology/PICU/NSx  - Xarelto on hold. WIll need to consider restarting anticoagulation when safe due to concerns of Fontan clot    Neuro:   - L frontal parenchymal hemorrhage- NSx and neuro and PMR following  - On Keppra  - On Clonidine patch, propranolol, melatnonin PRN

## 2021-06-19 NOTE — PROGRESS NOTE PEDS - SUBJECTIVE AND OBJECTIVE BOX
INTERVAL HISTORY: s/p neurosurgery procedure.       RESPIRATORY SUPPORT: RA    NUTRITION: NPO     @ 07:  -   @ 07:00  --------------------------------------------------------  IN: 978 mL / OUT: 1876 mL / NET: -898 mL    INTRAVASCULAR ACCESS: PIV, RRA    MEDICATIONS:  furosemide   Oral Tab/Cap - Peds 10 milliGRAM(s) Oral every 12 hours  propranolol  Oral Tab/Cap - Peds 10 milliGRAM(s) Oral every 8 hours  sildenafil   Oral Tab/Cap - Peds 20 milliGRAM(s) Oral every 8 hours  ALBUTerol  Intermittent Nebulization - Peds 2.5 milliGRAM(s) Nebulizer <User Schedule>  sodium chloride 3% for Nebulization - Peds 0.5 milliLiter(s) Nebulizer daily  ceFAZolin  IV Intermittent - Peds 1980 milliGRAM(s) IV Intermittent every 8 hours  levETIRAcetam  Oral Tab/Cap - Peds 1500 milliGRAM(s) Oral two times a day  famotidine  Oral Tab/Cap - Peds 20 milliGRAM(s) Oral two times a day  phytonadione  Oral Liquid - Peds 5 milliGRAM(s) Enteral Tube <User Schedule>  sodium chloride 0.9% -  250 milliLiter(s) IV Continuous <Continuous>  sodium chloride 0.9% lock flush - Peds 3 milliLiter(s) IV Push every 8 hours      PHYSICAL EXAMINATION:  ICU Vital Signs Last 24 Hrs  T(C): 38.1 (2021 11:00), Max: 38.6 (2021 07:00)  T(F): 100.5 (2021 11:00), Max: 101.4 (2021 07:00)  HR: 68 (2021 11:45) (68 - 70)  BP: 120/57 (2021 11:00) (96/59 - 126/88)  BP(mean): 72 (2021 11:00) (70 - 100)  ABP: 119/66 (2021 11:00) (91/55 - 154/94)  ABP(mean): 86 (2021 11:00) (68 - 119)  RR: 22 (2021 11:00) (13 - 31)  SpO2: 95% (2021 11:45) (83% - 96%)    General - awake, alert, interactive  Skin - no rash, no desquamation, no cyanosis.  Eyes / ENT - no conjunctival injection, sclerae anicteric, external ears & nares normal, mucous membranes moist.  Pulmonary - normal inspiratory effort, no retractions, lungs clear to auscultation bilaterally, no wheezes, no rales.  Cardiovascular - normal rate, regular rhythm, normal S1 & single S2, grade 1/6 blowing holosystolic murmur at LMSB, no rubs, no gallops, capillary refill < 2sec, normal pulses.  Gastrointestinal - soft, non-distended, non-tender, no splenomegaly. (+) hepatomegaly.  Musculoskeletal - no joint swelling, no clubbing, no edema.  Neurologic / Psychiatric - alert,     LABORATORY TESTS:                          14.3  CBC:   9.39 )-----------( 338   (21 @ 17:00)                          45.1               134   |  92    |  28                 Ca: 10.7   BMP:   ----------------------------< 108    M.3   (21 @ 06:57)             3.6    |  20    | 0.83               Ph: 4.6      LFT:     TPro: 8.9 / Alb: 4.8 / TBili: 1.4 / DBili: x / AST: 42 / ALT: 22 / AlkPhos: 192   (21 @ 06:57)    COAG: PT: 15.9 / PTT: 26.2 / INR: 1.42   (21 @ 12:17)       ABG:   pH: 7.50 / pCO2: 29 / pO2: 52 / HCO3: 24 / Base Excess: -0.9 / SaO2: 87.1 / Lactate: x / iCa: 1.15   (21 @ 18:06)      IMAGING STUDIES:  Electrocardiogram - (21) Ventricular paced rhythm @ 70bpm. Underlying IART.    Telemetry - (6/15/21) Ventricular paced rhythm @ 70 bpm. Underlying IART.    Chest x-ray - (21) Stable mild cardiomegaly with subsegmental left lower lobe atelectasis.   Echocardiogram, Pediatric (Echocardiogram, Pediatric .) (21 @ 14:00) >  Summary:   1. This was a technically difficult suboptimal study due to extremely poor acoustic windows.   2. On limited short axis views there appears to be adequate systolic excursion of the posterior wall of the left ventricle and decreased anterior wall excursion. Overall mild to moderately decreased left ventricular systolic function.   3. Mild left atrioventricular and trivial right atrioventricular regurgitation.   4. No pericardial effusion.   5. No pleural effusion.   6. Findings are limited to above.    Cath - (21)  Access 4 Fr RFA, 7 Fr RFV x2 with US guidance.  Sat data (%): on 50% FiO2 LPA 73, RUPV 98, Ao 97; CI 2.6 L/min/m2 with Qp:Qs 1 :1.  Pressures (mmHg): Elevated mFontan = mIVC = 20. mPCW=16, No significant gradient across LV to AAo to Vikki (98/58/73).  Normal PVR while on sildenafil.  Angios showed unobstructed Fontan with existing stent within Fontan conduit.    Comment: IART ablation was attempted after the diagnostic cath but unsuccessful. Patient was overdrive paced out of IART. At the end of the case, Ao sat was 94% and LPA 66% on 50% FiO2 INTERVAL HISTORY:  He is s/p cranioplasty  with placement of  shunt, POD # 1. Extubated yesterday and remains on RA. Low grade temp today, neurosurgery team aware. Neuro exam improving, speaks coherently but slurred speech. On tele continues to be in IART with pacemaker set at VOO @ 70.     RESPIRATORY SUPPORT: RA    NUTRITION: NPO    - @ 07:01  -  - @ 07:00  --------------------------------------------------------  IN: 978 mL / OUT: 1876 mL / NET: -898 mL    INTRAVASCULAR ACCESS: PIV, RRA    MEDICATIONS:  furosemide   Oral Tab/Cap - Peds 10 milliGRAM(s) Oral every 12 hours  propranolol  Oral Tab/Cap - Peds 10 milliGRAM(s) Oral every 8 hours  sildenafil   Oral Tab/Cap - Peds 20 milliGRAM(s) Oral every 8 hours  ALBUTerol  Intermittent Nebulization - Peds 2.5 milliGRAM(s) Nebulizer <User Schedule>  sodium chloride 3% for Nebulization - Peds 0.5 milliLiter(s) Nebulizer daily  ceFAZolin  IV Intermittent - Peds 1980 milliGRAM(s) IV Intermittent every 8 hours  levETIRAcetam  Oral Tab/Cap - Peds 1500 milliGRAM(s) Oral two times a day  famotidine  Oral Tab/Cap - Peds 20 milliGRAM(s) Oral two times a day  phytonadione  Oral Liquid - Peds 5 milliGRAM(s) Enteral Tube <User Schedule>  sodium chloride 0.9% -  250 milliLiter(s) IV Continuous <Continuous>  sodium chloride 0.9% lock flush - Peds 3 milliLiter(s) IV Push every 8 hours        PHYSICAL EXAMINATION:  ICU Vital Signs Last 24 Hrs  T(C): 37.5 (2021 17:00), Max: 38.6 (2021 07:00)  T(F): 99.5 (2021 17:00), Max: 101.4 (2021 07:00)  HR: 70 (2021 17:00) (68 - 70)  BP: 129/66 (2021 17:00) (110/82 - 129/66)  BP(mean): 79 (2021 17:00) (72 - 92)  ABP: 131/63 (2021 17:00) (103/66 - 137/70)  ABP(mean): 85 (2021 17:00) (81 - 90)  RR: 21 (2021 17:00) (21 - 31)  SpO2: 95% (2021 17:00) (87% - 96%)  General - awake, alert, interactive  Skin - no rash, no desquamation, no cyanosis.  Eyes / ENT - no conjunctival injection, sclerae anicteric, external ears & nares normal, mucous membranes moist.  Pulmonary - normal inspiratory effort, no retractions, lungs clear to auscultation bilaterally, no wheezes, no rales.  Cardiovascular - normal rate, regular rhythm, normal S1 & single S2, grade 1/6 blowing holosystolic murmur at LMSB, no rubs, no gallops, capillary refill < 2sec, normal pulses.  Gastrointestinal - soft, non-distended, non-tender, no splenomegaly. (+) hepatomegaly.  Musculoskeletal - no joint swelling, no clubbing, no edema.  Neurologic / Psychiatric - alert,     LABORATORY TESTS:                            14.8  CBC:   18.01 )-----------( 406   (21 @ 16:20)                          46.4               133   |  95    |  31                 Ca: 9.9    BMP:   ----------------------------< 177    M.3   (21 @ 16:20)             3.7    |  21    | 1.00               Ph: 6.3      LFT:     TPro: 9.1 / Alb: 4.5 / TBili: 1.6 / DBili: x / AST: 45 / ALT: 21 / AlkPhos: 187   (21 @ 16:20)    COAG: PT: 17.7 / PTT: 174.9 / INR: 1.57   (21 @ 16:19)       ABG:   pH: 7.30 / pCO2: 51 / pO2: 66 / HCO3: 22 / Base Excess: -1.1 / SaO2: 86.6 / Lactate: x / iCa: 1.18   (21 @ 16:18)    IMAGING STUDIES:  Electrocardiogram - (21) Ventricular paced rhythm @ 70bpm. Underlying IART.    Telemetry - (21) Ventricular paced rhythm @ 70 bpm. Underlying IART.    Chest x-ray - (21) Stable mild cardiomegaly with subsegmental left lower lobe atelectasis.   Echocardiogram, Pediatric (Echocardiogram, Pediatric .) (21 @ 14:00) >  Summary:   1. This was a technically difficult suboptimal study due to extremely poor acoustic windows.   2. On limited short axis views there appears to be adequate systolic excursion of the posterior wall of the left ventricle and decreased anterior wall excursion. Overall mild to moderately decreased left ventricular systolic function.   3. Mild left atrioventricular and trivial right atrioventricular regurgitation.   4. No pericardial effusion.   5. No pleural effusion.   6. Findings are limited to above.    Cath - (21)  Access 4 Fr RFA, 7 Fr RFV x2 with US guidance.  Sat data (%): on 50% FiO2 LPA 73, RUPV 98, Ao 97; CI 2.6 L/min/m2 with Qp:Qs 1 :1.  Pressures (mmHg): Elevated mFontan = mIVC = 20. mPCW=16, No significant gradient across LV to AAo to Vikki (98/58/73).  Normal PVR while on sildenafil.  Angios showed unobstructed Fontan with existing stent within Fontan conduit.    Comment: IART ablation was attempted after the diagnostic cath but unsuccessful. Patient was overdrive paced out of IART. At the end of the case, Ao sat was 94% and LPA 66% on 50% FiO2 INTERVAL HISTORY:  He is s/p cranioplasty  with placement of  shunt, POD # 1. Extubated yesterday and remains on RA. Low grade temp today, neurosurgery team aware. Neuro exam improving, speaks coherently but slurred speech. On tele continues to be in IART with pacemaker set at VOO @ 70.     RESPIRATORY SUPPORT: RA    NUTRITION: NPO    - @ 07:01  -  - @ 07:00  --------------------------------------------------------  IN: 978 mL / OUT: 1876 mL / NET: -898 mL    INTRAVASCULAR ACCESS: PIV, RRA    MEDICATIONS:  furosemide   Oral Tab/Cap - Peds 10 milliGRAM(s) Oral every 12 hours  propranolol  Oral Tab/Cap - Peds 10 milliGRAM(s) Oral every 8 hours  sildenafil   Oral Tab/Cap - Peds 20 milliGRAM(s) Oral every 8 hours  ALBUTerol  Intermittent Nebulization - Peds 2.5 milliGRAM(s) Nebulizer <User Schedule>  sodium chloride 3% for Nebulization - Peds 0.5 milliLiter(s) Nebulizer daily  ceFAZolin  IV Intermittent - Peds 1980 milliGRAM(s) IV Intermittent every 8 hours  levETIRAcetam  Oral Tab/Cap - Peds 1500 milliGRAM(s) Oral two times a day  famotidine  Oral Tab/Cap - Peds 20 milliGRAM(s) Oral two times a day  phytonadione  Oral Liquid - Peds 5 milliGRAM(s) Enteral Tube <User Schedule>  sodium chloride 0.9% -  250 milliLiter(s) IV Continuous <Continuous>  sodium chloride 0.9% lock flush - Peds 3 milliLiter(s) IV Push every 8 hours        PHYSICAL EXAMINATION:  ICU Vital Signs Last 24 Hrs  T(C): 37.5 (2021 17:00), Max: 38.6 (2021 07:00)  T(F): 99.5 (2021 17:00), Max: 101.4 (2021 07:00)  HR: 70 (2021 17:00) (68 - 70)  BP: 129/66 (2021 17:00) (110/82 - 129/66)  BP(mean): 79 (2021 17:00) (72 - 92)  ABP: 131/63 (2021 17:00) (103/66 - 137/70)  ABP(mean): 85 (2021 17:00) (81 - 90)  RR: 21 (2021 17:00) (21 - 31)  SpO2: 95% (2021 17:00) (87% - 96%)  General - no acute distress  Skin - no rash, no desquamation, no cyanosis.  Eyes / ENT - no conjunctival injection, sclerae anicteric, external ears & nares normal, mucous membranes moist.  Pulmonary - normal inspiratory effort, no retractions, lungs clear to auscultation bilaterally, no wheezes, no rales.  Cardiovascular - normal rate, regular rhythm, normal S1 & single S2, grade 1/6 blowing holosystolic murmur at LMSB, no rubs, no gallops, capillary refill < 2sec, normal pulses.  Gastrointestinal - soft, non-distended, non-tender, no splenomegaly. (+) hepatomegaly.  Musculoskeletal - no joint swelling, no clubbing, no edema.  Neurologic / Psychiatric - alert, slurred speech, responds to questions.    LABORATORY TESTS:                            14.8  CBC:   18.01 )-----------( 406   (21 @ 16:20)                          46.4               133   |  95    |  31                 Ca: 9.9    BMP:   ----------------------------< 177    M.3   (21 @ 16:20)             3.7    |  21    | 1.00               Ph: 6.3      LFT:     TPro: 9.1 / Alb: 4.5 / TBili: 1.6 / DBili: x / AST: 45 / ALT: 21 / AlkPhos: 187   (21 @ 16:20)    COAG: PT: 17.7 / PTT: 174.9 / INR: 1.57   (21 @ 16:19)       ABG:   pH: 7.30 / pCO2: 51 / pO2: 66 / HCO3: 22 / Base Excess: -1.1 / SaO2: 86.6 / Lactate: x / iCa: 1.18   (21 @ 16:18)    IMAGING STUDIES:  Electrocardiogram - (21) Ventricular paced rhythm @ 70bpm. Underlying IART.    Telemetry - (21) Ventricular paced rhythm @ 70 bpm. Underlying IART.    Chest x-ray - (21) Stable mild cardiomegaly with subsegmental left lower lobe atelectasis.   Echocardiogram, Pediatric (Echocardiogram, Pediatric .) (21 @ 14:00) >  Summary:   1. This was a technically difficult suboptimal study due to extremely poor acoustic windows.   2. On limited short axis views there appears to be adequate systolic excursion of the posterior wall of the left ventricle and decreased anterior wall excursion. Overall mild to moderately decreased left ventricular systolic function.   3. Mild left atrioventricular and trivial right atrioventricular regurgitation.   4. No pericardial effusion.   5. No pleural effusion.   6. Findings are limited to above.    Cath - (21)  Access 4 Fr RFA, 7 Fr RFV x2 with US guidance.  Sat data (%): on 50% FiO2 LPA 73, RUPV 98, Ao 97; CI 2.6 L/min/m2 with Qp:Qs 1 :1.  Pressures (mmHg): Elevated mFontan = mIVC = 20. mPCW=16, No significant gradient across LV to AAo to Vikki (98/58/73).  Normal PVR while on sildenafil.  Angios showed unobstructed Fontan with existing stent within Fontan conduit.    Comment: IART ablation was attempted after the diagnostic cath but unsuccessful. Patient was overdrive paced out of IART. At the end of the case, Ao sat was 94% and LPA 66% on 50% FiO2

## 2021-06-20 PROCEDURE — 70450 CT HEAD/BRAIN W/O DYE: CPT | Mod: 26

## 2021-06-20 PROCEDURE — 99233 SBSQ HOSP IP/OBS HIGH 50: CPT

## 2021-06-20 RX ORDER — CEPHALEXIN 500 MG
500 CAPSULE ORAL ONCE
Refills: 0 | Status: DISCONTINUED | OUTPATIENT
Start: 2021-06-20 | End: 2021-06-20

## 2021-06-20 RX ORDER — ACETAMINOPHEN 500 MG
650 TABLET ORAL ONCE
Refills: 0 | Status: COMPLETED | OUTPATIENT
Start: 2021-06-20 | End: 2021-06-20

## 2021-06-20 RX ADMIN — Medication 20 MILLIGRAM(S): at 14:00

## 2021-06-20 RX ADMIN — SODIUM CHLORIDE 0.5 MILLILITER(S): 9 INJECTION INTRAMUSCULAR; INTRAVENOUS; SUBCUTANEOUS at 12:01

## 2021-06-20 RX ADMIN — SODIUM CHLORIDE 3 MILLILITER(S): 9 INJECTION INTRAMUSCULAR; INTRAVENOUS; SUBCUTANEOUS at 18:00

## 2021-06-20 RX ADMIN — Medication 2 DROP(S): at 18:18

## 2021-06-20 RX ADMIN — LEVETIRACETAM 1500 MILLIGRAM(S): 250 TABLET, FILM COATED ORAL at 21:29

## 2021-06-20 RX ADMIN — Medication 650 MILLIGRAM(S): at 17:30

## 2021-06-20 RX ADMIN — SODIUM CHLORIDE 3 MILLILITER(S): 9 INJECTION INTRAMUSCULAR; INTRAVENOUS; SUBCUTANEOUS at 10:00

## 2021-06-20 RX ADMIN — Medication 20 MILLIGRAM(S): at 06:16

## 2021-06-20 RX ADMIN — Medication 2 DROP(S): at 21:29

## 2021-06-20 RX ADMIN — LEVETIRACETAM 1500 MILLIGRAM(S): 250 TABLET, FILM COATED ORAL at 10:17

## 2021-06-20 RX ADMIN — Medication 20 MILLIGRAM(S): at 21:29

## 2021-06-20 RX ADMIN — FAMOTIDINE 20 MILLIGRAM(S): 10 INJECTION INTRAVENOUS at 21:29

## 2021-06-20 RX ADMIN — ALBUTEROL 2.5 MILLIGRAM(S): 90 AEROSOL, METERED ORAL at 12:00

## 2021-06-20 RX ADMIN — Medication 650 MILLIGRAM(S): at 16:59

## 2021-06-20 RX ADMIN — Medication 10 MILLIGRAM(S): at 06:16

## 2021-06-20 RX ADMIN — FAMOTIDINE 20 MILLIGRAM(S): 10 INJECTION INTRAVENOUS at 10:17

## 2021-06-20 RX ADMIN — Medication 1 PACKET(S): at 10:15

## 2021-06-20 RX ADMIN — Medication 10 MILLIGRAM(S): at 17:00

## 2021-06-20 RX ADMIN — Medication 650 MILLIGRAM(S): at 06:15

## 2021-06-20 RX ADMIN — SODIUM CHLORIDE 3 MILLILITER(S): 9 INJECTION INTRAMUSCULAR; INTRAVENOUS; SUBCUTANEOUS at 01:51

## 2021-06-20 RX ADMIN — Medication 2 DROP(S): at 14:16

## 2021-06-20 RX ADMIN — Medication 2 DROP(S): at 10:19

## 2021-06-20 NOTE — PROGRESS NOTE PEDS - PROBLEM SELECTOR PLAN 1
1. D/c drain today  2. Care per PICU/Cardiology  3. Neurochecks  Case d/w attending 1. D/c drain today  2. CTH today  3. Neurochecks q1H  4. Care per PICU  Case d/w attending

## 2021-06-20 NOTE — PROGRESS NOTE PEDS - ASSESSMENT
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 He has elevated Fontan pressure secondary to LV diastolic dysfunction.  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   - Keppra (clinical szs)  - R sided hemiparesis improving   - EVD to 0   - tylenol prn for fever control  -PT/OT/speech/rehab consult  -cannot have mri    Resp:  - RA, goal SpO2 > 85% (VV collaterals on CTA)    CV:  continue mexilitine for hx of  VT  VOO 70, normal mode is DDD (though atrial wires don't work so effectively VVI)  Still in IART  Restart home aldactone  lisinopril on hold still  Continue Sildenafil   Continue Lasix po q12  Metolazone as needed for increased oxygen requirement   sono r chest (6/15) with moderate effusion clinically improving with diuresis  echo 6/8 extremely limited windows  HOLA when goes for neurosugical procedure (when intubated)    Heme:  Xarelto on hold  Goal PLTs >100  INR < 1.5  Hematology and NRSGY in discussions  s/p Vitamin K x3 days,   now po Vit K 3x/week    FEN:  Pepcid - renally dosed   Bowel regimen: Senna and Miralax PRN  Cleared by speech for oral diet of regular solids and thin fluids as tolerated   nutrition consulting  speech and swallow consulting    ID  -  Ancef post-op    Other  - L hydrocele  - outpt f/u    Access  Righ Rad AL (6/1) - d/c  PIV            22 y/o male DILV, 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 He has elevated Fontan pressure secondary to LV diastolic dysfunction.  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   - Keppra (clinical szs)  - R sided hemiparesis improving   - tylenol prn for fever control  -PT/OT/speech/rehab consult  -cannot have mri    Resp:  - RA, goal SpO2 > 85% (VV collaterals on CTA)    CV:  continue mexilitine for hx of  VT  VOO 70, normal mode is DDD (though atrial wires don't work so effectively VVI)  Still in IART  Restart home aldactone  lisinopril on hold still  Continue Sildenafil   Continue Lasix po q12  sono r chest (6/15) with moderate effusion clinically improving with diuresis  echo 6/8 extremely limited windows    Heme:  Xarelto on hold  Goal PLTs >100  INR < 1.5  Hematology and NRSGY in discussions  s/p Vitamin K x3 days,   now po Vit K 3x/week    FEN:  Pepcid - renally dosed   Bowel regimen: Senna and Miralax PRN  Cleared by speech for oral diet of regular solids and thin fluids as tolerated   nutrition consulting  speech and swallow consulting    ID  -  Ancef post-op    Other  - L hydrocele  - outpt f/u    Access  PIV

## 2021-06-20 NOTE — PROGRESS NOTE PEDS - SUBJECTIVE AND OBJECTIVE BOX
INTERVAL HISTORY:  He is s/p cranioplasty  with placement of  shunt, POD # 2. Continues to have fevers post op, on Ancef, neurosurgery team aware. Neuro exam improving, speaks coherently but slurred speech. On tele continues to be in IART with pacemaker set at VOO @ 70.     RESPIRATORY SUPPORT: RA    NUTRITION: NPO       @ 07:01  -   @ 07:00  --------------------------------------------------------  IN: 154 mL / OUT: 678 mL / NET: -524 mL      INTRAVASCULAR ACCESS: PIV, RRA    MEDICATIONS:  furosemide   Oral Tab/Cap - Peds 10 milliGRAM(s) Oral every 12 hours  propranolol  Oral Tab/Cap - Peds 10 milliGRAM(s) Oral every 8 hours  sildenafil   Oral Tab/Cap - Peds 20 milliGRAM(s) Oral every 8 hours  ALBUTerol  Intermittent Nebulization - Peds 2.5 milliGRAM(s) Nebulizer <User Schedule>  sodium chloride 3% for Nebulization - Peds 0.5 milliLiter(s) Nebulizer daily  ceFAZolin  IV Intermittent - Peds 1980 milliGRAM(s) IV Intermittent every 8 hours  cephalexin Oral Tab/Cap - Peds 500 milliGRAM(s) Oral once  levETIRAcetam  Oral Tab/Cap - Peds 1500 milliGRAM(s) Oral two times a day  famotidine  Oral Tab/Cap - Peds 20 milliGRAM(s) Oral two times a day  phytonadione  Oral Liquid - Peds 5 milliGRAM(s) Enteral Tube <User Schedule>  sodium chloride 0.9% -  250 milliLiter(s) IV Continuous <Continuous>  sodium chloride 0.9% lock flush - Peds 3 milliLiter(s) IV Push every 8 hours      PHYSICAL EXAMINATION:  ICU Vital Signs Last 24 Hrs  T(C): 37.6 (2021 08:00), Max: 38.4 (2021 20:40)  T(F): 99.6 (2021 08:00), Max: 101.1 (2021 20:40)  HR: 70 (2021 08:00) (68 - 70)  BP: 112/60 (2021 08:00) (100/59 - 129/66)  BP(mean): 72 (2021 08:00) (67 - 79)  ABP: 131/63 (2021 17:00) (103/66 - 131/63)  ABP(mean): 85 (2021 17:00) (81 - 86)  RR: 26 (2021 08:00) (21 - 26)  SpO2: 92% (2021 08:00) (89% - 95%)    General - no acute distress  Skin - no rash, no desquamation, no cyanosis.  Eyes / ENT - no conjunctival injection, sclerae anicteric, external ears & nares normal, mucous membranes moist.  Pulmonary - normal inspiratory effort, no retractions, lungs clear to auscultation bilaterally, no wheezes, no rales.  Cardiovascular - normal rate, regular rhythm, normal S1 & single S2, grade 1/6 blowing holosystolic murmur at LMSB, no rubs, no gallops, capillary refill < 2sec, normal pulses.  Gastrointestinal - soft, non-distended, non-tender, no splenomegaly. (+) hepatomegaly.  Musculoskeletal - no joint swelling, no clubbing, no edema.  Neurologic / Psychiatric - alert, slurred speech, responds to questions.    LABORATORY TESTS:                            14.8  CBC:   18.01 )-----------( 406   (21 @ 16:20)                          46.4               133   |  95    |  31                 Ca: 9.9    BMP:   ----------------------------< 177    M.3   (21 @ 16:20)             3.7    |  21    | 1.00               Ph: 6.3      LFT:     TPro: 9.1 / Alb: 4.5 / TBili: 1.6 / DBili: x / AST: 45 / ALT: 21 / AlkPhos: 187   (21 @ 16:20)    COAG: PT: 17.7 / PTT: 174.9 / INR: 1.57   (21 @ 16:19)       ABG:   pH: 7.30 / pCO2: 51 / pO2: 66 / HCO3: 22 / Base Excess: -1.1 / SaO2: 86.6 / Lactate: x / iCa: 1.18   (21 @ 16:18)      IMAGING STUDIES:  Electrocardiogram - (21) Ventricular paced rhythm @ 70bpm. Underlying IART.    Telemetry - (21) Ventricular paced rhythm @ 70 bpm. Underlying IART.    Chest x-ray - (21) Stable mild cardiomegaly with subsegmental left lower lobe atelectasis.   Echocardiogram, Pediatric (Echocardiogram, Pediatric .) (21 @ 14:00) >  Summary:   1. This was a technically difficult suboptimal study due to extremely poor acoustic windows.   2. On limited short axis views there appears to be adequate systolic excursion of the posterior wall of the left ventricle and decreased anterior wall excursion. Overall mild to moderately decreased left ventricular systolic function.   3. Mild left atrioventricular and trivial right atrioventricular regurgitation.   4. No pericardial effusion.   5. No pleural effusion.   6. Findings are limited to above.    Cath - (21)  Access 4 Fr RFA, 7 Fr RFV x2 with US guidance.  Sat data (%): on 50% FiO2 LPA 73, RUPV 98, Ao 97; CI 2.6 L/min/m2 with Qp:Qs 1 :1.  Pressures (mmHg): Elevated mFontan = mIVC = 20. mPCW=16, No significant gradient across LV to AAo to Vikki (98/58/73).  Normal PVR while on sildenafil.  Angios showed unobstructed Fontan with existing stent within Fontan conduit.    Comment: IART ablation was attempted after the diagnostic cath but unsuccessful. Patient was overdrive paced out of IART. At the end of the case, Ao sat was 94% and LPA 66% on 50% FiO2

## 2021-06-20 NOTE — PROGRESS NOTE PEDS - ASSESSMENT
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 Presented in IART - s/p EP study with unsuccessful attempt at ablation along with diagnostic cath showing elevated Fontan pressure (20mmHg). He was loaded with amiodarone and ultimately electrically cardioverted out, however reverted back into IART and continues to be in IART. His course is further complicated by L frontal parenchymal hemorrhage requiring emergent evacuation with NSx and EVD placement  and VTach of unknown origin. He is now s/p cranioplasty  with placement of  shunt, POD # 2    CVS:  - Continuos telemetry monitoring.  - Pacemaker baseline: DDD 70-120bpm. TO VOO 70bpm for OR today  - sildenafil 20mg q8 (home medication).   - lasix to 10 mg Q12H (home dose). Goal net even. Monitor creat. Will determine ongoing needs after OR  - Sats goal >85%.   - Hold home lisinopril, aldactone. As per PICU and neurosurgery BP goals should be normal. Home aldacone is 100 mg qd and Lisinopril 5 mg qd.   - Mexlitine 150mg q8 (do not hold dose while NPO)    Resp:   - Wean as tolerated. on RA  - CTA obtained to eval for PE - difficult to protocol because of Fontan anatomy and contrast timing, no PE seen, but does show VV collaterals (likely main reason for hypoxia)      FEN/GI:   - Regular diet     ID:   - Ancef per post op protocol.     Heme:   - Anticoagulation care per hematology/PICU/NSx  - Xarelto on hold. WIll need to consider restarting anticoagulation when safe due to concerns of Fontan clot    Neuro:   - L frontal parenchymal hemorrhage- NSx and neuro and PMR following  - On Keppra  - On Clonidine patch, propranolol, melatnonin PRN

## 2021-06-20 NOTE — PROGRESS NOTE PEDS - ASSESSMENT
19 y/o M with complex cardiac history presented after cardiac cath with large parenchymal hemorrhage in the anterior left frontal lobe and subarachnoid hemorrhage s/p Left craniectomy, discarded bone flap, placement of EVD on 6/1/2021. 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 Patient now s/p cranioplasty and VPS placement- strata @0.5 6/18/21/

## 2021-06-20 NOTE — PROGRESS NOTE PEDS - SUBJECTIVE AND OBJECTIVE BOX
Interval/Overnight Events:    VITAL SIGNS:  T(C): 37.6 (21 @ 08:00), Max: 38.4 (21 @ 20:40)  HR: 70 (21 @ 08:00) (68 - 70)  BP: 112/60 (21 @ 08:00) (100/59 - 129/66)  ABP: 131/63 (21 @ 17:00) (103/66 - 131/63)  ABP(mean): 85 (21 @ 17:00) (81 - 86)  RR: 26 (21 @ 08:00) (21 - 26)  SpO2: 92% (21 @ 08:00) (89% - 95%)  CVP(mm Hg): --  End-Tidal CO2:  NIRS:    Physical Exam:    General: NAD  HEENT: no acute changes from baseline  Resp: unlabored, CTAB, good aeration, no rhonchi/rales/wheezing  CV: RRR, nl S1/S2, no m/r/g appreciated, CR < 2s, distal pulses 2+ and equal  Abd: soft, NTND, no HSM appreciated  Ext: wwp, no gross deformities  Neuro: alert and oriented, no acute change from baseline  Skin: no rash    =======================RESPIRATORY=======================  [ ] FiO2: ___ 	[ ] Heliox: ____ 		[ ] BiPAP: ___   [ ] NC: __  Liters			[ ] HFNC: __ 	Liters, FiO2: __  [ ] Mechanical Ventilation:   [ ] Inhaled Nitric Oxide:  [ ] Extubation Readiness Assessed  Comments:    =====================CARDIOVASCULAR======================  Cardiovascular Medications:  furosemide   Oral Tab/Cap - Peds 10 milliGRAM(s) Oral every 12 hours  propranolol  Oral Tab/Cap - Peds 10 milliGRAM(s) Oral every 8 hours  sildenafil   Oral Tab/Cap - Peds 20 milliGRAM(s) Oral every 8 hours    Chest Tube Output: ___ in 24 hours, ___ in last 12 hours   [ ] Right     [ ] Left    [ ] Mediastinal  Cardiac Rhythm:	[x] NSR		[ ] Other:    [ ] Central Venous Line	[ ] R	[ ] L	[ ] IJ	[ ] Fem	[ ] SC			Placed:   [ ] Arterial Line		[ ] R	[ ] L	[ ] PT	[ ] DP	[ ] Fem	[ ] Rad	[ ] Ax	Placed:   [ ] PICC:				[ ] Broviac		[ ] Mediport  Comments:    ==========HEMATOLOGY/ONCOLOGY=================  Transfusions:	[ ] PRBC	[ ] Platelets	[ ] FFP		[ ] Cryoprecipitate  DVT Prophylaxis:  Comments:    =================INFECTIOUS DISEASE==================  [ ] Cooling [place] being used. Target Temperature:     ===========FLUIDS/ELECTROLYTES/NUTRITION=============  I&O's Summary    2021 07:01  -  2021 07:00  --------------------------------------------------------  IN: 154 mL / OUT: 678 mL / NET: -524 mL      Daily   Diet:	[ ] Regular	[ ] Soft		[ ] Clears	[ ] NPO  .	[ ] Other:  .	[ ] NGT		[ ] NDT		[ ] GT		[ ] GJT    [ ] Urinary Catheter, Date Placed:   Comments:    ====================NEUROLOGY===================  [ ] SBS:		[ ] GISSELLE-1:	[ ] BIS:	[ ] CAPD:  [ ] EVD set at: ___ , Drainage in last 24 hours: ___ ml    [x] Adequacy of sedation and pain control has been assessed and adjusted  Comments:      ==================PATIENT CARE=================  [ ] There are pressure ulcers/areas of breakdown that are being addressed -   [x] Preventative measures are being taken to decrease risk for skin breakdown.  [x] Necessity of urinary, arterial, and venous catheters discussed    ==================LABS============================  ABG - ( 2021 16:18 )  pH: 7.30  /  pCO2: 51    /  pO2: 66    / HCO3: 22    / Base Excess: -1.1  /  SaO2: 86.6  / Lactate: x        RECENT CULTURES:   @ 20:35 .CSF     No growth to date.    No polymorphonuclear leukocytes seen per low power field  No organisms seen per oil power field        =================MEDICATIONS======================  MEDICATIONS  MEDICATIONS  (STANDING):  ALBUTerol  Intermittent Nebulization - Peds 2.5 milliGRAM(s) Nebulizer <User Schedule>  ceFAZolin  IV Intermittent - Peds 1980 milliGRAM(s) IV Intermittent every 8 hours  cephalexin Oral Tab/Cap - Peds 500 milliGRAM(s) Oral once  famotidine  Oral Tab/Cap - Peds 20 milliGRAM(s) Oral two times a day  furosemide   Oral Tab/Cap - Peds 10 milliGRAM(s) Oral every 12 hours  lactobacillus Oral Powder (CULTURELLE KIDS) - Peds 1 Packet(s) Oral daily  levETIRAcetam  Oral Tab/Cap - Peds 1500 milliGRAM(s) Oral two times a day  Mexiletine 150 milliGRAM(s) 150 milliGRAM(s) Oral every 8 hours  phytonadione  Oral Liquid - Peds 5 milliGRAM(s) Enteral Tube <User Schedule>  polyvinyl alcohol 1.4%/povidone 0.6% Ophthalmic Solution - Peds 2 Drop(s) Both EYES four times a day  propranolol  Oral Tab/Cap - Peds 10 milliGRAM(s) Oral every 8 hours  sildenafil   Oral Tab/Cap - Peds 20 milliGRAM(s) Oral every 8 hours  sodium chloride 0.9% -  250 milliLiter(s) (3 mL/Hr) IV Continuous <Continuous>  sodium chloride 0.9% lock flush - Peds 3 milliLiter(s) IV Push every 8 hours  sodium chloride 3% for Nebulization - Peds 0.5 milliLiter(s) Nebulizer daily    MEDICATIONS  (PRN):  acetaminophen   Oral Tab/Cap - Peds. 650 milliGRAM(s) Oral every 6 hours PRN Mild Pain (1 - 3), Moderate Pain (4 - 6), Severe Pain (7 - 10)  melatonin Oral Tab/Cap - Peds 5 milliGRAM(s) Oral at bedtime PRN Insomnia  polyethylene glycol 3350 Oral Powder - Peds 17 Gram(s) Oral daily PRN Constipation    ===================================================  IMAGING STUDIES:    [ ] XR   [ ] CT   [ ] MR   [ ] US  [ ] Echo  ===========================================================  Parent/Guardian is at the bedside:	[ ] Yes	[ ] No  Patient and Parent/Guardian updated as to the progress/plan of care:	[ ] Yes	[ ] No    [x] The patient remains in critical and unstable condition, and requires ICU care and monitoring, assessment, and treatment  [ ] The patient is improving but requires continued monitoring, assessment, treatment, and adjustment of therapy    [x] The total critical care time spent by attending physician was __35__ minutes, excluding procedure time. Interval/Overnight Events:    No acute events overnight      VITAL SIGNS:  T(C): 37.6 (21 @ 08:00), Max: 38.4 (21 @ 20:40)  HR: 70 (21 @ 08:00) (68 - 70)  BP: 112/60 (21 @ 08:00) (100/59 - 129/66)  ABP: 131/63 (21 @ 17:00) (103/66 - 131/63)  ABP(mean): 85 (21 @ 17:00) (81 - 86)  RR: 26 (21 @ 08:00) (21 - 26)  SpO2: 92% (21 @ 08:00) (89% - 95%)  CVP(mm Hg): --  End-Tidal CO2:  NIRS:    Physical Exam:    General: NAD  HEENT: dressing c/d/i  Resp: unlabored, CTAB, good aeration, no rhonchi/rales/wheezing  CV: RRR, nl S1/S2, no m/r/g appreciated, CR < 2s, distal pulses 2+ and equal  Abd: soft, NTND, no HSM appreciated  Ext: wwp, no gross deformities  Neuro: alert and oriented, mild R hemiparesis  Skin: no rash    =======================RESPIRATORY=======================  [ ] FiO2: ___ 	[ ] Heliox: ____ 		[ ] BiPAP: ___   [ ] NC: __  Liters			[ ] HFNC: __ 	Liters, FiO2: __  [ ] Mechanical Ventilation:   [ ] Inhaled Nitric Oxide:  [ ] Extubation Readiness Assessed  Comments:    =====================CARDIOVASCULAR======================  Cardiovascular Medications:  furosemide   Oral Tab/Cap - Peds 10 milliGRAM(s) Oral every 12 hours  propranolol  Oral Tab/Cap - Peds 10 milliGRAM(s) Oral every 8 hours  sildenafil   Oral Tab/Cap - Peds 20 milliGRAM(s) Oral every 8 hours    Chest Tube Output: ___ in 24 hours, ___ in last 12 hours   [ ] Right     [ ] Left    [ ] Mediastinal  Cardiac Rhythm:	[x] NSR		[ ] Other:    [ ] Central Venous Line	[ ] R	[ ] L	[ ] IJ	[ ] Fem	[ ] SC			Placed:   [ ] Arterial Line		[ ] R	[ ] L	[ ] PT	[ ] DP	[ ] Fem	[ ] Rad	[ ] Ax	Placed:   [ ] PICC:				[ ] Broviac		[ ] Mediport  Comments:    ==========HEMATOLOGY/ONCOLOGY=================  Transfusions:	[ ] PRBC	[ ] Platelets	[ ] FFP		[ ] Cryoprecipitate  DVT Prophylaxis:  Comments:    =================INFECTIOUS DISEASE==================  [ ] Cooling [place] being used. Target Temperature:     ===========FLUIDS/ELECTROLYTES/NUTRITION=============  I&O's Summary    2021 07:01  -  2021 07:00  --------------------------------------------------------  IN: 154 mL / OUT: 678 mL / NET: -524 mL      Daily   Diet:	[ x] Regular	[ ] Soft		[ ] Clears	[ ] NPO  .	[ ] Other:  .	[ ] NGT		[ ] NDT		[ ] GT		[ ] GJT    [ ] Urinary Catheter, Date Placed:   Comments:    ====================NEUROLOGY===================  [ ] SBS:		[ ] GISSELLE-1:	[ ] BIS:	[ ] CAPD:  [ ] EVD set at: ___ , Drainage in last 24 hours: ___ ml    [x] Adequacy of sedation and pain control has been assessed and adjusted  Comments:      ==================PATIENT CARE=================  [ ] There are pressure ulcers/areas of breakdown that are being addressed -   [x] Preventative measures are being taken to decrease risk for skin breakdown.  [x] Necessity of urinary, arterial, and venous catheters discussed    ==================LABS============================  ABG - ( 2021 16:18 )  pH: 7.30  /  pCO2: 51    /  pO2: 66    / HCO3: 22    / Base Excess: -1.1  /  SaO2: 86.6  / Lactate: x        RECENT CULTURES:   @ 20:35 .CSF     No growth to date.    No polymorphonuclear leukocytes seen per low power field  No organisms seen per oil power field        =================MEDICATIONS======================  MEDICATIONS  MEDICATIONS  (STANDING):  ALBUTerol  Intermittent Nebulization - Peds 2.5 milliGRAM(s) Nebulizer <User Schedule>  ceFAZolin  IV Intermittent - Peds 1980 milliGRAM(s) IV Intermittent every 8 hours  cephalexin Oral Tab/Cap - Peds 500 milliGRAM(s) Oral once  famotidine  Oral Tab/Cap - Peds 20 milliGRAM(s) Oral two times a day  furosemide   Oral Tab/Cap - Peds 10 milliGRAM(s) Oral every 12 hours  lactobacillus Oral Powder (CULTURELLE KIDS) - Peds 1 Packet(s) Oral daily  levETIRAcetam  Oral Tab/Cap - Peds 1500 milliGRAM(s) Oral two times a day  Mexiletine 150 milliGRAM(s) 150 milliGRAM(s) Oral every 8 hours  phytonadione  Oral Liquid - Peds 5 milliGRAM(s) Enteral Tube <User Schedule>  polyvinyl alcohol 1.4%/povidone 0.6% Ophthalmic Solution - Peds 2 Drop(s) Both EYES four times a day  propranolol  Oral Tab/Cap - Peds 10 milliGRAM(s) Oral every 8 hours  sildenafil   Oral Tab/Cap - Peds 20 milliGRAM(s) Oral every 8 hours  sodium chloride 0.9% -  250 milliLiter(s) (3 mL/Hr) IV Continuous <Continuous>  sodium chloride 0.9% lock flush - Peds 3 milliLiter(s) IV Push every 8 hours  sodium chloride 3% for Nebulization - Peds 0.5 milliLiter(s) Nebulizer daily    MEDICATIONS  (PRN):  acetaminophen   Oral Tab/Cap - Peds. 650 milliGRAM(s) Oral every 6 hours PRN Mild Pain (1 - 3), Moderate Pain (4 - 6), Severe Pain (7 - 10)  melatonin Oral Tab/Cap - Peds 5 milliGRAM(s) Oral at bedtime PRN Insomnia  polyethylene glycol 3350 Oral Powder - Peds 17 Gram(s) Oral daily PRN Constipation    ===================================================  IMAGING STUDIES:    [ ] XR   [ ] CT   [ ] MR   [ ] US  [ ] Echo  ===========================================================  Parent/Guardian is at the bedside:	[ ] Yes	[ ] No  Patient and Parent/Guardian updated as to the progress/plan of care:	[ ] Yes	[ ] No    [ ] The patient remains in critical and unstable condition, and requires ICU care and monitoring, assessment, and treatment  [ x] The patient is improving but requires continued monitoring, assessment, treatment, and adjustment of therapy    [ ] The total critical care time spent by attending physician was ____ minutes, excluding procedure time.

## 2021-06-20 NOTE — PROGRESS NOTE PEDS - SUBJECTIVE AND OBJECTIVE BOX
OVERNIGHT EVENTS:  No acute events overnight. NEIL drain 0cc output    HPI:  19yo male with complex cardiac h/o DILV, L-malposition of great arteries, sick sinus syndrome and AV mihir disease with tachybradycardia syndrome with a dual chamber pacemaker (currently with RV lead fracture and is currently only LV paced 2/2 complete heart block), PSH of status post Msdas-Xaqw-Gobgrkp anastomosis and aortic arch reconstruction followed by a fenestrated lateral tunnel Fontan completion (now s/p fenestration closure). here s/p cardiac catheterization and ablation. Procedure was complicated by unsuccessful ablation and post extubation patient was noted to have increased hemoptysis and desaturations, patient was reintubated for airway protection and given propofol for sedation.  (26 May 2021 20:19)      Vital Signs Last 24 Hrs  T(C): 37.6 (2021 08:00), Max: 38.4 (2021 20:40)  T(F): 99.6 (2021 08:00), Max: 101.1 (2021 20:40)  HR: 70 (2021 08:00) (68 - 70)  BP: 112/60 (2021 08:00) (100/59 - 129/66)  BP(mean): 72 (2021 08:00) (67 - 79)  RR: 26 (2021 08:00) (21 - 26)  SpO2: 92% (2021 08:00) (89% - 95%)    I&O's Summary    2021 07:01  -  2021 07:00  --------------------------------------------------------  IN: 154 mL / OUT: 678 mL / NET: -524 mL        PHYSICAL EXAM:  Awake, Alert, Affect flat, speaking a few words   PERRL  Motor:  MAEx4, weaker on R side  Incision/Wound: c/d/i      LABS:                        14.8   18.01 )-----------( 406      ( 2021 16:20 )             46.4     06-18    133<L>  |  95<L>  |  31<H>  ----------------------------<  177<H>  3.7   |  21<L>  |  1.00    Ca    9.9      2021 16:20  Phos  6.3       Mg     2.3         TPro  9.1<H>  /  Alb  4.5  /  TBili  1.6<H>  /  DBili  x   /  AST  45<H>  /  ALT  21  /  AlkPhos  187      PT/INR - ( 2021 16:19 )   PT: 17.7 sec;   INR: 1.57 ratio         PTT - ( 2021 16:19 )  PTT:174.9 sec      CULTURES:  Culture Results:   No growth to date. ( @ 20:35)  Culture Results:   No Growth Final ( @ 10:00)    CSF Analysis:   CSF Lymphocytes: 38 % ( @ 16:53)  RBC Count - Spinal Fluid: [id number] cells/uL *H* ( @ 16:53)    MEDICATIONS:  Antibiotics:  ceFAZolin  IV Intermittent - Peds 1980 milliGRAM(s) IV Intermittent every 8 hours  cephalexin Oral Tab/Cap - Peds 500 milliGRAM(s) Oral once    Neuro:  acetaminophen   Oral Tab/Cap - Peds. 650 milliGRAM(s) Oral every 6 hours PRN  levETIRAcetam  Oral Tab/Cap - Peds 1500 milliGRAM(s) Oral two times a day  melatonin Oral Tab/Cap - Peds 5 milliGRAM(s) Oral at bedtime PRN    OTHER:  ALBUTerol  Intermittent Nebulization - Peds 2.5 milliGRAM(s) Nebulizer <User Schedule>  famotidine  Oral Tab/Cap - Peds 20 milliGRAM(s) Oral two times a day  furosemide   Oral Tab/Cap - Peds 10 milliGRAM(s) Oral every 12 hours  lactobacillus Oral Powder (CULTURELLE KIDS) - Peds 1 Packet(s) Oral daily  Mexiletine 150 milliGRAM(s) 150 milliGRAM(s) Oral every 8 hours  polyethylene glycol 3350 Oral Powder - Peds 17 Gram(s) Oral daily PRN  polyvinyl alcohol 1.4%/povidone 0.6% Ophthalmic Solution - Peds 2 Drop(s) Both EYES four times a day  propranolol  Oral Tab/Cap - Peds 10 milliGRAM(s) Oral every 8 hours  sildenafil   Oral Tab/Cap - Peds 20 milliGRAM(s) Oral every 8 hours  sodium chloride 3% for Nebulization - Peds 0.5 milliLiter(s) Nebulizer daily    IVF:  phytonadione  Oral Liquid - Peds 5 milliGRAM(s) Enteral Tube <User Schedule>  sodium chloride 0.9% -  250 milliLiter(s) IV Continuous <Continuous>  sodium chloride 0.9% lock flush - Peds 3 milliLiter(s) IV Push every 8 hours      RADIOLOGY & ADDITIONAL TESTS:  < from: CT Head No Cont (21 @ 10:26) >  IMPRESSION:    Grossly stable exam.    < end of copied text >

## 2021-06-21 LAB
APTT BLD: 25.5 SEC — LOW (ref 27–36.3)
CULTURE RESULTS: NO GROWTH — SIGNIFICANT CHANGE UP
INR BLD: 1.44 RATIO — HIGH (ref 0.88–1.16)
PROTHROM AB SERPL-ACNC: 16.1 SEC — HIGH (ref 10.6–13.6)
SPECIMEN SOURCE: SIGNIFICANT CHANGE UP

## 2021-06-21 PROCEDURE — ZZZZZ: CPT

## 2021-06-21 PROCEDURE — 99233 SBSQ HOSP IP/OBS HIGH 50: CPT

## 2021-06-21 RX ORDER — LISINOPRIL 2.5 MG/1
5 TABLET ORAL DAILY
Refills: 0 | Status: DISCONTINUED | OUTPATIENT
Start: 2021-06-21 | End: 2021-06-29

## 2021-06-21 RX ORDER — FAMOTIDINE 10 MG/ML
20 INJECTION INTRAVENOUS
Refills: 0 | Status: DISCONTINUED | OUTPATIENT
Start: 2021-06-21 | End: 2021-06-23

## 2021-06-21 RX ADMIN — Medication 1 PACKET(S): at 10:33

## 2021-06-21 RX ADMIN — Medication 2 DROP(S): at 13:57

## 2021-06-21 RX ADMIN — Medication 2 DROP(S): at 17:07

## 2021-06-21 RX ADMIN — Medication 20 MILLIGRAM(S): at 13:57

## 2021-06-21 RX ADMIN — LISINOPRIL 5 MILLIGRAM(S): 2.5 TABLET ORAL at 13:56

## 2021-06-21 RX ADMIN — LEVETIRACETAM 1500 MILLIGRAM(S): 250 TABLET, FILM COATED ORAL at 22:02

## 2021-06-21 RX ADMIN — LEVETIRACETAM 1500 MILLIGRAM(S): 250 TABLET, FILM COATED ORAL at 10:33

## 2021-06-21 RX ADMIN — Medication 2 DROP(S): at 22:04

## 2021-06-21 RX ADMIN — Medication 2 DROP(S): at 10:34

## 2021-06-21 RX ADMIN — Medication 5 MILLIGRAM(S): at 08:56

## 2021-06-21 RX ADMIN — Medication 10 MILLIGRAM(S): at 17:07

## 2021-06-21 RX ADMIN — SODIUM CHLORIDE 0.5 MILLILITER(S): 9 INJECTION INTRAMUSCULAR; INTRAVENOUS; SUBCUTANEOUS at 11:41

## 2021-06-21 RX ADMIN — Medication 20 MILLIGRAM(S): at 05:27

## 2021-06-21 RX ADMIN — FAMOTIDINE 20 MILLIGRAM(S): 10 INJECTION INTRAVENOUS at 10:33

## 2021-06-21 RX ADMIN — Medication 10 MILLIGRAM(S): at 05:27

## 2021-06-21 RX ADMIN — FAMOTIDINE 20 MILLIGRAM(S): 10 INJECTION INTRAVENOUS at 22:26

## 2021-06-21 RX ADMIN — ALBUTEROL 2.5 MILLIGRAM(S): 90 AEROSOL, METERED ORAL at 11:41

## 2021-06-21 RX ADMIN — SODIUM CHLORIDE 3 MILLILITER(S): 9 INJECTION INTRAMUSCULAR; INTRAVENOUS; SUBCUTANEOUS at 10:00

## 2021-06-21 RX ADMIN — Medication 20 MILLIGRAM(S): at 22:00

## 2021-06-21 RX ADMIN — SODIUM CHLORIDE 3 MILLILITER(S): 9 INJECTION INTRAMUSCULAR; INTRAVENOUS; SUBCUTANEOUS at 17:13

## 2021-06-21 RX ADMIN — SODIUM CHLORIDE 3 MILLILITER(S): 9 INJECTION INTRAMUSCULAR; INTRAVENOUS; SUBCUTANEOUS at 01:47

## 2021-06-21 NOTE — CHART NOTE - NSCHARTNOTEFT_GEN_A_CORE
20 y.o. 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 Had L frontal hemorrhagic stroke on 21 requiring decompressive craniectomy and urgent evacuation in OR; per MD notes. Extubated . Now s/p VPS and prosthetic skull flap . Transferred from picu to  .   Jimbo was NPO -, TPN -, EN (Jevity 1.2) -.  Passed swallow eval , SLP rec regular diet + thin liquids.   Spoke with mom at time of visit, reports good appetite and po intake. Had 2 croissants with cream cheese for breakfast this am. Denies N/V/GI distress. Has not had difficulty chewing/swallowing.  Last BM . +Bowel regimen prn. +probiotics.   Weights: : 67.7 kg, : 61.1 kg, : 66.1 kg. Unsure if weight on  is accurate.   No food preferences at this time.     Estimated energy needs: 30-35 kcal/kg using IBW of 63.8 [id number] kcal/day  Estimated protein needs: 1.2-1.3 g/kg using IBW of 63.8 k-83 g pro/day    PLAN/RECS:  1. Continue regular diet as tolerated, obtain food preferences    2. Bowel regimen prn   3. Monitor po intake, weights, labs     GOAL:  Pt will meet >75% of estimated nutrient needs with good tolerance.

## 2021-06-21 NOTE — PROGRESS NOTE PEDS - SUBJECTIVE AND OBJECTIVE BOX
Interval/Overnight Events: no events overnight.      ===========================RESPIRATORY==========================  RR: 20 (06-21-21 @ 11:20) (20 - 24)  SpO2: 90% (06-21-21 @ 11:20) (88% - 92%)  End Tidal CO2:    Respiratory Support:   [ ] Inhaled Nitric Oxide:    ALBUTerol  Intermittent Nebulization - Peds 2.5 milliGRAM(s) Nebulizer <User Schedule>  sodium chloride 3% for Nebulization - Peds 0.5 milliLiter(s) Nebulizer daily  [x] Airway Clearance Discussed  Extubation Readiness:  [x ] Not Applicable     [ ] Discussed and Assessed  Comments:    =========================CARDIOVASCULAR========================  HR: 70 (06-21-21 @ 11:20) (70 - 70)  BP: 111/61 (06-21-21 @ 11:20) (100/57 - 125/61)  ABP: --  CVP(mm Hg): --  NIRS:    Patient Care Access:  furosemide   Oral Tab/Cap - Peds 10 milliGRAM(s) Oral every 12 hours  lisinopril Oral Tab/Cap - Peds 5 milliGRAM(s) Oral daily  propranolol  Oral Tab/Cap - Peds 10 milliGRAM(s) Oral every 8 hours  sildenafil   Oral Tab/Cap - Peds 20 milliGRAM(s) Oral every 8 hours  Comments:    =====================HEMATOLOGY/ONCOLOGY=====================  Transfusions:	[ ] PRBC	[ ] Platelets	[ ] FFP		[ ] Cryoprecipitate  DVT Prophylaxis:  Comments:    ========================INFECTIOUS DISEASE=======================  T(C): 36.9 (06-21-21 @ 11:20), Max: 37.8 (06-20-21 @ 17:00)  T(F): 98.4 (06-21-21 @ 11:20), Max: 100 (06-20-21 @ 17:00)  [ ] Cooling [place] being used. Target Temperature:      ==================FLUIDS/ELECTROLYTES/NUTRITION=================  I&O's Summary    20 Jun 2021 07:01  -  21 Jun 2021 07:00  --------------------------------------------------------  IN: 1020 mL / OUT: 1553 mL / NET: -533 mL    21 Jun 2021 07:01  -  21 Jun 2021 11:29  --------------------------------------------------------  IN: 0 mL / OUT: 300 mL / NET: -300 mL      Diet: PO  [ ] NGT		[ ] NDT		[ ] GT		[ ] GJT    famotidine  Oral Tab/Cap - Peds 20 milliGRAM(s) Oral two times a day  phytonadione  Oral Liquid - Peds 5 milliGRAM(s) Enteral Tube <User Schedule>  polyethylene glycol 3350 Oral Powder - Peds 17 Gram(s) Oral daily PRN  sodium chloride 0.9% lock flush - Peds 3 milliLiter(s) IV Push every 8 hours  Comments:    ==========================NEUROLOGY===========================  [ ] SBS:		[ ] GISSELLE-1:	[ ] BIS:	[ ] CAPD:  acetaminophen   Oral Tab/Cap - Peds. 650 milliGRAM(s) Oral every 6 hours PRN  levETIRAcetam  Oral Tab/Cap - Peds 1500 milliGRAM(s) Oral two times a day  melatonin Oral Tab/Cap - Peds 5 milliGRAM(s) Oral at bedtime PRN  [x] Adequacy of sedation and pain control has been assessed and adjusted  Comments:    OTHER MEDICATIONS:  lactobacillus Oral Powder (CULTURELLE KIDS) - Peds 1 Packet(s) Oral daily  Mexiletine 150 milliGRAM(s) 150 milliGRAM(s) Oral every 8 hours  polyvinyl alcohol 1.4%/povidone 0.6% Ophthalmic Solution - Peds 2 Drop(s) Both EYES four times a day    =========================PATIENT CARE==========================  [ ] There are pressure ulcers/areas of breakdown that are being addressed.  [x] Preventative measures are being taken to decrease risk for skin breakdown.  [x] Necessity of urinary, arterial, and venous catheters discussed    =========================PHYSICAL EXAM=========================  General: NAD  HEENT: drain removed  Resp: unlabored, CTAB, good aeration, no rhonchi/rales/wheezing  CV: RRR, nl S1/S2, no m/r/g appreciated, CR < 2s, distal pulses 2+ and equal  Abd: soft, NTND, no HSM appreciated  Ext: wwp, no gross deformities  Neuro: alert and oriented, mild R hemiparesis  Skin: no rash    ===============================================================  LABS:  ( 06-20 @ 13:32 )   PT: 17.4 sec;   INR: 1.54 ratio  aPTT: 38.2 sec    RECENT CULTURES:  06-18 @ 20:35 .CSF     No growth to date.    No polymorphonuclear leukocytes seen per low power field  No organisms seen per oil power field        IMAGING STUDIES:    Parent/Guardian is at the bedside:	[x ] Yes	[ ] No  Patient and Parent/Guardian updated as to the progress/plan of care:	[x ] Yes	[ ] No    [x ] The patient remains in critical and unstable condition, and requires ICU care and monitoring, total critical care time spent by myself, the attending physician was 40 minutes, excluding procedure time.  [ ] The patient is improving but requires continued monitoring and adjustment of therapy

## 2021-06-21 NOTE — PROGRESS NOTE PEDS - ATTENDING COMMENTS
stable scan, stable exam, wound c/d/i, hold anticoagulation for at least 1 week post op, repeat ct 1 week from surgery

## 2021-06-21 NOTE — PROGRESS NOTE PEDS - ASSESSMENT
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 He has elevated Fontan pressure secondary to LV diastolic dysfunction.  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 Significant issues with neuro-agitation and delirium that have significantly improved  Now s/p VPS and prosthetic skull flap 6/18    PLAN:  Neuro:  melatonin Qday  continue propranolol per PMR recs (hold for SBP< 100)   Delirium seems much improved  - Keppra (clinical szs)  - R sided hemiparesis improving   - tylenol prn for fever control  -PT/OT/speech/rehab consult  -cannot have mri  - f/u with NSGY regarding further neuroimaging     Resp:  - RA, goal SpO2 > 85% (VV collaterals on CTA)    CV:  continue mexiletine for hx of  VT  VOO 70, normal mode is DDD (though atrial wires don't work so effectively VVI)  Still in IART   Restart home aldactone tomorrow  Restart home lisinopril today  Continue Sildenafil   Continue Lasix po q12  sono r chest (6/15) with moderate effusion clinically improving with diuresis  echo 6/8 extremely limited windows    Heme:  Need to clarify timing of re-initiation of anticoagulation with NSGY  Need to clarify plan for anticoagulation with Hematology in the setting of likely clot while on Xarelto  - Xarelto on hold  - Goal PLTs >100  - Goal INR < 1.6  s/p Vitamin K x3 days,   now po Vit K 3x/week    FEN:  Pepcid   Bowel regimen: Senna and Miralax PRN  Cleared by speech for oral diet of regular solids and thin fluids as tolerated   nutrition consulting  speech and swallow consulting    ID  - s/p Ancef post-op    Other  - L hydrocele  - outpt f/u    Access:  PIV

## 2021-06-21 NOTE — PROGRESS NOTE PEDS - ASSESSMENT
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 Presented in IART - s/p EP study with unsuccessful attempt at ablation along with diagnostic cath showing elevated Fontan pressure (20mmHg). He was loaded with amiodarone and ultimately electrically cardioverted out, however reverted back into IART and continues to be in IART. His course is further complicated by L frontal parenchymal hemorrhage requiring emergent evacuation with NSx and EVD placement  and VTach of unknown origin. He is now s/p cranioplasty  with placement of  shunt, POD # 3.     CVS:  - Continuos telemetry monitoring.  - Pacemaker baseline: DDD 70-120bpm. TO VOO 70bpm for OR today  - sildenafil 20mg q8 (home medication).   - lasix to 10 mg Q12H (home dose). Goal net even. Monitor creat. Will determine ongoing needs after OR  - Sats goal >85%.   - Restart home lisinopril 5mg PO QD, aldactone 100mg PO QD. As per PICU and neurosurgery BP goals should be normal now.   - Mexlitine 150mg q8 (do not hold dose while NPO)    Resp:   - RA  - CTA obtained to eval for PE - difficult to protocol because of Fontan anatomy and contrast timing, no PE seen, but does show VV collaterals (likely main reason for hypoxia)    FEN/GI:   - Regular diet     ID:   - s/p Ancef per post op protocol.     Heme:   - Anticoagulation care per hematology/PICU/NSx  - Xarelto on hold. WIll discuss anticoagulation with hematology today.    Neuro:   - L frontal parenchymal hemorrhage- NSx and neuro and PMR following  - On Keppra  - On Clonidine patch, propranolol, melatnonin PRN   TRINI MÉNDEZ is a 19 yo male with DILV, L-malposed great arteries s/p lateral tunnel Fontan (with subsequent stent placement in Fontan pathway in 2016), with sick sinus syndrome and complete heart block, s/p dual chamber epicardial pacemaker with cardiac resynchronization therapy for left ventricular dysfunction and failing Fontan in 2016. Presented in IART - s/p EP study with unsuccessful attempt at ablation along with diagnostic cath showing elevated Fontan pressure (20mmHg). He was loaded with amiodarone and ultimately electrically cardioverted, however reverted back into IART and continues to be in IART. His course was further complicated by L frontal parenchymal hemorrhage requiring emergent evacuation with NSx and EVD placement and ventricular tachycardia. He is now s/p cranioplasty with placement of  shunt, POD # 3.    CVS:  - Continuos telemetry monitoring.  - Pacemaker baseline: DDD 70-120bpm, will continue VOO 70bpm as per EP.  - Continue Sildenafil 20mg q8 (home medication).   - Lasix to 10 mg Q12H (home dose). Goal net even. Monitor creat. Will determine ongoing needs after OR.  - Sats goal >85%.   - Restart home lisinopril 5mg PO QD, aldactone 100mg PO QD. As per PICU and neurosurgery BP goals should be normal now.   - Mexiletine 150mg q8 (do not hold dose while NPO).    Resp:   - RA  - CTA obtained to eval for PE - difficult to protocol because of Fontan anatomy and contrast timing, no PE seen, but does show VV collaterals (likely main reason for hypoxia).    FEN/GI:   - Regular diet     ID:   - s/p Ancef per post op protocol.     Heme:   - Anticoagulation care per hematology/PICU/NSx.  - Xarelto on hold. WIll discuss anticoagulation with hematology today.    Neuro:   - L frontal parenchymal hemorrhage- NSx and neuro and PMR following  - On Keppra  - On Clonidine patch, propranolol, melatonin PRN    Dispo:  - The patient is cleared for discharge from a cardiac standpoint. We will continue to check in on him daily, but please alert cardiology with any changes or when close to being discharged (so that we can arrange follow-up with his primary cardiologists Dr. Londono and Dr. Christensen).

## 2021-06-21 NOTE — PROGRESS NOTE PEDS - SUBJECTIVE AND OBJECTIVE BOX
INTERVAL HISTORY:  He is s/p cranioplasty  with placement of  shunt, POD #3. Neuro exam improving, speaks coherently with mildly slurred speech. On tele continues to be in IART with pacemaker set at VOO @ 70.     RESPIRATORY SUPPORT: RA    NUTRITION: Regular diet    06-20 @ 07:01  -  06-21 @ 07:00  --------------------------------------------------------  IN: 1020 mL / OUT: 1553 mL / NET: -533 mL      INTRAVASCULAR ACCESS: PIV, RRA    MEDICATIONS:  furosemide   Oral Tab/Cap - Peds 10 milliGRAM(s) Oral every 12 hours  lisinopril Oral Tab/Cap - Peds 5 milliGRAM(s) Oral daily  propranolol  Oral Tab/Cap - Peds 10 milliGRAM(s) Oral every 8 hours  sildenafil   Oral Tab/Cap - Peds 20 milliGRAM(s) Oral every 8 hours  ALBUTerol  Intermittent Nebulization - Peds 2.5 milliGRAM(s) Nebulizer <User Schedule>  sodium chloride 3% for Nebulization - Peds 0.5 milliLiter(s) Nebulizer daily  levETIRAcetam  Oral Tab/Cap - Peds 1500 milliGRAM(s) Oral two times a day  famotidine  Oral Tab/Cap - Peds 20 milliGRAM(s) Oral two times a day  phytonadione  Oral Liquid - Peds 5 milliGRAM(s) Enteral Tube <User Schedule>  sodium chloride 0.9% lock flush - Peds 3 milliLiter(s) IV Push every 8 hours      PHYSICAL EXAMINATION:  Weight (kg): 66.1 (06-18 @ 07:44)  T(C): 36.9 (06-21-21 @ 11:20), Max: 37.8 (06-20-21 @ 17:00)  HR: 70 (06-21-21 @ 11:42) (70 - 77)  BP: 111/61 (06-21-21 @ 11:20) (100/57 - 125/61)  RR: 20 (06-21-21 @ 11:20) (20 - 24)  SpO2: 90% (06-21-21 @ 11:20) (88% - 92%)  General - no acute distress  Skin - no rash, no desquamation, no cyanosis.  Eyes / ENT - no conjunctival injection, sclerae anicteric, external ears & nares normal, mucous membranes moist.  Pulmonary - normal inspiratory effort, no retractions, lungs clear to auscultation bilaterally, no wheezes, no rales.  Cardiovascular - normal rate, regular rhythm, normal S1 & single S2, grade 1/6 blowing holosystolic murmur at LMSB, no rubs, no gallops, capillary refill < 2sec, normal pulses.  Gastrointestinal - soft, non-distended, non-tender, no splenomegaly. (+) hepatomegaly.  Musculoskeletal - no joint swelling, no clubbing, no edema.  Neurologic / Psychiatric - alert, slurred speech, responds to questions.    LABORATORY TESTS:                          13.3  CBC:   9.23 )-----------( 301   (06-20-21 @ 09:51)                          42.0               134   |  96    |  19                 Ca: 10.2   BMP:   ----------------------------< 111    Mg: x     (06-20-21 @ 09:51)             4.0    |  23    | 0.69               Ph: x        LFT:     TPro: 8.5 / Alb: 4.4 / TBili: 1.7 / DBili: x / AST: 37 / ALT: 16 / AlkPhos: 167   (06-20-21 @ 09:51)    COAG: PT: 17.4 / PTT: 38.2 / INR: 1.54   (06-20-21 @ 13:32)       ABG:   pH: 7.30 / pCO2: 51 / pO2: 66 / HCO3: 22 / Base Excess: -1.1 / SaO2: 86.6 / Lactate: x / iCa: 1.18   (06-18-21 @ 16:18)      IMAGING STUDIES:  Electrocardiogram - (5/27/21) Ventricular paced rhythm @ 70bpm. Underlying IART.    Telemetry - (6/21/21) Ventricular paced rhythm @ 70 bpm. Underlying IART.    Chest x-ray - (5/27/21) Stable mild cardiomegaly with subsegmental left lower lobe atelectasis.   Echocardiogram, Pediatric (Echocardiogram, Pediatric .) (06.08.21 @ 14:00) >  Summary:   1. This was a technically difficult suboptimal study due to extremely poor acoustic windows.   2. On limited short axis views there appears to be adequate systolic excursion of the posterior wall of the left ventricle and decreased anterior wall excursion. Overall mild to moderately decreased left ventricular systolic function.   3. Mild left atrioventricular and trivial right atrioventricular regurgitation.   4. No pericardial effusion.   5. No pleural effusion.   6. Findings are limited to above.    Cath - (5/26/21)  Access 4 Fr RFA, 7 Fr RFV x2 with US guidance.  Sat data (%): on 50% FiO2 LPA 73, RUPV 98, Ao 97; CI 2.6 L/min/m2 with Qp:Qs 1 :1.  Pressures (mmHg): Elevated mFontan = mIVC = 20. mPCW=16, No significant gradient across LV to AAo to Vikki (98/58/73).  Normal PVR while on sildenafil.  Angios showed unobstructed Fontan with existing stent within Fontan conduit.    Comment: IART ablation was attempted after the diagnostic cath but unsuccessful. Patient was overdrive paced out of IART. At the end of the case, Ao sat was 94% and LPA 66% on 50% FiO2 INTERVAL HISTORY:  He is s/p cranioplasty  with placement of  shunt, POD #3. Neuro exam improving, speaks coherently with mildly slurred speech. On tele continues to be in IART with pacemaker set at VOO @ 70.     RESPIRATORY SUPPORT: RA    NUTRITION: Regular diet    INTAKE/OUTPUT:  06-20 @ 07:01  -  06-21 @ 07:00  --------------------------------------------------------  IN: 1020 mL / OUT: 1553 mL / NET: -533 mL    INTRAVASCULAR ACCESS: PIV, RRA    MEDICATIONS:  furosemide   Oral Tab/Cap - Peds 10 milliGRAM(s) Oral every 12 hours  lisinopril Oral Tab/Cap - Peds 5 milliGRAM(s) Oral daily  propranolol  Oral Tab/Cap - Peds 10 milliGRAM(s) Oral every 8 hours  sildenafil   Oral Tab/Cap - Peds 20 milliGRAM(s) Oral every 8 hours  ALBUTerol  Intermittent Nebulization - Peds 2.5 milliGRAM(s) Nebulizer <User Schedule>  sodium chloride 3% for Nebulization - Peds 0.5 milliLiter(s) Nebulizer daily  levETIRAcetam  Oral Tab/Cap - Peds 1500 milliGRAM(s) Oral two times a day  famotidine  Oral Tab/Cap - Peds 20 milliGRAM(s) Oral two times a day  phytonadione  Oral Liquid - Peds 5 milliGRAM(s) Enteral Tube <User Schedule>  sodium chloride 0.9% lock flush - Peds 3 milliLiter(s) IV Push every 8 hours    PHYSICAL EXAMINATION:  Weight (kg): 66.1 (06-18 @ 07:44)  T(C): 36.9 (06-21-21 @ 11:20), Max: 37.8 (06-20-21 @ 17:00)  HR: 70 (06-21-21 @ 11:42) (70 - 77)  BP: 111/61 (06-21-21 @ 11:20) (100/57 - 125/61)  RR: 20 (06-21-21 @ 11:20) (20 - 24)  SpO2: 90% (06-21-21 @ 11:20) (88% - 92%)  General - no acute distress  Skin - no rash, no desquamation, no cyanosis.  Eyes / ENT - no conjunctival injection, sclerae anicteric, external ears & nares normal, mucous membranes moist.  Pulmonary - normal inspiratory effort, no retractions, lungs clear to auscultation bilaterally, no wheezes, no rales.  Cardiovascular - normal rate, regular rhythm, normal S1 & single S2, grade 1/6 blowing holosystolic murmur at LMSB, no rubs, no gallops, capillary refill < 2sec, normal pulses.  Gastrointestinal - soft, non-distended, non-tender, no splenomegaly. (+) hepatomegaly.  Musculoskeletal - no joint swelling, no clubbing, no edema.  Neurologic / Psychiatric - alert, slurred speech, responds to questions.    LABORATORY TESTS:                          13.3  CBC:   9.23 )-----------( 301   (06-20-21 @ 09:51)                          42.0               134   |  96    |  19                 Ca: 10.2   BMP:   ----------------------------< 111    Mg: x     (06-20-21 @ 09:51)             4.0    |  23    | 0.69               Ph: x        LFT:     TPro: 8.5 / Alb: 4.4 / TBili: 1.7 / DBili: x / AST: 37 / ALT: 16 / AlkPhos: 167   (06-20-21 @ 09:51)    COAG: PT: 17.4 / PTT: 38.2 / INR: 1.54   (06-20-21 @ 13:32)     ABG:   pH: 7.30 / pCO2: 51 / pO2: 66 / HCO3: 22 / Base Excess: -1.1 / SaO2: 86.6 / Lactate: x / iCa: 1.18   (06-18-21 @ 16:18)    IMAGING STUDIES:  Electrocardiogram - (5/27/21) Ventricular paced rhythm @ 70bpm. Underlying IART.    Telemetry - (6/21/21) Ventricular paced rhythm @ 70 bpm. Underlying IART.    Chest x-ray - (5/27/21) Stable mild cardiomegaly with subsegmental left lower lobe atelectasis.    Echocardiogram (6/8/21)   1. This was a technically difficult suboptimal study due to extremely poor acoustic windows.   2. On limited short axis views there appears to be adequate systolic excursion of the posterior wall of the left ventricle and decreased anterior wall excursion. Overall mild to moderately decreased left ventricular systolic function.   3. Mild left atrioventricular and trivial right atrioventricular regurgitation.   4. No pericardial effusion.   5. No pleural effusion.   6. Findings are limited to above.    Cath - (5/26/21)  Access 4 Fr RFA, 7 Fr RFV x2 with US guidance.  Sat data (%): on 50% FiO2 LPA 73, RUPV 98, Ao 97; CI 2.6 L/min/m2 with Qp:Qs 1 :1.  Pressures (mmHg): Elevated mFontan = mIVC = 20. mPCW=16, No significant gradient across LV to AAo to Vikki (98/58/73).  Normal PVR while on sildenafil.  Angios showed unobstructed Fontan with existing stent within Fontan conduit.    Comment: IART ablation was attempted after the diagnostic cath but unsuccessful. Patient was overdrive paced out of IART. At the end of the case, Ao sat was 94% and LPA 66% on 50% FiO2

## 2021-06-21 NOTE — CHART NOTE - NSCHARTNOTEFT_GEN_A_CORE
Jimbo is a 20 year old male with complex cardiac history previously on Xarelto with a large L frontal and subarachnoid hemorrhage s/p L craniectomy and placement of EVD. On 6/6 he was found to have increased ICP and some hemorrhage along the EVD catheter which has since stabilized. He has had no other signs of bleeding. He continues to have coagulopathy of unclear etiology, possibly secondary to hepatic congestion in the setting of his cardiac disease. INR remains in the 1.5-1.7 range and he has been receiving FFP daily. Factor levels drawn last week were mostly physiologic, with Factor VII lowest at 33.6%. JANET at that time was grossly normal with slight delay in clot initiation time (but not clinically significant) with normal clot formation time and firmness, suggesting no increased risk for bleeding at that time.    Jimbo is now s/p VPS and prosthetic skull flap 6/18. GIven that just is now hemodynamically stable, we would recommend to restart Xarelto.     Please contact hem/onc at [phone] with any questions. Jimbo is a 20 year old male with complex cardiac history previously on Xarelto with a large L frontal and subarachnoid hemorrhage s/p L craniectomy and placement of EVD. On 6/6 he was found to have increased ICP and some hemorrhage along the EVD catheter which has since stabilized. He has had no other signs of bleeding. He continues to have coagulopathy of unclear etiology, possibly secondary to hepatic congestion in the setting of his cardiac disease. INR remains in the 1.5-1.7 range and he has been receiving FFP daily. Factor levels drawn last week were mostly physiologic, with Factor VII lowest at 33.6%. JANET at that time was grossly normal with slight delay in clot initiation time (but not clinically significant) with normal clot formation time and firmness, suggesting no increased risk for bleeding at that time.    Jimbo is now s/p VPS and prosthetic skull flap 6/18. GIven that just is now hemodynamically stable, we would recommend to restart Xarelto. In addition, there is not need for vitamin K or daily coags at this time from a hematology perspective    Please contact hem/onc at [phone] with any questions. Jimbo is a 20 year old male with complex cardiac history previously on Xarelto with a large L frontal and subarachnoid hemorrhage s/p L craniectomy and placement of EVD. On 6/6 he was found to have increased ICP and some hemorrhage along the EVD catheter which has since stabilized. He has had no other signs of bleeding. He continues to have coagulopathy of unclear etiology, possibly secondary to hepatic congestion in the setting of his cardiac disease. INR remains in the 1.5-1.7 range and he has been receiving FFP daily. Factor levels drawn last week were mostly physiologic, with Factor VII lowest at 33.6%. JANET at that time was grossly normal with slight delay in clot initiation time (but not clinically significant) with normal clot formation time and firmness, suggesting no increased risk for bleeding at that time.    Jimbo is now s/p VPS and prosthetic skull flap 6/18. GIven that just is now hemodynamically stable, we would recommend to restart rivaroxaban (Xarelto)  given his underlying Fontan physiology and decreased anticoagulant levels; however given recent bleed and after discussion with Cardiology Dr Moran a decision was made to start him on regular dose  mg q day and monitor him for 3 months and decide if he needs to be transitioned to rivaroxaban. However  In addition, there is not need for vitamin K or daily coags at this time from a hematology perspective    Please contact hem/onc at [phone] with any questions.

## 2021-06-21 NOTE — PROGRESS NOTE PEDS - SUBJECTIVE AND OBJECTIVE BOX
OVERNIGHT EVENTS:  No acute events overnight. [facility]ed yesterday.    HPI:  19yo male with complex cardiac h/o DILV, L-malposition of great arteries, sick sinus syndrome and AV mihir disease with tachybradycardia syndrome with a dual chamber pacemaker (currently with RV lead fracture and is currently only LV paced 2/2 complete heart block), PSH of status post Lajcc-Qwfc-Wzudait anastomosis and aortic arch reconstruction followed by a fenestrated lateral tunnel Fontan completion (now s/p fenestration closure). here s/p cardiac catheterization and ablation. Procedure was complicated by unsuccessful ablation and post extubation patient was noted to have increased hemoptysis and desaturations, patient was reintubated for airway protection and given propofol for sedation.  (26 May 2021 20:19)      Vital Signs Last 24 Hrs  T(C): 37.6 (2021 08:00), Max: 38.4 (2021 20:40)  T(F): 99.6 (2021 08:00), Max: 101.1 (2021 20:40)  HR: 70 (2021 08:00) (68 - 70)  BP: 112/60 (2021 08:00) (100/59 - 129/66)  BP(mean): 72 (2021 08:00) (67 - 79)  RR: 26 (2021 08:00) (21 - 26)  SpO2: 92% (2021 08:00) (89% - 95%)    I&O's Summary    2021 07:01  -  2021 07:00  --------------------------------------------------------  IN: 154 mL / OUT: 678 mL / NET: -524 mL        PHYSICAL EXAM:  Awake, Alert, Affect flat, speaking a few words   PERRL  Motor:  MAEx4, weaker on R side  Incision/Wound: c/d/i      LABS:                        14.8   18.01 )-----------( 406      ( 2021 16:20 )             46.4     06-18    133<L>  |  95<L>  |  31<H>  ----------------------------<  177<H>  3.7   |  21<L>  |  1.00    Ca    9.9      2021 16:20  Phos  6.3       Mg     2.3         TPro  9.1<H>  /  Alb  4.5  /  TBili  1.6<H>  /  DBili  x   /  AST  45<H>  /  ALT  21  /  AlkPhos  187      PT/INR - ( 2021 16:19 )   PT: 17.7 sec;   INR: 1.57 ratio         PTT - ( 2021 16:19 )  PTT:174.9 sec      CULTURES:  Culture Results:   No growth to date. ( @ 20:35)  Culture Results:   No Growth Final ( @ 10:00)    CSF Analysis:   CSF Lymphocytes: 38 % ( @ 16:53)  RBC Count - Spinal Fluid: [id number] cells/uL *H* ( @ 16:53)    MEDICATIONS:  Antibiotics:  ceFAZolin  IV Intermittent - Peds 1980 milliGRAM(s) IV Intermittent every 8 hours  cephalexin Oral Tab/Cap - Peds 500 milliGRAM(s) Oral once    Neuro:  acetaminophen   Oral Tab/Cap - Peds. 650 milliGRAM(s) Oral every 6 hours PRN  levETIRAcetam  Oral Tab/Cap - Peds 1500 milliGRAM(s) Oral two times a day  melatonin Oral Tab/Cap - Peds 5 milliGRAM(s) Oral at bedtime PRN    OTHER:  ALBUTerol  Intermittent Nebulization - Peds 2.5 milliGRAM(s) Nebulizer <User Schedule>  famotidine  Oral Tab/Cap - Peds 20 milliGRAM(s) Oral two times a day  furosemide   Oral Tab/Cap - Peds 10 milliGRAM(s) Oral every 12 hours  lactobacillus Oral Powder (CULTURELLE KIDS) - Peds 1 Packet(s) Oral daily  Mexiletine 150 milliGRAM(s) 150 milliGRAM(s) Oral every 8 hours  polyethylene glycol 3350 Oral Powder - Peds 17 Gram(s) Oral daily PRN  polyvinyl alcohol 1.4%/povidone 0.6% Ophthalmic Solution - Peds 2 Drop(s) Both EYES four times a day  propranolol  Oral Tab/Cap - Peds 10 milliGRAM(s) Oral every 8 hours  sildenafil   Oral Tab/Cap - Peds 20 milliGRAM(s) Oral every 8 hours  sodium chloride 3% for Nebulization - Peds 0.5 milliLiter(s) Nebulizer daily    IVF:  phytonadione  Oral Liquid - Peds 5 milliGRAM(s) Enteral Tube <User Schedule>  sodium chloride 0.9% -  250 milliLiter(s) IV Continuous <Continuous>  sodium chloride 0.9% lock flush - Peds 3 milliLiter(s) IV Push every 8 hours      RADIOLOGY & ADDITIONAL TESTS:  < from: CT Head No Cont (21 @ 10:26) >  IMPRESSION:    Grossly stable exam.    < end of copied text >

## 2021-06-22 LAB
APTT BLD: 32.3 SEC — SIGNIFICANT CHANGE UP (ref 27–36.3)
CULTURE RESULTS: SIGNIFICANT CHANGE UP
INR BLD: 1.33 RATIO — HIGH (ref 0.88–1.16)
PAI-1 ACTIVITY: 4 IU/ML — SIGNIFICANT CHANGE UP (ref 0–27)
PROTHROM AB SERPL-ACNC: 15.1 SEC — HIGH (ref 10.6–13.6)
SPECIMEN SOURCE: SIGNIFICANT CHANGE UP

## 2021-06-22 PROCEDURE — 99233 SBSQ HOSP IP/OBS HIGH 50: CPT

## 2021-06-22 PROCEDURE — 99232 SBSQ HOSP IP/OBS MODERATE 35: CPT

## 2021-06-22 RX ORDER — LACTOBACILLUS RHAMNOSUS GG 10B CELL
1 CAPSULE ORAL DAILY
Refills: 0 | Status: DISCONTINUED | OUTPATIENT
Start: 2021-06-22 | End: 2021-06-23

## 2021-06-22 RX ORDER — SPIRONOLACTONE 25 MG/1
100 TABLET, FILM COATED ORAL DAILY
Refills: 0 | Status: DISCONTINUED | OUTPATIENT
Start: 2021-06-22 | End: 2021-06-29

## 2021-06-22 RX ADMIN — LEVETIRACETAM 1500 MILLIGRAM(S): 250 TABLET, FILM COATED ORAL at 09:57

## 2021-06-22 RX ADMIN — Medication 2 DROP(S): at 17:49

## 2021-06-22 RX ADMIN — LEVETIRACETAM 1500 MILLIGRAM(S): 250 TABLET, FILM COATED ORAL at 21:01

## 2021-06-22 RX ADMIN — Medication 20 MILLIGRAM(S): at 14:27

## 2021-06-22 RX ADMIN — FAMOTIDINE 20 MILLIGRAM(S): 10 INJECTION INTRAVENOUS at 21:01

## 2021-06-22 RX ADMIN — SPIRONOLACTONE 100 MILLIGRAM(S): 25 TABLET, FILM COATED ORAL at 14:26

## 2021-06-22 RX ADMIN — SODIUM CHLORIDE 3 MILLILITER(S): 9 INJECTION INTRAMUSCULAR; INTRAVENOUS; SUBCUTANEOUS at 10:00

## 2021-06-22 RX ADMIN — LISINOPRIL 5 MILLIGRAM(S): 2.5 TABLET ORAL at 09:57

## 2021-06-22 RX ADMIN — SODIUM CHLORIDE 3 MILLILITER(S): 9 INJECTION INTRAMUSCULAR; INTRAVENOUS; SUBCUTANEOUS at 18:46

## 2021-06-22 RX ADMIN — Medication 10 MILLIGRAM(S): at 17:49

## 2021-06-22 RX ADMIN — Medication 20 MILLIGRAM(S): at 05:41

## 2021-06-22 RX ADMIN — FAMOTIDINE 20 MILLIGRAM(S): 10 INJECTION INTRAVENOUS at 09:57

## 2021-06-22 RX ADMIN — SODIUM CHLORIDE 0.5 MILLILITER(S): 9 INJECTION INTRAMUSCULAR; INTRAVENOUS; SUBCUTANEOUS at 12:34

## 2021-06-22 RX ADMIN — Medication 10 MILLIGRAM(S): at 05:40

## 2021-06-22 RX ADMIN — ALBUTEROL 2.5 MILLIGRAM(S): 90 AEROSOL, METERED ORAL at 12:34

## 2021-06-22 RX ADMIN — Medication 1 PACKET(S): at 17:49

## 2021-06-22 RX ADMIN — Medication 20 MILLIGRAM(S): at 21:11

## 2021-06-22 NOTE — PROGRESS NOTE PEDS - ASSESSMENT
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    Plan:   1) From a cognitive standpoint, Jimbo is doing quite well although still having some difficulty with memory, recall, calculations, and processing speed. Depending on progress he may benefit from outpatient cognitive rehabilitation though speech therapy or occupational therapy.   2) He has only been out of bed to the chair so will need to work with physical therapy on progressing independent mobility and safety.   3) No concern about any ongoing agitation. Can consider weaning off propranolol.   4) Continue PT/OT/ST at bedside. Should start working on ambulation and eventually stairs before discharge since he has 3 flights to climb at home.     Pediatric PM&R will continue to follow.

## 2021-06-22 NOTE — PROGRESS NOTE PEDS - SUBJECTIVE AND OBJECTIVE BOX
Jimbo is a 20 year-old male admitted for cardioversion of interatrial re-entrant tachycardia (IART) found to have L-sided stroke with hemorrhagic conversion requiring intervention.    Interval history:   Patient was seen and examined in his room with mom at bedside. Jimbo continues to make excellent gains although mom reports some ongoing difficulty with speech and memory at times.    REVIEW OF SYSTEMS:   CONSTITUTIONAL: No fevers or chills.   PSYCH: No agitation currently.   CARDIOVASCULAR: No peripheral edema.  RESPIRATORY: No shortness of breath.  MUSCULOSKELETAL: No loss of range of motion.  NEUROLOGICAL: No right sided weakness.   ENDOCRINE: No intolerance to heat/cold.    PAST MEDICAL & SURGICAL HISTORY  Double inlet left ventricle  D-TGA (dextro-transposition of great arteries)  Sick sinus syndrome  Atrial tachycardia  Hepatomegaly  Other ascites  Pulmonary hypertension  Pacemaker  AV block  S/P Fontan procedure  Coagulopathy  Cardiac anomaly, congenital  S/P bidirectional Storm shunt  S/P Stansel operation  S/P Fontan procedure  S/P cardiac cath  S/P Nirmal-Taussig shunt    SOCIAL HISTORY  Lives in apartment building with family, 3 FARIDE[address] and 3 full flights to apt, no elevator. Per mom he has option to stay with maternal grandmother who has pvt home, 0 [address], 1st floor bathroom and set up for bedroom. Mom able to assist with care FT.    FAMILY HISTORY   No pertinent family history in first degree relatives    ALLERGIES  No Known Allergies    MEDICATIONS  (STANDING):  famotidine  Oral Tab/Cap - Peds 20 milliGRAM(s) Oral two times a day  furosemide   Oral Tab/Cap - Peds 10 milliGRAM(s) Oral every 12 hours  lactobacillus Oral Powder (CULTURELLE KIDS) - Peds 1 Packet(s) Oral daily  levETIRAcetam  Oral Tab/Cap - Peds 1500 milliGRAM(s) Oral two times a day  lisinopril Oral Tab/Cap - Peds 5 milliGRAM(s) Oral daily  Mexiletine 150 milliGRAM(s) 150 milliGRAM(s) Oral every 8 hours  phytonadione  Oral Liquid - Peds 5 milliGRAM(s) Enteral Tube <User Schedule>  polyvinyl alcohol 1.4%/povidone 0.6% Ophthalmic Solution - Peds 2 Drop(s) Both EYES four times a day  propranolol  Oral Tab/Cap - Peds 10 milliGRAM(s) Oral every 8 hours  sildenafil   Oral Tab/Cap - Peds 20 milliGRAM(s) Oral every 8 hours  sodium chloride 0.9% lock flush - Peds 3 milliLiter(s) IV Push every 8 hours  spironolactone Oral Tab/Cap - Peds 100 milliGRAM(s) Oral daily    MEDICATIONS  (PRN):  acetaminophen   Oral Tab/Cap - Peds. 650 milliGRAM(s) Oral every 6 hours PRN Mild Pain (1 - 3), Moderate Pain (4 - 6), Severe Pain (7 - 10)  melatonin Oral Tab/Cap - Peds 5 milliGRAM(s) Oral at bedtime PRN Insomnia  polyethylene glycol 3350 Oral Powder - Peds 17 Gram(s) Oral daily PRN Constipation    ICU Vital Signs Last 24 Hrs  T(C): 36.3 (22 Jun 2021 20:00), Max: 37 (22 Jun 2021 08:00)  T(F): 97.3 (22 Jun 2021 20:00), Max: 98.6 (22 Jun 2021 08:00)  HR: 70 (22 Jun 2021 20:00) (68 - 74)  BP: 105/56 (22 Jun 2021 20:00) (92/44 - 113/53)  BP(mean): 67 (22 Jun 2021 20:00) (55 - 70)  RR: 18 (22 Jun 2021 20:00) (17 - 26)  SpO2: 95% (22 Jun 2021 20:00) (89% - 96%)    ----------------------------------------------------------------------------------------  PHYSICAL EXAM  General:  Well-developed, well-nourished individual lying in hospital bed.   Mental:  Awake, answering questions appropriately. KOKMEN Short Test of Mental Status: Orientation 8/8; Attention 7/7; Learning 4/4; Calculation 1/4; Abstraction 3/3; Information 3/4; Recall 0/4. Total score = 26/34.  Skin:  Grossly negative for erythema, breakdown.  Vessels:  No lower extremity edema.   Lung:  Breathing is comfortable and regular.  Neurologic: No clonus. No hyperreflexia. No spasticity. Full strength in right upper and lower limb.  Musculoskeletal: No range of motion restrictions except ongoing heel cord tightness.

## 2021-06-22 NOTE — PROGRESS NOTE PEDS - SUBJECTIVE AND OBJECTIVE BOX
Interval/Overnight Events:    ===========================RESPIRATORY==========================  RR: 26 (06-22-21 @ 08:00) (17 - 30)  SpO2: 90% (06-22-21 @ 08:00) (90% - 96%)  End Tidal CO2:    Respiratory Support:   [ ] Inhaled Nitric Oxide:    ALBUTerol  Intermittent Nebulization - Peds 2.5 milliGRAM(s) Nebulizer <User Schedule>  sodium chloride 3% for Nebulization - Peds 0.5 milliLiter(s) Nebulizer daily  [x] Airway Clearance Discussed  Extubation Readiness:  [ ] Not Applicable     [ ] Discussed and Assessed  Comments:    =========================CARDIOVASCULAR========================  HR: 70 (06-22-21 @ 08:00) (70 - 77)  BP: 113/53 (06-22-21 @ 08:00) (92/44 - 114/55)  ABP: --  CVP(mm Hg): --  NIRS:    Patient Care Access:  furosemide   Oral Tab/Cap - Peds 10 milliGRAM(s) Oral every 12 hours  lisinopril Oral Tab/Cap - Peds 5 milliGRAM(s) Oral daily  propranolol  Oral Tab/Cap - Peds 10 milliGRAM(s) Oral every 8 hours  sildenafil   Oral Tab/Cap - Peds 20 milliGRAM(s) Oral every 8 hours  Comments:    =====================HEMATOLOGY/ONCOLOGY=====================  Transfusions:	[ ] PRBC	[ ] Platelets	[ ] FFP		[ ] Cryoprecipitate  DVT Prophylaxis:  Comments:    ========================INFECTIOUS DISEASE=======================  T(C): 37 (06-22-21 @ 08:00), Max: 37.5 (06-21-21 @ 20:00)  T(F): 98.6 (06-22-21 @ 08:00), Max: 99.5 (06-21-21 @ 20:00)  [ ] Cooling [place] being used. Target Temperature:      ==================FLUIDS/ELECTROLYTES/NUTRITION=================  I&O's Summary    21 Jun 2021 07:01  -  22 Jun 2021 07:00  --------------------------------------------------------  IN: 600 mL / OUT: 750 mL / NET: -150 mL      Diet:   [ ] NGT		[ ] NDT		[ ] GT		[ ] GJT    famotidine  Oral Tab/Cap - Peds 20 milliGRAM(s) Oral two times a day  phytonadione  Oral Liquid - Peds 5 milliGRAM(s) Enteral Tube <User Schedule>  polyethylene glycol 3350 Oral Powder - Peds 17 Gram(s) Oral daily PRN  sodium chloride 0.9% lock flush - Peds 3 milliLiter(s) IV Push every 8 hours  Comments:    ==========================NEUROLOGY===========================  [ ] SBS:		[ ] GISSELLE-1:	[ ] BIS:	[ ] CAPD:  acetaminophen   Oral Tab/Cap - Peds. 650 milliGRAM(s) Oral every 6 hours PRN  levETIRAcetam  Oral Tab/Cap - Peds 1500 milliGRAM(s) Oral two times a day  melatonin Oral Tab/Cap - Peds 5 milliGRAM(s) Oral at bedtime PRN  [x] Adequacy of sedation and pain control has been assessed and adjusted  Comments:    OTHER MEDICATIONS:  lactobacillus Oral Powder (CULTURELLE KIDS) - Peds 1 Packet(s) Oral daily  Mexiletine 150 milliGRAM(s) 150 milliGRAM(s) Oral every 8 hours  polyvinyl alcohol 1.4%/povidone 0.6% Ophthalmic Solution - Peds 2 Drop(s) Both EYES four times a day    =========================PATIENT CARE==========================  [ ] There are pressure ulcers/areas of breakdown that are being addressed.  [x] Preventative measures are being taken to decrease risk for skin breakdown.  [x] Necessity of urinary, arterial, and venous catheters discussed    =========================PHYSICAL EXAM=========================  GENERAL: In no acute distress  RESPIRATORY: Lungs clear to auscultation bilaterally. Good aeration. No rales, rhonchi, retractions or wheezing. Effort even and unlabored.  CARDIOVASCULAR: Regular rate and rhythm. Normal S1/S2. No murmurs, rubs, or gallop. Capillary refill < 2 seconds. Distal pulses 2+ and equal.  ABDOMEN: Soft, non-distended. Bowel sounds present. No palpable hepatosplenomegaly.  SKIN: No rash.  EXTREMITIES: Warm and well perfused. No gross extremity deformities.  NEUROLOGIC: Alert and oriented. No acute change from baseline exam.    ===============================================================  LABS:  ( 06-21 @ 15:08 )   PT: 16.1 sec;   INR: 1.44 ratio  aPTT: 25.5 sec    RECENT CULTURES:  06-18 @ 20:35 .CSF     Testing in progress      06-18 @ 14:00 .CSF     No growth    No polymorphonuclear leukocytes seen per low power field  No organisms seen per oil power field        IMAGING STUDIES:    Parent/Guardian is at the bedside:	[ ] Yes	[ ] No  Patient and Parent/Guardian updated as to the progress/plan of care:	[ ] Yes	[ ] No    [ ] The patient remains in critical and unstable condition, and requires ICU care and monitoring, total critical care time spent by myself, the attending physician was __ minutes, excluding procedure time.  [ ] The patient is improving but requires continued monitoring and adjustment of therapy Interval/Overnight Events: no significant events overnight    ===========================RESPIRATORY==========================  RR: 26 (06-22-21 @ 08:00) (17 - 30)  SpO2: 90% (06-22-21 @ 08:00) (90% - 96%)  End Tidal CO2:    Respiratory Support:   [ ] Inhaled Nitric Oxide:    ALBUTerol  Intermittent Nebulization - Peds 2.5 milliGRAM(s) Nebulizer <User Schedule>  sodium chloride 3% for Nebulization - Peds 0.5 milliLiter(s) Nebulizer daily  [x] Airway Clearance Discussed  Extubation Readiness:  [x ] Not Applicable     [ ] Discussed and Assessed  Comments:    =========================CARDIOVASCULAR========================  HR: 70 (06-22-21 @ 08:00) (70 - 77)  BP: 113/53 (06-22-21 @ 08:00) (92/44 - 114/55)  ABP: --  CVP(mm Hg): --  NIRS:    Patient Care Access:  furosemide   Oral Tab/Cap - Peds 10 milliGRAM(s) Oral every 12 hours  lisinopril Oral Tab/Cap - Peds 5 milliGRAM(s) Oral daily  propranolol  Oral Tab/Cap - Peds 10 milliGRAM(s) Oral every 8 hours  sildenafil   Oral Tab/Cap - Peds 20 milliGRAM(s) Oral every 8 hours  Comments:    =====================HEMATOLOGY/ONCOLOGY=====================  Transfusions:	[ ] PRBC	[ ] Platelets	[ ] FFP		[ ] Cryoprecipitate  DVT Prophylaxis:  Comments:    ========================INFECTIOUS DISEASE=======================  T(C): 37 (06-22-21 @ 08:00), Max: 37.5 (06-21-21 @ 20:00)  T(F): 98.6 (06-22-21 @ 08:00), Max: 99.5 (06-21-21 @ 20:00)  [ ] Cooling [place] being used. Target Temperature:      ==================FLUIDS/ELECTROLYTES/NUTRITION=================  I&O's Summary    21 Jun 2021 07:01  -  22 Jun 2021 07:00  --------------------------------------------------------  IN: 600 mL / OUT: 750 mL / NET: -150 mL      Diet: PO  [ ] NGT		[ ] NDT		[ ] GT		[ ] GJT    famotidine  Oral Tab/Cap - Peds 20 milliGRAM(s) Oral two times a day  phytonadione  Oral Liquid - Peds 5 milliGRAM(s) Enteral Tube <User Schedule>  polyethylene glycol 3350 Oral Powder - Peds 17 Gram(s) Oral daily PRN  sodium chloride 0.9% lock flush - Peds 3 milliLiter(s) IV Push every 8 hours  Comments:    ==========================NEUROLOGY===========================  [ ] SBS:		[ ] GISSELLE-1:	[ ] BIS:	[ ] CAPD:  acetaminophen   Oral Tab/Cap - Peds. 650 milliGRAM(s) Oral every 6 hours PRN  levETIRAcetam  Oral Tab/Cap - Peds 1500 milliGRAM(s) Oral two times a day  melatonin Oral Tab/Cap - Peds 5 milliGRAM(s) Oral at bedtime PRN  [x] Adequacy of sedation and pain control has been assessed and adjusted  Comments:    OTHER MEDICATIONS:  lactobacillus Oral Powder (CULTURELLE KIDS) - Peds 1 Packet(s) Oral daily  Mexiletine 150 milliGRAM(s) 150 milliGRAM(s) Oral every 8 hours  polyvinyl alcohol 1.4%/povidone 0.6% Ophthalmic Solution - Peds 2 Drop(s) Both EYES four times a day    =========================PATIENT CARE==========================  [ ] There are pressure ulcers/areas of breakdown that are being addressed.  [x] Preventative measures are being taken to decrease risk for skin breakdown.  [x] Necessity of urinary, arterial, and venous catheters discussed    =========================PHYSICAL EXAM=========================  General: NAD  HEENT: drain removed  Resp: unlabored, CTAB, good aeration, no rhonchi/rales/wheezing  CV: RRR, nl S1/S2, no m/r/g appreciated, CR < 2s, distal pulses 2+ and equal  Abd: soft, NTND, no HSM appreciated  Ext: wwp, no gross deformities  Neuro: alert and oriented, mild R hemiparesis  Skin: no rash    ===============================================================  LABS:  ( 06-21 @ 15:08 )   PT: 16.1 sec;   INR: 1.44 ratio  aPTT: 25.5 sec    RECENT CULTURES:  06-18 @ 20:35 .CSF     Testing in progress      06-18 @ 14:00 .CSF     No growth    No polymorphonuclear leukocytes seen per low power field  No organisms seen per oil power field        IMAGING STUDIES:    Parent/Guardian is at the bedside:	[x ] Yes	[ ] No  Patient and Parent/Guardian updated as to the progress/plan of care:	[x ] Yes	[ ] No    [x ] The patient remains in critical and unstable condition, and requires ICU care and monitoring, total critical care time spent by myself, the attending physician was 40 minutes, excluding procedure time.  [ ] The patient is improving but requires continued monitoring and adjustment of therapy

## 2021-06-22 NOTE — PROGRESS NOTE PEDS - SUBJECTIVE AND OBJECTIVE BOX
OVERNIGHT EVENTS:  No acute events overnight. NEIL [place]'ed 2 days ago    HPI:  21yo male with complex cardiac h/o DILV, L-malposition of great arteries, sick sinus syndrome and AV mihir disease with tachybradycardia syndrome with a dual chamber pacemaker (currently with RV lead fracture and is currently only LV paced 2/2 complete heart block), PSH of status post Axrfc-Uqhs-Floohpe anastomosis and aortic arch reconstruction followed by a fenestrated lateral tunnel Fontan completion (now s/p fenestration closure). here s/p cardiac catheterization and ablation. Procedure was complicated by unsuccessful ablation and post extubation patient was noted to have increased hemoptysis and desaturations, patient was reintubated for airway protection and given propofol for sedation.  (26 May 2021 20:19)    ICU Vital Signs Last 24 Hrs  T(C): 36.5 (22 Jun 2021 06:00), Max: 37.5 (21 Jun 2021 20:00)  T(F): 97.7 (22 Jun 2021 06:00), Max: 99.5 (21 Jun 2021 20:00)  HR: 70 (22 Jun 2021 05:00) (70 - 77)  BP: 104/56 (22 Jun 2021 05:00) (92/44 - 114/55)  BP(mean): 67 (22 Jun 2021 05:00) (55 - 73)  ABP: --  ABP(mean): --  RR: 17 (22 Jun 2021 05:00) (17 - 30)  SpO2: 95% (22 Jun 2021 05:00) (89% - 96%)    I&O's Summary    19 Jun 2021 07:01  -  20 Jun 2021 07:00  --------------------------------------------------------  IN: 154 mL / OUT: 678 mL / NET: -524 mL        PHYSICAL EXAM:  Awake, Alert, Affect flat, speaking more clearly  PERRL  Motor:  MAEx4, weaker on R side  Incision/Wound: c/d/i      LABS:                        14.8   18.01 )-----------( 406      ( 18 Jun 2021 16:20 )             46.4     06-18    133<L>  |  95<L>  |  31<H>  ----------------------------<  177<H>  3.7   |  21<L>  |  1.00    Ca    9.9      18 Jun 2021 16:20  Phos  6.3     06-18  Mg     2.3     06-18    TPro  9.1<H>  /  Alb  4.5  /  TBili  1.6<H>  /  DBili  x   /  AST  45<H>  /  ALT  21  /  AlkPhos  187  06-18    PT/INR - ( 18 Jun 2021 16:19 )   PT: 17.7 sec;   INR: 1.57 ratio         PTT - ( 18 Jun 2021 16:19 )  PTT:174.9 sec      CULTURES:  Culture Results:   No growth to date. (06-18 @ 20:35)  Culture Results:   No Growth Final (06-09 @ 10:00)    CSF Analysis:   CSF Lymphocytes: 38 % (06-18 @ 16:53)  RBC Count - Spinal Fluid: [id number] cells/uL *H* (06-18 @ 16:53)    MEDICATIONS:  MEDICATIONS  (STANDING):  ALBUTerol  Intermittent Nebulization - Peds 2.5 milliGRAM(s) Nebulizer <User Schedule>  famotidine  Oral Tab/Cap - Peds 20 milliGRAM(s) Oral two times a day  furosemide   Oral Tab/Cap - Peds 10 milliGRAM(s) Oral every 12 hours  lactobacillus Oral Powder (CULTURELLE KIDS) - Peds 1 Packet(s) Oral daily  levETIRAcetam  Oral Tab/Cap - Peds 1500 milliGRAM(s) Oral two times a day  lisinopril Oral Tab/Cap - Peds 5 milliGRAM(s) Oral daily  Mexiletine 150 milliGRAM(s) 150 milliGRAM(s) Oral every 8 hours  phytonadione  Oral Liquid - Peds 5 milliGRAM(s) Enteral Tube <User Schedule>  polyvinyl alcohol 1.4%/povidone 0.6% Ophthalmic Solution - Peds 2 Drop(s) Both EYES four times a day  propranolol  Oral Tab/Cap - Peds 10 milliGRAM(s) Oral every 8 hours  sildenafil   Oral Tab/Cap - Peds 20 milliGRAM(s) Oral every 8 hours  sodium chloride 0.9% lock flush - Peds 3 milliLiter(s) IV Push every 8 hours  sodium chloride 3% for Nebulization - Peds 0.5 milliLiter(s) Nebulizer daily    MEDICATIONS  (PRN):  acetaminophen   Oral Tab/Cap - Peds. 650 milliGRAM(s) Oral every 6 hours PRN Mild Pain (1 - 3), Moderate Pain (4 - 6), Severe Pain (7 - 10)  melatonin Oral Tab/Cap - Peds 5 milliGRAM(s) Oral at bedtime PRN Insomnia  polyethylene glycol 3350 Oral Powder - Peds 17 Gram(s) Oral daily PRN Constipation        RADIOLOGY & ADDITIONAL TESTS:  < from: CT Head No Cont (06.19.21 @ 10:26) >  IMPRESSION:    Grossly stable exam.    < end of copied text >

## 2021-06-22 NOTE — PROGRESS NOTE PEDS - PROBLEM SELECTOR PLAN 1
1. CT scan Friday AM, if stable and no new hemorrhage then can restart Xarelto FRIDAY (POD # 7)  2. Will continue to follow along

## 2021-06-22 NOTE — PROGRESS NOTE PEDS - ASSESSMENT
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 He has elevated Fontan pressure secondary to LV diastolic dysfunction.  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 Significant issues with neuro-agitation and delirium that have significantly improved  Now s/p VPS and prosthetic skull flap 6/18    PLAN:  Neuro:  melatonin Qday  continue propranolol per PMR recs (hold for SBP< 100)   Delirium seems much improved  - Keppra (clinical szs)  - R sided hemiparesis improving   - tylenol prn for fever control  -PT/OT/speech/rehab consult  -cannot have mri  - f/u with NSGY regarding further neuroimaging     Resp:  - RA, goal SpO2 > 85% (VV collaterals on CTA)    CV:  continue mexiletine for hx of  VT  VOO 70, normal mode is DDD (though atrial wires don't work so effectively VVI)  Still in IART   Restart home aldactone tomorrow  Restart home lisinopril today  Continue Sildenafil   Continue Lasix po q12  sono r chest (6/15) with moderate effusion clinically improving with diuresis  echo 6/8 extremely limited windows    Heme:  Need to clarify timing of re-initiation of anticoagulation with NSGY  Need to clarify plan for anticoagulation with Hematology in the setting of likely clot while on Xarelto  - Xarelto on hold  - Goal PLTs >100  - Goal INR < 1.6  s/p Vitamin K x3 days,   now po Vit K 3x/week    FEN:  Pepcid   Bowel regimen: Senna and Miralax PRN  Cleared by speech for oral diet of regular solids and thin fluids as tolerated   nutrition consulting  speech and swallow consulting    ID  - s/p Ancef post-op    Other  - L hydrocele  - outpt f/u    Access:  PIV  20 y/o male DILV, 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 He has elevated Fontan pressure secondary to LV diastolic dysfunction.  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 Significant issues with neuro-agitation and delirium that have significantly improved  Now s/p VPS and prosthetic skull flap 6/18    PLAN:  Neuro: Per Neurosurgery would like at least one more week (6/28) until consider starting anticoagulation.  They would like a CT just before starting it as well.  - melatonin Qday  - continue propranolol per PMR recs (hold for SBP< 100)  - Delirium seems much improved  - Keppra (clinical szs)  - R sided hemiparesis improving   - tylenol prn for fever control  -PT/OT/speech/rehab consult  -cannot have mri    Resp:  - RA, goal SpO2 > 85% (VV collaterals on CTA)    CV:  continue mexiletine for hx of  VT  VOO 70, normal mode is DDD (though atrial wires don't work so effectively VVI)  Still in IART   Restart home aldactone today  on home lisinopril today  Continue Sildenafil   Continue Lasix po q12  sono r chest (6/15) with moderate effusion clinically improving with diuresis  echo 6/8 extremely limited windows    Heme:  Tentative plan to resume anticoagulation on 6/28  Need to clarify plan for anticoagulation with Hematology in the setting of likely clot while on Xarelto  - Xarelto on hold  - Goal PLTs >100  - Goal INR < 1.6  s/p Vitamin K x3 days,   now po Vit K 3x/week--discuss discontinuing with heme today    FEN:  Pepcid   Bowel regimen: Senna and Miralax PRN  Cleared by speech for oral diet of regular solids and thin fluids as tolerated   nutrition consulting  speech and swallow consulting    ID  - s/p Ancef post-op    Other  - L hydrocele  - outpt f/u    Access:  PIV

## 2021-06-23 LAB
ALBUMIN SERPL ELPH-MCNC: 4.3 G/DL — SIGNIFICANT CHANGE UP (ref 3.3–5)
ALP SERPL-CCNC: 177 U/L — SIGNIFICANT CHANGE UP (ref 60–270)
ALT FLD-CCNC: 39 U/L — SIGNIFICANT CHANGE UP (ref 4–41)
ANION GAP SERPL CALC-SCNC: 18 MMOL/L — HIGH (ref 7–14)
APTT BLD: 33.4 SEC — SIGNIFICANT CHANGE UP (ref 27–36.3)
AST SERPL-CCNC: 72 U/L — HIGH (ref 4–40)
BILIRUB SERPL-MCNC: 0.9 MG/DL — SIGNIFICANT CHANGE UP (ref 0.2–1.2)
BUN SERPL-MCNC: 19 MG/DL — SIGNIFICANT CHANGE UP (ref 7–23)
CALCIUM SERPL-MCNC: 10.1 MG/DL — SIGNIFICANT CHANGE UP (ref 8.4–10.5)
CHLORIDE SERPL-SCNC: 96 MMOL/L — LOW (ref 98–107)
CO2 SERPL-SCNC: 24 MMOL/L — SIGNIFICANT CHANGE UP (ref 22–31)
CREAT SERPL-MCNC: 0.7 MG/DL — SIGNIFICANT CHANGE UP (ref 0.5–1.3)
FACT XIII ACT/NOR PPP CHRO: 91 % — SIGNIFICANT CHANGE UP (ref 51–163)
GLUCOSE SERPL-MCNC: 96 MG/DL — SIGNIFICANT CHANGE UP (ref 70–99)
INR BLD: 1.22 RATIO — HIGH (ref 0.88–1.16)
MAGNESIUM SERPL-MCNC: 2.2 MG/DL — SIGNIFICANT CHANGE UP (ref 1.6–2.6)
PHOSPHATE SERPL-MCNC: 4.4 MG/DL — SIGNIFICANT CHANGE UP (ref 2.5–4.5)
POTASSIUM SERPL-MCNC: 4.8 MMOL/L — SIGNIFICANT CHANGE UP (ref 3.5–5.3)
POTASSIUM SERPL-SCNC: 4.8 MMOL/L — SIGNIFICANT CHANGE UP (ref 3.5–5.3)
PROT SERPL-MCNC: 8.4 G/DL — HIGH (ref 6–8.3)
PROTHROM AB SERPL-ACNC: 13.8 SEC — HIGH (ref 10.6–13.6)
SODIUM SERPL-SCNC: 138 MMOL/L — SIGNIFICANT CHANGE UP (ref 135–145)

## 2021-06-23 PROCEDURE — 99233 SBSQ HOSP IP/OBS HIGH 50: CPT

## 2021-06-23 RX ADMIN — LEVETIRACETAM 1500 MILLIGRAM(S): 250 TABLET, FILM COATED ORAL at 22:42

## 2021-06-23 RX ADMIN — LISINOPRIL 5 MILLIGRAM(S): 2.5 TABLET ORAL at 09:25

## 2021-06-23 RX ADMIN — FAMOTIDINE 20 MILLIGRAM(S): 10 INJECTION INTRAVENOUS at 09:26

## 2021-06-23 RX ADMIN — Medication 10 MILLIGRAM(S): at 05:31

## 2021-06-23 RX ADMIN — SPIRONOLACTONE 100 MILLIGRAM(S): 25 TABLET, FILM COATED ORAL at 09:26

## 2021-06-23 RX ADMIN — Medication 20 MILLIGRAM(S): at 22:41

## 2021-06-23 RX ADMIN — SODIUM CHLORIDE 3 MILLILITER(S): 9 INJECTION INTRAMUSCULAR; INTRAVENOUS; SUBCUTANEOUS at 17:47

## 2021-06-23 RX ADMIN — Medication 10 MILLIGRAM(S): at 17:36

## 2021-06-23 RX ADMIN — Medication 20 MILLIGRAM(S): at 14:48

## 2021-06-23 RX ADMIN — SODIUM CHLORIDE 3 MILLILITER(S): 9 INJECTION INTRAMUSCULAR; INTRAVENOUS; SUBCUTANEOUS at 02:15

## 2021-06-23 RX ADMIN — Medication 20 MILLIGRAM(S): at 05:55

## 2021-06-23 RX ADMIN — Medication 5 MILLIGRAM(S): at 09:24

## 2021-06-23 RX ADMIN — Medication 1 PACKET(S): at 09:26

## 2021-06-23 RX ADMIN — LEVETIRACETAM 1500 MILLIGRAM(S): 250 TABLET, FILM COATED ORAL at 09:26

## 2021-06-23 RX ADMIN — SODIUM CHLORIDE 3 MILLILITER(S): 9 INJECTION INTRAMUSCULAR; INTRAVENOUS; SUBCUTANEOUS at 09:37

## 2021-06-23 NOTE — PROGRESS NOTE PEDS - ASSESSMENT
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 He has elevated Fontan pressure secondary to LV diastolic dysfunction.  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  They would like a CT just before starting it as well.  - melatonin Qday  - continue propranolol per PMR recs (hold for SBP< 100)  - Delirium seems much improved  - Keppra (clinical szs)  - R sided hemiparesis improving   - tylenol prn for fever control  -PT/OT/speech/rehab consult  -cannot have mri    Resp:  - RA, goal SpO2 > 85% (VV collaterals on CTA)    CV:  continue mexiletine for hx of  VT  VOO 70, normal mode is DDD (though atrial wires don't work so effectively VVI)  Still in IART   On home aldactone  On home lisinopril today  Continue Sildenafil   Continue Lasix po q12  sono r chest (6/15) with moderate effusion clinically improving with diuresis  echo 6/8 extremely limited windows    Heme:  Tentative plan to resume anticoagulation on 6/28  Need to clarify plan for anticoagulation with Hematology in the setting of likely clot while on Xarelto  - Xarelto on hold  - Goal PLTs >100  - Goal INR < 1.6  s/p Vitamin    FEN:   Bowel regimen: Senna and Miralax PRN  Cleared by speech for oral diet of regular solids and thin fluids as tolerated   nutrition consulting  speech and swallow consulting    ID  - s/p Ancef post-op    Other  - L hydrocele  - outpt f/u    Access:  PIV

## 2021-06-23 NOTE — PROGRESS NOTE PEDS - SUBJECTIVE AND OBJECTIVE BOX
Interval/Overnight Events: no events overnight    ===========================RESPIRATORY==========================  RR: 20 (06-23-21 @ 05:24) (18 - 23)  SpO2: 93% (06-23-21 @ 05:24) (89% - 98%)  End Tidal CO2:    Respiratory Support:   [ ] Inhaled Nitric Oxide:    [x] Airway Clearance Discussed  Extubation Readiness:  [x ] Not Applicable     [ ] Discussed and Assessed  Comments:    =========================CARDIOVASCULAR========================  HR: 70 (06-23-21 @ 05:24) (68 - 74)  BP: 110/64 (06-23-21 @ 05:24) (87/62 - 110/64)  ABP: --  CVP(mm Hg): --  NIRS:    Patient Care Access:  furosemide   Oral Tab/Cap - Peds 10 milliGRAM(s) Oral every 12 hours  lisinopril Oral Tab/Cap - Peds 5 milliGRAM(s) Oral daily  propranolol  Oral Tab/Cap - Peds 10 milliGRAM(s) Oral every 8 hours  sildenafil   Oral Tab/Cap - Peds 20 milliGRAM(s) Oral every 8 hours  spironolactone Oral Tab/Cap - Peds 100 milliGRAM(s) Oral daily  Comments:    =====================HEMATOLOGY/ONCOLOGY=====================  Transfusions:	[ ] PRBC	[ ] Platelets	[ ] FFP		[ ] Cryoprecipitate  DVT Prophylaxis:  Comments:    ========================INFECTIOUS DISEASE=======================  T(C): 36.8 (06-23-21 @ 05:24), Max: 37 (06-22-21 @ 17:00)  T(F): 98.2 (06-23-21 @ 05:24), Max: 98.6 (06-22-21 @ 17:00)  [ ] Cooling [place] being used. Target Temperature:      ==================FLUIDS/ELECTROLYTES/NUTRITION=================  I&O's Summary    22 Jun 2021 07:01  -  23 Jun 2021 07:00  --------------------------------------------------------  IN: 365 mL / OUT: 515 mL / NET: -150 mL      Diet: PO  [ ] NGT		[ ] NDT		[ ] GT		[ ] GJT    famotidine  Oral Tab/Cap - Peds 20 milliGRAM(s) Oral two times a day  phytonadione  Oral Liquid - Peds 5 milliGRAM(s) Enteral Tube <User Schedule>  polyethylene glycol 3350 Oral Powder - Peds 17 Gram(s) Oral daily PRN  sodium chloride 0.9% lock flush - Peds 3 milliLiter(s) IV Push every 8 hours  Comments:    ==========================NEUROLOGY===========================  [ ] SBS:		[ ] GISSELLE-1:	[ ] BIS:	[ ] CAPD:  acetaminophen   Oral Tab/Cap - Peds. 650 milliGRAM(s) Oral every 6 hours PRN  levETIRAcetam  Oral Tab/Cap - Peds 1500 milliGRAM(s) Oral two times a day  melatonin Oral Tab/Cap - Peds 5 milliGRAM(s) Oral at bedtime PRN  [x] Adequacy of sedation and pain control has been assessed and adjusted  Comments:    OTHER MEDICATIONS:  lactobacillus Oral Powder (CULTURELLE KIDS) - Peds 1 Packet(s) Oral daily  Mexiletine 150 milliGRAM(s) 150 milliGRAM(s) Oral every 8 hours  polyvinyl alcohol 1.4%/povidone 0.6% Ophthalmic Solution - Peds 2 Drop(s) Both EYES four times a day    =========================PATIENT CARE==========================  [ ] There are pressure ulcers/areas of breakdown that are being addressed.  [x] Preventative measures are being taken to decrease risk for skin breakdown.  [x] Necessity of urinary, arterial, and venous catheters discussed    =========================PHYSICAL EXAM=========================  General: NAD  HEENT: drain removed  Resp: unlabored, CTAB, good aeration, no rhonchi/rales/wheezing  CV: RRR, nl S1/S2, no m/r/g appreciated, CR < 2s, distal pulses 2+ and equal  Abd: soft, NTND, no HSM appreciated  Ext: wwp, no gross deformities  Neuro: alert and oriented, mild R hemiparesis  Skin: no rash    ===============================================================  LABS:  ( 06-23 @ 06:56 )   PT: 13.8 sec;   INR: 1.22 ratio  aPTT: 33.4 sec                            138    |  96     |  19                  Calcium: 10.1  / iCa: x      (06-23 @ 06:56)    ----------------------------<  96        Magnesium: 2.2                              4.8     |  24     |  0.70             Phosphorous: 4.4      TPro  8.4    /  Alb  4.3    /  TBili  0.9    /  DBili  x      /  AST  72     /  ALT  39     /  AlkPhos  177    23 Jun 2021 06:56  RECENT CULTURES:  06-18 @ 20:35 .CSF     Testing in progress      06-18 @ 14:00 .CSF     No growth    No polymorphonuclear leukocytes seen per low power field  No organisms seen per oil power field        IMAGING STUDIES:    Parent/Guardian is at the bedside:	[x ] Yes	[ ] No  Patient and Parent/Guardian updated as to the progress/plan of care:	[x ] Yes	[ ] No    [x ] The patient remains in critical and unstable condition, and requires ICU care and monitoring, total critical care time spent by myself, the attending physician was 40 minutes, excluding procedure time.  [ ] The patient is improving but requires continued monitoring and adjustment of therapy

## 2021-06-23 NOTE — PROGRESS NOTE PEDS - SUBJECTIVE AND OBJECTIVE BOX
OVERNIGHT EVENTS:  No acute events overnight.    HPI:  21yo male with complex cardiac h/o DILV, L-malposition of great arteries, sick sinus syndrome and AV mihir disease with tachybradycardia syndrome with a dual chamber pacemaker (currently with RV lead fracture and is currently only LV paced 2/2 complete heart block), PSH of status post Kvexc-Podr-Iphrkbn anastomosis and aortic arch reconstruction followed by a fenestrated lateral tunnel Fontan completion (now s/p fenestration closure). here s/p cardiac catheterization and ablation. Procedure was complicated by unsuccessful ablation and post extubation patient was noted to have increased hemoptysis and desaturations, patient was reintubated for airway protection and given propofol for sedation.  (26 May 2021 20:19)    ICU Vital Signs Last 24 Hrs  T(C): 36.5 (22 Jun 2021 06:00), Max: 37.5 (21 Jun 2021 20:00)  T(F): 97.7 (22 Jun 2021 06:00), Max: 99.5 (21 Jun 2021 20:00)  HR: 70 (22 Jun 2021 05:00) (70 - 77)  BP: 104/56 (22 Jun 2021 05:00) (92/44 - 114/55)  BP(mean): 67 (22 Jun 2021 05:00) (55 - 73)  ABP: --  ABP(mean): --  RR: 17 (22 Jun 2021 05:00) (17 - 30)  SpO2: 95% (22 Jun 2021 05:00) (89% - 96%)    I&O's Summary    19 Jun 2021 07:01  -  20 Jun 2021 07:00  --------------------------------------------------------  IN: 154 mL / OUT: 678 mL / NET: -524 mL        PHYSICAL EXAM:  Awake, Alert, Affect flat, speaking more clearly  PERRL  Motor:  MAEx4, weaker on R side  Incision/Wound: c/d/i      LABS:                        14.8   18.01 )-----------( 406      ( 18 Jun 2021 16:20 )             46.4     06-18    133<L>  |  95<L>  |  31<H>  ----------------------------<  177<H>  3.7   |  21<L>  |  1.00    Ca    9.9      18 Jun 2021 16:20  Phos  6.3     06-18  Mg     2.3     06-18    TPro  9.1<H>  /  Alb  4.5  /  TBili  1.6<H>  /  DBili  x   /  AST  45<H>  /  ALT  21  /  AlkPhos  187  06-18    PT/INR - ( 18 Jun 2021 16:19 )   PT: 17.7 sec;   INR: 1.57 ratio         PTT - ( 18 Jun 2021 16:19 )  PTT:174.9 sec      CULTURES:  Culture Results:   No growth to date. (06-18 @ 20:35)  Culture Results:   No Growth Final (06-09 @ 10:00)    CSF Analysis:   CSF Lymphocytes: 38 % (06-18 @ 16:53)  RBC Count - Spinal Fluid: [id number] cells/uL *H* (06-18 @ 16:53)    MEDICATIONS:  MEDICATIONS  (STANDING):  ALBUTerol  Intermittent Nebulization - Peds 2.5 milliGRAM(s) Nebulizer <User Schedule>  famotidine  Oral Tab/Cap - Peds 20 milliGRAM(s) Oral two times a day  furosemide   Oral Tab/Cap - Peds 10 milliGRAM(s) Oral every 12 hours  lactobacillus Oral Powder (CULTURELLE KIDS) - Peds 1 Packet(s) Oral daily  levETIRAcetam  Oral Tab/Cap - Peds 1500 milliGRAM(s) Oral two times a day  lisinopril Oral Tab/Cap - Peds 5 milliGRAM(s) Oral daily  Mexiletine 150 milliGRAM(s) 150 milliGRAM(s) Oral every 8 hours  phytonadione  Oral Liquid - Peds 5 milliGRAM(s) Enteral Tube <User Schedule>  polyvinyl alcohol 1.4%/povidone 0.6% Ophthalmic Solution - Peds 2 Drop(s) Both EYES four times a day  propranolol  Oral Tab/Cap - Peds 10 milliGRAM(s) Oral every 8 hours  sildenafil   Oral Tab/Cap - Peds 20 milliGRAM(s) Oral every 8 hours  sodium chloride 0.9% lock flush - Peds 3 milliLiter(s) IV Push every 8 hours  sodium chloride 3% for Nebulization - Peds 0.5 milliLiter(s) Nebulizer daily    MEDICATIONS  (PRN):  acetaminophen   Oral Tab/Cap - Peds. 650 milliGRAM(s) Oral every 6 hours PRN Mild Pain (1 - 3), Moderate Pain (4 - 6), Severe Pain (7 - 10)  melatonin Oral Tab/Cap - Peds 5 milliGRAM(s) Oral at bedtime PRN Insomnia  polyethylene glycol 3350 Oral Powder - Peds 17 Gram(s) Oral daily PRN Constipation        RADIOLOGY & ADDITIONAL TESTS:  < from: CT Head No Cont (06.19.21 @ 10:26) >  IMPRESSION:    Grossly stable exam.    < end of copied text >

## 2021-06-23 NOTE — PROGRESS NOTE PEDS - PROBLEM SELECTOR PLAN 1
1. CT scan Friday AM, if stable and no new hemorrhage then can restart Xarelto FRIDAY (POD # 7)  2. Will continue to follow along  Case d/w attending

## 2021-06-24 LAB
APTT BLD: 34.4 SEC — SIGNIFICANT CHANGE UP (ref 27–36.3)
INR BLD: 1.28 RATIO — HIGH (ref 0.88–1.16)
PROTHROM AB SERPL-ACNC: 14.4 SEC — HIGH (ref 10.6–13.6)

## 2021-06-24 PROCEDURE — 99233 SBSQ HOSP IP/OBS HIGH 50: CPT

## 2021-06-24 PROCEDURE — 99232 SBSQ HOSP IP/OBS MODERATE 35: CPT

## 2021-06-24 RX ADMIN — SODIUM CHLORIDE 3 MILLILITER(S): 9 INJECTION INTRAMUSCULAR; INTRAVENOUS; SUBCUTANEOUS at 18:03

## 2021-06-24 RX ADMIN — Medication 10 MILLIGRAM(S): at 17:35

## 2021-06-24 RX ADMIN — Medication 20 MILLIGRAM(S): at 05:40

## 2021-06-24 RX ADMIN — SODIUM CHLORIDE 3 MILLILITER(S): 9 INJECTION INTRAMUSCULAR; INTRAVENOUS; SUBCUTANEOUS at 10:34

## 2021-06-24 RX ADMIN — LISINOPRIL 5 MILLIGRAM(S): 2.5 TABLET ORAL at 10:19

## 2021-06-24 RX ADMIN — Medication 20 MILLIGRAM(S): at 22:46

## 2021-06-24 RX ADMIN — SPIRONOLACTONE 100 MILLIGRAM(S): 25 TABLET, FILM COATED ORAL at 10:19

## 2021-06-24 RX ADMIN — LEVETIRACETAM 1500 MILLIGRAM(S): 250 TABLET, FILM COATED ORAL at 22:46

## 2021-06-24 RX ADMIN — LEVETIRACETAM 1500 MILLIGRAM(S): 250 TABLET, FILM COATED ORAL at 10:20

## 2021-06-24 RX ADMIN — Medication 10 MILLIGRAM(S): at 05:40

## 2021-06-24 RX ADMIN — Medication 20 MILLIGRAM(S): at 14:38

## 2021-06-24 RX ADMIN — SODIUM CHLORIDE 3 MILLILITER(S): 9 INJECTION INTRAMUSCULAR; INTRAVENOUS; SUBCUTANEOUS at 02:24

## 2021-06-24 NOTE — PROGRESS NOTE PEDS - SUBJECTIVE AND OBJECTIVE BOX
Interval/Overnight Events: no events overnight    ===========================RESPIRATORY==========================  RR: 20 (06-24-21 @ 07:53) (16 - 26)  SpO2: 90% (06-24-21 @ 07:53) (90% - 98%)  End [place]idal CO2:    Respiratory Support:   [ ] Inhaled Nitric Oxide:    [x] Airway Clearance Discussed  Extubation Readiness:  [x ] Not Applicable     [ ] Discussed and Assessed  Comments:    =========================CARDIOVASCULAR========================  HR: 70 (06-24-21 @ 07:53) (70 - 70)  BP: 109/60 (06-24-21 @ 07:53) (90/61 - 122/71)  ABP: --  CVP(mm Hg): --  NIRS:    Patient Care Access:  furosemide   Oral Tab/Cap - Peds 10 milliGRAM(s) Oral every 12 hours  lisinopril Oral Tab/Cap - Peds 5 milliGRAM(s) Oral daily  propranolol  Oral Tab/Cap - Peds 10 milliGRAM(s) Oral every 8 hours  sildenafil   Oral Tab/Cap - Peds 20 milliGRAM(s) Oral every 8 hours  spironolactone Oral Tab/Cap - Peds 100 milliGRAM(s) Oral daily  Comments:    =====================HEMATOLOGY/ONCOLOGY=====================  Transfusions:	[ ] PRBC	[ ] Platelets	[ ] FFP		[ ] Cryoprecipitate  DVT Prophylaxis:  Comments:    ========================INFECTIOUS DISEASE=======================  T(C): 36.9 (06-24-21 @ 07:53), Max: 37 (06-23-21 @ 23:00)  T(F): 98.4 (06-24-21 @ 07:53), Max: 98.6 (06-23-21 @ 23:00)  [ ] Cooling [place] being used. Target Temperature:      ==================FLUIDS/ELECTROLYTES/NUTRITION=================  I&O's Summary    23 Jun 2021 07:01  -  24 Jun 2021 07:00  --------------------------------------------------------  IN: 1027 mL / OUT: 500 mL / NET: 527 mL    24 Jun 2021 07:01  -  24 Jun 2021 10:09  --------------------------------------------------------  IN: 0 mL / OUT: 480 mL / NET: -480 mL      Diet: PO  [ ] NGT		[ ] NDT		[ ] GT		[ ] GJT    polyethylene glycol 3350 Oral Powder - Peds 17 Gram(s) Oral daily PRN  sodium chloride 0.9% lock flush - Peds 3 milliLiter(s) IV Push every 8 hours  Comments:    ==========================NEUROLOGY===========================  [ ] SBS:		[ ] GISSELLE-1:	[ ] BIS:	[ ] CAPD:  acetaminophen   Oral Tab/Cap - Peds. 650 milliGRAM(s) Oral every 6 hours PRN  levETIRAcetam  Oral Tab/Cap - Peds 1500 milliGRAM(s) Oral two times a day  [x] Adequacy of sedation and pain control has been assessed and adjusted  Comments:    OTHER MEDICATIONS:  Mexiletine 150 milliGRAM(s) 150 milliGRAM(s) Oral every 8 hours    =========================PATIENT CARE==========================  [ ] There are pressure ulcers/areas of breakdown that are being addressed.  [x] Preventative measures are being taken to decrease risk for skin breakdown.  [x] Necessity of urinary, arterial, and venous catheters discussed    =========================PHYSICAL EXAM=========================  General: NAD  HEENT: drain removed  Resp: unlabored, CTAB, good aeration, no rhonchi/rales/wheezing  CV: RRR, nl S1/S2, no m/r/g appreciated, CR < 2s, distal pulses 2+ and equal  Abd: soft, NTND, no HSM appreciated  Ext: wwp, no gross deformities  Neuro: alert and oriented, mild R hemiparesis  Skin: no rash ===============================================================  LABS:    RECENT CULTURES:      IMAGING STUDIES:    Parent/Guardian is at the bedside:	[ x] Yes	[ ] No  Patient and Parent/Guardian updated as to the progress/plan of care:	[x ] Yes	[ ] No    [ ] The patient remains in critical and unstable condition, and requires ICU care and monitoring, total critical care time spent by myself, the attending physician was  minutes, excluding procedure time.  [x ] The patient is improving but requires continued monitoring and adjustment of therapy

## 2021-06-24 NOTE — PROGRESS NOTE PEDS - SUBJECTIVE AND OBJECTIVE BOX
OVERNIGHT EVENTS:  No acute events overnight.    HPI:  19yo male with complex cardiac h/o DILV, L-malposition of great arteries, sick sinus syndrome and AV mihir disease with tachybradycardia syndrome with a dual chamber pacemaker (currently with RV lead fracture and is currently only LV paced 2/2 complete heart block), PSH of status post Mprqs-Zqem-Mtkeczv anastomosis and aortic arch reconstruction followed by a fenestrated lateral tunnel Fontan completion (now s/p fenestration closure). here s/p cardiac catheterization and ablation. Procedure was complicated by unsuccessful ablation and post extubation patient was noted to have increased hemoptysis and desaturations, patient was reintubated for airway protection and given propofol for sedation.  (26 May 2021 20:19)    ICU Vital Signs Last 24 Hrs  T(C): 36.9 (24 Jun 2021 07:53), Max: 37 (23 Jun 2021 23:00)  T(F): 98.4 (24 Jun 2021 07:53), Max: 98.6 (23 Jun 2021 23:00)  HR: 70 (24 Jun 2021 07:53) (70 - 70)  BP: 109/60 (24 Jun 2021 07:53) (90/61 - 122/71)  BP(mean): 71 (24 Jun 2021 07:53) (60 - 84)  ABP: --  ABP(mean): --  RR: 20 (24 Jun 2021 07:53) (16 - 26)  SpO2: 90% (24 Jun 2021 07:53) (90% - 98%)      PHYSICAL EXAM:  Awake, Alert, Affect flat, speaking more clearly  PERRL  Motor:  MAEx4, weaker on R side  Incision/Wound: c/d/i      LABS:                        14.8   18.01 )-----------( 406      ( 18 Jun 2021 16:20 )             46.4     06-18    133<L>  |  95<L>  |  31<H>  ----------------------------<  177<H>  3.7   |  21<L>  |  1.00    Ca    9.9      18 Jun 2021 16:20  Phos  6.3     06-18  Mg     2.3     06-18    TPro  9.1<H>  /  Alb  4.5  /  TBili  1.6<H>  /  DBili  x   /  AST  45<H>  /  ALT  21  /  AlkPhos  187  06-18    PT/INR - ( 18 Jun 2021 16:19 )   PT: 17.7 sec;   INR: 1.57 ratio         PTT - ( 18 Jun 2021 16:19 )  PTT:174.9 sec      CULTURES:  Culture Results:   No growth to date. (06-18 @ 20:35)  Culture Results:   No Growth Final (06-09 @ 10:00)    CSF Analysis:   CSF Lymphocytes: 38 % (06-18 @ 16:53)  RBC Count - Spinal Fluid: [id number] cells/uL *H* (06-18 @ 16:53)    MEDICATIONS:  MEDICATIONS  (STANDING):  ALBUTerol  Intermittent Nebulization - Peds 2.5 milliGRAM(s) Nebulizer <User Schedule>  famotidine  Oral Tab/Cap - Peds 20 milliGRAM(s) Oral two times a day  furosemide   Oral Tab/Cap - Peds 10 milliGRAM(s) Oral every 12 hours  lactobacillus Oral Powder (CULTURELLE KIDS) - Peds 1 Packet(s) Oral daily  levETIRAcetam  Oral Tab/Cap - Peds 1500 milliGRAM(s) Oral two times a day  lisinopril Oral Tab/Cap - Peds 5 milliGRAM(s) Oral daily  Mexiletine 150 milliGRAM(s) 150 milliGRAM(s) Oral every 8 hours  phytonadione  Oral Liquid - Peds 5 milliGRAM(s) Enteral Tube <User Schedule>  polyvinyl alcohol 1.4%/povidone 0.6% Ophthalmic Solution - Peds 2 Drop(s) Both EYES four times a day  propranolol  Oral Tab/Cap - Peds 10 milliGRAM(s) Oral every 8 hours  sildenafil   Oral Tab/Cap - Peds 20 milliGRAM(s) Oral every 8 hours  sodium chloride 0.9% lock flush - Peds 3 milliLiter(s) IV Push every 8 hours  sodium chloride 3% for Nebulization - Peds 0.5 milliLiter(s) Nebulizer daily    MEDICATIONS  (PRN):  acetaminophen   Oral Tab/Cap - Peds. 650 milliGRAM(s) Oral every 6 hours PRN Mild Pain (1 - 3), Moderate Pain (4 - 6), Severe Pain (7 - 10)  melatonin Oral Tab/Cap - Peds 5 milliGRAM(s) Oral at bedtime PRN Insomnia  polyethylene glycol 3350 Oral Powder - Peds 17 Gram(s) Oral daily PRN Constipation        RADIOLOGY & ADDITIONAL TESTS:  < from: CT Head No Cont (06.19.21 @ 10:26) >  IMPRESSION:    Grossly stable exam.    < end of copied text >

## 2021-06-24 NOTE — PROGRESS NOTE PEDS - SUBJECTIVE AND OBJECTIVE BOX
Jimbo is a 20 year-old male admitted for cardioversion of interatrial re-entrant tachycardia (IART) found to have L-sided stroke with hemorrhagic conversion requiring intervention.    Interval history:   Patient was seen and examined in his room with mom at bedside. Jimbo was taking a nap after an active morning, He continues to make excellent gains. Today he ambulated 100 feet with CG/min A during PT without an assistive device. He is still having issues with loss of balance during turns. Speech therapy working with him on higher level cognitive function and working memory deficits.    REVIEW OF SYSTEMS:   CONSTITUTIONAL: No fevers or chills.   PSYCH: No agitation currently.   CARDIOVASCULAR: No peripheral edema.  RESPIRATORY: No shortness of breath.  MUSCULOSKELETAL: No loss of range of motion.  NEUROLOGICAL: No right sided weakness.   ENDOCRINE: No intolerance to heat/cold.    PAST MEDICAL & SURGICAL HISTORY  Double inlet left ventricle  D-TGA (dextro-transposition of great arteries)  Sick sinus syndrome  Atrial tachycardia  Hepatomegaly  Other ascites  Pulmonary hypertension  Pacemaker  AV block  S/P Fontan procedure  Coagulopathy  Cardiac anomaly, congenital  S/P bidirectional Storm shunt  S/P Stansel operation  S/P Fontan procedure  S/P cardiac cath  S/P Nirmal-Taussig shunt    SOCIAL HISTORY  Lives in apartment building with family, [address] and 3 full flights to apt, no elevator. Per mom he has option to stay with maternal grandmother who has pvt home, 0 [address], 1st floor bathroom and set up for bedroom. Mom able to assist with care FT.    FAMILY HISTORY   No pertinent family history in first degree relatives    ALLERGIES  No Known Allergies    MEDICATIONS  (STANDING):  furosemide   Oral Tab/Cap - Peds 10 milliGRAM(s) Oral every 12 hours  levETIRAcetam  Oral Tab/Cap - Peds 1500 milliGRAM(s) Oral two times a day  lisinopril Oral Tab/Cap - Peds 5 milliGRAM(s) Oral daily  Mexiletine 150 milliGRAM(s) 150 milliGRAM(s) Oral every 8 hours  propranolol  Oral Tab/Cap - Peds 10 milliGRAM(s) Oral every 8 hours  sildenafil   Oral Tab/Cap - Peds 20 milliGRAM(s) Oral every 8 hours  sodium chloride 0.9% lock flush - Peds 3 milliLiter(s) IV Push every 8 hours  spironolactone Oral Tab/Cap - Peds 100 milliGRAM(s) Oral daily    MEDICATIONS  (PRN):  acetaminophen   Oral Tab/Cap - Peds. 650 milliGRAM(s) Oral every 6 hours PRN Mild Pain (1 - 3), Moderate Pain (4 - 6), Severe Pain (7 - 10)  polyethylene glycol 3350 Oral Powder - Peds 17 Gram(s) Oral daily PRN Constipation    ICU Vital Signs Last 24 Hrs  T(C): 36.9 (24 Jun 2021 14:00), Max: 37 (23 Jun 2021 23:00)  T(F): 98.5 (24 Jun 2021 14:00), Max: 98.6 (23 Jun 2021 23:00)  HR: 70 (24 Jun 2021 14:00) (70 - 70)  BP: 96/49 (24 Jun 2021 14:00) (90/61 - 115/63)  BP(mean): 59 (24 Jun 2021 14:00) (59 - 81)  RR: 21 (24 Jun 2021 14:00) (16 - 26)  SpO2: 91% (24 Jun 2021 14:00) (88% - 98%)    ----------------------------------------------------------------------------------------  PHYSICAL EXAM  General:  Well-developed, well-nourished individual lying in hospital bed.   Mental:  Awake, answering questions appropriately. [place] Short Test of Mental Status: Orientation 8/8; Attention 7/7; Learning 4/4; Calculation 1/4; Abstraction 3/3; Information 3/4; Recall 0/4. Total score = 26/34.  Skin:  Grossly negative for erythema, breakdown.  Vessels:  No lower extremity edema.   Lung:  Breathing is comfortable and regular.  Neurologic: No clonus. No hyperreflexia. No spasticity. Full strength in right upper and lower limb.  Musculoskeletal: No range of motion restrictions except ongoing heel cord tightness.

## 2021-06-24 NOTE — PROGRESS NOTE PEDS - ASSESSMENT
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    Plan:   1) From a cognitive standpoint, Jimbo is doing quite well although still having some difficulty with memory, recall, calculations, and processing speed.  2) He continues to make progress with independent mobility and safety with physical therapy and has progressed from mod A with RW. Currently he is CG/min A due to loss of balance with turns.  3) No concern about any ongoing agitation. Recommend weaning off propranolol.   4) Continue PT/OT/ST at bedside.   5) Recommend ACUTE inpatient rehabilitation for the functional deficits consisting of 3 hours of therapy/day & 24 hour RN/daily PMR physician for comorbid medical management. Will continue to follow for ongoing rehab needs and recommendations.    Pediatric PM&R will continue to follow.

## 2021-06-25 ENCOUNTER — TRANSCRIPTION ENCOUNTER (OUTPATIENT)
Age: 21
End: 2021-06-25

## 2021-06-25 LAB
APTT BLD: 37.9 SEC — HIGH (ref 27–36.3)
INR BLD: 1.43 RATIO — HIGH (ref 0.88–1.16)
PROTHROM AB SERPL-ACNC: 16 SEC — HIGH (ref 10.6–13.6)

## 2021-06-25 PROCEDURE — ZZZZZ: CPT

## 2021-06-25 PROCEDURE — 99233 SBSQ HOSP IP/OBS HIGH 50: CPT

## 2021-06-25 PROCEDURE — 70450 CT HEAD/BRAIN W/O DYE: CPT | Mod: 26

## 2021-06-25 RX ORDER — FUROSEMIDE 40 MG
0.5 TABLET ORAL
Qty: 90 | Refills: 0
Start: 2021-06-25 | End: 2021-09-22

## 2021-06-25 RX ORDER — LISINOPRIL 2.5 MG/1
1 TABLET ORAL
Qty: 0 | Refills: 0 | DISCHARGE

## 2021-06-25 RX ORDER — SPIRONOLACTONE 25 MG/1
1 TABLET, FILM COATED ORAL
Qty: 90 | Refills: 0
Start: 2021-06-25 | End: 2021-09-22

## 2021-06-25 RX ORDER — RIVAROXABAN 15 MG-20MG
1 KIT ORAL
Qty: 0 | Refills: 0 | DISCHARGE

## 2021-06-25 RX ORDER — MEXILETINE HYDROCHLORIDE 150 MG/1
1 CAPSULE ORAL
Qty: 270 | Refills: 0
Start: 2021-06-25 | End: 2021-09-22

## 2021-06-25 RX ORDER — PROPRANOLOL HCL 160 MG
0.5 CAPSULE, EXTENDED RELEASE 24HR ORAL
Qty: 3 | Refills: 0
Start: 2021-06-25 | End: 2021-06-26

## 2021-06-25 RX ORDER — ASPIRIN/CALCIUM CARB/MAGNESIUM 324 MG
325 TABLET ORAL DAILY
Refills: 0 | Status: DISCONTINUED | OUTPATIENT
Start: 2021-06-25 | End: 2021-06-29

## 2021-06-25 RX ORDER — LISINOPRIL 2.5 MG/1
1 TABLET ORAL
Qty: 90 | Refills: 0
Start: 2021-06-25 | End: 2021-09-22

## 2021-06-25 RX ORDER — ASPIRIN/CALCIUM CARB/MAGNESIUM 324 MG
1 TABLET ORAL
Qty: 90 | Refills: 0
Start: 2021-06-25 | End: 2021-09-22

## 2021-06-25 RX ORDER — LEVETIRACETAM 250 MG/1
1 TABLET, FILM COATED ORAL
Qty: 180 | Refills: 0
Start: 2021-06-25 | End: 2021-09-22

## 2021-06-25 RX ORDER — LEVETIRACETAM 250 MG/1
3 TABLET, FILM COATED ORAL
Qty: 540 | Refills: 0
Start: 2021-06-25 | End: 2021-09-22

## 2021-06-25 RX ADMIN — LISINOPRIL 5 MILLIGRAM(S): 2.5 TABLET ORAL at 10:19

## 2021-06-25 RX ADMIN — Medication 325 MILLIGRAM(S): at 21:44

## 2021-06-25 RX ADMIN — Medication 10 MILLIGRAM(S): at 05:23

## 2021-06-25 RX ADMIN — Medication 20 MILLIGRAM(S): at 14:17

## 2021-06-25 RX ADMIN — Medication 10 MILLIGRAM(S): at 20:08

## 2021-06-25 RX ADMIN — SPIRONOLACTONE 100 MILLIGRAM(S): 25 TABLET, FILM COATED ORAL at 10:17

## 2021-06-25 RX ADMIN — SODIUM CHLORIDE 3 MILLILITER(S): 9 INJECTION INTRAMUSCULAR; INTRAVENOUS; SUBCUTANEOUS at 10:21

## 2021-06-25 RX ADMIN — Medication 20 MILLIGRAM(S): at 21:43

## 2021-06-25 RX ADMIN — SODIUM CHLORIDE 3 MILLILITER(S): 9 INJECTION INTRAMUSCULAR; INTRAVENOUS; SUBCUTANEOUS at 01:32

## 2021-06-25 RX ADMIN — LEVETIRACETAM 1500 MILLIGRAM(S): 250 TABLET, FILM COATED ORAL at 10:16

## 2021-06-25 RX ADMIN — LEVETIRACETAM 1500 MILLIGRAM(S): 250 TABLET, FILM COATED ORAL at 21:38

## 2021-06-25 RX ADMIN — Medication 20 MILLIGRAM(S): at 05:23

## 2021-06-25 RX ADMIN — SODIUM CHLORIDE 3 MILLILITER(S): 9 INJECTION INTRAMUSCULAR; INTRAVENOUS; SUBCUTANEOUS at 18:24

## 2021-06-25 NOTE — PROGRESS NOTE PEDS - SUBJECTIVE AND OBJECTIVE BOX
Interval/Overnight Events: no significant events.      ===========================RESPIRATORY==========================  RR: 21 (06-25-21 @ 09:03) (19 - 23)  SpO2: 89% (06-25-21 @ 09:03) (87% - 91%)  End Tidal CO2:    Respiratory Support:   [ ] Inhaled Nitric Oxide:    [x] Airway Clearance Discussed  Extubation Readiness:  [x ] Not Applicable     [ ] Discussed and Assessed  Comments:    =========================CARDIOVASCULAR========================  HR: 70 (06-25-21 @ 09:03) (70 - 70)  BP: 95/57 (06-25-21 @ 09:03) (95/57 - 116/66)  ABP: --  CVP(mm Hg): --  NIRS:    Patient Care Access:  furosemide   Oral Tab/Cap - Peds 10 milliGRAM(s) Oral every 12 hours  lisinopril Oral Tab/Cap - Peds 5 milliGRAM(s) Oral daily  propranolol  Oral Tab/Cap - Peds 10 milliGRAM(s) Oral every 8 hours  sildenafil   Oral Tab/Cap - Peds 20 milliGRAM(s) Oral every 8 hours  spironolactone Oral Tab/Cap - Peds 100 milliGRAM(s) Oral daily  Comments:    =====================HEMATOLOGY/ONCOLOGY=====================  Transfusions:	[ ] PRBC	[ ] Platelets	[ ] FFP		[ ] Cryoprecipitate  DVT Prophylaxis:  Comments:    ========================INFECTIOUS DISEASE=======================  T(C): 36.7 (06-25-21 @ 09:03), Max: 37 (06-24-21 @ 17:00)  T(F): 98 (06-25-21 @ 09:03), Max: 98.6 (06-24-21 @ 17:00)  [ ] Cooling [place] being used. Target Temperature:      ==================FLUIDS/ELECTROLYTES/NUTRITION=================  I&O's Summary    24 Jun 2021 07:01 - 25 Jun 2021 07:00  --------------------------------------------------------  IN: 480 mL / OUT: 1180 mL / NET: -700 mL    25 Jun 2021 07:01  -  25 Jun 2021 09:19  --------------------------------------------------------  IN: 0 mL / OUT: 150 mL / NET: -150 mL      Diet: PO  [ ] NGT		[ ] NDT		[ ] GT		[ ] GJT    polyethylene glycol 3350 Oral Powder - Peds 17 Gram(s) Oral daily PRN  sodium chloride 0.9% lock flush - Peds 3 milliLiter(s) IV Push every 8 hours  Comments:    ==========================NEUROLOGY===========================  [ ] SBS:		[ ] GISSELLE-1:	[ ] BIS:	[ ] CAPD:  acetaminophen   Oral Tab/Cap - Peds. 650 milliGRAM(s) Oral every 6 hours PRN  levETIRAcetam  Oral Tab/Cap - Peds 1500 milliGRAM(s) Oral two times a day  [x] Adequacy of sedation and pain control has been assessed and adjusted  Comments:    OTHER MEDICATIONS:  Mexiletine 150 milliGRAM(s) 150 milliGRAM(s) Oral every 8 hours    =========================PATIENT CARE==========================  [ ] There are pressure ulcers/areas of breakdown that are being addressed.  [x] Preventative measures are being taken to decrease risk for skin breakdown.  [x] Necessity of urinary, arterial, and venous catheters discussed    =========================PHYSICAL EXAM=========================  General: NAD  HEENT: drain removed  Resp: unlabored, CTAB, good aeration, no rhonchi/rales/wheezing  CV: RRR, nl S1/S2, no m/r/g appreciated, CR < 2s, distal pulses 2+ and equal  Abd: soft, NTND, no HSM appreciated  Ext: wwp, no gross deformities  Neuro: alert and oriented, mild R hemiparesis  Skin: no rash    ===============================================================  LABS:  ( 06-24 @ 12:14 )   PT: 14.4 sec;   INR: 1.28 ratio  aPTT: 34.4 sec    RECENT CULTURES:      IMAGING STUDIES:    Parent/Guardian is at the bedside:	[x ] Yes	[ ] No  Patient and Parent/Guardian updated as to the progress/plan of care:	[x ] Yes	[ ] No    [x] The patient remains in critical and unstable condition, and requires ICU care and monitoring, total critical care time spent by myself, the attending physician was 40 minutes, excluding procedure time.  [ ] The patient is improving but requires continued monitoring and adjustment of therapy Interval/Overnight Events: no significant events.  CT head done and stable.     ===========================RESPIRATORY==========================  RR: 21 (06-25-21 @ 09:03) (19 - 23)  SpO2: 89% (06-25-21 @ 09:03) (87% - 91%)  End Tidal CO2:    Respiratory Support:   [ ] Inhaled Nitric Oxide:    [x] Airway Clearance Discussed  Extubation Readiness:  [x ] Not Applicable     [ ] Discussed and Assessed  Comments:    =========================CARDIOVASCULAR========================  HR: 70 (06-25-21 @ 09:03) (70 - 70)  BP: 95/57 (06-25-21 @ 09:03) (95/57 - 116/66)  ABP: --  CVP(mm Hg): --  NIRS:    Patient Care Access:  furosemide   Oral Tab/Cap - Peds 10 milliGRAM(s) Oral every 12 hours  lisinopril Oral Tab/Cap - Peds 5 milliGRAM(s) Oral daily  propranolol  Oral Tab/Cap - Peds 10 milliGRAM(s) Oral every 8 hours  sildenafil   Oral Tab/Cap - Peds 20 milliGRAM(s) Oral every 8 hours  spironolactone Oral Tab/Cap - Peds 100 milliGRAM(s) Oral daily  Comments:    =====================HEMATOLOGY/ONCOLOGY=====================  Transfusions:	[ ] PRBC	[ ] Platelets	[ ] FFP		[ ] Cryoprecipitate  DVT Prophylaxis:  Comments:    ========================INFECTIOUS DISEASE=======================  T(C): 36.7 (06-25-21 @ 09:03), Max: 37 (06-24-21 @ 17:00)  T(F): 98 (06-25-21 @ 09:03), Max: 98.6 (06-24-21 @ 17:00)  [ ] Cooling [place] being used. Target Temperature:      ==================FLUIDS/ELECTROLYTES/NUTRITION=================  I&O's Summary    24 Jun 2021 07:01 - 25 Jun 2021 07:00  --------------------------------------------------------  IN: 480 mL / OUT: 1180 mL / NET: -700 mL    25 Jun 2021 07:01  -  25 Jun 2021 09:19  --------------------------------------------------------  IN: 0 mL / OUT: 150 mL / NET: -150 mL      Diet: PO  [ ] NGT		[ ] NDT		[ ] GT		[ ] GJT    polyethylene glycol 3350 Oral Powder - Peds 17 Gram(s) Oral daily PRN  sodium chloride 0.9% lock flush - Peds 3 milliLiter(s) IV Push every 8 hours  Comments:    ==========================NEUROLOGY===========================  [ ] SBS:		[ ] GISSELLE-1:	[ ] BIS:	[ ] CAPD:  acetaminophen   Oral Tab/Cap - Peds. 650 milliGRAM(s) Oral every 6 hours PRN  levETIRAcetam  Oral Tab/Cap - Peds 1500 milliGRAM(s) Oral two times a day  [x] Adequacy of sedation and pain control has been assessed and adjusted  Comments:    OTHER MEDICATIONS:  Mexiletine 150 milliGRAM(s) 150 milliGRAM(s) Oral every 8 hours    =========================PATIENT CARE==========================  [ ] There are pressure ulcers/areas of breakdown that are being addressed.  [x] Preventative measures are being taken to decrease risk for skin breakdown.  [x] Necessity of urinary, arterial, and venous catheters discussed    =========================PHYSICAL EXAM=========================  General: NAD  HEENT: drain removed  Resp: unlabored, CTAB, good aeration, no rhonchi/rales/wheezing  CV: RRR, nl S1/S2, no m/r/g appreciated, CR < 2s, distal pulses 2+ and equal  Abd: soft, NTND, no HSM appreciated  Ext: wwp, no gross deformities  Neuro: alert and oriented, mild R hemiparesis  Skin: no rash    ===============================================================  LABS:  ( 06-24 @ 12:14 )   PT: 14.4 sec;   INR: 1.28 ratio  aPTT: 34.4 sec    RECENT CULTURES:      IMAGING STUDIES:    Parent/Guardian is at the bedside:	[x ] Yes	[ ] No  Patient and Parent/Guardian updated as to the progress/plan of care:	[x ] Yes	[ ] No    [x] The patient remains in critical and unstable condition, and requires ICU care and monitoring, total critical care time spent by myself, the attending physician was 40 minutes, excluding procedure time.  [ ] The patient is improving but requires continued monitoring and adjustment of therapy

## 2021-06-25 NOTE — PROGRESS NOTE PEDS - ASSESSMENT
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 Presented in IART - s/p EP study with unsuccessful attempt at ablation along with diagnostic cath showing elevated Fontan pressure (20mmHg). He was loaded with amiodarone and ultimately electrically cardioverted, however reverted back into IART and continues to be in IART. His course was further complicated by L frontal parenchymal hemorrhage requiring emergent evacuation with NSx and EVD placement and ventricular tachycardia. He is now s/p cranioplasty with placement of  shunt, POD #7.    CVS:  - Continuos telemetry monitoring.  - Pacemaker baseline: DDD 70-120bpm, will continue VOO 70bpm as per EP.  - Continue Sildenafil 20mg q8 (home medication).   - Lasix to 10 mg Q12H (home dose). Goal net even. Monitor creat. Will determine ongoing needs after OR.  - Sats goal >85%.   - Continue lisinopril 5mg PO QD, aldactone 100mg PO QD. As per PICU and neurosurgery BP goals should be normal now.   - Mexiletine 150mg q8 (do not hold dose while NPO).    Resp:   - RA  - CTA obtained to eval for PE - difficult to protocol because of Fontan anatomy and contrast timing, no PE seen, but does show VV collaterals (likely main reason for hypoxia).    FEN/GI:   - Regular diet     ID:   - Afebrile  - s/p Ancef per post op protocol.     Heme:   - Will start aspirin 325mg PO QD for antiplatelet  - Discussed with EP, hematology, neurosurgery, primary cardiology    Neuro:   - s/p  shunt  - history of L frontal parenchymal hemorrhage- NSx and neuro and PMR following  - On Keppra  - On Clonidine patch, propranolol, melatonin PRN    Dispo:  - The patient is cleared for discharge from a cardiac standpoint.  We will continue to check in on him daily, but please alert cardiology with any changes or when close to being discharged (so that we can arrange follow-up with his primary cardiologists Dr. Londono and Dr. Christensen). 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 Presented in IART - s/p EP study with unsuccessful attempt at ablation along with diagnostic cath showing elevated Fontan pressure (20mmHg). He was loaded with amiodarone and ultimately electrically cardioverted, however reverted back into IART and continues to be in IART. His course was further complicated by L frontal parenchymal hemorrhage requiring emergent evacuation by Neurosurgery, EVD placement, and ventricular tachycardia. He is now s/p cranioplasty with placement of  shunt, POD #7.    CVS:  - Continuos telemetry monitoring.  - Pacemaker baseline: DDD 70-120bpm, will continue VOO 70bpm as per EP.  - Continue Sildenafil 20mg q8 (home medication).   - Lasix to 10 mg Q12H (home dose). Monitor creat.  - SpO2 goal >85%.  - Continue lisinopril 5mg PO QD, aldactone 100mg PO QD. As per PICU and neurosurgery BP goals should be normal now.   - Mexiletine 150mg q8 (do not hold dose while NPO).    Resp:   - RA.  - CTA obtained to eval for PE - difficult to protocol because of Fontan anatomy and contrast timing, no PE seen, but does show VV collaterals (likely main reason for hypoxia).    FEN/GI:   - Regular diet     ID:   - Afebrile  - s/p Ancef per post op protocol.     Heme:   - Will start aspirin 325mg PO QD for antiplatelet therapy. Will not start prior anti-coagulation regimen at this point.  - Discussed with EP, hematology, neurosurgery, primary cardiology    Neuro:   - s/p  shunt  - history of L frontal parenchymal hemorrhage- NSx and neuro and PMR following  - On Keppra  - On Clonidine patch, propranolol, melatonin PRN    Dispo:  - The patient is cleared for discharge from a cardiac standpoint.  We will continue to check in on him daily, but please alert cardiology with any changes or when close to being discharged (so that we can arrange follow-up with his primary cardiologists Dr. Londono and Dr. Christensen).

## 2021-06-25 NOTE — PROGRESS NOTE PEDS - TIME BILLING
Jimbo, his mother and PICU team updated on the bedside.
carefully reviewing all applicable data (including laboratory tests, imaging studies, etc), examining the patient, formulating a management plan, and discussing the plan in detail with the primary team.
Agree with above
see above
Time noted above was spent in examining the patient, obtaining history, gathering clinical data, reviewing laboratory and imaging findings if any, formulating a plan, coordinating care and discussing the plan with the primary team.
carefully reviewing all applicable data (including laboratory tests, imaging studies, etc), examining the patient, formulating a management plan, and discussing the plan in detail with the primary team.
carefully reviewing clinical information and discussing plan with ICU/EP/mother.

## 2021-06-25 NOTE — PROGRESS NOTE PEDS - ATTENDING COMMENTS
doing well, stable exam and stable scan, will d/c AC with cardiology, shunt 0.5 change to 1.0, transfer to floor with tele, dispo planning

## 2021-06-25 NOTE — PROGRESS NOTE PEDS - SUBJECTIVE AND OBJECTIVE BOX
INTERVAL HISTORY:  He is s/p cranioplasty  with placement of  shunt, POD #7. Neuro exam continues to improve, gaining strength slowly, speak coherently with mildly slurred speech. On tele, he continues to be in IART with pacemaker set at VOO @ 70.     RESPIRATORY SUPPORT: RA    NUTRITION: Regular diet    INTAKE/OUTPUT:       @ 07:01  -   @ 07:00  --------------------------------------------------------  IN: 480 mL / OUT: 1180 mL / NET: -700 mL      INTRAVASCULAR ACCESS: PIV, RRA    MEDICATIONS:  furosemide   Oral Tab/Cap - Peds 10 milliGRAM(s) Oral every 12 hours  lisinopril Oral Tab/Cap - Peds 5 milliGRAM(s) Oral daily  propranolol  Oral Tab/Cap - Peds 10 milliGRAM(s) Oral every 8 hours  sildenafil   Oral Tab/Cap - Peds 20 milliGRAM(s) Oral every 8 hours  spironolactone Oral Tab/Cap - Peds 100 milliGRAM(s) Oral daily  levETIRAcetam  Oral Tab/Cap - Peds 1500 milliGRAM(s) Oral two times a day  sodium chloride 0.9% lock flush - Peds 3 milliLiter(s) IV Push every 8 hours      PHYSICAL EXAMINATION:  T(C): 36.2 (21 @ 10:34), Max: 37 (21 @ 17:00)  HR: 70 (21 @ 10:34) (70 - 70)  BP: 100/49 (21 @ 10:34) (95/57 - 116/66)  RR: 28 (21 @ 10:34) (19 - 28)  SpO2: 87% (21 @ 10:34) (87% - 91%)  General - no acute distress  Skin - no rash, no desquamation, no cyanosis.  Eyes / ENT - no conjunctival injection, sclerae anicteric, external ears & nares normal, mucous membranes moist.  Pulmonary - normal inspiratory effort, no retractions, lungs clear to auscultation bilaterally, no wheezes, no rales.  Cardiovascular - normal rate, regular rhythm, normal S1 & single S2, grade 1/6 blowing holosystolic murmur at LMSB, no rubs, no gallops, capillary refill < 2sec, normal pulses.  Gastrointestinal - soft, non-distended, non-tender, no splenomegaly. (+) hepatomegaly.  Musculoskeletal - no joint swelling, no clubbing, no edema.  Neurologic / Psychiatric - alert, slurred speech, responds to questions.      LABORATORY TESTS:                          13.3  CBC:   9.23 )-----------( 301   (21 @ 09:51)                          42.0               138   |  96    |  19                 Ca: 10.1   BMP:   ----------------------------< 96     M.2   (21 @ 06:56)             4.8    |  24    | 0.70               Ph: 4.4      LFT:     TPro: 8.4 / Alb: 4.3 / TBili: 0.9 / DBili: x / AST: 72 / ALT: 39 / AlkPhos: 177   (21 @ 06:56)    COAG: PT: 14.4 / PTT: 34.4 / INR: 1.28   (21 @ 12:14)       ABG:   pH: 7.30 / pCO2: 51 / pO2: 66 / HCO3: 22 / Base Excess: -1.1 / SaO2: 86.6 / Lactate: x / iCa: 1.18   (21 @ 16:18)          IMAGING STUDIES:  Electrocardiogram - (21) Ventricular paced rhythm @ 70bpm. Underlying IART.    Telemetry - (21) Ventricular paced rhythm @ 70 bpm. Underlying IART.    Chest x-ray - (21) Stable mild cardiomegaly with subsegmental left lower lobe atelectasis.    Echocardiogram (21)   1. This was a technically difficult suboptimal study due to extremely poor acoustic windows.   2. On limited short axis views there appears to be adequate systolic excursion of the posterior wall of the left ventricle and decreased anterior wall excursion. Overall mild to moderately decreased left ventricular systolic function.   3. Mild left atrioventricular and trivial right atrioventricular regurgitation.   4. No pericardial effusion.   5. No pleural effusion.   6. Findings are limited to above.    Cath - (21)  Access 4 Fr RFA, 7 Fr RFV x2 with US guidance.  Sat data (%): on 50% FiO2 LPA 73, RUPV 98, Ao 97; CI 2.6 L/min/m2 with Qp:Qs 1 :1.  Pressures (mmHg): Elevated mFontan = mIVC = 20. mPCW=16, No significant gradient across LV to AAo to Vikki (98/58/73).  Normal PVR while on sildenafil.  Angios showed unobstructed Fontan with existing stent within Fontan conduit.    Comment: IART ablation was attempted after the diagnostic cath but unsuccessful. Patient was overdrive paced out of IART. At the end of the case, Ao sat was 94% and LPA 66% on 50% FiO2    CT Head No Cont (21)  No significant interval change compared with 2021 allowing for some resorption of postoperative air. Minimal trace high attenuation in the right frontal lobe. Small extra-axial fluid attenuation collection subjacent to the left frontal craniectomy and wire mesh cranioplasty. Left sided  shunt with tip of the shunt in the right lateral ventricle. No change in appearance of the ventricles compared with the prior. No hydrocephalus. INTERVAL HISTORY:  He is s/p cranioplasty  with placement of  shunt, POD #7. Neuro exam continues to improve, gaining strength slowly, speak coherently with mildly slurred speech. On tele, he continues to be in IART with pacemaker set at VOO @ 70.     RESPIRATORY SUPPORT: RA    NUTRITION: Regular diet    INTAKE/OUTPUT:   @ 07:01  -   @ 07:00  --------------------------------------------------------  IN: 480 mL / OUT: 1180 mL / NET: -700 mL    INTRAVASCULAR ACCESS: PIV, RRA    MEDICATIONS:  furosemide   Oral Tab/Cap - Peds 10 milliGRAM(s) Oral every 12 hours  lisinopril Oral Tab/Cap - Peds 5 milliGRAM(s) Oral daily  propranolol  Oral Tab/Cap - Peds 10 milliGRAM(s) Oral every 8 hours  sildenafil   Oral Tab/Cap - Peds 20 milliGRAM(s) Oral every 8 hours  spironolactone Oral Tab/Cap - Peds 100 milliGRAM(s) Oral daily  levETIRAcetam  Oral Tab/Cap - Peds 1500 milliGRAM(s) Oral two times a day  sodium chloride 0.9% lock flush - Peds 3 milliLiter(s) IV Push every 8 hours    PHYSICAL EXAMINATION:  T(C): 36.2 (21 @ 10:34), Max: 37 (21 @ 17:00)  HR: 70 (21 @ 10:34) (70 - 70)  BP: 100/49 (21 @ 10:34) (95/57 - 116/66)  RR: 28 (21 @ 10:34) (19 - 28)  SpO2: 87% (21 @ 10:34) (87% - 91%)  General - no acute distress  Skin - no rash, no desquamation, no cyanosis.  Eyes / ENT - no conjunctival injection, sclerae anicteric, external ears & nares normal, mucous membranes moist.  Pulmonary - normal inspiratory effort, no retractions, lungs clear to auscultation bilaterally, no wheezes, no rales.  Cardiovascular - normal rate, regular rhythm, normal S1 & single S2, grade 1/6 blowing holosystolic murmur at LMSB, no rubs, no gallops, capillary refill < 2sec, normal pulses.  Gastrointestinal - soft, non-distended, non-tender, no splenomegaly. (+) hepatomegaly.  Musculoskeletal - no joint swelling, no clubbing, no edema.  Neurologic / Psychiatric - alert, slurred speech, responds to questions.      LABORATORY TESTS:                          13.3  CBC:   9.23 )-----------( 301   (21 @ 09:51)                          42.0               138   |  96    |  19                 Ca: 10.1   BMP:   ----------------------------< 96     M.2   (21 @ 06:56)             4.8    |  24    | 0.70               Ph: 4.4      LFT:     TPro: 8.4 / Alb: 4.3 / TBili: 0.9 / DBili: x / AST: 72 / ALT: 39 / AlkPhos: 177   (21 @ 06:56)    COAG: PT: 14.4 / PTT: 34.4 / INR: 1.28   (21 @ 12:14)     ABG:   pH: 7.30 / pCO2: 51 / pO2: 66 / HCO3: 22 / Base Excess: -1.1 / SaO2: 86.6 / Lactate: x / iCa: 1.18   (21 @ 16:18)    IMAGING STUDIES:  Electrocardiogram - (21) Ventricular paced rhythm @ 70bpm. Underlying IART.    Telemetry - (21) Ventricular paced rhythm @ 70 bpm. Underlying IART.    Chest x-ray - (21) Stable mild cardiomegaly with subsegmental left lower lobe atelectasis.    Echocardiogram (21)   1. This was a technically difficult suboptimal study due to extremely poor acoustic windows.   2. On limited short axis views there appears to be adequate systolic excursion of the posterior wall of the left ventricle and decreased anterior wall excursion. Overall mild to moderately decreased left ventricular systolic function.   3. Mild left atrioventricular and trivial right atrioventricular regurgitation.   4. No pericardial effusion.   5. No pleural effusion.   6. Findings are limited to above.    Cath - (21)  Access 4 Fr RFA, 7 Fr RFV x2 with US guidance.  Sat data (%): on 50% FiO2 LPA 73, RUPV 98, Ao 97; CI 2.6 L/min/m2 with Qp:Qs 1 :1.  Pressures (mmHg): Elevated mFontan = mIVC = 20. mPCW=16, No significant gradient across LV to AAo to Vikki (98/58/73).  Normal PVR while on sildenafil.  Angios showed unobstructed Fontan with existing stent within Fontan conduit.    Comment: IART ablation was attempted after the diagnostic cath but unsuccessful. Patient was overdrive paced out of IART. At the end of the case, Ao sat was 94% and LPA 66% on 50% FiO2    CT Head No Cont (21)  No significant interval change compared with 2021 allowing for some resorption of postoperative air. Minimal trace high attenuation in the right frontal lobe. Small extra-axial fluid attenuation collection subjacent to the left frontal craniectomy and wire mesh cranioplasty. Left sided  shunt with tip of the shunt in the right lateral ventricle. No change in appearance of the ventricles compared with the prior. No hydrocephalus.

## 2021-06-25 NOTE — PROGRESS NOTE PEDS - SUBJECTIVE AND OBJECTIVE BOX
PAST 24hr EVENTS: no issues o/n. [facility] done this am, shunt reprogrammed to 1.0    HPI: 20y Male    PHYSICAL EXAM:   awake, alert, fc  perrl, tracts  [place]OD[place] 5/5  incision clean, dry, steris on     Vital Signs Last 24 Hrs  T(C): 36.5 (25 Jun 2021 05:11), Max: 37 (24 Jun 2021 17:00)  T(F): 97.7 (25 Jun 2021 05:11), Max: 98.6 (24 Jun 2021 17:00)  HR: 70 (25 Jun 2021 05:11) (70 - 70)  BP: 96/51 (25 Jun 2021 05:11) (96/49 - 116/66)  BP(mean): 62 (25 Jun 2021 05:11) (59 - 76)  RR: 22 (25 Jun 2021 05:11) (19 - 23)  SpO2: 87% (25 Jun 2021 05:11) (87% - 91%)    I&O's Summary    24 Jun 2021 07:01  -  25 Jun 2021 07:00  --------------------------------------------------------  IN: 480 mL / OUT: 1180 mL / NET: -700 mL    PT/INR - ( 24 Jun 2021 12:14 )   PT: 14.4 sec;   INR: 1.28 ratio         PTT - ( 24 Jun 2021 12:14 )  PTT:34.4 sec      MEDICATIONS  (STANDING):  furosemide   Oral Tab/Cap - Peds 10 milliGRAM(s) Oral every 12 hours  levETIRAcetam  Oral Tab/Cap - Peds 1500 milliGRAM(s) Oral two times a day  lisinopril Oral Tab/Cap - Peds 5 milliGRAM(s) Oral daily  Mexiletine 150 milliGRAM(s) 150 milliGRAM(s) Oral every 8 hours  propranolol  Oral Tab/Cap - Peds 10 milliGRAM(s) Oral every 8 hours  sildenafil   Oral Tab/Cap - Peds 20 milliGRAM(s) Oral every 8 hours  sodium chloride 0.9% lock flush - Peds 3 milliLiter(s) IV Push every 8 hours  spironolactone Oral Tab/Cap - Peds 100 milliGRAM(s) Oral daily    MEDICATIONS  (PRN):  acetaminophen   Oral Tab/Cap - Peds. 650 milliGRAM(s) Oral every 6 hours PRN Mild Pain (1 - 3), Moderate Pain (4 - 6), Severe Pain (7 - 10)  polyethylene glycol 3350 Oral Powder - Peds 17 Gram(s) Oral daily PRN Constipation      NPO STATUS:   REASON: [] OR procedure   [] imaging with sedation   [] medical need    [] other   RN Informed: [] Yes [] No  Family informed and educated [] Yes [] No

## 2021-06-25 NOTE — PROGRESS NOTE PEDS - ASSESSMENT
21 y/o M with complex cardiac history presented after cardiac cath with large parenchymal hemorrhage in the anterior left frontal lobe and subarachnoid hemorrhage s/p Left craniectomy, discarded bone flap, placement of EVD on 6/1/2021. 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 Patient now s/p cranioplasty and VPS placement- strata reprogrammed to 1.0 on 5/25/21.

## 2021-06-25 NOTE — PROCEDURE NOTE - PROCEDURE DATE TIME, MLM
25-Jun-2021 16:41
02-Jun-2021 13:00
01-Jun-2021 20:59
01-Jun-2021 14:06
26-May-2021 15:49
09-Jun-2021 18:37

## 2021-06-25 NOTE — PROCEDURE NOTE - NSPROCNAME_GEN_A_CORE
Arterial Puncture/Cannulation
Cardioversion
Programmable Shunt Reprogramming
Central Line Insertion
General
Central Line Insertion

## 2021-06-25 NOTE — PROGRESS NOTE PEDS - ASSESSMENT
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 He has elevated Fontan pressure secondary to LV diastolic dysfunction.  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  They would like a CT just before starting it as well.  - melatonin Qday  - continue propranolol per PMR recs (hold for SBP< 100)  - Delirium seems much improved  - Keppra (clinical szs)  - R sided hemiparesis improving   - tylenol prn for fever control  -PT/OT/speech/rehab consult  -cannot have mri    Resp:  - RA, goal SpO2 > 85% (VV collaterals on CTA)    CV:  continue mexiletine for hx of  VT  VOO 70, normal mode is DDD (though atrial wires don't work so effectively VVI)  Still in IART   On home aldactone  On home lisinopril today  Continue Sildenafil   Continue Lasix po q12  sono r chest (6/15) with moderate effusion clinically improving with diuresis  echo 6/8 extremely limited windows    Heme:  Tentative plan to resume anticoagulation on 6/28  Need to clarify plan for anticoagulation with Hematology in the setting of likely clot while on Xarelto  - Xarelto on hold  - Goal PLTs >100  - Goal INR < 1.6  s/p Vitamin    FEN:   Bowel regimen: Senna and Miralax PRN  Cleared by speech for oral diet of regular solids and thin fluids as tolerated   nutrition consulting  speech and swallow consulting    ID  - s/p Ancef post-op    Other  - L hydrocele  - outpt f/u    Access:  PIV  22 y/o male DILV, 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 He has elevated Fontan pressure secondary to LV diastolic dysfunction.  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  They would like a CT just before starting it as well.  - melatonin Qday  - start propranolol wean today per PMR   - No delirium   - Keppra (clinical szs)  - R sided hemiparesis improved  - tylenol prn for fever control  -PT/OT/speech/rehab consult  -cannot have mri    Resp:  - RA, goal SpO2 > 85% (VV collaterals on CTA)    CV:  continue mexiletine for hx of  VT  VOO 70, normal mode is DDD (though atrial wires don't work so effectively VVI)  Still in IART   On home aldactone  On home lisinopril today  Continue Sildenafil   Continue Lasix po q12  sono r chest (6/15) with moderate effusion clinically improving with diuresis  echo 6/8 extremely limited windows    Heme:  - Aspirin 325mg daily to start today per Heme/Cardiology plan in conjunction with NSGY with all teams in agreeement  - s/p Xarelto  - Goal PLTs >100  - Goal INR < 1.6  s/p Vitamin    FEN:   Bowel regimen: Senna and Miralax PRN  Cleared by speech for oral diet of regular solids and thin fluids as tolerated   nutrition consulting  speech and swallow consulting    ID  - s/p Ancef post-op    Access:  PIV     Dispo: Inpatient vs outpatient rehab

## 2021-06-25 NOTE — DISCHARGE NOTE NURSING/CASE MANAGEMENT/SOCIAL WORK - PATIENT PORTAL LINK FT
You can access the FollowMyHealth Patient Portal offered by [facility] by registering at the following website: [email or web address]. By joining [employer]’s FollowMyHealth portal, you will also be able to view your health information using other applications (apps) compatible with our system.

## 2021-06-26 LAB
APTT BLD: 36.5 SEC — HIGH (ref 27–36.3)
INR BLD: 1.3 RATIO — HIGH (ref 0.88–1.16)
PROTHROM AB SERPL-ACNC: 14.6 SEC — HIGH (ref 10.6–13.6)

## 2021-06-26 PROCEDURE — 99232 SBSQ HOSP IP/OBS MODERATE 35: CPT

## 2021-06-26 RX ADMIN — LEVETIRACETAM 1500 MILLIGRAM(S): 250 TABLET, FILM COATED ORAL at 11:04

## 2021-06-26 RX ADMIN — SODIUM CHLORIDE 3 MILLILITER(S): 9 INJECTION INTRAMUSCULAR; INTRAVENOUS; SUBCUTANEOUS at 01:46

## 2021-06-26 RX ADMIN — SPIRONOLACTONE 100 MILLIGRAM(S): 25 TABLET, FILM COATED ORAL at 11:04

## 2021-06-26 RX ADMIN — SODIUM CHLORIDE 3 MILLILITER(S): 9 INJECTION INTRAMUSCULAR; INTRAVENOUS; SUBCUTANEOUS at 18:07

## 2021-06-26 RX ADMIN — Medication 20 MILLIGRAM(S): at 22:17

## 2021-06-26 RX ADMIN — Medication 10 MILLIGRAM(S): at 05:32

## 2021-06-26 RX ADMIN — LEVETIRACETAM 1500 MILLIGRAM(S): 250 TABLET, FILM COATED ORAL at 22:17

## 2021-06-26 RX ADMIN — Medication 20 MILLIGRAM(S): at 05:33

## 2021-06-26 RX ADMIN — Medication 10 MILLIGRAM(S): at 18:11

## 2021-06-26 RX ADMIN — Medication 20 MILLIGRAM(S): at 14:13

## 2021-06-26 RX ADMIN — Medication 325 MILLIGRAM(S): at 22:16

## 2021-06-26 RX ADMIN — LISINOPRIL 5 MILLIGRAM(S): 2.5 TABLET ORAL at 11:03

## 2021-06-26 RX ADMIN — SODIUM CHLORIDE 3 MILLILITER(S): 9 INJECTION INTRAMUSCULAR; INTRAVENOUS; SUBCUTANEOUS at 10:45

## 2021-06-26 NOTE — PROGRESS NOTE PEDS - ASSESSMENT
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 He has elevated Fontan pressure secondary to LV diastolic dysfunction.  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  They would like a CT just before starting it as well.  - melatonin Qday  - start propranolol wean today per PMR   - No delirium   - Keppra (clinical szs)  - R sided hemiparesis improved  - tylenol prn for fever control  -PT/OT/speech/rehab consult  -cannot have mri    Resp:  - RA, goal SpO2 > 85% (VV collaterals on CTA)    CV:  continue mexiletine for hx of  VT  VOO 70, normal mode is DDD (though atrial wires don't work so effectively VVI)  Still in IART   On home aldactone  On home lisinopril today  Continue Sildenafil   Continue Lasix po q12  sono r chest (6/15) with moderate effusion clinically improving with diuresis  echo 6/8 extremely limited windows    Heme:  - Aspirin 325mg daily to start today per Heme/Cardiology plan in conjunction with NSGY with all teams in agreeement  - s/p Xarelto  - Goal PLTs >100  - Goal INR < 1.6  s/p Vitamin    FEN:   Bowel regimen: Senna and Miralax PRN  Cleared by speech for oral diet of regular solids and thin fluids as tolerated   nutrition consulting  speech and swallow consulting    ID  - s/p Ancef post-op    Access:  PIV     Dispo: Inpatient vs outpatient rehab 22 y/o male DILV, 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 He has elevated Fontan pressure secondary to LV diastolic dysfunction.  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 Significant issues with neuro-agitation and delirium that have significantly improved  Now s/p VPS and prosthetic skull flap 6/18    PLAN:  Neuro: Started on ASA last night (approved by NS)  - melatonin Qday  - propranolol weaned 6/25  - No delirium   - Keppra (clinical szs)  - R sided hemiparesis improved  - tylenol prn for fever control  -PT/OT/speech/rehab consult  -cannot have mri    Resp:  - RA, goal SpO2 > 85% (VV collaterals on CTA)    CV:  continue mexiletine for hx of  VT  VOO 70, normal mode is DDD (though atrial wires don't work so effectively VVI)  Still in IART   On home aldactone  On home lisinopril today  Continue Sildenafil   Continue Lasix po q12  sono r chest (6/15) with moderate effusion clinically improving with diuresis  echo 6/8 extremely limited windows    Heme:  - Aspirin 325mg daily started 6/25 per Heme/Cardiology plan in conjunction with NSGY with all teams in agreeement  - s/p Xarelto  - Goal PLTs >100  - Goal INR < 1.6  s/p Vitamin    FEN:   Bowel regimen: Senna and Miralax PRN  Cleared by speech for oral diet of regular solids and thin fluids as tolerated   nutrition consulting  speech and swallow consulting    ID  - s/p Ancef post-op    Access:  PIV     Dispo: Inpatient vs outpatient rehab--mother wants to be discharged home.

## 2021-06-26 NOTE — PROGRESS NOTE PEDS - SUBJECTIVE AND OBJECTIVE BOX
CC:     Interval/Overnight Events:      VITAL SIGNS:  T(C): 36.4 (06-26-21 @ 05:00), Max: 37 (06-25-21 @ 17:00)  HR: 70 (06-26-21 @ 05:00) (70 - 70)  BP: 92/57 (06-26-21 @ 05:00) (92/57 - 106/60)  ABP: --  ABP(mean): --  RR: 19 (06-26-21 @ 05:00) (19 - 32)  SpO2: 86% (06-26-21 @ 05:00) (86% - 92%)  CVP(mm Hg): --    ==============================RESPIRATORY========================  FiO2: 	    Mechanical Ventilation:       Respiratory Medications:        ============================CARDIOVASCULAR=======================  Cardiac Rhythm:	 NSR    Cardiovascular Medications:  furosemide   Oral Tab/Cap - Peds 10 milliGRAM(s) Oral every 12 hours  lisinopril Oral Tab/Cap - Peds 5 milliGRAM(s) Oral daily  propranolol  Oral Tab/Cap - Peds 5 milliGRAM(s) Oral every 8 hours  sildenafil   Oral Tab/Cap - Peds 20 milliGRAM(s) Oral every 8 hours  spironolactone Oral Tab/Cap - Peds 100 milliGRAM(s) Oral daily        =====================FLUIDS/ELECTROLYTES/NUTRITION===================  I&O's Summary    25 Jun 2021 07:01  -  26 Jun 2021 07:00  --------------------------------------------------------  IN: 240 mL / OUT: 150 mL / NET: 90 mL      Daily           Diet:     Gastrointestinal Medications:  polyethylene glycol 3350 Oral Powder - Peds 17 Gram(s) Oral daily PRN  sodium chloride 0.9% lock flush - Peds 3 milliLiter(s) IV Push every 8 hours      Fluid Management:  Fluid Status: [ ] Hypovolemic      [ ] Euvolemic         [ ] Fluid overloaded  Fluid Status Goal for next 24hr.:   [ ] Net Negative    ______   ml       [ ] Net Positive ____        ml      [ ] Intake=Output  [ ] No specific fluid goal  Fluid Intake Plan: ________________  Fluid Removal Plan: [ ] Not applicable  [ ] Diuretic Plan:  [ ] CRRT Plan:  [ ] Unchanged   [ ] No Fluid Removal     [ ] Prescribed weight loss of ___ml/hr.     [ ] Intake=Output       [ ] Fluid removal of ____    ml/hr.    ========================HEMATOLOGIC/ONCOLOGIC====================    ( 06-25 @ 12:50 )   PT: 16.0 sec;   INR: 1.43 ratio  aPTT: 37.9 sec        Transfusions:	  Hematologic/Oncologic Medications:    DVT Prophylaxis:    ============================INFECTIOUS DISEASE========================  Antimicrobials/Immunologic Medications:            =============================NEUROLOGY============================  Adequacy of sedation and pain control has been assessed and adjusted    SBS:  		  GISSELLE-1:	      Neurologic Medications:  acetaminophen   Oral Tab/Cap - Peds. 650 milliGRAM(s) Oral every 6 hours PRN  aspirin  Oral Tab/Cap - Peds 325 milliGRAM(s) Oral daily  levETIRAcetam  Oral Tab/Cap - Peds 1500 milliGRAM(s) Oral two times a day      OTHER MEDICATIONS:  Endocrine/Metabolic Medications:    Genitourinary Medications:    Topical/Other Medications:  Mexiletine 150 milliGRAM(s) 150 milliGRAM(s) Oral every 8 hours      =======================PATIENT CARE ===================  [ ] There are pressure ulcers/areas of breakdown that are being addressed  [ ] Preventive measures are being taken to decrease risk for skin breakdown  [ ] Necessity of urinary, arterial, and venous catheters discussed    ============================PHYSICAL EXAM============================  General: 	In no acute distress  Respiratory:	Lungs clear to auscultation bilaterally. Good aeration. No rales,   .		rhonchi, retractions or wheezing. Effort even and unlabored.  CV:		Regular rate and rhythm. Normal S1/S2. No murmurs, rubs, or   .		gallop. Capillary refill < 2 seconds. Distal pulses 2+ and equal.  Abdomen:	Soft, non-distended. Bowel sounds present. No palpable   .		hepatosplenomegaly.  Skin:		No rash.  Extremities:	Warm and well perfused. No gross extremity deformities.  Neurologic:	Alert and oriented. No acute change from baseline exam.    ============================IMAGING STUDIES=========================        =============================SOCIAL=================================  Parent/Guardian is at the bedside  Patient and Parent/Guardian updated as to the progress/plan of care    The patient remains in critical and unstable condition, and requires ICU care and monitoring    The patient is improving but requires continued monitoring and adjustment of therapy    Total critical care time spent by attending physician was 35 minutes excluding procedure time. CC:     Interval/Overnight Events: No events. ASA started last night      VITAL SIGNS:  T(C): 36.4 (06-26-21 @ 05:00), Max: 37 (06-25-21 @ 17:00)  HR: 70 (06-26-21 @ 05:00) (70 - 70)  BP: 92/57 (06-26-21 @ 05:00) (92/57 - 106/60)  RR: 19 (06-26-21 @ 05:00) (19 - 32)  SpO2: 86% (06-26-21 @ 05:00) (86% - 92%)  CVP(mm Hg): --    ==============================RESPIRATORY========================  Room air    Goal SpO2 85-88    ============================CARDIOVASCULAR=======================  Cardiac Rhythm:	Paced at 70    Cardiovascular Medications:  furosemide   Oral Tab/Cap - Peds 10 milliGRAM(s) Oral every 12 hours  lisinopril Oral Tab/Cap - Peds 5 milliGRAM(s) Oral daily  propranolol  Oral Tab/Cap - Peds 5 milliGRAM(s) Oral every 8 hours  sildenafil   Oral Tab/Cap - Peds 20 milliGRAM(s) Oral every 8 hours  spironolactone Oral Tab/Cap - Peds 100 milliGRAM(s) Oral daily  Mexiletine 150 milliGRAM(s) 150 milliGRAM(s) Oral every 8 hours        =====================FLUIDS/ELECTROLYTES/NUTRITION===================  I&O's Summary    25 Jun 2021 07:01  -  26 Jun 2021 07:00  --------------------------------------------------------  IN: 240 mL / OUT: 150 mL / NET: 90 mL      Daily           Diet: Regular diet    Gastrointestinal Medications:  polyethylene glycol 3350 Oral Powder - Peds 17 Gram(s) Oral daily PRN  sodium chloride 0.9% lock flush - Peds 3 milliLiter(s) IV Push every 8 hours      ========================HEMATOLOGIC/ONCOLOGIC====================    ( 06-25 @ 12:50 )   PT: 16.0 sec;   INR: 1.43 ratio  aPTT: 37.9 sec        DVT Prophylaxis:  mg daily    ============================INFECTIOUS DISEASE========================  No active issues      =============================NEUROLOGY============================  Adequacy of sedation and pain control has been assessed and adjusted    SBS:  		  GISSELLE-1:	  Shunt set at 1 yesterday    Neurologic Medications:  acetaminophen   Oral Tab/Cap - Peds. 650 milliGRAM(s) Oral every 6 hours PRN  aspirin  Oral Tab/Cap - Peds 325 milliGRAM(s) Oral daily  levETIRAcetam  Oral Tab/Cap - Peds 1500 milliGRAM(s) Oral two times a day        =======================PATIENT CARE ===================  [ ] There are pressure ulcers/areas of breakdown that are being addressed  [ ] Preventive measures are being taken to decrease risk for skin breakdown  [ ] Necessity of urinary, arterial, and venous catheters discussed    ============================PHYSICAL EXAM============================  General: 	In no acute distress  Respiratory:	Lungs clear to auscultation bilaterally. Good aeration. No rales,   .		rhonchi, retractions or wheezing. Effort even and unlabored.  CV:		Regular rate and rhythm. Normal S1/S2. No murmurs, rubs, or   .		gallop. Capillary refill < 2 seconds. Distal pulses 2+ and equal.  Abdomen:	Soft, non-distended. Bowel sounds present. No palpable   .		hepatosplenomegaly.  Skin:		No rash.  Extremities:	Warm and well perfused. No gross extremity deformities.  Neurologic:	Alert and oriented. No acute change from baseline exam.    ============================IMAGING STUDIES=========================        =============================SOCIAL=================================  Parent/Guardian is at the bedside  Patient and Parent/Guardian updated as to the progress/plan of care    The patient remains in critical and unstable condition, and requires ICU care and monitoring    The patient is improving but requires continued monitoring and adjustment of therapy    Total critical care time spent by attending physician was 35 minutes excluding procedure time. CC:     Interval/Overnight Events: No events. ASA started last night      VITAL SIGNS:  T(C): 36.4 (06-26-21 @ 05:00), Max: 37 (06-25-21 @ 17:00)  HR: 70 (06-26-21 @ 05:00) (70 - 70)  BP: 92/57 (06-26-21 @ 05:00) (92/57 - 106/60)  RR: 19 (06-26-21 @ 05:00) (19 - 32)  SpO2: 86% (06-26-21 @ 05:00) (86% - 92%)  CVP(mm Hg): --    ==============================RESPIRATORY========================  Room air    Goal SpO2 85-88    ============================CARDIOVASCULAR=======================  Cardiac Rhythm:	Paced at 70    Cardiovascular Medications:  furosemide   Oral Tab/Cap - Peds 10 milliGRAM(s) Oral every 12 hours  lisinopril Oral Tab/Cap - Peds 5 milliGRAM(s) Oral daily  propranolol  Oral Tab/Cap - Peds 5 milliGRAM(s) Oral every 8 hours  sildenafil   Oral Tab/Cap - Peds 20 milliGRAM(s) Oral every 8 hours  spironolactone Oral Tab/Cap - Peds 100 milliGRAM(s) Oral daily  Mexiletine 150 milliGRAM(s) 150 milliGRAM(s) Oral every 8 hours        =====================FLUIDS/ELECTROLYTES/NUTRITION===================  I&O's Summary    25 Jun 2021 07:01  -  26 Jun 2021 07:00  --------------------------------------------------------  IN: 240 mL / OUT: 150 mL / NET: 90 mL      Daily           Diet: Regular diet    Gastrointestinal Medications:  polyethylene glycol 3350 Oral Powder - Peds 17 Gram(s) Oral daily PRN  sodium chloride 0.9% lock flush - Peds 3 milliLiter(s) IV Push every 8 hours      ========================HEMATOLOGIC/ONCOLOGIC====================    ( 06-25 @ 12:50 )   PT: 16.0 sec;   INR: 1.43 ratio  aPTT: 37.9 sec        DVT Prophylaxis:  mg daily    ============================INFECTIOUS DISEASE========================  No active issues      =============================NEUROLOGY============================  Adequacy of sedation and pain control has been assessed and adjusted    SBS:  		  GISSELLE-1:	  Shunt set at 1 yesterday    Neurologic Medications:  acetaminophen   Oral Tab/Cap - Peds. 650 milliGRAM(s) Oral every 6 hours PRN  aspirin  Oral Tab/Cap - Peds 325 milliGRAM(s) Oral daily  levETIRAcetam  Oral Tab/Cap - Peds 1500 milliGRAM(s) Oral two times a day        =======================PATIENT CARE ===================  [ ] There are pressure ulcers/areas of breakdown that are being addressed  [ X] Preventive measures are being taken to decrease risk for skin breakdown  [ ] Necessity of urinary, arterial, and venous catheters discussed    ============================PHYSICAL EXAM============================  General: 	In no acute distress  Respiratory:	Lungs clear to auscultation bilaterally. Good aeration. No rales,   .		rhonchi, retractions or wheezing. Effort even and unlabored.  CV:		Regular rate and rhythm. Normal S1/S2. No murmurs, rubs, or   .		gallop. Capillary refill < 2 seconds. Distal pulses 2+ and equal.  Abdomen:	Soft, non-distended. Bowel sounds present. No palpable   .		hepatosplenomegaly.  Skin:		No rash. Surgical sites healing well  Extremities:	Warm and well perfused. No gross extremity deformities.  Neurologic:	Alert and oriented. No acute change from baseline exam. Minimal residual weakness on the right    ============================IMAGING STUDIES=========================        =============================SOCIAL=================================  Parent/Guardian is at the bedside  Patient and Parent/Guardian updated as to the progress/plan of care      The patient is improving but requires continued monitoring and adjustment of therapy

## 2021-06-26 NOTE — PROGRESS NOTE PEDS - SUBJECTIVE AND OBJECTIVE BOX
POD # 25 s/p left craniectomy for ICH, POD # 8 s/p insertion of VPS    Patient seen and examined with mother bedside, no significant events overnight, patient denies any headache, nausea, vomiting, ASA started yesterday.  VPS setting increased from 0.5 to 1.0 yesterday,  CT head yesterday showed No significant interval change compared with 6/20/2021 allowing for some resorption of postoperative air. Minimal trace high attenuation in the right frontal lobe. Small extra-axial fluid attenuation collection subjacent to the left frontal craniectomy and wire mesh cranioplasty. Left sided  shunt with tip of the shunt in the right lateral ventricle. No change in appearance of the ventricles compared with the prior. No hydrocephalus.    HPI:  21yo male with complex cardiac h/o DILV, L-malposition of great arteries, sick sinus syndrome and AV mihir disease with tachybradycardia syndrome with a dual chamber pacemaker (currently with RV lead fracture and is currently only LV paced 2/2 complete heart block), PSH of status post Qohmo-Gvhv-Yshaatx anastomosis and aortic arch reconstruction followed by a fenestrated lateral tunnel Fontan completion (now s/p fenestration closure). here s/p cardiac catheterization and ablation. Procedure was complicated by unsuccessful ablation and post extubation patient was noted to have increased hemoptysis and desaturations, patient was reintubated for airway protection and given propofol for sedation.  (26 May 2021 20:19)    PAST MEDICAL & SURGICAL HISTORY:  Double inlet left ventricle  D-TGA (dextro-transposition of great arteries)  Sick sinus syndrome  Atrial tachycardia  Hepatomegaly  Other ascites  Pulmonary hypertension  Pacemaker  AV block  Coagulopathy  S/P cardic cath  2001-assessment of anatomy prior to 1st surgery  4/13/2004- coil emolization of collateral vessels  7/25/2011-balloon angioplasty of superior narrowing of Fontan circuit, balloon angioplasty of LPA, ASD device placed for closure of Fontan fenestraion  10/24/2016- Cath of Fontan  S/P Nirmal-Taussig shunt  Bhxep-Hfdo-Gptfmpw anastomosis with modified right BT shunt and creation of pulmonary atresia  S/P celestine-Fontan operation  3/15/2003- Celestine-Fontan with ligation of BT shunt and left pulmonary artery plasty  Cardiac pacemaker 5/26/2004    PHYSICAL EXAM:  AA&0 x 3 ,follows commands, PERRL, EOMI  Motor- LUE/LLE 5/5, RUE/RLE 4+/5  Sensory - intact to light touch  Incision site C/D/I    Diet:  Regular ( x )  NPO       (  )    Drains:  ventriculostomy   (  )  Lumbar drain       (  )  NEIL drain               (  )  Hemovac              (  )    Vital Signs Last 24 Hrs  T(C): 36.1 (26 Jun 2021 08:00), Max: 37 (25 Jun 2021 17:00)  T(F): 97 (26 Jun 2021 08:00), Max: 98.6 (25 Jun 2021 17:00)  HR: 70 (26 Jun 2021 08:00) (70 - 70)  BP: 94/54 (26 Jun 2021 08:00) (92/57 - 106/60)  BP(mean): 65 (26 Jun 2021 08:00) (60 - 69)  RR: 22 (26 Jun 2021 08:00) (19 - 32)  SpO2: 87% (26 Jun 2021 08:00) (86% - 92%)  I&O's Summary    25 Jun 2021 07:01  -  26 Jun 2021 07:00  --------------------------------------------------------  IN: 240 mL / OUT: 150 mL / NET: 90 mL      MEDICATIONS  (STANDING):  aspirin  Oral Tab/Cap - Peds 325 milliGRAM(s) Oral daily  furosemide   Oral Tab/Cap - Peds 10 milliGRAM(s) Oral every 12 hours  levETIRAcetam  Oral Tab/Cap - Peds 1500 milliGRAM(s) Oral two times a day  lisinopril Oral Tab/Cap - Peds 5 milliGRAM(s) Oral daily  Mexiletine 150 milliGRAM(s) 150 milliGRAM(s) Oral every 8 hours  propranolol  Oral Tab/Cap - Peds 5 milliGRAM(s) Oral every 8 hours  sildenafil   Oral Tab/Cap - Peds 20 milliGRAM(s) Oral every 8 hours  sodium chloride 0.9% lock flush - Peds 3 milliLiter(s) IV Push every 8 hours  spironolactone Oral Tab/Cap - Peds 100 milliGRAM(s) Oral daily    MEDICATIONS  (PRN):  acetaminophen   Oral Tab/Cap - Peds. 650 milliGRAM(s) Oral every 6 hours PRN Mild Pain (1 - 3), Moderate Pain (4 - 6), Severe Pain (7 - 10)  polyethylene glycol 3350 Oral Powder - Peds 17 Gram(s) Oral daily PRN Constipation    LABS:          PT/INR - ( 25 Jun 2021 12:50 )   PT: 16.0 sec;   INR: 1.43 ratio         PTT - ( 25 Jun 2021 12:50 )  PTT:37.9 sec

## 2021-06-27 LAB
ALBUMIN SERPL ELPH-MCNC: 4.8 G/DL — SIGNIFICANT CHANGE UP (ref 3.3–5)
ALP SERPL-CCNC: 198 U/L — SIGNIFICANT CHANGE UP (ref 60–270)
ALT FLD-CCNC: 41 U/L — SIGNIFICANT CHANGE UP (ref 4–41)
ANION GAP SERPL CALC-SCNC: 19 MMOL/L — HIGH (ref 7–14)
APTT BLD: 37.5 SEC — HIGH (ref 27–36.3)
AST SERPL-CCNC: 29 U/L — SIGNIFICANT CHANGE UP (ref 4–40)
BILIRUB SERPL-MCNC: 1.1 MG/DL — SIGNIFICANT CHANGE UP (ref 0.2–1.2)
BUN SERPL-MCNC: 22 MG/DL — SIGNIFICANT CHANGE UP (ref 7–23)
CALCIUM SERPL-MCNC: 10.5 MG/DL — SIGNIFICANT CHANGE UP (ref 8.4–10.5)
CHLORIDE SERPL-SCNC: 97 MMOL/L — LOW (ref 98–107)
CO2 SERPL-SCNC: 17 MMOL/L — LOW (ref 22–31)
CREAT SERPL-MCNC: 0.85 MG/DL — SIGNIFICANT CHANGE UP (ref 0.5–1.3)
GLUCOSE SERPL-MCNC: 113 MG/DL — HIGH (ref 70–99)
INR BLD: 1.39 RATIO — HIGH (ref 0.88–1.16)
MAGNESIUM SERPL-MCNC: 2.6 MG/DL — SIGNIFICANT CHANGE UP (ref 1.6–2.6)
PHOSPHATE SERPL-MCNC: 4.9 MG/DL — HIGH (ref 2.5–4.5)
POTASSIUM SERPL-MCNC: 4.8 MMOL/L — SIGNIFICANT CHANGE UP (ref 3.5–5.3)
POTASSIUM SERPL-SCNC: 4.8 MMOL/L — SIGNIFICANT CHANGE UP (ref 3.5–5.3)
PROT SERPL-MCNC: 9 G/DL — HIGH (ref 6–8.3)
PROTHROM AB SERPL-ACNC: 15.6 SEC — HIGH (ref 10.6–13.6)
SODIUM SERPL-SCNC: 133 MMOL/L — LOW (ref 135–145)

## 2021-06-27 PROCEDURE — 99232 SBSQ HOSP IP/OBS MODERATE 35: CPT

## 2021-06-27 RX ADMIN — Medication 20 MILLIGRAM(S): at 05:44

## 2021-06-27 RX ADMIN — SPIRONOLACTONE 100 MILLIGRAM(S): 25 TABLET, FILM COATED ORAL at 09:54

## 2021-06-27 RX ADMIN — Medication 10 MILLIGRAM(S): at 17:18

## 2021-06-27 RX ADMIN — Medication 10 MILLIGRAM(S): at 05:43

## 2021-06-27 RX ADMIN — SODIUM CHLORIDE 3 MILLILITER(S): 9 INJECTION INTRAMUSCULAR; INTRAVENOUS; SUBCUTANEOUS at 09:54

## 2021-06-27 RX ADMIN — SODIUM CHLORIDE 3 MILLILITER(S): 9 INJECTION INTRAMUSCULAR; INTRAVENOUS; SUBCUTANEOUS at 02:05

## 2021-06-27 RX ADMIN — LISINOPRIL 5 MILLIGRAM(S): 2.5 TABLET ORAL at 09:54

## 2021-06-27 RX ADMIN — SODIUM CHLORIDE 3 MILLILITER(S): 9 INJECTION INTRAMUSCULAR; INTRAVENOUS; SUBCUTANEOUS at 02:17

## 2021-06-27 RX ADMIN — Medication 20 MILLIGRAM(S): at 22:05

## 2021-06-27 RX ADMIN — LEVETIRACETAM 1500 MILLIGRAM(S): 250 TABLET, FILM COATED ORAL at 09:55

## 2021-06-27 RX ADMIN — LEVETIRACETAM 1500 MILLIGRAM(S): 250 TABLET, FILM COATED ORAL at 22:05

## 2021-06-27 RX ADMIN — SODIUM CHLORIDE 3 MILLILITER(S): 9 INJECTION INTRAMUSCULAR; INTRAVENOUS; SUBCUTANEOUS at 17:26

## 2021-06-27 RX ADMIN — Medication 20 MILLIGRAM(S): at 13:51

## 2021-06-27 RX ADMIN — Medication 325 MILLIGRAM(S): at 22:05

## 2021-06-27 NOTE — PROGRESS NOTE PEDS - ASSESSMENT
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 He has elevated Fontan pressure secondary to LV diastolic dysfunction.  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 Significant issues with neuro-agitation and delirium that have significantly improved  Now s/p VPS and prosthetic skull flap 6/18    PLAN:  Neuro: Started on ASA last night (approved by NS)  - melatonin Qday  - propranolol weaned 6/25  - No delirium   - Keppra (clinical szs)  - R sided hemiparesis improved  - tylenol prn for fever control  -PT/OT/speech/rehab consult  -cannot have mri    Resp:  - RA, goal SpO2 > 85% (VV collaterals on CTA)    CV:  continue mexiletine for hx of  VT  VOO 70, normal mode is DDD (though atrial wires don't work so effectively VVI)  Still in IART   On home aldactone  On home lisinopril today  Continue Sildenafil   Continue Lasix po q12  sono r chest (6/15) with moderate effusion clinically improving with diuresis  echo 6/8 extremely limited windows    Heme:  - Aspirin 325mg daily started 6/25 per Heme/Cardiology plan in conjunction with NSGY with all teams in agreeement  - s/p Xarelto  - Goal PLTs >100  - Goal INR < 1.6  s/p Vitamin    FEN:   Bowel regimen: Senna and Miralax PRN  Cleared by speech for oral diet of regular solids and thin fluids as tolerated   nutrition consulting  speech and swallow consulting    ID  - s/p Ancef post-op    Access:  PIV     Dispo: Inpatient vs outpatient rehab--mother wants to be discharged home.  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 He has elevated Fontan pressure secondary to LV diastolic dysfunction.  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 Significant issues with neuro-agitation and delirium that have significantly improved  Now s/p VPS and prosthetic skull flap 6/18    PLAN:  Neuro: Started on ASA 6/25 (approved by NS)  - melatonin Qday  - propranolol weaned 6/25  - No delirium   - Keppra (clinical szs)  - R sided hemiparesis improved  - tylenol prn for fever control  -PT/OT/speech/rehab consult  -cannot have mri    Resp:  - RA, goal SpO2 > 85% (VV collaterals on CTA)    CV:  continue mexiletine for hx of  VT  VOO 70, normal mode is DDD (though atrial wires don't work so effectively VVI)  Still in IART   On home aldactone  On home lisinopril today  Continue Sildenafil   Continue Lasix po q12  sono r chest (6/15) with moderate effusion clinically improving with diuresis  echo 6/8 extremely limited windows    Heme:  - Aspirin 325mg daily started 6/25 per Heme/Cardiology plan in conjunction with NSGY with all teams in agreeement  - s/p Xarelto  - Goal PLTs >100  - Goal INR < 1.6  s/p Vitamin    FEN:   Bowel regimen: Senna and Miralax PRN  Cleared by speech for oral diet of regular solids and thin fluids as tolerated   nutrition consulting  speech and swallow consulting    ID  - s/p Ancef post-op    Access:  PIV     Dispo: Inpatient vs outpatient rehab--mother wants to be discharged home. Discharge planning underway.

## 2021-06-27 NOTE — PROGRESS NOTE PEDS - ASSESSMENT
POD # 26 s/p left craniectomy for ICH, POD # 9 s/p insertion of VPS. Strata set to 0.5 post op but changed to 1.0 on 6/25    - Restarted on ASA friday  - Can shower and get hair wet  - Tentative dc to rehab  - Rescan if any concern for mental status change

## 2021-06-27 NOTE — CHART NOTE - NSCHARTNOTEFT_GEN_A_CORE
20 y.o. 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 Had L frontal hemorrhagic stroke on 21 requiring decompressive craniectomy and urgent evacuation in OR; per MD notes. Extubated . Now s/p VPS and prosthetic skull flap . Transferred from picu to  .   Jimbo was NPO -, TPN -, EN (Jevity 1.2) -.  Passed swallow eval , SLP rec regular diet + thin liquids.   Spoke with Jimbo and his aunt at time of visit, reports good appetite and po intake. Denies N/V/GI distress. Has not had difficulty chewing/swallowing.  Last BM charted . +Bowel regimen prn. +probiotics.   Weights: : 67.7 kg, : 61.1 kg, : 66.1 kg. Unsure if weight on  is accurate.   No food preferences at this time.     Estimated energy needs: 30-35 kcal/kg using IBW of 63.8 [id number] kcal/day  Estimated protein needs: 1.2-1.3 g/kg using IBW of 63.8 k-83 g pro/day    Nutrition consult ordered for education prior to discharge. Provided Jimbo and aunt with verbal and written education on "Heart Healthy Nutrition Therapy"; encouraged a diet low in sodium and saturated fats, high in monounsaturated fats, omega 3's, whole grains, fruits, vegs, lean proteins, etc. All qs/concerns addressed.     PLAN/RECS:  1. Continue regular diet as tolerated, obtain food preferences    2. Bowel regimen prn   3. Monitor po intake, weights, labs     GOAL:  Pt will meet >75% of estimated nutrient needs with good tolerance.

## 2021-06-27 NOTE — PROGRESS NOTE PEDS - SUBJECTIVE AND OBJECTIVE BOX
CC:     Interval/Overnight Events:      VITAL SIGNS:  T(C): 36.4 (06-27-21 @ 05:00), Max: 36.7 (06-26-21 @ 14:00)  HR: 70 (06-27-21 @ 05:00) (70 - 70)  BP: 97/51 (06-27-21 @ 05:00) (87/59 - 102/51)  ABP: --  ABP(mean): --  RR: 22 (06-27-21 @ 05:00) (14 - 25)  SpO2: 94% (06-27-21 @ 05:00) (87% - 94%)  CVP(mm Hg): --    ==============================RESPIRATORY========================  FiO2: 	    Mechanical Ventilation:       Respiratory Medications:        ============================CARDIOVASCULAR=======================  Cardiac Rhythm:	 NSR    Cardiovascular Medications:  furosemide   Oral Tab/Cap - Peds 10 milliGRAM(s) Oral every 12 hours  lisinopril Oral Tab/Cap - Peds 5 milliGRAM(s) Oral daily  propranolol  Oral Tab/Cap - Peds 5 milliGRAM(s) Oral every 8 hours  sildenafil   Oral Tab/Cap - Peds 20 milliGRAM(s) Oral every 8 hours  spironolactone Oral Tab/Cap - Peds 100 milliGRAM(s) Oral daily        =====================FLUIDS/ELECTROLYTES/NUTRITION===================  I&O's Summary    26 Jun 2021 07:01  -  27 Jun 2021 07:00  --------------------------------------------------------  IN: 715 mL / OUT: 740 mL / NET: -25 mL      Daily           Diet:     Gastrointestinal Medications:  polyethylene glycol 3350 Oral Powder - Peds 17 Gram(s) Oral daily PRN  sodium chloride 0.9% lock flush - Peds 3 milliLiter(s) IV Push every 8 hours      Fluid Management:  Fluid Status: [ ] Hypovolemic      [ ] Euvolemic         [ ] Fluid overloaded  Fluid Status Goal for next 24hr.:   [ ] Net Negative    ______   ml       [ ] Net Positive ____        ml      [ ] Intake=Output  [ ] No specific fluid goal  Fluid Intake Plan: ________________  Fluid Removal Plan: [ ] Not applicable  [ ] Diuretic Plan:  [ ] CRRT Plan:  [ ] Unchanged   [ ] No Fluid Removal     [ ] Prescribed weight loss of ___ml/hr.     [ ] Intake=Output       [ ] Fluid removal of ____    ml/hr.    ========================HEMATOLOGIC/ONCOLOGIC====================    ( 06-26 @ 16:38 )   PT: 14.6 sec;   INR: 1.30 ratio  aPTT: 36.5 sec        Transfusions:	  Hematologic/Oncologic Medications:    DVT Prophylaxis:    ============================INFECTIOUS DISEASE========================  Antimicrobials/Immunologic Medications:            =============================NEUROLOGY============================  Adequacy of sedation and pain control has been assessed and adjusted    SBS:  		  GISSELLE-1:	      Neurologic Medications:  acetaminophen   Oral Tab/Cap - Peds. 650 milliGRAM(s) Oral every 6 hours PRN  aspirin  Oral Tab/Cap - Peds 325 milliGRAM(s) Oral daily  levETIRAcetam  Oral Tab/Cap - Peds 1500 milliGRAM(s) Oral two times a day      OTHER MEDICATIONS:  Endocrine/Metabolic Medications:    Genitourinary Medications:    Topical/Other Medications:  Mexiletine 150 milliGRAM(s) 150 milliGRAM(s) Oral every 8 hours      =======================PATIENT CARE ===================  [ ] There are pressure ulcers/areas of breakdown that are being addressed  [ ] Preventive measures are being taken to decrease risk for skin breakdown  [ ] Necessity of urinary, arterial, and venous catheters discussed    ============================PHYSICAL EXAM============================  General: 	In no acute distress  Respiratory:	Lungs clear to auscultation bilaterally. Good aeration. No rales,   .		rhonchi, retractions or wheezing. Effort even and unlabored.  CV:		Regular rate and rhythm. Normal S1/S2. No murmurs, rubs, or   .		gallop. Capillary refill < 2 seconds. Distal pulses 2+ and equal.  Abdomen:	Soft, non-distended. Bowel sounds present. No palpable   .		hepatosplenomegaly.  Skin:		No rash.  Extremities:	Warm and well perfused. No gross extremity deformities.  Neurologic:	Alert and oriented. No acute change from baseline exam.    ============================IMAGING STUDIES=========================        =============================SOCIAL=================================  Parent/Guardian is at the bedside  Patient and Parent/Guardian updated as to the progress/plan of care    The patient remains in critical and unstable condition, and requires ICU care and monitoring    The patient is improving but requires continued monitoring and adjustment of therapy    Total critical care time spent by attending physician was 35 minutes excluding procedure time. CC:     Interval/Overnight Events: None       VITAL SIGNS:  T(C): 36.4 (06-27-21 @ 05:00), Max: 36.7 (06-26-21 @ 14:00)  HR: 70 (06-27-21 @ 05:00) (70 - 70)  BP: 97/51 (06-27-21 @ 05:00) (87/59 - 102/51)  RR: 22 (06-27-21 @ 05:00) (14 - 25)  SpO2: 94% (06-27-21 @ 05:00) (87% - 94%)      ==============================RESPIRATORY========================  Room air    ============================CARDIOVASCULAR=======================  Cardiac Rhythm:	 DDD paced @ 70    Cardiovascular Medications:  furosemide   Oral Tab/Cap - Peds 10 milliGRAM(s) Oral every 12 hours  lisinopril Oral Tab/Cap - Peds 5 milliGRAM(s) Oral daily  propranolol  Oral Tab/Cap - Peds 5 milliGRAM(s) Oral every 8 hours  sildenafil   Oral Tab/Cap - Peds 20 milliGRAM(s) Oral every 8 hours  spironolactone Oral Tab/Cap - Peds 100 milliGRAM(s) Oral daily        =====================FLUIDS/ELECTROLYTES/NUTRITION===================  I&O's Summary    26 Jun 2021 07:01  -  27 Jun 2021 07:00  --------------------------------------------------------  IN: 715 mL / OUT: 740 mL / NET: -25 mL      Daily     Diet: Regular    Gastrointestinal Medications:  polyethylene glycol 3350 Oral Powder - Peds 17 Gram(s) Oral daily PRN  sodium chloride 0.9% lock flush - Peds 3 milliLiter(s) IV Push every 8 hours      ========================HEMATOLOGIC/ONCOLOGIC====================    ( 06-26 @ 16:38 )   PT: 14.6 sec;   INR: 1.30 ratio  aPTT: 36.5 sec:    DVT Prophylaxis:  mg daily    ============================INFECTIOUS DISEASE========================  No active issues  =============================NEUROLOGY============================  Neurologic Medications:  acetaminophen   Oral Tab/Cap - Peds. 650 milliGRAM(s) Oral every 6 hours PRN  aspirin  Oral Tab/Cap - Peds 325 milliGRAM(s) Oral daily  levETIRAcetam  Oral Tab/Cap - Peds 1500 milliGRAM(s) Oral two times a day      Topical/Other Medications:  Mexiletine 150 milliGRAM(s) 150 milliGRAM(s) Oral every 8 hours      =======================PATIENT CARE ===================  [ ] There are pressure ulcers/areas of breakdown that are being addressed  [ X] Preventive measures are being taken to decrease risk for skin breakdown  [ ] Necessity of urinary, arterial, and venous catheters discussed    ============================PHYSICAL EXAM============================  General: 	In no acute distress  Respiratory:	Lungs clear to auscultation bilaterally. Good aeration. No rales,   .		rhonchi, retractions or wheezing. Effort even and unlabored.  CV:		Regular rate and rhythm. Normal S1/S2. No rubs, or   .		gallop. Capillary refill < 2 seconds. Distal pulses 2+ and equal.  Abdomen:	Soft, non-distended. Bowel sounds present. No palpable   .		hepatosplenomegaly.  Skin:		No rash.  Extremities:	Warm and well perfused. No gross extremity deformities.  Neurologic:	Alert and oriented. Ambulating without difficulty    ============================IMAGING STUDIES=========================        =============================SOCIAL=================================  Parent/Guardian is at the bedside  Patient and Parent/Guardian updated as to the progress/plan of care      The patient is improving but requires continued monitoring and adjustment of therapy

## 2021-06-27 NOTE — PROGRESS NOTE PEDS - SUBJECTIVE AND OBJECTIVE BOX
Patient seen and examined with mother bedside, no significant events overnight, patient denies any headache, nausea, vomiting, ASA started yesterday.  VPS setting increased from 0.5 to 1.0 yesterday,  CT head yesterday showed No significant interval change compared with 6/20/2021 allowing for some resorption of postoperative air. Minimal trace high attenuation in the right frontal lobe. Small extra-axial fluid attenuation collection subjacent to the left frontal craniectomy and wire mesh cranioplasty. Left sided  shunt with tip of the shunt in the right lateral ventricle. No change in appearance of the ventricles compared with the prior. No hydrocephalus.    HPI:  19yo male with complex cardiac h/o DILV, L-malposition of great arteries, sick sinus syndrome and AV mihir disease with tachybradycardia syndrome with a dual chamber pacemaker (currently with RV lead fracture and is currently only LV paced 2/2 complete heart block), PSH of status post Tnzoz-Iizf-Ozpqjjs anastomosis and aortic arch reconstruction followed by a fenestrated lateral tunnel Fontan completion (now s/p fenestration closure). here s/p cardiac catheterization and ablation. Procedure was complicated by unsuccessful ablation and post extubation patient was noted to have increased hemoptysis and desaturations, patient was reintubated for airway protection and given propofol for sedation.  (26 May 2021 20:19)    PAST MEDICAL & SURGICAL HISTORY:  Double inlet left ventricle  D-TGA (dextro-transposition of great arteries)  Sick sinus syndrome  Atrial tachycardia  Hepatomegaly  Other ascites  Pulmonary hypertension  Pacemaker  AV block  Coagulopathy  S/P cardic cath  2001-assessment of anatomy prior to 1st surgery  4/13/2004- coil emolization of collateral vessels  7/25/2011-balloon angioplasty of superior narrowing of Fontan circuit, balloon angioplasty of LPA, ASD device placed for closure of Fontan fenestraion  10/24/2016- Cath of Fontan  S/P Nirmal-Taussig shunt  Bfgds-Ulfo-Zpsiwui anastomosis with modified right BT shunt and creation of pulmonary atresia  S/P celestine-Fontan operation  3/15/2003- Celestine-Fontan with ligation of BT shunt and left pulmonary artery plasty  Cardiac pacemaker 5/26/2004    PHYSICAL EXAM:  AA&0 x 3 ,follows commands, PERRL, EOMI  Motor- LUE/LLE 5/5, RUE/RLE 4+/5  Sensory - intact to light touch  Incision site C/D/I    Diet:  Regular ( x )  NPO       (  )    Drains:  ventriculostomy   (  )  Lumbar drain       (  )  NEIL drain               (  )  Hemovac              (  )    Vital Signs Last 24 Hrs  T(C): 36.1 (26 Jun 2021 08:00), Max: 37 (25 Jun 2021 17:00)  T(F): 97 (26 Jun 2021 08:00), Max: 98.6 (25 Jun 2021 17:00)  HR: 70 (26 Jun 2021 08:00) (70 - 70)  BP: 94/54 (26 Jun 2021 08:00) (92/57 - 106/60)  BP(mean): 65 (26 Jun 2021 08:00) (60 - 69)  RR: 22 (26 Jun 2021 08:00) (19 - 32)  SpO2: 87% (26 Jun 2021 08:00) (86% - 92%)  I&O's Summary    25 Jun 2021 07:01  -  26 Jun 2021 07:00  --------------------------------------------------------  IN: 240 mL / OUT: 150 mL / NET: 90 mL      MEDICATIONS  (STANDING):  aspirin  Oral Tab/Cap - Peds 325 milliGRAM(s) Oral daily  furosemide   Oral Tab/Cap - Peds 10 milliGRAM(s) Oral every 12 hours  levETIRAcetam  Oral Tab/Cap - Peds 1500 milliGRAM(s) Oral two times a day  lisinopril Oral Tab/Cap - Peds 5 milliGRAM(s) Oral daily  Mexiletine 150 milliGRAM(s) 150 milliGRAM(s) Oral every 8 hours  propranolol  Oral Tab/Cap - Peds 5 milliGRAM(s) Oral every 8 hours  sildenafil   Oral Tab/Cap - Peds 20 milliGRAM(s) Oral every 8 hours  sodium chloride 0.9% lock flush - Peds 3 milliLiter(s) IV Push every 8 hours  spironolactone Oral Tab/Cap - Peds 100 milliGRAM(s) Oral daily    MEDICATIONS  (PRN):  acetaminophen   Oral Tab/Cap - Peds. 650 milliGRAM(s) Oral every 6 hours PRN Mild Pain (1 - 3), Moderate Pain (4 - 6), Severe Pain (7 - 10)  polyethylene glycol 3350 Oral Powder - Peds 17 Gram(s) Oral daily PRN Constipation    LABS:          PT/INR - ( 25 Jun 2021 12:50 )   PT: 16.0 sec;   INR: 1.43 ratio         PTT - ( 25 Jun 2021 12:50 )  PTT:37.9 sec

## 2021-06-28 LAB
APTT BLD: 22.6 SEC — LOW (ref 27–36.3)
INR BLD: 1.27 RATIO — HIGH (ref 0.88–1.16)
PLG AG PPP-MCNC: 13.9 MG/DL — SIGNIFICANT CHANGE UP
PROTHROM AB SERPL-ACNC: 14.3 SEC — HIGH (ref 10.6–13.6)

## 2021-06-28 PROCEDURE — 99232 SBSQ HOSP IP/OBS MODERATE 35: CPT

## 2021-06-28 RX ADMIN — LEVETIRACETAM 1500 MILLIGRAM(S): 250 TABLET, FILM COATED ORAL at 10:52

## 2021-06-28 RX ADMIN — LEVETIRACETAM 1500 MILLIGRAM(S): 250 TABLET, FILM COATED ORAL at 21:45

## 2021-06-28 RX ADMIN — SODIUM CHLORIDE 3 MILLILITER(S): 9 INJECTION INTRAMUSCULAR; INTRAVENOUS; SUBCUTANEOUS at 08:00

## 2021-06-28 RX ADMIN — SPIRONOLACTONE 100 MILLIGRAM(S): 25 TABLET, FILM COATED ORAL at 10:53

## 2021-06-28 RX ADMIN — Medication 20 MILLIGRAM(S): at 14:11

## 2021-06-28 RX ADMIN — Medication 20 MILLIGRAM(S): at 21:46

## 2021-06-28 RX ADMIN — LISINOPRIL 5 MILLIGRAM(S): 2.5 TABLET ORAL at 10:52

## 2021-06-28 RX ADMIN — Medication 325 MILLIGRAM(S): at 21:45

## 2021-06-28 RX ADMIN — SODIUM CHLORIDE 3 MILLILITER(S): 9 INJECTION INTRAMUSCULAR; INTRAVENOUS; SUBCUTANEOUS at 14:00

## 2021-06-28 RX ADMIN — Medication 10 MILLIGRAM(S): at 18:35

## 2021-06-28 RX ADMIN — SODIUM CHLORIDE 3 MILLILITER(S): 9 INJECTION INTRAMUSCULAR; INTRAVENOUS; SUBCUTANEOUS at 20:08

## 2021-06-28 RX ADMIN — Medication 20 MILLIGRAM(S): at 06:10

## 2021-06-28 RX ADMIN — Medication 10 MILLIGRAM(S): at 06:10

## 2021-06-28 RX ADMIN — SODIUM CHLORIDE 3 MILLILITER(S): 9 INJECTION INTRAMUSCULAR; INTRAVENOUS; SUBCUTANEOUS at 01:00

## 2021-06-28 NOTE — PROGRESS NOTE PEDS - ASSESSMENT
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 He has elevated Fontan pressure secondary to LV diastolic dysfunction.  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 Significant issues with neuro-agitation and delirium that have significantly improved  Now s/p VPS and prosthetic skull flap 6/18    PLAN:  Neuro: Started on ASA 6/25 (approved by NS)  - melatonin Qday  - propranolol weaned 6/25  - No delirium   - Keppra (clinical szs)  - R sided hemiparesis improved  - tylenol prn for fever control  -PT/OT/speech/rehab consult  -cannot have mri    Resp:  - RA, goal SpO2 > 85% (VV collaterals on CTA)    CV:  continue mexiletine for hx of  VT  VOO 70, normal mode is DDD (though atrial wires don't work so effectively VVI)  Still in IART   On home aldactone  On home lisinopril today  Continue Sildenafil   Continue Lasix po q12  sono r chest (6/15) with moderate effusion clinically improving with diuresis  echo 6/8 extremely limited windows    Heme:  - Aspirin 325mg daily started 6/25 per Heme/Cardiology plan in conjunction with NSGY with all teams in agreeement  - s/p Xarelto  - Goal PLTs >100  - Goal INR < 1.6  s/p Vitamin    FEN:   Bowel regimen: Senna and Miralax PRN  Cleared by speech for oral diet of regular solids and thin fluids as tolerated   nutrition consulting  speech and swallow consulting    ID  - s/p Ancef post-op    Access:  PIV     Dispo: Inpatient vs outpatient rehab--mother wants to be discharged home. Discharge planning underway.  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 He has elevated Fontan pressure secondary to LV diastolic dysfunction.  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 Significant issues with neuro-agitation and delirium that have significantly improved  Now s/p VPS and prosthetic skull flap 6/18. Planning for transition to home.    PLAN:  Neuro: Started on ASA 6/25 (approved by NS)  - melatonin Qday  - propranolol DCd  - No delirium   - Keppra (clinical szs)  - R sided hemiparesis improved  - tylenol prn for fever control  -PT/OT/speech/rehab consult  -cannot have mri    Resp:  - RA, goal SpO2 > 85% (VV collaterals on CTA)    CV:  continue mexiletine for hx of  VT  VOO 70, normal mode is DDD (though atrial wires don't work so effectively VVI)  Still in IART   On home aldactone  On home lisinopril today  Continue Sildenafil   Continue Lasix po q12  sono r chest (6/15) with moderate effusion clinically improving with diuresis    Heme:  - Aspirin 325mg daily started 6/25 per Heme/Cardiology plan in conjunction with NSGY with all teams in agreeement  - s/p Xarelto  - Goal PLTs >100  - Goal INR < 1.6    FEN:   Bowel regimen: Senna and Miralax PRN  Cleared by speech for oral diet of regular solids and thin fluids as tolerated   nutrition consulting  speech and swallow consulting    ID  - s/p Ancef post-op    Access:  PIV     Dispo: Inpatient vs outpatient rehab--mother wants to be discharged home. Discharge planning underway. Discussing today with cardiology, neurosurgery, case management, and physical therapy team.  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 He has elevated Fontan pressure secondary to LV diastolic dysfunction.  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 Significant issues with neuro-agitation and delirium that have significantly improved  Now s/p VPS and prosthetic skull flap 6/18. Planning for transition to home.    PLAN:  Neuro: Started on ASA 6/25 (approved by NS)  - melatonin Qday  - propranolol DCd  - No delirium   - Keppra (clinical szs)  - R sided hemiparesis improved  - tylenol prn for fever control  -PT/OT/speech/rehab consult  -cannot have mri    Resp:  - RA, goal SpO2 > 85% (VV collaterals on CTA)    CV:  continue mexiletine for hx of  VT  VOO 70, normal mode is DDD (though atrial wires don't work so effectively VVI)  Still in IART   On home aldactone  On home lisinopril today  Continue Sildenafil   Continue Lasix po q12  sono r chest (6/15) with moderate effusion clinically improving with diuresis    Heme:  - Aspirin 325mg daily started 6/25 per Heme/Cardiology plan in conjunction with NSGY with all teams in agreeement  - s/p Xarelto  - Goal PLTs >100  - Goal INR < 1.6    FEN:   Bowel regimen: Senna and Miralax PRN  Cleared by speech for oral diet of regular solids and thin fluids as tolerated   nutrition consulting  speech and swallow consulting  4% fluid negative, continue lasix for euvolemic goal    ID  - s/p Ancef post-op    Access:  PIV     Dispo: Inpatient vs outpatient rehab--mother wants to be discharged home. Discharge planning underway. Discussing today with cardiology, neurosurgery, case management, and physical therapy team.

## 2021-06-28 NOTE — PROGRESS NOTE PEDS - SUBJECTIVE AND OBJECTIVE BOX
s/p left craniectomy and insertion of VPS for ICH and HCP    Patient seen and examined with mother bedside, no significant events overnight.,  patient denies any complaints.    HPI:  19yo male with complex cardiac h/o DILV, L-malposition of great arteries, sick sinus syndrome and AV mihir disease with tachybradycardia syndrome with a dual chamber pacemaker (currently with RV lead fracture and is currently only LV paced 2/2 complete heart block), PSH of status post Vvikk-Tukc-Fllrbtk anastomosis and aortic arch reconstruction followed by a fenestrated lateral tunnel Fontan completion (now s/p fenestration closure). here s/p cardiac catheterization and ablation. Procedure was complicated by unsuccessful ablation and post extubation patient was noted to have increased hemoptysis and desaturations, patient was reintubated for airway protection and given propofol for sedation.  (26 May 2021 20:19)    PAST MEDICAL & SURGICAL HISTORY:  Double inlet left ventricle    D-TGA (dextro-transposition of great arteries)    Sick sinus syndrome    Atrial tachycardia    Hepatomegaly    Other ascites    Pulmonary hypertension    Pacemaker    AV block    Coagulopathy    S/P cardiac cath  2001-assessment of anatomy prior to 1st surgery  4/13/2004- coil emolization of collateral vessels  7/25/2011-balloon angioplasty of superior narrowing of Fontan circuit, balloon angioplasty of LPA, ASD device placed for closure of Fontan fenestraion  10/24/2016- Cath of Fontan    S/P Nirmal-Taussig shunt  Aycdq-Nbfa-Ixckpif anastomosis with modified right BT shunt and creation of pulmonary atresia    S/P celestine-Fontan operation  3/15/2003- Celestine-Fontan with ligation of BT shunt and left pulmonary artery plasty    Cardiac pacemaker  5/26/2004        PHYSICAL EXAM:  AA&0 x 3 ,follows commands, PERRL, EOMI  Motor- LUE/LLE 5/5, RUE/RLE 4+/5  Sensory - intact to light touch  Incision site c/d/i    Diet:  Regular (  x)  NPO       (  )    Drains:  ventriculostomy   (  )  Lumbar drain       (  )  NEIL drain               (  )  Hemovac              (  )    Vital Signs Last 24 Hrs  T(C): 36.3 (28 Jun 2021 05:15), Max: 37 (27 Jun 2021 17:00)  T(F): 97.4 (28 Jun 2021 05:15), Max: 98.6 (27 Jun 2021 17:00)  HR: 70 (28 Jun 2021 05:15) (70 - 70)  BP: 95/64 (28 Jun 2021 05:15) (89/50 - 105/52)  BP(mean): 72 (28 Jun 2021 05:15) (58 - 72)  RR: 17 (28 Jun 2021 05:15) (17 - 32)  SpO2: 88% (28 Jun 2021 05:15) (86% - 93%)  I&O's Summary    27 Jun 2021 07:01  -  28 Jun 2021 07:00  --------------------------------------------------------  IN: 480 mL / OUT: 750 mL / NET: -270 mL      MEDICATIONS  (STANDING):  aspirin  Oral Tab/Cap - Peds 325 milliGRAM(s) Oral daily  furosemide   Oral Tab/Cap - Peds 10 milliGRAM(s) Oral every 12 hours  levETIRAcetam  Oral Tab/Cap - Peds 1500 milliGRAM(s) Oral two times a day  lisinopril Oral Tab/Cap - Peds 5 milliGRAM(s) Oral daily  Mexiletine 150 milliGRAM(s) 150 milliGRAM(s) Oral every 8 hours  sildenafil   Oral Tab/Cap - Peds 20 milliGRAM(s) Oral every 8 hours  sodium chloride 0.9% lock flush - Peds 3 milliLiter(s) IV Push every 8 hours  spironolactone Oral Tab/Cap - Peds 100 milliGRAM(s) Oral daily    MEDICATIONS  (PRN):  acetaminophen   Oral Tab/Cap - Peds. 650 milliGRAM(s) Oral every 6 hours PRN Mild Pain (1 - 3), Moderate Pain (4 - 6), Severe Pain (7 - 10)  polyethylene glycol 3350 Oral Powder - Peds 17 Gram(s) Oral daily PRN Constipation    LABS:    06-27    133<L>  |  97<L>  |  22  ----------------------------<  113<H>  4.8   |  17<L>  |  0.85    Ca    10.5      27 Jun 2021 10:56  Phos  4.9     06-27  Mg     2.6     06-27    TPro  9.0<H>  /  Alb  4.8  /  TBili  1.1  /  DBili  x   /  AST  29  /  ALT  41  /  AlkPhos  198  06-27    PT/INR - ( 28 Jun 2021 05:25 )   PT: 14.3 sec;   INR: 1.27 ratio         PTT - ( 28 Jun 2021 05:25 )  PTT:22.6 sec

## 2021-06-28 NOTE — PROGRESS NOTE PEDS - SUBJECTIVE AND OBJECTIVE BOX
Interval/Overnight Events:    ===========================RESPIRATORY==========================  RR: 17 (06-28-21 @ 05:15) (17 - 32)  SpO2: 88% (06-28-21 @ 05:15) (86% - 93%)  End [place] CO2:    Respiratory Support:   [ ] Inhaled Nitric Oxide:    [x] Airway Clearance Discussed  Extubation Readiness:  [ ] Not Applicable     [ ] Discussed and Assessed  Comments:    =========================CARDIOVASCULAR========================  HR: 70 (06-28-21 @ 05:15) (70 - 70)  BP: 95/64 (06-28-21 @ 05:15) (89/50 - 105/52)  ABP: --  CVP(mm Hg): --  NIRS:    Patient Care Access:  furosemide   Oral Tab/Cap - Peds 10 milliGRAM(s) Oral every 12 hours  lisinopril Oral Tab/Cap - Peds 5 milliGRAM(s) Oral daily  sildenafil   Oral Tab/Cap - Peds 20 milliGRAM(s) Oral every 8 hours  spironolactone Oral Tab/Cap - Peds 100 milliGRAM(s) Oral daily  Comments:    =====================HEMATOLOGY/ONCOLOGY=====================  Transfusions:	[ ] PRBC	[ ] Platelets	[ ] FFP		[ ] Cryoprecipitate  DVT Prophylaxis:  Comments:    ========================INFECTIOUS DISEASE=======================  T(C): 36.3 (06-28-21 @ 05:15), Max: 37 (06-27-21 @ 17:00)  T(F): 97.4 (06-28-21 @ 05:15), Max: 98.6 (06-27-21 @ 17:00)  [ ] Cooling [place] being used. Target Temperature:      ==================FLUIDS/ELECTROLYTES/NUTRITION=================  I&O's Summary    27 Jun 2021 07:01  -  28 Jun 2021 07:00  --------------------------------------------------------  IN: 480 mL / OUT: 750 mL / NET: -270 mL      Diet:   [ ] NGT		[ ] NDT		[ ] GT		[ ] GJT    polyethylene glycol 3350 Oral Powder - Peds 17 Gram(s) Oral daily PRN  sodium chloride 0.9% lock flush - Peds 3 milliLiter(s) IV Push every 8 hours  Comments:    ==========================NEUROLOGY===========================  [ ] SBS:		[ ] GISSELLE-1:	[ ] BIS:	[ ] CAPD:  acetaminophen   Oral Tab/Cap - Peds. 650 milliGRAM(s) Oral every 6 hours PRN  aspirin  Oral Tab/Cap - Peds 325 milliGRAM(s) Oral daily  levETIRAcetam  Oral Tab/Cap - Peds 1500 milliGRAM(s) Oral two times a day  [x] Adequacy of sedation and pain control has been assessed and adjusted  Comments:    OTHER MEDICATIONS:  Mexiletine 150 milliGRAM(s) 150 milliGRAM(s) Oral every 8 hours    =========================PATIENT CARE==========================  [ ] There are pressure ulcers/areas of breakdown that are being addressed.  [x] Preventative measures are being taken to decrease risk for skin breakdown.  [x] Necessity of urinary, arterial, and venous catheters discussed    =========================PHYSICAL EXAM=========================  GENERAL: In no acute distress  RESPIRATORY: Lungs clear to auscultation bilaterally. Good aeration. No rales, rhonchi, retractions or wheezing. Effort even and unlabored.  CARDIOVASCULAR: Regular rate and rhythm. Normal S1/S2. No murmurs, rubs, or gallop. Capillary refill < 2 seconds. Distal pulses 2+ and equal.  ABDOMEN: Soft, non-distended. Bowel sounds present. No palpable hepatosplenomegaly.  SKIN: No rash.  EXTREMITIES: Warm and well perfused. No gross extremity deformities.  NEUROLOGIC: Alert and oriented. No acute change from baseline exam.    ===============================================================  LABS:  ( 06-28 @ 05:25 )   PT: 14.3 sec;   INR: 1.27 ratio  aPTT: 22.6 sec                            133    |  97     |  22                  Calcium: 10.5  / iCa: x      (06-27 @ 10:56)    ----------------------------<  113       Magnesium: 2.6                              4.8     |  17     |  0.85             Phosphorous: 4.9      TPro  9.0    /  Alb  4.8    /  TBili  1.1    /  DBili  x      /  AST  29     /  ALT  41     /  AlkPhos  198    27 Jun 2021 10:56  RECENT CULTURES:      IMAGING STUDIES:    Parent/Guardian is at the bedside:	[ ] Yes	[ ] No  Patient and Parent/Guardian updated as to the progress/plan of care:	[ ] Yes	[ ] No    [ ] The patient remains in critical and unstable condition, and requires ICU care and monitoring, total critical care time spent by myself, the attending physician was __ minutes, excluding procedure time.  [ ] The patient is improving but requires continued monitoring and adjustment of therapy Interval/Overnight Events: no concerns overnight    ===========================RESPIRATORY==========================  RR: 17 (06-28-21 @ 05:15) (17 - 32)  SpO2: 88% (06-28-21 @ 05:15) (86% - 93%)      Respiratory Support:   room air    [x] Airway Clearance Discussed  Extubation Readiness:  x[ ] Not Applicable     [ ] Discussed and Assessed      =========================CARDIOVASCULAR========================  HR: 70 (06-28-21 @ 05:15) (70 - 70)  BP: 95/64 (06-28-21 @ 05:15) (89/50 - 105/52)    Patient Care Access: 1 PIV  furosemide   Oral Tab/Cap - Peds 10 milliGRAM(s) Oral every 12 hours  lisinopril Oral Tab/Cap - Peds 5 milliGRAM(s) Oral daily  sildenafil   Oral Tab/Cap - Peds 20 milliGRAM(s) Oral every 8 hours  spironolactone Oral Tab/Cap - Peds 100 milliGRAM(s) Oral daily    =====================HEMATOLOGY/ONCOLOGY=====================  Transfusions:	[ ] PRBC	[ ] Platelets	[ ] FFP		[ ] Cryoprecipitate  DVT Prophylaxis: aspirin  Comments:    ========================INFECTIOUS DISEASE=======================  T(C): 36.3 (06-28-21 @ 05:15), Max: 37 (06-27-21 @ 17:00)  T(F): 97.4 (06-28-21 @ 05:15), Max: 98.6 (06-27-21 @ 17:00)      ==================FLUIDS/ELECTROLYTES/NUTRITION=================  I&O's Summary    27 Jun 2021 07:01  -  28 Jun 2021 07:00  --------------------------------------------------------  IN: 480 mL / OUT: 750 mL / NET: -270 mL      Diet: regular diet    polyethylene glycol 3350 Oral Powder - Peds 17 Gram(s) Oral daily PRN  sodium chloride 0.9% lock flush - Peds 3 milliLiter(s) IV Push every 8 hours  Comments:    ==========================NEUROLOGY===========================  [ ] SBS:		[ ] GISSELLE-1:	[ ] BIS:	[ ] CAPD:  acetaminophen   Oral Tab/Cap - Peds. 650 milliGRAM(s) Oral every 6 hours PRN  aspirin  Oral Tab/Cap - Peds 325 milliGRAM(s) Oral daily  levETIRAcetam  Oral Tab/Cap - Peds 1500 milliGRAM(s) Oral two times a day  [x] Adequacy of sedation and pain control has been assessed and adjusted  Comments:    OTHER MEDICATIONS:  Mexiletine 150 milliGRAM(s) 150 milliGRAM(s) Oral every 8 hours    =========================PATIENT CARE==========================  [ ] No skin issues  [x] Preventative measures are being taken to decrease risk for skin breakdown.  [x] Necessity of urinary, arterial, and venous catheters discussed    =========================PHYSICAL EXAM=========================  GENERAL: In no acute distress  RESPIRATORY: Lungs clear to auscultation bilaterally. Good aeration. No rales, rhonchi, retractions or wheezing. Effort even and unlabored.  CARDIOVASCULAR: Regular rate and rhythm. soft S1/S2. soft 3/6 holosystolic murmur. Capillary refill < 2 seconds. Distal pulses 2+ and equal.  ABDOMEN: Soft, non-distended. Bowel sounds present. No palpable hepatosplenomegaly.  SKIN: No rash.  EXTREMITIES: Warm and well perfused. No gross extremity deformities.  NEUROLOGIC: Alert and oriented. No acute change from new baseline exam.    ===============================================================  LABS:  ( 06-28 @ 05:25 )   PT: 14.3 sec;   INR: 1.27 ratio  aPTT: 22.6 sec                            133    |  97     |  22                  Calcium: 10.5  / iCa: x      (06-27 @ 10:56)    ----------------------------<  113       Magnesium: 2.6                              4.8     |  17     |  0.85             Phosphorous: 4.9      TPro  9.0    /  Alb  4.8    /  TBili  1.1    /  DBili  x      /  AST  29     /  ALT  41     /  AlkPhos  198    27 Jun 2021 10:56  RECENT CULTURES:      IMAGING STUDIES:    Parent/Guardian is at the bedside:	[x ] Yes	[ ] No  Patient and Parent/Guardian updated as to the progress/plan of care:	[ x] Yes	[ ] No    [ ] The patient remains in critical and unstable condition, and requires ICU care and monitoring, total critical care time spent by myself, the attending physician was __ minutes, excluding procedure time.  [ ] The patient is improving but requires continued monitoring and adjustment of therapy

## 2021-06-29 VITALS
DIASTOLIC BLOOD PRESSURE: 60 MMHG | SYSTOLIC BLOOD PRESSURE: 110 MMHG | OXYGEN SATURATION: 86 % | HEART RATE: 69 BPM | RESPIRATION RATE: 32 BRPM | TEMPERATURE: 98 F

## 2021-06-29 PROCEDURE — 99233 SBSQ HOSP IP/OBS HIGH 50: CPT

## 2021-06-29 PROCEDURE — 99232 SBSQ HOSP IP/OBS MODERATE 35: CPT

## 2021-06-29 PROCEDURE — 99231 SBSQ HOSP IP/OBS SF/LOW 25: CPT

## 2021-06-29 RX ADMIN — Medication 10 MILLIGRAM(S): at 06:04

## 2021-06-29 RX ADMIN — SODIUM CHLORIDE 3 MILLILITER(S): 9 INJECTION INTRAMUSCULAR; INTRAVENOUS; SUBCUTANEOUS at 05:19

## 2021-06-29 RX ADMIN — LEVETIRACETAM 1500 MILLIGRAM(S): 250 TABLET, FILM COATED ORAL at 10:01

## 2021-06-29 RX ADMIN — LISINOPRIL 5 MILLIGRAM(S): 2.5 TABLET ORAL at 10:00

## 2021-06-29 RX ADMIN — SPIRONOLACTONE 100 MILLIGRAM(S): 25 TABLET, FILM COATED ORAL at 10:01

## 2021-06-29 RX ADMIN — Medication 20 MILLIGRAM(S): at 06:04

## 2021-06-29 NOTE — PROGRESS NOTE PEDS - SUBJECTIVE AND OBJECTIVE BOX
Jimbo is a 20 year-old male admitted for cardioversion of interatrial re-entrant tachycardia (IART) found to have L-sided stroke with hemorrhagic conversion requiring intervention.    Interval history:   Patient was seen and examined in his room with mom at bedside. Jimbo has been out of bed over the past week and yesterday reports walking with PT and ascending and descending the equivalent of 3 flights of steps to replicate his home environment. PT notes from 6/28 reviewed. Both Jimbo and his mom feel he will do well at home with outpatient therapies and medical follow up. Plan is for him to go home with her when medically cleared.    PT 6/28  Sit-Stand Transfer Training  Transfer Training Sit-to-Stand Transfer: contact guard;  1 person assist;  verbal cues;  full weight-bearing  Transfer Training Stand-to-Sit Transfer: contact guard;  1 person assist;  verbal cues;  full weight-bearing  Sit-to-Stand Transfer Training Transfer Safety Analysis: decreased strength;  impaired balance    Gait Training  Gait Training: contact guard;  1 person assist;  verbal cues;  full weight-bearing   no assistive device;  150 feet  Gait Analysis: swing-through gait   VC's to decrease spring, pt can be impulsive at times. Pt with tendency to lose balance posteriorly and to the right. Pt acknowledges deficits.;  decreased strength;  impaired balance;  narrow base of support at times;  150 feet;  no assistive device    Stair Training  Level of [place]: contact guard  Physical Assist/Nonphysical Assist: 1 person assist  Assistive Device: hand rail;  unilateral  Number of Stairs: 22   Pattern Used: reciprocal    Therapeutic Exercise  Therapeutic Exercise Detail: Pt performed additional balance activities: SLS (1x30 bilaterally), tandem walking x 15'.       REVIEW OF SYSTEMS:   CONSTITUTIONAL: No fevers or chills.   PSYCH: No agitation.   CARDIOVASCULAR: No peripheral edema.  RESPIRATORY: No shortness of breath.  MUSCULOSKELETAL: No loss of range of motion.  NEUROLOGICAL: No right sided weakness.   ENDOCRINE: No intolerance to heat/cold.    PAST MEDICAL & SURGICAL HISTORY  Double inlet left ventricle  D-TGA (dextro-transposition of great arteries)  Sick sinus syndrome  Atrial tachycardia  Hepatomegaly  Other ascites  Pulmonary hypertension  Pacemaker  AV block  S/P Fontan procedure  Coagulopathy  Cardiac anomaly, congenital  S/P bidirectional Storm shunt  S/P Stansel operation  S/P Fontan procedure  S/P cardiac cath  S/P Nirmal-Taussig shunt    SOCIAL HISTORY  Lives in apartment building with family, 3 FARIDE[address] and 3 full flights to apt, no elevator. Per mom he has option to stay with maternal grandmother who has pvt home, 0 [address], 1st floor bathroom and set up for bedroom. Mom able to assist with care FT.    Functional History  Independent Prior to Admission    FAMILY HISTORY    No pertinent family history in first degree relatives    ALLERGIES  No Known Allergies    MEDICATIONS  (STANDING):  aspirin  Oral Tab/Cap - Peds 325 milliGRAM(s) Oral daily  furosemide   Oral Tab/Cap - Peds 10 milliGRAM(s) Oral every 12 hours  levETIRAcetam  Oral Tab/Cap - Peds 1500 milliGRAM(s) Oral two times a day  lisinopril Oral Tab/Cap - Peds 5 milliGRAM(s) Oral daily  Mexiletine 150 milliGRAM(s) 150 milliGRAM(s) Oral every 8 hours  sildenafil   Oral Tab/Cap - Peds 20 milliGRAM(s) Oral every 8 hours  sodium chloride 0.9% lock flush - Peds 3 milliLiter(s) IV Push every 8 hours  spironolactone Oral Tab/Cap - Peds 100 milliGRAM(s) Oral daily    MEDICATIONS  (PRN):  acetaminophen   Oral Tab/Cap - Peds. 650 milliGRAM(s) Oral every 6 hours PRN Mild Pain (1 - 3), Moderate Pain (4 - 6), Severe Pain (7 - 10)  polyethylene glycol 3350 Oral Powder - Peds 17 Gram(s) Oral daily PRN Constipation      ICU Vital Signs Last 24 Hrs  T(C): 36.6 (29 Jun 2021 11:35), Max: 37.1 (28 Jun 2021 20:00)  T(F): 97.9 (29 Jun 2021 11:35), Max: 98.8 (28 Jun 2021 23:00)  HR: 69 (29 Jun 2021 11:35) (69 - 70)  BP: 110/60 (29 Jun 2021 11:35) (92/43 - 116/59)  BP(mean): 71 (29 Jun 2021 11:35) (54 - 73)  RR: 32 (29 Jun 2021 11:35) (17 - 32)  SpO2: 86% (29 Jun 2021 11:35) (86% - 94%)    ----------------------------------------------------------------------------------------  PHYSICAL EXAM  General:  Well-developed, well-nourished individual.   Mental:  Awake, alert, oriented, and answering questions appropriately.  Skin:  Grossly negative for erythema, breakdown.  Vessels:  No lower extremity edema.   Lung:  Breathing is comfortable and regular.  Neurologic: No clonus. No hyperreflexia. No spasticity. Full strength in right upper and lower limb. Negative Romberg.  Musculoskeletal: No range of motion restrictions.

## 2021-06-29 NOTE — PROGRESS NOTE PEDS - ASSESSMENT
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    Plan:   1) From a cognitive standpoint, Jimbo is doing quite well although it remains to be seen if he will notice any deficits once he is home and attempts to reestablish his routines. Depending on progress he may benefit from outpatient cognitive rehabilitation though speech therapy or occupational therapy.   2) Jimbo has made tremendous progress with PT with independent mobility and safety.  Recommended to Jimbo's mother that she contact his PCP for outpatient PT near their home in [place]. He has some ongoing balance deficits, although his balance has improved from our last evaluation.   3) No concern about any ongoing agitation. Propranolol was discontinued on 6/27.  4) Recommend outpatient follow up with pediatric PM&R in 2-3 months. Please contact the office at [phone] to schedule post-hospitalization follow up with Dr. Arshad.    Jimbo is cleared from a PM&R standpoint to go home with supervision from his mother.   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    Plan:   1) From a cognitive standpoint, Jimbo is doing quite well although it remains to be seen if he will notice any deficits once he is home and attempts to reestablish his routines. Depending on progress he may benefit from outpatient cognitive rehabilitation though speech therapy or occupational therapy.   2) Jimbo has made tremendous progress with PT with independent mobility and safety and he no longer has right-sided weakness.  Recommended to Jimbo's mother that she contact his PCP for outpatient PT near their home in [place]. He has some ongoing balance deficits, although his balance has improved from our last evaluation.   3) No concern about any ongoing agitation. Propranolol was discontinued on 6/27.  4) Recommend outpatient follow up with pediatric PM&R in 2-3 months. Please contact the office at [phone] to schedule post-hospitalization follow up with Dr. Arshad.    Jimbo is cleared from a PM&R standpoint to go home with supervision from his mother.

## 2021-06-29 NOTE — PROGRESS NOTE PEDS - PROVIDER SPECIALTY LIST PEDS
Cardiology
Critical Care
Neurology
Neurosurgery
Cardiology
Critical Care
Heme/Onc
Neurology
Neurosurgery
Surgery
Cardiology
Critical Care
Heme/Onc
Neurology
Neurosurgery
Critical Care
Neurosurgery
Physiatry
Critical Care
Neurosurgery
Physiatry
Physiatry
Neurosurgery
Cardiology
Cardiology
Neurosurgery
Physiatry
Physiatry

## 2021-06-29 NOTE — PROGRESS NOTE PEDS - SUBJECTIVE AND OBJECTIVE BOX
INTERVAL HISTORY:  He is s/p cranioplasty  with placement of  shunt, POD #7. Neuro exam continues to improve, gaining strength slowly, speak coherently with mildly slurred speech. On tele, he continues to be in IART with pacemaker set at VOO @ 70.     RESPIRATORY SUPPORT: RA    NUTRITION: Regular diet    INTAKE/OUTPUT:   @ 07:01  -   @ 07:00  --------------------------------------------------------  IN: 480 mL / OUT: 1180 mL / NET: -700 mL    INTRAVASCULAR ACCESS: PIV, RRA    MEDICATIONS:  furosemide   Oral Tab/Cap - Peds 10 milliGRAM(s) Oral every 12 hours  lisinopril Oral Tab/Cap - Peds 5 milliGRAM(s) Oral daily  propranolol  Oral Tab/Cap - Peds 10 milliGRAM(s) Oral every 8 hours  sildenafil   Oral Tab/Cap - Peds 20 milliGRAM(s) Oral every 8 hours  spironolactone Oral Tab/Cap - Peds 100 milliGRAM(s) Oral daily  levETIRAcetam  Oral Tab/Cap - Peds 1500 milliGRAM(s) Oral two times a day  sodium chloride 0.9% lock flush - Peds 3 milliLiter(s) IV Push every 8 hours    PHYSICAL EXAMINATION:  T(C): 36.2 (21 @ 10:34), Max: 37 (21 @ 17:00)  HR: 70 (21 @ 10:34) (70 - 70)  BP: 100/49 (21 @ 10:34) (95/57 - 116/66)  RR: 28 (21 @ 10:34) (19 - 28)  SpO2: 87% (21 @ 10:34) (87% - 91%)  General - no acute distress  Skin - no rash, no desquamation, no cyanosis.  Eyes / ENT - no conjunctival injection, sclerae anicteric, external ears & nares normal, mucous membranes moist.  Pulmonary - normal inspiratory effort, no retractions, lungs clear to auscultation bilaterally, no wheezes, no rales.  Cardiovascular - normal rate, regular rhythm, normal S1 & single S2, grade 1/6 blowing holosystolic murmur at LMSB, no rubs, no gallops, capillary refill < 2sec, normal pulses.  Gastrointestinal - soft, non-distended, non-tender, no splenomegaly. (+) hepatomegaly.  Musculoskeletal - no joint swelling, no clubbing, no edema.  Neurologic / Psychiatric - alert, slurred speech, responds to questions.      LABORATORY TESTS:                          13.3  CBC:   9.23 )-----------( 301   (21 @ 09:51)                          42.0               138   |  96    |  19                 Ca: 10.1   BMP:   ----------------------------< 96     M.2   (21 @ 06:56)             4.8    |  24    | 0.70               Ph: 4.4      LFT:     TPro: 8.4 / Alb: 4.3 / TBili: 0.9 / DBili: x / AST: 72 / ALT: 39 / AlkPhos: 177   (21 @ 06:56)    COAG: PT: 14.4 / PTT: 34.4 / INR: 1.28   (21 @ 12:14)     ABG:   pH: 7.30 / pCO2: 51 / pO2: 66 / HCO3: 22 / Base Excess: -1.1 / SaO2: 86.6 / Lactate: x / iCa: 1.18   (21 @ 16:18)    IMAGING STUDIES:  Electrocardiogram - (21) Ventricular paced rhythm @ 70bpm. Underlying IART.    Telemetry - (21) Ventricular paced rhythm @ 70 bpm. Underlying IART.    Chest x-ray - (21) Stable mild cardiomegaly with subsegmental left lower lobe atelectasis.    Echocardiogram (21)   1. This was a technically difficult suboptimal study due to extremely poor acoustic windows.   2. On limited short axis views there appears to be adequate systolic excursion of the posterior wall of the left ventricle and decreased anterior wall excursion. Overall mild to moderately decreased left ventricular systolic function.   3. Mild left atrioventricular and trivial right atrioventricular regurgitation.   4. No pericardial effusion.   5. No pleural effusion.   6. Findings are limited to above.    Cath - (21)  Access 4 Fr RFA, 7 Fr RFV x2 with US guidance.  Sat data (%): on 50% FiO2 LPA 73, RUPV 98, Ao 97; CI 2.6 L/min/m2 with Qp:Qs 1 :1.  Pressures (mmHg): Elevated mFontan = mIVC = 20. mPCW=16, No significant gradient across LV to AAo to Vikki (98/58/73).  Normal PVR while on sildenafil.  Angios showed unobstructed Fontan with existing stent within Fontan conduit.    Comment: IART ablation was attempted after the diagnostic cath but unsuccessful. Patient was overdrive paced out of IART. At the end of the case, Ao sat was 94% and LPA 66% on 50% FiO2    CT Head No Cont (21)  No significant interval change compared with 2021 allowing for some resorption of postoperative air. Minimal trace high attenuation in the right frontal lobe. Small extra-axial fluid attenuation collection subjacent to the left frontal craniectomy and wire mesh cranioplasty. Left sided  shunt with tip of the shunt in the right lateral ventricle. No change in appearance of the ventricles compared with the prior. No hydrocephalus. INTERVAL HISTORY:  He is s/p cranioplasty  with placement of  shunt, POD #11. Neuro exam continues to improve, gaining strength slowly, speak coherently with mildly slurred speech. On tele, he continues to be in IART with pacemaker set at VOO @ 70.     RESPIRATORY SUPPORT: RA    NUTRITION: Regular diet    INTAKE/OUTPUT:       @ 07:01  -   @ 07:00  --------------------------------------------------------  IN: 125 mL / OUT: 0 mL / NET: 125 mL      INTRAVASCULAR ACCESS: PIV, RRA    MEDICATIONS:  furosemide   Oral Tab/Cap - Peds 10 milliGRAM(s) Oral every 12 hours  lisinopril Oral Tab/Cap - Peds 5 milliGRAM(s) Oral daily  propranolol  Oral Tab/Cap - Peds 10 milliGRAM(s) Oral every 8 hours  sildenafil   Oral Tab/Cap - Peds 20 milliGRAM(s) Oral every 8 hours  spironolactone Oral Tab/Cap - Peds 100 milliGRAM(s) Oral daily  levETIRAcetam  Oral Tab/Cap - Peds 1500 milliGRAM(s) Oral two times a day  sodium chloride 0.9% lock flush - Peds 3 milliLiter(s) IV Push every 8 hours    PHYSICAL EXAMINATION:  T(C): 36.2 (21 @ 10:34), Max: 37 (21 @ 17:00)  HR: 70 (21 @ 10:34) (70 - 70)  BP: 100/49 (21 @ 10:34) (95/57 - 116/66)  RR: 28 (21 @ 10:34) (19 - 28)  SpO2: 87% (21 @ 10:34) (87% - 91%)  General - no acute distress  Skin - no rash, no desquamation, no cyanosis.  Eyes / ENT - no conjunctival injection, sclerae anicteric, external ears & nares normal, mucous membranes moist.  Pulmonary - normal inspiratory effort, no retractions, lungs clear to auscultation bilaterally, no wheezes, no rales.  Cardiovascular - normal rate, regular rhythm, normal S1 & single S2, grade 1/6 blowing holosystolic murmur at LMSB, no rubs, no gallops, capillary refill < 2sec, normal pulses.  Gastrointestinal - soft, non-distended, non-tender, no splenomegaly. (+) hepatomegaly.  Musculoskeletal - no joint swelling, no clubbing, no edema.  Neurologic / Psychiatric - alert, slurred speech, responds to questions.      LABORATORY TESTS:                          13.3  CBC:   9.23 )-----------( 301   (21 @ 09:51)                          42.0               138   |  96    |  19                 Ca: 10.1   BMP:   ----------------------------< 96     M.2   (21 @ 06:56)             4.8    |  24    | 0.70               Ph: 4.4      LFT:     TPro: 8.4 / Alb: 4.3 / TBili: 0.9 / DBili: x / AST: 72 / ALT: 39 / AlkPhos: 177   (21 @ 06:56)    COAG: PT: 14.4 / PTT: 34.4 / INR: 1.28   (21 @ 12:14)     ABG:   pH: 7.30 / pCO2: 51 / pO2: 66 / HCO3: 22 / Base Excess: -1.1 / SaO2: 86.6 / Lactate: x / iCa: 1.18   (21 @ 16:18)    IMAGING STUDIES:  Electrocardiogram - (21) Ventricular paced rhythm @ 70bpm. Underlying IART.    Telemetry - (21) Ventricular paced rhythm @ 70 bpm. Underlying IART.    Chest x-ray - (21) Stable mild cardiomegaly with subsegmental left lower lobe atelectasis.    Echocardiogram (21)   1. This was a technically difficult suboptimal study due to extremely poor acoustic windows.   2. On limited short axis views there appears to be adequate systolic excursion of the posterior wall of the left ventricle and decreased anterior wall excursion. Overall mild to moderately decreased left ventricular systolic function.   3. Mild left atrioventricular and trivial right atrioventricular regurgitation.   4. No pericardial effusion.   5. No pleural effusion.   6. Findings are limited to above.    Cath - (21)  Access 4 Fr RFA, 7 Fr RFV x2 with US guidance.  Sat data (%): on 50% FiO2 LPA 73, RUPV 98, Ao 97; CI 2.6 L/min/m2 with Qp:Qs 1 :1.  Pressures (mmHg): Elevated mFontan = mIVC = 20. mPCW=16, No significant gradient across LV to AAo to Vikki (98/58/73).  Normal PVR while on sildenafil.  Angios showed unobstructed Fontan with existing stent within Fontan conduit.    Comment: IART ablation was attempted after the diagnostic cath but unsuccessful. Patient was overdrive paced out of IART. At the end of the case, Ao sat was 94% and LPA 66% on 50% FiO2    CT Head No Cont (21)  No significant interval change compared with 2021 allowing for some resorption of postoperative air. Minimal trace high attenuation in the right frontal lobe. Small extra-axial fluid attenuation collection subjacent to the left frontal craniectomy and wire mesh cranioplasty. Left sided  shunt with tip of the shunt in the right lateral ventricle. No change in appearance of the ventricles compared with the prior. No hydrocephalus. INTERVAL HISTORY:  He is s/p cranioplasty  with placement of  shunt, POD #11. Neuro exam continues to improve, gaining strength slowly, speak coherently. On tele, he continues to be in IART with pacemaker set at VOO @ 70.     RESPIRATORY SUPPORT: JOYCE    NUTRITION: Regular diet    INTAKE/OUTPUT:       @ 07:01  -   @ 07:00  --------------------------------------------------------  IN: 125 mL / OUT: 0 mL / NET: 125 mL      INTRAVASCULAR ACCESS: PIV    MEDICATIONS:  furosemide   Oral Tab/Cap - Peds 10 milliGRAM(s) Oral every 12 hours  lisinopril Oral Tab/Cap - Peds 5 milliGRAM(s) Oral daily  sildenafil   Oral Tab/Cap - Peds 20 milliGRAM(s) Oral every 8 hours  spironolactone Oral Tab/Cap - Peds 100 milliGRAM(s) Oral daily  aspirin  Oral Tab/Cap - Peds 325 milliGRAM(s) Oral daily  levETIRAcetam  Oral Tab/Cap - Peds 1500 milliGRAM(s) Oral two times a day  Mexiletine 150mg PO Q8H    PHYSICAL EXAMINATION:  Weight (kg): 66.1 ( @ 05:11)  T(C): 36.6 (21 @ 11:35), Max: 37.1 (21 @ 20:00)  HR: 69 (21 @ 11:35) (69 - 70)  BP: 110/60 (21 @ 11:35) (92/43 - 116/59)  RR: 32 (21 @ 11:35) (22 - 32)  SpO2: 86% (21 @ 11:35) (86% - 93%)  General - no acute distress  Skin - no rash, no desquamation, no cyanosis.  Eyes / ENT - no conjunctival injection, sclerae anicteric, external ears & nares normal, mucous membranes moist.  Pulmonary - normal inspiratory effort, no retractions, lungs clear to auscultation bilaterally, no wheezes, no rales.  Cardiovascular - normal rate, regular rhythm, normal S1 & single S2, grade 1/6 blowing holosystolic murmur at LMSB, no rubs, no gallops, capillary refill < 2sec, normal pulses.  Gastrointestinal - soft, non-distended, non-tender, no splenomegaly. (+) hepatomegaly.  Musculoskeletal - no joint swelling, no clubbing, no edema.  Neurologic / Psychiatric - alert, responds to questions.      LABORATORY TESTS:                          13.3  CBC:   9.23 )-----------( 301   (21 @ 09:51)                          42.0               138   |  96    |  19                 Ca: 10.1   BMP:   ----------------------------< 96     M.2   (21 @ 06:56)             4.8    |  24    | 0.70               Ph: 4.4      LFT:     TPro: 8.4 / Alb: 4.3 / TBili: 0.9 / DBili: x / AST: 72 / ALT: 39 / AlkPhos: 177   (21 @ 06:56)    COAG: PT: 14.4 / PTT: 34.4 / INR: 1.28   (21 @ 12:14)     ABG:   pH: 7.30 / pCO2: 51 / pO2: 66 / HCO3: 22 / Base Excess: -1.1 / SaO2: 86.6 / Lactate: x / iCa: 1.18   (21 @ 16:18)    IMAGING STUDIES:  Electrocardiogram - (21) Ventricular paced rhythm @ 70bpm. Underlying IART.    Telemetry - (21) Ventricular paced rhythm @ 70 bpm. Underlying IART.    Chest x-ray - (21) Stable mild cardiomegaly with subsegmental left lower lobe atelectasis.    Echocardiogram (21)   1. This was a technically difficult suboptimal study due to extremely poor acoustic windows.   2. On limited short axis views there appears to be adequate systolic excursion of the posterior wall of the left ventricle and decreased anterior wall excursion. Overall mild to moderately decreased left ventricular systolic function.   3. Mild left atrioventricular and trivial right atrioventricular regurgitation.   4. No pericardial effusion.   5. No pleural effusion.   6. Findings are limited to above.    Cath - (21)  Access 4 Fr RFA, 7 Fr RFV x2 with US guidance.  Sat data (%): on 50% FiO2 LPA 73, RUPV 98, Ao 97; CI 2.6 L/min/m2 with Qp:Qs 1 :1.  Pressures (mmHg): Elevated mFontan = mIVC = 20. mPCW=16, No significant gradient across LV to AAo to Vikki (98/58/73).  Normal PVR while on sildenafil.  Angios showed unobstructed Fontan with existing stent within Fontan conduit.    Comment: IART ablation was attempted after the diagnostic cath but unsuccessful. Patient was overdrive paced out of IART. At the end of the case, Ao sat was 94% and LPA 66% on 50% FiO2    CT Head No Cont (21)  No significant interval change compared with 2021 allowing for some resorption of postoperative air. Minimal trace high attenuation in the right frontal lobe. Small extra-axial fluid attenuation collection subjacent to the left frontal craniectomy and wire mesh cranioplasty. Left sided  shunt with tip of the shunt in the right lateral ventricle. No change in appearance of the ventricles compared with the prior. No hydrocephalus.

## 2021-06-29 NOTE — PROGRESS NOTE PEDS - ASSESSMENT
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 Presented in IART - s/p EP study with unsuccessful attempt at ablation along with diagnostic cath showing elevated Fontan pressure (20mmHg). He was loaded with amiodarone and ultimately electrically cardioverted, however reverted back into IART and continues to be in IART. His course was further complicated by L frontal parenchymal hemorrhage requiring emergent evacuation by Neurosurgery, EVD placement, and ventricular tachycardia. He is now s/p cranioplasty with placement of  shunt, POD #7.    CVS:  - Continuos telemetry monitoring.  - Pacemaker baseline: DDD 70-120bpm, will continue VOO 70bpm as per EP.  - Continue Sildenafil 20mg q8 (home medication).   - Lasix to 10 mg Q12H (home dose). Monitor creat.  - SpO2 goal >85%.  - Continue lisinopril 5mg PO QD, aldactone 100mg PO QD. As per PICU and neurosurgery BP goals should be normal now.   - Mexiletine 150mg q8 (do not hold dose while NPO).    Resp:   - RA.  - CTA obtained to eval for PE - difficult to protocol because of Fontan anatomy and contrast timing, no PE seen, but does show VV collaterals (likely main reason for hypoxia).    FEN/GI:   - Regular diet     ID:   - Afebrile  - s/p Ancef per post op protocol.     Heme:   - Will start aspirin 325mg PO QD for antiplatelet therapy. Will not start prior anti-coagulation regimen at this point.  - Discussed with EP, hematology, neurosurgery, primary cardiology    Neuro:   - s/p  shunt  - history of L frontal parenchymal hemorrhage- NSx and neuro and PMR following  - On Keppra  - On Clonidine patch, propranolol, melatonin PRN    Dispo:  - The patient is cleared for discharge from a cardiac standpoint.  We will continue to check in on him daily, but please alert cardiology with any changes or when close to being discharged (so that we can arrange follow-up with his primary cardiologists Dr. Londono and Dr. Christensen). 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 Presented in IART - s/p EP study with unsuccessful attempt at ablation along with diagnostic cath showing elevated Fontan pressure (20mmHg). He was loaded with amiodarone and ultimately electrically cardioverted, however reverted back into IART and continues to be in IART. His course was further complicated by L frontal parenchymal hemorrhage requiring emergent evacuation by Neurosurgery, EVD placement, and ventricular tachycardia. He is now s/p cranioplasty with placement of  shunt, POD #11. Patient is stable for discharge home from cardiac standpoint.     - Pacemaker: continue VOO 70bpm as per EP.  - Continue Sildenafil 20mg q8 (home medication).   - Lasix to 10 mg Q12H (home dose)  - Continue lisinopril 5mg PO QD  - aldactone 100mg PO QD  - Mexiletine 150mg PO Q8H for ventricular tachycardia  - Continue  aspirin 325mg PO QD for antiplatelet therapy. Will not start prior anti-coagulation regimen at this point.  - Stable INR off vitamin K for the last 5 days  - Discussed with EP, hematology, neurosurgery, primary cardiology  - history of L frontal parenchymal hemorrhage s/p  shunt- NSx and neuro and PMR to follow outpatient  - Keppra per Neurosurgery  - Follow-up with his primary cardiologists Dr. Londono and Dr. Christensen upon discharge in 3-6 weeks.

## 2021-06-29 NOTE — PROGRESS NOTE PEDS - PROBLEM SELECTOR PROBLEM 1
Cardiac arrhythmia
Intracerebral bleed
Intracerebral bleed
Cardiac arrhythmia
Cardiac arrhythmia
DILV (double inlet left ventricle)
Intracerebral bleed
Cardiac arrhythmia
DILV (double inlet left ventricle)
Intracerebral bleed
Intracerebral bleed
Cardiac arrhythmia
DILV (double inlet left ventricle)
Intracerebral bleed
DILV (double inlet left ventricle)
DILV (double inlet left ventricle)

## 2021-06-29 NOTE — PROGRESS NOTE PEDS - ASSESSMENT
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 He has elevated Fontan pressure secondary to LV diastolic dysfunction.  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 Significant issues with neuro-agitation and delirium that have significantly improved  Now s/p VPS and prosthetic skull flap 6/18. Planning for transition to home.    PLAN:  Neuro: Started on ASA 6/25 (approved by NS)  - melatonin Qday  - propranolol DCd  - No delirium   - Keppra (clinical szs)  - R sided hemiparesis improved  - tylenol prn for fever control  -PT/OT/speech/rehab consult  -cannot have mri    Resp:  - RA, goal SpO2 > 85% (VV collaterals on CTA)    CV:  continue mexiletine for hx of  VT  VOO 70, normal mode is DDD (though atrial wires don't work so effectively VVI)  Still in IART   On home aldactone  On home lisinopril today  Continue Sildenafil   Continue Lasix po q12  sono r chest (6/15) with moderate effusion clinically improving with diuresis    Heme:  - Aspirin 325mg daily started 6/25 per Heme/Cardiology plan in conjunction with NSGY with all teams in agreeement  - s/p Xarelto  - Goal PLTs >100  - Goal INR < 1.6    FEN:   Bowel regimen: Senna and Miralax PRN  Cleared by speech for oral diet of regular solids and thin fluids as tolerated   nutrition consulting  speech and swallow consulting  4% fluid negative, continue lasix for euvolemic goal    ID  - s/p Ancef post-op    Access:  PIV     Dispo: Inpatient vs outpatient rehab--mother wants to be discharged home. Discharge planning underway. Discussing today with cardiology, neurosurgery, case management, and physical therapy team.  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 He has elevated Fontan pressure secondary to LV diastolic dysfunction.  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 Significant issues with neuro-agitation and delirium that have significantly improved  Now s/p VPS and prosthetic skull flap 6/18. Planning for transition to home.    PLAN:  Neuro: Started on ASA 6/25 (approved by NS)  - melatonin Qday  - propranolol DCd  - No delirium   - Keppra (clinical szs)  - R sided hemiparesis improved  - tylenol prn for fever control  -PT/OT/speech/rehab consult  -cannot have mri    Resp:  - RA, goal SpO2 > 85% (VV collaterals on CTA)    CV:  continue mexiletine for hx of  VT  VOO 70, normal mode is DDD (though atrial wires don't work so effectively VVI)  Still in IART   On home aldactone  On home lisinopril today  Continue Sildenafil   Continue Lasix po q12  sono r chest (6/15) with moderate effusion clinically improving with diuresis    Heme:  - Aspirin 325mg daily started 6/25 per Heme/Cardiology plan in conjunction with NSGY with all teams in agreeement  - s/p Xarelto  - Goal PLTs >100  - Goal INR < 1.6    FEN:   Bowel regimen: Senna and Miralax PRN  Cleared by speech for oral diet of regular solids and thin fluids as tolerated   nutrition consulting  speech and swallow consulting  4% fluid negative, continue lasix for euvolemic goal    ID  - s/p Ancef post-op    Access:  PIV     Dispo: Inpatient vs outpatient rehab--mother wants to be discharged home. Discharge planning underway. Discussing today with cardiology, neurosurgery, case management, and physical therapy team.   Follow up: 1 month hematology, 2 weeks nsgy, confirming with cards, and PM and RAmarilys. 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 He has elevated Fontan pressure secondary to LV diastolic dysfunction.  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 Significant issues with neuro-agitation and delirium that have significantly improved  Now s/p VPS and prosthetic skull flap 6/18. Clinically stable and improving functional status. Planning for transition to home.    PLAN:  Neuro: Started on ASA 6/25 (approved by NS)  - melatonin Qday  - propranolol DCd  - Keppra (clinical szs)  - R sided hemiparesis improved  - tylenol prn for fever control  -PT/OT/speech/rehab   -cannot have mri    Resp:  - RA, goal SpO2 > 85% (VV collaterals on CTA)    CV:  continue mexiletine for hx of  VT  VOO 70, normal mode is DDD (though atrial wires don't work so effectively VVI)  Still in IART   On home aldactone  On home lisinopril   Continue Sildenafil   Continue Lasix po q12    Heme:  - Aspirin 325mg daily started 6/25 per Heme/Cardiology plan in conjunction with NSGY with all teams in agreeement  - s/p Xarelto  - Goal PLTs >100  - Goal INR < 1.6    FEN:   Bowel regimen: Senna and Miralax PRN  Cleared by speech for oral diet of regular solids and thin fluids as tolerated   nutrition consulting  speech and swallow consulting  4% fluid negative, continue lasix for euvolemic goal    ID  - s/p Ancef post-op    Access:  PIV     Dispo: Inpatient vs outpatient rehab--mother wants to be discharged home. Discharge planning underway. Discussing today with cardiology, neurosurgery, case management, and physical therapy team.   Follow up: 1 month hematology, 2 weeks nsgy, confirming with cards, and PM and R..

## 2021-06-29 NOTE — PROGRESS NOTE PEDS - REASON FOR ADMISSION
cardiac catheterization and ablation
cardiac catheterization and ablation
congenital heart disease
failing fontan physiology, dysrrhythmia
ICH
ICH
Left frontal ICH and SAH s/p L crani
cardiac catheterization and ablation
failing fontan physiology, dysrrhythmia
Fontan; IART, s/p hemorrhagic stroke
ICH
IART
ICH
ICH
ICH, IVH
Fontan with IART
ICH
ICH
IART s/p Fontan procedure
cardiac catheterization and ablation
Fontan; IART s/p hemorrhagic stroke
cardiac catheterization and ablation
congenital heart disease

## 2021-06-29 NOTE — PROGRESS NOTE PEDS - PROBLEM SELECTOR PROBLEM 3
Atrial tachycardia
Right hemiparesis
Atrial tachycardia
Right hemiparesis
Right hemiparesis
Sick sinus syndrome
Atrial tachycardia
Sick sinus syndrome

## 2021-06-29 NOTE — PROGRESS NOTE PEDS - CRITICAL CARE SERVICES PROVIDED
Critical care services provided

## 2021-06-29 NOTE — PROGRESS NOTE PEDS - PROBLEM SELECTOR PROBLEM 4
Impaired mobility
Impaired mobility
Complete heart block
Impaired mobility
AV block

## 2021-06-29 NOTE — PROGRESS NOTE PEDS - SUBJECTIVE AND OBJECTIVE BOX
Interval/Overnight Events:    ===========================RESPIRATORY==========================  RR: 29 (06-29-21 @ 05:05) (17 - 29)  SpO2: 87% (06-29-21 @ 05:05) (87% - 96%)  End [place] CO2:    Respiratory Support:   [ ] Inhaled Nitric Oxide:    [x] Airway Clearance Discussed  Extubation Readiness:  [ ] Not Applicable     [ ] Discussed and Assessed  Comments:    =========================CARDIOVASCULAR========================  HR: 70 (06-29-21 @ 05:05) (70 - 70)  BP: 116/59 (06-29-21 @ 05:05) (92/43 - 116/59)  ABP: --  CVP(mm Hg): --  NIRS:    Patient Care Access:  furosemide   Oral Tab/Cap - Peds 10 milliGRAM(s) Oral every 12 hours  lisinopril Oral Tab/Cap - Peds 5 milliGRAM(s) Oral daily  sildenafil   Oral Tab/Cap - Peds 20 milliGRAM(s) Oral every 8 hours  spironolactone Oral Tab/Cap - Peds 100 milliGRAM(s) Oral daily  Comments:    =====================HEMATOLOGY/ONCOLOGY=====================  Transfusions:	[ ] PRBC	[ ] Platelets	[ ] FFP		[ ] Cryoprecipitate  DVT Prophylaxis:  Comments:    ========================INFECTIOUS DISEASE=======================  T(C): 36.6 (06-29-21 @ 05:05), Max: 37.1 (06-28-21 @ 20:00)  T(F): 97.9 (06-29-21 @ 05:05), Max: 98.8 (06-28-21 @ 23:00)  [ ] Cooling [place] being used. Target Temperature:      ==================FLUIDS/ELECTROLYTES/NUTRITION=================  I&O's Summary    28 Jun 2021 07:01  -  29 Jun 2021 07:00  --------------------------------------------------------  IN: 125 mL / OUT: 0 mL / NET: 125 mL      Diet:   [ ] NGT		[ ] NDT		[ ] GT		[ ] GJT    polyethylene glycol 3350 Oral Powder - Peds 17 Gram(s) Oral daily PRN  sodium chloride 0.9% lock flush - Peds 3 milliLiter(s) IV Push every 8 hours  Comments:    ==========================NEUROLOGY===========================  [ ] SBS:		[ ] GISSELLE-1:	[ ] BIS:	[ ] CAPD:  acetaminophen   Oral Tab/Cap - Peds. 650 milliGRAM(s) Oral every 6 hours PRN  aspirin  Oral Tab/Cap - Peds 325 milliGRAM(s) Oral daily  levETIRAcetam  Oral Tab/Cap - Peds 1500 milliGRAM(s) Oral two times a day  [x] Adequacy of sedation and pain control has been assessed and adjusted  Comments:    OTHER MEDICATIONS:  Mexiletine 150 milliGRAM(s) 150 milliGRAM(s) Oral every 8 hours    =========================PATIENT CARE==========================  [ ] There are pressure ulcers/areas of breakdown that are being addressed.  [x] Preventative measures are being taken to decrease risk for skin breakdown.  [x] Necessity of urinary, arterial, and venous catheters discussed    =========================PHYSICAL EXAM=========================  GENERAL: In no acute distress  RESPIRATORY: Lungs clear to auscultation bilaterally. Good aeration. No rales, rhonchi, retractions or wheezing. Effort even and unlabored.  CARDIOVASCULAR: Regular rate and rhythm. Normal S1/S2. No murmurs, rubs, or gallop. Capillary refill < 2 seconds. Distal pulses 2+ and equal.  ABDOMEN: Soft, non-distended. Bowel sounds present. No palpable hepatosplenomegaly.  SKIN: No rash.  EXTREMITIES: Warm and well perfused. No gross extremity deformities.  NEUROLOGIC: Alert and oriented. No acute change from baseline exam.    ===============================================================  LABS:    RECENT CULTURES:      IMAGING STUDIES:    Parent/Guardian is at the bedside:	[ ] Yes	[ ] No  Patient and Parent/Guardian updated as to the progress/plan of care:	[ ] Yes	[ ] No    [ ] The patient remains in critical and unstable condition, and requires ICU care and monitoring, total critical care time spent by myself, the attending physician was __ minutes, excluding procedure time.  [ ] The patient is improving but requires continued monitoring and adjustment of therapy Interval/Overnight Events: no acute events    ===========================RESPIRATORY==========================  RR: 29 (06-29-21 @ 05:05) (17 - 29)  SpO2: 87% (06-29-21 @ 05:05) (87% - 96%)    Respiratory Support:  room air    [x] Airway Clearance Discussed  Extubation Readiness:  [ x] Not Applicable     [ ] Discussed and Assessed      =========================CARDIOVASCULAR========================  HR: 70 (06-29-21 @ 05:05) (70 - 70)  BP: 116/59 (06-29-21 @ 05:05) (92/43 - 116/59)    Patient Care Access: 1 PIV  furosemide   Oral Tab/Cap - Peds 10 milliGRAM(s) Oral every 12 hours  lisinopril Oral Tab/Cap - Peds 5 milliGRAM(s) Oral daily  sildenafil   Oral Tab/Cap - Peds 20 milliGRAM(s) Oral every 8 hours  spironolactone Oral Tab/Cap - Peds 100 milliGRAM(s) Oral daily    =====================HEMATOLOGY/ONCOLOGY=====================  Transfusions:	[ ] PRBC	[ ] Platelets	[ ] FFP		[ ] Cryoprecipitate  DVT Prophylaxis:aspirin  Comments:    ========================INFECTIOUS DISEASE=======================  T(C): 36.6 (06-29-21 @ 05:05), Max: 37.1 (06-28-21 @ 20:00)  T(F): 97.9 (06-29-21 @ 05:05), Max: 98.8 (06-28-21 @ 23:00)  :      ==================FLUIDS/ELECTROLYTES/NUTRITION=================  I&O's Summary    28 Jun 2021 07:01  -  29 Jun 2021 07:00  --------------------------------------------------------  IN: 125 mL / OUT: 0 mL / NET: 125 mL      Diet: regular diet    polyethylene glycol 3350 Oral Powder - Peds 17 Gram(s) Oral daily PRN  sodium chloride 0.9% lock flush - Peds 3 milliLiter(s) IV Push every 8 hours  Comments:    ==========================NEUROLOGY===========================    acetaminophen   Oral Tab/Cap - Peds. 650 milliGRAM(s) Oral every 6 hours PRN  aspirin  Oral Tab/Cap - Peds 325 milliGRAM(s) Oral daily  levETIRAcetam  Oral Tab/Cap - Peds 1500 milliGRAM(s) Oral two times a day  [x] Adequacy of sedation and pain control has been assessed and adjusted  Comments:    OTHER MEDICATIONS:  Mexiletine 150 milliGRAM(s) 150 milliGRAM(s) Oral every 8 hours    =========================PATIENT CARE==========================  [ ] no skin injury  [x] Preventative measures are being taken to decrease risk for skin breakdown.  [x] Necessity of urinary, arterial, and venous catheters discussed    =========================PHYSICAL EXAM=========================  GENERAL: In no acute distress  RESPIRATORY: Lungs clear to auscultation bilaterally. Good aeration. No rales, rhonchi, retractions or wheezing. Effort even and unlabored.  CARDIOVASCULAR: Regular rate and rhythm. soft S1/S2. soft 3/6 holosystolic murmur. Capillary refill < 2 seconds. Distal pulses 2+ and equal.  ABDOMEN: Soft, non-distended. Bowel sounds present. No palpable hepatosplenomegaly.  SKIN: No rash.  EXTREMITIES: Warm and well perfused. No gross extremity deformities.  NEUROLOGIC: Alert and oriented. No acute change from new baseline exam.    ===============================================================    Parent/Guardian is at the bedside:	[ x] Yes	[ ] No  Patient and Parent/Guardian updated as to the progress/plan of care:	[x ] Yes	[ ] No    [ ] The patient remains in critical and unstable condition, and requires ICU care and monitoring, total critical care time spent by myself, the attending physician was __ minutes, excluding procedure time.  [ ] The patient is improving but requires continued monitoring and adjustment of therapy

## 2021-06-29 NOTE — PROGRESS NOTE PEDS - SUBJECTIVE AND OBJECTIVE BOX
s/p left craniectomy and insertion of VPS for ICH and HCP    Patient seen and examined with mother bedside, no significant events overnight.,  patient denies any complaints.    HPI:  21yo male with complex cardiac h/o DILV, L-malposition of great arteries, sick sinus syndrome and AV mihir disease with tachybradycardia syndrome with a dual chamber pacemaker (currently with RV lead fracture and is currently only LV paced 2/2 complete heart block), PSH of status post Jkzcd-Flhu-Uywdnbr anastomosis and aortic arch reconstruction followed by a fenestrated lateral tunnel Fontan completion (now s/p fenestration closure). here s/p cardiac catheterization and ablation. Procedure was complicated by unsuccessful ablation and post extubation patient was noted to have increased hemoptysis and desaturations, patient was reintubated for airway protection and given propofol for sedation.  (26 May 2021 20:19)    PAST MEDICAL & SURGICAL HISTORY:  Double inlet left ventricle    D-TGA (dextro-transposition of great arteries)    Sick sinus syndrome    Atrial tachycardia    Hepatomegaly    Other ascites    Pulmonary hypertension    Pacemaker    AV block    Coagulopathy    S/P cardiac cath  2001-assessment of anatomy prior to 1st surgery  4/13/2004- coil emolization of collateral vessels  7/25/2011-balloon angioplasty of superior narrowing of Fontan circuit, balloon angioplasty of LPA, ASD device placed for closure of Fontan fenestraion  10/24/2016- Cath of Fontan    S/P Nirmal-Taussig shunt  Jqpkx-Qoxc-Jizaime anastomosis with modified right BT shunt and creation of pulmonary atresia    S/P celestine-Fontan operation  3/15/2003- Celestine-Fontan with ligation of BT shunt and left pulmonary artery plasty    Cardiac pacemaker  5/26/2004        PHYSICAL EXAM:  AA&0 x 3 ,follows commands, PERRL, EOMI  Motor- LUE/LLE 5/5, RUE/RLE 4+/5  Sensory - intact to light touch  Incision site c/d/i    Diet:  Regular (  x)  NPO       (  )    Drains:  ventriculostomy   (  )  Lumbar drain       (  )  NEIL drain               (  )  Hemovac              (  )    Vital Signs Last 24 Hrs  T(C): 36.6 (29 Jun 2021 05:05), Max: 37.1 (28 Jun 2021 20:00)  T(F): 97.9 (29 Jun 2021 05:05), Max: 98.8 (28 Jun 2021 23:00)  HR: 70 (29 Jun 2021 05:05) (70 - 70)  BP: 116/59 (29 Jun 2021 05:05) (92/43 - 116/59)  BP(mean): 73 (29 Jun 2021 05:05) (54 - 73)  RR: 29 (29 Jun 2021 05:05) (17 - 29)  SpO2: 87% (29 Jun 2021 05:05) (87% - 96%)      MEDICATIONS  (STANDING):  aspirin  Oral Tab/Cap - Peds 325 milliGRAM(s) Oral daily  furosemide   Oral Tab/Cap - Peds 10 milliGRAM(s) Oral every 12 hours  levETIRAcetam  Oral Tab/Cap - Peds 1500 milliGRAM(s) Oral two times a day  lisinopril Oral Tab/Cap - Peds 5 milliGRAM(s) Oral daily  Mexiletine 150 milliGRAM(s) 150 milliGRAM(s) Oral every 8 hours  sildenafil   Oral Tab/Cap - Peds 20 milliGRAM(s) Oral every 8 hours  sodium chloride 0.9% lock flush - Peds 3 milliLiter(s) IV Push every 8 hours  spironolactone Oral Tab/Cap - Peds 100 milliGRAM(s) Oral daily    MEDICATIONS  (PRN):  acetaminophen   Oral Tab/Cap - Peds. 650 milliGRAM(s) Oral every 6 hours PRN Mild Pain (1 - 3), Moderate Pain (4 - 6), Severe Pain (7 - 10)  polyethylene glycol 3350 Oral Powder - Peds 17 Gram(s) Oral daily PRN Constipation    LABS:    06-27    133<L>  |  97<L>  |  22  ----------------------------<  113<H>  4.8   |  17<L>  |  0.85    Ca    10.5      27 Jun 2021 10:56  Phos  4.9     06-27  Mg     2.6     06-27    TPro  9.0<H>  /  Alb  4.8  /  TBili  1.1  /  DBili  x   /  AST  29  /  ALT  41  /  AlkPhos  198  06-27    PT/INR - ( 28 Jun 2021 05:25 )   PT: 14.3 sec;   INR: 1.27 ratio         PTT - ( 28 Jun 2021 05:25 )  PTT:22.6 sec

## 2021-06-29 NOTE — PRE-OP CHECKLIST, PEDIATRIC - BMI (KG/M2)
PCP:    REQUESTING PHYSICIAN:    REASON FOR CONSULT:    CHIEF COMPLAINT:    HPI:HPI: 86year old man with a history of valvular heart disease (bioprosthetic MVR, TV repair), atrial appendectomy. AF, nonobstructive CAD now POD # 1 R THR; cardiology consulted for post-op hypotension.    21:  Feels well; no cardiac symptoms.  21: Pt anticipating discharge later but hospitalist is concerned about rapid ventricular response and borderline blood pressure.       PAST MEDICAL & SURGICAL HISTORY:  Gout    Prostate CA  seed placement    Hypertension, unspecified type    HLD (hyperlipidemia)    Atrial fibrillation, unspecified type  on coumadin    GERD (gastroesophageal reflux disease)    Kidney stones    Osteoarthritis    Bacteremia with signs of infection  Left knee 2018- I &amp; D wash out - IV antibiotics for 6 weeks    Obesity (BMI 30.0-34.9)    Mitral valve vegetation  replaced    CKD (chronic kidney disease) stage 3, GFR 30-59 ml/min    S/P total knee replacement, right    Status post cataract extraction, unspecified laterality  bilateral    S/P appendectomy    S/P inguinal hernia repair  left    H/O prostate cancer  seed placement    S/P mitral valve replacement  ; and aortic  valve repair        SOCIAL HISTORY:    FAMILY HISTORY:  FH: heart disease (Father)  blood clot in heart    Family history of stroke (cerebrovascular) (Sibling)        ALLERGIES:  Allergies    Zosyn (Flushing; Rash)    Intolerances        MEDICATIONS:    MEDICATIONS  (STANDING):  acetaminophen   Tablet .. 1000 milliGRAM(s) Oral every 12 hours  atorvastatin 40 milliGRAM(s) Oral at bedtime  enoxaparin Injectable 40 milliGRAM(s) SubCutaneous every 12 hours  furosemide    Tablet 20 milliGRAM(s) Oral daily  hydrALAZINE 25 milliGRAM(s) Oral three times a day  HYDROmorphone  Injectable 0.5 milliGRAM(s) IV Push once  metoprolol succinate ER 25 milliGRAM(s) Oral daily  pantoprazole    Tablet 40 milliGRAM(s) Oral before breakfast  polyethylene glycol 3350 17 Gram(s) Oral at bedtime  senna 2 Tablet(s) Oral at bedtime  warfarin 1 milliGRAM(s) Oral once    MEDICATIONS  (PRN):  bisacodyl Suppository 10 milliGRAM(s) Rectal once PRN Constipation  magnesium hydroxide Suspension 30 milliLiter(s) Oral daily PRN Constipation  ondansetron Injectable 4 milliGRAM(s) IV Push every 6 hours PRN Nausea and/or Vomiting  oxyCODONE    IR 5 milliGRAM(s) Oral every 3 hours PRN Moderate Pain (4 - 6)  oxyCODONE    IR 10 milliGRAM(s) Oral every 3 hours PRN Severe Pain (7 - 10)        Vital Signs Last 24 Hrs  T(C): 36.7 (2021 14:10), Max: 36.9 (2021 00:00)  T(F): 98 (2021 14:10), Max: 98.4 (2021 00:00)  HR: 98 (2021 14:10) (67 - 108)  BP: 120/75 (2021 14:10) (111/53 - 130/59)  BP(mean): 90 (2021 14:10) (69 - 90)  RR: 18 (2021 14:10) (9 - 23)  SpO2: 97% (2021 14:10) (94% - 98%)Daily     Daily Weight in k.6 (2021 06:00)I&O's Summary    2021 07:01  -  2021 07:00  --------------------------------------------------------  IN: 1300 mL / OUT: 2300 mL / NET: -1000 mL    2021 07:01  -  2021 16:30  --------------------------------------------------------  IN: 240 mL / OUT: 900 mL / NET: -660 mL        PHYSICAL EXAM:    Constitutional: NAD, awake and alert, well-developed  HEENT: PERR, EOMI,  No oral cyananosis.  Neck:  supple,  No JVD  Respiratory: Breath sounds are clear bilaterally, No wheezing, rales or rhonchi  Cardiovascular: S1 and S2, regular rate and rhythm, no Murmurs, gallops or rubs  Gastrointestinal: Bowel Sounds present, soft, nontender.   Extremities: Post op  Vascular: 2+ peripheral pulses  Neurological: A/O x 3, no focal deficits  Musculoskeletal: no calf tenderness.  Skin: No rashes.      LABS: All Labs Reviewed:                        12.0   9.63  )-----------( 197      ( 2021 06:29 )             35.3                         13.2   13.77 )-----------( 193      ( 2021 06:14 )             37.6                         14.6   x     )-----------( x        ( 2021 17:02 )             42.1     2021 06:29    139    |  106    |  39     ----------------------------<  92     4.1     |  28     |  1.66   2021 06:14    135    |  102    |  34     ----------------------------<  139    4.1     |  26     |  1.57   2021 17:02    138    |  103    |  32     ----------------------------<  147    3.8     |  26     |  1.60     Ca    8.9        2021 06:29  Ca    8.8        2021 06:14  Ca    8.6        2021 17:02  Phos  3.3       2021 06:29  Mg     2.0       2021 06:29      PT/INR - ( 2021 06:29 )   PT: 13.2 sec;   INR: 1.10 ratio         PTT - ( 2021 06:14 )  PTT:29.0 sec      Blood Culture:         RADIOLOGY/EKG:      ECHO/CARDIAC CATHTERIZATION/STRESS TEST:   23.8

## 2021-06-29 NOTE — PROGRESS NOTE PEDS - PROBLEM SELECTOR PROBLEM 2
Intracerebral bleed
Sick sinus syndrome
Fontan circulation present
Fontan circulation present

## 2021-06-29 NOTE — PROGRESS NOTE PEDS - ATTENDING SUPERVISION STATEMENT
Resident
ACP
Fellow
ACP
Fellow
Resident
Fellow
Fellow
Resident
Resident
ACP
ACP
Fellow
Fellow
Resident
Resident/Fellow
ACP
Fellow
ACP
Fellow

## 2021-07-14 ENCOUNTER — APPOINTMENT (OUTPATIENT)
Dept: PEDIATRIC CARDIOLOGY | Facility: CLINIC | Age: 21
End: 2021-07-14
Payer: MEDICAID

## 2021-07-14 VITALS
WEIGHT: 130.51 LBS | SYSTOLIC BLOOD PRESSURE: 111 MMHG | HEIGHT: 66.54 IN | DIASTOLIC BLOOD PRESSURE: 68 MMHG | HEART RATE: 69 BPM | OXYGEN SATURATION: 88 % | BODY MASS INDEX: 20.73 KG/M2

## 2021-07-14 PROCEDURE — 99215 OFFICE O/P EST HI 40 MIN: CPT

## 2021-07-14 PROCEDURE — 93000 ELECTROCARDIOGRAM COMPLETE: CPT

## 2021-07-14 RX ORDER — FUROSEMIDE 80 MG/1
TABLET ORAL
Refills: 0 | Status: DISCONTINUED | COMMUNITY
End: 2021-07-14

## 2021-07-14 RX ORDER — SOTALOL HYDROCHLORIDE 120 MG/1
120 TABLET ORAL
Qty: 60 | Refills: 5 | Status: DISCONTINUED | COMMUNITY
Start: 2020-11-10 | End: 2021-07-14

## 2021-07-14 RX ORDER — RIVAROXABAN 20 MG/1
20 TABLET, FILM COATED ORAL
Qty: 1 | Refills: 3 | Status: DISCONTINUED | COMMUNITY
Start: 2018-01-10 | End: 2021-07-14

## 2021-07-14 RX ORDER — RIVAROXABAN 20 MG/1
20 TABLET, FILM COATED ORAL
Qty: 30 | Refills: 6 | Status: DISCONTINUED | COMMUNITY
Start: 2020-07-02 | End: 2021-07-14

## 2021-07-14 NOTE — HISTORY OF PRESENT ILLNESS
[FreeTextEntry1] : We had the opportunity to evaluate TRINI MÉNDEZ at pediatric cardiology department at [address] [place] [address], on July 14th,2021.This is Trini's first follow-up since his discharge from the hospital after a prolonged hospital stay recovering from a intracranial bleed after an elective cardiac catheterization procedure.  Trini underwent a cardiac catheterization and EP study for ablation of the reentrant tachycardia on May 26, 2021.  His hemodynamics were unchanged compared to previous data few years ago.  In summary,\par 1. cardiac output was 4.5 L/min\par 2.  Mean Fontan pressures were 20 with no evidence of obstruction across the Fontan pathway.\par 3.  Normal PVR of 1.6 units/m² on current medication\par 4.  Elevated ventricular end-diastolic pressure of 14 to 16 mmHg.\par 5.  Angiography revealed unobstructed Fontan, aortic arch and no obvious collaterals.  This study was limited for evaluation of venovenous collaterals.\par \par Subsequently patient underwent an EP  study to map and ablate the atrial tachycardia but was unsuccessful.  Patient was subsequently cardioverted and transferred to the PICU for recovery.  His post procedure course was complicated with worsening hemodynamic status needing mechanical ventilation for 24 hours followed by an intracranial bleed with clinical symptoms.Trini is status post cranioplasty with placement of  shunt and on Keppra for seizure control.  He has had no further clinical seizures and remains symptom-free.  He does have some weakness in his legs but able to ambulate without any problems.Trini's anticoagulation regimen was changed from Xarelto to aspirin since the cranial bleed.\par \par From a cardiac standpoint he is back to his baseline status and remains in IART and at a paced ventricular rate of 70 bpm.  He is on mexiletine for control of V. tach detected during his in-hospital stay.  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 In 2016 he presented to us with evidence of failing fontan with symptoms of SOB, exercise intolerence, ascites and fatigue. He had evidence of LV dysfunction, PHT and mild fontan obstruction. A stent was placed, pulmonary vasodilators initiated, CHF meds given and CRT done due to concerns of pacemaker induced cardiomyopathy. He had an excellent response.\par When we evaluated on December 14, 2017 we found he was on IART (atrial flutter). Interestingly, he was not having symptoms. He did not experience chest pain, palpitations or syncope. Our electrophysiologist attempted to terminate his atrial flutter by overpacing without success. He was taking Aspirin 325 mg PO q 24 hours. There was no thrombus on his echocardiogram and after talking with hematology he was started on Xaraleto 20 mg PO BID in order to have a more aggressive prophylaxis since we were not able to terminate the atrial flutter. Of note: prior to Aspirin he was taking Warfarin but this medication was stopped due to interactions with sildenafil. He continues to take his other medication which include, Enalapril 2.5 mg PO BID, Furosemide 20 mg PO q 24 hours, Spironolactone 100 mg PO daily and Sotalol 120 mg PO q 12 hours. \par \par He is status post CRT of his single ventricle (LV now with 2 epicardial leads) and pacemaker generator replacement via left thoracotomy on December 5, 2016. \par \par He had a recent syncopal episode(June 2020) presumably due to fractured RV pacing lead causing bradycardia. Since he had CRT with 2 ventricular leads, Dr Christensen reprogrammed the leads and recommended surveillance for now. He has remained asymptomatic. Cardiac function remained unchanged as well. He was also noncompliant with Xarelto and sildenafil due to its midday schedule in the past but now has a pill box that he carries and has been compliant with his medications. \par  He was recently admitted in PICU (September, 2020) after he was found to be in A fib for almost 3 days and required cardioversion. He was asymptomatic at that time. During this admission his Sotalol was increased to 120 mg Q12h. \oleg Choi presented in an atrially paced and biventricularly paced rhythm.@ 80 bpm. His underlying is V paced @ 35 bpm. He is DDDR  bpm. \par \par \par His cardiac history can be summarized as follows: \par \par 11/02/01, Cath – Assessment of anatomy and hemodynamics prior to surgery\par 11/08/01, OR - Eznjg-Pdmz-Egngphe anastomosis with a modified right Nirmal-Taussig shunt.\par 03/15/03, OR - Celestine-Fontan with ligation of BT shunt and left pulmonary artery plasty\par 04/13/04, Cath - Coil embolization of collateral vessels\par 05/19/04, OR - Lateral tunnel fenestrated Fontan\par 05/26/04, OR - Pacemaker placement\par 7/25/11, Cath- Normal CI 3.1L/min/m2, Qp:Qs 1:1, normal pressure in Fontan circuit (mean 15 mmHg), balloon angioplasty of superior narrowing of Fontan circuit with improvement seen. Balloon angioplasty of LPA with improvement of stenosis. ASD device placed for closure of Fontan fenestration\par 10/24/16, Cath- Cath for Fontan failure as evidenced by increasing exercise intolerance as well as ascites. Low normal CI of 2.7 L/min/ m2. Elevated Fontan pressures of 24 mmHg. Elevated PVR (3.7) that responded well to Apryl pulmonary vasodilator challenge (decreased to 1.4). Angiographic evidence of mild intracardiac Fontan obstruction without any pressure gradient, relieved by placing a 36mm X 12mm stent (IntraStent Max LD) mounted on a 25mm balloon. Post intervention Fontan pressure decreased to 20 mmHg. \par \par \par \par

## 2021-07-14 NOTE — CONSULT LETTER
[Today's Date] : [unfilled] [Name] : Name: [unfilled] [] : : ~~ [Today's Date:] : [unfilled] [Dear  ___:] : Dear Dr. [unfilled]: [Consult] : I had the pleasure of evaluating your patient, [unfilled]. My full evaluation follows. [Consult - Multiple Provider] : Thank you very much for allowing us to participate in the care of this patient. If you have any questions, please do not hesitate to contact us. [Sincerely,] : Sincerely, [DrAmarilys  ___] : Dr. WU [FreeTextEntry4] : Jose Luis Saenz MD [FreeTextEntry5] : [address] [facility] [FreeTextEntry6] : [place] [id number] [de-identified] : Adonis Londono MD\par Director, Pediatric catheterization Lab\par [facility]\par , [facility] [employer]\par Telephone: [phone]\par [phone]\par

## 2021-07-14 NOTE — DISCUSSION/SUMMARY
[Needs SBE Prophylaxis] : [unfilled]  needs bacterial endocarditis prophylaxis. SBE prophylaxis is indicated for dental and invasive ENT procedures. (Circulation. 2007; 116: 6879-6501) [Participate only in Mild PE activities] : [unfilled] may participate ONLY IN MILD physical education activities such as Knik games, golf, and badminton. 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 Jimbo is recovering from his unfortunate episode of cranial bleed 24 hours after an elective cardiac catheterization/EP study.  He is status post cranial plasty and the  shunt.  From a neurologic standpoint he has had remarkable recovery and has only minimal residual weakness.   Mom remains concerned regarding his mental acuity.  He will be receiving physical and occupational therapy starting next week.Jimbo's anticoagulation regimen was changed from Xarelto to aspirin since the cranial bleed.\par \par From a cardiac standpoint, he remains in IART with a controlled paced ventricular rate of 70 bpm.  He has history of V. tach during his hospital stay for which he is on mexiletine with good control.  There has been no clinical episodes of palpitation.  As you well know his anterior RV pacing lead is nonfunctioning therefore he is no longer synchronize. At the present time, we would continue monitoring his cardiac function and or symptoms before considering replacing the ventricular lead for resynchronization. With regards to his Fontan physiology he remains at a elevated Fontan pressures secondary to increased ventricular filling pressure from cardiac dysfunction.  His pulmonary vascular system appears to be normal on current medical management.  He was minimally desaturated since his hospital stay ranging from 88 to 91%.  I have increased his Lasix to 20 mg in the a.m. and The p.m. dose as 10 mg.  We discussed the referral to cardiac failure/transplant evaluation once he recovers well from his current event.  He will return for a follow-up in 2 months.

## 2021-07-17 LAB
CULTURE RESULTS: SIGNIFICANT CHANGE UP
SPECIMEN SOURCE: SIGNIFICANT CHANGE UP

## 2021-07-29 ENCOUNTER — APPOINTMENT (OUTPATIENT)
Dept: PEDIATRIC HEMATOLOGY/ONCOLOGY | Facility: CLINIC | Age: 21
End: 2021-07-29
Payer: MEDICAID

## 2021-07-29 ENCOUNTER — APPOINTMENT (OUTPATIENT)
Dept: PEDIATRIC CARDIOLOGY | Facility: CLINIC | Age: 21
End: 2021-07-29
Payer: MEDICAID

## 2021-07-29 ENCOUNTER — OUTPATIENT (OUTPATIENT)
Dept: OUTPATIENT SERVICES | Age: 21
LOS: 1 days | End: 2021-07-29

## 2021-07-29 VITALS
SYSTOLIC BLOOD PRESSURE: 91 MMHG | OXYGEN SATURATION: 100 % | HEART RATE: 92 BPM | BODY MASS INDEX: 21.37 KG/M2 | RESPIRATION RATE: 20 BRPM | WEIGHT: 131.4 LBS | TEMPERATURE: 97.88 F | HEIGHT: 65.63 IN | DIASTOLIC BLOOD PRESSURE: 55 MMHG

## 2021-07-29 VITALS
SYSTOLIC BLOOD PRESSURE: 112 MMHG | OXYGEN SATURATION: 90 % | DIASTOLIC BLOOD PRESSURE: 67 MMHG | HEART RATE: 70 BPM | HEIGHT: 66.14 IN | WEIGHT: 132.06 LBS | BODY MASS INDEX: 21.22 KG/M2

## 2021-07-29 DIAGNOSIS — Z95.0 PRESENCE OF CARDIAC PACEMAKER: Chronic | ICD-10-CM

## 2021-07-29 DIAGNOSIS — Z95.818 PRESENCE OF OTHER CARDIAC IMPLANTS AND GRAFTS: Chronic | ICD-10-CM

## 2021-07-29 DIAGNOSIS — Z98.890 OTHER SPECIFIED POSTPROCEDURAL STATES: Chronic | ICD-10-CM

## 2021-07-29 DIAGNOSIS — Z51.81 ENCOUNTER FOR THERAPEUTIC DRUG LVL MONITORING: ICD-10-CM

## 2021-07-29 PROCEDURE — 93280 PM DEVICE PROGR EVAL DUAL: CPT

## 2021-07-29 PROCEDURE — 99214 OFFICE O/P EST MOD 30 MIN: CPT

## 2021-07-29 PROCEDURE — ZZZZZ: CPT

## 2021-07-29 RX ORDER — LISINOPRIL 5 MG/1
5 TABLET ORAL
Qty: 3 | Refills: 3 | Status: ACTIVE | COMMUNITY
Start: 1900-01-01 | End: 1900-01-01

## 2021-07-29 NOTE — CONSULT LETTER
[Today's Date] : [unfilled] [Name] : Name: [unfilled] [] : : ~~ [Today's Date:] : [unfilled] [Dear  ___:] : Dear Dr. [unfilled]: [Consult] : I had the pleasure of evaluating your patient, [unfilled]. My full evaluation follows. [Consult - Single Provider] : Thank you very much for allowing me to participate in the care of this patient. If you have any questions, please do not hesitate to contact me. [Sincerely,] : Sincerely, [DrAmarilys ___] : Dr. WU [FreeTextEntry4] : Adonis Londono MD [FreeTextEntry5] : Pediatric Cardiology [FreeTextEntry6] : [facility] [de-identified] : Issa Christensen MD, FACC, FHRS, FAAP\par Associate Chief, Pediatric Cardiology\par , Pediatric Cardiac Electrophysiology\par [facility]\par [facility]\par Professor of Pediatrics\par [facility] [employer] [employer]\par \par \par

## 2021-07-29 NOTE — DISCUSSION/SUMMARY
[Needs SBE Prophylaxis] : [unfilled]  needs bacterial endocarditis prophylaxis. SBE prophylaxis is indicated for dental and invasive ENT procedures. (Circulation. 2007; 116: 1440-5383) [PE + No Varsity Sports or Strenuous Activity] : [unfilled] may participate in the physical education program, WITH RESTRICTION from all varsity sports and from excessively stressful activities such as rope climbing, weight lifting, sustained running (i.e. laps) and fitness testing. Must be allowed to rest when tired. [FreeTextEntry1] : In summary Jimbo is a 20 year old with history of DILV, L-malposition of the great arteries status post lateral tunnel Fontan with sick sinus node syndrome, AV block with tachybradycardia syndrome (and IART), s/p dual chamber pacemaker and CRT system who is seen today for follow up. He has an RV lead fracture and is only LV paced now. He is recovering from a head bleed with residual weakness.  His pacemaker was reprogrammed from VOO to VVIR to allow for rate response when he exercises, but his sensitivity is set at 11 mV when his R waves are 6.1 mV so he is essentially being paced VOOR.  He will remain in IART.  Transplant discussions are underway.  Before his generator battery depletes in 10 months, a decision to change leads so that more adequate  CRT can be performed, will be made vs proceeding with transplant eval.  I will see him in 6 months and he will send a transmission in 3 months.

## 2021-07-29 NOTE — HISTORY OF PRESENT ILLNESS
[FreeTextEntry1] : It was a pleasure to see Jimbo Myers today in the EP clinic for the follow up of pacemaker. As you know, he is a 20 year old with single ventricle physiology (double-inlet left ventricle with L-malposition of the great arteries) who is status post Optiu-Wxbv-Bflrylw anastomosis and aortic arch reconstruction followed by a fenestrated lateral tunnel Fontan completion (now s/p fenestration closure). He underwent placement of dual-chamber epicardial leads for AV sequential pacing and antitachycardia functionality for sick sinus node syndrome with AV mihir disease, tachybradycardia syndrome and IART. \par He developed features of failing Fontan in 2016- cardiac cath revealed LV dysfunction with elevated PVR and mild fontan obstruction; a stent was placed at the site of Fontan stenosis, pulmonary vasodilators and CHF meds were initiated and cardiac resynchronization therapy with placement of additional ventricular lead led to an excellent clinical response and he was noted to be doing well until June, 2020 he presented to the ER with syncope. CT head was performed and was normal, however CXR revealed a likely RV fracture and upon device interrogation the RV lead impedance did increase. He has previously been on coumadin for anti-coagulation but it was changed to Rivoraxiban by Hematology.  \par \par He was admitted to PICU in Sept, 2020 after being seen in the clinic for A. fib that required cardioversion. He was last seen in the clinic on Dec 2020 when he was doing well. Today he denies any symptoms of chest pain, palpitations, dizziness or syncope. He has recently started lifting weights. He lifts about 50 lbs in each hand, he does it twice a day (for 2 hrs) and has not had any limitations with exercise. He says that he is compliant with his medications. He continues on Sotalol 120 mg twice a day and Xarelto. \par \par SInce his last visit on 3/31/2021, [place] [facility] [place].  He underwent cardioversion and developed a head bleed which was surgically evacuated and a  shunt placed.  After a long hospitalization, Jimbo has made an excellent recovery with minimal residual weakness on his right side.  He is now taking ASA for his atrial arrhythmia because of the risks of bleeding with other medications and Mexiletine for ventricular tachycardia that developed during his hospitalization.  He is receiving PT and OT.

## 2021-07-29 NOTE — CARDIOLOGY SUMMARY
[Today's Date] : [unfilled] [FreeTextEntry1] : Likely IART with atrial rate of 250. V paced at the rate of 70.   [de-identified] : PACEMAKER EVALUATION (See separate note for details): Presenting rhythm was AT  @ 70 bpm. CL of the AT was ~ 240 ms. The pacemaker mode is VOO @ 70 bpm. He has 10 months remaining on the battery.  Adequate lead parameters of the LV lead. Low p wave amplitudes. \par He was changed to VVIR with sensitivity 11.3 mV when R waves 6.1 mV.

## 2021-07-29 NOTE — REASON FOR VISIT
[Follow-Up] : a follow-up visit for [Mother] : mother [S/P Cardiac Surgery] : status post cardiac surgery [S/P Catheterization] : status post catheterization [S/P EPS/Ablation] : status post EPS/Ablation [Atrial Flutter] : atrial flutter [Ventricular Tachycardia] : ventricular tachycardia [Patient] : patient [FreeTextEntry3] : complex congenital heart disease

## 2021-07-30 PROBLEM — Z51.81 ENCOUNTER FOR MEDICATION MONITORING: Status: ACTIVE | Noted: 2017-05-30

## 2021-07-30 NOTE — CONSULT LETTER
[Dear  ___] : Dear  [unfilled], [Courtesy Letter:] : I had the pleasure of seeing your patient, [unfilled], in my office today. [Please see my note below.] : Please see my note below. [Consult Closing:] : Thank you very much for allowing me to participate in the care of this patient.  If you have any questions, please do not hesitate to contact me. [Sincerely,] : Sincerely, [DrAmarilys  ___] : Dr. WU [DrAmarilys ___] : Dr. WU [___] : [unfilled] [FreeTextEntry2] : Adonis Londono MD\par [facility]- Dept. of Pediatrics \par [address]\par [address] Cardiology	\[place] [address]\par Tel: [phone]\par Fax: [phone] [FreeTextEntry3] : Palak Ordonez\par Pediatric Hematology\par Division of Hematology/Oncology and Stem Cell Transplantation\par [facility]\par [phone]\par \par

## 2021-07-30 NOTE — HISTORY OF PRESENT ILLNESS
[de-identified] : Jimob is a 16 year old boy with history of double inlet left ventricle, L- malposition of the great arteries status post Fontan. 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 He had cardiac resynchronization in Dec 2016. Aspirin therapy discontinued and started on Warfarin prophylaxis target 2.0-2.5. Difficulty achieving therapeutic levels and referred by cardiology to Ped Hematology for medication monitoring. Family reports compliance without missed doses; interactions with cardiac medications possibility; administered Warfarin separately at bedtime most times on an empty stomach. Denies any significant bleeding diathesis from spontaneous/trauma/surgical challenges. Denies any current anemia and/or iron deficiency. \par Birth history 42 weeks gestation induced normal vaginal delivery; no NICU stay. Discharged home without issues. Returned and hospitalized approximately 1 month later with cyanotic cardiac symptoms. He required open heart surgery. See below for details of cardiac history.  Denies any personal history of thromboembolism or stroke.  He has been followed by Neurology with imaging for history of dizziness without syncope; associated side effect of medications.  He is also followed by GI for history of ascites treated during hospitalization in Dec 2016.  Past history of nose bleeds 3 years ago associated with minor injury to the nose during activity; resolved without recurrence of nose bleeding. Growth and development is good; currently in 10th grade; school activities restricted as per cardiology--no contact sports. INR average 1.7-1.4 L with recent blood work 5/16/17 and increased dosing following labs. Currently taking Warfarin 91mg weekly divided 13mg daily (6mg strength and 1 mg strength tablets used in combination).  Also con-meds include enalapril, sildenafil, furosemide, spironolactone and most recently added sotalol.  He is not taking any vitamins or antibiotics; and dietary restrictions have been discussed by cardiology limiting Vitamin K foods.\par Family history is negative for any known thrombophilia and/or early onset heart attacks, early strokes, thromboembolism, multiple miscarriages.  Denies any family history of autoimmune disorders.  Maternal grandfather had coronary artery disease associated with environmental risk factors.  Mother and maternal aunt have gynecological history of ovarian cysts.  Jimbo lives with Mother and stepfather and 2 year old maternal-half sister who is healthy. Family ancestry [place]. Mother is primary caretaker and works as a medical assistant; she demonstrates understanding and importance of compliance with care team asking appropriate questions. Distance in travel and time does pose difficulty in scheduling visits; routine labs and INR have been drawn at outside lab facility close to home and monitored by cardiology.      [de-identified] : 12/22/17 He failed Coumadin therapy; compliance confirmed; unable to attain therapeutic range with upper limits of therapy. Notified by Cardiology of current changes in cardiac history with notable asymptomatic atrial flutter requiring more aggressive anticoagulant therapy rather than current aspirin regime.\par 1/10/18 Patient is asymptomatic for chest pain and/or palpitations monitored closely by cardiology; con-medications reconciled--no changes. Remains on Xarelto (rivaroxaban) 15mg twice daily taken with meals as directed for 21 days; then dose will changed to maintenance 20mg once daily beginning on Saturday 1/13/18. Last dose 7:30am today. Denies any minor or major bleeding events.  Once starter kit completed, will require refill RX at local pharmacy.\par 6/27/18 Interval follow up for H&P with labs. Remains on Xarelto 20mg daily with dinner. Denies any bleeding signs. Reports demonstrated mild thrombocytopenia. Will follow up with labs today. \par 10/2/19 Remains on Xarelto 20mg once daily with meals; no missed doses. Currently freshman in college for Business major. Bleeding precautions and activity restrictions as per cardiology maintained. \par 7/8/2020: Jimbo states that he  has not been quite consistent with medication, has forgotten in the past. He bought a pill box 2 weeks ago and has since been compliant as per Jimbo and qing. No bleeding symptoms- did endorse that he bleeds easily from minor cuts, bleeding stops with light pressure. He finished first year college in [employer], studying Marketing. \par 7/29/2021: Jimbo presents to the clinic for his yearly hematology follow up. He was hospitalized in June 2021 for cardiac cath and ablation. Ablation was unsuccessful and it was decided to Cardiovert him. \par Hospital course as follows:\par CV: On 5/27, attempted cardioversion through pacing, although was unsuccessful, and subsequently started medical cardioversion with amiodarone 300mg BID. Restarted on home medications including lisinopril, sildenafil, lasix, aldactone. IART rhythm was broken on 6/1 after cardioversion. However, he went back into IART later that night. Pacemaker was changed to VOO at 70 prior to neurosurgery. Amiodarone, aldactone, and lisinopril were held while critically ill. Epinephrine and vasopressin were utilized to maintain higher maps while EVD in place. On 6/8 he started having runs of ventricular tachycardia. Arrythmia resolved with lidocaine infusion and changing pacemaker mode to VVI. Transitioned to PO mexiletine on 6/13. Pacemaker transitioned back to VOO at 70. \par Resp: Re-intubated on 6/1 due to AMS associated with new intracranial bleed. Extubated to HFNC on 6/9. CTA chest on 6/10 showed numerous veno-venous anterior mediastinal collaterals. Weaned to RA on 6/16.\par Neuro: Four hours after cardioversion, he had an episode of staring and unresponsiveness. Head CT showed severe hemorrhagic stroke in left frontal lobe in the ROCCO-MCA watershed territory with extension to the intraventricular regions. He required urgent surgical decompressive craniectomy and hemorrhage evacuation with EVD placement overnight on 6/1. Serial head CTs showed a new hemorrhage along the EVD catheter tract on 6/6. EVD clamped on 6/10. VEEG placed on 6/10 due to eye deviation and seizure-like activity did not correlate with seizures. He did have a clinical seizure on 6/14 which broke with Ativan. Neurology re-consulted and recommended continuing Keppra at current dose. PM&R consulted for delirium/agitation, recommended starting propranolol for agitation. Delirium improved with propranolol and unclamping EVD. Taken to OR on 6/17 for EVD removal, cranioplasty, and VPS placement.  Repeat CT head 6/18 showed trace hemorrhage R lateral ventricle and interval placement of wire mesh cranioplasty. Repeat CT head on 6/19 and 6/20 was stable, repeat CT head on 6/25 was also stable prior to restarting anticoagulation. Propranolol was weaned per PM&R recs to 5mg q8h on 6/25 and off on 6/27.\par Heme: Given hemoptysis, hemoglobin was monitored and he did not require any transfusions. Hemoptysis resolved overnight 5/26-5/27. Home xarelto was held and restarted on 5/27. INR on 5/26 was 1.32, and it jumped to 4.94 after hemorrhage was identified. Cause for jump in INR is unclear. In the OR, xarelto reversals were given (Andexxa, KCentra), in addition to multiple units of FFP and blood products. Xarelto was stopped and he was received SubQ Vit K for 3 days. He was given multiple units of FFP for goal INR < 1.5 in order to prevent further bleeding. Hematology was consulted for persistently elevated INR. Thought to be possibly due to hepatic congestion secondary to elevated fontan pressures. INR improved w/ Vitamin K. Hypercoagulopathy workup was sent and is pending. Vitamin K PO 5mg 3x/wk started on 6/14. Will discuss with hematology duration of treatment. Pt was given FFP x1 for elevated INR 1.57 on 6/19. INR remained stable less than 1.5 and vit K 5mg PO 3x/wk was d/c'd on 6/23. INR continued to be trended daily which has been stable, slightly elevated at 1.39.\par

## 2021-07-30 NOTE — REVIEW OF SYSTEMS
[Normal Appetite] : normal appetite [Murmur] : murmur [Negative] : Allergic/Immunologic [Fever] : no fever [Sweating] : no sweating [Fatigue] : no fatigue [Weakness] : no weakness [Ecchymoses] : no ecchymoses [Epistaxis] : no epistaxis [Sore Throat] : no sore throat [Pallor] : no pallor [Bleeding] : no bleeding [Bruising] : no bruising [Anemia] : no anemia [Frequent Infections] : no frequent infections [Dyspnea] : no dyspnea [Wheezing] : no wheezing [Stridor] : no stridor [Chest Pain] : no chest paint [Edema] : no edema [Palpitations] : no palpitations [Cyanosis] : no cyanosis [Abdominal Pain] : no abdominal pain [Constipation] : no constipation [FreeTextEntry2] : Asymptomatic atrial flutter pacemaker in place [FreeTextEntry5] : 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

## 2021-07-30 NOTE — PHYSICAL EXAM
[No focal deficits] : no focal deficits [Normal] : affect appropriate [100: Fully active, normal.] : 100: Fully active, normal. 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

## 2021-08-07 LAB
CULTURE RESULTS: SIGNIFICANT CHANGE UP
SPECIMEN SOURCE: SIGNIFICANT CHANGE UP

## 2021-08-16 ENCOUNTER — APPOINTMENT (OUTPATIENT)
Dept: CT IMAGING | Facility: IMAGING CENTER | Age: 21
End: 2021-08-16
Payer: MEDICAID

## 2021-08-16 ENCOUNTER — OUTPATIENT (OUTPATIENT)
Dept: OUTPATIENT SERVICES | Facility: HOSPITAL | Age: 21
LOS: 1 days | End: 2021-08-16
Payer: MEDICAID

## 2021-08-16 DIAGNOSIS — Z98.890 OTHER SPECIFIED POSTPROCEDURAL STATES: Chronic | ICD-10-CM

## 2021-08-16 DIAGNOSIS — Z95.0 PRESENCE OF CARDIAC PACEMAKER: Chronic | ICD-10-CM

## 2021-08-16 DIAGNOSIS — Z95.818 PRESENCE OF OTHER CARDIAC IMPLANTS AND GRAFTS: Chronic | ICD-10-CM

## 2021-08-16 DIAGNOSIS — G91.9 HYDROCEPHALUS, UNSPECIFIED: ICD-10-CM

## 2021-08-16 PROCEDURE — 70450 CT HEAD/BRAIN W/O DYE: CPT | Mod: 26

## 2021-08-16 PROCEDURE — 70450 CT HEAD/BRAIN W/O DYE: CPT

## 2021-08-23 ENCOUNTER — NON-APPOINTMENT (OUTPATIENT)
Age: 21
End: 2021-08-23

## 2021-08-23 LAB
ALBUMIN SERPL ELPH-MCNC: 5.1 G/DL
ALP BLD-CCNC: 185 U/L
ALT SERPL-CCNC: 31 U/L
ANION GAP SERPL CALC-SCNC: 13 MMOL/L
APTT 2H P 1:4 NP PPP: 36.7 SEC
APTT 2H P INC PPP: 36.1 SEC
APTT BLD: 39.4 SEC
APTT IMM NP/PRE NP PPP: 35.5 SEC
APTT INV RATIO PPP: 39.4 SEC
AST SERPL-CCNC: 19 U/L
BASOPHILS # BLD AUTO: 0.05 K/UL
BASOPHILS NFR BLD AUTO: 0.8 %
BILIRUB SERPL-MCNC: 0.4 MG/DL
BUN SERPL-MCNC: 30 MG/DL
CALCIUM SERPL-MCNC: 10.2 MG/DL
CARDIOLIPIN AB SER IA-ACNC: NEGATIVE
CHLORIDE SERPL-SCNC: 106 MMOL/L
CO2 SERPL-SCNC: 21 MMOL/L
CREAT SERPL-MCNC: 1.16 MG/DL
DEPRECATED D DIMER PPP IA-ACNC: 830 NG/ML DDU
EOSINOPHIL # BLD AUTO: 0.37 K/UL
EOSINOPHIL NFR BLD AUTO: 6.1 %
FACT IX ACT/NOR PPP: 79 %
FACT VII ACT/NOR PPP: 78 %
FACT VIII ACT/NOR PPP: 113 %
FIBRINOGEN PPP COAG.DERIVED-MCNC: 446 MG/DL
GLUCOSE SERPL-MCNC: 80 MG/DL
HCT VFR BLD CALC: 41.7 %
HGB BLD-MCNC: 12.8 G/DL
IMM GRANULOCYTES NFR BLD AUTO: 1.1 %
INR PPP: 1.12 RATIO
LYMPHOCYTES # BLD AUTO: 1.07 K/UL
LYMPHOCYTES NFR BLD AUTO: 17.5 %
MAN DIFF?: NORMAL
MCHC RBC-ENTMCNC: 27.4 PG
MCHC RBC-ENTMCNC: 30.7 GM/DL
MCV RBC AUTO: 89.3 FL
MONOCYTES # BLD AUTO: 0.49 K/UL
MONOCYTES NFR BLD AUTO: 8 %
NEUTROPHILS # BLD AUTO: 4.06 K/UL
NEUTROPHILS NFR BLD AUTO: 66.5 %
NPP NORMAL POOLED PLASMA: 32.8 SECS
PLATELET # BLD AUTO: 189 K/UL
POTASSIUM SERPL-SCNC: 5.7 MMOL/L
PROT SERPL-MCNC: 8.3 G/DL
PT BLD: 13.2 SEC
RBC # BLD: 4.67 M/UL
RBC # FLD: 15.9 %
SODIUM SERPL-SCNC: 139 MMOL/L
WBC # FLD AUTO: 6.11 K/UL

## 2021-09-15 ENCOUNTER — APPOINTMENT (OUTPATIENT)
Dept: PEDIATRIC CARDIOLOGY | Facility: CLINIC | Age: 21
End: 2021-09-15

## 2021-10-06 ENCOUNTER — APPOINTMENT (OUTPATIENT)
Dept: PEDIATRIC CARDIOLOGY | Facility: CLINIC | Age: 21
End: 2021-10-06
Payer: MEDICAID

## 2021-10-06 VITALS
HEART RATE: 90 BPM | RESPIRATION RATE: 18 BRPM | HEIGHT: 65.75 IN | OXYGEN SATURATION: 91 % | SYSTOLIC BLOOD PRESSURE: 119 MMHG | BODY MASS INDEX: 23.02 KG/M2 | WEIGHT: 141.54 LBS | DIASTOLIC BLOOD PRESSURE: 79 MMHG

## 2021-10-06 PROCEDURE — 93325 DOPPLER ECHO COLOR FLOW MAPG: CPT

## 2021-10-06 PROCEDURE — 93303 ECHO TRANSTHORACIC: CPT

## 2021-10-06 PROCEDURE — 93320 DOPPLER ECHO COMPLETE: CPT

## 2021-10-06 PROCEDURE — 93000 ELECTROCARDIOGRAM COMPLETE: CPT

## 2021-10-06 PROCEDURE — 99215 OFFICE O/P EST HI 40 MIN: CPT

## 2021-10-08 NOTE — CONSULT LETTER
[Today's Date] : [unfilled] [Name] : Name: [unfilled] [] : : ~~ [Today's Date:] : [unfilled] [Dear  ___:] : Dear Dr. [unfilled]: [Consult] : I had the pleasure of evaluating your patient, [unfilled]. My full evaluation follows. [Consult - Multiple Provider] : Thank you very much for allowing us to participate in the care of this patient. If you have any questions, please do not hesitate to contact us. [Sincerely,] : Sincerely, [DrAmarilys  ___] : Dr. WU [FreeTextEntry4] : Jose Luis Saenz MD [FreeTextEntry5] : [address] [facility] [FreeTextEntry6] : [place] [id number] [de-identified] : Tahira Martinez MD\par Pediatric Cardiology Fellow (PGY-4)\par [facility]\par [address], [address]\[place] [address]\par Phone: [phone]\par \par Adonis Londono MD\par Director, Pediatric catheterization Lab\par [facility]\par , [facility]\par Telephone: [phone]\par [phone]\par \par Fax: [phone]\par \par \par \par \par

## 2021-10-08 NOTE — REVIEW OF SYSTEMS
[Change in Vision] : change in vision [Shortness Of Breath] : expressed as feeling short of breath [Dizziness] : dizziness [Headache] : headache [FreeTextEntry1] : Fatigue, Mood Swings

## 2021-10-08 NOTE — PHYSICAL EXAM
[General Appearance - Alert] : alert [General Appearance - In No Acute Distress] : in no acute distress [General Appearance - Well-Appearing] : well appearing [General Appearance - Well Nourished] : well nourished [Sclera] : the sclera were normal [Examination Of The Oral Cavity] : mucous membranes were moist and pink [] : no respiratory distress [Auscultation Breath Sounds / Voice Sounds] : breath sounds clear to auscultation bilaterally [No Cough] : no cough [Normal Chest Appearance] : the chest was normal in appearance [Apical Impulse] : quiet precordium with normal apical impulse [Chest Surgical / Traumatic Scar] : chest incision well healed [Heart Rate And Rhythm] : normal heart rate and rhythm [Heart Sounds Gallop] : no gallops [Heart Sounds Pericardial Friction Rub] : no pericardial rub [Heart Sounds Click] : no clicks [Arterial Pulses] : normal upper and lower extremity pulses with no pulse delay [Edema] : no edema [Capillary Refill Test] : normal capillary refill [II] : a grade 2/6 [LLSB] : LLSB  [Holosystolic] : holosystolic [Abdomen Soft] : soft [Nondistended] : nondistended [Abdomen Tenderness] : non-tender [Nail Clubbing] : no clubbing  or cyanosis of the fingernails [FreeTextEntry1] : Hepatomegaly ~2 cm below right subcostal margins

## 2021-10-08 NOTE — DISCUSSION/SUMMARY
[Needs SBE Prophylaxis] : [unfilled]  needs bacterial endocarditis prophylaxis. SBE prophylaxis is indicated for dental and invasive ENT procedures. (Circulation. 2007; 116: 5696-7637) [Participate only in Mild PE activities] : [unfilled] may participate ONLY IN MILD physical education activities such as [place] games, golf, and badminton. 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 He had an unfortunate episode of cranial bleed 24 hours after an elective cardiac catheterization/EP study done May 26th, 2021.  He is status post cranial plasty and the  shunt.  From a neurologic standpoint he has had remarkable recovery and has only minimal residual weakness. Mom remains concerned regarding his mental acuity, however for the most part patient is near baseline functionality  He completed physical therapy October 04th, 2021 but continues with occupational therapy once a week. He continues with Aspirin as his anticoagulation regimen (changed from Xarelto since the cranial bleed), and continues to follow with Hematology for such.\par \par From a cardiac standpoint, he remains in IART with a controlled paced ventricular rate of 70 bpm.  He has history of V. tach during his hospital stay for which he is on mexiletine with good control. There has been no clinical episodes of palpitation.  As you well know his anterior RV pacing lead is nonfunctioning therefore he is no longer synchronize. At the present time, we would continue monitoring his cardiac function and or symptoms before considering replacing the ventricular lead for resynchronization. With regards to his Fontan physiology he remains at a elevated Fontan pressures secondary to increased ventricular filling pressure from cardiac dysfunction.  His pulmonary vascular system appears to be normal on current medical management.  He was minimally desaturated since his hospital stay ranging from 90 to 91%.  Jimbo will continue on his current Lasix regime (20 mg in the a.m. and 10 mg in the p.m.).  We again discussed about the eventual need for referral to cardiac failure/transplant evaluation, however in the interim we would have patient reviewed by our heart failure doctor (Dr. Weiss).  He will return for a follow-up in 6 months.\par \par Additionally, Jimbo was educated about the benefits of the Covid-19 vaccine, and a Letter of clearance was given for such as requested by patient and his mother. He was also advised to follow up with Ophthalmology for concerns pertaining to worsening visual acuity.  \par \par We also discussed with the Hematology team (Dr. Sosa) regarding restarting patient on another anticoagulation regime. Eliquis was suggested as a possible option, but it was recommended that patient should schedule a Hematology appointment in the upcoming week to further discuss this topic.

## 2021-10-08 NOTE — REASON FOR VISIT
[Follow-Up] : a follow-up visit for [Patient] : patient [Mother] : mother [Medical Records] : medical records [FreeTextEntry3] : complex congenital heart disease

## 2021-10-08 NOTE — HISTORY OF PRESENT ILLNESS
[FreeTextEntry1] : We had the opportunity to evaluate TRINI MÉNDEZ at pediatric cardiology department at [address] [place] [address], on October 10th, 2021. 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 He underwent a cardiac catheterization and EP study for ablation of the reentrant tachycardia on May 26, 2021. His hemodynamics were unchanged compared to previous data few years ago:\par Cardiac Cath (5.26.21)\par 1. cardiac output was 4.5 L/min\par 2.  Mean Fontan pressures were 20 with no evidence of obstruction across the Fontan pathway.\par 3.  Normal PVR of 1.6 units/m² on current medication\par 4.  Elevated ventricular end-diastolic pressure of 14 to 16 mmHg.\par 5.  Angiography revealed unobstructed Fontan, aortic arch and no obvious collaterals.  This study was limited for evaluation of venovenous collaterals.\par \par He subsequently underwent an EP study to map and ablate the atrial tachycardia but was unsuccessful.  Patient was subsequently cardioverted and transferred to the PICU for recovery.  His post procedure course was complicated with worsening hemodynamic status needing mechanical ventilation for 24 hours followed by an intracranial bleed with clinical symptoms. Trini is status post cranioplasty with placement of  shunt and on Keppra for seizure control.  He has had no further clinical seizures and remains symptom-free.  Initially he had some weakness in his legs but able to ambulate without any problems, but this is now improved, and patient has been discharged from Physical Therapy. Trini's anticoagulation regimen was changed from Xarelto to aspirin since the cranial bleed.\par \par From a cardiac standpoint he remains in IART and at a paced ventricular rate of 70 bpm.  He is on mexiletine for control of V. tach detected during his in-hospital stay.  He reports intermittent fatigue and SOB after climbing 2-3 flights of stairs, but he is able to walk on flat surfaces without problems. 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 In 2016 he presented to us with evidence of failing fontan with symptoms of SOB, exercise intolerance, ascites and fatigue. He had evidence of LV dysfunction, PHT and mild fontan obstruction. A stent was placed, pulmonary vasodilators initiated, CHF meds given and CRT done due to concerns of pacemaker induced cardiomyopathy. He had an excellent response.\par When we evaluated on December 14, 2017 we found he was on IART (atrial flutter). Interestingly, he was not having symptoms. He did not experience chest pain, palpitations, or syncope. Our electrophysiologist attempted to terminate his atrial flutter by over-pacing without success. He was taking Aspirin 325 mg PO q 24 hours. There was no thrombus on his echocardiogram and after talking with hematology he was started on Xarelto 20 mg PO BID to have a more aggressive prophylaxis since we were not able to terminate the atrial flutter. Of note: prior to Aspirin he was taking Warfarin, but this medication was stopped due to interactions with sildenafil. He continues to take his other medication which include, Enalapril 2.5 mg PO BID, Furosemide 20 mg PO q 24 hours, Spironolactone 100 mg PO daily and Sotalol 120 mg PO q 12 hours. \par \par He is status post CRT of his single ventricle (LV now with 2 epicardial leads) and pacemaker generator replacement via left thoracotomy on December 5, 2016. \par \par He had a recent syncopal episode(June 2020) presumably due to fractured RV pacing lead causing bradycardia. Since he had CRT with 2 ventricular leads, Dr Christensen reprogrammed the leads and recommended surveillance for now. He has remained asymptomatic. Cardiac function remained unchanged as well. He was also noncompliant with Xarelto and sildenafil due to its midday schedule in the past but now has a pill box that he carries and has been compliant with his medications. \par He was admitted in PICU (September, 2020) after he was found to be in A fib for almost 3 days and required cardioversion. He was asymptomatic at that time. During this admission his Sotalol was increased to 120 mg Q12h. Trini presented in an atrially paced and biventricularly paced rhythm.@ 80 bpm. His underlying is V paced @ 35 bpm. He is DDDR  bpm. \par \par \par His cardiac history can be summarized as follows: \par \par 11/02/01, Cath – Assessment of anatomy and hemodynamics prior to surgery\par 11/08/01, OR - Esedk-Awvk-Mhmwjvo anastomosis with a modified right Nirmal-Taussig shunt.\par 03/15/03, OR - Celestine-Fontan with ligation of BT shunt and left pulmonary artery plasty\par 04/13/04, Cath - Coil embolization of collateral vessels\par 05/19/04, OR - Lateral tunnel fenestrated Fontan\par 05/26/04, OR - Pacemaker placement\par 7/25/11, Cath- Normal CI 3.1L/min/m2, Qp:Qs 1:1, normal pressure in Fontan circuit (mean 15 mmHg), balloon angioplasty of superior narrowing of Fontan circuit with improvement seen. Balloon angioplasty of LPA with improvement of stenosis. ASD device placed for closure of Fontan fenestration\par 10/24/16, Cath- Cath for Fontan failure as evidenced by increasing exercise intolerance as well as ascites. Low normal CI of 2.7 L/min/ m2. Elevated Fontan pressures of 24 mmHg. Elevated PVR (3.7) that responded well to Apryl pulmonary vasodilator challenge (decreased to 1.4). Angiographic evidence of mild intracardiac Fontan obstruction without any pressure gradient, relieved by placing a 36mm X 12mm stent (IntraStent Max LD) mounted on a 25mm balloon. Post intervention Fontan pressure decreased to 20 mmHg. \par \par \par

## 2021-10-08 NOTE — CARDIOLOGY SUMMARY
[Today's Date] : [unfilled] [FreeTextEntry1] : I ART, paced rhythm at 70 bpm [FreeTextEntry2] : Unchanged from previous study:\par Severly dilated, mildly concentrically hypertrophied single LV with mildly decreased systolic function. MIld right AV valve regur. Patent fontan conduit.

## 2021-10-13 ENCOUNTER — OUTPATIENT (OUTPATIENT)
Dept: OUTPATIENT SERVICES | Age: 21
LOS: 1 days | End: 2021-10-13

## 2021-10-13 ENCOUNTER — APPOINTMENT (OUTPATIENT)
Dept: PEDIATRIC HEMATOLOGY/ONCOLOGY | Facility: CLINIC | Age: 21
End: 2021-10-13
Payer: MEDICAID

## 2021-10-13 ENCOUNTER — RESULT REVIEW (OUTPATIENT)
Age: 21
End: 2021-10-13

## 2021-10-13 VITALS
BODY MASS INDEX: 22.98 KG/M2 | HEART RATE: 77 BPM | HEIGHT: 65.87 IN | SYSTOLIC BLOOD PRESSURE: 118 MMHG | OXYGEN SATURATION: 96 % | DIASTOLIC BLOOD PRESSURE: 67 MMHG | RESPIRATION RATE: 20 BRPM | TEMPERATURE: 98.06 F | WEIGHT: 141.32 LBS

## 2021-10-13 DIAGNOSIS — D68.9 COAGULATION DEFECT, UNSPECIFIED: ICD-10-CM

## 2021-10-13 DIAGNOSIS — I27.20 PULMONARY HYPERTENSION, UNSPECIFIED: ICD-10-CM

## 2021-10-13 DIAGNOSIS — Z95.818 PRESENCE OF OTHER CARDIAC IMPLANTS AND GRAFTS: Chronic | ICD-10-CM

## 2021-10-13 DIAGNOSIS — Z98.890 OTHER SPECIFIED POSTPROCEDURAL STATES: Chronic | ICD-10-CM

## 2021-10-13 DIAGNOSIS — Z95.0 PRESENCE OF CARDIAC PACEMAKER: Chronic | ICD-10-CM

## 2021-10-13 DIAGNOSIS — I48.19 OTHER PERSISTENT ATRIAL FIBRILLATION: ICD-10-CM

## 2021-10-13 DIAGNOSIS — R18.8 OTHER ASCITES: ICD-10-CM

## 2021-10-13 LAB
ALBUMIN SERPL ELPH-MCNC: 4.8 G/DL — SIGNIFICANT CHANGE UP (ref 3.3–5)
ALP SERPL-CCNC: 165 U/L — HIGH (ref 40–120)
ALT FLD-CCNC: 19 U/L — SIGNIFICANT CHANGE UP (ref 4–41)
ANION GAP SERPL CALC-SCNC: 16 MMOL/L — HIGH (ref 7–14)
APTT BLD: 39 SEC — HIGH (ref 27–36.3)
AST SERPL-CCNC: 21 U/L — SIGNIFICANT CHANGE UP (ref 4–40)
BASOPHILS # BLD AUTO: 0.02 K/UL — SIGNIFICANT CHANGE UP (ref 0–0.2)
BASOPHILS NFR BLD AUTO: 0.4 % — SIGNIFICANT CHANGE UP (ref 0–2)
BILIRUB SERPL-MCNC: 0.8 MG/DL — SIGNIFICANT CHANGE UP (ref 0.2–1.2)
BUN SERPL-MCNC: 15 MG/DL — SIGNIFICANT CHANGE UP (ref 7–23)
CALCIUM SERPL-MCNC: 10.3 MG/DL — SIGNIFICANT CHANGE UP (ref 8.4–10.5)
CHLORIDE SERPL-SCNC: 104 MMOL/L — SIGNIFICANT CHANGE UP (ref 98–107)
CO2 SERPL-SCNC: 18 MMOL/L — LOW (ref 22–31)
CREAT SERPL-MCNC: 0.76 MG/DL — SIGNIFICANT CHANGE UP (ref 0.5–1.3)
D DIMER BLD IA.RAPID-MCNC: 690 NG/ML DDU — HIGH
EOSINOPHIL # BLD AUTO: 0.25 K/UL — SIGNIFICANT CHANGE UP (ref 0–0.5)
EOSINOPHIL NFR BLD AUTO: 4.7 % — SIGNIFICANT CHANGE UP (ref 0–6)
FIBRINOGEN PPP-MCNC: 401 MG/DL — SIGNIFICANT CHANGE UP (ref 290–520)
GLUCOSE SERPL-MCNC: 75 MG/DL — SIGNIFICANT CHANGE UP (ref 70–99)
HCT VFR BLD CALC: 40.7 % — SIGNIFICANT CHANGE UP (ref 39–50)
HGB BLD-MCNC: 13.3 G/DL — SIGNIFICANT CHANGE UP (ref 13–17)
IANC: 3.53 K/UL — SIGNIFICANT CHANGE UP (ref 1.5–8.5)
IMM GRANULOCYTES NFR BLD AUTO: 0.2 % — SIGNIFICANT CHANGE UP (ref 0–1.5)
INR BLD: 1.29 RATIO — HIGH (ref 0.88–1.16)
LYMPHOCYTES # BLD AUTO: 1.09 K/UL — SIGNIFICANT CHANGE UP (ref 1–3.3)
LYMPHOCYTES # BLD AUTO: 20.5 % — SIGNIFICANT CHANGE UP (ref 13–44)
MCHC RBC-ENTMCNC: 27.5 PG — SIGNIFICANT CHANGE UP (ref 27–34)
MCHC RBC-ENTMCNC: 32.7 GM/DL — SIGNIFICANT CHANGE UP (ref 32–36)
MCV RBC AUTO: 84.1 FL — SIGNIFICANT CHANGE UP (ref 80–100)
MONOCYTES # BLD AUTO: 0.43 K/UL — SIGNIFICANT CHANGE UP (ref 0–0.9)
MONOCYTES NFR BLD AUTO: 8.1 % — SIGNIFICANT CHANGE UP (ref 2–14)
NEUTROPHILS # BLD AUTO: 3.53 K/UL — SIGNIFICANT CHANGE UP (ref 1.8–7.4)
NEUTROPHILS NFR BLD AUTO: 66.1 % — SIGNIFICANT CHANGE UP (ref 43–77)
NRBC # BLD: 0 /100 WBCS — SIGNIFICANT CHANGE UP
PLATELET # BLD AUTO: 115 K/UL — LOW (ref 150–400)
POTASSIUM SERPL-MCNC: 4 MMOL/L — SIGNIFICANT CHANGE UP (ref 3.5–5.3)
POTASSIUM SERPL-SCNC: 4 MMOL/L — SIGNIFICANT CHANGE UP (ref 3.5–5.3)
PROT SERPL-MCNC: 8 G/DL — SIGNIFICANT CHANGE UP (ref 6–8.3)
PROTHROM AB SERPL-ACNC: 14.7 SEC — HIGH (ref 10.6–13.6)
RBC # BLD: 4.84 M/UL — SIGNIFICANT CHANGE UP (ref 4.2–5.8)
RBC # BLD: 4.84 M/UL — SIGNIFICANT CHANGE UP (ref 4.2–5.8)
RBC # FLD: 13.6 % — SIGNIFICANT CHANGE UP (ref 10.3–14.5)
RETICS #: 79.9 K/UL — SIGNIFICANT CHANGE UP (ref 25–125)
RETICS/RBC NFR: 1.7 % — SIGNIFICANT CHANGE UP (ref 0.5–2.5)
SODIUM SERPL-SCNC: 138 MMOL/L — SIGNIFICANT CHANGE UP (ref 135–145)
WBC # BLD: 5.33 K/UL — SIGNIFICANT CHANGE UP (ref 3.8–10.5)
WBC # FLD AUTO: 5.33 K/UL — SIGNIFICANT CHANGE UP (ref 3.8–10.5)

## 2021-10-13 PROCEDURE — 99215 OFFICE O/P EST HI 40 MIN: CPT

## 2021-10-14 DIAGNOSIS — I47.1 SUPRAVENTRICULAR TACHYCARDIA: ICD-10-CM

## 2021-10-20 PROBLEM — I48.19 PERSISTENT ATRIAL FIBRILLATION: Status: ACTIVE | Noted: 2021-10-20

## 2021-10-21 PROBLEM — D68.9 COAGULOPATHY: Status: ACTIVE | Noted: 2017-05-30

## 2021-10-21 PROBLEM — I27.20 PULMONARY HYPERTENSION: Status: ACTIVE | Noted: 2017-06-27

## 2021-10-21 NOTE — PHYSICAL EXAM
[No focal deficits] : no focal deficits [Normal] : affect appropriate [100: Fully active, normal.] : 100: Fully active, normal. 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

## 2021-10-21 NOTE — HISTORY OF PRESENT ILLNESS
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 He had cardiac resynchronization in Dec 2016. Aspirin therapy discontinued and started on Warfarin prophylaxis target 2.0-2.5. Difficulty achieving therapeutic levels and referred by cardiology to Ped Hematology for medication monitoring. Family reports compliance without missed doses; interactions with cardiac medications possibility; administered Warfarin separately at bedtime most times on an empty stomach. Denies any significant bleeding diathesis from spontaneous/trauma/surgical challenges. Denies any current anemia and/or iron deficiency. \par Birth history 42 weeks gestation induced normal vaginal delivery; no NICU stay. Discharged home without issues. Returned and hospitalized approximately 1 month later with cyanotic cardiac symptoms. He required open heart surgery. See below for details of cardiac history.  Denies any personal history of thromboembolism or stroke.  He has been followed by Neurology with imaging for history of dizziness without syncope; associated side effect of medications.  He is also followed by GI for history of ascites treated during hospitalization in Dec 2016.  Past history of nose bleeds 3 years ago associated with minor injury to the nose during activity; resolved without recurrence of nose bleeding. Growth and development is good; currently in 10th grade; school activities restricted as per cardiology--no contact sports. INR average 1.7-1.4 L with recent blood work 5/16/17 and increased dosing following labs. Currently taking Warfarin 91mg weekly divided 13mg daily (6mg strength and 1 mg strength tablets used in combination).  Also con-meds include enalapril, sildenafil, furosemide, spironolactone and most recently added sotalol.  He is not taking any vitamins or antibiotics; and dietary restrictions have been discussed by cardiology limiting Vitamin K foods.\par Family history is negative for any known thrombophilia and/or early onset heart attacks, early strokes, thromboembolism, multiple miscarriages.  Denies any family history of autoimmune disorders.  Maternal grandfather had coronary artery disease associated with environmental risk factors.  Mother and maternal aunt have gynecological history of ovarian cysts.  Jimbo lives with Mother and stepfather and 2 year old maternal-half sister who is healthy. Family ancestry [place]. Mother is primary caretaker and works as a medical assistant; she demonstrates understanding and importance of compliance with care team asking appropriate questions. Distance in travel and time does pose difficulty in scheduling visits; routine labs and INR have been drawn at outside lab facility close to home and monitored by cardiology.     \par \par 11/02/01, Cath – Assessment of anatomy and hemodynamics prior to surgery\par 11/08/01, OR - Weeqb-Kmvn-Hesrqbq anastomosis with a modified right Nirmal-Taussig shunt.\par 03/15/03, OR - Celestine-Fontan with ligation of BT shunt and left pulmonary artery plasty\par 04/13/04, Cath - Coil embolization of collateral vessels\par 05/19/04, OR - Lateral tunnel fenestrated Fontan\par 05/26/04, OR - Pacemaker placement\par 7/25/11, Cath- Normal CI 3.1L/min/m2, Qp:Qs 1:1, normal pressure in Fontan circuit (mean 15 mmHg), balloon angioplasty of superior narrowing of Fontan circuit with improvement seen. Balloon angioplasty of LPA with improvement of stenosis. ASD device placed for closure of Fontan fenestration\par 10/24/16, Cath- Cath for Fontan failure as evidenced by increasing exercise intolerance as well as ascites. Low normal CI of 2.7 L/min/ m2. Elevated Fontan pressures of 24 mmHg. Elevated PVR (3.7) that responded well to Apryl pulmonary vasodilator challenge (decreased to 1.4). Angiographic evidence of mild intracardiac Fontan obstruction without any pressure gradient, relieved by placing a 36mm X 12mm stent (IntraStent Max LD) mounted on a 25mm balloon. Post intervention Fontan pressure decreased to 20 mmHg. \par \par \par  [de-identified] : 12/22/17 He failed Coumadin therapy; compliance confirmed; unable to attain therapeutic range with upper limits of therapy. Notified by Cardiology of current changes in cardiac history with notable asymptomatic atrial flutter requiring more aggressive anticoagulant therapy rather than current aspirin regime.\par 1/10/18 Patient is asymptomatic for chest pain and/or palpitations monitored closely by cardiology; con-medications reconciled--no changes. Remains on Xarelto (rivaroxaban) 15mg twice daily taken with meals as directed for 21 days; then dose will changed to maintenance 20mg once daily beginning on Saturday 1/13/18. Last dose 7:30am today. Denies any minor or major bleeding events.  Once starter kit completed, will require refill RX at local pharmacy.\par 6/27/18 Interval follow up for H&P with labs. Remains on Xarelto 20mg daily with dinner. Denies any bleeding signs. Reports demonstrated mild thrombocytopenia. Will follow up with labs today. \par 10/2/19 Remains on Xarelto 20mg once daily with meals; no missed doses. Currently freshman in college for Business major. Bleeding precautions and activity restrictions as per cardiology maintained. \par 7/8/2020: Jimbo states that he  has not been quite consistent with medication, has forgotten in the past. He bought a pill box 2 weeks ago and has since been compliant as per Jimbo and qing. No bleeding symptoms- did endorse that he bleeds easily from minor cuts, bleeding stops with light pressure. He finished first year college in [employer], studying Marketing. \par 7/29/2021: Jimbo presents to the clinic for his yearly hematology follow up. He was hospitalized in June 2021 for cardiac cath and ablation. Ablation was unsuccessful and it was decided to Cardiovert him. \par Hospital course as follows:\par CV: On 5/27, attempted cardioversion through pacing, although was unsuccessful, and subsequently started medical cardioversion with amiodarone 300mg BID. Restarted on home medications including lisinopril, sildenafil, lasix, aldactone. IART rhythm was broken on 6/1 after cardioversion. However, he went back into IART later that night. Pacemaker was changed to VOO at 70 prior to neurosurgery. Amiodarone, aldactone, and lisinopril were held while critically ill. Epinephrine and vasopressin were utilized to maintain higher maps while EVD in place. On 6/8 he started having runs of ventricular tachycardia. Arrythmia resolved with lidocaine infusion and changing pacemaker mode to VVI. Transitioned to PO mexiletine on 6/13. Pacemaker transitioned back to VOO at 70. \par Resp: Re-intubated on 6/1 due to AMS associated with new intracranial bleed. Extubated to HFNC on 6/9. CTA chest on 6/10 showed numerous veno-venous anterior mediastinal collaterals. Weaned to RA on 6/16.\par Neuro: Four hours after cardioversion, he had an episode of staring and unresponsiveness. Head CT showed severe hemorrhagic stroke in left frontal lobe in the ROCCO-MCA watershed territory with extension to the intraventricular regions. He required urgent surgical decompressive craniectomy and hemorrhage evacuation with EVD placement overnight on 6/1. Serial head CTs showed a new hemorrhage along the EVD catheter tract on 6/6. EVD clamped on 6/10. VEEG placed on 6/10 due to eye deviation and seizure-like activity did not correlate with seizures. He did have a clinical seizure on 6/14 which broke with Ativan. Neurology re-consulted and recommended continuing Keppra at current dose. PM&R consulted for delirium/agitation, recommended starting propranolol for agitation. Delirium improved with propranolol and unclamping EVD. Taken to OR on 6/17 for EVD removal, cranioplasty, and VPS placement.  Repeat CT head 6/18 showed trace hemorrhage R lateral ventricle and interval placement of wire mesh cranioplasty. Repeat CT head on 6/19 and 6/20 was stable, repeat CT head on 6/25 was also stable prior to restarting anticoagulation. Propranolol was weaned per PM&R recs to 5mg q8h on 6/25 and off on 6/27.\par Heme: Given hemoptysis, hemoglobin was monitored and he did not require any transfusions. Hemoptysis resolved overnight 5/26-5/27. Home xarelto was held and restarted on 5/27. INR on 5/26 was 1.32, and it jumped to 4.94 after hemorrhage was identified. Cause for jump in INR is unclear. In the OR, xarelto reversals were given (Andexxa, KCentra), in addition to multiple units of FFP and blood products. Xarelto was stopped and he was received SubQ Vit K for 3 days. He was given multiple units of FFP for goal INR < 1.5 in order to prevent further bleeding. Hematology was consulted for persistently elevated INR. Thought to be possibly due to hepatic congestion secondary to elevated fontan pressures. INR improved w/ Vitamin K. Hypercoagulopathy workup was sent and is pending. Vitamin K PO 5mg 3x/wk started on 6/14. Will discuss with hematology duration of treatment. Pt was given FFP x1 for elevated INR 1.57 on 6/19. INR remained stable less than 1.5 and vit K 5mg PO 3x/wk was d/c'd on 6/23. INR continued to be trended daily which has been stable, slightly elevated at 1.39.\par \par 10/13/21: Jimbo is here for follow up and consideration of starting an anticoagulant given recent findings on cath conveyed to me by his cardiologist Dr Adonis Bradford. Since hospitalization for ICH after cardioversion in June while he was on rivaroxaban;  he was discharged home on  mg which does not seem to control his arrhythmia and he is at high risk for stroke ; here today to discuss anticoagulant options ; doing well, compliant with meds;  no complaints reported, no bleeding from any site; no new meds/ allergies to Meds \par

## 2021-10-21 NOTE — CONSULT LETTER
[Dear  ___] : Dear  [unfilled], [Courtesy Letter:] : I had the pleasure of seeing your patient, [unfilled], in my office today. [Please see my note below.] : Please see my note below. [Consult Closing:] : Thank you very much for allowing me to participate in the care of this patient.  If you have any questions, please do not hesitate to contact me. [Sincerely,] : Sincerely, [DrAmarilys  ___] : Dr. WU [DrAmarilys ___] : Dr. WU [___] : [unfilled] [FreeTextEntry2] : Adonis Londono MD\par [facility]- Dept. of Pediatrics \par [address] Cardiology	\[place] [address]\par Tel: [phone]\par Fax: [phone] [FreeTextEntry3] : Palak Ordonez\par Pediatric Hematology\par Division of Hematology/Oncology and Stem Cell Transplantation\par [facility]\par [phone]\par \par

## 2021-10-22 ENCOUNTER — EMERGENCY (EMERGENCY)
Age: 21
LOS: 1 days | Discharge: HOME | End: 2021-10-22
Attending: EMERGENCY MEDICINE | Admitting: PEDIATRICS
Payer: MEDICAID

## 2021-10-22 VITALS
WEIGHT: 143.52 LBS | OXYGEN SATURATION: 92 % | TEMPERATURE: 98 F | DIASTOLIC BLOOD PRESSURE: 62 MMHG | SYSTOLIC BLOOD PRESSURE: 118 MMHG | HEART RATE: 72 BPM | RESPIRATION RATE: 18 BRPM

## 2021-10-22 VITALS
OXYGEN SATURATION: 89 % | HEART RATE: 75 BPM | SYSTOLIC BLOOD PRESSURE: 103 MMHG | RESPIRATION RATE: 18 BRPM | TEMPERATURE: 98 F | DIASTOLIC BLOOD PRESSURE: 50 MMHG

## 2021-10-22 DIAGNOSIS — Z95.0 PRESENCE OF CARDIAC PACEMAKER: Chronic | ICD-10-CM

## 2021-10-22 DIAGNOSIS — Z95.818 PRESENCE OF OTHER CARDIAC IMPLANTS AND GRAFTS: Chronic | ICD-10-CM

## 2021-10-22 DIAGNOSIS — Z98.890 OTHER SPECIFIED POSTPROCEDURAL STATES: Chronic | ICD-10-CM

## 2021-10-22 DIAGNOSIS — R56.9 UNSPECIFIED CONVULSIONS: ICD-10-CM

## 2021-10-22 LAB
ALBUMIN SERPL ELPH-MCNC: 5.2 G/DL — HIGH (ref 3.3–5)
ALP SERPL-CCNC: 177 U/L — HIGH (ref 40–120)
ALT FLD-CCNC: 28 U/L — SIGNIFICANT CHANGE UP (ref 4–41)
ANION GAP SERPL CALC-SCNC: 15 MMOL/L — HIGH (ref 7–14)
AST SERPL-CCNC: 23 U/L — SIGNIFICANT CHANGE UP (ref 4–40)
BASOPHILS # BLD AUTO: 0.02 K/UL — SIGNIFICANT CHANGE UP (ref 0–0.2)
BASOPHILS NFR BLD AUTO: 0.3 % — SIGNIFICANT CHANGE UP (ref 0–2)
BILIRUB SERPL-MCNC: 0.6 MG/DL — SIGNIFICANT CHANGE UP (ref 0.2–1.2)
BUN SERPL-MCNC: 16 MG/DL — SIGNIFICANT CHANGE UP (ref 7–23)
CALCIUM SERPL-MCNC: 9.9 MG/DL — SIGNIFICANT CHANGE UP (ref 8.4–10.5)
CHLORIDE SERPL-SCNC: 102 MMOL/L — SIGNIFICANT CHANGE UP (ref 98–107)
CO2 SERPL-SCNC: 21 MMOL/L — LOW (ref 22–31)
CREAT SERPL-MCNC: 0.88 MG/DL — SIGNIFICANT CHANGE UP (ref 0.5–1.3)
EOSINOPHIL # BLD AUTO: 0.14 K/UL — SIGNIFICANT CHANGE UP (ref 0–0.5)
EOSINOPHIL NFR BLD AUTO: 1.9 % — SIGNIFICANT CHANGE UP (ref 0–6)
GLUCOSE SERPL-MCNC: 119 MG/DL — HIGH (ref 70–99)
HCT VFR BLD CALC: 40.5 % — SIGNIFICANT CHANGE UP (ref 39–50)
HGB BLD-MCNC: 12.8 G/DL — LOW (ref 13–17)
IANC: 5.95 K/UL — SIGNIFICANT CHANGE UP (ref 1.5–8.5)
IMM GRANULOCYTES NFR BLD AUTO: 0.4 % — SIGNIFICANT CHANGE UP (ref 0–1.5)
LYMPHOCYTES # BLD AUTO: 0.7 K/UL — LOW (ref 1–3.3)
LYMPHOCYTES # BLD AUTO: 9.5 % — LOW (ref 13–44)
MCHC RBC-ENTMCNC: 26.7 PG — LOW (ref 27–34)
MCHC RBC-ENTMCNC: 31.6 GM/DL — LOW (ref 32–36)
MCV RBC AUTO: 84.4 FL — SIGNIFICANT CHANGE UP (ref 80–100)
MONOCYTES # BLD AUTO: 0.53 K/UL — SIGNIFICANT CHANGE UP (ref 0–0.9)
MONOCYTES NFR BLD AUTO: 7.2 % — SIGNIFICANT CHANGE UP (ref 2–14)
NEUTROPHILS # BLD AUTO: 5.95 K/UL — SIGNIFICANT CHANGE UP (ref 1.8–7.4)
NEUTROPHILS NFR BLD AUTO: 80.7 % — HIGH (ref 43–77)
NRBC # BLD: 0 /100 WBCS — SIGNIFICANT CHANGE UP
NRBC # FLD: 0 K/UL — SIGNIFICANT CHANGE UP
PLATELET # BLD AUTO: 156 K/UL — SIGNIFICANT CHANGE UP (ref 150–400)
POTASSIUM SERPL-MCNC: 3.9 MMOL/L — SIGNIFICANT CHANGE UP (ref 3.5–5.3)
POTASSIUM SERPL-SCNC: 3.9 MMOL/L — SIGNIFICANT CHANGE UP (ref 3.5–5.3)
PROT SERPL-MCNC: 8 G/DL — SIGNIFICANT CHANGE UP (ref 6–8.3)
RBC # BLD: 4.8 M/UL — SIGNIFICANT CHANGE UP (ref 4.2–5.8)
RBC # FLD: 14 % — SIGNIFICANT CHANGE UP (ref 10.3–14.5)
SODIUM SERPL-SCNC: 138 MMOL/L — SIGNIFICANT CHANGE UP (ref 135–145)
WBC # BLD: 7.37 K/UL — SIGNIFICANT CHANGE UP (ref 3.8–10.5)
WBC # FLD AUTO: 7.37 K/UL — SIGNIFICANT CHANGE UP (ref 3.8–10.5)

## 2021-10-22 PROCEDURE — 74018 RADEX ABDOMEN 1 VIEW: CPT | Mod: 26

## 2021-10-22 PROCEDURE — 70250 X-RAY EXAM OF SKULL: CPT | Mod: 26

## 2021-10-22 PROCEDURE — 99285 EMERGENCY DEPT VISIT HI MDM: CPT

## 2021-10-22 PROCEDURE — 77011 CT SCAN FOR LOCALIZATION: CPT | Mod: 26

## 2021-10-22 PROCEDURE — 71045 X-RAY EXAM CHEST 1 VIEW: CPT | Mod: 26

## 2021-10-22 RX ORDER — ASPIRIN/CALCIUM CARB/MAGNESIUM 324 MG
325 TABLET ORAL ONCE
Refills: 0 | Status: COMPLETED | OUTPATIENT
Start: 2021-10-22 | End: 2021-10-22

## 2021-10-22 RX ORDER — LEVETIRACETAM 250 MG/1
1500 TABLET, FILM COATED ORAL ONCE
Refills: 0 | Status: COMPLETED | OUTPATIENT
Start: 2021-10-22 | End: 2021-10-22

## 2021-10-22 RX ORDER — APIXABAN 2.5 MG/1
5 TABLET, FILM COATED ORAL ONCE
Refills: 0 | Status: COMPLETED | OUTPATIENT
Start: 2021-10-22 | End: 2021-10-22

## 2021-10-22 RX ADMIN — APIXABAN 5 MILLIGRAM(S): 2.5 TABLET, FILM COATED ORAL at 23:13

## 2021-10-22 RX ADMIN — Medication 325 MILLIGRAM(S): at 23:13

## 2021-10-22 RX ADMIN — LEVETIRACETAM 1500 MILLIGRAM(S): 250 TABLET, FILM COATED ORAL at 23:13

## 2021-10-22 NOTE — ED PROVIDER NOTE - PATIENT PORTAL LINK FT
You can access the FollowMyHealth Patient Portal offered by [facility] by registering at the following website: [email or web address]. By joining [facility]’s FollowMyHealth portal, you will also be able to view your health information using other applications (apps) compatible with our system.

## 2021-10-22 NOTE — ED PROVIDER NOTE - NSICDXPASTSURGICALHX_GEN_ALL_CORE_FT
PAST SURGICAL HISTORY:  Cardiac pacemaker 5/26/2004    S/P Nirmal-Taussig shunt Pophu-Yjdi-Ildvpdw anastomosis with modified right BT shunt and creation of pulmonary atresia    S/P cardiac cath 2001-assessment of anatomy prior to 1st surgery  4/13/2004- coil emolization of collateral vessels  7/25/2011-balloon angioplasty of superior narrowing of Fontan circuit, balloon angioplasty of LPA, ASD device placed for closure of Fontan fenestraion  10/24/2016- Cath of Fontan    S/P celestine-Fontan operation 3/15/2003- Celestine-Fontan with ligation of BT shunt and left pulmonary artery plasty

## 2021-10-22 NOTE — ED PEDIATRIC TRIAGE NOTE - CHIEF COMPLAINT QUOTE
pt with  shunt, pulmonary HTN, pacemaker, presents here with 1x seizure today @ 1845, as per mom it was full body shaking for about 5 mins, pt was blue, postictal for about 10 mins.  mom denies giving any meds, denies vomiting or head injury.  pt awake and alert here, at baseline, denies pain.  no known allergies, takes keppra daily for seizures.  baseline oxygen sats 85-95%

## 2021-10-22 NOTE — ED PROVIDER NOTE - CHIEF COMPLAINT
The patient is a 21y Male complaining of  The patient is a 21y Male complaining of one seizure today.

## 2021-10-22 NOTE — ED PROVIDER NOTE - NSFOLLOWUPCLINICS_GEN_ALL_ED_FT
Pediatric Neurology  Pediatric Neurology  [address] [facility] [address] [place] [address]  Phone: [phone]  Fax: [phone]

## 2021-10-22 NOTE — ED PROVIDER NOTE - ATTENDING CONTRIBUTION TO CARE
I have obtained patient's history, performed physical exam and formulated management plan.   Reggie Pimentel

## 2021-10-22 NOTE — ED PROVIDER NOTE - OBJECTIVE STATEMENT
-Persistent atrial fibrillation (427.31, I48.19) 20-Oct-2021 Status: Active	  -Seizure (780.39, R56.9) 14-Jul-2021 Status: Active	  -Hemorrhagic stroke (I61.9) 14-Jul-2021 Status: Active	  -Preop testing (V72.84, Z01.818) 20-May-2021 Status: Active	  -Syncope (780.2) 30-Jun-2020 Status: Active	  -Pulmonary hypertension (416.8, I27.20) 27-Jun-2017 Status: Active	  -Encounter for medication monitoring (V58.83, Z51.81) 30-May-2017 Status: Active	  -Coagulopathy (286.9, D68.9) 30-May-2017 Status: Active	  -Cardiac resynchronization therapy pacemaker (CRT-P) in place (V45.01, Z95.0) 16-Mar-2017 Status: Active	  -S/P thoracotomy (V45.89, Z98.890) 08-Dec-2016 Status: Active	  -S/P Fontan procedure (V45.89, Z98.890) 04-Nov-2016 Status: Active	  -Other ascites (789.59, R18.8) 06-Oct-2016 Status: Active	  -Hepatomegaly, not elsewhere classified (789.1, R16.0) 22-Sep-2016 Status: Active	  -Permanent Pacemaker Type Dual-Chamber 11-Jun-2015 Status: Active	  -Single ventricle, 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, 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) 29-Oct-2014 Status: Active	  -Atrial tachycardia (427.89, I47.1) 23-Apr-2014 Status: Active	  -Sick sinus syndrome (427.81, I49.5) 07-Jul-2011 Status: Active	  -Atrioventricular block (426.10, I44.30) 06-Jul-2011 Status: Active	  -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 s/p cardiac catheterization and ablation here with a seizure today.     At 7 pm tonight, was at the dining table when Mom noticed that he began banging his right arm on the table and not being responsive. He did not fall and he was not hurt. Mom took patient off the chair and held the patient down. The right arm shaking lasted 5 minutes. Along with right arm shaking, the patient's eyes were rolled up in his head, he skin tint was slowly turning blue, no known incontinence, no tongue biting. Mom mentioned "in total he wasn't breathing for 10 minutes". As soon as patient started shaking and "not breathing" EMS was called. After the 5 minutes of shaking, he was non-responsive and limp for another 5 minutes.     In June, patient was admitted to the PICU: s/p cardiac catheterization and ablation. Procedure was complicated by unsuccessful ablation and post extubation patient was noted to have increased hemoptysis and desaturations, patient was reintubated for airway protection and given propofol for sedation. 2x intracranial bleed and stroke. Craniotomy and  shunt revision done. Various arrhythmias w/ medical management and pacemaker modifications.             -Persistent atrial fibrillation (427.31, I48.19) 20-Oct-2021 Status: Active	  -Seizure (780.39, R56.9) 14-Jul-2021 Status: Active	  -Hemorrhagic stroke (I61.9) 14-Jul-2021 Status: Active	  -Preop testing (V72.84, Z01.818) 20-May-2021 Status: Active	  -Syncope (780.2) 30-Jun-2020 Status: Active	  -Pulmonary hypertension (416.8, I27.20) 27-Jun-2017 Status: Active	  -Encounter for medication monitoring (V58.83, Z51.81) 30-May-2017 Status: Active	  -Coagulopathy (286.9, D68.9) 30-May-2017 Status: Active	  -Cardiac resynchronization therapy pacemaker (CRT-P) in place (V45.01, Z95.0) 16-Mar-2017 Status: Active	  -S/P thoracotomy (V45.89, Z98.890) 08-Dec-2016 Status: Active	  -S/P Fontan procedure (V45.89, Z98.890) 04-Nov-2016 Status: Active	  -Other ascites (789.59, R18.8) 06-Oct-2016 Status: Active	  -Hepatomegaly, not elsewhere classified (789.1, R16.0) 22-Sep-2016 Status: Active	  -Permanent Pacemaker Type Dual-Chamber 11-Jun-2015 Status: Active	  -Single ventricle, 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, Q20.4) 29-Oct-2014 Status: Active	  -Atrial tachycardia (427.89, I47.1) 23-Apr-2014 Status: Active	  -Sick sinus syndrome (427.81, I49.5) 07-Jul-2011 Status: Active	  -Atrioventricular block (426.10, I44.30) 06-Jul-2011 Status: Active	  -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 s/p cardiac catheterization and ablation here with a seizure today.     At 7 pm tonight, was at the dining table when Mom noticed that he began banging his right arm on the table and not being responsive. He did not fall and he was not hurt. Mom took patient off the chair and held the patient down. The right arm shaking lasted 5 minutes. Along with right arm shaking, the patient's eyes were rolled up in his head, he skin tint was slowly turning blue, no known incontinence, no tongue biting. Mom mentioned "in total he wasn't breathing for 10 minutes". As soon as patient started shaking and "not breathing" EMS was called. After the 5 minutes of shaking, he was non-responsive and limp for another 5 minutes. Has been in good health since then. NO FEVERS, NO VOMITING, NO NECK STIFFNESS.    In June, patient was admitted to the PICU: s/p cardiac catheterization and ablation. Procedure was complicated by unsuccessful ablation and post extubation patient was noted to have increased hemoptysis and desaturations, patient was reintubated for airway protection and given propofol for sedation. 2x intracranial bleed and stroke (weakness of right side). Craniotomy and  shunt revision done. Various arrhythmias w/ medical management and pacemaker modifications.     Meds:   Aspirin 325 mg QD  Furosemide 10 mg BID  Keppra 1500 mg BID  Lisinopril 5 mg QD  Mexiletine 150 mg q8  Sildenafil 20 mg q8  Spironolactone 100 mg QD    MEDICAL HX:  -Persistent atrial fibrillation (427.31, I48.19) 20-Oct-2021 Status: Active	  -Seizure (780.39, R56.9) 14-Jul-2021 Status: Active	  -Hemorrhagic stroke (I61.9) 14-Jul-2021 Status: Active		  -Syncope (780.2) 30-Jun-2020 Status: Active	  -Pulmonary hypertension (416.8, I27.20) 27-Jun-2017 Status: Active		  -Coagulopathy (286.9, D68.9) 30-May-2017 Status: Active	  -Cardiac resynchronization therapy pacemaker (CRT-P) in place (V45.01, Z95.0) 16-Mar-2017 Status: Active	  -S/P thoracotomy (V45.89, Z98.890) 08-Dec-2016 Status: Active	  -S/P Fontan procedure (V45.89, Z98.890) 04-Nov-2016 Status: Active	  -Other ascites (789.59, R18.8) 06-Oct-2016 Status: Active	  -Hepatomegaly, not elsewhere classified (789.1, R16.0) 22-Sep-2016 Status: Active	  -Permanent Pacemaker Type Dual-Chamber 11-Jun-2015 Status: Active	  -Single ventricle, 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, Q20.4) 29-Oct-2014 Status: Active	  -Atrial tachycardia (427.89, I47.1) 23-Apr-2014 Status: Active	  -Sick sinus syndrome (427.81, I49.5) 07-Jul-2011 Status: Active	  -Atrioventricular block (426.10, I44.30) 06-Jul-2011 Status: Active	  -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 s/p cardiac catheterization and ablation here with a seizure today.     At 7 pm tonight, was at the dining table when Mom noticed that he began banging his right arm on the table and not being responsive. He did not fall and he was not hurt. Mom took patient off the chair and held the patient down. The right arm shaking lasted 5 minutes. Along with right arm shaking, the patient's eyes were rolled up in his head, he skin tint was slowly turning blue, no known incontinence, no tongue biting. Mom mentioned "in total he wasn't breathing for 10 minutes". As soon as patient started shaking and "not breathing" EMS was called. After the 5 minutes of shaking, he was non-responsive and limp for another 5 minutes. Has been in good health since then. NO FEVERS, NO VOMITING, NO NECK STIFFNESS.    In June, patient was admitted to the PICU: s/p cardiac catheterization and ablation. 2x intracranial bleed and stroke (weakness of right side).Taken to OR on 6/17 for EVD removal, cranioplasty, and VPS placement. Various arrhythmias w/ medical management and pacemaker modifications.     Meds:   Aspirin 325 mg QD  Furosemide 10 mg BID  Keppra 1500 mg BID  Lisinopril 5 mg QD  Mexiletine 150 mg q8  Sildenafil 20 mg q8  Spironolactone 100 mg QD    MEDICAL HX:  -Persistent atrial fibrillation  -Seizures  -Hemorrhagic stroke 		  -Syncope   -Pulmonary hypertension 	  -Coagulopathy   -Cardiac resynchronization therapy pacemaker (CRT-P) in place   -S/P thoracotomy   -S/P Fontan procedure 	  -Other ascites   -Hepatomegaly, not elsewhere classified	  -Permanent Pacemaker Type Dual-Chamber 	  -Single ventricle, DILV (double inlet left ventricle)	  -Atrial tachycardia 	  -Sick sinus syndrome 	  -Atrioventricular block   -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

## 2021-10-22 NOTE — ED PEDIATRIC NURSE NOTE - NSICDXPASTSURGICALHX_GEN_ALL_CORE_FT
PAST SURGICAL HISTORY:  Cardiac pacemaker 5/26/2004    S/P Nirmal-Taussig shunt Yoaup-Cjxh-Rqmcgpx anastomosis with modified right BT shunt and creation of pulmonary atresia    S/P cardiac cath 2001-assessment of anatomy prior to 1st surgery  4/13/2004- coil emolization of collateral vessels  7/25/2011-balloon angioplasty of superior narrowing of Fontan circuit, balloon angioplasty of LPA, ASD device placed for closure of Fontan fenestraion  10/24/2016- Cath of Fontan    S/P celestine-Fontan operation 3/15/2003- Celestine-Fontan with ligation of BT shunt and left pulmonary artery plasty

## 2021-10-22 NOTE — ED PROVIDER NOTE - PROGRESS NOTE DETAILS
Consulted NSG, recommended Stereotactic CT and shunt series. Given PM doses of meds. CBC, CMP, keppra level.  -Issa Tanner PGY2

## 2021-10-22 NOTE — CONSULT NOTE PEDS - SUBJECTIVE AND OBJECTIVE BOX
22yo male with complex cardiac h/o DILV, L-malposition of great arteries, sick sinus syndrome and AV mihir disease with tachybradycardia syndrome with a dual chamber pacemaker (currently with RV lead fracture and is currently only LV paced 2/2 complete heart block), PSH of status post Ltayv-Xwcb-Cyjwbsi anastomosis and aortic arch reconstruction followed by a fenestrated lateral tunnel Fontan completion (now s/p fenestration closure). s/p cardiac catheterization and ablation here with a seizure today.     	At 7 pm tonight, was at the dining table when Mom noticed that he began banging his right arm on the table and not being responsive. He did not fall and he was not hurt. Mom took patient off the chair and held the patient down. The right arm shaking lasted 5 minutes. Along with right arm shaking, the patient's eyes were rolled up in his head, he skin tint was slowly turning blue, no known incontinence, no tongue biting. Mom mentioned "in total he wasn't breathing for 10 minutes". As soon as patient started shaking and "not breathing" EMS was called. After the 5 minutes of shaking, he was non-responsive and limp for another 5 minutes. Has been in good health since then. NO FEVERS, NO VOMITING, NO NECK STIFFNESS.    	In June, patient was admitted to the PICU: s/p cardiac catheterization and ablation. 2x intracranial bleed and stroke (weakness of right side).Taken to OR on 6/17 for EVD removal, cranioplasty, and VPS placement. Various arrhythmias w/ medical management and pacemaker modifications.     WDWN male in NAD  Vital Signs Last 24 Hrs  T(C): 36.9 (22 Oct 2021 23:07), Max: 36.9 (22 Oct 2021 23:07)  T(F): 98.4 (22 Oct 2021 23:07), Max: 98.4 (22 Oct 2021 23:07)  HR: 75 (22 Oct 2021 23:07) (72 - 75)  BP: 103/50 (22 Oct 2021 23:07) (103/50 - 118/62)  BP(mean): --  RR: 18 (22 Oct 2021 23:07) (18 - 18)  SpO2: 89% (22 Oct 2021 23:07) (89% - 92%)    AAO X 3  PERRLA, EOMI  CN 2-12 grossly intact  GOODSON strength 5/5  SILT    Noncontrast head CT: Stable ventricular size and configuration compared to prior CT    Shunt series: No kinks or discontinuity

## 2021-10-22 NOTE — ED PROVIDER NOTE - PHYSICAL EXAMINATION
A&O x3  Constitutional: well-appearing in no apparent distress.   Eyes: PERRLA, EOMI  Pulmonary: clear to auscultation bilaterally, no wheezing.   Cardiac:. Cardiac murmur secondary to pacemaker in place; regular rate.   Abdomen: normoactive bowel sounds, soft and nontender, no hepatosplenomegaly or masses appreciated.   Musculoskeletal: full range of motion and no deformities appreciated, no masses and normal strength in all extremities.   Neurology: no focal deficits.   Psychiatric: affect appropriate.

## 2021-10-22 NOTE — ED PROVIDER NOTE - NSFOLLOWUPINSTRUCTIONS_ED_ALL_ED_FT
Please do not permit your child to swim or bathe unattended as his seizures may put him at greater risk of drowning. If your child experiences a seizure, place him on a flat surface on the ground (somewhere he cannot fall) on his side. Do not put anything in his mouth. Call a physician. If the seizure lasts longer than 3 minutes, administer diastat and call EMS immediately.    Please take 2000 mg Keppra at night and your regular dose in the AM.

## 2021-10-22 NOTE — ED PROVIDER NOTE - NSICDXPASTMEDICALHX_GEN_ALL_CORE_FT
PAST MEDICAL HISTORY:  Atrial tachycardia     AV block     Coagulopathy     D-TGA (dextro-transposition of great arteries)     Double inlet left ventricle     Hepatomegaly     Other ascites     Pacemaker     Pulmonary hypertension     Sick sinus syndrome

## 2021-10-23 RX ORDER — LEVETIRACETAM 250 MG/1
500 TABLET, FILM COATED ORAL ONCE
Refills: 0 | Status: DISCONTINUED | OUTPATIENT
Start: 2021-10-23 | End: 2021-10-23

## 2021-10-23 RX ORDER — LEVETIRACETAM 250 MG/1
2 TABLET, FILM COATED ORAL
Qty: 60 | Refills: 3
Start: 2021-10-23 | End: 2022-02-19

## 2021-10-26 LAB — LEVETIRACETAM SERPL-MCNC: <1 UG/ML — LOW (ref 10–40)

## 2021-10-27 NOTE — ED POST DISCHARGE NOTE - REASON FOR FOLLOW-UP
Other 10/27/21 2:45 pm Levetiracetam level < 1.0 received dose in ED and dose increased at home f/u w/ peds neurology MPopcun PNP

## 2021-11-09 ENCOUNTER — OUTPATIENT (OUTPATIENT)
Dept: OUTPATIENT SERVICES | Facility: HOSPITAL | Age: 21
LOS: 1 days | End: 2021-11-09
Payer: MEDICAID

## 2021-11-09 ENCOUNTER — APPOINTMENT (OUTPATIENT)
Dept: CT IMAGING | Facility: IMAGING CENTER | Age: 21
End: 2021-11-09
Payer: MEDICAID

## 2021-11-09 DIAGNOSIS — I61.9 NONTRAUMATIC INTRACEREBRAL HEMORRHAGE, UNSPECIFIED: ICD-10-CM

## 2021-11-09 DIAGNOSIS — Z98.890 OTHER SPECIFIED POSTPROCEDURAL STATES: Chronic | ICD-10-CM

## 2021-11-09 DIAGNOSIS — Z95.0 PRESENCE OF CARDIAC PACEMAKER: Chronic | ICD-10-CM

## 2021-11-09 DIAGNOSIS — Z95.818 PRESENCE OF OTHER CARDIAC IMPLANTS AND GRAFTS: Chronic | ICD-10-CM

## 2021-11-09 DIAGNOSIS — Z00.8 ENCOUNTER FOR OTHER GENERAL EXAMINATION: ICD-10-CM

## 2021-11-09 PROCEDURE — 70450 CT HEAD/BRAIN W/O DYE: CPT

## 2021-11-09 PROCEDURE — 70450 CT HEAD/BRAIN W/O DYE: CPT | Mod: 26

## 2021-12-14 ENCOUNTER — NON-APPOINTMENT (OUTPATIENT)
Age: 21
End: 2021-12-14

## 2021-12-22 ENCOUNTER — RESULT REVIEW (OUTPATIENT)
Age: 21
End: 2021-12-22

## 2021-12-22 ENCOUNTER — APPOINTMENT (OUTPATIENT)
Dept: PEDIATRIC HEMATOLOGY/ONCOLOGY | Facility: CLINIC | Age: 21
End: 2021-12-22
Payer: MEDICAID

## 2021-12-22 ENCOUNTER — OUTPATIENT (OUTPATIENT)
Dept: OUTPATIENT SERVICES | Age: 21
LOS: 1 days | End: 2021-12-22

## 2021-12-22 VITALS
HEART RATE: 79 BPM | TEMPERATURE: 98.24 F | DIASTOLIC BLOOD PRESSURE: 71 MMHG | WEIGHT: 145.95 LBS | BODY MASS INDEX: 24.02 KG/M2 | HEIGHT: 65.55 IN | OXYGEN SATURATION: 79 % | SYSTOLIC BLOOD PRESSURE: 116 MMHG | RESPIRATION RATE: 20 BRPM

## 2021-12-22 DIAGNOSIS — Z98.890 OTHER SPECIFIED POSTPROCEDURAL STATES: Chronic | ICD-10-CM

## 2021-12-22 DIAGNOSIS — Z95.0 PRESENCE OF CARDIAC PACEMAKER: Chronic | ICD-10-CM

## 2021-12-22 DIAGNOSIS — Z95.818 PRESENCE OF OTHER CARDIAC IMPLANTS AND GRAFTS: Chronic | ICD-10-CM

## 2021-12-22 LAB
ALBUMIN SERPL ELPH-MCNC: 4.9 G/DL — SIGNIFICANT CHANGE UP (ref 3.3–5)
ALP SERPL-CCNC: 174 U/L — HIGH (ref 40–120)
ALT FLD-CCNC: 22 U/L — SIGNIFICANT CHANGE UP (ref 4–41)
ANION GAP SERPL CALC-SCNC: 13 MMOL/L — SIGNIFICANT CHANGE UP (ref 7–14)
AST SERPL-CCNC: 17 U/L — SIGNIFICANT CHANGE UP (ref 4–40)
BASOPHILS # BLD AUTO: 0.02 K/UL — SIGNIFICANT CHANGE UP (ref 0–0.2)
BASOPHILS NFR BLD AUTO: 0.3 % — SIGNIFICANT CHANGE UP (ref 0–2)
BILIRUB SERPL-MCNC: 0.7 MG/DL — SIGNIFICANT CHANGE UP (ref 0.2–1.2)
BUN SERPL-MCNC: 15 MG/DL — SIGNIFICANT CHANGE UP (ref 7–23)
CALCIUM SERPL-MCNC: 10.1 MG/DL — SIGNIFICANT CHANGE UP (ref 8.4–10.5)
CHLORIDE SERPL-SCNC: 102 MMOL/L — SIGNIFICANT CHANGE UP (ref 98–107)
CO2 SERPL-SCNC: 22 MMOL/L — SIGNIFICANT CHANGE UP (ref 22–31)
CREAT SERPL-MCNC: 0.75 MG/DL — SIGNIFICANT CHANGE UP (ref 0.5–1.3)
D DIMER BLD IA.RAPID-MCNC: 1414 NG/ML DDU — HIGH
EOSINOPHIL # BLD AUTO: 0.23 K/UL — SIGNIFICANT CHANGE UP (ref 0–0.5)
EOSINOPHIL NFR BLD AUTO: 3.4 % — SIGNIFICANT CHANGE UP (ref 0–6)
FIBRINOGEN PPP-MCNC: 414 MG/DL — SIGNIFICANT CHANGE UP (ref 290–520)
GLUCOSE SERPL-MCNC: 102 MG/DL — HIGH (ref 70–99)
HCT VFR BLD CALC: 43.1 % — SIGNIFICANT CHANGE UP (ref 39–50)
HGB BLD-MCNC: 14.1 G/DL — SIGNIFICANT CHANGE UP (ref 13–17)
IANC: 5.19 K/UL — SIGNIFICANT CHANGE UP (ref 1.5–8.5)
IMM GRANULOCYTES NFR BLD AUTO: 0.4 % — SIGNIFICANT CHANGE UP (ref 0–1.5)
LYMPHOCYTES # BLD AUTO: 0.92 K/UL — LOW (ref 1–3.3)
LYMPHOCYTES # BLD AUTO: 13.4 % — SIGNIFICANT CHANGE UP (ref 13–44)
MCHC RBC-ENTMCNC: 26.4 PG — LOW (ref 27–34)
MCHC RBC-ENTMCNC: 32.7 GM/DL — SIGNIFICANT CHANGE UP (ref 32–36)
MCV RBC AUTO: 80.6 FL — SIGNIFICANT CHANGE UP (ref 80–100)
MONOCYTES # BLD AUTO: 0.46 K/UL — SIGNIFICANT CHANGE UP (ref 0–0.9)
MONOCYTES NFR BLD AUTO: 6.7 % — SIGNIFICANT CHANGE UP (ref 2–14)
NEUTROPHILS # BLD AUTO: 5.19 K/UL — SIGNIFICANT CHANGE UP (ref 1.8–7.4)
NEUTROPHILS NFR BLD AUTO: 75.8 % — SIGNIFICANT CHANGE UP (ref 43–77)
NRBC # BLD: 0 /100 WBCS — SIGNIFICANT CHANGE UP
PLATELET # BLD AUTO: 140 K/UL — LOW (ref 150–400)
POTASSIUM SERPL-MCNC: 4 MMOL/L — SIGNIFICANT CHANGE UP (ref 3.5–5.3)
POTASSIUM SERPL-SCNC: 4 MMOL/L — SIGNIFICANT CHANGE UP (ref 3.5–5.3)
PROT SERPL-MCNC: 8.2 G/DL — SIGNIFICANT CHANGE UP (ref 6–8.3)
RBC # BLD: 5.35 M/UL — SIGNIFICANT CHANGE UP (ref 4.2–5.8)
RBC # BLD: 5.35 M/UL — SIGNIFICANT CHANGE UP (ref 4.2–5.8)
RBC # FLD: 14 % — SIGNIFICANT CHANGE UP (ref 10.3–14.5)
RETICS #: 61 K/UL — SIGNIFICANT CHANGE UP (ref 25–125)
RETICS/RBC NFR: 1.1 % — SIGNIFICANT CHANGE UP (ref 0.5–2.5)
SODIUM SERPL-SCNC: 137 MMOL/L — SIGNIFICANT CHANGE UP (ref 135–145)
WBC # BLD: 6.85 K/UL — SIGNIFICANT CHANGE UP (ref 3.8–10.5)
WBC # FLD AUTO: 6.85 K/UL — SIGNIFICANT CHANGE UP (ref 3.8–10.5)

## 2021-12-22 PROCEDURE — 99213 OFFICE O/P EST LOW 20 MIN: CPT

## 2021-12-23 DIAGNOSIS — I61.9 NONTRAUMATIC INTRACEREBRAL HEMORRHAGE, UNSPECIFIED: ICD-10-CM

## 2021-12-23 DIAGNOSIS — D68.9 COAGULATION DEFECT, UNSPECIFIED: ICD-10-CM

## 2021-12-23 DIAGNOSIS — I50.9 HEART FAILURE, UNSPECIFIED: ICD-10-CM

## 2021-12-23 DIAGNOSIS — I47.1 SUPRAVENTRICULAR TACHYCARDIA: ICD-10-CM

## 2021-12-23 DIAGNOSIS — I45.9 CONDUCTION DISORDER, UNSPECIFIED: ICD-10-CM

## 2021-12-23 DIAGNOSIS — I27.20 PULMONARY HYPERTENSION, UNSPECIFIED: ICD-10-CM

## 2021-12-29 NOTE — HISTORY OF PRESENT ILLNESS
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 He had cardiac resynchronization in Dec 2016. Aspirin therapy discontinued and started on Warfarin prophylaxis target 2.0-2.5. Difficulty achieving therapeutic levels and referred by cardiology to Ped Hematology for medication monitoring. Family reports compliance without missed doses; interactions with cardiac medications possibility; administered Warfarin separately at bedtime most times on an empty stomach. Denies any significant bleeding diathesis from spontaneous/trauma/surgical challenges. Denies any current anemia and/or iron deficiency. \par Birth history 42 weeks gestation induced normal vaginal delivery; no NICU stay. Discharged home without issues. Returned and hospitalized approximately 1 month later with cyanotic cardiac symptoms. He required open heart surgery. See below for details of cardiac history.  Denies any personal history of thromboembolism or stroke.  He has been followed by Neurology with imaging for history of dizziness without syncope; associated side effect of medications.  He is also followed by GI for history of ascites treated during hospitalization in Dec 2016.  Past history of nose bleeds 3 years ago associated with minor injury to the nose during activity; resolved without recurrence of nose bleeding. Growth and development is good; currently in 10th grade; school activities restricted as per cardiology--no contact sports. INR average 1.7-1.4 L with recent blood work 5/16/17 and increased dosing following labs. Currently taking Warfarin 91mg weekly divided 13mg daily (6mg strength and 1 mg strength tablets used in combination).  Also con-meds include enalapril, sildenafil, furosemide, spironolactone and most recently added sotalol.  He is not taking any vitamins or antibiotics; and dietary restrictions have been discussed by cardiology limiting Vitamin K foods.\par Family history is negative for any known thrombophilia and/or early onset heart attacks, early strokes, thromboembolism, multiple miscarriages.  Denies any family history of autoimmune disorders.  Maternal grandfather had coronary artery disease associated with environmental risk factors.  Mother and maternal aunt have gynecological history of ovarian cysts.  Jimbo lives with Mother and stepfather and 2 year old maternal-half sister who is healthy. Family ancestry [place]. Mother is primary caretaker and works as a medical assistant; she demonstrates understanding and importance of compliance with care team asking appropriate questions. Distance in travel and time does pose difficulty in scheduling visits; routine labs and INR have been drawn at outside lab facility close to home and monitored by cardiology.     \par \par 11/02/01, Cath – Assessment of anatomy and hemodynamics prior to surgery\par 11/08/01, OR - Zdklw-Mpht-Rozwlcn anastomosis with a modified right Nirmal-Taussig shunt.\par 03/15/03, OR - Celestine-Fontan with ligation of BT shunt and left pulmonary artery plasty\par 04/13/04, Cath - Coil embolization of collateral vessels\par 05/19/04, OR - Lateral tunnel fenestrated Fontan\par 05/26/04, OR - Pacemaker placement\par 7/25/11, Cath- Normal CI 3.1L/min/m2, Qp:Qs 1:1, normal pressure in Fontan circuit (mean 15 mmHg), balloon angioplasty of superior narrowing of Fontan circuit with improvement seen. Balloon angioplasty of LPA with improvement of stenosis. ASD device placed for closure of Fontan fenestration\par 10/24/16, Cath- Cath for Fontan failure as evidenced by increasing exercise intolerance as well as ascites. Low normal CI of 2.7 L/min/ m2. Elevated Fontan pressures of 24 mmHg. Elevated PVR (3.7) that responded well to Apryl pulmonary vasodilator challenge (decreased to 1.4). Angiographic evidence of mild intracardiac Fontan obstruction without any pressure gradient, relieved by placing a 36mm X 12mm stent (IntraStent Max LD) mounted on a 25mm balloon. Post intervention Fontan pressure decreased to 20 mmHg. \par \par \par  [de-identified] : 12/22/17 He failed Coumadin therapy; compliance confirmed; unable to attain therapeutic range with upper limits of therapy. Notified by Cardiology of current changes in cardiac history with notable asymptomatic atrial flutter requiring more aggressive anticoagulant therapy rather than current aspirin regime.\par 1/10/18 Patient is asymptomatic for chest pain and/or palpitations monitored closely by cardiology; con-medications reconciled--no changes. Remains on Xarelto (rivaroxaban) 15mg twice daily taken with meals as directed for 21 days; then dose will changed to maintenance 20mg once daily beginning on Saturday 1/13/18. Last dose 7:30am today. Denies any minor or major bleeding events.  Once starter kit completed, will require refill RX at local pharmacy.\par 6/27/18 Interval follow up for H&P with labs. Remains on Xarelto 20mg daily with dinner. Denies any bleeding signs. Reports demonstrated mild thrombocytopenia. Will follow up with labs today. \par 10/2/19 Remains on Xarelto 20mg once daily with meals; no missed doses. Currently freshman in college for Business major. Bleeding precautions and activity restrictions as per cardiology maintained. \par 7/8/2020: Jimbo states that he  has not been quite consistent with medication, has forgotten in the past. He bought a pill box 2 weeks ago and has since been compliant as per Jimbo and qing. No bleeding symptoms- did endorse that he bleeds easily from minor cuts, bleeding stops with light pressure. He finished first year college in [employer], studying Marketing. \par 7/29/2021: Jimbo presents to the clinic for his yearly hematology follow up. He was hospitalized in June 2021 for cardiac cath and ablation. Ablation was unsuccessful and it was decided to Cardiovert him. \par Hospital course as follows:\par CV: On 5/27, attempted cardioversion through pacing, although was unsuccessful, and subsequently started medical cardioversion with amiodarone 300mg BID. Restarted on home medications including lisinopril, sildenafil, lasix, aldactone. IART rhythm was broken on 6/1 after cardioversion. However, he went back into IART later that night. Pacemaker was changed to VOO at 70 prior to neurosurgery. Amiodarone, aldactone, and lisinopril were held while critically ill. Epinephrine and vasopressin were utilized to maintain higher maps while EVD in place. On 6/8 he started having runs of ventricular tachycardia. Arrythmia resolved with lidocaine infusion and changing pacemaker mode to VVI. Transitioned to PO mexiletine on 6/13. Pacemaker transitioned back to VOO at 70. \par Resp: Re-intubated on 6/1 due to AMS associated with new intracranial bleed. Extubated to HFNC on 6/9. CTA chest on 6/10 showed numerous veno-venous anterior mediastinal collaterals. Weaned to RA on 6/16.\par Neuro: Four hours after cardioversion, he had an episode of staring and unresponsiveness. Head CT showed severe hemorrhagic stroke in left frontal lobe in the ROCCO-MCA watershed territory with extension to the intraventricular regions. He required urgent surgical decompressive craniectomy and hemorrhage evacuation with EVD placement overnight on 6/1. Serial head CTs showed a new hemorrhage along the EVD catheter tract on 6/6. EVD clamped on 6/10. VEEG placed on 6/10 due to eye deviation and seizure-like activity did not correlate with seizures. He did have a clinical seizure on 6/14 which broke with Ativan. Neurology re-consulted and recommended continuing Keppra at current dose. PM&R consulted for delirium/agitation, recommended starting propranolol for agitation. Delirium improved with propranolol and unclamping EVD. Taken to OR on 6/17 for EVD removal, cranioplasty, and VPS placement.  Repeat CT head 6/18 showed trace hemorrhage R lateral ventricle and interval placement of wire mesh cranioplasty. Repeat CT head on 6/19 and 6/20 was stable, repeat CT head on 6/25 was also stable prior to restarting anticoagulation. Propranolol was weaned per PM&R recs to 5mg q8h on 6/25 and off on 6/27.\par Heme: Given hemoptysis, hemoglobin was monitored and he did not require any transfusions. Hemoptysis resolved overnight 5/26-5/27. Home xarelto was held and restarted on 5/27. INR on 5/26 was 1.32, and it jumped to 4.94 after hemorrhage was identified. Cause for jump in INR is unclear. In the OR, xarelto reversals were given (Andexxa, KCentra), in addition to multiple units of FFP and blood products. Xarelto was stopped and he was received SubQ Vit K for 3 days. He was given multiple units of FFP for goal INR < 1.5 in order to prevent further bleeding. Hematology was consulted for persistently elevated INR. Thought to be possibly due to hepatic congestion secondary to elevated fontan pressures. INR improved w/ Vitamin K. Hypercoagulopathy workup was sent and is pending. Vitamin K PO 5mg 3x/wk started on 6/14. Will discuss with hematology duration of treatment. Pt was given FFP x1 for elevated INR 1.57 on 6/19. INR remained stable less than 1.5 and vit K 5mg PO 3x/wk was d/c'd on 6/23. INR continued to be trended daily which has been stable, slightly elevated at 1.39.\par \par 10/13/21: Jimbo is here for follow up and consideration of starting an anticoagulant given recent findings on cath conveyed to me by his cardiologist Dr Adonis Bradford. Since hospitalization for ICH after cardioversion in June while he was on rivaroxaban;  he was discharged home on  mg which does not seem to control his arrhythmia and he is at high risk for stroke ; here today to discuss anticoagulant options ; doing well, compliant with meds;  no complaints reported, no bleeding from any site; no new meds/ allergies to Meds \par \par 12/22/21: Mela is here for follow up after starting on apixaban 5 mg BID for stoke prevention given underlying h/o atrial fibrillation related to DORV s/p Fontan, sick sinus syndrome with pacemaker s/p recent cardioversion when he had an ICH ; currently stable on apixaban , doing well, no bleeding from any sites, no missed doses; 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

## 2021-12-29 NOTE — PHYSICAL EXAM
[No focal deficits] : no focal deficits [Normal] : affect appropriate [100: Fully active, normal.] : 100: Fully active, normal. 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

## 2021-12-29 NOTE — PAST MEDICAL HISTORY
[Premature] : premature [[place] : [place] [ Section] : by  section [Age Appropriate] : age appropriate

## 2021-12-29 NOTE — CONSULT LETTER
[Dear  ___] : Dear  [unfilled], [Courtesy Letter:] : I had the pleasure of seeing your patient, [unfilled], in my office today. [Please see my note below.] : Please see my note below. [Consult Closing:] : Thank you very much for allowing me to participate in the care of this patient.  If you have any questions, please do not hesitate to contact me. [Sincerely,] : Sincerely, [DrAmarilys  ___] : Dr. WU [DrAmarilys ___] : Dr. WU [___] : [unfilled] [FreeTextEntry2] : Adonis Londono MD\par [facility]- Dept. of Pediatrics \par [address] Cardiology	\[place] [address]\par Tel: [phone]\par Fax: [phone] [FreeTextEntry3] : Palak Ordonez\par Pediatric Hematology\par Division of Hematology/Oncology and Stem Cell Transplantation\par [facility]\par [phone]\par \par

## 2022-01-24 ENCOUNTER — EMERGENCY (EMERGENCY)
Age: 22
LOS: 1 days | Discharge: HOME | End: 2022-01-24
Attending: PEDIATRICS | Admitting: PEDIATRICS
Payer: MEDICAID

## 2022-01-24 VITALS
TEMPERATURE: 97 F | OXYGEN SATURATION: 95 % | HEART RATE: 70 BPM | DIASTOLIC BLOOD PRESSURE: 52 MMHG | RESPIRATION RATE: 18 BRPM | SYSTOLIC BLOOD PRESSURE: 107 MMHG | WEIGHT: 289.03 LBS

## 2022-01-24 VITALS
TEMPERATURE: 100 F | RESPIRATION RATE: 20 BRPM | OXYGEN SATURATION: 92 % | HEART RATE: 71 BPM | DIASTOLIC BLOOD PRESSURE: 66 MMHG | SYSTOLIC BLOOD PRESSURE: 116 MMHG

## 2022-01-24 DIAGNOSIS — T85.09XA OTHER MECHANICAL COMPLICATION OF VENTRICULAR INTRACRANIAL (COMMUNICATING) SHUNT, INITIAL ENCOUNTER: ICD-10-CM

## 2022-01-24 DIAGNOSIS — Z95.0 PRESENCE OF CARDIAC PACEMAKER: Chronic | ICD-10-CM

## 2022-01-24 DIAGNOSIS — Z95.818 PRESENCE OF OTHER CARDIAC IMPLANTS AND GRAFTS: Chronic | ICD-10-CM

## 2022-01-24 DIAGNOSIS — Z98.890 OTHER SPECIFIED POSTPROCEDURAL STATES: Chronic | ICD-10-CM

## 2022-01-24 LAB
ALBUMIN SERPL ELPH-MCNC: 5.2 G/DL — HIGH (ref 3.3–5)
ALP SERPL-CCNC: 172 U/L — HIGH (ref 40–120)
ALT FLD-CCNC: 28 U/L — SIGNIFICANT CHANGE UP (ref 4–41)
ANION GAP SERPL CALC-SCNC: 15 MMOL/L — HIGH (ref 7–14)
APPEARANCE UR: ABNORMAL
AST SERPL-CCNC: 22 U/L — SIGNIFICANT CHANGE UP (ref 4–40)
B PERT DNA SPEC QL NAA+PROBE: SIGNIFICANT CHANGE UP
B PERT+PARAPERT DNA PNL SPEC NAA+PROBE: SIGNIFICANT CHANGE UP
BACTERIA # UR AUTO: NEGATIVE — SIGNIFICANT CHANGE UP
BASOPHILS # BLD AUTO: 0.03 K/UL — SIGNIFICANT CHANGE UP (ref 0–0.2)
BASOPHILS NFR BLD AUTO: 0.2 % — SIGNIFICANT CHANGE UP (ref 0–2)
BILIRUB SERPL-MCNC: 0.7 MG/DL — SIGNIFICANT CHANGE UP (ref 0.2–1.2)
BILIRUB UR-MCNC: NEGATIVE — SIGNIFICANT CHANGE UP
BORDETELLA PARAPERTUSSIS (RAPRVP): SIGNIFICANT CHANGE UP
BUN SERPL-MCNC: 15 MG/DL — SIGNIFICANT CHANGE UP (ref 7–23)
C PNEUM DNA SPEC QL NAA+PROBE: SIGNIFICANT CHANGE UP
CALCIUM SERPL-MCNC: 9.6 MG/DL — SIGNIFICANT CHANGE UP (ref 8.4–10.5)
CHLORIDE SERPL-SCNC: 101 MMOL/L — SIGNIFICANT CHANGE UP (ref 98–107)
CO2 SERPL-SCNC: 22 MMOL/L — SIGNIFICANT CHANGE UP (ref 22–31)
COLOR SPEC: ABNORMAL
COMMENT - URINE: SIGNIFICANT CHANGE UP
CREAT SERPL-MCNC: 0.8 MG/DL — SIGNIFICANT CHANGE UP (ref 0.5–1.3)
DIFF PNL FLD: ABNORMAL
EOSINOPHIL # BLD AUTO: 0.12 K/UL — SIGNIFICANT CHANGE UP (ref 0–0.5)
EOSINOPHIL NFR BLD AUTO: 0.8 % — SIGNIFICANT CHANGE UP (ref 0–6)
EPI CELLS # UR: 3 /HPF — SIGNIFICANT CHANGE UP (ref 0–5)
FLUAV SUBTYP SPEC NAA+PROBE: SIGNIFICANT CHANGE UP
FLUBV RNA SPEC QL NAA+PROBE: SIGNIFICANT CHANGE UP
GLUCOSE SERPL-MCNC: 198 MG/DL — HIGH (ref 70–99)
GLUCOSE UR QL: NEGATIVE — SIGNIFICANT CHANGE UP
HADV DNA SPEC QL NAA+PROBE: SIGNIFICANT CHANGE UP
HCOV 229E RNA SPEC QL NAA+PROBE: SIGNIFICANT CHANGE UP
HCOV HKU1 RNA SPEC QL NAA+PROBE: SIGNIFICANT CHANGE UP
HCOV NL63 RNA SPEC QL NAA+PROBE: SIGNIFICANT CHANGE UP
HCOV OC43 RNA SPEC QL NAA+PROBE: SIGNIFICANT CHANGE UP
HCT VFR BLD CALC: 48.8 % — SIGNIFICANT CHANGE UP (ref 39–50)
HGB BLD-MCNC: 15.2 G/DL — SIGNIFICANT CHANGE UP (ref 13–17)
HMPV RNA SPEC QL NAA+PROBE: SIGNIFICANT CHANGE UP
HPIV1 RNA SPEC QL NAA+PROBE: SIGNIFICANT CHANGE UP
HPIV2 RNA SPEC QL NAA+PROBE: SIGNIFICANT CHANGE UP
HPIV3 RNA SPEC QL NAA+PROBE: SIGNIFICANT CHANGE UP
HPIV4 RNA SPEC QL NAA+PROBE: SIGNIFICANT CHANGE UP
HYALINE CASTS # UR AUTO: 12 /LPF — HIGH (ref 0–7)
IANC: 12.52 K/UL — HIGH (ref 1.5–8.5)
IMM GRANULOCYTES NFR BLD AUTO: 0.7 % — SIGNIFICANT CHANGE UP (ref 0–1.5)
KETONES UR-MCNC: NEGATIVE — SIGNIFICANT CHANGE UP
LEUKOCYTE ESTERASE UR-ACNC: NEGATIVE — SIGNIFICANT CHANGE UP
LYMPHOCYTES # BLD AUTO: 1.49 K/UL — SIGNIFICANT CHANGE UP (ref 1–3.3)
LYMPHOCYTES # BLD AUTO: 9.7 % — LOW (ref 13–44)
M PNEUMO DNA SPEC QL NAA+PROBE: SIGNIFICANT CHANGE UP
MAGNESIUM SERPL-MCNC: 2 MG/DL — SIGNIFICANT CHANGE UP (ref 1.6–2.6)
MCHC RBC-ENTMCNC: 26 PG — LOW (ref 27–34)
MCHC RBC-ENTMCNC: 31.1 GM/DL — LOW (ref 32–36)
MCV RBC AUTO: 83.6 FL — SIGNIFICANT CHANGE UP (ref 80–100)
MONOCYTES # BLD AUTO: 1.09 K/UL — HIGH (ref 0–0.9)
MONOCYTES NFR BLD AUTO: 7.1 % — SIGNIFICANT CHANGE UP (ref 2–14)
NEUTROPHILS # BLD AUTO: 12.52 K/UL — HIGH (ref 1.8–7.4)
NEUTROPHILS NFR BLD AUTO: 81.5 % — HIGH (ref 43–77)
NITRITE UR-MCNC: NEGATIVE — SIGNIFICANT CHANGE UP
NRBC # BLD: 0 /100 WBCS — SIGNIFICANT CHANGE UP
NRBC # FLD: 0 K/UL — SIGNIFICANT CHANGE UP
PH UR: 6 — SIGNIFICANT CHANGE UP (ref 5–8)
PHOSPHATE SERPL-MCNC: 2 MG/DL — LOW (ref 2.5–4.5)
PLATELET # BLD AUTO: 192 K/UL — SIGNIFICANT CHANGE UP (ref 150–400)
POTASSIUM SERPL-MCNC: 3 MMOL/L — LOW (ref 3.5–5.3)
POTASSIUM SERPL-SCNC: 3 MMOL/L — LOW (ref 3.5–5.3)
PROT SERPL-MCNC: 8.7 G/DL — HIGH (ref 6–8.3)
PROT UR-MCNC: ABNORMAL
RAPID RVP RESULT: SIGNIFICANT CHANGE UP
RBC # BLD: 5.84 M/UL — HIGH (ref 4.2–5.8)
RBC # FLD: 14.5 % — SIGNIFICANT CHANGE UP (ref 10.3–14.5)
RBC CASTS # UR COMP ASSIST: 2 /HPF — SIGNIFICANT CHANGE UP (ref 0–4)
RSV RNA SPEC QL NAA+PROBE: SIGNIFICANT CHANGE UP
RV+EV RNA SPEC QL NAA+PROBE: SIGNIFICANT CHANGE UP
SARS-COV-2 RNA SPEC QL NAA+PROBE: SIGNIFICANT CHANGE UP
SODIUM SERPL-SCNC: 138 MMOL/L — SIGNIFICANT CHANGE UP (ref 135–145)
SP GR SPEC: 1.03 — SIGNIFICANT CHANGE UP (ref 1–1.05)
UROBILINOGEN FLD QL: ABNORMAL
WBC # BLD: 15.36 K/UL — HIGH (ref 3.8–10.5)
WBC # FLD AUTO: 15.36 K/UL — HIGH (ref 3.8–10.5)
WBC UR QL: 4 /HPF — SIGNIFICANT CHANGE UP (ref 0–5)

## 2022-01-24 PROCEDURE — 71045 X-RAY EXAM CHEST 1 VIEW: CPT | Mod: 26

## 2022-01-24 PROCEDURE — 74018 RADEX ABDOMEN 1 VIEW: CPT | Mod: 26

## 2022-01-24 PROCEDURE — 70250 X-RAY EXAM OF SKULL: CPT | Mod: 26

## 2022-01-24 PROCEDURE — 93010 ELECTROCARDIOGRAM REPORT: CPT

## 2022-01-24 PROCEDURE — 99285 EMERGENCY DEPT VISIT HI MDM: CPT

## 2022-01-24 PROCEDURE — 70450 CT HEAD/BRAIN W/O DYE: CPT | Mod: 26,MA

## 2022-01-24 RX ORDER — SODIUM CHLORIDE 9 MG/ML
1000 INJECTION INTRAMUSCULAR; INTRAVENOUS; SUBCUTANEOUS ONCE
Refills: 0 | Status: COMPLETED | OUTPATIENT
Start: 2022-01-24 | End: 2022-01-24

## 2022-01-24 RX ORDER — ONDANSETRON 8 MG/1
4 TABLET, FILM COATED ORAL ONCE
Refills: 0 | Status: COMPLETED | OUTPATIENT
Start: 2022-01-24 | End: 2022-01-24

## 2022-01-24 RX ORDER — METOCLOPRAMIDE HCL 10 MG
10 TABLET ORAL ONCE
Refills: 0 | Status: COMPLETED | OUTPATIENT
Start: 2022-01-24 | End: 2022-01-24

## 2022-01-24 RX ORDER — POTASSIUM CHLORIDE 20 MEQ
40 PACKET (EA) ORAL ONCE
Refills: 0 | Status: DISCONTINUED | OUTPATIENT
Start: 2022-01-24 | End: 2022-01-24

## 2022-01-24 RX ORDER — LEVETIRACETAM 250 MG/1
1500 TABLET, FILM COATED ORAL EVERY 12 HOURS
Refills: 0 | Status: DISCONTINUED | OUTPATIENT
Start: 2022-01-24 | End: 2022-01-27

## 2022-01-24 RX ORDER — POTASSIUM CHLORIDE 20 MEQ
40 PACKET (EA) ORAL ONCE
Refills: 0 | Status: COMPLETED | OUTPATIENT
Start: 2022-01-24 | End: 2022-01-24

## 2022-01-24 RX ORDER — ACETAMINOPHEN 500 MG
650 TABLET ORAL ONCE
Refills: 0 | Status: COMPLETED | OUTPATIENT
Start: 2022-01-24 | End: 2022-01-24

## 2022-01-24 RX ADMIN — Medication 650 MILLIGRAM(S): at 11:45

## 2022-01-24 RX ADMIN — Medication 40 MILLIEQUIVALENT(S): at 14:02

## 2022-01-24 RX ADMIN — ONDANSETRON 4 MILLIGRAM(S): 8 TABLET, FILM COATED ORAL at 11:13

## 2022-01-24 RX ADMIN — Medication 8 MILLIGRAM(S): at 13:45

## 2022-01-24 RX ADMIN — SODIUM CHLORIDE 1000 MILLILITER(S): 9 INJECTION INTRAMUSCULAR; INTRAVENOUS; SUBCUTANEOUS at 13:13

## 2022-01-24 RX ADMIN — LEVETIRACETAM 400 MILLIGRAM(S): 250 TABLET, FILM COATED ORAL at 14:01

## 2022-01-24 NOTE — CONSULT NOTE PEDS - SUBJECTIVE AND OBJECTIVE BOX
HPI:    PAST MEDICAL & SURGICAL HISTORY:  Double inlet left ventricle  D-TGA (dextro-transposition of great arteries)  Sick sinus syndrome  Atrial tachycardia  Hepatomegaly  Other ascites  Pulmonary hypertension  Pacemaker  AV block  Coagulopathy  S/P cardiac cath  2001-assessment of anatomy prior to 1st surgery  4/13/2004- coil emolization of collateral vessels  7/25/2011-balloon angioplasty of superior narrowing of Fontan circuit, balloon angioplasty of LPA, ASD device placed for closure of Fontan fenestraion  10/24/2016- Cath of Fontan  S/P Nirmal-Taussig shunt  Qzrip-Lokq-Dbsholg anastomosis with modified right BT shunt and creation of pulmonary atresia  S/P celestine-Fontan operation  3/15/2003- Celestine-Fontan with ligation of BT shunt and left pulmonary artery plasty  Cardiac pacemaker  5/26/2004      Allergies  No Known Allergies  Intolerances      acetaminophen   Oral Tab/Cap - Peds. 650 milliGRAM(s) Oral Once  ondansetron IV Push - Peds 4 milliGRAM(s) IV Push Once    SOCIAL HISTORY:  FAMILY HISTORY:    Vital Signs Last 24 Hrs  T(C): 36.1 (24 Jan 2022 10:23), Max: 36.1 (24 Jan 2022 10:23)  T(F): 96.9 (24 Jan 2022 10:23), Max: 96.9 (24 Jan 2022 10:23)  HR: 70 (24 Jan 2022 10:23) (70 - 70)  BP: 107/52 (24 Jan 2022 10:23) (107/52 - 107/52)  BP(mean): --  RR: 18 (24 Jan 2022 10:23) (18 - 18)  SpO2: 95% (24 Jan 2022 10:23) (95% - 95%)    PHYSICAL EXAM:          RADIOLOGY & ADDITIONAL STUDIES:       HPI:  21M with extensive cardiac history double inlet L ventricle, L-malposition of the great arteries status post multiple surgeries, sick sinus node syndrome s/p pacemaker in place (dual-chamber epicardial leads for AV sequential pacing as well as antitachycardia functionality), hemorrhagic stroke s/p L Craniectomy and EVD placement 6/1/21 and Cranioplasty and VPS placement (Strata @ 0.5) 6/18/21, never revised, now presenting with for witnessed tonic clonic seizures. Per mom, patient was in otherwise normal state of health recently, no recent infections. Was eating breakfast when patient went unresponsive and turned to the right and started having tonic clonic jerks. Last seizure was in october (pt's keppra dose increased at that time). This time the seizure lasted a lot longer- 10 mins? Resolved by the time EMS got there. Patient currently complaining of HA and nausea. No other complaints.    PAST MEDICAL & SURGICAL HISTORY:  Double inlet left ventricle  D-TGA (dextro-transposition of great arteries)  Sick sinus syndrome  Atrial tachycardia  Hepatomegaly  Other ascites  Pulmonary hypertension  Pacemaker  AV block  Coagulopathy  S/P cardiac cath  2001-assessment of anatomy prior to 1st surgery  4/13/2004- coil emolization of collateral vessels  7/25/2011-balloon angioplasty of superior narrowing of Fontan circuit, balloon angioplasty of LPA, ASD device placed for closure of Fontan fenestraion  10/24/2016- Cath of Fontan  S/P Nirmal-Taussig shunt  Eomvo-Tzgc-Gfedoom anastomosis with modified right BT shunt and creation of pulmonary atresia  S/P celestine-Fontan operation  3/15/2003- Celestine-Fontan with ligation of BT shunt and left pulmonary artery plasty  Cardiac pacemaker  5/26/2004      Allergies  No Known Allergies  Intolerances    MEds:  acetaminophen   Oral Tab/Cap - Peds. 650 milliGRAM(s) Oral Once      Vital Signs Last 24 Hrs  T(C): 36.1 (24 Jan 2022 10:23), Max: 36.1 (24 Jan 2022 10:23)  T(F): 96.9 (24 Jan 2022 10:23), Max: 96.9 (24 Jan 2022 10:23)  HR: 70 (24 Jan 2022 10:23) (70 - 70)  BP: 107/52 (24 Jan 2022 10:23) (107/52 - 107/52)  BP(mean): --  RR: 18 (24 Jan 2022 10:23) (18 - 18)  SpO2: 95% (24 Jan 2022 10:23) (95% - 95%)    PHYSICAL EXAM:  Awake, Alert. Affect Appropriate  PERRL, EOMI  MOTOR:  GOODSON x 4  R HG 4/5  RUE/RLE 4+/5  LUE/LLE 5/5  No drift    RADIOLOGY & ADDITIONAL STUDIES:  < from: CT Head No Cont (01.24.22 @ 10:51) >  IMPRESSION: Stable exam.    < end of copied text >
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
## 2022-01-24 NOTE — ED PROVIDER NOTE - CLINICAL SUMMARY MEDICAL DECISION MAKING FREE TEXT BOX
21M w medical history as detailed above presenting for witnessed seizure. VSS w non focal neuro exam at this time. Will assess for reversible causes ie infection/ shunt malfunction. Plan for labs, imaging, neuro/neurosurgical consult, reassess. Jett Arrieta, EM PGY3 21M w medical history as detailed above presenting for witnessed seizure. VSS w non focal neuro exam at this time. Will assess for reversible causes ie infection/ shunt malfunction. Plan for labs, imaging, neuro/neurosurgical consult, reassess. LIDA Gonzales PGY3    attending- patient with complex medical history with VPS and h/o seizures s/p stroke spring 2021, here with seizure today.  Afebrile and no recent illness.  Complains of headache now but no headache prior to seizure and no other complaints at this time.  Had some vomiting as well.  Non focal neuro exam.  Will get head CT to evaluate for bleed and ventricle size.  Shunt series.  Check labs.  Neurosurgery consult. Sudha Giles MD

## 2022-01-24 NOTE — ED PROVIDER NOTE - OBJECTIVE STATEMENT
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 Per mom, patient was in otherwise normal state of health recently, no recent infections. Was eating breakfast when patient went unresponsive and turned to the right and started having tonic clonic jerks. Last seizure was in october (pt's keppra dose increased). This time the seizure lasted a lot longer- 10 mins? Resolved by the time EMS got there. Patient currently endorseing HA and nausea. No other complaints.

## 2022-01-24 NOTE — ED PROVIDER NOTE - PHYSICAL EXAMINATION
Const: Well-nourished, Well-developed, appearing stated age.  Eyes: no conjunctival injection, and symmetrical lids.  HEENT: Head NCAT, no lesions. Atraumatic external nose and ears.   Neck: Symmetric, trachea midline.   CVS: +S1/S2, Radial and DP pulses 2+ b/l.   RESP: Unlabored respiratory effort. Clear to auscultation bilaterally.  GI: Nontender/Nondistended, No CVA tenderness b/l.   MSK: Normocephalic/Atraumatic, Lower Extremities w/o edema b/l.   Skin: Warm, dry and intact.   Neuro: Fluent Speech. Motor & Sensation grossly intact. No dysmetria or dysarthria. No nystagmus.   Psych: Awake, Alert, & Oriented (AAO) x3. Appropriate mood and affect.

## 2022-01-24 NOTE — CONSULT NOTE PEDS - PROBLEM SELECTOR RECOMMENDATION 9
1. No acute neurosurgical intervention at this time.  2. Neurology consult for management of seizures.  Case d/w attending

## 2022-01-24 NOTE — ED PEDIATRIC NURSE NOTE - CHIEF COMPLAINT QUOTE
20 yo M BIBA from home for seizure. Mom reports witnessed sz appox 10 minutes, pt turned blue, lowered to floor from chair and mom did "CPR". Sz resolved. PMH: pulm htn, s/p cardiac sx,  shunt, seizures. Meds: Keppra, Eloquis, lisinopril. NKDA. Vaccines UTD.

## 2022-01-24 NOTE — ED PROVIDER NOTE - NS ED ROS FT
CONST: no fevers, no chills, no trauma  EYES: no pain, no blurry vision   ENT: no sore throat, no epistaxis, no rhinorrhea  CV: no chest pain, no palpitations, no orthopnea, no extremity pain or swelling  RESP: no shortness of breath, no cough, no sputum, no pleurisy, no wheezing  ABD: no abdominal pain, + nausea, no vomiting, no diarrhea, no black or bloody stool  : no dysuria, no hematuria, no frequency, no urgency  MSK: no back pain, no neck pain, no extremity pain  NEURO: + headache, no sensory disturbances, no focal weakness, no dizziness  HEME: no easy bleeding or bruising  SKIN: no diaphoresis, no rash

## 2022-01-24 NOTE — CONSULT NOTE PEDS - ASSESSMENT
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 CTH was done showing stable ventricles. Pt at neurologic baseline,

## 2022-01-24 NOTE — ED PROVIDER NOTE - NSICDXPASTSURGICALHX_GEN_ALL_CORE_FT
PAST SURGICAL HISTORY:  Cardiac pacemaker 5/26/2004    S/P Nirmal-Taussig shunt Ojagh-Srus-Xvvgknv anastomosis with modified right BT shunt and creation of pulmonary atresia    S/P cardiac cath 2001-assessment of anatomy prior to 1st surgery  4/13/2004- coil emolization of collateral vessels  7/25/2011-balloon angioplasty of superior narrowing of Fontan circuit, balloon angioplasty of LPA, ASD device placed for closure of Fontan fenestraion  10/24/2016- Cath of Fontan    S/P celestine-Fontan operation 3/15/2003- Celestine-Fontan with ligation of BT shunt and left pulmonary artery plasty

## 2022-01-24 NOTE — ED PEDIATRIC NURSE REASSESSMENT NOTE - NS ED NURSE REASSESS COMMENT FT2
Pt tolerated po potassium liquid. IV keppra infusing now.
seen by neuro. remains alert and interactive. NSR on full cardiopulm monitor. Pt reports no nausea after zofran.

## 2022-01-24 NOTE — ED PEDIATRIC NURSE NOTE - NSICDXPASTSURGICALHX_GEN_ALL_CORE_FT
PAST SURGICAL HISTORY:  Cardiac pacemaker 5/26/2004    S/P Nirmal-Taussig shunt Jbnoe-Zlub-Suwldzc anastomosis with modified right BT shunt and creation of pulmonary atresia    S/P cardiac cath 2001-assessment of anatomy prior to 1st surgery  4/13/2004- coil emolization of collateral vessels  7/25/2011-balloon angioplasty of superior narrowing of Fontan circuit, balloon angioplasty of LPA, ASD device placed for closure of Fontan fenestraion  10/24/2016- Cath of Fontan    S/P celestine-Fontan operation 3/15/2003- Celestine-Fontan with ligation of BT shunt and left pulmonary artery plasty

## 2022-01-24 NOTE — ED PROVIDER NOTE - PROGRESS NOTE DETAILS
Neurosurgery consulted, agreeable w ed work up ordered. Will follow CT read. LIDA Gonzales PGY3 Based on imaging, shunt stable. Neurosurgery recommended neuro reccs. Neuro consulted. to give final reccs. Pt is on 500mg Keppra in the am, 2000mg pm. LIDA Gonzales PGY3 Per neuro - recommending 1500mg am, 2000mg pm dosing. Will send script for new dosing. Will PO challenge and reassess. Jett Arrieta, EM PGY3 Tolerating po, will dc w follow up. Jett Arrieta, EM PGY3 attending- agree with resident assessment and plan above.  Well appearing.  Baseline as per mom.  D/c home to continue keppra. Sudha Giles MD

## 2022-01-24 NOTE — ED PROVIDER NOTE - NSFOLLOWUPINSTRUCTIONS_ED_ALL_ED_FT
Please see attached information about seizures.     Please see your primary doctor in 2-3 days for follow-up care. Return to ER for any new or worsening symptoms.    Also follow up with neurology as scheduled, if you are unable to make that appointment, call [phone] for an appointment with our clinic.     Continue 1500mg Keppra in the morning and 2000mg Keppra in the evening as discussed.

## 2022-01-24 NOTE — ED PROVIDER NOTE - TEMPLATE
Attempted to contact the patient to for refill reminder call,  left message on voicemail    Follow-up Date: 05/04/18 2nd attempt     Abigail Rivas, [facility]  [phone]     General

## 2022-01-24 NOTE — ED PROVIDER NOTE - PATIENT PORTAL LINK FT
You can access the FollowMyHealth Patient Portal offered by [facility] by registering at the following website: [email or web address]. By joining [facility]’s FollowMyHealth portal, you will also be able to view your health information using other applications (apps) compatible with our system.

## 2022-01-25 ENCOUNTER — APPOINTMENT (OUTPATIENT)
Dept: PEDIATRIC CARDIOLOGY | Facility: CLINIC | Age: 22
End: 2022-01-25
Payer: MEDICAID

## 2022-01-25 VITALS
WEIGHT: 150.13 LBS | HEIGHT: 66.14 IN | HEART RATE: 87 BPM | BODY MASS INDEX: 24.13 KG/M2 | SYSTOLIC BLOOD PRESSURE: 120 MMHG | OXYGEN SATURATION: 90 % | DIASTOLIC BLOOD PRESSURE: 80 MMHG

## 2022-01-25 LAB
CULTURE RESULTS: SIGNIFICANT CHANGE UP
SPECIMEN SOURCE: SIGNIFICANT CHANGE UP

## 2022-01-25 PROCEDURE — 93288 INTERROG EVL PM/LDLS PM IP: CPT

## 2022-01-25 PROCEDURE — 99213 OFFICE O/P EST LOW 20 MIN: CPT

## 2022-01-25 NOTE — PHYSICAL EXAM
right upper arm [General Appearance - Alert] : alert [General Appearance - In No Acute Distress] : in no acute distress [General Appearance - Well Nourished] : well nourished [General Appearance - Well Developed] : well developed [General Appearance - Well-Appearing] : well appearing [Appearance Of Head] : the head was normocephalic [Facies] : there were no dysmorphic facial features [Sclera] : the conjunctiva were normal [Outer Ear] : the ears and nose were normal in appearance [Examination Of The Oral Cavity] : mucous membranes were moist and pink [Auscultation Breath Sounds / Voice Sounds] : breath sounds clear to auscultation bilaterally [Normal Chest Appearance] : the chest was normal in appearance [Apical Impulse] : quiet precordium with normal apical impulse [Heart Rate And Rhythm] : normal heart rate and rhythm [Heart Sounds] : normal S1 and S2 [Heart Sounds Gallop] : no gallops [Heart Sounds Pericardial Friction Rub] : no pericardial rub [Heart Sounds Click] : no clicks [Arterial Pulses] : normal upper and lower extremity pulses with no pulse delay [Edema] : no edema [Capillary Refill Test] : normal capillary refill [II] : a grade 2/6 [LLSB] : LLSB  [Holosystolic] : holosystolic [Bowel Sounds] : normal bowel sounds [Abdomen Soft] : soft [Nondistended] : nondistended [Abdomen Tenderness] : non-tender [Nail Clubbing] : no clubbing  or cyanosis of the fingers [Motor Tone] : normal muscle strength and tone [Cervical Lymph Nodes Enlarged Anterior] : The anterior cervical nodes were normal [Cervical Lymph Nodes Enlarged Posterior] : The posterior cervical nodes were normal [] : no rash [Skin Lesions] : no lesions [Skin Turgor] : normal turgor [Demonstrated Behavior - Infant Nonreactive To Parents] : interactive [Mood] : mood and affect were appropriate for age [Demonstrated Behavior] : normal behavior

## 2022-01-26 LAB — LEVETIRACETAM SERPL-MCNC: 1.2 UG/ML — LOW (ref 10–40)

## 2022-01-26 NOTE — CARDIOLOGY SUMMARY
[Today's Date] : [unfilled] [de-identified] : PACEMAKER EVALUATION (See separate note for details): Presenting rhythm was AT  @ 71 bpm.  The pacemaker mode is VVIR @ 70 bpm. He has 3 months remaining on the battery. Adequate lead parameters of the LV lead. Low p wave amplitudes. \par No changes made at this time

## 2022-01-26 NOTE — CONSULT LETTER
[Today's Date] : [unfilled] [Name] : Name: [unfilled] [] : : ~~ [Today's Date:] : [unfilled] [Dear  ___:] : Dear Dr. [unfilled]: [Consult] : I had the pleasure of evaluating your patient, [unfilled]. My full evaluation follows. [Consult - Single Provider] : Thank you very much for allowing me to participate in the care of this patient. If you have any questions, please do not hesitate to contact me. [Sincerely,] : Sincerely, [FreeTextEntry4] : Jose Luis Rudolph [FreeTextEntry5] : [address] [place] [address] [de-identified] :  \par \par Issa Christensen MD, FACC, FHRS, FAAP\par Associate Chief, Pediatric Cardiology\par , Pediatric Cardiac Electrophysiology\par [facility]\par [facility]\par Professor of Pediatrics\par [employer]\par \par

## 2022-01-26 NOTE — HISTORY OF PRESENT ILLNESS
[FreeTextEntry1] : It was a pleasure to see Jimbo Myers today in the EP clinic for the follow up of pacemaker. 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 He underwent placement of dual-chamber epicardial leads for AV sequential pacing and antitachycardia functionality for sick sinus node syndrome with AV mihir disease, tachybradycardia syndrome and IART. \par He developed features of failing Fontan in 2016- cardiac cath revealed LV dysfunction with elevated PVR and mild fontan obstruction; a stent was placed at the site of Fontan stenosis, pulmonary vasodilators and CHF meds were initiated and cardiac resynchronization therapy with placement of additional ventricular lead led to an excellent clinical response and he was noted to be doing well until June, 2020 he presented to the ER with syncope. CT head was performed and was normal, however CXR revealed a likely RV fracture and upon device interrogation the RV lead impedance did increase. He has previously been on coumadin for anti-coagulation but it was changed to Rivoraxiban by Hematology. \par \par He was admitted to PICU in Sept, 2020 after being seen in the clinic for A. fib that required cardioversion. He was last seen in the clinic on Dec 2020 when he was doing well. Today he denies any symptoms of chest pain, palpitations, dizziness or syncope. He has recently started lifting weights. He lifts about 50 lbs in each hand, he does it twice a day (for 2 hrs) and has not had any limitations with exercise. He says that he is compliant with his medications. He continues on Sotalol 120 mg twice a day and Xarelto. \par \par SInce his last visit on 7/29/2021, Jim[place] had [place] [facility] [place]. He underwent cardioversion and developed a head bleed which was surgically evacuated and a  shunt placed. After a long hospitalization, Jimbo has made an excellent recovery with minimal residual weakness on his right side. He is now taking eliquis for his atrial arrhythmia  and Mexiletine for ventricular tachycardia that developed during his hospitalization. He has finished his PT/OT and states that he does not do any physical activity.  He and his mother states that he is tired for most of the day, and that his memory is very poor.  \par \par He was seen in the ED yesterday [place] [facility] [place] having a seizure at home.  They were told that his  shunt is working well and he remains on Keppra for his seizure control.

## 2022-01-26 NOTE — REASON FOR VISIT
[Follow-Up] : a follow-up visit for [Mother] : mother [Atrial Fibrillation] : atrial fibrillation [Third Degree A-V Block] : third degree AV block

## 2022-01-26 NOTE — DISCUSSION/SUMMARY
[Needs SBE Prophylaxis] : [unfilled]  needs bacterial endocarditis prophylaxis. SBE prophylaxis is indicated for dental and invasive ENT procedures. (Circulation. 2007; 116: 4270-2040) [PE + No Varsity Sports or Strenuous Activity] : [unfilled] may participate in the physical education program, WITH RESTRICTION from all varsity sports and from excessively stressful activities such as rope climbing, weight lifting, sustained running (i.e. laps) and fitness testing. Must be allowed to rest when tired. [FreeTextEntry1] : In summary Jimbo is a 21 year old with history of DILV, L-malposition of the great arteries status post lateral tunnel Fontan with sick sinus node syndrome, AV block with tachybradycardia syndrome (and IART), s/p dual chamber pacemaker and CRT system who is seen today for follow up. He has an RV lead fracture and is only LV paced now. His device today is reading that there is only 3 months left on his battery.  After discussion with the mother about future planning for Jimbo's care we would like to arrange a Telehealth visit between myself and Dr. Londono to answer questions and have a further discussion.  At this point we would recommend that he has a generator change without changing or adding any new leads based off of his most recent ECHO findings.  We recommend that he has another ECHO closer to his surgical date to assess his function to continue with this plan of action.  We will set his device to send transmission every month from now until the replacement of his device.

## 2022-01-26 NOTE — END OF VISIT
[Time Spent: ___ minutes] : I have spent [unfilled] minutes of time on the encounter. [FreeTextEntry3] : I, Dr. Christensen, personally performed the evaluation and management (E/M) services for this established patient who presents today. That E/M includes conducting the examination, assessing all new/exacerbated conditions. reassessing pre-existing conditions, and establishing a new or updated plan of care. Today, the RN documented the Chief Complaint, source of information and performed med and allergy reconciliation with or without education.  The timing listed under billing is the time I personally spent reviewing material, evaluating the patient, discussing management issues, coordinating services, and making documentation.\par \par I, Dr. Christensen, personally performed the evaluation and management (E/M) services for this established patient who presents today. That E/M includes conducting the examination, assessing all new/exacerbated conditions. reassessing pre-existing conditions, and establishing a new or updated plan of care. Today, my ACP, Yaw Whitfield, was here to perform the pacemaker or ICD interrogation under my supervision.  The timing listed under billing is the time I personally spent reviewing material, evaluating the patient, discussing management issues, coordinating services, and making documentation.\par

## 2022-02-28 ENCOUNTER — APPOINTMENT (OUTPATIENT)
Dept: PEDIATRIC NEUROLOGY | Facility: CLINIC | Age: 22
End: 2022-02-28
Payer: MEDICAID

## 2022-02-28 ENCOUNTER — LABORATORY RESULT (OUTPATIENT)
Age: 22
End: 2022-02-28

## 2022-02-28 VITALS — HEART RATE: 99 BPM | SYSTOLIC BLOOD PRESSURE: 116 MMHG | DIASTOLIC BLOOD PRESSURE: 74 MMHG | WEIGHT: 146 LBS

## 2022-02-28 PROCEDURE — 99205 OFFICE O/P NEW HI 60 MIN: CPT

## 2022-02-28 RX ORDER — DIAZEPAM 10 MG/100UL
10 SPRAY NASAL
Qty: 2 | Refills: 0 | Status: ACTIVE | COMMUNITY
Start: 2022-02-28 | End: 1900-01-01

## 2022-03-01 LAB
ALBUMIN SERPL ELPH-MCNC: 5 G/DL
ALP BLD-CCNC: 191 U/L
ALT SERPL-CCNC: 33 U/L
ANION GAP SERPL CALC-SCNC: 15 MMOL/L
AST SERPL-CCNC: 23 U/L
BASOPHILS # BLD AUTO: 0.12 K/UL
BASOPHILS NFR BLD AUTO: 1.7 %
BILIRUB SERPL-MCNC: 0.6 MG/DL
BUN SERPL-MCNC: 15 MG/DL
CALCIUM SERPL-MCNC: 9.9 MG/DL
CHLORIDE SERPL-SCNC: 104 MMOL/L
CO2 SERPL-SCNC: 20 MMOL/L
CREAT SERPL-MCNC: 0.82 MG/DL
EGFR: 129 ML/MIN/1.73M2
EOSINOPHIL # BLD AUTO: 0.13 K/UL
EOSINOPHIL NFR BLD AUTO: 1.8 %
GLUCOSE SERPL-MCNC: 86 MG/DL
HCT VFR BLD CALC: 45.5 %
HGB BLD-MCNC: 14.5 G/DL
LYMPHOCYTES # BLD AUTO: 1.85 K/UL
LYMPHOCYTES NFR BLD AUTO: 26.1 %
MAN DIFF?: NORMAL
MCHC RBC-ENTMCNC: 25.7 PG
MCHC RBC-ENTMCNC: 31.9 GM/DL
MCV RBC AUTO: 80.5 FL
MONOCYTES # BLD AUTO: 0.62 K/UL
MONOCYTES NFR BLD AUTO: 8.7 %
NEUTROPHILS # BLD AUTO: 4.01 K/UL
NEUTROPHILS NFR BLD AUTO: 56.5 %
PLATELET # BLD AUTO: 173 K/UL
POTASSIUM SERPL-SCNC: 4.4 MMOL/L
PROT SERPL-MCNC: 7.9 G/DL
RBC # BLD: 5.65 M/UL
RBC # FLD: 14.9 %
SODIUM SERPL-SCNC: 138 MMOL/L
WBC # FLD AUTO: 7.09 K/UL

## 2022-03-02 NOTE — REASON FOR VISIT
[Initial Consultation] : an initial consultation for [Seizure] : seizure [Patient] : patient [Mother] : mother

## 2022-03-03 NOTE — PHYSICAL EXAM
[Normocephalic] : normocephalic [Well-appearing] : well-appearing [No dysmorphic facial features] : no dysmorphic facial features [Neck supple] : neck supple [No ocular abnormalities] : no ocular abnormalities [No abnormal neurocutaneous stigmata or skin lesions] : no abnormal neurocutaneous stigmata or skin lesions [Straight] : straight [No deformities] : no deformities [Alert] : alert [Well related, good eye contact] : well related, good eye contact [Conversant] : conversant [Normal speech and language] : normal speech and language [Follows instructions well] : follows instructions well [VFF] : VFF [Pupils reactive to light and accommodation] : pupils reactive to light and accommodation [No nystagmus] : no nystagmus [Full extraocular movements] : full extraocular movements [Normal facial sensation to light touch] : normal facial sensation to light touch [No facial asymmetry or weakness] : no facial asymmetry or weakness [Gross hearing intact] : gross hearing intact [Equal palate elevation] : equal palate elevation [Good shoulder shrug] : good shoulder shrug [Normal tongue movement] : normal tongue movement [Midline tongue, no fasciculations] : midline tongue, no fasciculations [Normal axial and appendicular muscle tone] : normal axial and appendicular muscle tone [Gets up on table without difficulty] : gets up on table without difficulty [No pronator drift] : no pronator drift [Normal finger tapping and fine finger movements] : normal finger tapping and fine finger movements [No abnormal involuntary movements] : no abnormal involuntary movements [Walks and runs well] : walks and runs well [Able to do deep knee bend] : able to do deep knee bend [Able to walk on heels] : able to walk on heels [Able to walk on toes] : able to walk on toes [2+ biceps] : 2+ biceps [Knee jerks] : knee jerks [Ankle jerks] : ankle jerks [No ankle clonus] : no ankle clonus [Localizes LT and temperature] : localizes LT and temperature [No dysmetria on FTNT] : no dysmetria on FTNT [Good walking balance] : good walking balance [Normal gait] : normal gait [Able to tandem well] : able to tandem well [Negative Romberg] : negative Romberg [Bilaterally] : bilaterally [de-identified] : No respiratory distress [de-identified] : No abdominal distension [de-identified] : 4+/5 right upper extremity // 5/5 bilateral left upper extremity and bilateral lower extremities // No pronator drift, no arm circumduction noted.

## 2022-03-03 NOTE — CONSULT LETTER
[Dear  ___] : Dear  [unfilled], [Courtesy Letter:] : I had the pleasure of seeing your patient, [unfilled], in my office today. [Please see my note below.] : Please see my note below. [Consult Closing:] : Thank you very much for allowing me to participate in the care of this patient.  If you have any questions, please do not hesitate to contact me. [Sincerely,] : Sincerely, [FreeTextEntry3] : Dr MABEL Basilio\par Child Neurology resident\par

## 2022-03-03 NOTE — REVIEW OF SYSTEMS
[Seizure] : seizures [Normal] : Psychiatric [Fainting] : no fainting [Headache] : no headache [FreeTextEntry8] : Mood changes + //

## 2022-03-03 NOTE — PLAN
[FreeTextEntry1] : - Routine EEG\par - Keppra ER 750mg twice daily\par - Obtain Keppra level\par - will consider vimpat in case seizure control is poor\par - asked mother to follow with cardiology to document normal SC interval\par - Follow up in 8 weeks with Dr Garcia via telehealth

## 2022-03-03 NOTE — ASSESSMENT
[FreeTextEntry1] : 21 year old right handed young man with extensive cardiac history double inlet left ventricle, L-malposition of the great arteries status post multiple surgeries, sick sinus node syndrome s/p pacemaker, hemorrhagic stroke s/p left Craniectomy, EVD placement 6/1/21, cranioplasty and VPS placement 6/18/21 and history of having had 2 lifetime seizures (on Keppra) here for initial evaluation. Patient has been on Keppra since he sustained a stroke. \par

## 2022-03-03 NOTE — HISTORY OF PRESENT ILLNESS
[FreeTextEntry1] : 21 year old right handed young man with extensive cardiac history double inlet left ventricle, L-malposition of the great arteries status post multiple surgeries, sick sinus node syndrome s/p pacemaker, hemorrhagic stroke s/p left Craniectomy, EVD placement 6/1/21, cranioplasty and VPS placement 6/18/21 and history of having had 2 lifetime seizures (on Keppra) here for initial evaluation.\par \par Review of cardiac history: Per mom, patient was admitted in PICU in June 2021 s/p cardiac catheterization and ablation. Per mother patient reportedly needed pacemaker and cardioversion. Few hours later, patient had an acute hemorrhagic stroke in left frontal lobe in ROCCO, MCA territory for which he needed left craniotomy, an EVD placement and eventually  shunt\par \par Seizure history: Patient has had 2 lifetime seizures so far. First event happened in October 2021. Apparently at 7 pm on 10.23.2021, patient was at the dining table when Mom noticed that he began banging his right arm on the table and became responsive. His eyes rolled up, he became cyanotic and his right arm started shaking. The episode lasted 5 minutes. There was no incontinence, no tongue biting. EMS was called and patient was brought to [facility] [place] [facility] ED. No MRI scan because he has a pacemaker.\par \par 2nd event happened on January 24, 2022 when he was eating breakfast when she became unresponsive, turned to the right and started having tonic clonic jerks lasting 10 minutes. Patient thereafter complained of headache and nausea. \par \par No staring events. He can sporadically say things impulsively and get angry. He does have some mood swings which has been present since the stroke. When he was a baby he had febrile seizures (8-9 episodes) and occurred between 4-6 years.\par \par Medications: Keppra 500mg twice daily\par \par Patient has a baseline weakness on right upper extremity. He has no difficulty with walking. He has been receiving physical therapy and is overall doing well. Patient has not complained of headache, nausea, dizziness.

## 2022-03-03 NOTE — DATA REVIEWED
[FreeTextEntry1] : Review of data:\par CT head (01.24.2022): Postoperative changes compatible the high left frontal craniectomy and mesh like cranioplasty. There is encephalomalacia and gliosis seen in the left frontal cortical subcortical region with expected dilatation of the left frontal horn again seen. The overall size and configuration of ventricles is same as before. Left frontal shunt catheter is seen and unchanged compared to previous exam.\par \par CT head (06.01.2021): High left frontal craniectomy with evacuation of a large portion of the previously noted left frontal parenchymal hemorrhage. There are scattered areas of remaining high attenuation identified in this region which is compatible with residual areas of acute parenchymal hemorrhage. Decrease mass effect on the anterior lateral ventricle is seen. Surrounding edema is again seen. There is decrease in size of the lateral ventricle is seen. Intraventricular hemorrhage again seen // Left frontal shunt catheter is now identified with the tip in the right posterior temporal/occipital region. This catheter appears to be transversing the right lateral ventricle.\par \par CT head (06.01.2021): Interval worsening of parenchymal hemorrhage within the left frontal lobe which now measures 5.7 x 4.3 cm in the axial plane, previously measuring 2.4 x 2.4 cm. There is worsening surrounding mass effect and worsening shift of the midline structures from left to right now measuring 5 mm. There is new intraventricular extension of hemorrhage via the left frontal horn. New mild hydrocephalus is seen.\par \par CT (06.01.2021): Brain: A focal wedge-shaped region of hemorrhage is noted in the left frontal lobe boundary zone region between the left ROCCO and MCA (2:22 and 6:2724). Regional cortical edema is questioned surrounding the hemorrhage. Blood products appear to be predominantly parenchymal, with a gyral pattern suggestion on sagittal views, with extravasation extending into the adjoining sulci consistent with subarachnoid extension. // No large mass, mass effect, or midline shift is seen. The midline structures are unremarkable. The brain demonstrates unremarkable morphology and volume for age.

## 2022-03-04 LAB — LEVETIRACETAM SERPL-MCNC: 55.1 UG/ML

## 2022-03-10 ENCOUNTER — APPOINTMENT (OUTPATIENT)
Dept: PEDIATRIC NEUROLOGY | Facility: CLINIC | Age: 22
End: 2022-03-10
Payer: MEDICAID

## 2022-03-10 PROCEDURE — 95816 EEG AWAKE AND DROWSY: CPT

## 2022-03-11 ENCOUNTER — RESULT REVIEW (OUTPATIENT)
Age: 22
End: 2022-03-11

## 2022-03-21 ENCOUNTER — APPOINTMENT (OUTPATIENT)
Dept: CT IMAGING | Facility: IMAGING CENTER | Age: 22
End: 2022-03-21
Payer: MEDICAID

## 2022-03-21 ENCOUNTER — OUTPATIENT (OUTPATIENT)
Dept: OUTPATIENT SERVICES | Facility: HOSPITAL | Age: 22
LOS: 1 days | End: 2022-03-21
Payer: MEDICAID

## 2022-03-21 DIAGNOSIS — Z98.890 OTHER SPECIFIED POSTPROCEDURAL STATES: Chronic | ICD-10-CM

## 2022-03-21 DIAGNOSIS — I44.30 UNSPECIFIED ATRIOVENTRICULAR BLOCK: ICD-10-CM

## 2022-03-21 DIAGNOSIS — Z95.818 PRESENCE OF OTHER CARDIAC IMPLANTS AND GRAFTS: Chronic | ICD-10-CM

## 2022-03-21 DIAGNOSIS — Z00.8 ENCOUNTER FOR OTHER GENERAL EXAMINATION: ICD-10-CM

## 2022-03-21 DIAGNOSIS — Z95.0 PRESENCE OF CARDIAC PACEMAKER: Chronic | ICD-10-CM

## 2022-03-21 PROCEDURE — 71250 CT THORAX DX C-: CPT | Mod: 26

## 2022-03-21 PROCEDURE — 71250 CT THORAX DX C-: CPT

## 2022-03-24 ENCOUNTER — APPOINTMENT (OUTPATIENT)
Dept: CARDIOTHORACIC SURGERY | Facility: CLINIC | Age: 22
End: 2022-03-24
Payer: MEDICAID

## 2022-03-24 ENCOUNTER — OUTPATIENT (OUTPATIENT)
Dept: OUTPATIENT SERVICES | Age: 22
LOS: 1 days | End: 2022-03-24

## 2022-03-24 VITALS
TEMPERATURE: 98 F | WEIGHT: 148.15 LBS | RESPIRATION RATE: 17 BRPM | SYSTOLIC BLOOD PRESSURE: 108 MMHG | HEIGHT: 65.71 IN | DIASTOLIC BLOOD PRESSURE: 67 MMHG | HEART RATE: 81 BPM | OXYGEN SATURATION: 91 %

## 2022-03-24 DIAGNOSIS — D68.9 COAGULATION DEFECT, UNSPECIFIED: ICD-10-CM

## 2022-03-24 DIAGNOSIS — Q20.5 DISCORDANT ATRIOVENTRICULAR CONNECTION: ICD-10-CM

## 2022-03-24 DIAGNOSIS — Z98.890 OTHER SPECIFIED POSTPROCEDURAL STATES: Chronic | ICD-10-CM

## 2022-03-24 DIAGNOSIS — Q20.4 DOUBLE INLET VENTRICLE: ICD-10-CM

## 2022-03-24 DIAGNOSIS — Z95.0 PRESENCE OF CARDIAC PACEMAKER: Chronic | ICD-10-CM

## 2022-03-24 DIAGNOSIS — I27.2 OTHER SECONDARY PULMONARY HYPERTENSION: ICD-10-CM

## 2022-03-24 DIAGNOSIS — Z95.818 PRESENCE OF OTHER CARDIAC IMPLANTS AND GRAFTS: Chronic | ICD-10-CM

## 2022-03-24 LAB
ANION GAP SERPL CALC-SCNC: 14 MMOL/L — SIGNIFICANT CHANGE UP (ref 7–14)
BLD GP AB SCN SERPL QL: NEGATIVE — SIGNIFICANT CHANGE UP
BUN SERPL-MCNC: 17 MG/DL — SIGNIFICANT CHANGE UP (ref 7–23)
CALCIUM SERPL-MCNC: 10.3 MG/DL — SIGNIFICANT CHANGE UP (ref 8.4–10.5)
CHLORIDE SERPL-SCNC: 101 MMOL/L — SIGNIFICANT CHANGE UP (ref 98–107)
CO2 SERPL-SCNC: 22 MMOL/L — SIGNIFICANT CHANGE UP (ref 22–31)
CREAT SERPL-MCNC: 0.79 MG/DL — SIGNIFICANT CHANGE UP (ref 0.5–1.3)
EGFR: 130 ML/MIN/1.73M2 — SIGNIFICANT CHANGE UP
GLUCOSE SERPL-MCNC: 83 MG/DL — SIGNIFICANT CHANGE UP (ref 70–99)
HCT VFR BLD CALC: 45.5 % — SIGNIFICANT CHANGE UP (ref 39–50)
HGB BLD-MCNC: 14.9 G/DL — SIGNIFICANT CHANGE UP (ref 13–17)
MCHC RBC-ENTMCNC: 26.3 PG — LOW (ref 27–34)
MCHC RBC-ENTMCNC: 32.7 GM/DL — SIGNIFICANT CHANGE UP (ref 32–36)
MCV RBC AUTO: 80.2 FL — SIGNIFICANT CHANGE UP (ref 80–100)
NRBC # BLD: 0 /100 WBCS — SIGNIFICANT CHANGE UP
NRBC # FLD: 0 K/UL — SIGNIFICANT CHANGE UP
PLATELET # BLD AUTO: 152 K/UL — SIGNIFICANT CHANGE UP (ref 150–400)
POTASSIUM SERPL-MCNC: 4.6 MMOL/L — SIGNIFICANT CHANGE UP (ref 3.5–5.3)
POTASSIUM SERPL-SCNC: 4.6 MMOL/L — SIGNIFICANT CHANGE UP (ref 3.5–5.3)
RBC # BLD: 5.67 M/UL — SIGNIFICANT CHANGE UP (ref 4.2–5.8)
RBC # FLD: 15 % — HIGH (ref 10.3–14.5)
RH IG SCN BLD-IMP: NEGATIVE — SIGNIFICANT CHANGE UP
SODIUM SERPL-SCNC: 137 MMOL/L — SIGNIFICANT CHANGE UP (ref 135–145)
WBC # BLD: 6.78 K/UL — SIGNIFICANT CHANGE UP (ref 3.8–10.5)
WBC # FLD AUTO: 6.78 K/UL — SIGNIFICANT CHANGE UP (ref 3.8–10.5)

## 2022-03-24 PROCEDURE — 99204 OFFICE O/P NEW MOD 45 MIN: CPT

## 2022-03-24 RX ORDER — APIXABAN 2.5 MG/1
1 TABLET, FILM COATED ORAL
Qty: 0 | Refills: 0 | DISCHARGE

## 2022-03-24 RX ORDER — FUROSEMIDE 40 MG
1 TABLET ORAL
Qty: 0 | Refills: 0 | DISCHARGE

## 2022-03-24 RX ORDER — MUPIROCIN 20 MG/G
1 OINTMENT TOPICAL ONCE
Refills: 0 | Status: DISCONTINUED | OUTPATIENT
Start: 2022-03-24 | End: 2022-03-27

## 2022-03-24 RX ORDER — LEVETIRACETAM 250 MG/1
1 TABLET, FILM COATED ORAL
Qty: 0 | Refills: 0 | DISCHARGE

## 2022-03-24 NOTE — H&P PST ADULT - HEMATOLOGY/LYMPHATICS COMMENTS
Follows with hematology, maintained on Xarelto to prevent any thromboembolism/ stroke associated with underlying cardiac disorder. Follows with hematology, maintained on Eliquis since discharge from hospital s/p stroke.  Denies any change in doseages.  As per MOC, pt was instructed to stop Eliquis 24hrs prior to DOS and resume 24hrs s/p procedure. Follows with hematology, maintained on Eliquis since discharge from hospital s/p hemorrhagic stroke.  Denies any change in doseages.  As per MOC, pt was instructed to stop Eliquis 24hrs prior to DOS and resume 24hrs s/p procedure.

## 2022-03-24 NOTE — H&P PST ADULT - NSICDXPASTMEDICALHX_GEN_ALL_CORE_FT
PAST MEDICAL HISTORY:  Atrial tachycardia     AV block     Coagulopathy     D-TGA (dextro-transposition of great arteries)     Double inlet left ventricle     Hemorrhagic stroke Dec 2021    Hepatomegaly     Other ascites     Pacemaker     Pulmonary hypertension     Sick sinus syndrome      PAST MEDICAL HISTORY:  Atrial tachycardia     AV block     Coagulopathy     D-TGA (dextro-transposition of great arteries)     Double inlet left ventricle     Hemorrhagic stroke June 2021    Hepatomegaly     Other ascites     Pacemaker     Pulmonary hypertension     Sick sinus syndrome

## 2022-03-24 NOTE — H&P PST ADULT - ASSESSMENT
Pt appears well.  No evidence of acute illness or infection.  CBC, BMP, T&S sent as indicated  COVID testing to be completed on 3/28/22.  CHG wipes provided with verbal and written instruction  Chest X-ray not needed as per Mela Mendoza NP  MRSA swab sent, instructions given to patient and parent for Mupirocin use  Child life prep during our visit.  Instructed to notify PCP and surgeon if s/s of infection develop prior to procedure.

## 2022-03-24 NOTE — H&P PST ADULT - PROBLEM SELECTOR PLAN 2
As per MOC, pt was instructed to hold Eliquis 24hrs prior to procedure and resume 24hrs s/p procedure.    E-mail sent to Dr. Sosa to confirm

## 2022-03-24 NOTE — H&P PST ADULT - ANESTHESIA, PREVIOUS REACTION, PROFILE
reports waking up during most recent HOLA/none reports waking up during most recent HOLA as well as during previous procedures.  States he was able to hear and feel everything that was occurring.  Pt had a lot of anxiety surrounding this issue./none

## 2022-03-24 NOTE — H&P PST ADULT - HISTORY OF PRESENT ILLNESS
20yo M w/ extensive cardiac history double inlet left ventricle, L-malposition of the great arteries status post multiple surgeries, sick sinus node syndrome s/p pacemaker placement, hemorrhagic stroke s/p left craniectomy, EVD placement on 6/1/21, cranioplasty and VPS placement on 6/18/21, seizures x 2, most recently Jan 2022 currently maintained on Keppra.     Pt was admitted to PICU in June 2021 s/p cardiac catheterization and ablation and MOC states 2 hrs post procedure pt had hemorrhagic stroke in left frontal lobe in ROCCO, MCA territory for which he needed an emergent left craniotomy, and EVD placement and eventually  shunt.

## 2022-03-24 NOTE — H&P PST ADULT - OCCUPATION
Attending college, currently online d/t the pandemic, studying business Currently had to dis enroll from college d/t stroke Currently had to disenroll from college d/t stroke

## 2022-03-24 NOTE — H&P PST ADULT - NS MD HP INPLANTS MED DEV
----- Message from Reji Lezama sent at 7/16/2019  8:29 AM CDT -----  Contact: self [phone]  Pt would like to reschedule appt.  Please call back at [phone].  Md Connor   Pacemaker

## 2022-03-24 NOTE — H&P PST ADULT - NOTES
FHx: Mother: PCOS, Father: Healthy, Sister (7yo): Healthy. No family history of anesthesia complications or prolonged bleeding. All vaccines reportedly UTD. No vaccine in past 2 weeks, educated parent on avoiding any vaccines until 3 days after surgery. No recent travel in the past few months in or outside of the [place]. No known exposure to anyone with Covid-19 virus.

## 2022-03-24 NOTE — H&P PST ADULT - REASON FOR ADMISSION
Pt presents to PST for pre-surgical evaluation prior to pacemaker generator change, lead replacement on 3/30/22 with Dr. Pantoja at [facility].

## 2022-03-24 NOTE — H&P PST ADULT - VENOUS THROMBOEMBOLISM HISTORY
Received call from Zehra Grider at [facility] with [employer]. Subjective: Caller states \"had spinal surgery last year and had pain in right arm and hand. Right arm is heavy and painful.  is weak. Tried PT. Had EMG on right hand last week\"     Current Symptoms: right hand pain and weakness    Onset: 10 months ago; worsening    Associated Symptoms: NA    Pain Severity: 9/10; throbbing, shooting, piercing; constant    Temperature:denies fever    What has been tried: Ibuprofen, Lyrica, Cymbalta    LMP: NA Pregnant: No    Recommended disposition: see in office within 4 hrs or go to [address] [place] [address] advice provided, patient verbalizes understanding; denies any other questions or concerns; instructed to call back for any new or worsening symptoms. Patient/Caller agrees with recommended disposition; writer provided warm transfer to [facility] at [facility] for appointment scheduling     Attention Provider: Thank you for allowing me to participate in the care of your patient. The patient was connected to triage in response to information provided to the ECC/PSC. Please do not respond through this encounter as the response is not directed to a shared pool.     Reminders:     Call [address] Provider/Office    Care Advice - both instruction and documentation    Routing - PCP (and NP for 2nd level triage)    PLEASE DELETE ALL RED TEXT PRIOR TO SIGNING NOTE      Reason for Disposition   SEVERE pain (e.g., excruciating, unable to do any normal activities)    Protocols used: ARM PAIN-ADULT-OH (0) indicator not present

## 2022-03-24 NOTE — H&P PST ADULT - OTHER CARE PROVIDERS
Dr. Christensen (EP), Dr. Londono (Cards), Dr. Sosa (Heme) Dr. Christensen (EP), Dr. Londono (Cards), Dr. Sosa (Heme), Dr. Russo (neurosurgery); Dr. Garcia (neurologist)

## 2022-03-24 NOTE — H&P PST ADULT - NEUROLOGICAL COMMENTS
h/o febrile seizures when younger h/o febrile seizures when younger.  Pt s/p hemorrhagic stroke in June 2021 s/p ablation in May 2021.  Pt was brought to OR emergent for craniectomy then eventually  shunt placement 2 weeks later on 6/18/21.  Pt now experiencing infrequent seizures, approx 3 since surgery, most recently Jan 24, 2022.  Recently changed Keppra to 750mg ER BID. Pt s/p hemorrhagic stroke in June 2021 s/p ablation in May 2021.  Pt was brought to OR emergent for craniectomy then eventually  shunt placement 2 weeks later on 6/18/21.  Pt now experiencing infrequent seizures, approx 3 since surgery, most recently Jan 24, 2022.  Recently changed Keppra to 750mg ER BID.  H/o febrile seizures when younger. Pt s/p hemorrhagic stroke in June 2021 s/p ablation in May 2021.  Pt was brought to OR emergent for craniectomy then eventually  shunt placement 2 weeks later on 6/18/21.  Pt now experiencing infrequent seizures, approx 3 since surgery, most recently Jan 24, 2022.  Recently changed Keppra to 750mg ER BID.  H/o febrile seizures when younger.  Pt admits to memory loss since stroke, denies any weakness to extremities x4

## 2022-03-24 NOTE — H&P PST ADULT - PROBLEM SELECTOR PLAN 1
Pt scheduled for pacemaker generator change, lead replacement on 3/30/22 with Dr. Pantoja at [facility] [place] [facility].

## 2022-03-24 NOTE — H&P PST ADULT - NSICDXPASTSURGICALHX_GEN_ALL_CORE_FT
PAST SURGICAL HISTORY:  Cardiac pacemaker 5/26/2004    S/P Nirmal-Taussig shunt Zsuqp-Srpo-Ockjlvv anastomosis with modified right BT shunt and creation of pulmonary atresia    S/P cardiac cath 2001-assessment of anatomy prior to 1st surgery  4/13/2004- coil emolization of collateral vessels  7/25/2011-balloon angioplasty of superior narrowing of Fontan circuit, balloon angioplasty of LPA, ASD device placed for closure of Fontan fenestraion  10/24/2016- Cath of Fontan    S/P celestine-Fontan operation 3/15/2003- Celestine-Fontan with ligation of BT shunt and left pulmonary artery plasty    Status post craniectomy Dec 2021 s/p hemorrhagic stroke     PAST SURGICAL HISTORY:  Cardiac pacemaker 5/26/2004    H/O cardiac catheterization EPS ablation, unsuccessful May 2021    S/P Nirmal-Taussig shunt Dqssi-Tmrg-Kcrepjp anastomosis with modified right BT shunt and creation of pulmonary atresia    S/P cardiac cath 2001-assessment of anatomy prior to 1st surgery  4/13/2004- coil emolization of collateral vessels  7/25/2011-balloon angioplasty of superior narrowing of Fontan circuit, balloon angioplasty of LPA, ASD device placed for closure of Fontan fenestraion  10/24/2016- Cath of Fontan    S/P celestine-Fontan operation 3/15/2003- Celestine-Fontan with ligation of BT shunt and left pulmonary artery plasty    Status post craniectomy Emergent in June 2021 s/p hemorrhagic stroke

## 2022-03-24 NOTE — H&P PST ADULT - PROBLEM SELECTOR PLAN 3
Pt scheduled for pacemaker generator change, lead replacement on 3/30/22 with [place]ja [place] [facility]

## 2022-03-24 NOTE — H&P PST ADULT - CARDIOVASCULAR COMMENTS
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 He has an RV lead fracture and is only LV paced. Now scheduled for bilateral heart cath EPS ablation and HOLA with Dr. Christensen on 5/24/2021. Please see attached cardiology for further details. well healed MS incision See HPI.  Pt currently has an RV lead fracture and is only LV paced now.  As of Jan 2022, his device is reading that there is only 3 months left on his battery.  As per MOC, child is currently on the heart transplant list.  He denies any worsening cardiac s/s.  MOC states pulse ox ranges from 85-95%. multiple well healed MS incisions

## 2022-03-25 LAB
MRSA PCR RESULT.: SIGNIFICANT CHANGE UP
S AUREUS DNA NOSE QL NAA+PROBE: SIGNIFICANT CHANGE UP

## 2022-03-25 NOTE — ASSESSMENT
[FreeTextEntry1] : We will replace Jimbo's RUQ generator and attempt to gain safe access to the ventricle using a subxyphoid approach.  If successful, a new RV lead will be placed.

## 2022-03-25 NOTE — HISTORY OF PRESENT ILLNESS
[FreeTextEntry1] : 21M hx DILV s/p fenestrated Fontan with epicardial ppm, ventricular dysfunction.  RV lead fracture with generator at EOL.

## 2022-03-30 ENCOUNTER — NON-APPOINTMENT (OUTPATIENT)
Age: 22
End: 2022-03-30

## 2022-03-30 ENCOUNTER — INPATIENT (INPATIENT)
Age: 22
LOS: 0 days | Discharge: HOME | End: 2022-03-30
Attending: THORACIC SURGERY (CARDIOTHORACIC VASCULAR SURGERY) | Admitting: THORACIC SURGERY (CARDIOTHORACIC VASCULAR SURGERY)
Payer: MEDICAID

## 2022-03-30 ENCOUNTER — TRANSCRIPTION ENCOUNTER (OUTPATIENT)
Age: 22
End: 2022-03-30

## 2022-03-30 VITALS
OXYGEN SATURATION: 90 % | SYSTOLIC BLOOD PRESSURE: 124 MMHG | RESPIRATION RATE: 18 BRPM | HEART RATE: 71 BPM | TEMPERATURE: 98 F | DIASTOLIC BLOOD PRESSURE: 80 MMHG

## 2022-03-30 VITALS
DIASTOLIC BLOOD PRESSURE: 55 MMHG | RESPIRATION RATE: 18 BRPM | HEIGHT: 65.71 IN | HEART RATE: 70 BPM | WEIGHT: 138.23 LBS | TEMPERATURE: 98 F | SYSTOLIC BLOOD PRESSURE: 105 MMHG | OXYGEN SATURATION: 89 %

## 2022-03-30 DIAGNOSIS — Z95.0 PRESENCE OF CARDIAC PACEMAKER: Chronic | ICD-10-CM

## 2022-03-30 DIAGNOSIS — Z98.890 OTHER SPECIFIED POSTPROCEDURAL STATES: Chronic | ICD-10-CM

## 2022-03-30 DIAGNOSIS — Z95.818 PRESENCE OF OTHER CARDIAC IMPLANTS AND GRAFTS: Chronic | ICD-10-CM

## 2022-03-30 DIAGNOSIS — Q20.4 DOUBLE INLET VENTRICLE: ICD-10-CM

## 2022-03-30 PROCEDURE — 33230 INSRT PULSE GEN W/DUAL LEADS: CPT

## 2022-03-30 DEVICE — ENVELOPE TYRX ABSORBABLE ANTIBACTERIAL MED: Type: IMPLANTABLE DEVICE | Status: FUNCTIONAL

## 2022-03-30 DEVICE — LIGATING CLIPS WECK HORIZON MEDIUM (BLUE) 24: Type: IMPLANTABLE DEVICE | Status: FUNCTIONAL

## 2022-03-30 DEVICE — IMPLANTABLE DEVICE: Type: IMPLANTABLE DEVICE | Status: FUNCTIONAL

## 2022-03-30 RX ORDER — SPIRONOLACTONE 25 MG/1
100 TABLET, FILM COATED ORAL ONCE
Refills: 0 | Status: DISCONTINUED | OUTPATIENT
Start: 2022-03-30 | End: 2022-03-30

## 2022-03-30 RX ORDER — LEVETIRACETAM 250 MG/1
750 TABLET, FILM COATED ORAL ONCE
Refills: 0 | Status: DISCONTINUED | OUTPATIENT
Start: 2022-03-30 | End: 2022-03-30

## 2022-03-30 RX ORDER — FUROSEMIDE 40 MG
20 TABLET ORAL DAILY
Refills: 0 | Status: DISCONTINUED | OUTPATIENT
Start: 2022-03-30 | End: 2022-03-30

## 2022-03-30 RX ORDER — LEVETIRACETAM 250 MG/1
750 TABLET, FILM COATED ORAL
Refills: 0 | Status: DISCONTINUED | OUTPATIENT
Start: 2022-03-30 | End: 2022-03-30

## 2022-03-30 RX ORDER — ACETAMINOPHEN 500 MG
2 TABLET ORAL
Qty: 0 | Refills: 0 | DISCHARGE

## 2022-03-30 RX ORDER — OXYCODONE HYDROCHLORIDE 5 MG/1
1 TABLET ORAL
Qty: 8 | Refills: 0
Start: 2022-03-30 | End: 2022-03-31

## 2022-03-30 RX ORDER — ONDANSETRON 8 MG/1
4 TABLET, FILM COATED ORAL ONCE
Refills: 0 | Status: DISCONTINUED | OUTPATIENT
Start: 2022-03-30 | End: 2022-03-30

## 2022-03-30 RX ORDER — LISINOPRIL 2.5 MG/1
5 TABLET ORAL ONCE
Refills: 0 | Status: DISCONTINUED | OUTPATIENT
Start: 2022-03-30 | End: 2022-03-30

## 2022-03-30 RX ORDER — FENTANYL CITRATE 50 UG/ML
31 INJECTION INTRAVENOUS
Refills: 0 | Status: DISCONTINUED | OUTPATIENT
Start: 2022-03-30 | End: 2022-03-30

## 2022-03-30 RX ORDER — ACETAMINOPHEN 500 MG
650 TABLET ORAL EVERY 6 HOURS
Refills: 0 | Status: DISCONTINUED | OUTPATIENT
Start: 2022-03-30 | End: 2022-03-30

## 2022-03-30 NOTE — ASU PATIENT PROFILE, ADULT - FALL HARM RISK - UNIVERSAL INTERVENTIONS
Bed in lowest position, wheels locked, appropriate side rails in place/Call bell, personal items and telephone in reach/Instruct patient to call for assistance before getting out of bed or chair/Non-slip footwear when patient is out of bed/[place] to call system/Physically safe environment - no spills, clutter or unnecessary equipment/Purposeful Proactive Rounding/Room/bathroom lighting operational, light cord in reach

## 2022-03-30 NOTE — BRIEF OPERATIVE NOTE - NSICDXBRIEFPROCEDURE_GEN_ALL_CORE_FT
PROCEDURES:  Removal and replacement, dual lead pacemaker pulse generator 30-Mar-2022 09:43:33  Landen Apple

## 2022-03-30 NOTE — ASU DISCHARGE PLAN (ADULT/PEDIATRIC) - CONDITION AT DISCHARGE
Patient sitting up in wheelchair and states she is ready to go home. Denies pain,nausea or any weakness. Ice pack,instructions given for prn injection site pain. All belongings listed on pre-op check list returned to patient. Patient's spouse Jay at chairside and states he is ready to take the patient home and that he is driving.  
Stable

## 2022-04-01 NOTE — REVIEW OF SYSTEMS
Health Maintenance Due   Topic Date Due   • Hepatitis B Vaccine (1 of 3 - 3-dose primary series) Never done   • IPV Vaccine (1 of 4 - 4-dose series) Never done   • HIB Vaccine (1 of 4 - Standard series) Never done   • DTaP/Tdap/Td Vaccine (1 - DTaP) Never done   • Pneumococcal Vaccine 0-64 (1 of 4) Never done   • Well Child Exam 4 Months  03/25/2022       Patient is due for topics as listed above but is not proceeding with Immunization(s) Dtap/Tdap/Td, Hep B, HIB, IPV and Pneumococcal at this time. Parent refused all vaccines today.     [Feeling Poorly] : not feeling poorly (malaise) [Fever] : no fever [Wgt Loss (___ Lbs)] : no recent weight loss [Pallor] : not pale [Eye Discharge] : no eye discharge [Redness] : no redness [Change in Vision] : no change in vision [Nasal Stuffiness] : no nasal congestion [Sore Throat] : no sore throat [Earache] : no earache [Loss Of Hearing] : no hearing loss [Cyanosis] : no cyanosis [Edema] : no edema [Diaphoresis] : not diaphoretic [Chest Pain] : no chest pain or discomfort [Exercise Intolerance] : no persistence of exercise intolerance [Palpitations] : no palpitations [Orthopnea] : no orthopnea [Fast HR] : no tachycardia [Tachypnea] : not tachypneic [Wheezing] : no wheezing [Cough] : no cough [Shortness Of Breath] : not expressed as feeling short of breath [Vomiting] : no vomiting [Diarrhea] : no diarrhea [Abdominal Pain] : no abdominal pain [Decrease In Appetite] : appetite not decreased [Fainting (Syncope)] : no fainting [Seizure] : no seizures [Headache] : no headache [Dizziness] : no dizziness [Limping] : no limping [Joint Pains] : no arthralgias [Joint Swelling] : no joint swelling [Rash] : no rash [Wound problems] : no wound problems [Easy Bruising] : no tendency for easy bruising [Swollen Glands] : no lymphadenopathy [Easy Bleeding] : no ~M tendency for easy bleeding [Nosebleeds] : no epistaxis [Sleep Disturbances] : ~T no sleep disturbances [Hyperactive] : no hyperactive behavior [Depression] : no depression [Anxiety] : no anxiety [Failure To Thrive] : no failure to thrive [Short Stature] : short stature was not noted [Jitteriness] : no jitteriness [Heat/Cold Intolerance] : no temperature intolerance [Dec Urine Output] : no oliguria

## 2022-04-07 ENCOUNTER — APPOINTMENT (OUTPATIENT)
Dept: PEDIATRIC NEUROLOGY | Facility: CLINIC | Age: 22
End: 2022-04-07
Payer: MEDICAID

## 2022-04-07 ENCOUNTER — APPOINTMENT (OUTPATIENT)
Dept: CARDIOTHORACIC SURGERY | Facility: CLINIC | Age: 22
End: 2022-04-07
Payer: MEDICAID

## 2022-04-07 DIAGNOSIS — Q20.4 DOUBLE INLET VENTRICLE: ICD-10-CM

## 2022-04-07 PROBLEM — I61.9 NONTRAUMATIC INTRACEREBRAL HEMORRHAGE, UNSPECIFIED: Chronic | Status: ACTIVE | Noted: 2022-03-24

## 2022-04-07 PROCEDURE — 99214 OFFICE O/P EST MOD 30 MIN: CPT | Mod: 95

## 2022-04-07 PROCEDURE — 99024 POSTOP FOLLOW-UP VISIT: CPT

## 2022-04-07 RX ORDER — ASPIRIN 325 MG/1
325 TABLET, FILM COATED ORAL DAILY
Qty: 90 | Refills: 3 | Status: DISCONTINUED | COMMUNITY
Start: 1900-01-01 | End: 2022-04-07

## 2022-04-07 RX ORDER — LEVETIRACETAM 500 MG/1
500 TABLET, FILM COATED ORAL TWICE DAILY
Refills: 0 | Status: DISCONTINUED | COMMUNITY
End: 2022-04-07

## 2022-04-12 NOTE — QUALITY MEASURES
[Seizure frequency] : Seizure frequency: Yes [Etiology, seizure type, and epilepsy syndrome] : Etiology, seizure type, and epilepsy syndrome: Yes [Side effects of anti-seizure medications] : Side effects of anti-seizure medications: Yes [Safety and education around seizures] : Safety and education around seizures: Yes [Issues around driving] : Issues around driving: Yes [Screening for anxiety, depression] : Screening for anxiety, depression: Yes [Treatment-resistant epilepsy (every visit)] : Treatment-resistant epilepsy (every visit): Not Applicable [Adherence to medication(s)] : Adherence to medication(s): Yes [Counseling for women of childbearing potential with epilepsy (including folic acid supplement)] : Counseling for women of childbearing potential with epilepsy (including folic acid supplement): Not Applicable [Options for adjunctive therapy (Neurostimulation, CBD, Dietary Therapy, Epilepsy Surgery)] : Options for adjunctive therapy (Neurostimulation, CBD, Dietary Therapy, Epilepsy Surgery): Not Applicable [25 Hydroxy Vitamin D level assessed and Vitamin D3 ordered] : 25 Hydroxy Vitamin D level assessed and Vitamin D3 ordered: Not Applicable [Thyroid profile ordered] : Thyroid profile ordered: Not Applicable

## 2022-04-12 NOTE — CONSULT LETTER
[Dear  ___] : Dear  [unfilled], [Courtesy Letter:] : I had the pleasure of seeing your patient, [unfilled], in my office today. [Please see my note below.] : Please see my note below. [Consult Closing:] : Thank you very much for allowing me to participate in the care of this patient.  If you have any questions, please do not hesitate to contact me. [Sincerely,] : Sincerely, [FreeTextEntry3] : Georgina Garcia MD\par Director, Pediatric Epilepsy\par [facility]\par , Pediatric Neurology Residency Program\par ,\par [employer] [facility]\par [address] [place] [address], [facility] [address]\par [place] [address]\par Phone: [phone]\par Fax: [phone]\par \par

## 2022-04-12 NOTE — ASSESSMENT
[FreeTextEntry1] : 22 yo with complex congenital cardiac history, hemorrhagic stroke and FBTCS x2.\par He should remain on ASM till 2 years seizure free.

## 2022-04-12 NOTE — PHYSICAL EXAM
[Well-appearing] : well-appearing [Normocephalic] : normocephalic [No dysmorphic facial features] : no dysmorphic facial features [No deformities] : no deformities [Alert] : alert [Well related, good eye contact] : well related, good eye contact [Conversant] : conversant [Normal speech and language] : normal speech and language [Follows instructions well] : follows instructions well [Full extraocular movements] : full extraocular movements [No facial asymmetry or weakness] : no facial asymmetry or weakness [de-identified] : can not be assessed, Telehealth visit. [de-identified] : can not be assessed, Telehealth visit. [de-identified] : can not be assessed, Telehealth visit.

## 2022-04-12 NOTE — HISTORY OF PRESENT ILLNESS
[Home] : at home, [unfilled] , at the time of the visit. [Other Location: e.g. Home (Enter Location, City,State)___] : at [unfilled] [Mother] : mother [FreeTextEntry1] : Jimbo is a  22yo M w/ extensive cardiac history double inlet left ventricle, L-malposition of the great arteries status post multiple surgeries now palliated to a Fontan physiology s/p fenestration closure with sick sinus node syndrome s/p pacemaker placement, hemorrhagic stroke s/p left craniectomy, EVD placement on 6/1/21, cranioplasty and VPS placement on 6/18/21, seizures x 2, most recently Jan 2022 currently maintained on Keppra.has remained seizure free. He denies any side effects. He is doing well otherwise and no sleep issues or headaches. \par He underwent.removal and replacement of his dual lead pacemaker pulse generator in March 2022 without incident. \par

## 2022-06-09 RX ORDER — SPIRONOLACTONE 100 MG/1
100 TABLET ORAL DAILY
Qty: 30 | Refills: 5 | Status: ACTIVE | COMMUNITY
Start: 2021-07-29 | End: 1900-01-01

## 2022-06-09 RX ORDER — MEXILETINE HYDROCHLORIDE 150 MG/1
150 CAPSULE ORAL 3 TIMES DAILY
Qty: 90 | Refills: 5 | Status: ACTIVE | COMMUNITY
Start: 1900-01-01 | End: 1900-01-01

## 2022-09-27 ENCOUNTER — OUTPATIENT (OUTPATIENT)
Dept: OUTPATIENT SERVICES | Age: 22
LOS: 1 days | Discharge: HOME | End: 2022-09-27

## 2022-09-27 DIAGNOSIS — Z95.818 PRESENCE OF OTHER CARDIAC IMPLANTS AND GRAFTS: Chronic | ICD-10-CM

## 2022-09-27 DIAGNOSIS — Z98.890 OTHER SPECIFIED POSTPROCEDURAL STATES: Chronic | ICD-10-CM

## 2022-09-27 DIAGNOSIS — Z95.0 PRESENCE OF CARDIAC PACEMAKER: Chronic | ICD-10-CM

## 2022-09-28 ENCOUNTER — RESULT REVIEW (OUTPATIENT)
Age: 22
End: 2022-09-28

## 2022-09-28 ENCOUNTER — APPOINTMENT (OUTPATIENT)
Dept: PEDIATRIC HEMATOLOGY/ONCOLOGY | Facility: CLINIC | Age: 22
End: 2022-09-28

## 2022-09-28 VITALS
DIASTOLIC BLOOD PRESSURE: 71 MMHG | WEIGHT: 159.17 LBS | SYSTOLIC BLOOD PRESSURE: 114 MMHG | HEIGHT: 65.94 IN | OXYGEN SATURATION: 92 % | HEART RATE: 87 BPM | BODY MASS INDEX: 25.89 KG/M2 | TEMPERATURE: 97.88 F | RESPIRATION RATE: 22 BRPM

## 2022-09-28 DIAGNOSIS — Z95.0 PRESENCE OF CARDIAC PACEMAKER: ICD-10-CM

## 2022-09-28 DIAGNOSIS — Z87.898 PERSONAL HISTORY OF OTHER SPECIFIED CONDITIONS: ICD-10-CM

## 2022-09-28 DIAGNOSIS — I49.5 SICK SINUS SYNDROME: ICD-10-CM

## 2022-09-28 DIAGNOSIS — Z84.2 FAMILY HISTORY OF OTHER DISEASES OF THE GENITOURINARY SYSTEM: ICD-10-CM

## 2022-09-28 LAB
ALBUMIN SERPL ELPH-MCNC: 4.8 G/DL — SIGNIFICANT CHANGE UP (ref 3.3–5)
ALP SERPL-CCNC: 147 U/L — HIGH (ref 40–120)
ALT FLD-CCNC: 23 U/L — SIGNIFICANT CHANGE UP (ref 4–41)
ANION GAP SERPL CALC-SCNC: 13 MMOL/L — SIGNIFICANT CHANGE UP (ref 7–14)
AST SERPL-CCNC: 33 U/L — SIGNIFICANT CHANGE UP (ref 4–40)
BASOPHILS # BLD AUTO: 0.02 K/UL — SIGNIFICANT CHANGE UP (ref 0–0.2)
BASOPHILS NFR BLD AUTO: 0.3 % — SIGNIFICANT CHANGE UP (ref 0–2)
BILIRUB SERPL-MCNC: 1.1 MG/DL — SIGNIFICANT CHANGE UP (ref 0.2–1.2)
BUN SERPL-MCNC: 13 MG/DL — SIGNIFICANT CHANGE UP (ref 7–23)
CALCIUM SERPL-MCNC: 10.1 MG/DL — SIGNIFICANT CHANGE UP (ref 8.4–10.5)
CHLORIDE SERPL-SCNC: 106 MMOL/L — SIGNIFICANT CHANGE UP (ref 98–107)
CO2 SERPL-SCNC: 20 MMOL/L — LOW (ref 22–31)
CREAT SERPL-MCNC: 0.8 MG/DL — SIGNIFICANT CHANGE UP (ref 0.5–1.3)
D DIMER BLD IA.RAPID-MCNC: 1248 NG/ML DDU — HIGH
EGFR: 129 ML/MIN/1.73M2 — SIGNIFICANT CHANGE UP
EOSINOPHIL # BLD AUTO: 0.17 K/UL — SIGNIFICANT CHANGE UP (ref 0–0.5)
EOSINOPHIL NFR BLD AUTO: 2.9 % — SIGNIFICANT CHANGE UP (ref 0–6)
FIBRINOGEN PPP-MCNC: 433 MG/DL — SIGNIFICANT CHANGE UP (ref 330–520)
GLUCOSE SERPL-MCNC: 82 MG/DL — SIGNIFICANT CHANGE UP (ref 70–99)
HCT VFR BLD CALC: 47.3 % — SIGNIFICANT CHANGE UP (ref 39–50)
HGB BLD-MCNC: 15.5 G/DL — SIGNIFICANT CHANGE UP (ref 13–17)
IANC: 4.32 K/UL — SIGNIFICANT CHANGE UP (ref 1.8–7.4)
IMM GRANULOCYTES NFR BLD AUTO: 0.3 % — SIGNIFICANT CHANGE UP (ref 0–0.9)
LYMPHOCYTES # BLD AUTO: 0.96 K/UL — LOW (ref 1–3.3)
LYMPHOCYTES # BLD AUTO: 16.1 % — SIGNIFICANT CHANGE UP (ref 13–44)
MCHC RBC-ENTMCNC: 26.5 PG — LOW (ref 27–34)
MCHC RBC-ENTMCNC: 32.8 GM/DL — SIGNIFICANT CHANGE UP (ref 32–36)
MCV RBC AUTO: 80.7 FL — SIGNIFICANT CHANGE UP (ref 80–100)
MONOCYTES # BLD AUTO: 0.47 K/UL — SIGNIFICANT CHANGE UP (ref 0–0.9)
MONOCYTES NFR BLD AUTO: 7.9 % — SIGNIFICANT CHANGE UP (ref 2–14)
NEUTROPHILS # BLD AUTO: 4.32 K/UL — SIGNIFICANT CHANGE UP (ref 1.8–7.4)
NEUTROPHILS NFR BLD AUTO: 72.5 % — SIGNIFICANT CHANGE UP (ref 43–77)
NRBC # BLD: 0 /100 WBCS — SIGNIFICANT CHANGE UP (ref 0–0)
PLATELET # BLD AUTO: 142 K/UL — LOW (ref 150–400)
POTASSIUM SERPL-MCNC: 5.2 MMOL/L — SIGNIFICANT CHANGE UP (ref 3.5–5.3)
POTASSIUM SERPL-SCNC: 5.2 MMOL/L — SIGNIFICANT CHANGE UP (ref 3.5–5.3)
PROT SERPL-MCNC: 8 G/DL — SIGNIFICANT CHANGE UP (ref 6–8.3)
RBC # BLD: 5.86 M/UL — HIGH (ref 4.2–5.8)
RBC # BLD: 5.86 M/UL — HIGH (ref 4.2–5.8)
RBC # FLD: 14.7 % — HIGH (ref 10.3–14.5)
RETICS #: 79.1 K/UL — SIGNIFICANT CHANGE UP (ref 25–125)
RETICS/RBC NFR: 1.4 % — SIGNIFICANT CHANGE UP (ref 0.5–2.5)
SODIUM SERPL-SCNC: 139 MMOL/L — SIGNIFICANT CHANGE UP (ref 135–145)
WBC # BLD: 5.96 K/UL — SIGNIFICANT CHANGE UP (ref 3.8–10.5)
WBC # FLD AUTO: 5.96 K/UL — SIGNIFICANT CHANGE UP (ref 3.8–10.5)

## 2022-09-28 PROCEDURE — 99213 OFFICE O/P EST LOW 20 MIN: CPT

## 2022-09-29 DIAGNOSIS — I49.5 SICK SINUS SYNDROME: ICD-10-CM

## 2022-09-29 DIAGNOSIS — I61.9 NONTRAUMATIC INTRACEREBRAL HEMORRHAGE, UNSPECIFIED: ICD-10-CM

## 2022-09-29 DIAGNOSIS — Z84.2 FAMILY HISTORY OF OTHER DISEASES OF THE GENITOURINARY SYSTEM: ICD-10-CM

## 2022-09-29 DIAGNOSIS — Z95.0 PRESENCE OF CARDIAC PACEMAKER: ICD-10-CM

## 2022-09-29 DIAGNOSIS — Z87.898 PERSONAL HISTORY OF OTHER SPECIFIED CONDITIONS: ICD-10-CM

## 2022-09-29 DIAGNOSIS — R56.9 UNSPECIFIED CONVULSIONS: ICD-10-CM

## 2022-09-29 DIAGNOSIS — Q20.4 DOUBLE INLET VENTRICLE: ICD-10-CM

## 2022-09-29 DIAGNOSIS — Z98.890 OTHER SPECIFIED POSTPROCEDURAL STATES: ICD-10-CM

## 2022-09-29 DIAGNOSIS — Z79.01 LONG TERM (CURRENT) USE OF ANTICOAGULANTS: ICD-10-CM

## 2022-10-01 NOTE — HISTORY OF PRESENT ILLNESS
[de-identified] : 12/22/17 He failed Coumadin therapy; compliance confirmed; unable to attain therapeutic range with upper limits of therapy. Notified by Cardiology of current changes in cardiac history with notable asymptomatic atrial flutter requiring more aggressive anticoagulant therapy rather than current aspirin regime.\par 1/10/18 Patient is asymptomatic for chest pain and/or palpitations monitored closely by cardiology; con-medications reconciled--no changes. Remains on Xarelto (rivaroxaban) 15mg twice daily taken with meals as directed for 21 days; then dose will changed to maintenance 20mg once daily beginning on Saturday 1/13/18. Last dose 7:30am today. Denies any minor or major bleeding events.  Once starter kit completed, will require refill RX at local pharmacy.\par 6/27/18 Interval follow up for H&P with labs. Remains on Xarelto 20mg daily with dinner. Denies any bleeding signs. Reports demonstrated mild thrombocytopenia. Will follow up with labs today. \par 10/2/19 Remains on Xarelto 20mg once daily with meals; no missed doses. Currently freshman in college for Business major. Bleeding precautions and activity restrictions as per cardiology maintained. \par 7/8/2020: Jimbo states that he  has not been quite consistent with medication, has forgotten in the past. He bought a pill box 2 weeks ago and has since been compliant as per Jimbo and qing. No bleeding symptoms- did endorse that he bleeds easily from minor cuts, bleeding stops with light pressure. He finished first year college in [place], studying Marketing. \par 7/29/2021: Jimbo presents to the clinic for his yearly hematology follow up. He was hospitalized in June 2021 for cardiac cath and ablation. Ablation was unsuccessful and it was decided to Cardiovert him. \par Hospital course as follows:\par CV: On 5/27, attempted cardioversion through pacing, although was unsuccessful, and subsequently started medical cardioversion with amiodarone 300mg BID. Restarted on home medications including lisinopril, sildenafil, lasix, aldactone. IART rhythm was broken on 6/1 after cardioversion. However, he went back into IART later that night. Pacemaker was changed to VOO at 70 prior to neurosurgery. Amiodarone, aldactone, and lisinopril were held while critically ill. Epinephrine and vasopressin were utilized to maintain higher maps while EVD in place. On 6/8 he started having runs of ventricular tachycardia. Arrythmia resolved with lidocaine infusion and changing pacemaker mode to VVI. Transitioned to PO mexiletine on 6/13. Pacemaker transitioned back to VOO at 70. \par Resp: Re-intubated on 6/1 due to AMS associated with new intracranial bleed. Extubated to HFNC on 6/9. CTA chest on 6/10 showed numerous veno-venous anterior mediastinal collaterals. Weaned to RA on 6/16.\par Neuro: Four hours after cardioversion, he had an episode of staring and unresponsiveness. Head CT showed severe hemorrhagic stroke in left frontal lobe in the ROCCO-MCA watershed territory with extension to the intraventricular regions. He required urgent surgical decompressive craniectomy and hemorrhage evacuation with EVD placement overnight on 6/1. Serial head CTs showed a new hemorrhage along the EVD catheter tract on 6/6. EVD clamped on 6/10. VEEG placed on 6/10 due to eye deviation and seizure-like activity did not correlate with seizures. He did have a clinical seizure on 6/14 which broke with Ativan. Neurology re-consulted and recommended continuing Keppra at current dose. PM&R consulted for delirium/agitation, recommended starting propranolol for agitation. Delirium improved with propranolol and unclamping EVD. Taken to OR on 6/17 for EVD removal, cranioplasty, and VPS placement.  Repeat CT head 6/18 showed trace hemorrhage R lateral ventricle and interval placement of wire mesh cranioplasty. Repeat CT head on 6/19 and 6/20 was stable, repeat CT head on 6/25 was also stable prior to restarting anticoagulation. Propranolol was weaned per PM&R recs to 5mg q8h on 6/25 and off on 6/27.\par Heme: Given hemoptysis, hemoglobin was monitored and he did not require any transfusions. Hemoptysis resolved overnight 5/26-5/27. Home xarelto was held and restarted on 5/27. INR on 5/26 was 1.32, and it jumped to 4.94 after hemorrhage was identified. Cause for jump in INR is unclear. In the OR, xarelto reversals were given (Andexxa, KCentra), in addition to multiple units of FFP and blood products. Xarelto was stopped and he was received SubQ Vit K for 3 days. He was given multiple units of FFP for goal INR < 1.5 in order to prevent further bleeding. Hematology was consulted for persistently elevated INR. Thought to be possibly due to hepatic congestion secondary to elevated fontan pressures. INR improved w/ Vitamin K. Hypercoagulopathy workup was sent and is pending. Vitamin K PO 5mg 3x/wk started on 6/14. Will discuss with hematology duration of treatment. Pt was given FFP x1 for elevated INR 1.57 on 6/19. INR remained stable less than 1.5 and vit K 5mg PO 3x/wk was d/c'd on 6/23. INR continued to be trended daily which has been stable, slightly elevated at 1.39.\par \par 10/13/21: Jimbo is here for follow up and consideration of starting an anticoagulant given recent findings on cath conveyed to me by his cardiologist Dr Adonis Bradford. Since hospitalization for ICH after cardioversion in June while he was on rivaroxaban;  he was discharged home on  mg which does not seem to control his arrhythmia and he is at high risk for stroke ; here today to discuss anticoagulant options ; doing well, compliant with meds;  no complaints reported, no bleeding from any site; no new meds/ allergies to Meds \par \par 12/22/21: Jimbo is here for follow up after starting on apixaban 5 mg BID for stroke prevention given underlying h/o atrial fibrillation related to DORV s/p Fontan, sick sinus syndrome with pacemaker s/p recent cardioversion when he had an ICH ; currently stable on apixaban , doing well, no bleeding from any sites, no missed doses; 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

## 2022-10-01 NOTE — REVIEW OF SYSTEMS
[Murmur] : murmur [Negative] : Allergic/Immunologic [Fever] : no fever [Sweating] : no sweating [Fatigue] : no fatigue [Weakness] : no weakness [Ecchymoses] : no ecchymoses [Epistaxis] : no epistaxis [Sore Throat] : no sore throat [Pallor] : no pallor [Bleeding] : no bleeding [Bruising] : no bruising [Anemia] : no anemia [Frequent Infections] : no frequent infections [Dyspnea] : no dyspnea [Wheezing] : no wheezing [Stridor] : no stridor [Edema] : no edema [Palpitations] : no palpitations [Cyanosis] : no cyanosis [Abdominal Pain] : no abdominal pain [Constipation] : no constipation [FreeTextEntry2] : Asymptomatic atrial flutter pacemaker in place [FreeTextEntry5] : 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

## 2022-10-01 NOTE — PHYSICAL EXAM
[No focal deficits] : no focal deficits [Normal] : affect appropriate [100: Fully active, normal.] : 100: Fully active, normal. 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

## 2022-10-01 NOTE — CONSULT LETTER
[Dear  ___] : Dear  [unfilled], [Courtesy Letter:] : I had the pleasure of seeing your patient, [unfilled], in my office today. [Please see my note below.] : Please see my note below. [Consult Closing:] : Thank you very much for allowing me to participate in the care of this patient.  If you have any questions, please do not hesitate to contact me. [Sincerely,] : Sincerely, [DrAmarilys  ___] : Dr. WU [DrAmarilys ___] : Dr. WU [___] : [unfilled] [FreeTextEntry2] : Adonis Londono MD\par [facility]- Dept. of Pediatrics \par [address] Cardiology	\[place] [address]\par Tel: [phone]\par Fax: [phone] [FreeTextEntry3] : Palak Ordonez\par Pediatric Hematology\par Division of Hematology/Oncology and Stem Cell Transplantation\par [facility]\par [phone]\par \par

## 2022-10-21 ENCOUNTER — APPOINTMENT (OUTPATIENT)
Dept: PEDIATRIC CARDIOLOGY | Facility: CLINIC | Age: 22
End: 2022-10-21

## 2022-10-25 ENCOUNTER — APPOINTMENT (OUTPATIENT)
Dept: PEDIATRIC CARDIOLOGY | Facility: CLINIC | Age: 22
End: 2022-10-25

## 2022-10-25 ENCOUNTER — NON-APPOINTMENT (OUTPATIENT)
Age: 22
End: 2022-10-25

## 2022-10-25 VITALS
OXYGEN SATURATION: 89 % | HEART RATE: 87 BPM | SYSTOLIC BLOOD PRESSURE: 133 MMHG | DIASTOLIC BLOOD PRESSURE: 85 MMHG | WEIGHT: 155.65 LBS | BODY MASS INDEX: 24.14 KG/M2 | HEIGHT: 67.13 IN

## 2022-10-25 DIAGNOSIS — I44.30 UNSPECIFIED ATRIOVENTRICULAR BLOCK: ICD-10-CM

## 2022-10-25 DIAGNOSIS — I47.1 SUPRAVENTRICULAR TACHYCARDIA: ICD-10-CM

## 2022-10-25 PROCEDURE — 99213 OFFICE O/P EST LOW 20 MIN: CPT

## 2022-10-25 PROCEDURE — 93288 INTERROG EVL PM/LDLS PM IP: CPT

## 2022-10-25 NOTE — HISTORY OF PRESENT ILLNESS
[FreeTextEntry1] : It was a pleasure to see Jimbo Myers today in the EP clinic for the follow up of pacemaker. As you know, he is a 22 year old with single ventricle physiology (double-inlet left ventricle with L-malposition of the great arteries) who is status post Veocc-Qrne-Bpsynhw anastomosis and aortic arch reconstruction followed by a fenestrated lateral tunnel Fontan completion (now s/p fenestration closure). He underwent placement of dual-chamber epicardial leads for AV sequential pacing and antitachycardia functionality for sick sinus node syndrome with AV mihir disease, tachybradycardia syndrome and IART. \par He developed features of failing Fontan in 2016- cardiac cath revealed LV dysfunction with elevated PVR and mild fontan obstruction; a stent was placed at the site of Fontan stenosis, pulmonary vasodilators and CHF meds were initiated and cardiac resynchronization therapy with placement of additional ventricular lead led to an excellent clinical response and he was noted to be doing well until June, 2020 he presented to the ER with syncope. CT head was performed and was normal, however CXR revealed a likely RV fracture and upon device interrogation the RV lead impedance did increase. He has previously been on coumadin for anti-coagulation but it was changed to Rivoraxiban by Hematology. \par \par He was admitted to PICU in Sept, 2020 after being seen in the clinic for A. fib that required cardioversion. He was last seen in the clinic on Dec 2020 when he was doing well. Today he denies any symptoms of chest pain, palpitations, dizziness or syncope. He has recently started lifting weights. He lifts about 50 lbs in each hand, he does it twice a day (for 2 hrs) and has not had any limitations with exercise. He says that he is compliant with his medications. He continues on Sotalol 120 mg twice a day and Xarelto. \par \par SInce his last visit on 1/25/2022, Jimbo had his generator changed and has been doing well since his change.  He states he feels as though he is back to baseline with slight memory issues.  He has completed OT/PT and is looking for a job.

## 2022-10-25 NOTE — REASON FOR VISIT
[Follow-Up] : a follow-up visit for [S/P Cardiac Surgery] : status post cardiac surgery [S/P Catheterization] : status post catheterization [Atrial Flutter] : atrial flutter [Third Degree A-V Block] : third degree AV block [Family Member] : family member [Patient] : patient [FreeTextEntry3] : complex congenital heart disease

## 2022-10-25 NOTE — CONSULT LETTER
[Today's Date] : [unfilled] [Name] : Name: [unfilled] [] : : ~~ [Today's Date:] : [unfilled] [Dear  ___:] : Dear Dr. [unfilled]: [Consult] : I had the pleasure of evaluating your patient, [unfilled]. My full evaluation follows. [Consult - Single Provider] : Thank you very much for allowing me to participate in the care of this patient. If you have any questions, please do not hesitate to contact me. [Sincerely,] : Sincerely, [DrAmarilys  ___] : Dr. WU [FreeTextEntry4] : Jose Luis Rudolph [FreeTextEntry6] : [place] [id number] [FreeTextEntry5] : [address],  [de-identified] :  \par \par Issa Christensen MD, FACC, FHRS, FAAP\par Associate Chief, Pediatric Cardiology\par , Pediatric Cardiac Electrophysiology\par [facility]\par [facility]\par Professor of Pediatrics\par [employer]\par \par

## 2022-10-25 NOTE — DISCUSSION/SUMMARY
[Needs SBE Prophylaxis] : [unfilled]  needs bacterial endocarditis prophylaxis. SBE prophylaxis is indicated for dental and invasive ENT procedures. (Circulation. 2007; 116: 3399-0957) [PE + No Varsity Sports or Strenuous Activity] : [unfilled] may participate in the physical education program, WITH RESTRICTION from all varsity sports and from excessively stressful activities such as rope climbing, weight lifting, sustained running (i.e. laps) and fitness testing. Must be allowed to rest when tired. 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 He has an RV lead fracture and is only LV paced now. His device today is functioning well , no changes made today.  He will send a transmission in 3 months.  He will reschedule his appointment with Dr Londono as well.  He will continue his Eliquis under the direction of hematology.  We will see him in 6 months.

## 2022-10-25 NOTE — CARDIOLOGY SUMMARY
[Today's Date] : [unfilled] [FreeTextEntry1] : AT with ventricular pacing [de-identified] : PACEMAKER EVALUATION (See separate note for details): Presenting rhythm was AT  @ 75 bpm. Underlying AT & CHB @ 51.  The pacemaker mode is VVIR @ 70 bpm. He has 11 years remaining on the battery. Adequate lead parameters.  \par No changes made at this time.

## 2022-10-25 NOTE — REVIEW OF SYSTEMS
[Easy Bruising] : a tendency for easy bruising [Feeling Poorly] : not feeling poorly (malaise) [Fever] : no fever [Wgt Loss (___ Lbs)] : no recent weight loss [Pallor] : not pale [Eye Discharge] : no eye discharge [Redness] : no redness [Change in Vision] : no change in vision [Nasal Stuffiness] : no nasal congestion [Sore Throat] : no sore throat [Earache] : no earache [Loss Of Hearing] : no hearing loss [Cyanosis] : no cyanosis [Edema] : no edema [Diaphoresis] : not diaphoretic [Chest Pain] : no chest pain or discomfort [Exercise Intolerance] : no persistence of exercise intolerance [Palpitations] : no palpitations [Orthopnea] : no orthopnea [Fast HR] : no tachycardia [Tachypnea] : not tachypneic [Wheezing] : no wheezing [Cough] : no cough [Shortness Of Breath] : not expressed as feeling short of breath [Vomiting] : no vomiting [Diarrhea] : no diarrhea [Abdominal Pain] : no abdominal pain [Decrease In Appetite] : appetite not decreased [Fainting (Syncope)] : no fainting [Seizure] : no seizures [Headache] : no headache [Dizziness] : no dizziness [Limping] : no limping [Joint Pains] : no arthralgias [Joint Swelling] : no joint swelling [Rash] : no rash [Wound problems] : no wound problems [Swollen Glands] : no lymphadenopathy [Easy Bleeding] : no ~M tendency for easy bleeding [Nosebleeds] : no epistaxis [Sleep Disturbances] : ~T no sleep disturbances [Hyperactive] : no hyperactive behavior [Depression] : no depression [Anxiety] : no anxiety [Failure To Thrive] : no failure to thrive [Short Stature] : short stature was not noted [Jitteriness] : no jitteriness [Heat/Cold Intolerance] : no temperature intolerance [Dec Urine Output] : no oliguria

## 2022-10-25 NOTE — PHYSICAL EXAM
[General Appearance - Alert] : alert [General Appearance - In No Acute Distress] : in no acute distress [General Appearance - Well Nourished] : well nourished [General Appearance - Well Developed] : well developed [General Appearance - Well-Appearing] : well appearing [Appearance Of Head] : the head was normocephalic [Facies] : there were no dysmorphic facial features [Sclera] : the conjunctiva were normal [Outer Ear] : the ears and nose were normal in appearance [Examination Of The Oral Cavity] : mucous membranes were moist and pink [Auscultation Breath Sounds / Voice Sounds] : breath sounds clear to auscultation bilaterally [Normal Chest Appearance] : the chest was normal in appearance [Apical Impulse] : quiet precordium with normal apical impulse [Heart Rate And Rhythm] : normal heart rate and rhythm [Heart Sounds] : normal S1 and S2 [Heart Sounds Gallop] : no gallops [Heart Sounds Pericardial Friction Rub] : no pericardial rub [Heart Sounds Click] : no clicks [Arterial Pulses] : normal upper and lower extremity pulses with no pulse delay [Capillary Refill Test] : normal capillary refill [Edema] : no edema [II] : a grade 2/6 [LLSB] : LLSB  [Holosystolic] : holosystolic [Bowel Sounds] : normal bowel sounds [Abdomen Soft] : soft [Nondistended] : nondistended [Abdomen Tenderness] : non-tender [Nail Clubbing] : no clubbing  or cyanosis of the fingers [Motor Tone] : normal muscle strength and tone [Cervical Lymph Nodes Enlarged Anterior] : The anterior cervical nodes were normal [Cervical Lymph Nodes Enlarged Posterior] : The posterior cervical nodes were normal [] : no rash [Skin Lesions] : no lesions [Skin Turgor] : normal turgor [Demonstrated Behavior - Infant Nonreactive To Parents] : interactive [Mood] : mood and affect were appropriate for age [Demonstrated Behavior] : normal behavior

## 2023-01-12 ENCOUNTER — APPOINTMENT (OUTPATIENT)
Dept: PEDIATRIC NEUROLOGY | Facility: CLINIC | Age: 23
End: 2023-01-12
Payer: MEDICAID

## 2023-01-12 PROCEDURE — 99214 OFFICE O/P EST MOD 30 MIN: CPT | Mod: 95

## 2023-01-17 NOTE — ASSESSMENT
[FreeTextEntry1] : 21 yo man with complex cardiac issues and seizure disorder likely secondary to hemorrhagic stroke causing encephalomalacia. He needs to remain on ASM for two years seizure free, but likely longer given the structural cause.

## 2023-01-17 NOTE — DATA REVIEWED
[FreeTextEntry1] : \par Report date: 1/24/2022 \par  \par  View Order\par \par \par (Report matches study selected on Patient History pane)\par \par \par   \par \par \par \par \par ACC: [id number] EXAM: CT BRAIN\par \par PROCEDURE DATE: 01/24/2022\par \par \par \par INTERPRETATION: Clinical indication: Seizures.\par \par Multiple axial sections were performed from base skull to vertex without contrast enhancement. Coronal and sagittal reconstructions were performed as well\par \par This exam is compared with prior noncontrast head CT performed on November 9, 2021.\par \par Postoperative changes compatible the high left frontal craniectomy and mesh like cranioplasty is again seen. There is encephalomalacia and gliosis seen in the left frontal cortical subcortical region with expected dilatation of the left frontal horn again seen.\par \par The overall size and configuration of ventricles appear unchanged.\par \par Left frontal shunt catheter is again seen and unchanged in position\par \par There is no evidence acute hemorrhage identified.\par \par Evaluation of the osseous structures with the appropriate window aside from postop changes appear normal\par \par The visualized paranasal sinuses mastoid and middle ear regions appear clear.\par \par IMPRESSION: Stable exam.

## 2023-01-17 NOTE — CONSULT LETTER
[Dear  ___] : Dear  [unfilled], [Courtesy Letter:] : I had the pleasure of seeing your patient, [unfilled], in my office today. [Please see my note below.] : Please see my note below. [Consult Closing:] : Thank you very much for allowing me to participate in the care of this patient.  If you have any questions, please do not hesitate to contact me. [Sincerely,] : Sincerely, [FreeTextEntry3] : Georgina Garcia MD\par Director, Pediatric Epilepsy\par [facility]\par , Pediatric Neurology Residency Program\par ,\par [employer] [facility]\par [address] [place] [address], [facility] [address]\par [place] [address]\par Phone: [phone]\par Fax: [phone]\par \par

## 2023-01-17 NOTE — HISTORY OF PRESENT ILLNESS
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 He denies any side effects. He is doing well otherwise and no sleep issues or headaches. \par He does not want to drive.

## 2023-01-17 NOTE — PHYSICAL EXAM
[Well-appearing] : well-appearing [Normocephalic] : normocephalic [No dysmorphic facial features] : no dysmorphic facial features [No deformities] : no deformities [Alert] : alert [Well related, good eye contact] : well related, good eye contact [Conversant] : conversant [Normal speech and language] : normal speech and language [Follows instructions well] : follows instructions well [Full extraocular movements] : full extraocular movements [No facial asymmetry or weakness] : no facial asymmetry or weakness [de-identified] : can not be assessed, Telehealth visit. [de-identified] : can not be assessed, Telehealth visit. [de-identified] : can not be assessed, Telehealth visit.

## 2023-01-17 NOTE — QUALITY MEASURES
[Seizure frequency] : Seizure frequency: Yes [Etiology, seizure type, and epilepsy syndrome] : Etiology, seizure type, and epilepsy syndrome: Yes [Side effects of anti-seizure medications] : Side effects of anti-seizure medications: Yes [Safety and education around seizures] : Safety and education around seizures: Yes [Sudden unexpected death in epilepsy (SUDEP)] : Sudden unexpected death in epilepsy: Yes [Issues around driving] : Issues around driving: Yes [Screening for anxiety, depression] : Screening for anxiety, depression: Yes

## 2023-01-27 ENCOUNTER — APPOINTMENT (OUTPATIENT)
Dept: PEDIATRIC CARDIOLOGY | Facility: CLINIC | Age: 23
End: 2023-01-27
Payer: MEDICAID

## 2023-01-27 VITALS
HEIGHT: 67.13 IN | DIASTOLIC BLOOD PRESSURE: 78 MMHG | BODY MASS INDEX: 23.94 KG/M2 | OXYGEN SATURATION: 89 % | SYSTOLIC BLOOD PRESSURE: 120 MMHG | WEIGHT: 154.32 LBS | HEART RATE: 88 BPM

## 2023-01-27 DIAGNOSIS — Q20.4 DOUBLE INLET VENTRICLE: ICD-10-CM

## 2023-01-27 PROCEDURE — 93325 DOPPLER ECHO COLOR FLOW MAPG: CPT

## 2023-01-27 PROCEDURE — 93303 ECHO TRANSTHORACIC: CPT

## 2023-01-27 PROCEDURE — 99215 OFFICE O/P EST HI 40 MIN: CPT | Mod: 25

## 2023-01-27 PROCEDURE — 93320 DOPPLER ECHO COMPLETE: CPT

## 2023-01-27 PROCEDURE — 93000 ELECTROCARDIOGRAM COMPLETE: CPT

## 2023-01-27 NOTE — PHYSICAL EXAM
[General Appearance - Alert] : alert [General Appearance - In No Acute Distress] : in no acute distress [General Appearance - Well Nourished] : well nourished [General Appearance - Well Developed] : well developed [General Appearance - Well-Appearing] : well appearing [Appearance Of Head] : the head was normocephalic [Facies] : there were no dysmorphic facial features [Sclera] : the conjunctiva were normal [Outer Ear] : the ears and nose were normal in appearance [Examination Of The Oral Cavity] : mucous membranes were moist and pink [Auscultation Breath Sounds / Voice Sounds] : breath sounds clear to auscultation bilaterally [Normal Chest Appearance] : the chest was normal in appearance [Apical Impulse] : quiet precordium with normal apical impulse [Heart Rate And Rhythm] : normal heart rate and rhythm [Heart Sounds] : normal S1 and S2 [Heart Sounds Gallop] : no gallops [Heart Sounds Pericardial Friction Rub] : no pericardial rub [Heart Sounds Click] : no clicks [Arterial Pulses] : normal upper and lower extremity pulses with no pulse delay [Edema] : no edema [Capillary Refill Test] : normal capillary refill [Systolic] : systolic [II] : a grade 2/6 [LLSB] : LLSB  [Holosystolic] : holosystolic [Bowel Sounds] : normal bowel sounds [Abdomen Soft] : soft [Nondistended] : nondistended [Abdomen Tenderness] : non-tender [Nail Clubbing] : no clubbing  or cyanosis of the fingers [Motor Tone] : normal muscle strength and tone [Cervical Lymph Nodes Enlarged Anterior] : The anterior cervical nodes were normal [Cervical Lymph Nodes Enlarged Posterior] : The posterior cervical nodes were normal [] : no rash [Skin Lesions] : no lesions [Skin Turgor] : normal turgor [Demonstrated Behavior - Infant Nonreactive To Parents] : interactive [Mood] : mood and affect were appropriate for age [Demonstrated Behavior] : normal behavior

## 2023-02-01 NOTE — HISTORY OF PRESENT ILLNESS
[FreeTextEntry1] : We had the opportunity to evaluate TRINI MÉNDEZ at pediatric cardiology department at [address] [place] [address], on January 27th, 2023. As you know, he is a 22-year-old with history of double inlet left ventricle, L-malposition of the great arteries status post Pdpvn-Xnsn-Nfuehoj anastomosis and aortic arch reconstruction followed by a fenestrated lateral tunnel Fontan completion (now s/p fenestration closure). He underwent placement of dual-chamber epicardial leads for AV sequential pacing and antitachycardia functionality for sick sinus node syndrome with AV mihir disease, tachybradycardia syndrome and IART. 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 He has no acute concerns. He reports compliance with is medications and was last seen in the EP clinic in october 2022.\par \par Below is his past medical and surgical history:\par  History of double inlet left ventricle, L-malposition of the great arteries status post Fontan. 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 In 2016 he presented to us with evidence of failing fontan with symptoms of SOB, exercise intolerance, ascites and fatigue. He had evidence of LV dysfunction, PHT and mild fontan obstruction. A stent was placed, pulmonary vasodilators initiated, CHF meds given and CRT done due to concerns of pacemaker induced cardiomyopathy. He had an excellent response.\par When we evaluated on December 14, 2017 we found he was on IART (atrial flutter). Interestingly, he was not having symptoms. He did not experience chest pain, palpitations, or syncope. Our electrophysiologist attempted to terminate his atrial flutter by over-pacing without success. He was taking Aspirin 325 mg PO q 24 hours. There was no thrombus on his echocardiogram and after talking with hematology he was started on Xarelto 20 mg PO BID to have a more aggressive prophylaxis since we were not able to terminate the atrial flutter. Of note: prior to Aspirin he was taking Warfarin, but this medication was stopped due to interactions with sildenafil. He continues to take his other medication which include, Enalapril 2.5 mg PO BID, Furosemide 20 mg PO q 24 hours, Spironolactone 100 mg PO daily and Sotalol 120 mg PO q 12 hours. \par He is status post CRT of his single ventricle (LV now with 2 epicardial leads) and pacemaker generator replacement via left thoracotomy on December 5, 2016. \par He had a  syncopal episode(June 2020) presumably due to fractured RV pacing lead causing bradycardia. Since he had CRT with 2 ventricular leads, Dr Christensen reprogrammed the leads and recommended surveillance for now. He has remained asymptomatic. Cardiac function remained unchanged as well. He was also noncompliant with Xarelto and sildenafil due to its midday schedule in the past but now has a pill box that he carries and has been compliant with his medications. \par He was admitted in PICU (September, 2020) after he was found to be in A fib for almost 3 days and required cardioversion. He was asymptomatic at that time. During this admission his Sotalol was increased to 120 mg Q12h. Trini presented in an atrially paced and biventricularly paced rhythm.@ 80 bpm. His underlying is V paced @ 35 bpm. He is DDDR  bpm. \par \par His cardiac history can be summarized as follows: \par 11/02/01, Cath – Assessment of anatomy and hemodynamics prior to surgery\par 11/08/01, OR - Pntcl-Jqsy-Eoxygip anastomosis with a modified right Nirmal-Taussig shunt.\par 03/15/03, OR - Celestine-Fontan with ligation of BT shunt and left pulmonary artery plasty\par 04/13/04, Cath - Coil embolization of collateral vessels\par 05/19/04, OR - Lateral tunnel fenestrated Fontan\par 05/26/04, OR - Pacemaker placement\par 7/25/11, Cath- Normal CI 3.1L/min/m2, Qp:Qs 1:1, normal pressure in Fontan circuit (mean 15 mmHg), balloon angioplasty of superior narrowing of Fontan circuit with improvement seen. Balloon angioplasty of LPA with improvement of stenosis. ASD device placed for closure of Fontan fenestration\par 10/24/16, Cath- Cath for Fontan failure as evidenced by increasing exercise intolerance as well as ascites. Low normal CI of 2.7 L/min/ m2. Elevated Fontan pressures of 24 mmHg. Elevated PVR (3.7) that responded well to Apryl pulmonary vasodilator challenge (decreased to 1.4). Angiographic evidence of mild intracardiac Fontan obstruction without any pressure gradient, relieved by placing a 36mm X 12mm stent (IntraStent Max LD) mounted on a 25mm balloon. Post intervention Fontan pressure decreased to 20 mmHg. \par \par

## 2023-02-01 NOTE — REASON FOR VISIT
[Follow-Up] : a follow-up visit for [Patient] : patient [FreeTextEntry3] : complex congenital heart disease

## 2023-02-01 NOTE — CONSULT LETTER
[Today's Date] : [unfilled] [Name] : Name: [unfilled] [] : : ~~ [Today's Date:] : [unfilled] [Dear  ___:] : Dear Dr. [unfilled]: [Consult] : I had the pleasure of evaluating your patient, [unfilled]. My full evaluation follows. [Consult - Multiple Provider] : Thank you very much for allowing us to participate in the care of this patient. If you have any questions, please do not hesitate to contact us. [Sincerely,] : Sincerely, [DrAmarilys  ___] : Dr. WU [FreeTextEntry4] : Jose Luis Saenz MD [FreeTextEntry5] : [address] [facility] [FreeTextEntry6] : [place] [id number] [de-identified] : Cary Slade MD\par fellow in training.\par \par Adonis Londono MD\par Director, Pediatric catheterization Lab\par [facility]\par , [facility]\par Telephone: [phone]\par [phone]\par \par Fax: [phone]\par \par \par \par \par  [DrAmarilys ___] : Dr. WU

## 2023-02-01 NOTE — DISCUSSION/SUMMARY
[Needs SBE Prophylaxis] : [unfilled]  needs bacterial endocarditis prophylaxis. SBE prophylaxis is indicated for dental and invasive ENT procedures. (Circulation. 2007; 116: 1743-3786) [Participate only in Mild PE activities] : [unfilled] may participate ONLY IN MILD physical education activities such as [place] games, golf, and badminton. [FreeTextEntry1] : In summary, Jimbo is a 22 year old with history of DILV, L-malposition of the great arteries status post Fontan with sick sinus node syndrome, AV mihir disease with tachybradycardia syndrome (and IART) status post CRT with improved cardiac synchrony and function. Despite the malfunctioning RV lead he continues to show fair ventricular function. He had an unfortunate episode of cranial bleed 24 hours after an unsuccessful ablation attempt on May 26th, 2021.  He is status post cranial plasty and the  shunt. Jimbo has near complete recovery , no seizures and still maintained on Keppra.\par \par From a cardiac standpoint, he is fairly stable,  remains in IART with a controlled paced ventricular rate of 70 bpm on mexiletine His echo today showed low normal decreased function.  During his last cardiac catheterization in 2016, he had evidence of elevated Fontan pressures of 24 mmHg and angiographic evidence of mild Fontan obstruction that was relieved by placing a stent with excellent results and a decline of Fontan pressure to 20 mmHg..  He also had evidence of mildly elevated pulmonary vascular resistance up to 3.7 that responded well to pulmonary vasodilator challenge and decreased to 1.4.  He remains on oral sildenafil.  He is a failing Fontan and NYHA class II. we again discussed about the need for referral to cardiac failure/transplant evaluation and  provided a referral to Dr David LEAHY  team at [facility] for transplant evaluation.  Interim, no management changes were made and follow-up recommended  in 6 months.

## 2023-02-01 NOTE — CARDIOLOGY SUMMARY
[Today's Date] : [unfilled] [FreeTextEntry1] : Atrial tachycardia, Ventricular paced rhythm at rate 79 bpm [FreeTextEntry2] : Low normal to mildly depressed LV function, mild left AV valve regurgitation

## 2023-06-28 ENCOUNTER — EMERGENCY (EMERGENCY)
Age: 23
LOS: 1 days | Discharge: HOME | End: 2023-06-28
Attending: EMERGENCY MEDICINE | Admitting: EMERGENCY MEDICINE
Payer: MEDICAID

## 2023-06-28 VITALS
RESPIRATION RATE: 25 BRPM | DIASTOLIC BLOOD PRESSURE: 74 MMHG | TEMPERATURE: 98 F | SYSTOLIC BLOOD PRESSURE: 128 MMHG | OXYGEN SATURATION: 92 % | HEART RATE: 80 BPM

## 2023-06-28 VITALS
SYSTOLIC BLOOD PRESSURE: 126 MMHG | RESPIRATION RATE: 32 BRPM | HEART RATE: 98 BPM | DIASTOLIC BLOOD PRESSURE: 62 MMHG | OXYGEN SATURATION: 93 % | TEMPERATURE: 98 F | WEIGHT: 149.69 LBS

## 2023-06-28 DIAGNOSIS — Z95.818 PRESENCE OF OTHER CARDIAC IMPLANTS AND GRAFTS: Chronic | ICD-10-CM

## 2023-06-28 DIAGNOSIS — Z98.890 OTHER SPECIFIED POSTPROCEDURAL STATES: Chronic | ICD-10-CM

## 2023-06-28 DIAGNOSIS — R56.9 UNSPECIFIED CONVULSIONS: ICD-10-CM

## 2023-06-28 DIAGNOSIS — Z95.0 PRESENCE OF CARDIAC PACEMAKER: Chronic | ICD-10-CM

## 2023-06-28 DIAGNOSIS — Z92.89 PERSONAL HISTORY OF OTHER MEDICAL TREATMENT: ICD-10-CM

## 2023-06-28 LAB
ALBUMIN SERPL ELPH-MCNC: 4.9 G/DL — SIGNIFICANT CHANGE UP (ref 3.3–5)
ALP SERPL-CCNC: 140 U/L — HIGH (ref 40–120)
ALT FLD-CCNC: 22 U/L — SIGNIFICANT CHANGE UP (ref 4–41)
ANION GAP SERPL CALC-SCNC: 15 MMOL/L — HIGH (ref 7–14)
AST SERPL-CCNC: 52 U/L — HIGH (ref 4–40)
BASOPHILS # BLD AUTO: 0.03 K/UL — SIGNIFICANT CHANGE UP (ref 0–0.2)
BASOPHILS NFR BLD AUTO: 0.3 % — SIGNIFICANT CHANGE UP (ref 0–2)
BILIRUB SERPL-MCNC: 0.9 MG/DL — SIGNIFICANT CHANGE UP (ref 0.2–1.2)
BUN SERPL-MCNC: 19 MG/DL — SIGNIFICANT CHANGE UP (ref 7–23)
CALCIUM SERPL-MCNC: 9.7 MG/DL — SIGNIFICANT CHANGE UP (ref 8.4–10.5)
CHLORIDE SERPL-SCNC: 102 MMOL/L — SIGNIFICANT CHANGE UP (ref 98–107)
CO2 SERPL-SCNC: 17 MMOL/L — LOW (ref 22–31)
CREAT SERPL-MCNC: 0.8 MG/DL — SIGNIFICANT CHANGE UP (ref 0.5–1.3)
EGFR: 129 ML/MIN/1.73M2 — SIGNIFICANT CHANGE UP
EOSINOPHIL # BLD AUTO: 0.04 K/UL — SIGNIFICANT CHANGE UP (ref 0–0.5)
EOSINOPHIL NFR BLD AUTO: 0.4 % — SIGNIFICANT CHANGE UP (ref 0–6)
GLUCOSE SERPL-MCNC: 116 MG/DL — HIGH (ref 70–99)
HCT VFR BLD CALC: 45.4 % — SIGNIFICANT CHANGE UP (ref 39–50)
HEMOLYSIS INDEX: 2 — SIGNIFICANT CHANGE UP
HGB BLD-MCNC: 14.5 G/DL — SIGNIFICANT CHANGE UP (ref 13–17)
IANC: 8.23 K/UL — HIGH (ref 1.8–7.4)
IMM GRANULOCYTES NFR BLD AUTO: 0.2 % — SIGNIFICANT CHANGE UP (ref 0–0.9)
LYMPHOCYTES # BLD AUTO: 0.72 K/UL — LOW (ref 1–3.3)
LYMPHOCYTES # BLD AUTO: 7.5 % — LOW (ref 13–44)
MCHC RBC-ENTMCNC: 25.7 PG — LOW (ref 27–34)
MCHC RBC-ENTMCNC: 31.9 GM/DL — LOW (ref 32–36)
MCV RBC AUTO: 80.5 FL — SIGNIFICANT CHANGE UP (ref 80–100)
MONOCYTES # BLD AUTO: 0.62 K/UL — SIGNIFICANT CHANGE UP (ref 0–0.9)
MONOCYTES NFR BLD AUTO: 6.4 % — SIGNIFICANT CHANGE UP (ref 2–14)
NEUTROPHILS # BLD AUTO: 8.23 K/UL — HIGH (ref 1.8–7.4)
NEUTROPHILS NFR BLD AUTO: 85.2 % — HIGH (ref 43–77)
NRBC # BLD: 0 /100 WBCS — SIGNIFICANT CHANGE UP (ref 0–0)
NRBC # FLD: 0 K/UL — SIGNIFICANT CHANGE UP (ref 0–0)
PLATELET # BLD AUTO: 124 K/UL — LOW (ref 150–400)
POTASSIUM SERPL-MCNC: 4.4 MMOL/L — SIGNIFICANT CHANGE UP (ref 3.5–5.3)
POTASSIUM SERPL-MCNC: 6.5 MMOL/L — CRITICAL HIGH (ref 3.5–5.3)
POTASSIUM SERPL-SCNC: 4.4 MMOL/L — SIGNIFICANT CHANGE UP (ref 3.5–5.3)
POTASSIUM SERPL-SCNC: 6.5 MMOL/L — CRITICAL HIGH (ref 3.5–5.3)
PROT SERPL-MCNC: 8.1 G/DL — SIGNIFICANT CHANGE UP (ref 6–8.3)
RBC # BLD: 5.64 M/UL — SIGNIFICANT CHANGE UP (ref 4.2–5.8)
RBC # FLD: 15.3 % — HIGH (ref 10.3–14.5)
SODIUM SERPL-SCNC: 134 MMOL/L — LOW (ref 135–145)
WBC # BLD: 9.66 K/UL — SIGNIFICANT CHANGE UP (ref 3.8–10.5)
WBC # FLD AUTO: 9.66 K/UL — SIGNIFICANT CHANGE UP (ref 3.8–10.5)

## 2023-06-28 PROCEDURE — 99285 EMERGENCY DEPT VISIT HI MDM: CPT

## 2023-06-28 PROCEDURE — 93010 ELECTROCARDIOGRAM REPORT: CPT

## 2023-06-28 RX ORDER — LEVETIRACETAM 250 MG/1
2700 TABLET, FILM COATED ORAL ONCE
Refills: 0 | Status: COMPLETED | OUTPATIENT
Start: 2023-06-28 | End: 2023-06-28

## 2023-06-28 RX ORDER — LEVETIRACETAM 250 MG/1
1 TABLET, FILM COATED ORAL
Qty: 60 | Refills: 0
Start: 2023-06-28 | End: 2023-07-27

## 2023-06-28 RX ADMIN — LEVETIRACETAM 720 MILLIGRAM(S): 250 TABLET, FILM COATED ORAL at 21:37

## 2023-06-28 NOTE — ED PEDIATRIC TRIAGE NOTE - CHIEF COMPLAINT QUOTE
BIBA from [facility] with extensive medical history including  shunt, pacemaker ,and PMHx of hemorrhagic stroke June 2022. Seizure presenting in full body shaking given Versed given at OSH. Pt. presents awake, alert, VSS. PIV placed at OSH. IUTD, NKA.

## 2023-06-28 NOTE — ED PROVIDER NOTE - NSICDXPASTMEDICALHX_GEN_ALL_CORE_FT
PAST MEDICAL HISTORY:  Atrial tachycardia     AV block     Coagulopathy     D-TGA (dextro-transposition of great arteries)     Double inlet left ventricle     Hemorrhagic stroke June 2021    Hepatomegaly     Other ascites     Pacemaker     Pulmonary hypertension     Sick sinus syndrome

## 2023-06-28 NOTE — ED PEDIATRIC NURSE NOTE - NSICDXPASTSURGICALHX_GEN_ALL_CORE_FT
PAST SURGICAL HISTORY:  Cardiac pacemaker 5/26/2004    H/O cardiac catheterization EPS ablation, unsuccessful May 2021    S/P Nirmal-Taussig shunt Cmmze-Qwoe-Gefpipx anastomosis with modified right BT shunt and creation of pulmonary atresia    S/P cardiac cath 2001-assessment of anatomy prior to 1st surgery  4/13/2004- coil emolization of collateral vessels  7/25/2011-balloon angioplasty of superior narrowing of Fontan circuit, balloon angioplasty of LPA, ASD device placed for closure of Fontan fenestraion  10/24/2016- Cath of Fontan    S/P celestine-Fontan operation 3/15/2003- Celestine-Fontan with ligation of BT shunt and left pulmonary artery plasty    Status post craniectomy Emergent in June 2021 s/p hemorrhagic stroke

## 2023-06-28 NOTE — ED PEDIATRIC NURSE NOTE - CHIEF COMPLAINT QUOTE
BIBA from [facility] with extensive medical history including  shunt, pacemaker ,and PMHx of hemorrhagic stroke June 2022. Seizure presenting in full body shaking given Versed given at OSH. Pt. presents awake, alert, VSS. PIV placed at OSH. IUTD, NKA.

## 2023-06-28 NOTE — CONSULT NOTE PEDS - SUBJECTIVE AND OBJECTIVE BOX
22M with extensive cardiac history double inlet L ventricle, L-malposition of the great arteries status post multiple surgeries, sick sinus node syndrome s/p pacemaker in place (dual-chamber epicardial leads for AV sequential pacing as well as antitachycardia functionality), hemorrhagic stroke requiring a decompressive craniectomy and  shunt, seizure disorder on Keppra 750mg (increased from 500 mg Jan 2022) with last seizure Jan 2022 here for increased seizure frequency. Pt with 2 generalized tonic clonic seizures today without return to baseline. no fevers, nausea/vomiting, headache, rhinorrhea, cough, diarrhea, vomiting. No recent trauma. Pt was en route to [facility] when 2nd seizure occurred (lasting 5 min) prompting visit to nearest hospital where he received 5mg Versed. Patient back to baseline.    WDWN male in NAD  Vital Signs Last 24 Hrs  T(C): 36.4 (28 Jun 2023 20:26), Max: 36.4 (28 Jun 2023 20:26)  T(F): 97.5 (28 Jun 2023 20:26), Max: 97.5 (28 Jun 2023 20:26)  HR: 98 (28 Jun 2023 20:26) (98 - 98)  BP: 126/62 (28 Jun 2023 20:26) (126/62 - 126/62)  BP(mean): --  RR: 32 (28 Jun 2023 20:26) (32 - 32)  SpO2: 93% (28 Jun 2023 20:26) (93% - 93%)    Parameters below as of 28 Jun 2023 20:26  Patient On (Oxygen Delivery Method): room air    AAO X 3  PERRLA, EOMI  CN 2-12 grossly intact  GOODSON strength 5/5  No dysmetria or drift  SILT    Noncontrast head CT: Stable ventricular size and configuration

## 2023-06-28 NOTE — ED PEDIATRIC TRIAGE NOTE - WEIGHT KG
Patient is seen and examined. Above note appreciated. Case fully discussed with the medical team. Will continue with antibiotics. ID and ortho follow up appreciated. Will continue DVT prophylaxis. 67.9

## 2023-06-28 NOTE — ED PROVIDER NOTE - PHYSICAL EXAMINATION
PE:  Gen: NAD  Head: NCAT  ENT: MMM  Chest: tachycardia, murmur in pulmonary region  Lungs: Symmetrical chest rise, lungs CTAB  Abdomen: soft, NTND, No rebound/guarding  Ext: No gross deformities  Skin: no rashes  Neurologic Exam:   Patient A&O to person, place, time, and situation.   GCS 15 (E4M5V6)  Cranial Nerves II-XII intact & symmetric.  Speech is normal and fluent.  Motor 5/5 and symmetric in both upper & lower extremities with normal tone and no tremor.  Sensation intact in both upper and lower extremities.  Gait normal  Normal finger to nose, no dysdiadochokinesia

## 2023-06-28 NOTE — ED PROVIDER NOTE - PATIENT PORTAL LINK FT
You can access the FollowMyHealth Patient Portal offered by [facility] by registering at the following website: [email or web address]. By joining [facility]’s FollowMyHealth portal, you will also be able to view your health information using other applications (apps) compatible with our system.

## 2023-06-28 NOTE — ED PROVIDER NOTE - PROGRESS NOTE DETAILS
Tameka Osborne, Attending Physician: Pt with  on monitor. EKG ordered, cardiology paged. Tameka Osborne, Attending Physician: Cardiology paged - EKG sent. Neurology consulted - recommending Keppra load 40 mg/kg and to increased home dose to 1000 mg BID. Tameka Osborne, Attending Physician: After reviewing tele - HR seems less consistent with ventricular rate and more consistent with atrial rhythm. Lead changed and HR 71 consistently. Cardiology fellow reviewing with Dr. Christensen. <Late entry> At 21:49 d/w cardiology fellow, Dr. Dubose who reviewed case with Dr. Christensen. Given chronic atrial arrythmia that was not able to be converted in the past, he is paced vvi 70. Given that the pulse is c/w that rate and after changing lead on monitor it is consistent with rate, no acute concerns from cardiology standpoint at this time. EKG reviewed.

## 2023-06-28 NOTE — ED PROVIDER NOTE - NSFOLLOWUPINSTRUCTIONS_ED_ALL_ED_FT
You were seen for 2 seizure episodes today despite taking Keppra as prescribed.  Your evaluated with physical exam, blood work, EKG, and our neurology/neurosurgery/cardiology services. You were treated with a Keppra bolus dosage (40 mg/kg).  Your results showed no abnormalities requiring further urgent intervention. Verbal instructions provided, including instructions to return to ED immediately for any new, worsening, or concerning symptoms. Patient and/or family/caregiver endorsed understanding.    Please take the following medications at home:   - levetiracetam (Keppra) 1000 mg by mouth every 12 hours   - please continue to take remaining medications as prescribed    Please follow up with your neurologist regarding this ED visit.    Thank you for choosing us for your care.

## 2023-06-28 NOTE — ED PROVIDER NOTE - CLINICAL SUMMARY MEDICAL DECISION MAKING FREE TEXT BOX
22-year-old male with aforementioned history here for increased seizure frequency.  No infectious symptoms to suggest lowered seizure threshold.  Patient had CT at outside hospital to evaluate patient.  Will upload, review and consult neurosurgery. Will discuss with cardiology re: need for interrogation, though less likely to be the cause. No recent medication changes to suspect iatrogenic in nature. No recent trauma.

## 2023-06-28 NOTE — ED PEDIATRIC TRIAGE NOTE - NS ED NURSE AMBULANCES
0    tadalafil (CIALIS) 5 MG tablet Take 1 tablet by mouth as needed for Erectile Dysfunction 30 tablet 0    venlafaxine (EFFEXOR XR) 75 MG extended release capsule Take 2 capsules by mouth daily 7 capsule 0    Hyprom-Naphaz-Polysorb-Zn Sulf (CLEAR EYES COMPLETE) SOLN Apply 1 drop to eye      SUMAtriptan (IMITREX) 5 MG/ACT nasal spray 1 spray by Nasal route      esomeprazole (NEXIUM) 40 MG delayed release capsule Take 1 capsule by mouth every morning (before breakfast) 90 capsule 0    SUMAtriptan (IMITREX) 100 MG tablet TAKE 1 TABLET BY MOUTH ONCE AS NEEDED FOR MIGRAINE  0    naproxen sodium (ANAPROX DS) 550 MG tablet Take 1 tablet by mouth 2 times daily (with meals) Prn for pain 30 tablet 1    glucosamine-chondroitin 750-600 MG TABS tablet Take 1 tablet by mouth. Multiple Vitamin (MULTIVITAMIN) tablet Take 1 tablet by mouth. No current facility-administered medications for this visit. Return if symptoms worsen or fail to improve.     Jak Sultana MD, MD [facility]

## 2023-06-28 NOTE — ED PROVIDER NOTE - OBJECTIVE STATEMENT
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 Pt with 2 generalized tonic clonic seizures today. no fevers, nausea/vomiting, headache, rhinorrhea, cough, diarrhea, vomiting. No recent trauma. Pt was en route to [facility] when 2nd seizure occurred (lasting 5 min) prompting visit to nearest hospital where he received 5mg Versed. Patient back to baseline. 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 Pt with 2 generalized tonic clonic seizures today without return to baseline. no fevers, nausea/vomiting, headache, rhinorrhea, cough, diarrhea, vomiting. No recent trauma. [place] [facility] [place] 2nd seizure occurred (lasting 5 min) prompting visit to nearest hospital where he received 5mg Versed. Patient back to baseline.     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 Pt with 2 generalized tonic clonic seizures today without return to baseline. no fevers, nausea/vomiting, headache, rhinorrhea, cough, diarrhea, vomiting. No recent trauma. Pt was en route to [facility] when 2nd seizure occurred (lasting 5 min) prompting visit to nearest hospital where he received 5mg Versed. Patient back to baseline.    Meds:  -sildenafil 20 mg TID  -mexeletine 159 mg TID  -Keppra 750 mg BID  -Eliquis 5 mg  -Furosemide 20 mg in AM, 10 mg qHS  -Spironolactone 100 mg qdbenigno Lama PGY1 Addendum:  June 2022 - last seizures, hemorrhagic stroke requiring  shunt.  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 Pt with 2 generalized tonic clonic seizures today without return to baseline. no fevers, nausea/vomiting, headache, rhinorrhea, cough, diarrhea, vomiting. No recent trauma. Pt was en route to [facility] when 2nd seizure occurred (lasting 5 min) prompting visit to nearest hospital where he received 5mg Versed. Patient back to baseline.    Meds:  -sildenafil 20 mg TID  -mexeletine 159 mg TID  -Keppra 750 mg BID  -Eliquis 5 mg  -Furosemide 20 mg in AM, 10 mg qHS  -Spironolactone 100 mg qdaily    [place] PGY1 Addendum:  June 2022 - last seizures, hemorrhagic stroke requiring  shunt.  Seizures - episode 1 (tonic clonic, 30s, @3:15PM, no return to baseline), episode 2 (tonic clonic, 10min, @3:20PM, start of return to baseline in 15min w/ complete return @2000)   Doctors - Amari (NSGY), Radha (Neuro), Fermin (Pacemaker), Adonis Londono (NSGY)

## 2023-06-28 NOTE — ED PEDIATRIC NURSE REASSESSMENT NOTE - NS ED NURSE REASSESS COMMENT FT2
Pt. resting comfortably in bed. VSS. Cardiac monitor in place. Pt. assisted to bathroom. Meds given as per eMAR. IV dressing dry and intact, site appears WDL. Awaiting lab results. Parent updated with plan of care and verbalized understanding. Safety precautions maintained.

## 2023-06-28 NOTE — CONSULT NOTE PEDS - ASSESSMENT
23 YO male S/P left frontal craniectomy for IPH and  shunt with seizures today, no indication of shunt malfunction
no

## 2023-06-28 NOTE — ED PROVIDER NOTE - ATTENDING CONTRIBUTION TO CARE
see mdm    edited by Tameka Osborne DO - attending physician.   Please see progress notes for status/labs/consult updates and ED course after initial presentation.

## 2023-06-28 NOTE — CHART NOTE - NSCHARTNOTEFT_GEN_A_CORE
Paged by ED for breakthrough seizure.   3 episodes today, received Versed at OSH.   Now back to baseline.     Advised to load with 40mg/kg LEV. Inc maint dose to 1000mg BID.   Recommend NSx c/s to ensure no shunt malfunction as trigger of todays seizures    - [facility] peds neuro Paged by ED for breakthrough seizure.   3 episodes today, received Versed at OSH.   Now back to baseline.     Advised to load with 40mg/kg LEV. Inc maint dose to 1000mg BID.   Recommend NSx c/s to ensure no shunt malfunction as trigger of todays seizures    - [facility] peds neuro    Arthur Olivas MD  Attending Physician   Pediatric Neurology/Epilepsy

## 2023-06-28 NOTE — ED PROVIDER NOTE - NSICDXPASTSURGICALHX_GEN_ALL_CORE_FT
PAST SURGICAL HISTORY:  Cardiac pacemaker 5/26/2004    H/O cardiac catheterization EPS ablation, unsuccessful May 2021    S/P Nirmal-Taussig shunt Lekmc-Cbnp-Yhxkaee anastomosis with modified right BT shunt and creation of pulmonary atresia    S/P cardiac cath 2001-assessment of anatomy prior to 1st surgery  4/13/2004- coil emolization of collateral vessels  7/25/2011-balloon angioplasty of superior narrowing of Fontan circuit, balloon angioplasty of LPA, ASD device placed for closure of Fontan fenestraion  10/24/2016- Cath of Fontan    S/P celestine-Fontan operation 3/15/2003- Celestine-Fontan with ligation of BT shunt and left pulmonary artery plasty    Status post craniectomy Emergent in June 2021 s/p hemorrhagic stroke

## 2023-06-29 ENCOUNTER — NON-APPOINTMENT (OUTPATIENT)
Age: 23
End: 2023-06-29

## 2023-06-29 RX ORDER — FUROSEMIDE 20 MG/1
20 TABLET ORAL
Qty: 45 | Refills: 5 | Status: ACTIVE | COMMUNITY
Start: 1900-01-01 | End: 1900-01-01

## 2023-06-30 ENCOUNTER — APPOINTMENT (OUTPATIENT)
Dept: PEDIATRIC CARDIOLOGY | Facility: CLINIC | Age: 23
End: 2023-06-30
Payer: MEDICAID

## 2023-06-30 PROCEDURE — 93294 REM INTERROG EVL PM/LDLS PM: CPT

## 2023-07-03 LAB — LEVETIRACETAM SERPL-MCNC: 16.2 UG/ML — SIGNIFICANT CHANGE UP (ref 10–40)

## 2023-07-14 ENCOUNTER — RX RENEWAL (OUTPATIENT)
Age: 23
End: 2023-07-14

## 2023-08-10 ENCOUNTER — APPOINTMENT (OUTPATIENT)
Dept: PEDIATRIC NEUROLOGY | Facility: CLINIC | Age: 23
End: 2023-08-10
Payer: COMMERCIAL

## 2023-08-10 VITALS
HEART RATE: 89 BPM | WEIGHT: 151 LBS | HEIGHT: 67 IN | SYSTOLIC BLOOD PRESSURE: 117 MMHG | DIASTOLIC BLOOD PRESSURE: 77 MMHG | BODY MASS INDEX: 23.7 KG/M2

## 2023-08-10 DIAGNOSIS — R56.9 UNSPECIFIED CONVULSIONS: ICD-10-CM

## 2023-08-10 DIAGNOSIS — H53.8 OTHER VISUAL DISTURBANCES: ICD-10-CM

## 2023-08-10 PROCEDURE — 99215 OFFICE O/P EST HI 40 MIN: CPT

## 2023-08-10 RX ORDER — MIDAZOLAM 5 MG/.1ML
5 SPRAY NASAL ONCE
Qty: 1 | Refills: 0 | Status: ACTIVE | COMMUNITY
Start: 2023-08-10 | End: 1900-01-01

## 2023-08-10 NOTE — QUALITY MEASURES
[Seizure frequency] : Seizure frequency: Yes [Etiology, seizure type, and epilepsy syndrome] : Etiology, seizure type, and epilepsy syndrome: Yes [Side effects of anti-seizure medications] : Side effects of anti-seizure medications: Yes [Safety and education around seizures] : Safety and education around seizures: Yes [Sudden unexpected death in epilepsy (SUDEP)] : Sudden unexpected death in epilepsy: Yes [Issues around driving] : Issues around driving: Yes [Screening for anxiety, depression] : Screening for anxiety, depression: Yes [Treatment-resistant epilepsy (every visit)] : Treatment-resistant epilepsy (every visit): Yes [Adherence to medication(s)] : Adherence to medication(s): Yes [Counseling for women of childbearing potential with epilepsy (including folic acid supplement)] : Counseling for women of childbearing potential with epilepsy (including folic acid supplement): Yes

## 2023-08-11 NOTE — HISTORY OF PRESENT ILLNESS
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 Last seizure was 6/28. [place] and had a seizure witnessed by sister and step-dad, lasting ~35 seconds with full body shaking, speech arrest, and cyanosis. 911 was called and when EMT arrived patient was having another seizure, unclear duration, similar semiology. Denies rescue meds. Denies incontinence, tongue biting or eye deviation. Postictal confusion was noted to last for greater than one hour. Patient reports going to sleep and when awakened felt back to baseline. ED course unremarkable, CT unchanged from previous imaging, increased Keppra dose to 1000mg BID with no seizures since. Endorses blurry vision, attributes to cardiac medications and has not seen optho. Denies current side effects of medications and no missed doses. Denies headaches or difficulty sleeping.

## 2023-08-11 NOTE — ASSESSMENT
[FreeTextEntry1] : 23 y.o with extensive cardiac history, cranioplasty and VPS placement on 6/18/21 with seizures being seen today for a follow up ED visit. Patient had two witnessed GTC, hospital course unremarkable with CT unchanged, Keppra level therapeutic and increase in Keppra dose to 1000mg BID. Seizure free since 6/28. Endorses blurry vision and will see optho.

## 2023-08-11 NOTE — PHYSICAL EXAM
[Well-appearing] : well-appearing [Normocephalic] : normocephalic [No dysmorphic facial features] : no dysmorphic facial features [No ocular abnormalities] : no ocular abnormalities [Neck supple] : neck supple [No abnormal neurocutaneous stigmata or skin lesions] : no abnormal neurocutaneous stigmata or skin lesions [Straight] : straight [No deformities] : no deformities [Alert] : alert [Well related, good eye contact] : well related, good eye contact [Conversant] : conversant [Normal speech and language] : normal speech and language [Follows instructions well] : follows instructions well [Pupils reactive to light and accommodation] : pupils reactive to light and accommodation [Full extraocular movements] : full extraocular movements [No nystagmus] : no nystagmus [Normal facial sensation to light touch] : normal facial sensation to light touch [No facial asymmetry or weakness] : no facial asymmetry or weakness [Gross hearing intact] : gross hearing intact [Equal palate elevation] : equal palate elevation [Good shoulder shrug] : good shoulder shrug [Normal tongue movement] : normal tongue movement [Midline tongue, no fasciculations] : midline tongue, no fasciculations [Normal axial and appendicular muscle tone] : normal axial and appendicular muscle tone [Gets up on table without difficulty] : gets up on table without difficulty [No pronator drift] : no pronator drift [Normal finger tapping and fine finger movements] : normal finger tapping and fine finger movements [No abnormal involuntary movements] : no abnormal involuntary movements [5/5 strength in proximal and distal muscles of arms and legs] : 5/5 strength in proximal and distal muscles of arms and legs [Walks and runs well] : walks and runs well [Able to do deep knee bend] : able to do deep knee bend [Able to walk on heels] : able to walk on heels [Able to walk on toes] : able to walk on toes [2+ biceps] : 2+ biceps [Knee jerks] : knee jerks [No ankle clonus] : no ankle clonus [Bilaterally] : bilaterally [No dysmetria on FTNT] : no dysmetria on FTNT [Good walking balance] : good walking balance [Normal gait] : normal gait [Able to tandem well] : able to tandem well [Negative Romberg] : negative Romberg [de-identified] : no increased wob

## 2023-08-11 NOTE — PLAN
[FreeTextEntry1] : seizures: [ ] Continue Keppra 1000mg BID [ ] Discussed [place] law about not driving until one year seizure free. [ ] Obtain serum Keppra trough level. [ ] Follow up lab results. [ ] Prescribed Nayzilam as rescue for seizures lasting greater than 3-5 minutes. [ ] Referral to optho given. [ ]Discussed transition to adult neurology. [ ] REEG ordered [ ] AEEG ordered [ ] Follow up in 3 months.

## 2023-08-11 NOTE — CONSULT LETTER
[Dear  ___] : Dear  [unfilled], [Consult Letter:] : I had the pleasure of evaluating your patient, [unfilled]. [Please see my note below.] : Please see my note below. [Consult Closing:] : Thank you very much for allowing me to participate in the care of this patient.  If you have any questions, please do not hesitate to contact me. [Sincerely,] : Sincerely, [FreeTextEntry3] : Ashley Lopez, GILMAR, CPNP Certified Pediatric Nurse Practitioner  Pediatric Neurology  [facility]  Fermin Foy MD Chief, Pediatric Neurology Co-Director (Neurology), Pediatric Sleep Program [facility] Professor of Pediatrics & Neurology at [facility]

## 2023-08-11 NOTE — END OF VISIT
[Time Spent: ___ minutes] : I have spent [unfilled] minutes of time on the encounter. [FreeTextEntry3] : I, Dr. Garcia, personally performed the evaluation and management (E/M) services for this new patient. That E/M includes conducting the clinically appropriate initial history &/ or exam, assessing all conditions, and establishing a plan of care. Today, my BRENDA, Ashley AtTask, was here to observe &/ or participate in the visit & follow plan of care established by me.

## 2023-08-17 ENCOUNTER — APPOINTMENT (OUTPATIENT)
Dept: PEDIATRIC NEUROLOGY | Facility: CLINIC | Age: 23
End: 2023-08-17

## 2023-11-04 NOTE — H&P PEDIATRIC - PROBLEM SELECTOR PROBLEM 9
Tone is normal, moving all extremities well, reflexes normal for age. - romberg
D-TGA (dextro-transposition of great arteries)

## 2023-11-09 ENCOUNTER — APPOINTMENT (OUTPATIENT)
Dept: PEDIATRIC NEUROLOGY | Facility: CLINIC | Age: 23
End: 2023-11-09

## 2024-02-20 NOTE — PROCEDURE NOTE - ATTENDING PROVIDER
Dr. Meenakshi Bradshaw
Javan Russo
Adonis Londono
Meenakshi Bradshaw
Avtar
Blaufox
No-Patient/Caregiver offered and refused free interpretation services.

## 2024-02-23 ENCOUNTER — NON-APPOINTMENT (OUTPATIENT)
Age: 24
End: 2024-02-23

## 2024-02-23 ENCOUNTER — APPOINTMENT (OUTPATIENT)
Dept: PEDIATRIC NEUROLOGY | Facility: CLINIC | Age: 24
End: 2024-02-23
Payer: COMMERCIAL

## 2024-02-23 PROCEDURE — 99214 OFFICE O/P EST MOD 30 MIN: CPT

## 2024-02-25 RX ORDER — LEVETIRACETAM 500 MG/1
500 TABLET, FILM COATED, EXTENDED RELEASE ORAL TWICE DAILY
Qty: 120 | Refills: 5 | Status: ACTIVE | COMMUNITY
Start: 2022-02-28 | End: 1900-01-01

## 2024-02-25 NOTE — HISTORY OF PRESENT ILLNESS
[Medical Office: ([facility])___] : at the medical office located in  [Home] : at home, [unfilled] , at the time of the visit. 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 He has been maintained on Keppra. He has remained seizure free. He denies any side effects. He is doing well otherwise and no sleep issues or headaches.  He does not want to drive or attempt weaning off ASM. He is not in school or employed due to his complex medical problems.  He follows with neurosurgery. He can not get MRI brain due to his cardiac device and has a shunt.

## 2024-02-25 NOTE — ASSESSMENT
[FreeTextEntry1] : 23 y.o with extensive cardiac history, cranioplasty and VPS placement for an intraparenchymal hemorrhage with acute symptomatic seizures as well as seizures several months after the acute events. EEGs have been ordered but not completed and patient does not want to attempt weaning off ASM. He was given information for transition of care to adult epilepsy care providers.

## 2024-02-25 NOTE — QUALITY MEASURES
[Etiology, seizure type, and epilepsy syndrome] : Etiology, seizure type, and epilepsy syndrome: Yes [Seizure frequency] : Seizure frequency: Yes [Safety and education around seizures] : Safety and education around seizures: Yes [Side effects of anti-seizure medications] : Side effects of anti-seizure medications: Yes [Issues around driving] : Issues around driving: Yes [Screening for anxiety, depression] : Screening for anxiety, depression: Yes [Adherence to medication(s)] : Adherence to medication(s): Yes [Sudden unexpected death in epilepsy (SUDEP)] : Sudden unexpected death in epilepsy: Not Applicable [Treatment-resistant epilepsy (every visit)] : Treatment-resistant epilepsy (every visit): Not Applicable [Counseling for women of childbearing potential with epilepsy (including folic acid supplement)] : Counseling for women of childbearing potential with epilepsy (including folic acid supplement): Not Applicable [25 Hydroxy Vitamin D level assessed and Vitamin D3 ordered] : 25 Hydroxy Vitamin D level assessed and Vitamin D3 ordered: Not Applicable [Options for adjunctive therapy (Neurostimulation, CBD, Dietary Therapy, Epilepsy Surgery)] : Options for adjunctive therapy (Neurostimulation, CBD, Dietary Therapy, Epilepsy Surgery): Not Applicable [Thyroid profile ordered] : Thyroid profile ordered: Not Applicable

## 2024-02-25 NOTE — PHYSICAL EXAM
[Normocephalic] : normocephalic [Well-appearing] : well-appearing [No dysmorphic facial features] : no dysmorphic facial features [No deformities] : no deformities [Alert] : alert [Well related, good eye contact] : well related, good eye contact [Normal speech and language] : normal speech and language [Conversant] : conversant [Follows instructions well] : follows instructions well [Full extraocular movements] : full extraocular movements [No facial asymmetry or weakness] : no facial asymmetry or weakness [de-identified] : can not be assessed, Telehealth visit. [de-identified] : can not be assessed, Telehealth visit. [de-identified] : can not be assessed, Telehealth visit.

## 2024-02-25 NOTE — CONSULT LETTER
[Dear  ___] : Dear  [unfilled], [Courtesy Letter:] : I had the pleasure of seeing your patient, [unfilled], in my office today. [Please see my note below.] : Please see my note below. [Consult Closing:] : Thank you very much for allowing me to participate in the care of this patient.  If you have any questions, please do not hesitate to contact me. [Sincerely,] : Sincerely, [FreeTextEntry3] : Georgina Garcia MD Director, Pediatric Epilepsy [facility] , Pediatric Neurology Residency , [employer] [facility] at [address], [place] [address] Phone: [phone] Fax: [phone]

## 2024-02-26 ENCOUNTER — NON-APPOINTMENT (OUTPATIENT)
Age: 24
End: 2024-02-26

## 2024-03-04 NOTE — PATIENT PROFILE PEDIATRIC. - FINANCIAL/ENVIRONMENTAL CONCERNS, OB PROFILE
Bill For Surgical Tray: no Size Of Lesion In Cm (Optional): 0 Biopsy Type: Frozen Section Anesthesia Volume In Cc: 1 Scc In Situ Histology Text: Full-thickness atypia of keratinocytes in the epidermis, with no invasion of dermis seen in the sections examined. Bcc Infiltrative Histology Text: There were numerous aggregates of basaloid cells demonstrating an infiltrative pattern. Processing Note: The specimen was frozen in the cryostat, sectioned and stained. Scar Histology Text: Thickened dermal collagen consistent with scar. Scc Sarcomatoid Histology Text: Squamous epithelial cells arising from the epidermis and extending into the dermis in a sarcomatous pattern. Afx Histology Text: Dermal pleomorphic spindle cells with mitoses. Compound Nevus With Mild Atypia Histology Text: Nests of melanocytes are found at the dermoepidermal junction and within the dermis, with mild atypia. Scc Histology Text: Squamous epithelial cells arising from the epidermis and extending into the dermis. Post-Care Instructions: I reviewed with the patient in detail post-care instructions. Patient is to keep the biopsy site bandage dry overnight, and then apply vaseline under occlusion daily until healed. Bcc Superficial Pigmented Histology Text: There were numerous aggregates of basaloid cells demonstrating a superficial pattern. Billing Type: Third-Party Bill Verruca Vulgaris Histology Text: Papillomatous epidermis with hypergranulomatosis and overlying tiers of parakeratosis. Irritated Seborrheic Keratosis Histology Text: Benign proliferation of epidermal keratinocytes with hyperkeratosis and horn cysts. Bcc Macronodular Histology Text: There were numerous aggregates of basaloid cells. Mixed Nodular And Infiltrative Bcc Histology Text: There were numerous aggregates of basaloid cells demonstrating a nodular and infiltrative pattern. Depth Of Biopsy: dermis Scc Well Differentiated Histology Text: Well-differentiated squamous epithelial cells arising from the epidermis and extending into the dermis. Mixed Superficial And Nodular Bcc Histology Text: There were numerous aggregates of basaloid cells demonstrating a nodular and superficial pattern. Scc Crateriform Histology Text: Squamous epithelial cells arising from the epidermis and extending into the dermis, with crateriform pattern. Bcc  Nodulocystic Histology Text: There were numerous aggregates of basaloid cells demonstrating a nodulocystic pattern. Bcc Micronodular Histology Text: There were numerous aggregates of basaloid cells demonstrating a micronodular pattern. Notification Instructions: Patient notified of results in office on the same day.  If patient elected to leave office, they were advised to call later that day for results. Metatypical Bcc Histology Text: There were numerous aggregates of basaloid cells demonstrating a metatypical pattern. Basosquamous Cell Carcinoma Histology Text: Aggregates of basaloid cells with areas of squamous differentiation. Merkel Cell Carcinoma Histology Text: Sheets of densely blue cells consistent with Merkel cell carcinoma. Use Enhanced Frozen Section Features: Yes Bcc Keratotic Histology Text: There were numerous aggregates of basaloid cells demonstrating a keratotic pattern. Folliculitis Histology Text: Perifollicular inflammation. Inflamed Seborrheic Keratosis Histology Text: Benign proliferation of epidermal keratinocytes with hyperkeratosis, horn cysts, and lymphocytes. Scc In Situ With Follicular Extension Histology Text: Full-thickness atypia of keratinocytes in the epidermis, extending down along adnexal structures. Bcc  Nodular Histology Text: There were numerous aggregates of basaloid cells demonstrating a nodular pattern. Enhanced Features Information: The enhanced features will allow you to make use of the built in histology library. In this enhanced mode you will not have to select a frozen section diagnosis as this will automatically be based on the chosen impression. The parent diagnosis will also be overridden if you select a different microscopic description. The enhanced features will allow you develop a small library of gross descriptions to use as well. If you prefer the old method please leave the Use Enhanced Frozen Section Features question set to No. Anesthesia Type: 2% lidocaine with epinephrine and a 1:12 solution of 8.4% sodium bicarbonate Actinic Keratosis Histology Text: Atypia of the basilar layer of the epidermis, with parakeratosis. Scc Acantholytic Histology Text: Squamous epithelial cells arising from the epidermis and extending into the dermis in an acantholytic pattern. Consent: Written consent was obtained and risks were reviewed including but not limited to scarring, infection, bleeding, scabbing, incomplete removal, nerve damage and allergy to anesthesia. Scc Moderately Differentiated Histology Text: Moderately differentiated squamous epithelial cells arising from the epidermis and extending into the dermis. no Scc Spindle Histology Text: Spindle-shaped squamous epithelial cells arising from the epidermis and extending into the dermis. Scc Desmoplastic Subtype Histology Text: Desmoplastic squamous epithelial cells arising from the epidermis and extending into the dermis. Fibroepithelioma Of Pinkus Histology Text: Thin anastomosing strands of basaloid cells consistent with fibroepithelioma of Pinkus. Hemostasis: Aluminum Chloride Epidermal Inclusion Cyst Histology Text: Cystic structure containing ining composed of a flattened epithelium. Scc Poorly Differentiated Histology Text: Poorly squamous epithelial cells arising from the epidermis and extending into the dermis. Scc Ka Subtype Histology Text: Glassy squamous epithelial cells arising from the epidermis and extending into the dermis, in a keratoacanthoma pattern. Bcc  Morpheaform/Sclerosing Histology Text: There were numerous aggregates of basaloid cells demonstrating a morpheaform/sclerosing pattern. Bcc Infundibulocystic Histology Text: There were numerous aggregates of basaloid cells demonstrating an infundibulocystic pattern. Biopsy Method: 15 blade Wound Care: Petrolatum Bcc Adenoid Histology Text: There were numerous aggregates of basaloid cells demonstrating an adenoid pattern. Mixed Nodular And Micronodular Bcc Histology Text: There were numerous aggregates of basaloid cells demonstrating a nodular and micronodularl pattern. Detail Level: Detailed

## 2024-03-26 ENCOUNTER — OUTPATIENT (OUTPATIENT)
Dept: OUTPATIENT SERVICES | Age: 24
LOS: 1 days | Discharge: HOME | End: 2024-03-26

## 2024-03-26 DIAGNOSIS — Z98.890 OTHER SPECIFIED POSTPROCEDURAL STATES: Chronic | ICD-10-CM

## 2024-03-26 DIAGNOSIS — Z95.818 PRESENCE OF OTHER CARDIAC IMPLANTS AND GRAFTS: Chronic | ICD-10-CM

## 2024-03-26 DIAGNOSIS — Z95.0 PRESENCE OF CARDIAC PACEMAKER: Chronic | ICD-10-CM

## 2024-03-27 ENCOUNTER — LABORATORY RESULT (OUTPATIENT)
Age: 24
End: 2024-03-27

## 2024-03-27 ENCOUNTER — APPOINTMENT (OUTPATIENT)
Dept: PEDIATRIC HEMATOLOGY/ONCOLOGY | Facility: CLINIC | Age: 24
End: 2024-03-27
Payer: COMMERCIAL

## 2024-03-27 VITALS
TEMPERATURE: 98.06 F | BODY MASS INDEX: 25.46 KG/M2 | RESPIRATION RATE: 19 BRPM | OXYGEN SATURATION: 89 % | HEART RATE: 80 BPM | HEIGHT: 65.91 IN | SYSTOLIC BLOOD PRESSURE: 124 MMHG | DIASTOLIC BLOOD PRESSURE: 74 MMHG | WEIGHT: 156.53 LBS

## 2024-03-27 DIAGNOSIS — Z79.01 LONG TERM (CURRENT) USE OF ANTICOAGULANTS: ICD-10-CM

## 2024-03-27 DIAGNOSIS — I61.9 NONTRAUMATIC INTRACEREBRAL HEMORRHAGE, UNSPECIFIED: ICD-10-CM

## 2024-03-27 DIAGNOSIS — Z98.890 OTHER SPECIFIED POSTPROCEDURAL STATES: ICD-10-CM

## 2024-03-27 PROCEDURE — 99213 OFFICE O/P EST LOW 20 MIN: CPT

## 2024-03-28 DIAGNOSIS — I44.30 UNSPECIFIED ATRIOVENTRICULAR BLOCK: ICD-10-CM

## 2024-03-28 DIAGNOSIS — I47.19 OTHER SUPRAVENTRICULAR TACHYCARDIA: ICD-10-CM

## 2024-03-28 DIAGNOSIS — R56.9 UNSPECIFIED CONVULSIONS: ICD-10-CM

## 2024-03-28 DIAGNOSIS — Q20.4 DOUBLE INLET VENTRICLE: ICD-10-CM

## 2024-03-28 DIAGNOSIS — I61.9 NONTRAUMATIC INTRACEREBRAL HEMORRHAGE, UNSPECIFIED: ICD-10-CM

## 2024-04-04 PROBLEM — Z79.01 ANTICOAGULANT LONG-TERM USE: Status: ACTIVE | Noted: 2021-12-29

## 2024-04-04 PROBLEM — I61.9 HEMORRHAGIC STROKE: Status: ACTIVE | Noted: 2021-07-14

## 2024-04-04 RX ORDER — APIXABAN 5 MG/1
5 TABLET, FILM COATED ORAL
Qty: 60 | Refills: 3 | Status: DISCONTINUED | COMMUNITY
Start: 2021-10-20 | End: 2024-04-04

## 2024-04-04 RX ORDER — APIXABAN 5 MG/1
5 TABLET, FILM COATED ORAL
Qty: 60 | Refills: 3 | Status: DISCONTINUED | COMMUNITY
Start: 2022-04-18 | End: 2024-04-04

## 2024-04-04 RX ORDER — APIXABAN 5 MG/1
5 TABLET, FILM COATED ORAL
Qty: 60 | Refills: 3 | Status: DISCONTINUED | COMMUNITY
Start: 2022-09-28 | End: 2024-04-04

## 2024-04-04 NOTE — PHYSICAL EXAM
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

## 2024-04-04 NOTE — REASON FOR VISIT
[Follow-Up Visit] : a follow-up visit for [Deep Vein Thrombosis] : deep vein thrombosis [Father] : father

## 2024-04-04 NOTE — HISTORY OF PRESENT ILLNESS
[de-identified] : 12/22/17 He failed Coumadin therapy; compliance confirmed; unable to attain therapeutic range with upper limits of therapy. Notified by Cardiology of current changes in cardiac history with notable asymptomatic atrial flutter requiring more aggressive anticoagulant therapy rather than current aspirin regime. 1/10/18 Patient is asymptomatic for chest pain and/or palpitations monitored closely by cardiology; con-medications reconciled--no changes. Remains on Xarelto (rivaroxaban) 15mg twice daily taken with meals as directed for 21 days; then dose will changed to maintenance 20mg once daily beginning on Saturday 1/13/18. Last dose 7:30am today. Denies any minor or major bleeding events.  Once starter kit completed, will require refill RX at local pharmacy. 6/27/18 Interval follow up for H&P with labs. Remains on Xarelto 20mg daily with dinner. Denies any bleeding signs. Reports demonstrated mild thrombocytopenia. Will follow up with labs today.  10/2/19 Remains on Xarelto 20mg once daily with meals; no missed doses. Currently freshman in college for Business major. Bleeding precautions and activity restrictions as per cardiology maintained.  7/8/2020: Jimbo states that he  has not been quite consistent with medication, has forgotten in the past. He bought a pill box 2 weeks ago and has since been compliant as per Jimbo and qing. No bleeding symptoms- did endorse that he bleeds easily from minor cuts, bleeding stops with light pressure. He finished first year college in [place], studying Marketing.  7/29/2021: Jimbo presents to the clinic for his yearly hematology follow up. He was hospitalized in June 2021 for cardiac cath and ablation. Ablation was unsuccessful and it was decided to Cardiovert him.  Hospital course as follows: CV: On 5/27, attempted cardioversion through pacing, although was unsuccessful, and subsequently started medical cardioversion with amiodarone 300mg BID. Restarted on home medications including lisinopril, sildenafil, lasix, aldactone. IART rhythm was broken on 6/1 after cardioversion. However, he went back into IART later that night. Pacemaker was changed to VOO at 70 prior to neurosurgery. Amiodarone, aldactone, and lisinopril were held while critically ill. Epinephrine and vasopressin were utilized to maintain higher maps while EVD in place. On 6/8 he started having runs of ventricular tachycardia. Arrythmia resolved with lidocaine infusion and changing pacemaker mode to VVI. Transitioned to PO mexiletine on 6/13. Pacemaker transitioned back to VOO at 70.  Resp: Re-intubated on 6/1 due to AMS associated with new intracranial bleed. Extubated to HFNC on 6/9. CTA chest on 6/10 showed numerous veno-venous anterior mediastinal collaterals. Weaned to RA on 6/16. Neuro: Four hours after cardioversion, he had an episode of staring and unresponsiveness. Head CT showed severe hemorrhagic stroke in left frontal lobe in the ROCCO-MCA watershed territory with extension to the intraventricular regions. He required urgent surgical decompressive craniectomy and hemorrhage evacuation with EVD placement overnight on 6/1. Serial head CTs showed a new hemorrhage along the EVD catheter tract on 6/6. EVD clamped on 6/10. VEEG placed on 6/10 due to eye deviation and seizure-like activity did not correlate with seizures. He did have a clinical seizure on 6/14 which broke with Ativan. Neurology re-consulted and recommended continuing Keppra at current dose. PM&R consulted for delirium/agitation, recommended starting propranolol for agitation. Delirium improved with propranolol and unclamping EVD. Taken to OR on 6/17 for EVD removal, cranioplasty, and VPS placement.  Repeat CT head 6/18 showed trace hemorrhage R lateral ventricle and interval placement of wire mesh cranioplasty. Repeat CT head on 6/19 and 6/20 was stable, repeat CT head on 6/25 was also stable prior to restarting anticoagulation. Propranolol was weaned per PM&R recs to 5mg q8h on 6/25 and off on 6/27. Heme: Given hemoptysis, hemoglobin was monitored and he did not require any transfusions. Hemoptysis resolved overnight 5/26-5/27. Home xarelto was held and restarted on 5/27. INR on 5/26 was 1.32, and it jumped to 4.94 after hemorrhage was identified. Cause for jump in INR is unclear. In the OR, xarelto reversals were given (Andexxa, KCentra), in addition to multiple units of FFP and blood products. Xarelto was stopped and he was received SubQ Vit K for 3 days. He was given multiple units of FFP for goal INR < 1.5 in order to prevent further bleeding. Hematology was consulted for persistently elevated INR. Thought to be possibly due to hepatic congestion secondary to elevated fontan pressures. INR improved w/ Vitamin K. Hypercoagulopathy workup was sent and is pending. Vitamin K PO 5mg 3x/wk started on 6/14. Will discuss with hematology duration of treatment. Pt was given FFP x1 for elevated INR 1.57 on 6/19. INR remained stable less than 1.5 and vit K 5mg PO 3x/wk was d/c'd on 6/23. INR continued to be trended daily which has been stable, slightly elevated at 1.39.  10/13/21: Jimbo is here for follow up and consideration of starting an anticoagulant given recent findings on cath conveyed to me by his cardiologist Dr Adonis Bradford. Since hospitalization for ICH after cardioversion in June while he was on rivaroxaban;  he was discharged home on  mg which does not seem to control his arrhythmia and he is at high risk for stroke ; here today to discuss anticoagulant options ; doing well, compliant with meds;  no complaints reported, no bleeding from any site; no new meds/ allergies to Meds   12/22/21: Jimbo is here for follow up after starting on apixaban 5 mg BID for stroke prevention given underlying h/o atrial fibrillation related to DORV s/p Fontan, sick sinus syndrome with pacemaker s/p recent cardioversion when he had an ICH ; currently stable on apixaban , doing well, no bleeding from any sites, no missed doses; 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   09/28/22: Jimbo is doing well on apixaban 5 mg BID for stroke prevention given underlying h/o atrial fibrillation related to DORV s/p Fontan, sick sinus syndrome with pacemaker s/p recent cardioversion when he had an ICH; no missed doses, no bleeding from any sites spontaneously but does develop bruises when rough housing with 7 yr old sister; no new meds/ allergies ; has retention issues with memory, not in school, looking into vocational training  [de-identified] : Jimbo is doing well on apixaban 5 mg BID for stroke prevention given underlying h/o atrial fibrillation related to DORV s/p Fontan, sick sinus syndrome with pacemaker s/p recent cardioversion when he had an ICH No missed doses No bleeding from any site He is looking to transition to Adult providers

## 2024-04-04 NOTE — REVIEW OF SYSTEMS
[Fever] : no fever [Sweating] : no sweating [Fatigue] : no fatigue [Weakness] : no weakness [Ecchymoses] : no ecchymoses [Epistaxis] : no epistaxis [Sore Throat] : no sore throat [Pallor] : no pallor [Bleeding] : no bleeding [Bruising] : no bruising [Anemia] : no anemia [Frequent Infections] : no frequent infections [Dyspnea] : no dyspnea [Wheezing] : no wheezing [Stridor] : no stridor [Murmur] : murmur [Edema] : no edema [Palpitations] : no palpitations [Cyanosis] : no cyanosis [Abdominal Pain] : no abdominal pain [Constipation] : no constipation [Negative] : Allergic/Immunologic [FreeTextEntry2] : Asymptomatic atrial flutter pacemaker in place [FreeTextEntry5] : 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

## 2024-05-07 ENCOUNTER — RX RENEWAL (OUTPATIENT)
Age: 24
End: 2024-05-07

## 2024-05-07 RX ORDER — APIXABAN 5 MG/1
5 TABLET, FILM COATED ORAL
Qty: 60 | Refills: 2 | Status: ACTIVE | COMMUNITY
Start: 2023-01-27 | End: 1900-01-01

## 2024-09-04 ENCOUNTER — APPOINTMENT (OUTPATIENT)
Dept: PEDIATRIC HEMATOLOGY/ONCOLOGY | Facility: CLINIC | Age: 24
End: 2024-09-04

## 2024-09-20 ENCOUNTER — APPOINTMENT (OUTPATIENT)
Dept: PEDIATRIC CARDIOLOGY | Facility: CLINIC | Age: 24
End: 2024-09-20

## 2024-09-24 ENCOUNTER — APPOINTMENT (OUTPATIENT)
Dept: PEDIATRIC CARDIOLOGY | Facility: CLINIC | Age: 24
End: 2024-09-24
Payer: MEDICAID

## 2024-09-24 ENCOUNTER — NON-APPOINTMENT (OUTPATIENT)
Age: 24
End: 2024-09-24

## 2024-09-24 VITALS
BODY MASS INDEX: 26.54 KG/M2 | HEIGHT: 66.14 IN | HEART RATE: 89 BPM | DIASTOLIC BLOOD PRESSURE: 89 MMHG | OXYGEN SATURATION: 91 % | SYSTOLIC BLOOD PRESSURE: 127 MMHG | WEIGHT: 165.13 LBS

## 2024-09-24 DIAGNOSIS — I47.19 OTHER SUPRAVENTRICULAR TACHYCARDIA: ICD-10-CM

## 2024-09-24 DIAGNOSIS — I44.30 UNSPECIFIED ATRIOVENTRICULAR BLOCK: ICD-10-CM

## 2024-09-24 PROCEDURE — 99214 OFFICE O/P EST MOD 30 MIN: CPT | Mod: 25

## 2024-09-24 PROCEDURE — 93288 INTERROG EVL PM/LDLS PM IP: CPT

## 2024-09-24 RX ORDER — LEVETIRACETAM 500 MG/1
500 TABLET, FILM COATED ORAL TWICE DAILY
Refills: 0 | Status: ACTIVE | COMMUNITY
Start: 2024-09-24

## 2024-09-24 RX ORDER — MEXILETINE HYDROCHLORIDE 150 MG/1
150 CAPSULE ORAL
Refills: 0 | Status: ACTIVE | COMMUNITY
Start: 2024-09-24

## 2024-09-24 NOTE — HISTORY OF PRESENT ILLNESS
[FreeTextEntry1] : It was a pleasure to see Jimbo Myers today in the EP clinic for the follow up of pacemaker. 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 He underwent placement of dual-chamber epicardial leads for AV sequential pacing and antitachycardia functionality for sick sinus node syndrome with AV mihir disease, tachybradycardia syndrome and IART.  He developed features of failing Fontan in 2016- cardiac cath revealed LV dysfunction with elevated PVR and mild fontan obstruction; a stent was placed at the site of Fontan stenosis, pulmonary vasodilators and CHF meds were initiated and cardiac resynchronization therapy with placement of additional ventricular lead led to an excellent clinical response and he was noted to be doing well until June, 2020 he presented to the ER with syncope. CT head was performed and was normal, however CXR revealed a likely RV fracture and upon device interrogation the RV lead impedance did increase. He has previously been on coumadin for anti-coagulation but it was changed to Rivoraxiban by Hematology.    He was admitted to PICU in Sept, 2020 after being seen in the clinic for A. fib that required cardioversion. He was last seen in the clinic in October 2022 when he was doing well. Today he denies any symptoms of chest pain, palpitations, dizziness or syncope. He states that he does not do much activity and is currently not working or in school.  He remains on Mexiletine for his NS Vt that was noted during his ICU admission post ablation.   His PCP has been renewing all of his medications, he has not been seen by cardiology since January 2023.     Since his last visit in 2022, he states that he feels "110 %" better then he ever has.  He is able to walk but does get tired after walking and going up stairs.  He had a seizure last year where they increased his keppra and has been seizure free ever since.  He has been getting his Mexiletine prescribed via you.

## 2024-09-24 NOTE — PHYSICAL EXAM
[General Appearance - Alert] : alert [General Appearance - In No Acute Distress] : in no acute distress [General Appearance - Well Nourished] : well nourished [General Appearance - Well Developed] : well developed [General Appearance - Well-Appearing] : well appearing [Appearance Of Head] : the head was normocephalic [Facies] : there were no dysmorphic facial features [Sclera] : the conjunctiva were normal [Outer Ear] : the ears and nose were normal in appearance [Examination Of The Oral Cavity] : mucous membranes were moist and pink [Auscultation Breath Sounds / Voice Sounds] : breath sounds clear to auscultation bilaterally [Normal Chest Appearance] : the chest was normal in appearance [Apical Impulse] : quiet precordium with normal apical impulse [Heart Rate And Rhythm] : normal heart rate and rhythm [Heart Sounds] : normal S1 and S2 [Heart Sounds Gallop] : no gallops [Heart Sounds Pericardial Friction Rub] : no pericardial rub [Edema] : no edema [Arterial Pulses] : normal upper and lower extremity pulses with no pulse delay [Heart Sounds Click] : no clicks [Capillary Refill Test] : normal capillary refill [II] : a grade 2/6 [LLSB] : LLSB  [Holosystolic] : holosystolic [Bowel Sounds] : normal bowel sounds [Abdomen Soft] : soft [Nondistended] : nondistended [Abdomen Tenderness] : non-tender [Nail Clubbing] : no clubbing  or cyanosis of the fingers [Motor Tone] : normal muscle strength and tone [Cervical Lymph Nodes Enlarged Anterior] : The anterior cervical nodes were normal [] : no rash [Cervical Lymph Nodes Enlarged Posterior] : The posterior cervical nodes were normal [Skin Lesions] : no lesions [Skin Turgor] : normal turgor [Demonstrated Behavior - Infant Nonreactive To Parents] : interactive [Mood] : mood and affect were appropriate for age [Demonstrated Behavior] : normal behavior

## 2024-09-24 NOTE — END OF VISIT
[Time Spent: ___ minutes] : I have spent [unfilled] minutes of time on the encounter which excludes teaching and separately reported services. [FreeTextEntry3] : I, Dr. Christensen, personally performed the evaluation and management (E/M) services for this established patient who presents today. That E/M includes conducting the examination, assessing all new/exacerbated conditions. reassessing pre-existing conditions, and establishing a new or updated plan of care. Today, the RN documented the Chief Complaint, source of information and performed med and allergy reconciliation with or without education.  The timing listed under billing is the time I personally spent reviewing material, evaluating the patient, discussing management issues, coordinating services, and making documentation.  I, Dr. Christensen, personally performed the evaluation and management (E/M) services for this established patient who presents today. That E/M includes conducting the examination, assessing all new/exacerbated conditions. reassessing pre-existing conditions, and establishing a new or updated plan of care. Today, my ACP, Yaw Whitfield, was here to perform the pacemaker or ICD interrogation under my supervision.  The timing listed under billing is the time I personally spent reviewing material, evaluating the patient, discussing management issues, coordinating services, and making documentation.

## 2024-09-24 NOTE — DISCUSSION/SUMMARY
[Needs SBE Prophylaxis] : [unfilled]  needs bacterial endocarditis prophylaxis. SBE prophylaxis is indicated for dental and invasive ENT procedures. (Circulation. 2007; 116: 9028-7908) [FreeTextEntry1] : In summary Jimbo is a 24 year old with history of DILV, L-malposition of the great arteries status post lateral tunnel Fontan with sick sinus node syndrome, AV block with tachybradycardia syndrome (and IART), s/p dual chamber pacemaker and CRT system who is seen today for follow up. He has an RV lead fracture and is only LV paced now. His device today is functioning well , no changes made today. He will send a transmission in 3 months. He will Dr Londono  later this as well. He will continue his Eliquis under the direction of hematology. Depending upon the decision or not to refer him for transplant, I may discuss reattempt at ablation at his next visit.  I will discuss with Dr Londono after his appointment.  We will see him in 6 months.

## 2024-09-24 NOTE — CONSULT LETTER
[Today's Date] : [unfilled] [Dear  ___:] : Dear Dr. [unfilled]: [Sincerely,] : Sincerely, [Consult - Multiple Provider] : Thank you very much for allowing us to participate in the care of this patient. If you have any questions, please do not hesitate to contact us. [FreeTextEntry4] : Dr More [FreeTextEntry5] : [address] [facility] [FreeTextEntry6] : [place] [id number] [de-identified] : Yaw Whitfield, GILMAR CPNP Cardiology Nurse Practitioner [facility]     Issa Christensen MD, FHRS Associate Chief, Pediatric Cardiology , Pediatric Cardiac Electrophysiology [facility] Professor [facility] Pediatrics [facility]

## 2024-09-24 NOTE — CARDIOLOGY SUMMARY
[Today's Date] : [unfilled] [de-identified] : PACEMAKER EVALUATION (See separate note for details): Presenting rhythm was AT  @ 75 bpm. Underlying AT & CHB @ 51. The pacemaker mode is VVIR @ 70 bpm. He has 9 years remaining on the battery. Adequate lead parameters. No Changes made.

## 2024-09-27 ENCOUNTER — APPOINTMENT (OUTPATIENT)
Dept: PEDIATRIC CARDIOLOGY | Facility: CLINIC | Age: 24
End: 2024-09-27
Payer: MEDICAID

## 2024-09-27 VITALS
BODY MASS INDEX: 26.93 KG/M2 | HEART RATE: 90 BPM | DIASTOLIC BLOOD PRESSURE: 72 MMHG | WEIGHT: 165.57 LBS | SYSTOLIC BLOOD PRESSURE: 114 MMHG | HEIGHT: 65.75 IN | OXYGEN SATURATION: 89 %

## 2024-09-27 DIAGNOSIS — Q20.4 DOUBLE INLET VENTRICLE: ICD-10-CM

## 2024-09-27 DIAGNOSIS — I49.5 SICK SINUS SYNDROME: ICD-10-CM

## 2024-09-27 DIAGNOSIS — Z98.890 OTHER SPECIFIED POSTPROCEDURAL STATES: ICD-10-CM

## 2024-09-27 DIAGNOSIS — I27.20 PULMONARY HYPERTENSION, UNSPECIFIED: ICD-10-CM

## 2024-09-27 PROCEDURE — 93303 ECHO TRANSTHORACIC: CPT

## 2024-09-27 PROCEDURE — 93000 ELECTROCARDIOGRAM COMPLETE: CPT

## 2024-09-27 PROCEDURE — 93325 DOPPLER ECHO COLOR FLOW MAPG: CPT

## 2024-09-27 PROCEDURE — 93320 DOPPLER ECHO COMPLETE: CPT

## 2024-09-27 PROCEDURE — 99215 OFFICE O/P EST HI 40 MIN: CPT | Mod: 25

## 2024-09-27 RX ORDER — SILDENAFIL 20 MG/1
20 TABLET ORAL
Refills: 0 | Status: ACTIVE | COMMUNITY
Start: 2024-09-24

## 2024-09-27 NOTE — PHYSICAL EXAM
[General Appearance - Alert] : alert [General Appearance - Well Nourished] : well nourished [General Appearance - In No Acute Distress] : in no acute distress [Attitude Uncooperative] : cooperative [Facies] : the head and face were normal in appearance [Sclera] : the conjunctiva were normal [Outer Ear] : the ears and nose were normal in appearance [Examination Of The Oral Cavity] : mucous membranes were moist and pink [No Cough] : no cough [Stridor] : no stridor was observed [Auscultation Breath Sounds / Voice Sounds] : breath sounds clear to auscultation bilaterally [Respiration, Rhythm And Depth] : normal respiratory rhythm and effort [Chest Visual Inspection Thoracic Deformity] : no chest wall deformity [Chest Surgical / Traumatic Scar] : chest incision well healed [Chest Palpation Tender Sternum] : no chest wall tenderness [Heart Rate And Rhythm] : normal heart rate and rhythm [Apical Impulse] : quiet precordium with normal apical impulse [Heart Sounds Gallop] : no gallops [Heart Sounds Pericardial Friction Rub] : no pericardial rub [Edema] : no edema [Arterial Pulses] : normal upper and lower extremity pulses with no pulse delay [Capillary Refill Test] : normal capillary refill [Systolic] : systolic [III] : a grade 3/6   [LLSB] : LLSB  [[place]] : the murmur was transmitted to the apex [Bowel Sounds] : normal bowel sounds [Abdomen Soft] : soft [Nondistended] : nondistended [Abdomen Tenderness] : non-tender [Abnormal Walk] : normal gait [] : no rash [Skin Turgor] : normal turgor [Demonstrated Behavior - Infant Nonreactive To Parents] : interactive [Mood] : mood and affect were appropriate for age [Demonstrated Behavior] : normal behavior

## 2024-09-27 NOTE — PHYSICAL EXAM
[General Appearance - Alert] : alert [General Appearance - Well Nourished] : well nourished [General Appearance - In No Acute Distress] : in no acute distress [Attitude Uncooperative] : cooperative [Facies] : the head and face were normal in appearance [Sclera] : the conjunctiva were normal [Outer Ear] : the ears and nose were normal in appearance [Examination Of The Oral Cavity] : mucous membranes were moist and pink [No Cough] : no cough [Stridor] : no stridor was observed [Auscultation Breath Sounds / Voice Sounds] : breath sounds clear to auscultation bilaterally [Respiration, Rhythm And Depth] : normal respiratory rhythm and effort [Chest Visual Inspection Thoracic Deformity] : no chest wall deformity [Chest Surgical / Traumatic Scar] : chest incision well healed [Chest Palpation Tender Sternum] : no chest wall tenderness [Heart Rate And Rhythm] : normal heart rate and rhythm [Apical Impulse] : quiet precordium with normal apical impulse [Heart Sounds Gallop] : no gallops [Heart Sounds Pericardial Friction Rub] : no pericardial rub [Edema] : no edema [Arterial Pulses] : normal upper and lower extremity pulses with no pulse delay [Capillary Refill Test] : normal capillary refill [Systolic] : systolic [III] : a grade 3/6   [LLSB] : LLSB  [Waves] : the murmur was transmitted to the apex [Bowel Sounds] : normal bowel sounds [Abdomen Soft] : soft [Nondistended] : nondistended [Abdomen Tenderness] : non-tender [Abnormal Walk] : normal gait [] : no rash [Skin Turgor] : normal turgor [Demonstrated Behavior - Infant Nonreactive To Parents] : interactive [Mood] : mood and affect were appropriate for age [Demonstrated Behavior] : normal behavior

## 2024-10-01 ENCOUNTER — APPOINTMENT (OUTPATIENT)
Dept: PEDIATRIC CARDIOLOGY | Facility: CLINIC | Age: 24
End: 2024-10-01

## 2024-10-02 NOTE — CONSULT LETTER
[Today's Date] : [unfilled] [Name] : Name: [unfilled] [] : : ~~ [Today's Date:] : [unfilled] [Dear  ___:] : Dear Dr. [unfilled]: [Consult] : I had the pleasure of evaluating your patient, [unfilled]. My full evaluation follows. [Consult - Multiple Provider] : Thank you very much for allowing us to participate in the care of this patient. If you have any questions, please do not hesitate to contact us. [Sincerely,] : Sincerely, [DrAmarilys  ___] : Dr. WU [DrAmarilys ___] : Dr. WU [FreeTextEntry4] : Amari More MD [FreeTextEntry5] : [address] [facility] [FreeTextEntry6] : [place] [id number] [de-identified] : Josh Kessler MD - Pediatric Cardiology Fellow (PGY-5)  Adonis Londono MD Director, Pediatric catheterization Lab [facility] , [facility] [employer] Telephone: [phone] [phone]  Fax: [phone]

## 2024-10-02 NOTE — HISTORY OF PRESENT ILLNESS
[FreeTextEntry1] : We had the opportunity to evaluate TRINI MÉNDEZ at pediatric cardiology department at [address] [place] [address], on 09/27/2024. As you know, he is a 24-year-old with history of double inlet left ventricle, L-malposition of the great arteries status post Vjjit-Gnvi-Cmijais anastomosis and aortic arch reconstruction followed by a fenestrated lateral tunnel Fontan completion (now s/p fenestration closure). He underwent placement of dual-chamber epicardial leads for AV sequential pacing and antitachycardia functionality for sick sinus node syndrome with AV mihir disease, tachybradycardia syndrome and IART.  He developed features of failing Fontan in 2016- cardiac cath revealed LV dysfunction with elevated PVR and mild fontan obstruction; a stent was placed at the site of Fontan stenosis, pulmonary vasodilators and CHF meds were initiated and cardiac resynchronization therapy with placement of additional ventricular lead led to an excellent clinical response.  TODAY (9/27/24) Trini reports no symptoms and is doing well clinically. He babysits his 2 nephews and currently does not study. He reports shortness of breath when walking up ~2-3 floors of stairs, which is his baseline. His only acute concern is that he thinks his memory is not good recently. He denies chest pain, palpitations, diarrhea, cough, dizziness, syncope or recent illness. He reports compliance with is medications and was last seen in the EP clinic in September 2024 when his pacemaker was evaluated: 09/24/2024. PACEMAKER EVALUATION (See separate note for details): Presenting rhythm was AT  @ 75 bpm. Underlying AT & CHB @ 51. The pacemaker mode is VVIR @ 70 bpm. He has 9 years remaining on the battery. Adequate lead parameters. No Changes made.      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 In 2016 he presented to us with evidence of failing fontan with symptoms of SOB, exercise intolerance, ascites and fatigue. He had evidence of LV dysfunction, PHT and mild fontan obstruction. A stent was placed, pulmonary vasodilators initiated, CHF meds given and CRT done due to concerns of pacemaker induced cardiomyopathy. He had an excellent response.  When we evaluated on December 14, 2017 we found he was on IART (atrial flutter). Interestingly, he was not having symptoms. He did not experience chest pain, palpitations, or syncope. Our electrophysiologist attempted to terminate his atrial flutter by over-pacing without success. He was taking Aspirin 325 mg PO q 24 hours. There was no thrombus on his echocardiogram and after talking with hematology he was started on Xarelto 20 mg PO BID to have a more aggressive prophylaxis since we were not able to terminate the atrial flutter. Of note: prior to Aspirin he was taking Warfarin, but this medication was stopped due to interactions with sildenafil. He continues to take his other medication which include, Enalapril 2.5 mg PO BID, Furosemide 20 mg PO q 24 hours, Spironolactone 100 mg PO daily and Sotalol 120 mg PO q 12 hours.  He is status post CRT of his single ventricle (LV now with 2 epicardial leads) and pacemaker generator replacement via left thoracotomy on December 5, 2016.  He had a syncopal episode(June 2020) presumably due to fractured RV pacing lead causing bradycardia. Since he had CRT with 2 ventricular leads, Dr Christensen reprogrammed the leads and recommended surveillance for now. He has remained asymptomatic. Cardiac function remained unchanged as well. He was also noncompliant with Xarelto and sildenafil due to its midday schedule in the past but now has a pill box that he carries and has been compliant with his medications.  He was admitted in PICU (September, 2020) after he was found to be in A fib for almost 3 days and required cardioversion. He was asymptomatic at that time. During this admission his Sotalol was increased to 120 mg Q12h. Trini presented in an atrially paced and biventricularly paced rhythm.@ 80 bpm. His underlying is V paced @ 35 bpm. He is DDDR  bpm.    His cardiac history can be summarized as follows:  11/02/01, Cath - Assessment of anatomy and hemodynamics prior to surgery  11/08/01, OR - Tfkwp-Xnte-Hyzhbwo anastomosis with a modified right Nirmal-Taussig shunt.  03/15/03, OR - Celestine-Fontan with ligation of BT shunt and left pulmonary artery plasty  04/13/04, Cath - Coil embolization of collateral vessels  05/19/04, OR - Lateral tunnel fenestrated Fontan  05/26/04, OR - Pacemaker placement  7/25/11, Cath- Normal CI 3.1L/min/m2, Qp:Qs 1:1, normal pressure in Fontan circuit (mean 15 mmHg), balloon angioplasty of superior narrowing of Fontan circuit with improvement seen. Balloon angioplasty of LPA with improvement of stenosis. ASD device placed for closure of Fontan fenestration  10/24/16, Cath- Cath for Fontan failure as evidenced by increasing exercise intolerance as well as ascites. Low normal CI of 2.7 L/min/ m2. Elevated Fontan pressures of 24 mmHg. Elevated PVR (3.7) that responded well to Apryl pulmonary vasodilator challenge (decreased to 1.4). Angiographic evidence of mild intracardiac Fontan obstruction without any pressure gradient, relieved by placing a 36mm X 12mm stent (IntraStent Max LD) mounted on a 25mm balloon. Post intervention Fontan pressure decreased to 20 mmHg.

## 2024-10-02 NOTE — DISCUSSION/SUMMARY
[Needs SBE Prophylaxis] : [unfilled]  needs bacterial endocarditis prophylaxis. SBE prophylaxis is indicated for dental and invasive ENT procedures. (Circulation. 2007; 116: 7707-5636) [Participate only in Mild PE activities] : [unfilled] may participate ONLY IN MILD physical education activities such as [place] games, golf, and badminton. 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 He had an unfortunate episode of cranial bleed 24 hours after an unsuccessful ablation attempt on May 26th, 2021. He is status post cranial plasty and the  shunt. Jimbo has near complete neurological recovery, last seizure >1 year ago and he is still maintained on Keppra.   From a cardiac standpoint, he is fairly stable, remains in IART with a controlled paced ventricular rate of ~85 bpm on mexiletine. His echo today showed mildly decreased function with mild mitral regurgitation. During his last cardiac catheterization in 2016, he had evidence of elevated Fontan pressures of 24 mmHg and angiographic evidence of mild Fontan obstruction that was relieved by placing a stent with excellent results and a decline of Fontan pressure to 20 mmHg. He also had evidence of mildly elevated pulmonary vascular resistance up to 3.7 that responded well to pulmonary vasodilator challenge and decreased to 1.4. He remains on oral sildenafil. He is a failing Fontan and NYHA class II. we again discussed about the need for referral to cardiac failure/transplant evaluation and we called Dr David Leal [facility] team at [place] for transplant evaluation. It was scheduled for October 31st. No management changes were made, and follow-up recommended in 3 months with me and Dr Talbot for transition of care.   All questions were answered, Jimbo and his grandfather expressed understanding and agreement with plan of care.

## 2024-10-02 NOTE — DISCUSSION/SUMMARY
[Needs SBE Prophylaxis] : [unfilled]  needs bacterial endocarditis prophylaxis. SBE prophylaxis is indicated for dental and invasive ENT procedures. (Circulation. 2007; 116: 9032-9608) [Participate only in Mild PE activities] : [unfilled] may participate ONLY IN MILD physical education activities such as [place] games, golf, and badminton. 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 He had an unfortunate episode of cranial bleed 24 hours after an unsuccessful ablation attempt on May 26th, 2021. He is status post cranial plasty and the  shunt. Jimbo has near complete neurological recovery, last seizure >1 year ago and he is still maintained on Keppra.   From a cardiac standpoint, he is fairly stable, remains in IART with a controlled paced ventricular rate of ~85 bpm on mexiletine. His echo today showed mildly decreased function with mild mitral regurgitation. During his last cardiac catheterization in 2016, he had evidence of elevated Fontan pressures of 24 mmHg and angiographic evidence of mild Fontan obstruction that was relieved by placing a stent with excellent results and a decline of Fontan pressure to 20 mmHg. He also had evidence of mildly elevated pulmonary vascular resistance up to 3.7 that responded well to pulmonary vasodilator challenge and decreased to 1.4. He remains on oral sildenafil. He is a failing Fontan and NYHA class II. we again discussed about the need for referral to cardiac failure/transplant evaluation and we called Dr David Leal [facility] team at [facility] for transplant evaluation. It was scheduled for October 31st. No management changes were made, and follow-up recommended in 3 months with me and Dr Talbot for transition of care.   All questions were answered, Jimbo and his grandfather expressed understanding and agreement with plan of care.

## 2024-10-02 NOTE — CONSULT LETTER
[Today's Date] : [unfilled] [Name] : Name: [unfilled] [] : : ~~ [Today's Date:] : [unfilled] [Dear  ___:] : Dear Dr. [unfilled]: [Consult] : I had the pleasure of evaluating your patient, [unfilled]. My full evaluation follows. [Consult - Multiple Provider] : Thank you very much for allowing us to participate in the care of this patient. If you have any questions, please do not hesitate to contact us. [Sincerely,] : Sincerely, [DrAmarilys  ___] : Dr. WU [DrAmarilys ___] : Dr. WU [FreeTextEntry4] : Amari More MD [FreeTextEntry5] : [address] [facility] [FreeTextEntry6] : [place] [id number] [de-identified] : Josh Kessler MD - Pediatric Cardiology Fellow (PGY-5)  Adonis Londono MD Director, Pediatric catheterization Lab [facility] , [facility] Telephone: [phone] [phone]  Fax: [phone]

## 2024-10-02 NOTE — CARDIOLOGY SUMMARY
[Today's Date] : [unfilled] [FreeTextEntry1] : Atrial flutter, ventricle paced. Ventricular rate 86bpm. [FreeTextEntry2] : mildly decreased function with mild mitral regurgitation.

## 2024-10-02 NOTE — HISTORY OF PRESENT ILLNESS
[FreeTextEntry1] : We had the opportunity to evaluate TRINI MÉNDEZ at pediatric cardiology department at [address] [place] [address], on 09/27/2024. As you know, he is a 24-year-old with history of double inlet left ventricle, L-malposition of the great arteries status post Dhfmo-Emcu-Bsrjawp anastomosis and aortic arch reconstruction followed by a fenestrated lateral tunnel Fontan completion (now s/p fenestration closure). He underwent placement of dual-chamber epicardial leads for AV sequential pacing and antitachycardia functionality for sick sinus node syndrome with AV mihir disease, tachybradycardia syndrome and IART.  He developed features of failing Fontan in 2016- cardiac cath revealed LV dysfunction with elevated PVR and mild fontan obstruction; a stent was placed at the site of Fontan stenosis, pulmonary vasodilators and CHF meds were initiated and cardiac resynchronization therapy with placement of additional ventricular lead led to an excellent clinical response.  TODAY (9/27/24) Trini reports no symptoms and is doing well clinically. He babysits his 2 nephews and currently does not study. He reports shortness of breath when walking up ~2-3 floors of stairs, which is his baseline. His only acute concern is that he thinks his memory is not good recently. He denies chest pain, palpitations, diarrhea, cough, dizziness, syncope or recent illness. He reports compliance with is medications and was last seen in the EP clinic in September 2024 when his pacemaker was evaluated: 09/24/2024. PACEMAKER EVALUATION (See separate note for details): Presenting rhythm was AT  @ 75 bpm. Underlying AT & CHB @ 51. The pacemaker mode is VVIR @ 70 bpm. He has 9 years remaining on the battery. Adequate lead parameters. No Changes made.      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 In 2016 he presented to us with evidence of failing fontan with symptoms of SOB, exercise intolerance, ascites and fatigue. He had evidence of LV dysfunction, PHT and mild fontan obstruction. A stent was placed, pulmonary vasodilators initiated, CHF meds given and CRT done due to concerns of pacemaker induced cardiomyopathy. He had an excellent response.  When we evaluated on December 14, 2017 we found he was on IART (atrial flutter). Interestingly, he was not having symptoms. He did not experience chest pain, palpitations, or syncope. Our electrophysiologist attempted to terminate his atrial flutter by over-pacing without success. He was taking Aspirin 325 mg PO q 24 hours. There was no thrombus on his echocardiogram and after talking with hematology he was started on Xarelto 20 mg PO BID to have a more aggressive prophylaxis since we were not able to terminate the atrial flutter. Of note: prior to Aspirin he was taking Warfarin, but this medication was stopped due to interactions with sildenafil. He continues to take his other medication which include, Enalapril 2.5 mg PO BID, Furosemide 20 mg PO q 24 hours, Spironolactone 100 mg PO daily and Sotalol 120 mg PO q 12 hours.  He is status post CRT of his single ventricle (LV now with 2 epicardial leads) and pacemaker generator replacement via left thoracotomy on December 5, 2016.  He had a syncopal episode(June 2020) presumably due to fractured RV pacing lead causing bradycardia. Since he had CRT with 2 ventricular leads, Dr Christensen reprogrammed the leads and recommended surveillance for now. He has remained asymptomatic. Cardiac function remained unchanged as well. He was also noncompliant with Xarelto and sildenafil due to its midday schedule in the past but now has a pill box that he carries and has been compliant with his medications.  He was admitted in PICU (September, 2020) after he was found to be in A fib for almost 3 days and required cardioversion. He was asymptomatic at that time. During this admission his Sotalol was increased to 120 mg Q12h. Trini presented in an atrially paced and biventricularly paced rhythm.@ 80 bpm. His underlying is V paced @ 35 bpm. He is DDDR  bpm.    His cardiac history can be summarized as follows:  11/02/01, Cath - Assessment of anatomy and hemodynamics prior to surgery  11/08/01, OR - Htrhg-Pekd-Pqmflwl anastomosis with a modified right Nirmal-Taussig shunt.  03/15/03, OR - Celestine-Fontan with ligation of BT shunt and left pulmonary artery plasty  04/13/04, Cath - Coil embolization of collateral vessels  05/19/04, OR - Lateral tunnel fenestrated Fontan  05/26/04, OR - Pacemaker placement  7/25/11, Cath- Normal CI 3.1L/min/m2, Qp:Qs 1:1, normal pressure in Fontan circuit (mean 15 mmHg), balloon angioplasty of superior narrowing of Fontan circuit with improvement seen. Balloon angioplasty of LPA with improvement of stenosis. ASD device placed for closure of Fontan fenestration  10/24/16, Cath- Cath for Fontan failure as evidenced by increasing exercise intolerance as well as ascites. Low normal CI of 2.7 L/min/ m2. Elevated Fontan pressures of 24 mmHg. Elevated PVR (3.7) that responded well to Apryl pulmonary vasodilator challenge (decreased to 1.4). Angiographic evidence of mild intracardiac Fontan obstruction without any pressure gradient, relieved by placing a 36mm X 12mm stent (IntraStent Max LD) mounted on a 25mm balloon. Post intervention Fontan pressure decreased to 20 mmHg.

## 2024-10-02 NOTE — REASON FOR VISIT
[Follow-Up] : a follow-up visit for [Father] : father [Patient] : patient [FreeTextEntry3] : complex congenital heart disease

## 2024-11-01 ENCOUNTER — APPOINTMENT (OUTPATIENT)
Dept: PEDIATRIC CARDIOLOGY | Facility: CLINIC | Age: 24
End: 2024-11-01

## 2025-03-06 ENCOUNTER — APPOINTMENT (OUTPATIENT)
Dept: NEUROLOGY | Facility: CLINIC | Age: 25
End: 2025-03-06

## 2025-03-18 ENCOUNTER — APPOINTMENT (OUTPATIENT)
Dept: PEDIATRIC CARDIOLOGY | Facility: CLINIC | Age: 25
End: 2025-03-18

## 2025-04-02 ENCOUNTER — RX RENEWAL (OUTPATIENT)
Age: 25
End: 2025-04-02

## 2025-05-01 ENCOUNTER — NON-APPOINTMENT (OUTPATIENT)
Age: 25
End: 2025-05-01

## 2025-05-02 ENCOUNTER — APPOINTMENT (OUTPATIENT)
Dept: NEUROLOGY | Facility: CLINIC | Age: 25
End: 2025-05-02

## 2025-05-02 VITALS
WEIGHT: 167 LBS | BODY MASS INDEX: 26.21 KG/M2 | DIASTOLIC BLOOD PRESSURE: 80 MMHG | RESPIRATION RATE: 19 BRPM | OXYGEN SATURATION: 98 % | HEIGHT: 67 IN | HEART RATE: 90 BPM | SYSTOLIC BLOOD PRESSURE: 108 MMHG

## 2025-05-02 DIAGNOSIS — G40.109 LOCALIZATION-RELATED (FOCAL) (PARTIAL) SYMPTOMATIC EPILEPSY AND EPILEPTIC SYNDROMES WITH SIMPLE PARTIAL SEIZURES, NOT INTRACTABLE, W/OUT STATUS EPILEPTICUS: ICD-10-CM

## 2025-05-02 DIAGNOSIS — R41.844 FRONTAL LOBE AND EXECUTIVE FUNCTION DEFICIT: ICD-10-CM

## 2025-05-02 DIAGNOSIS — R56.9 UNSPECIFIED CONVULSIONS: ICD-10-CM

## 2025-05-02 DIAGNOSIS — F07.0 PERSONALITY CHANGE DUE TO KNOWN PHYSIOLOGICAL CONDITION: ICD-10-CM

## 2025-05-02 PROCEDURE — 99205 OFFICE O/P NEW HI 60 MIN: CPT

## 2025-05-02 RX ORDER — LEVETIRACETAM 1000 MG/1
1000 TABLET, FILM COATED ORAL TWICE DAILY
Refills: 0 | Status: DISCONTINUED | COMMUNITY

## 2025-05-03 PROBLEM — R56.9 SEIZURE: Noted: 2021-07-14

## 2025-05-03 PROBLEM — G40.109 EPILEPSY, LOCALIZATION-RELATED: Status: ACTIVE | Noted: 2025-05-03

## 2025-05-03 PROBLEM — R41.844 FRONTAL LOBE AND EXECUTIVE FUNCTION DEFICIT: Status: ACTIVE | Noted: 2025-05-03

## 2025-05-03 RX ORDER — SILDENAFIL 20 MG/1
20 TABLET ORAL
Refills: 0 | Status: ACTIVE | COMMUNITY

## 2025-05-03 RX ORDER — EMPAGLIFLOZIN 10 MG/1
10 TABLET, FILM COATED ORAL
Refills: 0 | Status: ACTIVE | COMMUNITY

## 2025-05-05 RX ORDER — LEVETIRACETAM 500 MG/1
500 TABLET, FILM COATED, EXTENDED RELEASE ORAL
Qty: 360 | Refills: 3 | Status: ACTIVE | COMMUNITY
Start: 2025-05-02 | End: 1900-01-01

## 2025-07-04 RX ORDER — APIXABAN 5 MG/1
5 TABLET, FILM COATED ORAL
Qty: 60 | Refills: 2 | Status: ACTIVE | COMMUNITY
Start: 2025-07-04 | End: 1900-01-01

## 2025-08-13 ENCOUNTER — APPOINTMENT (OUTPATIENT)
Dept: PEDIATRIC HEMATOLOGY/ONCOLOGY | Facility: CLINIC | Age: 25
End: 2025-08-13
Payer: MEDICAID

## 2025-08-13 ENCOUNTER — RESULT REVIEW (OUTPATIENT)
Age: 25
End: 2025-08-13

## 2025-08-13 VITALS
HEART RATE: 90 BPM | OXYGEN SATURATION: 90 % | DIASTOLIC BLOOD PRESSURE: 73 MMHG | SYSTOLIC BLOOD PRESSURE: 112 MMHG | WEIGHT: 163.58 LBS | TEMPERATURE: 98.24 F | RESPIRATION RATE: 18 BRPM | HEIGHT: 65.94 IN | BODY MASS INDEX: 26.61 KG/M2

## 2025-08-13 DIAGNOSIS — Z87.898 PERSONAL HISTORY OF OTHER SPECIFIED CONDITIONS: ICD-10-CM

## 2025-08-13 DIAGNOSIS — R41.844 FRONTAL LOBE AND EXECUTIVE FUNCTION DEFICIT: ICD-10-CM

## 2025-08-13 DIAGNOSIS — I61.9 NONTRAUMATIC INTRACEREBRAL HEMORRHAGE, UNSPECIFIED: ICD-10-CM

## 2025-08-13 DIAGNOSIS — Q20.4 DOUBLE INLET VENTRICLE: ICD-10-CM

## 2025-08-13 DIAGNOSIS — Z84.2 FAMILY HISTORY OF OTHER DISEASES OF THE GENITOURINARY SYSTEM: ICD-10-CM

## 2025-08-13 DIAGNOSIS — R18.8 OTHER ASCITES: ICD-10-CM

## 2025-08-13 DIAGNOSIS — Z87.74 PERSONAL HISTORY OF (CORRECTED) CONGENITAL MALFORMATIONS OF HEART AND CIRCULATORY SYSTEM: ICD-10-CM

## 2025-08-13 DIAGNOSIS — Z98.890 OTHER SPECIFIED POSTPROCEDURAL STATES: ICD-10-CM

## 2025-08-13 DIAGNOSIS — Z79.01 LONG TERM (CURRENT) USE OF ANTICOAGULANTS: ICD-10-CM

## 2025-08-13 DIAGNOSIS — Z82.49 FAMILY HISTORY OF ISCHEMIC HEART DISEASE AND OTHER DISEASES OF THE CIRCULATORY SYSTEM: ICD-10-CM

## 2025-08-13 PROCEDURE — 99213 OFFICE O/P EST LOW 20 MIN: CPT

## 2025-08-13 RX ORDER — APIXABAN 5 MG/1
5 TABLET, FILM COATED ORAL
Qty: 60 | Refills: 6 | Status: ACTIVE | COMMUNITY
Start: 2025-08-13 | End: 1900-01-01

## 2025-08-15 ENCOUNTER — APPOINTMENT (OUTPATIENT)
Dept: NEUROLOGY | Facility: CLINIC | Age: 25
End: 2025-08-15

## (undated) DEVICE — SUT VICRYL 0 27" CT-1 UNDYED

## (undated) DEVICE — GLV 7 PROTEXIS (WHITE)

## (undated) DEVICE — DRAPE MAYO STAND 23"

## (undated) DEVICE — PACK MINOR WITH LAP

## (undated) DEVICE — SUT SILK 2-0 30" SH

## (undated) DEVICE — DRAPE SNAP KOVER 36X28

## (undated) DEVICE — DRAPE COVER SNAP 36X30"

## (undated) DEVICE — SUT VICRYL 2-0 27" SH UNDYED

## (undated) DEVICE — POSITIONER STRAP ARMBOARD VELCRO TS-30

## (undated) DEVICE — DRAPE CAMERA COVER

## (undated) DEVICE — DRAPE LAPAROTOMY TRANSVERSE

## (undated) DEVICE — DRSG ALLEVYN GB LITE 2X4.75"

## (undated) DEVICE — DRSG DERMABOND 0.7ML

## (undated) DEVICE — GOWN LG

## (undated) DEVICE — DRAPE IOBAN 23" X 23"

## (undated) DEVICE — SUT MONOCRYL 5-0 18" P-2

## (undated) DEVICE — VENODYNE/SCD SLEEVE CALF MEDIUM

## (undated) DEVICE — BLADE STERILIZING

## (undated) DEVICE — CONNECTOR STRAIGHT 0.25 X 0.25"

## (undated) DEVICE — SUT ETHIBOND 2-0 36" SH

## (undated) DEVICE — SUT ETHIBOND 2-0 4-30" RB-1 WHITE

## (undated) DEVICE — SUT VICRYL 3-0 27" SH UNDYED

## (undated) DEVICE — SUT MONOCRYL 4-0 27" PS-2 UNDYED

## (undated) DEVICE — PACING CABLE SURGICAL 12FT WITH SMALL CLIP

## (undated) DEVICE — DRSG MEPILEX 10 X 10CM (4 X 4") LITE

## (undated) DEVICE — SUT PROLENE 5-0 30" RB-2

## (undated) DEVICE — DRSG DERMABOND PRINEO 22CM

## (undated) DEVICE — ELCTR GROUNDING PAD ADULT COVIDIEN

## (undated) DEVICE — SUCTION YANKAUER OPEN TIP NO VENT CURVE

## (undated) DEVICE — DRAPE 3/4 SHEET 52X76"

## (undated) DEVICE — SYR LUER LOK 10CC

## (undated) DEVICE — MARKING PEN W RULER

## (undated) DEVICE — PREP CHLORAPREP HI-LITE ORANGE 10.5ML

## (undated) DEVICE — WARMING BLANKET UPPER BODY PEDS